# Patient Record
Sex: FEMALE | Race: WHITE | NOT HISPANIC OR LATINO | Employment: OTHER | URBAN - METROPOLITAN AREA
[De-identification: names, ages, dates, MRNs, and addresses within clinical notes are randomized per-mention and may not be internally consistent; named-entity substitution may affect disease eponyms.]

---

## 2017-01-11 DIAGNOSIS — F41.1 GAD (GENERALIZED ANXIETY DISORDER): Chronic | ICD-10-CM

## 2017-01-12 RX ORDER — CLONAZEPAM 0.5 MG/1
0.5 TABLET ORAL 2 TIMES DAILY
Qty: 30 TABLET | Refills: 2 | Status: SHIPPED | OUTPATIENT
Start: 2017-01-12 | End: 2017-01-20 | Stop reason: SDUPTHER

## 2017-01-20 PROBLEM — R41.3 AMNESIA: Status: ACTIVE | Noted: 2017-01-20

## 2017-01-29 DIAGNOSIS — K21.9 GASTROESOPHAGEAL REFLUX DISEASE WITHOUT ESOPHAGITIS: Chronic | ICD-10-CM

## 2017-01-29 RX ORDER — PANTOPRAZOLE SODIUM 40 MG/1
TABLET, DELAYED RELEASE ORAL
Qty: 90 TABLET | Refills: 0 | Status: SHIPPED | OUTPATIENT
Start: 2017-01-29 | End: 2017-05-10 | Stop reason: SDUPTHER

## 2017-01-30 ENCOUNTER — CLINICAL SUPPORT (OUTPATIENT)
Dept: CARDIOLOGY | Facility: CLINIC | Age: 77
End: 2017-01-30
Payer: MEDICARE

## 2017-01-30 DIAGNOSIS — R05.9 COUGH: ICD-10-CM

## 2017-01-30 DIAGNOSIS — I51.89 LEFT VENTRICULAR DIASTOLIC DYSFUNCTION WITH PRESERVED SYSTOLIC FUNCTION: Chronic | ICD-10-CM

## 2017-01-30 DIAGNOSIS — I20.9 AP (ANGINA PECTORIS): Chronic | ICD-10-CM

## 2017-01-30 DIAGNOSIS — Z95.1 S/P CABG X 1: ICD-10-CM

## 2017-01-30 DIAGNOSIS — R06.02 SHORTNESS OF BREATH: ICD-10-CM

## 2017-01-30 DIAGNOSIS — I25.10 CAD, MULTIPLE VESSEL: ICD-10-CM

## 2017-01-30 LAB
AORTIC VALVE REGURGITATION: ABNORMAL
DIASTOLIC DYSFUNCTION: NO
ESTIMATED PA SYSTOLIC PRESSURE: 15.11
MITRAL VALVE MOBILITY: NORMAL
RETIRED EF AND QEF - SEE NOTES: 70 (ref 55–65)

## 2017-01-30 PROCEDURE — 93306 TTE W/DOPPLER COMPLETE: CPT | Mod: S$GLB,,, | Performed by: NUCLEAR MEDICINE

## 2017-02-09 RX ORDER — ESCITALOPRAM OXALATE 20 MG/1
20 TABLET ORAL DAILY
Qty: 12 TABLET | Refills: 0 | Status: SHIPPED | OUTPATIENT
Start: 2017-02-09 | End: 2017-03-10 | Stop reason: SDUPTHER

## 2017-02-09 NOTE — TELEPHONE ENCOUNTER
----- Message from Chilo Schwarz sent at 2/9/2017  1:30 PM CST -----  Contact: Sadie ( [facility] )   Sadie ( [facility] ) is requesting a call from nurse to discuss a change in pt medication.              Please call Sadie ( [facility] ) back at [phone]

## 2017-02-10 DIAGNOSIS — J44.1 COPD EXACERBATION: ICD-10-CM

## 2017-02-10 RX ORDER — CLOPIDOGREL BISULFATE 75 MG/1
TABLET ORAL
Qty: 90 TABLET | Refills: 3 | Status: SHIPPED | OUTPATIENT
Start: 2017-02-10 | End: 2017-06-12 | Stop reason: SDUPTHER

## 2017-02-10 RX ORDER — HYDROCHLOROTHIAZIDE 25 MG/1
TABLET ORAL
Qty: 90 TABLET | Refills: 3 | Status: SHIPPED | OUTPATIENT
Start: 2017-02-10 | End: 2017-03-29 | Stop reason: SDUPTHER

## 2017-02-10 RX ORDER — PREDNISONE 20 MG/1
TABLET ORAL
Qty: 20 TABLET | Refills: 0 | Status: SHIPPED | OUTPATIENT
Start: 2017-02-10 | End: 2017-09-14

## 2017-02-13 RX ORDER — ATORVASTATIN CALCIUM 80 MG/1
TABLET, FILM COATED ORAL
Qty: 12 TABLET | Refills: 0 | Status: SHIPPED | OUTPATIENT
Start: 2017-02-13 | End: 2017-03-10 | Stop reason: SDUPTHER

## 2017-02-24 ENCOUNTER — LAB VISIT (OUTPATIENT)
Dept: LAB | Facility: HOSPITAL | Age: 77
End: 2017-02-24
Attending: INTERNAL MEDICINE
Payer: MEDICARE

## 2017-02-24 DIAGNOSIS — E11.69 HYPERLIPIDEMIA ASSOCIATED WITH TYPE 2 DIABETES MELLITUS: Chronic | ICD-10-CM

## 2017-02-24 DIAGNOSIS — R06.02 SHORTNESS OF BREATH: ICD-10-CM

## 2017-02-24 DIAGNOSIS — I51.89 LEFT VENTRICULAR DIASTOLIC DYSFUNCTION WITH PRESERVED SYSTOLIC FUNCTION: Chronic | ICD-10-CM

## 2017-02-24 DIAGNOSIS — R05.9 COUGH: ICD-10-CM

## 2017-02-24 DIAGNOSIS — E78.5 HYPERLIPIDEMIA ASSOCIATED WITH TYPE 2 DIABETES MELLITUS: Chronic | ICD-10-CM

## 2017-02-24 LAB
ALBUMIN SERPL BCP-MCNC: 3.3 G/DL
ALP SERPL-CCNC: 86 U/L
ALT SERPL W/O P-5'-P-CCNC: 17 U/L
ANION GAP SERPL CALC-SCNC: 9 MMOL/L
AST SERPL-CCNC: 13 U/L
BILIRUB SERPL-MCNC: 0.8 MG/DL
BNP SERPL-MCNC: 131 PG/ML
BUN SERPL-MCNC: 17 MG/DL
CALCIUM SERPL-MCNC: 9.3 MG/DL
CHLORIDE SERPL-SCNC: 102 MMOL/L
CHOLEST/HDLC SERPL: 2.6 {RATIO}
CO2 SERPL-SCNC: 31 MMOL/L
CREAT SERPL-MCNC: 0.9 MG/DL
EST. GFR  (AFRICAN AMERICAN): >60 ML/MIN/1.73 M^2
EST. GFR  (NON AFRICAN AMERICAN): >60 ML/MIN/1.73 M^2
GLUCOSE SERPL-MCNC: 160 MG/DL
HDL/CHOLESTEROL RATIO: 37.8 %
HDLC SERPL-MCNC: 193 MG/DL
HDLC SERPL-MCNC: 73 MG/DL
LDLC SERPL CALC-MCNC: 98 MG/DL
NONHDLC SERPL-MCNC: 120 MG/DL
POTASSIUM SERPL-SCNC: 3.4 MMOL/L
PROT SERPL-MCNC: 7.2 G/DL
SODIUM SERPL-SCNC: 142 MMOL/L
TRIGL SERPL-MCNC: 110 MG/DL

## 2017-02-24 PROCEDURE — 83880 ASSAY OF NATRIURETIC PEPTIDE: CPT

## 2017-02-24 PROCEDURE — 80053 COMPREHEN METABOLIC PANEL: CPT

## 2017-02-24 PROCEDURE — 36415 COLL VENOUS BLD VENIPUNCTURE: CPT

## 2017-02-24 PROCEDURE — 80061 LIPID PANEL: CPT

## 2017-03-10 RX ORDER — ALENDRONATE SODIUM 70 MG/1
TABLET ORAL
Qty: 12 TABLET | Refills: 0 | Status: SHIPPED | OUTPATIENT
Start: 2017-03-10 | End: 2017-05-11 | Stop reason: SDUPTHER

## 2017-03-13 RX ORDER — ATORVASTATIN CALCIUM 80 MG/1
80 TABLET, FILM COATED ORAL DAILY
Qty: 14 TABLET | Refills: 0 | Status: SHIPPED | OUTPATIENT
Start: 2017-03-13 | End: 2017-03-14 | Stop reason: SDUPTHER

## 2017-03-13 RX ORDER — ESCITALOPRAM OXALATE 20 MG/1
20 TABLET ORAL DAILY
Qty: 14 TABLET | Refills: 0 | Status: SHIPPED | OUTPATIENT
Start: 2017-03-13 | End: 2017-03-14 | Stop reason: SDUPTHER

## 2017-03-13 NOTE — TELEPHONE ENCOUNTER
Pt needs an appt. Last office visit with Dr. Wood was 10/2015. Will send 2 week supply of medication until chronic care appt

## 2017-03-15 RX ORDER — ESCITALOPRAM OXALATE 20 MG/1
20 TABLET ORAL DAILY
Qty: 90 TABLET | Refills: 0 | Status: SHIPPED | OUTPATIENT
Start: 2017-03-15 | End: 2017-06-12 | Stop reason: SDUPTHER

## 2017-03-15 RX ORDER — FOSINOPIRL SODIUM 10 MG/1
TABLET ORAL
Qty: 90 TABLET | Refills: 3 | Status: SHIPPED | OUTPATIENT
Start: 2017-03-15 | End: 2017-05-09 | Stop reason: ALTCHOICE

## 2017-03-15 RX ORDER — ATORVASTATIN CALCIUM 80 MG/1
80 TABLET, FILM COATED ORAL DAILY
Qty: 90 TABLET | Refills: 0 | Status: SHIPPED | OUTPATIENT
Start: 2017-03-15 | End: 2017-06-12 | Stop reason: SDUPTHER

## 2017-03-29 ENCOUNTER — OFFICE VISIT (OUTPATIENT)
Dept: CARDIOLOGY | Facility: CLINIC | Age: 77
End: 2017-03-29
Payer: MEDICARE

## 2017-03-29 VITALS
DIASTOLIC BLOOD PRESSURE: 44 MMHG | SYSTOLIC BLOOD PRESSURE: 118 MMHG | WEIGHT: 153.44 LBS | HEIGHT: 59 IN | HEART RATE: 60 BPM | BODY MASS INDEX: 30.93 KG/M2

## 2017-03-29 DIAGNOSIS — I25.10 CORONARY ARTERY DISEASE, ANGINA PRESENCE UNSPECIFIED, UNSPECIFIED VESSEL OR LESION TYPE, UNSPECIFIED WHETHER NATIVE OR TRANSPLANTED HEART: ICD-10-CM

## 2017-03-29 DIAGNOSIS — I25.10 CAD, MULTIPLE VESSEL: ICD-10-CM

## 2017-03-29 DIAGNOSIS — E11.69 HYPERLIPIDEMIA ASSOCIATED WITH TYPE 2 DIABETES MELLITUS: Chronic | ICD-10-CM

## 2017-03-29 DIAGNOSIS — E78.5 HYPERLIPIDEMIA ASSOCIATED WITH TYPE 2 DIABETES MELLITUS: Chronic | ICD-10-CM

## 2017-03-29 DIAGNOSIS — I51.89 LEFT VENTRICULAR DIASTOLIC DYSFUNCTION WITH PRESERVED SYSTOLIC FUNCTION: Chronic | ICD-10-CM

## 2017-03-29 DIAGNOSIS — I15.2 HYPERTENSION ASSOCIATED WITH DIABETES: ICD-10-CM

## 2017-03-29 DIAGNOSIS — E11.59 HYPERTENSION ASSOCIATED WITH DIABETES: ICD-10-CM

## 2017-03-29 DIAGNOSIS — E11.65 TYPE 2 DIABETES MELLITUS WITH HYPERGLYCEMIA, WITHOUT LONG-TERM CURRENT USE OF INSULIN: Chronic | ICD-10-CM

## 2017-03-29 DIAGNOSIS — I20.9 AP (ANGINA PECTORIS): Primary | Chronic | ICD-10-CM

## 2017-03-29 DIAGNOSIS — Z95.1 S/P CABG X 1: ICD-10-CM

## 2017-03-29 PROCEDURE — 1159F MED LIST DOCD IN RCRD: CPT | Mod: S$GLB,,, | Performed by: INTERNAL MEDICINE

## 2017-03-29 PROCEDURE — 1160F RVW MEDS BY RX/DR IN RCRD: CPT | Mod: S$GLB,,, | Performed by: INTERNAL MEDICINE

## 2017-03-29 PROCEDURE — 99999 PR PBB SHADOW E&M-EST. PATIENT-LVL II: CPT | Mod: PBBFAC,,, | Performed by: INTERNAL MEDICINE

## 2017-03-29 PROCEDURE — 3074F SYST BP LT 130 MM HG: CPT | Mod: S$GLB,,, | Performed by: INTERNAL MEDICINE

## 2017-03-29 PROCEDURE — 1157F ADVNC CARE PLAN IN RCRD: CPT | Mod: S$GLB,,, | Performed by: INTERNAL MEDICINE

## 2017-03-29 PROCEDURE — 99213 OFFICE O/P EST LOW 20 MIN: CPT | Mod: S$GLB,,, | Performed by: INTERNAL MEDICINE

## 2017-03-29 PROCEDURE — 3078F DIAST BP <80 MM HG: CPT | Mod: S$GLB,,, | Performed by: INTERNAL MEDICINE

## 2017-03-29 RX ORDER — ISOSORBIDE MONONITRATE 60 MG/1
60 TABLET, EXTENDED RELEASE ORAL DAILY
COMMUNITY
End: 2017-06-12 | Stop reason: SDUPTHER

## 2017-03-29 RX ORDER — LEVOTHYROXINE SODIUM 50 UG/1
50 TABLET ORAL DAILY
COMMUNITY
End: 2017-06-12 | Stop reason: SDUPTHER

## 2017-03-29 NOTE — MR AVS SNAPSHOT
[facility]  [id number] [place] [id number]  Phone: [phone]  Fax: [phone]                  Tiffanie Wise   3/29/2017 2:00 PM   Office Visit    Description:  Female : 1940   Provider:  Brendan Short MD   Department:  [facility]           Reason for Visit     Coronary Artery Disease     Hypertension     Hyperlipidemia           Diagnoses this Visit        Comments    AP (angina pectoris)    -  Primary     Hyperlipidemia associated with type 2 diabetes mellitus         Type 2 diabetes mellitus with hyperglycemia, without long-term current use of insulin         Left ventricular diastolic dysfunction with preserved systolic function         S/P CABG x 1         Hypertension associated with diabetes         CAD, multiple vessel         Coronary artery disease, angina presence unspecified, unspecified vessel or lesion type, unspecified whether native or transplanted heart                To Do List           Goals (5 Years of Data)     None      Follow-Up and Disposition     Return in about 6 months (around 2017).      [facility] On Call     [facility] On Call Nurse Care Line -  Assistance  Registered nurses in the [facility] Call Center provide clinical advisement, health education, appointment booking, and other advisory services.  Call for this free service at [phone].             Medications           Message regarding Medications     Verify the changes and/or additions to your medication regime listed below are the same as discussed with your clinician today.  If any of these changes or additions are incorrect, please notify your healthcare provider.        STOP taking these medications     acetaminophen (TYLENOL) 325 MG tablet Take 325 mg by mouth every 6 (six) hours as needed for Pain.           Verify that the below list of medications is an accurate representation of the medications you are currently taking.  If none reported, the list may  be blank. If incorrect, please contact your healthcare provider. Carry this list with you in case of emergency.           Current Medications     albuterol-ipratropium 2.5mg-0.5mg/3mL (DUO-NEB) 0.5 mg-3 mg(2.5 mg base)/3 mL nebulizer solution USE 3 ML VIA NEBULIZER EVERY 6 HOURS    alendronate (FOSAMAX) 70 MG tablet TAKE 1 TABLET BY MOUTH EVERY WEEK WITH A FULL GLASS OF WATER ON EMPTY STOMACH. DO NOT EAT OR LIE DOWN FOR 30 MINUTES    aspirin (ECOTRIN) 81 MG EC tablet Take 81 mg by mouth. Take 81 mg by mouth daily.    atorvastatin (LIPITOR) 80 MG tablet Take 1 tablet (80 mg total) by mouth once daily.    CANAGLIFLOZIN/METFORMIN HCL (INVOKAMET ORAL) Take 50 mg by mouth once daily. Takes two tablets by mouth daily    clonazePAM (KLONOPIN) 0.5 MG tablet Take 1 tablet (0.5 mg total) by mouth 2 (two) times daily as needed for Anxiety. as needed for anxiety.    clopidogrel (PLAVIX) 75 mg tablet TAKE 1 TABLET BY MOUTH DAILY    donepezil (ARICEPT) 10 MG tablet     ERGOCALCIFEROL, VITAMIN D2, (VITAMIN D ORAL) Take by mouth.    escitalopram oxalate (LEXAPRO) 20 MG tablet Take 1 tablet (20 mg total) by mouth once daily.    fluticasone (FLONASE) 50 mcg/actuation nasal spray 1 spray by Each Nare route once daily.    fosinopril (MONOPRIL) 10 MG Tab TAKE 1 TABLET BY MOUTH DAILY    hydrochlorothiazide (HYDRODIURIL) 25 MG tablet TAKE 1 TABLET BY MOUTH EVERY DAY    insulin lispro (HUMALOG) 100 unit/mL Crtg Inject 40 Units into the skin 2 (two) times daily. Takes 40 units in the morning and 25 units at night    isosorbide mononitrate (IMDUR) 60 MG 24 hr tablet Take 60 mg by mouth once daily.    levothyroxine (SYNTHROID) 50 MCG tablet Take 50 mcg by mouth once daily.    losartan (COZAAR) 25 MG tablet Take 1 tablet (25 mg total) by mouth once daily.    metoprolol succinate (TOPROL-XL) 50 MG 24 hr tablet TAKE 1 TABLET(50 MG) BY MOUTH EVERY DAY    pantoprazole (PROTONIX) 40 MG tablet TAKE 1 TABLET BY MOUTH DAILY    albuterol 90  "mcg/actuation inhaler Inhale 2 puffs into the lungs every 6 (six) hours.    ALCOHOL PADS PadM     BD INSULIN SYRINGE ULT-FINE II 1/2 mL 31 x 5/16" Syrg     BLOOD SUGAR DIAGNOSTIC (TRUETEST TEST STRIPS MISC) by Misc.(Non-Drug; Combo Route) route. Pt is testing 3 times a day    EASY COMFORT LANCETS 30 gauge [facility]     EASY TALK GLUCOSE TEST Strp     EASY TALK LOW CONTROL Soln     hydrocodone-acetaminophen 5-325mg (NORCO) 5-325 mg per tablet Take 1 tablet by mouth every 6 (six) hours as needed for Pain.    insulin NPH (HUMULIN N) 100 unit/mL injection Inject into the skin. 40 units in the morning and 25 units in the evening    NITROSTAT 0.4 mg SL tablet DISSOLVE 1 TABLET UNDER THE TONGUE AS NEEDED FOR CHEST PAIN AS DIRECTED    pen needle, diabetic 31 gauge x 5/16" Ndle USE TWICE DAILY AND PRN    predniSONE (DELTASONE) 20 MG tablet TAKE 3 TABLETS BY MOUTH DAILY FOR 3 DAYS, 2 TABLETS DAILY FOR 3 DAYS, 1 TABLET DAILY FOR 3 DAYS, 1/2 TABLET DAILY FOR 3 DAYS    SITagliptan (JANUVIA) 100 MG Tab Take 100 mg by mouth once daily.           Clinical Reference Information           Your Vitals Were     BP Pulse Height Weight BMI    118/44 60 4' 11" (1.499 m) 69.6 kg (153 lb 7 oz) 30.99 kg/m2      Blood Pressure          Most Recent Value    Right Arm BP - Sitting  118/44    Left Arm BP - Sitting  110/46    BP  (!)  118/44      Allergies as of 3/29/2017     No Known Allergies      Immunizations Administered on Date of Encounter - 3/29/2017     None      [facility] Sign-Up     Activating your [facility] account is as easy as 1-2-3!     1) Visit [email or web address], select Sign Up Now, enter this activation code and your date of birth, then select Next.  [email or web address]  Expires: 5/13/2017  2:44 PM      2) Create a username and password to use when you visit [facility] in the future and select a security question in case you lose your password and select Next.    3) Enter your e-mail address and click Sign Up!    Additional Information  If " you have questions, please e-mail [email or web address] or call [phone] to talk to our [facility] staff. Remember, [facility] is NOT to be used for urgent needs. For medical emergencies, dial 911.         Smoking Cessation     If you would like to quit smoking:   You may be eligible for free services if you are a [place] resident and started smoking cigarettes before September 1, 1988.  Call the Smoking Cessation Trust ([facility]) toll free at [phone] or [phone].   Call 4-810-QUIT-NOW if you do not meet the above criteria.            Language Assistance Services     ATTENTION: Language assistance services are available, free of charge. Please call [phone].      ATENCIÓN: Si nicholas zarate, tiene a barrios disposición servicios gratuitos de asistencia lingüística. Llame al [phone].     CHÚ Ý: N?u b?n nói Ti?ng Vi?t, có các d?ch v? h? tr? ngôn ng? mi?n phí dành cho b?n. G?i s? [phone].         [facility] complies with applicable Federal civil rights laws and does not discriminate on the basis of race, color, national origin, age, disability, or sex.

## 2017-03-29 NOTE — PROGRESS NOTES
Subjective:    Patient ID:  Tiffanie Wise is a 76 y.o. female who presents for evaluation of Coronary Artery Disease (3 mo f/u); Hypertension; and Hyperlipidemia      HPI Mrs. Wise returns for f/u.  Her current medical conditions include CAD (CABG LIMA-LAD 6/16), DM, HTN, dyslipidemia, GERD. Nonsmoker.      Her prior history is pertinent for following:      NSTEMI 11/10 and underwent LHC and sucessful stenting of 90% RCA (3 x 23 mm Promus BRIANDA).    S/p PCI of 70% mid-LCX 12/12 with Xience BRIANDA.    S/p LHC for worsening anginal sxs on 11/21/13 with successful PCI 90% ISR mid-LCX with Xience BRIANDA.    S/p unstable angina and had PTCA of ISR of her LCX March 2014.  S/p 7/14 PTCA of severe LCX/OM ISR and PTCA of distal LCX at her bifurcation lesion at stent site July 2014 for ACS.  S/p 10/14 repeat revascularization of severe LCX ISR with cutting balloon Oct 2014 for ACS.  S/p 12/14 admit with unstable angina. She had ISR LCX again, Resolute BRIANDA implanted with good results.    Pt underwent repeat LHC again late June 2016 for unstable anginal sxs and had severe 95% distal LAD stenosis not amenable to PCI.  She had patent LCX, RCA stents, chronic occlusion of R PLB, normal LVEF.  She had urgent CABG w LIMA-LAD June 2016.  Now here for f/u.  Seems ok.  Has residual sternotomy discomfort but no cp like her prior angina since her cabg.  Has not had to take sl ntg.  Stable SHERMAN, unchanged.  BP controlled on current meds.  Working with Dr. De Leon, endocrine, on her DM.  Lipids controlled on statin tx.    Review of Systems   Constitution: Positive for malaise/fatigue.   HENT: Negative.    Eyes: Negative.    Cardiovascular: Positive for chest pain and dyspnea on exertion.   Respiratory: Positive for shortness of breath.    Endocrine: Negative.    Hematologic/Lymphatic: Negative.    Skin: Negative.    Musculoskeletal: Negative.    Gastrointestinal: Negative.    Genitourinary: Negative.    Neurological: Negative.   "  Psychiatric/Behavioral: Negative.    Allergic/Immunologic: Negative.        BP (!) 118/44  Pulse 60  Ht 4' 11" (1.499 m)  Wt 69.6 kg (153 lb 7 oz)  BMI 30.99 kg/m2     Wt Readings from Last 3 Encounters:   03/29/17 69.6 kg (153 lb 7 oz)   01/20/17 68.5 kg (151 lb)   12/29/16 70.1 kg (154 lb 8.7 oz)     Temp Readings from Last 3 Encounters:   10/13/16 98.2 °F (36.8 °C) (Tympanic)   07/08/16 98.4 °F (36.9 °C) (Oral)   06/03/16 97.2 °F (36.2 °C) (Tympanic)     BP Readings from Last 3 Encounters:   03/29/17 (!) 118/44   01/20/17 (!) 152/68   12/29/16 122/62     Pulse Readings from Last 3 Encounters:   03/29/17 60   01/20/17 (!) 53   12/29/16 60            Objective:    Physical Exam   Constitutional: She is oriented to person, place, and time. Vital signs are normal. She appears well-developed and well-nourished. She is active and cooperative. She does not have a sickly appearance. She does not appear ill. No distress.   HENT:   Head: Normocephalic.   Neck: Neck supple. No JVD present. Carotid bruit is not present.   Cardiovascular: Normal rate, regular rhythm, S1 normal, S2 normal, normal heart sounds and normal pulses.  PMI is not displaced.  Exam reveals no gallop and no friction rub.    No murmur heard.  Pulses:       Radial pulses are 2+ on the right side, and 2+ on the left side.   Pulmonary/Chest: Effort normal and breath sounds normal. She has no wheezes. She has no rales.   Abdominal: Soft. Normal appearance, normal aorta and bowel sounds are normal. There is no tenderness.   Musculoskeletal: She exhibits no edema.   Lymphadenopathy:     She has no cervical adenopathy.   Neurological: She is alert and oriented to person, place, and time.   Skin: Skin is warm. She is not diaphoretic.   Psychiatric: She has a normal mood and affect. Her behavior is normal.   Nursing note and vitals reviewed.      I have reviewed all pertinent labs and cardiac studies.    Lab Results   Component Value Date    WBC 12.00 " 07/07/2016    HGB 12.2 07/07/2016    HCT 38.8 07/07/2016    MCV 80 (L) 07/07/2016     (H) 07/07/2016     Lab Results   Component Value Date    HGBA1C 7.7 (H) 09/13/2016     Lab Results   Component Value Date    CHOL 193 02/24/2017    CHOL 184 01/08/2016    CHOL 154 02/25/2015     Lab Results   Component Value Date    HDL 73 02/24/2017    HDL 61 01/08/2016    HDL 61 02/25/2015     Lab Results   Component Value Date    LDLCALC 98.0 02/24/2017    LDLCALC 108.4 01/08/2016    LDLCALC 74.4 02/25/2015     Lab Results   Component Value Date    TRIG 110 02/24/2017    TRIG 73 01/08/2016    TRIG 93 02/25/2015     Lab Results   Component Value Date    CHOLHDL 37.8 02/24/2017    CHOLHDL 33.2 01/08/2016    CHOLHDL 39.6 02/25/2015           Assessment:       1. AP (angina pectoris)    2. Hyperlipidemia associated with type 2 diabetes mellitus    3. Type 2 diabetes mellitus with hyperglycemia, without long-term current use of insulin    4. Left ventricular diastolic dysfunction with preserved systolic function    5. S/P CABG x 1    6. Hypertension associated with diabetes    7. CAD, multiple vessel    8. Coronary artery disease, angina presence unspecified, unspecified vessel or lesion type, unspecified whether native or transplanted heart         Plan:             STABLE CAD S/P CABG 2016.  NO ANGINAL SXS SINCE CABG AND SHE HAS BEEN TAKEN OFF RANEXA AND IS ON LOWER IMDUR DOSE NOW.  STERNOTOMY DISCOMFORT STABLE.  NO CHANGES IN MEDS TODAY.  CONTINUE OPTIMAL MEDICAL TX FOR CAD.  WORK ON LOWERING DM TO GOAL.  CARDIAC  DIET  F/U 6 MONTHS.

## 2017-05-09 ENCOUNTER — TELEPHONE (OUTPATIENT)
Dept: CARDIOLOGY | Facility: CLINIC | Age: 77
End: 2017-05-09

## 2017-05-09 NOTE — TELEPHONE ENCOUNTER
I have attempted without success to contact this patient by phone. Left message to return call.    Medication list updated.

## 2017-05-09 NOTE — TELEPHONE ENCOUNTER
----- Message from Viry Ríos sent at 5/9/2017 12:22 PM CDT -----  Contact: daughter-Connie  Pt daughter-Connie states her endo wants pt to get off Lisinopril because on 2 blood pressure on losartan and daughter wants to know if it is okay to take her off the lisinopril or ween off.....[phone]

## 2017-05-09 NOTE — TELEPHONE ENCOUNTER
Patient daughter states her Endocrinologist wants patient to stop Fosinopril because she is also on Losartan and they do the same thing. Daughter wants to know if she should just stop Fosinopril or wean patient off. Please advise?

## 2017-05-10 DIAGNOSIS — K21.9 GASTROESOPHAGEAL REFLUX DISEASE WITHOUT ESOPHAGITIS: Chronic | ICD-10-CM

## 2017-05-10 RX ORDER — PANTOPRAZOLE SODIUM 40 MG/1
TABLET, DELAYED RELEASE ORAL
Qty: 90 TABLET | Refills: 0 | Status: SHIPPED | OUTPATIENT
Start: 2017-05-10 | End: 2017-06-12 | Stop reason: SDUPTHER

## 2017-05-11 RX ORDER — ALENDRONATE SODIUM 70 MG/1
TABLET ORAL
Qty: 12 TABLET | Refills: 0 | Status: SHIPPED | OUTPATIENT
Start: 2017-05-11 | End: 2017-06-12 | Stop reason: SDUPTHER

## 2017-05-11 NOTE — TELEPHONE ENCOUNTER
Last office visit 10/13/2016. Last refill date 03/10/2017. Pt is requesting a refill on alendronate 70mg  #12  1tablet once a week.

## 2017-06-05 ENCOUNTER — PATIENT MESSAGE (OUTPATIENT)
Dept: CARDIOLOGY | Facility: CLINIC | Age: 77
End: 2017-06-05

## 2017-06-12 RX ORDER — ATORVASTATIN CALCIUM 80 MG/1
80 TABLET, FILM COATED ORAL DAILY
Qty: 90 TABLET | Refills: 0 | Status: CANCELLED | OUTPATIENT
Start: 2017-06-12

## 2017-06-12 RX ORDER — ESCITALOPRAM OXALATE 20 MG/1
20 TABLET ORAL DAILY
Qty: 90 TABLET | Refills: 0 | Status: CANCELLED | OUTPATIENT
Start: 2017-06-12

## 2017-06-15 ENCOUNTER — TELEPHONE (OUTPATIENT)
Dept: CARDIOLOGY | Facility: CLINIC | Age: 77
End: 2017-06-15

## 2017-06-15 NOTE — TELEPHONE ENCOUNTER
----- Message from Billie Harrison sent at 6/15/2017  3:01 PM CDT -----  Contact: Philip/[facility]  States he's calling to verify a prescription. Please call Kole at [phone]. Thank you

## 2017-06-16 DIAGNOSIS — E78.00 PURE HYPERCHOLESTEROLEMIA: ICD-10-CM

## 2017-06-16 DIAGNOSIS — F41.9 ANXIETY: ICD-10-CM

## 2017-06-16 RX ORDER — ESCITALOPRAM OXALATE 20 MG/1
20 TABLET ORAL DAILY
Qty: 90 TABLET | Refills: 0 | Status: SHIPPED | OUTPATIENT
Start: 2017-06-16 | End: 2017-06-20 | Stop reason: SDUPTHER

## 2017-06-16 RX ORDER — ATORVASTATIN CALCIUM 80 MG/1
80 TABLET, FILM COATED ORAL DAILY
Qty: 90 TABLET | Refills: 3 | Status: SHIPPED | OUTPATIENT
Start: 2017-06-16 | End: 2017-06-20 | Stop reason: SDUPTHER

## 2017-06-16 NOTE — TELEPHONE ENCOUNTER
Meds were originally sent to [facility] (mail order). Please send to Philip on [address] [place].

## 2017-08-07 DIAGNOSIS — K21.9 GASTROESOPHAGEAL REFLUX DISEASE WITHOUT ESOPHAGITIS: Chronic | ICD-10-CM

## 2017-08-07 RX ORDER — PANTOPRAZOLE SODIUM 40 MG/1
TABLET, DELAYED RELEASE ORAL
Qty: 90 TABLET | Refills: 0 | Status: SHIPPED | OUTPATIENT
Start: 2017-08-07 | End: 2018-12-31 | Stop reason: SDUPTHER

## 2017-08-15 RX ORDER — NITROGLYCERIN 0.4 MG/1
TABLET SUBLINGUAL
Qty: 25 TABLET | Refills: 12 | Status: SHIPPED | OUTPATIENT
Start: 2017-08-15 | End: 2018-07-17 | Stop reason: SDUPTHER

## 2017-08-23 ENCOUNTER — TELEPHONE (OUTPATIENT)
Dept: CARDIOLOGY | Facility: CLINIC | Age: 77
End: 2017-08-23

## 2017-08-23 NOTE — TELEPHONE ENCOUNTER
----- Message from Stacey Pina sent at 8/22/2017  3:51 PM CDT -----  Contact: Self  Good afternoon,     Pt would like a call back regarding rescheduling appt on 09/21/17. Pt stated she saw her Endocrinologist and was advised to see Dr. Short sooner than her current appt.    Pt can be reached at [phone].    Thank you!

## 2017-08-23 NOTE — TELEPHONE ENCOUNTER
----- Message from Fidelia Hair sent at 8/23/2017  8:36 AM CDT -----  needs callback rg poss being worked in before 9/21...[phone] (home)

## 2017-09-14 ENCOUNTER — OFFICE VISIT (OUTPATIENT)
Dept: CARDIOLOGY | Facility: CLINIC | Age: 77
End: 2017-09-14
Payer: MEDICARE

## 2017-09-14 ENCOUNTER — CLINICAL SUPPORT (OUTPATIENT)
Dept: CARDIOLOGY | Facility: CLINIC | Age: 77
End: 2017-09-14
Payer: MEDICARE

## 2017-09-14 VITALS
HEIGHT: 59 IN | DIASTOLIC BLOOD PRESSURE: 74 MMHG | SYSTOLIC BLOOD PRESSURE: 144 MMHG | WEIGHT: 160.81 LBS | BODY MASS INDEX: 32.42 KG/M2 | HEART RATE: 59 BPM

## 2017-09-14 DIAGNOSIS — I15.2 HYPERTENSION ASSOCIATED WITH DIABETES: ICD-10-CM

## 2017-09-14 DIAGNOSIS — E11.65 TYPE 2 DIABETES MELLITUS WITH HYPERGLYCEMIA, WITH LONG-TERM CURRENT USE OF INSULIN: ICD-10-CM

## 2017-09-14 DIAGNOSIS — Z95.1 S/P CABG X 1: ICD-10-CM

## 2017-09-14 DIAGNOSIS — E11.59 HYPERTENSION ASSOCIATED WITH DIABETES: ICD-10-CM

## 2017-09-14 DIAGNOSIS — I25.10 CAD, MULTIPLE VESSEL: ICD-10-CM

## 2017-09-14 DIAGNOSIS — E11.65 TYPE 2 DIABETES MELLITUS WITH HYPERGLYCEMIA, WITHOUT LONG-TERM CURRENT USE OF INSULIN: Chronic | ICD-10-CM

## 2017-09-14 DIAGNOSIS — Z79.4 TYPE 2 DIABETES MELLITUS WITH HYPERGLYCEMIA, WITH LONG-TERM CURRENT USE OF INSULIN: ICD-10-CM

## 2017-09-14 DIAGNOSIS — I25.10 CORONARY ARTERY DISEASE, ANGINA PRESENCE UNSPECIFIED, UNSPECIFIED VESSEL OR LESION TYPE, UNSPECIFIED WHETHER NATIVE OR TRANSPLANTED HEART: ICD-10-CM

## 2017-09-14 DIAGNOSIS — I51.89 LEFT VENTRICULAR DIASTOLIC DYSFUNCTION WITH PRESERVED SYSTOLIC FUNCTION: Primary | Chronic | ICD-10-CM

## 2017-09-14 DIAGNOSIS — I25.810 CORONARY ARTERY DISEASE INVOLVING CORONARY BYPASS GRAFT OF NATIVE HEART, ANGINA PRESENCE UNSPECIFIED: Primary | ICD-10-CM

## 2017-09-14 DIAGNOSIS — G47.33 OSA (OBSTRUCTIVE SLEEP APNEA): Chronic | ICD-10-CM

## 2017-09-14 DIAGNOSIS — I25.810 CORONARY ARTERY DISEASE INVOLVING CORONARY BYPASS GRAFT OF NATIVE HEART, ANGINA PRESENCE UNSPECIFIED: ICD-10-CM

## 2017-09-14 DIAGNOSIS — I20.9 AP (ANGINA PECTORIS): Chronic | ICD-10-CM

## 2017-09-14 DIAGNOSIS — E78.5 HYPERLIPIDEMIA ASSOCIATED WITH TYPE 2 DIABETES MELLITUS: Chronic | ICD-10-CM

## 2017-09-14 DIAGNOSIS — E11.69 HYPERLIPIDEMIA ASSOCIATED WITH TYPE 2 DIABETES MELLITUS: Chronic | ICD-10-CM

## 2017-09-14 DIAGNOSIS — R06.02 SHORTNESS OF BREATH: ICD-10-CM

## 2017-09-14 PROCEDURE — 99214 OFFICE O/P EST MOD 30 MIN: CPT | Mod: S$GLB,,, | Performed by: INTERNAL MEDICINE

## 2017-09-14 PROCEDURE — 3077F SYST BP >= 140 MM HG: CPT | Mod: S$GLB,,, | Performed by: INTERNAL MEDICINE

## 2017-09-14 PROCEDURE — 3078F DIAST BP <80 MM HG: CPT | Mod: S$GLB,,, | Performed by: INTERNAL MEDICINE

## 2017-09-14 PROCEDURE — 93000 ELECTROCARDIOGRAM COMPLETE: CPT | Mod: S$GLB,,, | Performed by: INTERNAL MEDICINE

## 2017-09-14 PROCEDURE — 1159F MED LIST DOCD IN RCRD: CPT | Mod: S$GLB,,, | Performed by: INTERNAL MEDICINE

## 2017-09-14 PROCEDURE — 99999 PR PBB SHADOW E&M-EST. PATIENT-LVL III: CPT | Mod: PBBFAC,,, | Performed by: INTERNAL MEDICINE

## 2017-09-14 PROCEDURE — 3008F BODY MASS INDEX DOCD: CPT | Mod: S$GLB,,, | Performed by: INTERNAL MEDICINE

## 2017-09-14 PROCEDURE — 99499 UNLISTED E&M SERVICE: CPT | Mod: S$GLB,,, | Performed by: INTERNAL MEDICINE

## 2017-09-14 RX ORDER — CHOLECALCIFEROL (VITAMIN D3) 25 MCG
1000 TABLET ORAL DAILY
COMMUNITY
End: 2017-11-06

## 2017-09-14 RX ORDER — ISOSORBIDE MONONITRATE 30 MG/1
30 TABLET, EXTENDED RELEASE ORAL NIGHTLY
Qty: 30 TABLET | Refills: 11 | Status: ON HOLD | OUTPATIENT
Start: 2017-09-14 | End: 2017-12-22 | Stop reason: HOSPADM

## 2017-09-14 NOTE — PROGRESS NOTES
Subjective:    Patient ID:  Tiffanie Wise is a 77 y.o. female who presents for evaluation of Coronary Artery Disease; Hyperlipidemia; Chest Pain; Shortness of Breath; and Hypertension      HPI Mrs. Wise returns for f/u.  Her current medical conditions include CAD (CABG LIMA-LAD 6/16), DM, HTN, dyslipidemia, GERD. Nonsmoker.  Her prior history is pertinent for following:  NSTEMI 11/10 and underwent LHC and sucessful stenting of 90% RCA (3 x 23 mm Promus BRIANDA).    S/p PCI of 70% mid-LCX 12/12 with Xience BRIANDA.    S/p LHC for worsening anginal sxs on 11/21/13 with successful PCI 90% ISR mid-LCX with Xience BRIANDA.    S/p unstable angina and had PTCA of ISR of her LCX March 2014.  S/p 7/14 PTCA of severe LCX/OM ISR and PTCA of distal LCX at her bifurcation lesion at stent site July 2014 for ACS.  S/p 10/14 repeat revascularization of severe LCX ISR with cutting balloon Oct 2014 for ACS.  S/p 12/14 admit with unstable angina. She had ISR LCX again, Resolute BRIANDA implanted with good results.    Pt underwent repeat LHC again late June 2016 for unstable anginal sxs and had severe 95% distal LAD stenosis not amenable to PCI.  She had patent LCX, RCA stents, chronic occlusion of R PLB, normal LVEF.  She had urgent CABG w LIMA-LAD June 2016.  Now here for f/u.  States she just doesn't feel good.  Has back pains, arm/hand pains, leg pains that limit her.   She thinks it is arthritis.  She has taken sl ntg once/week at night, and alleviates angina.  Has chronic SHERMAN, worse over last few months per pt.    Not using CPAP since cabg.  No pnd.  ecg today shows sinus eleno 59 bpm, anterolateral st-t abnl.  There were concerns over pt taking fosinopril and losartan per daughter.  I saw pt in Dec 2016 and pt was told to stop fosinopril due to cough and losartan was started 25 mg daily.  Apparently her PCP ordered 100 mg losartan.   She has been more depressed too recently.      Patient Active Problem List   Diagnosis    AP (angina  pectoris)    Hyperlipidemia associated with type 2 diabetes mellitus    Type 2 diabetes mellitus with hyperglycemia    Acquired hypothyroidism    S/P CABG x 1    Osteoporosis    NAWAF (obstructive sleep apnea)    Type 2 diabetes mellitus    RUDOLPH (generalized anxiety disorder)    Pulmonary nodule, right - stable    Major depressive disorder, single episode, mild    Left ventricular diastolic dysfunction with preserved systolic function    Long-term insulin use in type 2 diabetes    CAD (coronary artery disease)    CAD, multiple vessel    Cough    Shortness of breath    Hypertension associated with diabetes    Unspecified vitamin D deficiency    Amnesia     Past Medical History:   Diagnosis Date    Acute respiratory failure with hypoxia     Anxiety     AP (angina pectoris) 7/18/2013    Asthma     CAD, multiple vessel 8/2/2016    Chronic ischemic heart disease 7/18/2013    Coronary artery disease 7/18/2013    Depression     Diabetes with neurologic complications     GERD (gastroesophageal reflux disease) 7/18/2013    Heart failure     History of PTCA 7/18/2013    Hypertension 7/18/2013    Hypothyroidism     Leg pain 8/29/2013    Mixed hyperlipidemia 7/18/2013    Myocardial infarction     Osteoporosis     Pneumonia     Pneumonia due to other staphylococcus     Precancerous changes of the vagina     Sleep apnea     stopped using CPAP 2015 - sores in nose, couldn't breathe, uses Breathe riht strips now.     Thyroid disease     Tobacco dependence     resolved    Trouble in sleeping     Type 2 diabetes mellitus with ophthalmic manifestations     Urinary incontinence     pullups day, pads at night     Review of Systems   Constitution: Positive for weakness and malaise/fatigue.   HENT: Negative.    Eyes: Negative.    Cardiovascular: Positive for chest pain and dyspnea on exertion.   Respiratory: Positive for shortness of breath and snoring.    Endocrine: Negative.     Hematologic/Lymphatic: Negative.    Skin: Negative.    Musculoskeletal: Positive for arthritis, back pain and joint pain.   Gastrointestinal: Negative.    Genitourinary: Negative.    Psychiatric/Behavioral: Positive for depression.   Allergic/Immunologic: Negative.          Wt Readings from Last 3 Encounters:   09/14/17 73 kg (160 lb 13.2 oz)   05/31/17 71.2 kg (157 lb)   04/13/17 69.4 kg (153 lb)     Temp Readings from Last 3 Encounters:   10/13/16 98.2 °F (36.8 °C) (Tympanic)   07/08/16 98.4 °F (36.9 °C) (Oral)   06/03/16 97.2 °F (36.2 °C) (Tympanic)     BP Readings from Last 3 Encounters:   09/14/17 (!) 144/74   05/31/17 (!) 128/50   04/13/17 105/61     Pulse Readings from Last 3 Encounters:   09/14/17 (!) 59   05/31/17 (!) 53   04/13/17 (!) 56           Objective:    Physical Exam   Constitutional: She is oriented to person, place, and time. Vital signs are normal. She appears well-developed and well-nourished. She is active and cooperative. She does not have a sickly appearance. She does not appear ill. No distress.   HENT:   Head: Normocephalic.   Neck: Neck supple. Normal carotid pulses, no hepatojugular reflux and no JVD present. Carotid bruit is not present. No thyromegaly present.   Cardiovascular: Normal rate, regular rhythm, S1 normal, S2 normal, normal heart sounds and normal pulses.  PMI is not displaced.  Exam reveals no gallop and no friction rub.    No murmur heard.  Pulses:       Radial pulses are 2+ on the right side, and 2+ on the left side.   Pulmonary/Chest: Effort normal and breath sounds normal. She has no wheezes. She has no rales.   Abdominal: Soft. Normal appearance, normal aorta and bowel sounds are normal. She exhibits no pulsatile liver, no abdominal bruit, no ascites and no mass. There is no splenomegaly or hepatomegaly. There is no tenderness.   Musculoskeletal: She exhibits no edema.   Lymphadenopathy:     She has no cervical adenopathy.   Neurological: She is alert and oriented to  person, place, and time.   Skin: Skin is warm. She is not diaphoretic.   Psychiatric: She has a normal mood and affect. Her behavior is normal.   Nursing note and vitals reviewed.      I have reviewed all pertinent labs and cardiac studies.        Chemistry        Component Value Date/Time     04/13/2017 1216    K 5.3 (H) 04/13/2017 1216    CL 97 04/13/2017 1216    CO2 25 04/13/2017 1216    BUN 22 04/13/2017 1216    CREATININE 0.90 04/13/2017 1216     (H) 04/13/2017 1216        Component Value Date/Time    CALCIUM 9.1 04/13/2017 1216    ALKPHOS 116 04/13/2017 1216    AST 13 04/13/2017 1216    ALT 11 04/13/2017 1216    BILITOT 0.6 04/13/2017 1216    ESTGFRAFRICA >60.0 02/24/2017 0955    EGFRNONAA 62 04/13/2017 1216        Lab Results   Component Value Date    WBC 10.4 04/13/2017    HGB 12.3 04/13/2017    HCT 40.6 04/13/2017    MCV 79 04/13/2017     04/13/2017     Lab Results   Component Value Date    HGBA1C 7.7 (H) 09/13/2016     Lab Results   Component Value Date    CHOL 193 02/24/2017    CHOL 184 01/08/2016    CHOL 154 02/25/2015     Lab Results   Component Value Date    HDL 73 02/24/2017    HDL 61 01/08/2016    HDL 61 02/25/2015     Lab Results   Component Value Date    LDLCALC 98.0 02/24/2017    LDLCALC 108.4 01/08/2016    LDLCALC 74.4 02/25/2015     Lab Results   Component Value Date    TRIG 110 02/24/2017    TRIG 73 01/08/2016    TRIG 93 02/25/2015     Lab Results   Component Value Date    CHOLHDL 37.8 02/24/2017    CHOLHDL 33.2 01/08/2016    CHOLHDL 39.6 02/25/2015           Assessment:       1. Left ventricular diastolic dysfunction with preserved systolic function    2. Hypertension associated with diabetes    3. Hyperlipidemia associated with type 2 diabetes mellitus    4. CAD, multiple vessel    5. Coronary artery disease, angina presence unspecified, unspecified vessel or lesion type, unspecified whether native or transplanted heart    6. AP (angina pectoris)    7. S/P CABG x 1    8.  Shortness of breath    9. Type 2 diabetes mellitus with hyperglycemia, without long-term current use of insulin    10. Type 2 diabetes mellitus with hyperglycemia, with long-term current use of insulin         Plan:             CHRONIC CAD/ANGINA S/P CABG, AND NUMEROUS PCI PROCEDURES.  SHE NOW HAS SXS OF INCREASED SHERMAN, DEPRESSION, FATIGUE, GENERALIZED MALAISE.  ALSO HAS HAD INCREASE IN HER ANGINAL PATTERN LAST FEW MONTHS AS WELL.   PT NEEDS TO SEE PULMONARY FOR NAWAF TREATMENT AND GET BACK ON HER CPAP WHICH WILL HELP HER SXS OF FATIGUE, SNORING.  WILL SCHEDULED CONSULT.  ADD BACK NIGHTLY IMDUR 30 MG FOR ANGINA.  AFTER HER CABG, MANY OF HER ANGINAL MEDS WERE DECREASED (TAKEN OFF RANEXA, IMDUR WAS DECREASED; USED TO BE ON NIGHTLY IMDUR FOR LONG TIME).  CHECK LABS TO INCLUDE BNP TO ASSESS DIASTOLIC FUNCTION/POSS CHF.  NEEDS TO SEE HER PCP ON DEPRESSION, ARTHRITIS ISSUES.  CHECK ECHOCARDIOGRAM.  NEEDS OPTIMAL DM CONTROL; HAS POORLY CONTROLLED DM. WILL RECHECK.  CARDIAC DIET    F/U 6 WEEKS.

## 2017-09-15 ENCOUNTER — LAB VISIT (OUTPATIENT)
Dept: LAB | Facility: HOSPITAL | Age: 77
End: 2017-09-15
Attending: INTERNAL MEDICINE
Payer: MEDICARE

## 2017-09-15 DIAGNOSIS — E11.69 HYPERLIPIDEMIA ASSOCIATED WITH TYPE 2 DIABETES MELLITUS: Chronic | ICD-10-CM

## 2017-09-15 DIAGNOSIS — R06.02 SHORTNESS OF BREATH: ICD-10-CM

## 2017-09-15 DIAGNOSIS — E78.5 HYPERLIPIDEMIA ASSOCIATED WITH TYPE 2 DIABETES MELLITUS: Chronic | ICD-10-CM

## 2017-09-15 DIAGNOSIS — E11.65 TYPE 2 DIABETES MELLITUS WITH HYPERGLYCEMIA, WITHOUT LONG-TERM CURRENT USE OF INSULIN: Chronic | ICD-10-CM

## 2017-09-15 DIAGNOSIS — I51.89 LEFT VENTRICULAR DIASTOLIC DYSFUNCTION WITH PRESERVED SYSTOLIC FUNCTION: Chronic | ICD-10-CM

## 2017-09-15 LAB
ALBUMIN SERPL BCP-MCNC: 2.9 G/DL
ALP SERPL-CCNC: 137 U/L
ALT SERPL W/O P-5'-P-CCNC: 11 U/L
ANION GAP SERPL CALC-SCNC: 11 MMOL/L
AST SERPL-CCNC: 11 U/L
BILIRUB SERPL-MCNC: 0.8 MG/DL
BNP SERPL-MCNC: 190 PG/ML
BUN SERPL-MCNC: 20 MG/DL
CALCIUM SERPL-MCNC: 8.8 MG/DL
CHLORIDE SERPL-SCNC: 102 MMOL/L
CHOLEST SERPL-MCNC: 145 MG/DL
CHOLEST/HDLC SERPL: 2.5 {RATIO}
CO2 SERPL-SCNC: 29 MMOL/L
CREAT SERPL-MCNC: 0.8 MG/DL
EST. GFR  (AFRICAN AMERICAN): >60 ML/MIN/1.73 M^2
EST. GFR  (NON AFRICAN AMERICAN): >60 ML/MIN/1.73 M^2
GLUCOSE SERPL-MCNC: 217 MG/DL
HDLC SERPL-MCNC: 57 MG/DL
HDLC SERPL: 39.3 %
LDLC SERPL CALC-MCNC: 71.4 MG/DL
NONHDLC SERPL-MCNC: 88 MG/DL
POTASSIUM SERPL-SCNC: 3.8 MMOL/L
PROT SERPL-MCNC: 6.6 G/DL
SODIUM SERPL-SCNC: 142 MMOL/L
TRIGL SERPL-MCNC: 83 MG/DL

## 2017-09-15 PROCEDURE — 80061 LIPID PANEL: CPT

## 2017-09-15 PROCEDURE — 83036 HEMOGLOBIN GLYCOSYLATED A1C: CPT

## 2017-09-15 PROCEDURE — 83880 ASSAY OF NATRIURETIC PEPTIDE: CPT

## 2017-09-15 PROCEDURE — 80053 COMPREHEN METABOLIC PANEL: CPT

## 2017-09-15 PROCEDURE — 36415 COLL VENOUS BLD VENIPUNCTURE: CPT | Mod: PO

## 2017-09-16 LAB
ESTIMATED AVG GLUCOSE: 263 MG/DL
HBA1C MFR BLD HPLC: 10.8 %

## 2017-09-18 ENCOUNTER — TELEPHONE (OUTPATIENT)
Dept: CARDIOLOGY | Facility: CLINIC | Age: 77
End: 2017-09-18

## 2017-09-18 NOTE — TELEPHONE ENCOUNTER
----- Message from Chelsea Wells sent at 9/18/2017 10:50 AM CDT -----  Contact: pt   Pt was calling to get labs results.   ..[phone] (home)

## 2017-09-18 NOTE — PROGRESS NOTES
"  Subjective:   Patient ID:  Tiffanie Wise is a 77 y.o. female     Chief complaint:No chief complaint on file.      HPI  Background as recorded in my last note ( ):  Update since then:  Current Outpatient Prescriptions   Medication Sig    albuterol 90 mcg/actuation inhaler Inhale 2 puffs into the lungs every 6 (six) hours.    albuterol-ipratropium 2.5mg-0.5mg/3mL (DUO-NEB) 0.5 mg-3 mg(2.5 mg base)/3 mL nebulizer solution USE 3 ML VIA NEBULIZER EVERY 6 HOURS    ALCOHOL PADS PadM     alendronate (FOSAMAX) 70 MG tablet TAKE 1 TABLET BY MOUTH EVERY WEEK WITH A FULL GLASS OF WATER ON EMPTY STOMACH. DO NOT EAT OR LIE DOWN FOR 30 MINUTES    aspirin (ECOTRIN) 81 MG EC tablet Take 81 mg by mouth. Take 81 mg by mouth daily.    atorvastatin (LIPITOR) 80 MG tablet Take 1 tablet (80 mg total) by mouth once daily.    BD INSULIN SYRINGE ULT-FINE II 1/2 mL 31 x 5/16" Syrg     BLOOD SUGAR DIAGNOSTIC (TRUETEST TEST STRIPS MISC) by Misc.(Non-Drug; Combo Route) route. Pt is testing 3 times a day    clonazePAM (KLONOPIN) 0.5 MG tablet TAKE 1 TABLET(0.5 MG) BY MOUTH TWICE DAILY AS NEEDED FOR ANXIETY    clopidogrel (PLAVIX) 75 mg tablet Take 1 tablet (75 mg total) by mouth once daily.    donepezil (ARICEPT) 10 MG tablet Take 1 tablet (10 mg total) by mouth once daily.    EASY COMFORT LANCETS 30 gauge [facility]     EASY TALK GLUCOSE TEST Strp     EASY TALK LOW CONTROL Soln     escitalopram oxalate (LEXAPRO) 20 MG tablet Take 1 tablet (20 mg total) by mouth once daily.    fluticasone (FLONASE) 50 mcg/actuation nasal spray 1 spray by Each Nare route once daily.    hydrochlorothiazide (HYDRODIURIL) 25 MG tablet Take 1 tablet (25 mg total) by mouth once daily.    insulin lispro (HUMALOG) 100 unit/mL Crtg Inject 40 Units into the skin 2 (two) times daily. Takes 40 units in the morning and 25 units at night    insulin NPH (HUMULIN N) 100 unit/mL injection Inject into the skin. 40 units in the morning and 25 units in the evening " "   isosorbide mononitrate (IMDUR) 30 MG 24 hr tablet Take 1 tablet (30 mg total) by mouth every evening.    isosorbide mononitrate (IMDUR) 60 MG 24 hr tablet Take 1 tablet (60 mg total) by mouth once daily.    levothyroxine (SYNTHROID) 50 MCG tablet Take 1 tablet (50 mcg total) by mouth once daily.    losartan (COZAAR) 25 MG tablet Take 1 tablet (25 mg total) by mouth once daily.    metoprolol succinate (TOPROL-XL) 50 MG 24 hr tablet TAKE 1 TABLET(50 MG) BY MOUTH EVERY DAY (Patient taking differently: TAKE 1 TABLET(50 MG) BY MOUTH EVERY DAY)    nitroGLYCERIN (NITROSTAT) 0.4 MG SL tablet ONE TABLET UNDER TONGUE AS NEEDED FOR CHEST PAIN    pantoprazole (PROTONIX) 40 MG tablet TAKE 1 TABLET BY MOUTH DAILY    pen needle, diabetic 31 gauge x 5/16" Ndle USE TWICE DAILY AND PRN    SITagliptin (JANUVIA) 100 MG Tab Take 1 tablet (100 mg total) by mouth once daily.    vitamin D 1000 units Tab Take 1,000 Units by mouth once daily.     No current facility-administered medications for this visit.      ROS    Objective:   Physical Exam  There were no vitals taken for this visit.     Assessment:      1. Coronary artery disease involving coronary bypass graft of native heart, angina presence unspecified        Plan:      No orders of the defined types were placed in this encounter.    No Follow-up on file.  There are no discontinued medications.  New Prescriptions    ISOSORBIDE MONONITRATE (IMDUR) 30 MG 24 HR TABLET    Take 1 tablet (30 mg total) by mouth every evening.     Modified Medications    No medications on file              "

## 2017-09-19 NOTE — TELEPHONE ENCOUNTER
Telephoned patient and reviewed results  Requested copies be faxed to Dr. Ave De Leon her Endocrinologist [phone]/fax [phone]

## 2017-09-19 NOTE — TELEPHONE ENCOUNTER
Please call pt  Labs show her DM to again be poorly controlled.  Cholesterol is good  Other labs stable    Dr Short

## 2017-09-25 ENCOUNTER — OFFICE VISIT (OUTPATIENT)
Dept: PULMONOLOGY | Facility: CLINIC | Age: 77
End: 2017-09-25
Payer: MEDICARE

## 2017-09-25 VITALS
SYSTOLIC BLOOD PRESSURE: 120 MMHG | BODY MASS INDEX: 31.84 KG/M2 | DIASTOLIC BLOOD PRESSURE: 64 MMHG | OXYGEN SATURATION: 97 % | HEART RATE: 63 BPM | RESPIRATION RATE: 18 BRPM | HEIGHT: 59 IN | WEIGHT: 157.94 LBS

## 2017-09-25 DIAGNOSIS — G47.33 OSA (OBSTRUCTIVE SLEEP APNEA): Primary | Chronic | ICD-10-CM

## 2017-09-25 PROCEDURE — 3078F DIAST BP <80 MM HG: CPT | Mod: S$GLB,,, | Performed by: INTERNAL MEDICINE

## 2017-09-25 PROCEDURE — 3008F BODY MASS INDEX DOCD: CPT | Mod: S$GLB,,, | Performed by: INTERNAL MEDICINE

## 2017-09-25 PROCEDURE — 99999 PR PBB SHADOW E&M-EST. PATIENT-LVL III: CPT | Mod: PBBFAC,,, | Performed by: INTERNAL MEDICINE

## 2017-09-25 PROCEDURE — 1159F MED LIST DOCD IN RCRD: CPT | Mod: S$GLB,,, | Performed by: INTERNAL MEDICINE

## 2017-09-25 PROCEDURE — 3074F SYST BP LT 130 MM HG: CPT | Mod: S$GLB,,, | Performed by: INTERNAL MEDICINE

## 2017-09-25 PROCEDURE — 99214 OFFICE O/P EST MOD 30 MIN: CPT | Mod: S$GLB,,, | Performed by: INTERNAL MEDICINE

## 2017-09-25 NOTE — PATIENT INSTRUCTIONS
Continuous Positive Airway Pressure (CPAP)  Your healthcare provider has prescribed continuous positive airway pressure (CPAP) therapy for you. A CPAP device helps you breathe better at night. The device sends air through your nose or mouth when you breathe in to keep your air passages open. CPAP is:  · Used most often to treat sleep apnea and some other problems. (Sleep apnea is a chronic condition with periods of sleep in which you briefly stop breathing.)  · Safe and very effective. But it takes time to get used to the mask.  Your healthcare provider, nurse, or medical supplier will give you tips for wearing and caring for your CPAP device.  General guidelines  Recommendations include the following:  · It's very important not to give up! It takes time to get used to wearing the mask at night.  · Practice using your CPAP device during the day, especially whenever you take a nap.  · Remember, there are several different types of masks. If you cant get used to your mask, ask your provider or medical supply company about trying another style.  · If you have nasal stuffiness or dryness when using your CPAP device, talk with your provider or medical supply company. There are ways to ease these problems. For example, your provider may recommend using a moistening nasal spray. Or the medical supply company may recommend a device with a humidifier.  · The goal is to use your CPAP all night, every night, during all naps, and even when you travel.  · Keep your mask clean. Wash it with soap and water. Be sure to rinse the mask and tubing well with water to remove any soap. Let them air-dry completely before using.  · Make yourself comfortable when sleeping with CPAP. Try using extra pillows.  Work with your medical supply company so that you know how to correctly use your CPAP. The company's representative will be able to help you:  · Use the CPAP correctly  · Troubleshoot any problems that come up  · Learn to clean and  maintain the device  · Adjust to regular use of the CPAP     The CPAP device settings are given as centimeters of water, or cm/H2O. Each persons pressure settings are different. Your healthcare provider will tell you what settings to use. Never change your CPAP pressure setting unless your provider tells you to.  CPAP ____________cm/H20 pressure when you breathe in   Date Last Reviewed: 5/1/2016  © 1774-3534 [employer]. [address] [place] [address]. All rights reserved. This information is not intended as a substitute for professional medical care. Always follow your healthcare professional's instructions.

## 2017-09-25 NOTE — LETTER
September 25, 2017      Brendan Short MD  [address]  [facility] [address]           [facility]  [address] [place] [id number]  Phone: [phone]  Fax: [phone]          Patient: Tiffanie Wise   MR Number: [id number]   YOB: 1940   Date of Visit: 9/25/2017       Dear Dr. Brendan Sohrt:    Thank you for referring Tiffanie Wise to me for evaluation. Attached you will find relevant portions of my assessment and plan of care.    If you have questions, please do not hesitate to call me. I look forward to following Tiffanie Wise along with you.    Sincerely,    Toby Estrella MD    Enclosure  CC:  No Recipients    If you would like to receive this communication electronically, please contact [email or web address] or [phone] to request more information on [employer] access.    For providers and/or their staff who would like to refer a patient to [facility], please contact us through our one-stop-shop provider referral line, [facility], at [phone].    If you feel you have received this communication in error or would no longer like to receive these types of communications, please e-mail [email or web address]

## 2017-09-25 NOTE — ASSESSMENT & PLAN NOTE
Resume CPAP of 6 - 20 CMWP. (DME - [facility])     Discussed therapeutic goals for positive airway pressure therapy(CPAP or BiPAP): Ideal is usage 100% of nights for 6 - 8 hours per night. Minimum usage is 70% of night for at least 4 hours per night used. Pateint expressed understanding. All Questions answered.    She will be referred to [facility] for CPAP instructions and re education.      CPAP supplies renewed.

## 2017-09-25 NOTE — PROGRESS NOTES
Subjective:      Patient ID: Tiffanie Wise is a 77 y.o. female.  Patient Active Problem List   Diagnosis    AP (angina pectoris)    Hyperlipidemia associated with type 2 diabetes mellitus    Type 2 diabetes mellitus with hyperglycemia    Acquired hypothyroidism    S/P CABG x 1    Osteoporosis    NAWAF (obstructive sleep apnea)    Type 2 diabetes mellitus    RUDOLPH (generalized anxiety disorder)    Pulmonary nodule, right - stable    Major depressive disorder, single episode, mild    Left ventricular diastolic dysfunction with preserved systolic function    Long-term insulin use in type 2 diabetes    CAD (coronary artery disease)    CAD, multiple vessel    Cough    Shortness of breath    Hypertension associated with diabetes    Unspecified vitamin D deficiency    Amnesia     Problem list has been reviewed.    Chief Complaint: Sleep Apnea and Pulmonary Nodules    HPI    She wants to resume CPAP therapy. Her last sleep study was on 08/16/14.  Study revealed severe obstructive sleep apnea / hypopnea syndrome (A HI  = 38.9 events/ hr asleep; 35.6 arousals / hr asleep). AUTOPAP of 6 - 20 CMWP was initiated. She used her CPAP until 06/2016. She stopped using her CPAP at the time she had her heart surgery. She has not used her CPAP in 1 year. She reports that she was advised to resume PAP therapy by her cardiologist. She reports that needs re education on the use of her CPAP.       Previous Report Reviewed: office notes and radiology reports     The following portions of the patient's history were reviewed and updated as appropriate: She  has a past medical history of Acute respiratory failure with hypoxia; Anxiety; AP (angina pectoris) (7/18/2013); Asthma; CAD, multiple vessel (8/2/2016); Chronic ischemic heart disease (7/18/2013); Coronary artery disease (7/18/2013); Depression; Diabetes with neurologic complications; GERD (gastroesophageal reflux disease) (7/18/2013); Heart failure; History of PTCA  (7/18/2013); Hypertension (7/18/2013); Hypothyroidism; Leg pain (8/29/2013); Mixed hyperlipidemia (7/18/2013); Myocardial infarction; Osteoporosis; Pneumonia; Pneumonia due to other staphylococcus; Precancerous changes of the vagina; Sleep apnea; Thyroid disease; Tobacco dependence; Trouble in sleeping; Type 2 diabetes mellitus with ophthalmic manifestations; and Urinary incontinence.  She  has a past surgical history that includes Coronary stent placement; Thyroid surgery; Coronary angioplasty; Breast surgery (Left); Hysterectomy (1970); Eye surgery (Bilateral, 2014); and Tubal ligation (1970).  Her family history includes Alcohol abuse in her sister; Cancer in her son; Cirrhosis in her sister; Diabetes in her paternal grandmother and sister; Fibromyalgia in her daughter and daughter; Heart disease in her mother; Kidney disease in her brother and father.  She  reports that she quit smoking about 9 years ago. She has a 38.00 pack-year smoking history. She has never used smokeless tobacco. She reports that she does not drink alcohol or use drugs.  She has a current medication list which includes the following prescription(s): albuterol, albuterol-ipratropium 2.5mg-0.5mg/3ml, alcohol pads, alendronate, aspirin, atorvastatin, bd insulin syringe ult-fine ii, blood sugar diagnostic, clonazepam, clopidogrel, donepezil, easy comfort lancets, easy talk glucose test, easy talk low control, escitalopram oxalate, fluticasone, hydrochlorothiazide, insulin lispro, insulin nph, isosorbide mononitrate, isosorbide mononitrate, levothyroxine, losartan, metoprolol succinate, nitroglycerin, pantoprazole, pen needle, diabetic, sitagliptin, and vitamin d.  She has No Known Allergies..    Review of Systems   Constitutional: Positive for fatigue. Negative for fever.   HENT: Negative for postnasal drip, rhinorrhea and congestion.    Respiratory: Positive for apnea, cough, sputum production, chest tightness, shortness of breath, dyspnea on  "extertion, use of rescue inhaler and Paroxysmal Nocturnal Dyspnea.    Cardiovascular: Negative for chest pain, palpitations and leg swelling.   Skin: Negative for rash.   Gastrointestinal: Negative for nausea and abdominal pain.   Neurological: Positive for weakness. Negative for dizziness, syncope and light-headedness.   Hematological: Negative for adenopathy. Does not bruise/bleed easily.   Psychiatric/Behavioral: Positive for sleep disturbance. The patient is not nervous/anxious.       Objective:   /64   Pulse 63   Resp 18   Ht 4' 11" (1.499 m)   Wt 71.6 kg (157 lb 15.4 oz)   SpO2 97%   BMI 31.90 kg/m²   Body mass index is 31.9 kg/m².    Physical Exam   Constitutional: She is oriented to person, place, and time. She appears well-developed and well-nourished.   HENT:   Head: Normocephalic and atraumatic.   Mouth/Throat: Oropharyngeal exudate present.   Eyes: Conjunctivae are normal. Pupils are equal, round, and reactive to light.   Neck: Neck supple. No JVD present. No tracheal deviation present. No thyromegaly present.   Cardiovascular: Normal rate, regular rhythm and normal heart sounds.    Pulmonary/Chest: Effort normal. No respiratory distress. She has decreased breath sounds. She has no wheezes. She has rales in the right lower field and the left lower field. She exhibits no tenderness.   Abdominal: Soft. Bowel sounds are normal.   Musculoskeletal: Normal range of motion. She exhibits no edema.   Lymphadenopathy:     She has no cervical adenopathy.   Neurological: She is alert and oriented to person, place, and time.   Skin: Skin is warm and dry.   Nursing note and vitals reviewed.      Personal Diagnostic Review  none pertinent    Assessment:     1. NAWAF (obstructive sleep apnea) Active     Outpatient Encounter Prescriptions as of 9/25/2017   Medication Sig Dispense Refill    albuterol 90 mcg/actuation inhaler Inhale 2 puffs into the lungs every 6 (six) hours. 1 Inhaler 12    " "albuterol-ipratropium 2.5mg-0.5mg/3mL (DUO-NEB) 0.5 mg-3 mg(2.5 mg base)/3 mL nebulizer solution USE 3 ML VIA NEBULIZER EVERY 6 HOURS 1080 mL 12    ALCOHOL PADS PadM       alendronate (FOSAMAX) 70 MG tablet TAKE 1 TABLET BY MOUTH EVERY WEEK WITH A FULL GLASS OF WATER ON EMPTY STOMACH. DO NOT EAT OR LIE DOWN FOR 30 MINUTES 12 tablet 3    aspirin (ECOTRIN) 81 MG EC tablet Take 81 mg by mouth. Take 81 mg by mouth daily.      atorvastatin (LIPITOR) 80 MG tablet Take 1 tablet (80 mg total) by mouth once daily. 90 tablet 3    BD INSULIN SYRINGE ULT-FINE II 1/2 mL 31 x 5/16" Syrg       BLOOD SUGAR DIAGNOSTIC (TRUETEST TEST STRIPS MISC) by Misc.(Non-Drug; Combo Route) route. Pt is testing 3 times a day      clonazePAM (KLONOPIN) 0.5 MG tablet TAKE 1 TABLET(0.5 MG) BY MOUTH TWICE DAILY AS NEEDED FOR ANXIETY 30 tablet 0    clopidogrel (PLAVIX) 75 mg tablet Take 1 tablet (75 mg total) by mouth once daily. 90 tablet 3    donepezil (ARICEPT) 10 MG tablet Take 1 tablet (10 mg total) by mouth once daily. 90 tablet 3    EASY COMFORT LANCETS 30 gauge [facility]       EASY TALK GLUCOSE TEST Strp       EASY TALK LOW CONTROL Soln       escitalopram oxalate (LEXAPRO) 20 MG tablet Take 1 tablet (20 mg total) by mouth once daily. 90 tablet 0    fluticasone (FLONASE) 50 mcg/actuation nasal spray 1 spray by Each Nare route once daily. 16 g 0    hydrochlorothiazide (HYDRODIURIL) 25 MG tablet Take 1 tablet (25 mg total) by mouth once daily. 90 tablet 3    insulin lispro (HUMALOG) 100 unit/mL Crtg Inject 40 Units into the skin 2 (two) times daily. Takes 40 units in the morning and 25 units at night      insulin NPH (HUMULIN N) 100 unit/mL injection Inject into the skin. 40 units in the morning and 25 units in the evening      isosorbide mononitrate (IMDUR) 30 MG 24 hr tablet Take 1 tablet (30 mg total) by mouth every evening. 30 tablet 11    isosorbide mononitrate (IMDUR) 60 MG 24 hr tablet Take 1 tablet (60 mg total) by mouth " "once daily. 90 tablet 3    levothyroxine (SYNTHROID) 50 MCG tablet Take 1 tablet (50 mcg total) by mouth once daily. 90 tablet 3    losartan (COZAAR) 25 MG tablet Take 1 tablet (25 mg total) by mouth once daily. 90 tablet 3    metoprolol succinate (TOPROL-XL) 50 MG 24 hr tablet TAKE 1 TABLET(50 MG) BY MOUTH EVERY DAY (Patient taking differently: TAKE 1 TABLET(50 MG) BY MOUTH EVERY DAY) 90 tablet 3    nitroGLYCERIN (NITROSTAT) 0.4 MG SL tablet ONE TABLET UNDER TONGUE AS NEEDED FOR CHEST PAIN 25 tablet 12    pantoprazole (PROTONIX) 40 MG tablet TAKE 1 TABLET BY MOUTH DAILY 90 tablet 0    pen needle, diabetic 31 gauge x 5/16" Ndle USE TWICE DAILY AND PRN      SITagliptin (JANUVIA) 100 MG Tab Take 1 tablet (100 mg total) by mouth once daily. 90 tablet 3    vitamin D 1000 units Tab Take 1,000 Units by mouth once daily.       No facility-administered encounter medications on file as of 9/25/2017.      Orders Placed This Encounter   Procedures    CPAP/BIPAP SUPPLIES     Order Specific Question:   Type of mask:     Answer:   FFM     Order Specific Question:   Headgear?     Answer:   Yes     Order Specific Question:   Tubing?     Answer:   Yes     Order Specific Question:   Humidifier chamber?     Answer:   Yes     Order Specific Question:   Chin strap?     Answer:   Yes     Order Specific Question:   Filters?     Answer:   Yes     Order Specific Question:   Length of need (1-99 months):     Answer:   99     Plan:     Discussed diagnosis, its evaluation, treatment and usual course. All questions answered.    NAWAF (obstructive sleep apnea)  Resume CPAP of 6 - 20 CMWP. (DME - [facility])     Discussed therapeutic goals for positive airway pressure therapy(CPAP or BiPAP): Ideal is usage 100% of nights for 6 - 8 hours per night. Minimum usage is 70% of night for at least 4 hours per night used. Pateint expressed understanding. All Questions answered.    [facility] " [facility].      CPAP supplies renewed.    TIME SPENT WITH PATIENT: Time spent: 30 minutes in face to face  discussion concerning diagnosis, prognosis, review of lab and test results, benefits of treatment as well as management of disease, counseling of patient and coordination of care between various health  care providers . Greater than half the time spent was used for coordination of care and counseling of patient.        Return in about 6 weeks (around 11/6/2017) for NAWAF.

## 2017-09-29 ENCOUNTER — CLINICAL SUPPORT (OUTPATIENT)
Dept: CARDIOLOGY | Facility: CLINIC | Age: 77
End: 2017-09-29
Payer: MEDICARE

## 2017-09-29 DIAGNOSIS — I15.2 HYPERTENSION ASSOCIATED WITH DIABETES: ICD-10-CM

## 2017-09-29 DIAGNOSIS — E11.59 HYPERTENSION ASSOCIATED WITH DIABETES: ICD-10-CM

## 2017-09-29 DIAGNOSIS — Z95.1 S/P CABG X 1: ICD-10-CM

## 2017-09-29 DIAGNOSIS — I51.89 LEFT VENTRICULAR DIASTOLIC DYSFUNCTION WITH PRESERVED SYSTOLIC FUNCTION: Chronic | ICD-10-CM

## 2017-09-29 DIAGNOSIS — I25.10 CORONARY ARTERY DISEASE, ANGINA PRESENCE UNSPECIFIED, UNSPECIFIED VESSEL OR LESION TYPE, UNSPECIFIED WHETHER NATIVE OR TRANSPLANTED HEART: ICD-10-CM

## 2017-09-29 DIAGNOSIS — Z79.4 TYPE 2 DIABETES MELLITUS WITH HYPERGLYCEMIA, WITH LONG-TERM CURRENT USE OF INSULIN: ICD-10-CM

## 2017-09-29 DIAGNOSIS — E11.65 TYPE 2 DIABETES MELLITUS WITH HYPERGLYCEMIA, WITH LONG-TERM CURRENT USE OF INSULIN: ICD-10-CM

## 2017-09-29 DIAGNOSIS — I25.10 CAD, MULTIPLE VESSEL: ICD-10-CM

## 2017-09-29 DIAGNOSIS — R06.02 SHORTNESS OF BREATH: ICD-10-CM

## 2017-09-29 DIAGNOSIS — G47.33 OSA (OBSTRUCTIVE SLEEP APNEA): Chronic | ICD-10-CM

## 2017-09-29 DIAGNOSIS — I20.9 AP (ANGINA PECTORIS): Chronic | ICD-10-CM

## 2017-09-29 LAB
AORTIC VALVE REGURGITATION: NORMAL
DIASTOLIC DYSFUNCTION: NO
MITRAL VALVE MOBILITY: NORMAL
RETIRED EF AND QEF - SEE NOTES: 65 (ref 55–65)

## 2017-09-29 PROCEDURE — 93306 TTE W/DOPPLER COMPLETE: CPT | Mod: S$GLB,,, | Performed by: INTERNAL MEDICINE

## 2017-10-01 ENCOUNTER — TELEPHONE (OUTPATIENT)
Dept: CARDIOLOGY | Facility: CLINIC | Age: 77
End: 2017-10-01

## 2017-10-03 NOTE — TELEPHONE ENCOUNTER
----- Message from Sumeet Garibay sent at 10/3/2017 10:50 AM CDT -----  Contact: pt  Pt is calling nurse staff regarding pt test results. Pt asked that staff call back right a way.  Pt call back [phone] to be advised thanks

## 2017-10-17 ENCOUNTER — TELEPHONE (OUTPATIENT)
Dept: FAMILY MEDICINE | Facility: CLINIC | Age: 77
End: 2017-10-17

## 2017-11-06 ENCOUNTER — OFFICE VISIT (OUTPATIENT)
Dept: PULMONOLOGY | Facility: CLINIC | Age: 77
End: 2017-11-06
Payer: MEDICARE

## 2017-11-06 VITALS
DIASTOLIC BLOOD PRESSURE: 63 MMHG | HEART RATE: 68 BPM | SYSTOLIC BLOOD PRESSURE: 130 MMHG | BODY MASS INDEX: 32.36 KG/M2 | HEIGHT: 59 IN | OXYGEN SATURATION: 92 % | WEIGHT: 160.5 LBS | RESPIRATION RATE: 17 BRPM

## 2017-11-06 DIAGNOSIS — G47.33 OSA ON CPAP: Primary | ICD-10-CM

## 2017-11-06 PROCEDURE — 99999 PR PBB SHADOW E&M-EST. PATIENT-LVL III: CPT | Mod: PBBFAC,,, | Performed by: INTERNAL MEDICINE

## 2017-11-06 PROCEDURE — 99499 UNLISTED E&M SERVICE: CPT | Mod: S$GLB,,, | Performed by: INTERNAL MEDICINE

## 2017-11-06 PROCEDURE — 99214 OFFICE O/P EST MOD 30 MIN: CPT | Mod: S$GLB,,, | Performed by: INTERNAL MEDICINE

## 2017-11-06 NOTE — PATIENT INSTRUCTIONS
Continuous Positive Air Pressure (CPAP)     A mask over the nose gently directs air into the throat to keep the airway open.   Continuous positive air pressure (CPAP) uses gentle air pressure to hold the airway open. CPAP is often the most effective treatment for sleep apnea and severe snoring. It works very well for many people. But keep in mind that it can take several adjustments before the setup is right for you.  How CPAP works  The CPAP machine  is a small portable pump beside the bed. The pump sends air through a hose, which is held over your nose and mouth by a mask. Mild air pressure is gently pushed through your airway. The air pressure nudges sagging tissues aside. This widens the airway so you can breathe better. CPAP may be combined with other kinds of therapy for sleep apnea.  Types of air pressure treatments  There are different types of CPAP. Your doctor or CPAP technician will help you decide which type is best for you:  · Basic CPAP keeps the pressure constant all night long.  · A bilevel device (BiPAP) provides more pressure when you breathe in and less when you breathe out. A BiPAP machine also may be set to provide automatic breaths to maintain breathing if you stop breathing while sleeping.  · An autoCPAP device automatically adjusts pressure throughout the night and in response to changes such as body position, sleep stage, and snoring.  Date Last Reviewed: 8/10/2015  © 7599-1211 [employer] [address] [place] [address], [place] [address]. All rights reserved. This information is not intended as a substitute for professional medical care. Always follow your healthcare professional's instructions.

## 2017-11-06 NOTE — ASSESSMENT & PLAN NOTE
Continue APAP of  4 - 20 CMWP. (DME - [facility])     Discussed therapeutic goals for positive airway pressure therapy(CPAP or BiPAP): Ideal is usage 100% of nights for 6 - 8 hours per night. Minimum usage is 70% of night for at least 4 hours per night used. Pateint expressed understanding. All Questions answered.    Complaince evaluation is 6 weeks.

## 2017-11-06 NOTE — PROGRESS NOTES
Subjective:      Patient ID: Tiffanie Wise is a 77 y.o. female.  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    Chief Complaint: Sleep Apnea    HPI: here is  here for follow up for NAWAF and CPAP complaince assessment. she  is on APAP 4 - 20 CMWP . She  definitely thinks PAP is beneficial to her health and she wants to continue with PAP therapy. Patient denies snoring, headaches, excessive daytime sleepiness.      Compliance Summary  10/7/2017 - 11/5/2017 (30 days)  Days with Device Usage 30 days  Days without Device Usage 0 days  Percent Days with Device Usage 100.0%  Cumulative Usage 10 days 4 hrs. 49 mins. 47 secs.  Maximum Usage (1 Day) 10 hrs. 54 mins. 1 secs.  Average Usage (All Days) 8 hrs. 9 mins. 39 secs.  Average Usage (Days Used) 8 hrs. 9 mins. 39 secs.  Minimum Usage (1 Day) 4 hrs. 1 mins. 49 secs.  Percent of Days with Usage >= 4 Hours 100.0%  Percent of Days with Usage < 4 Hours 0.0%  Date Range  Total Blower Time 10 days 7 hrs. 4 mins. 18 secs.  Average AHI 5.5  Auto CPAP Summary ([facility])  Auto CPAP Mean Pressure 10.5 cmH2O  Auto CPAP Peak Average Pressure 16.2 cmH2O  Average Device Pressure <= 90% of Time 13.5 cmH2O  Average Time in Large Leak Per Day 36 mins. 20 secs.      [place] Sleepiness Scale   EPWORTH SLEEPINESS  SCALE 11/6/2017 9/25/2017 12/10/2015 7/14/2015   Sitting and reading 2 2 3 3   Watching TV 1 1 2 0   Sitting, inactive in a public place (e.g. a theatre or a meeting) 1 2 2 2   As a passenger in a car for an hour without a break 0 2 2 3   Lying down to rest in the afternoon when circumstances permit 3 3 3 3   Sitting and talking to someone 0 0 2 0   Sitting quietly after a lunch without alcohol 1 1 2 3   In a car, while stopped for a few minutes in traffic 0 0 0 0   Total score 8 11 16 14     Previous Report Reviewed: office notes and radiology reports     The following portions of the patient's history were reviewed and updated as appropriate: She  has a past medical history of Acute respiratory failure with hypoxia; Anxiety; AP (angina pectoris) (7/18/2013); Asthma; CAD, multiple vessel (8/2/2016); Chronic ischemic heart disease (7/18/2013); Coronary artery disease (7/18/2013); Depression; Diabetes with neurologic complications; GERD (gastroesophageal reflux disease) (7/18/2013); Heart failure; History of PTCA (7/18/2013); Hypertension (7/18/2013); Hypothyroidism; Leg pain (8/29/2013); Mixed hyperlipidemia (7/18/2013); Myocardial infarction; Osteoporosis; Pneumonia; Pneumonia due to other staphylococcus; Precancerous changes of the vagina; Sleep apnea; Thyroid disease; Tobacco dependence; Trouble in sleeping; Type 2 diabetes mellitus with ophthalmic manifestations; and Urinary incontinence.  She  does not have any pertinent problems on file.  She  has a past surgical history that includes Coronary stent placement; Thyroid surgery; Coronary angioplasty; Breast surgery (Left); Hysterectomy (1970); Eye surgery (Bilateral, 2014); and Tubal ligation (1970).  Her family history includes Alcohol abuse in her sister; Cancer in her son; Cirrhosis in her sister; Diabetes in her paternal grandmother and sister; Fibromyalgia in her daughter and daughter; Heart disease in her mother; Kidney disease in her brother and  "father.  She  reports that she quit smoking about 9 years ago. She has a 38.00 pack-year smoking history. She has never used smokeless tobacco. She reports that she does not drink alcohol or use drugs.  She has a current medication list which includes the following prescription(s): albuterol, alcohol pads, alendronate, aspirin, atorvastatin, bd insulin syringe ult-fine ii, blood sugar diagnostic, clopidogrel, donepezil, easy comfort lancets, easy talk glucose test, easy talk low control, escitalopram oxalate, fluad [id number] (65 yr up)(pf), hydrochlorothiazide, insulin lispro, insulin nph, isosorbide mononitrate, levothyroxine, losartan, metoprolol succinate, nitroglycerin, pantoprazole, pen needle, diabetic, and sitagliptin.  She has No Known Allergies..    Review of Systems   Constitutional: Positive for fatigue. Negative for fever.   HENT: Negative for postnasal drip, rhinorrhea and congestion.    Respiratory: Positive for apnea, cough, sputum production, chest tightness, shortness of breath, dyspnea on extertion, use of rescue inhaler and Paroxysmal Nocturnal Dyspnea.    Cardiovascular: Negative for chest pain, palpitations and leg swelling.   Skin: Negative for rash.   Gastrointestinal: Negative for nausea and abdominal pain.   Neurological: Positive for weakness. Negative for dizziness, syncope and light-headedness.   Hematological: Negative for adenopathy. Does not bruise/bleed easily.   Psychiatric/Behavioral: Positive for sleep disturbance. The patient is not nervous/anxious.       Objective:     Vitals:    11/06/17 1354   BP: 130/63   Pulse: 68   Resp: 17   SpO2: (!) 92%   Weight: 72.8 kg (160 lb 7.9 oz)   Height: 4' 11" (1.499 m)     Body mass index is 32.42 kg/m².     Physical Exam   Constitutional: She is oriented to person, place, and time. She appears well-developed and well-nourished.   HENT:   Head: Normocephalic and atraumatic.   Mouth/Throat: Oropharyngeal exudate present.   Eyes: Conjunctivae are " normal. Pupils are equal, round, and reactive to light.   Neck: Neck supple. No JVD present. No tracheal deviation present. No thyromegaly present.   Cardiovascular: Normal rate, regular rhythm and normal heart sounds.    Pulmonary/Chest: Effort normal. No respiratory distress. She has decreased breath sounds. She has no wheezes. She has rales in the right lower field and the left lower field. She exhibits no tenderness.   Abdominal: Soft. Bowel sounds are normal.   Musculoskeletal: Normal range of motion. She exhibits no edema.   Lymphadenopathy:     She has no cervical adenopathy.   Neurological: She is alert and oriented to person, place, and time.   Skin: Skin is warm and dry.   Nursing note and vitals reviewed.      Personal Diagnostic Review  CPAP complaince download.     Assessment:     1. 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      Plan:     Discussed diagnosis, its evaluation, treatment and usual course. All questions answered.    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 (DME - [facility])     Discussed therapeutic goals for positive airway pressure therapy(CPAP or BiPAP): Ideal is usage 100% of nights for 6 - 8 hours per night. Minimum usage is 70% of night for at least 4 hours per night used. Pateint expressed understanding. All Questions answered.    Complaince evaluation is 6 weeks.     TIME SPENT WITH PATIENT: Time spent: 30 minutes in face to face  discussion concerning diagnosis, prognosis, review of lab and test results, benefits of treatment as well as management of disease, counseling of patient and coordination of care between various health  care providers . Greater than half the time spent was used for coordination of care and counseling of patient.      Return in about 6 weeks (around 12/18/2017) for NAWAF, Obesity.

## 2017-12-15 ENCOUNTER — HOSPITAL ENCOUNTER (EMERGENCY)
Facility: HOSPITAL | Age: 77
Discharge: ANOTHER HOSPITAL | End: 2017-12-15
Attending: EMERGENCY MEDICINE
Payer: MEDICARE

## 2017-12-15 ENCOUNTER — HOSPITAL ENCOUNTER (INPATIENT)
Facility: HOSPITAL | Age: 77
LOS: 7 days | Discharge: NURSING, REHAB OR LONG-TERM CARE | DRG: 064 | End: 2017-12-22
Attending: EMERGENCY MEDICINE | Admitting: PSYCHIATRY & NEUROLOGY
Payer: MEDICARE

## 2017-12-15 VITALS
OXYGEN SATURATION: 88 % | SYSTOLIC BLOOD PRESSURE: 153 MMHG | DIASTOLIC BLOOD PRESSURE: 62 MMHG | BODY MASS INDEX: 32.98 KG/M2 | RESPIRATION RATE: 18 BRPM | WEIGHT: 163.31 LBS | HEART RATE: 57 BPM | TEMPERATURE: 99 F

## 2017-12-15 DIAGNOSIS — I63.9 CEREBROVASCULAR ACCIDENT (CVA), UNSPECIFIED MECHANISM: Primary | ICD-10-CM

## 2017-12-15 DIAGNOSIS — I63.89 ACUTE HEMORRHAGIC INFARCTION OF BRAIN: ICD-10-CM

## 2017-12-15 DIAGNOSIS — I63.512 ACUTE ISCHEMIC LEFT MCA STROKE: ICD-10-CM

## 2017-12-15 DIAGNOSIS — I63.9 ISCHEMIC STROKE: ICD-10-CM

## 2017-12-15 DIAGNOSIS — R47.81 SLURRED SPEECH: ICD-10-CM

## 2017-12-15 DIAGNOSIS — G47.33 OSA ON CPAP: ICD-10-CM

## 2017-12-15 DIAGNOSIS — R00.1 BRADYCARDIA: ICD-10-CM

## 2017-12-15 DIAGNOSIS — E11.69 TYPE 2 DIABETES MELLITUS WITH OTHER SPECIFIED COMPLICATION, WITH LONG-TERM CURRENT USE OF INSULIN: ICD-10-CM

## 2017-12-15 DIAGNOSIS — I25.10 CORONARY ARTERY DISEASE, ANGINA PRESENCE UNSPECIFIED, UNSPECIFIED VESSEL OR LESION TYPE, UNSPECIFIED WHETHER NATIVE OR TRANSPLANTED HEART: ICD-10-CM

## 2017-12-15 DIAGNOSIS — I51.89 LEFT VENTRICULAR DIASTOLIC DYSFUNCTION WITH PRESERVED SYSTOLIC FUNCTION: Chronic | ICD-10-CM

## 2017-12-15 DIAGNOSIS — I63.412 EMBOLIC STROKE INVOLVING LEFT MIDDLE CEREBRAL ARTERY: ICD-10-CM

## 2017-12-15 DIAGNOSIS — E03.9 ACQUIRED HYPOTHYROIDISM: Chronic | ICD-10-CM

## 2017-12-15 DIAGNOSIS — Z79.4 TYPE 2 DIABETES MELLITUS WITH OTHER SPECIFIED COMPLICATION, WITH LONG-TERM CURRENT USE OF INSULIN: ICD-10-CM

## 2017-12-15 DIAGNOSIS — R53.1 RIGHT SIDED WEAKNESS: ICD-10-CM

## 2017-12-15 DIAGNOSIS — E11.65 TYPE 2 DIABETES MELLITUS WITH HYPERGLYCEMIA, WITH LONG-TERM CURRENT USE OF INSULIN: Chronic | ICD-10-CM

## 2017-12-15 DIAGNOSIS — Z79.4 TYPE 2 DIABETES MELLITUS WITH HYPERGLYCEMIA, WITH LONG-TERM CURRENT USE OF INSULIN: Chronic | ICD-10-CM

## 2017-12-15 PROBLEM — R47.01 APHASIA: Status: ACTIVE | Noted: 2017-12-15

## 2017-12-15 PROBLEM — G93.6 CYTOTOXIC CEREBRAL EDEMA: Status: ACTIVE | Noted: 2017-12-15

## 2017-12-15 LAB
ABO + RH BLD: NORMAL
ALBUMIN SERPL BCP-MCNC: 2.6 G/DL
ALBUMIN SERPL BCP-MCNC: 2.9 G/DL
ALP SERPL-CCNC: 119 U/L
ALP SERPL-CCNC: 122 U/L
ALT SERPL W/O P-5'-P-CCNC: 10 U/L
ALT SERPL W/O P-5'-P-CCNC: 15 U/L
ANION GAP SERPL CALC-SCNC: 12 MMOL/L
ANION GAP SERPL CALC-SCNC: 9 MMOL/L
ANISOCYTOSIS BLD QL SMEAR: SLIGHT
AORTIC VALVE REGURGITATION: NORMAL
AST SERPL-CCNC: 14 U/L
AST SERPL-CCNC: 17 U/L
BASOPHILS # BLD AUTO: 0.03 K/UL
BASOPHILS # BLD AUTO: 0.06 K/UL
BASOPHILS NFR BLD: 0.3 %
BASOPHILS NFR BLD: 0.3 %
BILIRUB SERPL-MCNC: 1.1 MG/DL
BILIRUB SERPL-MCNC: 1.2 MG/DL
BLD GP AB SCN CELLS X3 SERPL QL: NORMAL
BNP SERPL-MCNC: 77 PG/ML
BUN SERPL-MCNC: 11 MG/DL
BUN SERPL-MCNC: 11 MG/DL
BURR CELLS BLD QL SMEAR: ABNORMAL
CALCIUM SERPL-MCNC: 8.2 MG/DL
CALCIUM SERPL-MCNC: 8.8 MG/DL
CHLORIDE SERPL-SCNC: 100 MMOL/L
CHLORIDE SERPL-SCNC: 102 MMOL/L
CK SERPL-CCNC: 152 U/L
CO2 SERPL-SCNC: 25 MMOL/L
CO2 SERPL-SCNC: 27 MMOL/L
CREAT SERPL-MCNC: 0.8 MG/DL
CREAT SERPL-MCNC: 0.9 MG/DL
DACRYOCYTES BLD QL SMEAR: ABNORMAL
DIFFERENTIAL METHOD: ABNORMAL
DIFFERENTIAL METHOD: ABNORMAL
EOSINOPHIL # BLD AUTO: 0 K/UL
EOSINOPHIL # BLD AUTO: 0.2 K/UL
EOSINOPHIL NFR BLD: 0.2 %
EOSINOPHIL NFR BLD: 1.7 %
ERYTHROCYTE [DISTWIDTH] IN BLOOD BY AUTOMATED COUNT: 17 %
ERYTHROCYTE [DISTWIDTH] IN BLOOD BY AUTOMATED COUNT: 17.2 %
EST. GFR  (AFRICAN AMERICAN): >60 ML/MIN/1.73 M^2
EST. GFR  (AFRICAN AMERICAN): >60 ML/MIN/1.73 M^2
EST. GFR  (NON AFRICAN AMERICAN): >60 ML/MIN/1.73 M^2
EST. GFR  (NON AFRICAN AMERICAN): >60 ML/MIN/1.73 M^2
ESTIMATED AVG GLUCOSE: 301 MG/DL
ESTIMATED PA SYSTOLIC PRESSURE: 14.29
GLUCOSE SERPL-MCNC: 203 MG/DL
GLUCOSE SERPL-MCNC: 311 MG/DL
HBA1C MFR BLD HPLC: 12.1 %
HCT VFR BLD AUTO: 35 %
HCT VFR BLD AUTO: 38.5 %
HGB BLD-MCNC: 10.2 G/DL
HGB BLD-MCNC: 11.5 G/DL
IMM GRANULOCYTES # BLD AUTO: 0.12 K/UL
IMM GRANULOCYTES NFR BLD AUTO: 0.6 %
INR PPP: 1.6
LYMPHOCYTES # BLD AUTO: 0.9 K/UL
LYMPHOCYTES # BLD AUTO: 1.6 K/UL
LYMPHOCYTES NFR BLD: 16.5 %
LYMPHOCYTES NFR BLD: 4.2 %
MCH RBC QN AUTO: 21.7 PG
MCH RBC QN AUTO: 21.9 PG
MCHC RBC AUTO-ENTMCNC: 29.1 G/DL
MCHC RBC AUTO-ENTMCNC: 29.9 G/DL
MCV RBC AUTO: 73 FL
MCV RBC AUTO: 75 FL
MONOCYTES # BLD AUTO: 0.7 K/UL
MONOCYTES # BLD AUTO: 0.9 K/UL
MONOCYTES NFR BLD: 4.3 %
MONOCYTES NFR BLD: 6.6 %
NEUTROPHILS # BLD AUTO: 19.2 K/UL
NEUTROPHILS # BLD AUTO: 7.4 K/UL
NEUTROPHILS NFR BLD: 75.2 %
NEUTROPHILS NFR BLD: 90.4 %
NRBC BLD-RTO: 0 /100 WBC
OVALOCYTES BLD QL SMEAR: ABNORMAL
PLATELET # BLD AUTO: 286 K/UL
PLATELET # BLD AUTO: 300 K/UL
PLATELET BLD QL SMEAR: ABNORMAL
PMV BLD AUTO: 11.5 FL
PMV BLD AUTO: 12.2 FL
POCT GLUCOSE: 324 MG/DL (ref 70–110)
POIKILOCYTOSIS BLD QL SMEAR: SLIGHT
POTASSIUM SERPL-SCNC: 3.2 MMOL/L
POTASSIUM SERPL-SCNC: 3.2 MMOL/L
PROT SERPL-MCNC: 6.1 G/DL
PROT SERPL-MCNC: 6.9 G/DL
PROTHROMBIN TIME: 16.7 SEC
RBC # BLD AUTO: 4.69 M/UL
RBC # BLD AUTO: 5.26 M/UL
RETIRED EF AND QEF - SEE NOTES: 70 (ref 55–65)
SODIUM SERPL-SCNC: 136 MMOL/L
SODIUM SERPL-SCNC: 139 MMOL/L
TROPONIN I SERPL DL<=0.01 NG/ML-MCNC: 0.01 NG/ML
TSH SERPL DL<=0.005 MIU/L-ACNC: 1.01 UIU/ML
WBC # BLD AUTO: 21.16 K/UL
WBC # BLD AUTO: 9.94 K/UL

## 2017-12-15 PROCEDURE — 96374 THER/PROPH/DIAG INJ IV PUSH: CPT

## 2017-12-15 PROCEDURE — 99285 EMERGENCY DEPT VISIT HI MDM: CPT | Mod: 25,27

## 2017-12-15 PROCEDURE — 80053 COMPREHEN METABOLIC PANEL: CPT | Mod: 91

## 2017-12-15 PROCEDURE — 83880 ASSAY OF NATRIURETIC PEPTIDE: CPT

## 2017-12-15 PROCEDURE — G0427 INPT/ED TELECONSULT70: HCPCS | Mod: GT,,, | Performed by: PSYCHIATRY & NEUROLOGY

## 2017-12-15 PROCEDURE — 86900 BLOOD TYPING SEROLOGIC ABO: CPT

## 2017-12-15 PROCEDURE — A4216 STERILE WATER/SALINE, 10 ML: HCPCS | Performed by: NURSE PRACTITIONER

## 2017-12-15 PROCEDURE — 85025 COMPLETE CBC W/AUTO DIFF WBC: CPT | Mod: 91

## 2017-12-15 PROCEDURE — 93306 TTE W/DOPPLER COMPLETE: CPT

## 2017-12-15 PROCEDURE — 99285 EMERGENCY DEPT VISIT HI MDM: CPT | Mod: 25

## 2017-12-15 PROCEDURE — 93306 TTE W/DOPPLER COMPLETE: CPT | Mod: 26,,, | Performed by: INTERNAL MEDICINE

## 2017-12-15 PROCEDURE — 80053 COMPREHEN METABOLIC PANEL: CPT

## 2017-12-15 PROCEDURE — 83036 HEMOGLOBIN GLYCOSYLATED A1C: CPT

## 2017-12-15 PROCEDURE — 99285 EMERGENCY DEPT VISIT HI MDM: CPT | Mod: ,,, | Performed by: PSYCHIATRY & NEUROLOGY

## 2017-12-15 PROCEDURE — 96361 HYDRATE IV INFUSION ADD-ON: CPT

## 2017-12-15 PROCEDURE — 63600175 PHARM REV CODE 636 W HCPCS: Performed by: EMERGENCY MEDICINE

## 2017-12-15 PROCEDURE — 25000003 PHARM REV CODE 250: Performed by: NURSE PRACTITIONER

## 2017-12-15 PROCEDURE — 99223 1ST HOSP IP/OBS HIGH 75: CPT | Mod: ,,, | Performed by: NURSE PRACTITIONER

## 2017-12-15 PROCEDURE — 93010 ELECTROCARDIOGRAM REPORT: CPT | Mod: ,,, | Performed by: INTERNAL MEDICINE

## 2017-12-15 PROCEDURE — 84443 ASSAY THYROID STIM HORMONE: CPT

## 2017-12-15 PROCEDURE — 84484 ASSAY OF TROPONIN QUANT: CPT

## 2017-12-15 PROCEDURE — 25500020 PHARM REV CODE 255: Performed by: PHYSICIAN ASSISTANT

## 2017-12-15 PROCEDURE — 96360 HYDRATION IV INFUSION INIT: CPT

## 2017-12-15 PROCEDURE — 86901 BLOOD TYPING SEROLOGIC RH(D): CPT

## 2017-12-15 PROCEDURE — 87040 BLOOD CULTURE FOR BACTERIA: CPT | Mod: 59

## 2017-12-15 PROCEDURE — 99291 CRITICAL CARE FIRST HOUR: CPT | Mod: ,,, | Performed by: EMERGENCY MEDICINE

## 2017-12-15 PROCEDURE — 20000000 HC ICU ROOM

## 2017-12-15 PROCEDURE — 80307 DRUG TEST PRSMV CHEM ANLYZR: CPT

## 2017-12-15 PROCEDURE — 85025 COMPLETE CBC W/AUTO DIFF WBC: CPT

## 2017-12-15 PROCEDURE — 82550 ASSAY OF CK (CPK): CPT

## 2017-12-15 PROCEDURE — 85610 PROTHROMBIN TIME: CPT

## 2017-12-15 RX ORDER — ONDANSETRON 2 MG/ML
4 INJECTION INTRAMUSCULAR; INTRAVENOUS EVERY 8 HOURS PRN
Status: DISCONTINUED | OUTPATIENT
Start: 2017-12-15 | End: 2017-12-22 | Stop reason: HOSPADM

## 2017-12-15 RX ORDER — GLUCAGON 1 MG
1 KIT INJECTION
Status: DISCONTINUED | OUTPATIENT
Start: 2017-12-15 | End: 2017-12-17

## 2017-12-15 RX ORDER — SODIUM CHLORIDE 0.9 % (FLUSH) 0.9 %
3 SYRINGE (ML) INJECTION EVERY 8 HOURS
Status: DISCONTINUED | OUTPATIENT
Start: 2017-12-15 | End: 2017-12-22 | Stop reason: HOSPADM

## 2017-12-15 RX ORDER — LEVOTHYROXINE SODIUM 50 UG/1
50 TABLET ORAL DAILY
Status: DISCONTINUED | OUTPATIENT
Start: 2017-12-16 | End: 2017-12-22 | Stop reason: HOSPADM

## 2017-12-15 RX ORDER — NICARDIPINE HYDROCHLORIDE 0.2 MG/ML
1 INJECTION INTRAVENOUS CONTINUOUS
Status: DISCONTINUED | OUTPATIENT
Start: 2017-12-15 | End: 2017-12-15

## 2017-12-15 RX ORDER — SODIUM CHLORIDE 9 MG/ML
INJECTION, SOLUTION INTRAVENOUS CONTINUOUS
Status: DISCONTINUED | OUTPATIENT
Start: 2017-12-15 | End: 2017-12-17

## 2017-12-15 RX ORDER — INSULIN ASPART 100 [IU]/ML
1-10 INJECTION, SOLUTION INTRAVENOUS; SUBCUTANEOUS EVERY 6 HOURS PRN
Status: DISCONTINUED | OUTPATIENT
Start: 2017-12-15 | End: 2017-12-17

## 2017-12-15 RX ORDER — LABETALOL HYDROCHLORIDE 5 MG/ML
10 INJECTION, SOLUTION INTRAVENOUS EVERY 4 HOURS PRN
Status: DISCONTINUED | OUTPATIENT
Start: 2017-12-15 | End: 2017-12-15

## 2017-12-15 RX ORDER — HYDRALAZINE HYDROCHLORIDE 20 MG/ML
10 INJECTION INTRAMUSCULAR; INTRAVENOUS EVERY 6 HOURS PRN
Status: DISCONTINUED | OUTPATIENT
Start: 2017-12-15 | End: 2017-12-16

## 2017-12-15 RX ORDER — ACETAMINOPHEN 325 MG/1
650 TABLET ORAL EVERY 6 HOURS PRN
Status: DISCONTINUED | OUTPATIENT
Start: 2017-12-15 | End: 2017-12-20

## 2017-12-15 RX ORDER — ATORVASTATIN CALCIUM 20 MG/1
80 TABLET, FILM COATED ORAL DAILY
Status: DISCONTINUED | OUTPATIENT
Start: 2017-12-16 | End: 2017-12-22 | Stop reason: HOSPADM

## 2017-12-15 RX ORDER — AMOXICILLIN 250 MG
1 CAPSULE ORAL 2 TIMES DAILY
Status: DISCONTINUED | OUTPATIENT
Start: 2017-12-15 | End: 2017-12-22

## 2017-12-15 RX ORDER — ATORVASTATIN CALCIUM 20 MG/1
40 TABLET, FILM COATED ORAL DAILY
Status: DISCONTINUED | OUTPATIENT
Start: 2017-12-16 | End: 2017-12-15

## 2017-12-15 RX ORDER — NICARDIPINE HYDROCHLORIDE 0.2 MG/ML
5 INJECTION INTRAVENOUS CONTINUOUS
Status: DISCONTINUED | OUTPATIENT
Start: 2017-12-15 | End: 2017-12-15 | Stop reason: HOSPADM

## 2017-12-15 RX ADMIN — ALTEPLASE 7 MG: KIT at 11:12

## 2017-12-15 RX ADMIN — SODIUM CHLORIDE: 0.9 INJECTION, SOLUTION INTRAVENOUS at 08:12

## 2017-12-15 RX ADMIN — IOHEXOL 100 ML: 350 INJECTION, SOLUTION INTRAVENOUS at 02:12

## 2017-12-15 RX ADMIN — SODIUM CHLORIDE, PRESERVATIVE FREE 3 ML: 5 INJECTION INTRAVENOUS at 11:12

## 2017-12-15 RX ADMIN — ALTEPLASE 60 MG: KIT at 11:12

## 2017-12-15 RX ADMIN — SODIUM CHLORIDE: 0.9 INJECTION, SOLUTION INTRAVENOUS at 06:12

## 2017-12-15 NOTE — HPI
Ms. Wise is a 78yo F who presented to OSH in [place] for dysarthria, aphasia, right hemiparesis and right hemisensory loss. She was last known normal at 10:30am. She received tpa at the outside facility and was transferred to [facility]. CTA MP on arrival showed a distal L MCA infarct with some early ischemic changes and she did not go to IR. Ms. Wise continues to have deficits, most significant for aphasia and dysarthria but her right hemiparesis has greatly improved.   Stroke risk factors: HLD, HTN, hypothyroidism, DMII

## 2017-12-15 NOTE — ED TRIAGE NOTES
Patient presents from [facility] by helicopter. Patient was last known normal around 1030am while playing bingo. Patient then had right sided weakness, aphasia, slurred speech, right sided facial droop. Patient was given 7mg bolus of TPA at 1154 and infusion of 60 mg  TPA that followed at 1155. Patient finished TPA prior to arrival. Upon arrival patient follows commands, slurred speech, aphasia and a right sided facial droop

## 2017-12-15 NOTE — ED NOTES
Dr. Hair recommends TPA and to have the pt flown to [facility]. She is calling the transfer center.

## 2017-12-15 NOTE — HOSPITAL COURSE
12/15:patient admitted to [facility] s/p tPA. CTH revealed L parietal MCA occlusion.   12/16: repeat CTH and MRI brain revealed left parietal lobe infarct with hemorrhagic conversion and mild mass effect. PT/INR elevated, Vitamin K 5mg IV given. Cardene drip to manage strict SBP goal of <140 in setting of hemorhagic conversion. Blood glucoses remain elevated while on SSI, started on 20u detemir.   12/17: Blood glucose remain elevated at 482, insulin drip started and endocrine consulted. Stepdown to [facility] pending Endocrinology recs. Start heparin prophylaxis. Cardene drip d/c.  12/19: boarding for stroke, added hctz

## 2017-12-15 NOTE — ASSESSMENT & PLAN NOTE
Ms. Wise is a 76yo f who presents with a L MCA syndrome, s/p tpa. Evidence of distal branch off MCA occlusion not amenable to thrombectomy.   Unclear etiology at this time but picture thus far suspicious for embolic stroke; ESUS.     -Antithrombotics for secondary stroke prevention: None: Reason:  Hold all Antithrombotics x 24 hours after IV t-PA administration  -Statins for secondary stroke prevention and hyperlipidemia, if present: Atorvastatin- 40 mg oral daily; check LDL   -Aggressive risk factor modification: Hypertension, Diabetes, High Cholesterol, Diet, Exercise  -Rehab Efforts: Physical Therapy, Occupational Therapy, Speech and Language Pathology  -Diagnostics: Ordered/Pending    -HgbA1C to assess blood glucose levels   -Lipid Profile to assess cholesterol levels   -MRI head without contrast to assess brain parenchyma   -Trans-thoracic cardiac echo to assess cardiac function/status   -TSH to assess thyroid function  -VTE Prophylaxis: None: Reason for No Pharmacological VTE Prophylaxis: Mechanical prophylaxis: Place SCDs

## 2017-12-15 NOTE — ASSESSMENT & PLAN NOTE
VAN (+) LMCA stroke  Statins for secondary stroke prevention and hyperlipidemia, if present: Atorvastatin- 80 mg oral daily  Aggressive risk factor modification: Hypertension  High Cholesterol  Exercise  Obesity  Rehab Efforts: Physical Therapy  Occupational Therapy  Speech and Language Pathology  Diagnostics: Ordered/Pending CTA Head to assess vasculature   CTA Neck/Arch to assess vasculature  HgbA1C to assess blood glucose levels  Lipid Profile to assess cholesterol levels  MRI head without contrast to assess brain parenchyma  Trans-thoracic cardiac echo to assess cardiac function/status  VTE Prophylaxis: None: Reason for No Pharmacological VTE Prophylaxis: Mechanical prophylaxis: Place SCDs

## 2017-12-15 NOTE — ED NOTES
Irma with transfer center called to say they have set up transfer report is to be called to [phone].     Accepting doctor Vik.

## 2017-12-15 NOTE — SUBJECTIVE & OBJECTIVE
Past Medical History:   Diagnosis Date    Acute respiratory failure with hypoxia     Anxiety     AP (angina pectoris) 7/18/2013    Arterial ischemic stroke, MCA (middle cerebral artery), left, acute 12/15/2017    Asthma     CAD, multiple vessel 8/2/2016    Chronic ischemic heart disease 7/18/2013    Coronary artery disease 7/18/2013    Depression     Diabetes with neurologic complications     GERD (gastroesophageal reflux disease) 7/18/2013    Heart failure     History of PTCA 7/18/2013    Hypertension 7/18/2013    Hypothyroidism     Leg pain 8/29/2013    Mixed hyperlipidemia 7/18/2013    Myocardial infarction     Osteoporosis     Pneumonia     Pneumonia due to other staphylococcus     Precancerous changes of the vagina     Sleep apnea     stopped using CPAP 2015 - sores in nose, couldn't breathe, uses Breathe riht strips now.     Thyroid disease     Tobacco dependence     resolved    Trouble in sleeping     Type 2 diabetes mellitus with ophthalmic manifestations     Urinary incontinence     pullups day, pads at night     Past Surgical History:   Procedure Laterality Date    BREAST SURGERY Left     benign cyst    CORONARY ANGIOPLASTY      CORONARY STENT PLACEMENT      x6-7 oer the yeqrs  last 12/17/14 BRIANDA to lcfx    EYE SURGERY Bilateral 2014    cataracts w/IOL   Dr Vance    HYSTERECTOMY  1970    vag hyst; ovaries intact    THYROID SURGERY      nodule benign, Dr Hankins    TUBAL LIGATION  1970      Current Facility-Administered Medications on File Prior to Encounter   Medication Dose Route Frequency Provider Last Rate Last Dose    [COMPLETED] alteplase (ACTIVASE) 100 mg injection 60 mg  0.81 mg/kg Intravenous ED 1 Time Eric Lovett MD 60 mL/hr at 12/15/17 1155 60 mg at 12/15/17 1155    [COMPLETED] alteplase (ACTIVASE) 100 mg injection 7 mg  0.09 mg/kg Intravenous ED 1 Time Eric Lovett MD   Stopped at 12/15/17 1205    [DISCONTINUED] niCARdipine 40 mg/200 mL  "infusion  1 mg/hr Intravenous Continuous Eric Lovett MD        [DISCONTINUED] niCARdipine 40 mg/200 mL infusion  5 mg/hr Intravenous Continuous Eric Lovett MD   Stopped at 12/15/17 1207     Current Outpatient Prescriptions on File Prior to Encounter   Medication Sig Dispense Refill    albuterol 90 mcg/actuation inhaler Inhale 2 puffs into the lungs every 6 (six) hours. 1 Inhaler 12    ALCOHOL PADS PadM       alendronate (FOSAMAX) 70 MG tablet TAKE 1 TABLET BY MOUTH EVERY WEEK WITH A FULL GLASS OF WATER ON EMPTY STOMACH. DO NOT EAT OR LIE DOWN FOR 30 MINUTES 12 tablet 3    aspirin (ECOTRIN) 81 MG EC tablet Take 81 mg by mouth. Take 81 mg by mouth daily.      atorvastatin (LIPITOR) 80 MG tablet Take 1 tablet (80 mg total) by mouth once daily. 90 tablet 3    BD INSULIN SYRINGE ULT-FINE II 1/2 mL 31 x 5/16" Syrg       BLOOD SUGAR DIAGNOSTIC (TRUETEST TEST STRIPS MISC) by Misc.(Non-Drug; Combo Route) route. Pt is testing 3 times a day      clonazePAM (KLONOPIN) 0.5 MG tablet TAKE 1 TABLET(0.5 MG) BY MOUTH TWICE DAILY AS NEEDED FOR ANXIETY 30 tablet 0    clopidogrel (PLAVIX) 75 mg tablet Take 1 tablet (75 mg total) by mouth once daily. 90 tablet 3    donepezil (ARICEPT) 10 MG tablet Take 1 tablet (10 mg total) by mouth once daily. 90 tablet 3    EASY COMFORT LANCETS 30 gauge Misc       EASY TALK GLUCOSE TEST Strp       EASY TALK LOW CONTROL Soln       escitalopram oxalate (LEXAPRO) 20 MG tablet TAKE 1 TABLET(20 MG) BY MOUTH EVERY DAY 90 tablet 0    escitalopram oxalate (LEXAPRO) 20 MG tablet TAKE 1 TABLET BY MOUTH EVERY DAY 90 tablet 0    FLUAD 3034-8064, 65 YR UP,,PF, 45 mcg (15 mcg x 3)/0.5 mL Syrg ADM 0.5ML IM UTD  0    hydrochlorothiazide (HYDRODIURIL) 25 MG tablet Take 1 tablet (25 mg total) by mouth once daily. 90 tablet 3    insulin lispro (HUMALOG) 100 unit/mL Crtg Inject 40 Units into the skin 2 (two) times daily. Takes 40 units in the morning and 25 units at night      insulin " "NPH (HUMULIN N) 100 unit/mL injection Inject into the skin. 40 units in the morning and 25 units in the evening      isosorbide mononitrate (IMDUR) 30 MG 24 hr tablet Take 1 tablet (30 mg total) by mouth every evening. 30 tablet 11    levothyroxine (SYNTHROID) 50 MCG tablet Take 1 tablet (50 mcg total) by mouth once daily. 90 tablet 3    losartan (COZAAR) 25 MG tablet Take 1 tablet (25 mg total) by mouth once daily. 90 tablet 3    metoprolol succinate (TOPROL-XL) 50 MG 24 hr tablet TAKE 1 TABLET(50 MG) BY MOUTH EVERY DAY (Patient taking differently: TAKE 1 TABLET(50 MG) BY MOUTH EVERY DAY) 90 tablet 3    nitroGLYCERIN (NITROSTAT) 0.4 MG SL tablet ONE TABLET UNDER TONGUE AS NEEDED FOR CHEST PAIN 25 tablet 12    pantoprazole (PROTONIX) 40 MG tablet TAKE 1 TABLET BY MOUTH DAILY 90 tablet 0    pen needle, diabetic 31 gauge x 5/16" Ndle USE TWICE DAILY AND PRN      SITagliptin (JANUVIA) 100 MG Tab Take 1 tablet (100 mg total) by mouth once daily. 90 tablet 3      Allergies: Patient has no known allergies.    Family History   Problem Relation Age of Onset    Kidney disease Father     Kidney disease Brother     Heart disease Mother     Fibromyalgia Daughter     Cancer Son      lung    Cirrhosis Sister     Alcohol abuse Sister     Diabetes Sister     Fibromyalgia Daughter     Diabetes Paternal Grandmother     Stroke Neg Hx     Mental illness Neg Hx     Mental retardation Neg Hx      Social History   Substance Use Topics    Smoking status: Former Smoker     Packs/day: 1.00     Years: 38.00     Quit date: 7/8/2008    Smokeless tobacco: Never Used    Alcohol use No     Review of Systems   Unable to obtain-pt is aphasic  Objective:     Vitals:  Pulse: (!) 58 (12/15/17 1524)  Resp: 16 (12/15/17 1354)  BP: (!) 140/65 (12/15/17 1524)  SpO2: (!) 94 % (12/15/17 1524)    Temp:  [99 °F (37.2 °C)] 99 °F (37.2 °C)  Pulse:  [53-82] 58  Resp:  [13-29] 16  SpO2:  [88 %-100 %] 94 %  BP: (117-181)/(57-89) 140/65     "          No intake/output data recorded.    Physical Exam    Physical Exam:  GA: Alert, comfortable, no acute distress.   HEENT: No scleral icterus or JVD. Bleeding gums, R facial droop  Pulmonary: Clear to auscultation A. No wheezing, crackles, or rhonchi.  Cardiac: RRR S1 & S2 w/o rubs/murmurs/gallops.   Abdominal: Bowel sounds present x 4. No appreciable hepatosplenomegaly.  Skin: No jaundice, rashes, or visible lesions.  Neuro:  --GCS: E4 V2 M6  --Mental Status:  Awake, alert, oriented to person, aphasis,   --CN II-XII grossly intact.   --Pupils R pupil 3mm, L pupil 2 mm PERRL  --Corneal reflex, gag, cough intact.  --LUE strength: 5/5  --RUE strength: 4/5  --LLE strength: 5/5  --RLE strength: 5/5    Today I personally reviewed pertinent medications, lines/drains/airways, imaging, lab results,

## 2017-12-15 NOTE — ED PROVIDER NOTES
SCRIBE #1 NOTE: I, Niki Tavera, am scribing for, and in the presence of, Eric Lovett MD. I have scribed the entire note.      History      Chief Complaint   Patient presents with    Cerebrovascular Accident     pt was playing bingo at 1030 and dropped what she was holding and had postive droop and slurred speech.        Review of patient's allergies indicates:  No Known Allergies     HPI   HPI    12/15/2017, 11:12 AM   [facility]      History of Present Illness: Tiffanie Wise is a 77 y.o. female patient who presents to the Emergency Department for cardiovascular accident which onset gradually around 1030 today while playing bingo. Pt is having stroke like sxs. Symptoms are constant and moderate in severity.  No mitigating or exacerbating factors reported. Associated sxs include slurred speech, facial droop, and R sided weakness. Patient is unable to deny any other sxs at this time secondary to speech troubles. No prior Tx reported by [facility]. No further complaints or concerns at this time.         Arrival mode:   [facility]    PCP: Evelio Parnell MD       Past Medical History:  Past Medical History:   Diagnosis Date    Acute respiratory failure with hypoxia     Anxiety     AP (angina pectoris) 7/18/2013    Asthma     CAD, multiple vessel 8/2/2016    Chronic ischemic heart disease 7/18/2013    Coronary artery disease 7/18/2013    Depression     Diabetes with neurologic complications     GERD (gastroesophageal reflux disease) 7/18/2013    Heart failure     History of PTCA 7/18/2013    Hypertension 7/18/2013    Hypothyroidism     Leg pain 8/29/2013    Mixed hyperlipidemia 7/18/2013    Myocardial infarction     Osteoporosis     Pneumonia     Pneumonia due to other staphylococcus     Precancerous changes of the vagina     Sleep apnea     stopped using CPAP 2015 - sores in nose, couldn't breathe, uses Breathe riht strips now.     Thyroid disease     Tobacco dependence      resolved    Trouble in sleeping     Type 2 diabetes mellitus with ophthalmic manifestations     Urinary incontinence     pullups day, pads at night       Past Surgical History:  Past Surgical History:   Procedure Laterality Date    BREAST SURGERY Left     benign cyst    CORONARY ANGIOPLASTY      CORONARY STENT PLACEMENT      x6-7 oer the yeqrs  last 12/17/14 BRIANDA to lcfx    EYE SURGERY Bilateral 2014    cataracts w/IOL   Dr Vance    HYSTERECTOMY  1970    vag hyst; ovaries intact    THYROID SURGERY      nodule benign, Dr Hankins    TUBAL LIGATION  1970         Family History:  Family History   Problem Relation Age of Onset    Kidney disease Father     Kidney disease Brother     Heart disease Mother     Fibromyalgia Daughter     Cancer Son      lung    Cirrhosis Sister     Alcohol abuse Sister     Diabetes Sister     Fibromyalgia Daughter     Diabetes Paternal Grandmother     Stroke Neg Hx     Mental illness Neg Hx     Mental retardation Neg Hx        Social History:  Social History     Social History Main Topics    Smoking status: Former Smoker     Packs/day: 1.00     Years: 38.00     Quit date: 7/8/2008    Smokeless tobacco: Never Used    Alcohol use No    Drug use: No    Sexual activity: Not Currently     Birth control/ protection: None       ROS   Review of Systems   Unable to perform ROS: Patient nonverbal       Physical Exam      Initial Vitals [12/15/17 1112]   BP Pulse Resp Temp SpO2   (!) 181/88 60 16 -- 99 %      MAP       119           Physical Exam  Nursing Notes and Vital Signs Reviewed.  Constitutional: Patient is in no apparent distress. Well-developed and well-nourished.  Head: Atraumatic. Normocephalic.  Eyes: PERRL. EOM intact. Conjunctivae are not pale. No scleral icterus.  ENT: Mucous membranes are moist. Oropharynx is clear and symmetric.    Neck: Supple. Full ROM. No lymphadenopathy.  Cardiovascular: Regular rate. Regular rhythm. No murmurs, rubs, or gallops.  Distal pulses are 2+ and symmetric.  Pulmonary/Chest: No respiratory distress. Clear to auscultation bilaterally. No wheezing or rales.  Abdominal: Soft and non-distended. No rebound, guarding, or rigidity. Good bowel sounds.  Musculoskeletal: No obvious deformities. No edema.   Skin: Warm and dry.  Neurological:  3/5 strength to LUE and LLE.  0/5 strength to RUE and RLE or pt is unable to follow verbal commands. Slurred speech. R sided facial weakness.      ED Course    Procedures  ED Vital Signs:  Vitals:    12/15/17 1112 12/15/17 1131 12/15/17 1154   BP: (!) 181/88 (!) 179/74 (!) 170/89   Pulse: 60 (!) 58 (!) 56   Resp: 16 13 16   Temp: 99 °F (37.2 °C)     TempSrc: Oral     SpO2: 99% 99% 100%   Weight: 74.1 kg (163 lb 4.8 oz)         Abnormal Lab Results:  Labs Reviewed   CBC W/ AUTO DIFFERENTIAL - Abnormal; Notable for the following:        Result Value    Hemoglobin 11.5 (*)     MCV 73 (*)     MCH 21.9 (*)     MCHC 29.9 (*)     RDW 17.0 (*)     Gran% 75.2 (*)     Lymph% 16.5 (*)     All other components within normal limits   COMPREHENSIVE METABOLIC PANEL - Abnormal; Notable for the following:     Potassium 3.2 (*)     Glucose 311 (*)     Albumin 2.9 (*)     Total Bilirubin 1.1 (*)     All other components within normal limits   B-TYPE NATRIURETIC PEPTIDE   CK   TROPONIN I   URINALYSIS        All Lab Results:  Results for orders placed or performed during the hospital encounter of 12/15/17   CBC auto differential   Result Value Ref Range    WBC 9.94 3.90 - 12.70 K/uL    RBC 5.26 4.00 - 5.40 M/uL    Hemoglobin 11.5 (L) 12.0 - 16.0 g/dL    Hematocrit 38.5 37.0 - 48.5 %    MCV 73 (L) 82 - 98 fL    MCH 21.9 (L) 27.0 - 31.0 pg    MCHC 29.9 (L) 32.0 - 36.0 g/dL    RDW 17.0 (H) 11.5 - 14.5 %    Platelets 286 150 - 350 K/uL    MPV 11.5 9.2 - 12.9 fL    Gran # 7.4 1.8 - 7.7 K/uL    Lymph # 1.6 1.0 - 4.8 K/uL    Mono # 0.7 0.3 - 1.0 K/uL    Eos # 0.2 0.0 - 0.5 K/uL    Baso # 0.03 0.00 - 0.20 K/uL    Gran% 75.2 (H) 38.0 -  73.0 %    Lymph% 16.5 (L) 18.0 - 48.0 %    Mono% 6.6 4.0 - 15.0 %    Eosinophil% 1.7 0.0 - 8.0 %    Basophil% 0.3 0.0 - 1.9 %    Platelet Estimate Appears normal     Aniso Slight     Poik Slight     Ovalocytes Occasional     Tear Drop Cells Occasional     Portis Cells Occasional     Differential Method Automated    Comprehensive metabolic panel   Result Value Ref Range    Sodium 139 136 - 145 mmol/L    Potassium 3.2 (L) 3.5 - 5.1 mmol/L    Chloride 100 95 - 110 mmol/L    CO2 27 23 - 29 mmol/L    Glucose 311 (H) 70 - 110 mg/dL    BUN, Bld 11 8 - 23 mg/dL    Creatinine 0.9 0.5 - 1.4 mg/dL    Calcium 8.8 8.7 - 10.5 mg/dL    Total Protein 6.9 6.0 - 8.4 g/dL    Albumin 2.9 (L) 3.5 - 5.2 g/dL    Total Bilirubin 1.1 (H) 0.1 - 1.0 mg/dL    Alkaline Phosphatase 122 55 - 135 U/L    AST 17 10 - 40 U/L    ALT 15 10 - 44 U/L    Anion Gap 12 8 - 16 mmol/L    eGFR if African American >60 >60 mL/min/1.73 m^2    eGFR if non African American >60 >60 mL/min/1.73 m^2   Brain natriuretic peptide   Result Value Ref Range    BNP 77 0 - 99 pg/mL   CK   Result Value Ref Range     20 - 180 U/L   Troponin I   Result Value Ref Range    Troponin I 0.013 0.000 - 0.026 ng/mL         Imaging Results:  Imaging Results          X-Ray Chest AP Portable (Final result)  Result time 12/15/17 11:59:08    Final result by Elizabeth Mcghee MD (12/15/17 11:59:08)                 Impression:      Mild vascular prominence.    Please see above.      Electronically signed by: ELIZABETH MCGHEE MD  Date:     12/15/17  Time:    11:59              Narrative:    EXAM: Portable CXR one view    CLINICAL HISTORY: Weakness.    COMPARISON STUDIES: 12/29/2016    FINDINGS:  Evidence for median sternotomy and CABG.  Cardiomegaly.  Atherosclerotic aortic arch.  Mild vascular prominence.  No focal infiltrate..  Ribs appear intact .                             CT Head Without Contrast (Final result)  Result time 12/15/17 11:33:57    Final result by Shaka Cross MD  (12/15/17 11:33:57)                 Impression:         Chronic microvascular ischemic changes.     All CT scans at this facility use dose modulation, iterative reconstruction and/or weight based dosing when appropriate to reduce radiation dose to as low as reasonably achievable.        Electronically signed by: JORY MEYERS MD  Date:     12/15/17  Time:    11:33              Narrative:    CT OF THE HEAD WITHOUT CONTRAST:     Technique: 5 mm axial images were obtained from the skullbase to the vertex, without administration of contrast.    Comparison: None.    History:  Slurred speech and ALT are    Findings:    No intracranial hemorrhage, extra-axial fluid collection, midline shift, or mass effect.  No evidence of an acute cortical infarct or of an infarct in a major vascular territory.    There is mild prominence of the ventricles and sulci compatible with diffuse cerebral volume loss.  Patchy hypoattenuation in the periventricular white matter regions is nonspecific, but most commonly seen with chronic microvascular ischemic changes. Vascular calcifications noted.    The calvarium is unremarkable. Visualized portions of the paranasal sinuses are clear. Visualized mastoid air cells are clear. Visualized orbits are unremarkable.                             The EKG was ordered, reviewed, and independently interpreted by the ED provider.  Interpretation time: 1129  Rate: 56 BPM  Rhythm: sinus bradycardia  Interpretation: Abnormal EKG. No STEMI.           The Emergency Provider reviewed the vital signs and test results, which are outlined above.    ED Discussion   11:45 AM: Dr. Bony MD discussed the pt's case with Dr. Hair (Vascular Neurology) who recommends TPA and transfer to [facility] [place] via ED to ED .        11:46 AM: Re-evaluated pt. Informed pt that there are no vascular neurology services available at this time. I have discussed test results, shared treatment plan, and the need for transfer with patient at  bedside. All historical, clinical, radiographic, and laboratory findings were reviewed with the patient in detail. Patient will be transferred by helicopter services with ALS care and neuro checks required en route. Patient was told that there could be unforeseen motor vehicle accidents or loss of vital signs that could result in potential death or permanent disability. Pt is ready for transfer.       11:50 AM Updated family. I have discussed test results, shared treatment plan, and the need for transfer with the family at bedside. All historical, clinical, radiographic, and laboratory findings were reviewed with the family in detail. Family understands that the patient will be transferred by [facility] services with ALS care and neuro checks required en route. Family was told that there could be unforeseen motor vehicle accidents or loss of vital signs that could result in potential death or permanent disability. Family understands and agrees with plan at this time. All questions were answered and addressed.       11:56 AM  Consult with MD Pao (ER) at [facility] concerning pt. There are no Vascular neurology services, which the patient requires, offered at [facility] at this time. MD Pao expresses understanding and will accept transfer for Vascular neurology services.  Accepting Facility: [facility]  Accepting Physician: MD Pao  ED Medication(s):  Medications   alteplase (ACTIVASE) 100 mg injection 60 mg (60 mg Intravenous New Bag 12/15/17 1155)   niCARdipine 40 mg/200 mL infusion (0 mg/hr Intravenous Hold 12/15/17 1207)   alteplase (ACTIVASE) 100 mg injection 7 mg (0 mg/kg × 74.1 kg Intravenous Stopped 12/15/17 1205)       New Prescriptions    No medications on file             Medical Decision Making    Medical Decision Making:   Clinical Tests:   Lab Tests: Ordered and Reviewed  Radiological Study: Ordered and Reviewed  Medical Tests: Ordered and Reviewed           Scribe Attestation:    Scribe #1: I performed the above scribed service and the documentation accurately describes the services I performed. I attest to the accuracy of the note.    Attending:   Physician Attestation Statement for Scribe #1: I, Eric Lovett MD, personally performed the services described in this documentation, as scribed by Niki Tavera, in my presence, and it is both accurate and complete.          Clinical Impression       ICD-10-CM ICD-9-CM   1. Cerebrovascular accident (CVA), unspecified mechanism I63.9 434.91   2. Right sided weakness R53.1 728.87   3. Slurred speech R47.81 784.59       Disposition:   Disposition: Transferred  Condition: Stable         Eric Lovett MD  12/15/17 [id number]

## 2017-12-15 NOTE — SUBJECTIVE & OBJECTIVE
Past Medical History:   Diagnosis Date    Acute respiratory failure with hypoxia     Anxiety     AP (angina pectoris) 7/18/2013    Arterial ischemic stroke, MCA (middle cerebral artery), left, acute 12/15/2017    Asthma     CAD, multiple vessel 8/2/2016    Chronic ischemic heart disease 7/18/2013    Coronary artery disease 7/18/2013    Depression     Diabetes with neurologic complications     GERD (gastroesophageal reflux disease) 7/18/2013    Heart failure     History of PTCA 7/18/2013    Hypertension 7/18/2013    Hypothyroidism     Leg pain 8/29/2013    Mixed hyperlipidemia 7/18/2013    Myocardial infarction     Osteoporosis     Pneumonia     Pneumonia due to other staphylococcus     Precancerous changes of the vagina     Sleep apnea     stopped using CPAP 2015 - sores in nose, couldn't breathe, uses Breathe riht strips now.     Thyroid disease     Tobacco dependence     resolved    Trouble in sleeping     Type 2 diabetes mellitus with ophthalmic manifestations     Urinary incontinence     pullups day, pads at night     Past Surgical History:   Procedure Laterality Date    BREAST SURGERY Left     benign cyst    CORONARY ANGIOPLASTY      CORONARY STENT PLACEMENT      x6-7 oer the yeqrs  last 12/17/14 BRIANDA to lcfx    EYE SURGERY Bilateral 2014    cataracts w/IOL   Dr Vance    HYSTERECTOMY  1970    vag hyst; ovaries intact    THYROID SURGERY      nodule benign, Dr Hankins    TUBAL LIGATION  1970     Family History   Problem Relation Age of Onset    Kidney disease Father     Kidney disease Brother     Heart disease Mother     Fibromyalgia Daughter     Cancer Son      lung    Cirrhosis Sister     Alcohol abuse Sister     Diabetes Sister     Fibromyalgia Daughter     Diabetes Paternal Grandmother     Stroke Neg Hx     Mental illness Neg Hx     Mental retardation Neg Hx      Social History   Substance Use Topics    Smoking status: Former Smoker     Packs/day:  1.00     Years: 38.00     Quit date: 7/8/2008    Smokeless tobacco: Never Used    Alcohol use No     Review of patient's allergies indicates:  No Known Allergies    Medications: I have reviewed the current medication administration record.      (Not in a hospital admission)    Review of Systems   Constitutional: Negative for fever.   HENT: Positive for dental problem (bleeding from mouth).    Eyes: Negative for visual disturbance.   Respiratory: Negative for shortness of breath.    Cardiovascular: Negative for leg swelling.   Gastrointestinal: Negative for nausea and vomiting.   Endocrine: Negative for polyuria.   Genitourinary: Negative for dysuria.   Musculoskeletal: Negative for neck stiffness.   Skin: Negative for color change.   Allergic/Immunologic: Negative for immunocompromised state.   Neurological: Positive for speech difficulty. Negative for tremors, syncope and headaches.   Hematological: Bruises/bleeds easily.   Psychiatric/Behavioral: Negative for behavioral problems.     Objective:     Vital Signs (Most Recent):  Pulse: (!) 53 (12/15/17 1624)  Resp: 16 (12/15/17 1354)  BP: (!) 158/70 (12/15/17 1624)  SpO2: 99 % (12/15/17 1624)    Vital Signs Range (Last 24H):  Temp:  [99 °F (37.2 °C)]   Pulse:  [53-82]   Resp:  [13-29]   BP: (117-181)/(57-89)   SpO2:  [88 %-100 %]     Physical Exam   Constitutional: She appears well-developed and well-nourished. No distress.   HENT:   Head: Normocephalic and atraumatic.   Eyes: EOM are normal. Pupils are equal, round, and reactive to light.   Neck: Normal range of motion. Neck supple.   Cardiovascular: Normal rate.    Pulmonary/Chest: Effort normal.   Abdominal: Soft.   Musculoskeletal: Normal range of motion.   Neurological: She is alert.   Severe aphasia, unable to determine if oriented   Skin: Skin is warm and dry. She is not diaphoretic.   Psychiatric:   Severe aphasia       Neurological Exam:   LOC: alert  Attention Span: Good   Language: Global  aphasia  Articulation: Dysarthria  Orientation: Untestable due to severe aphasia   Visual Fields: Full  EOM (CN III, IV, VI): Full/intact  Pupils (CN II, III): PERRL  Facial Sensation (CN V): Normal  Facial Movement (CN VII): Lower facial weakness on the Right  Gag Reflex: present  Reflexes: 2+ throughout  Motor: No drift noted in x4 extremities   Cebellar: No evidence of appendicular or axial ataxia  Sensation: Intact to light touch, temperature and vibration  Tone: Normal tone throughout      Laboratory:  CMP:   Recent Labs  Lab 12/15/17  1433   CALCIUM 8.2*   ALBUMIN 2.6*   PROT 6.1      K 3.2*   CO2 25      BUN 11   CREATININE 0.8   ALKPHOS 119   ALT 10   AST 14   BILITOT 1.2*     CBC:   Recent Labs  Lab 12/15/17  1433   WBC 21.16*   RBC 4.69   HGB 10.2*   HCT 35.0*      MCV 75*   MCH 21.7*   MCHC 29.1*     Lipid Panel: No results for input(s): CHOL, LDLCALC, HDL, TRIG in the last 168 hours.  Coagulation:   Recent Labs  Lab 12/15/17  1433   INR 1.6*     Hgb A1C:   Recent Labs  Lab 12/15/17  1433   HGBA1C 12.1*     TSH:   Recent Labs  Lab 12/15/17  1433   TSH 1.008       Diagnostic Results:      Brain imaging:  CTA MP 12/15/17  Distal left parietal parietal MCA branch occlusion with correlating early changes of infarction on noncontrast CT.    No intracranial hemorrhage or significant mass effect/midline shift.    CTH 12/15/17: No acute intracranial abnormality.     Vessel Imaging:  As above    Cardiac Evaluation:   ECHO pending

## 2017-12-15 NOTE — ED PROVIDER NOTES
Encounter Date: 12/15/2017    SCRIBE #1 NOTE: I, Hung Dunne, am scribing for, and in the presence of,  Dr. Nicole. I have scribed the following portions of the note - the APC attestation.       History     Chief Complaint   Patient presents with    Transfer     post tpa after right sided weakness at 1030     76 y/o WF with PMHx of HTN, DM, MI, CAD on Plavix presents to the ED after being transferred from [place] for CVA s/p TPA. Per ED note from [place], patient was playing Bingo when she developed slurred speech, R facial droop, R sided weakness around 10:30am today. TPA was administered prior to transfer. Per flight care, BP stable en route, patient had some bleeding of the gums. She denies fever, chest pain, SOB, abdominal pain, n/v, headache, changes in vision.      The history is provided by the patient.     Review of patient's allergies indicates:  No Known Allergies  Past Medical History:   Diagnosis Date    Acute respiratory failure with hypoxia     Anxiety     AP (angina pectoris) 7/18/2013    Arterial ischemic stroke, MCA (middle cerebral artery), left, acute 12/15/2017    Asthma     CAD, multiple vessel 8/2/2016    Chronic ischemic heart disease 7/18/2013    Coronary artery disease 7/18/2013    Depression     Diabetes with neurologic complications     GERD (gastroesophageal reflux disease) 7/18/2013    Heart failure     History of PTCA 7/18/2013    Hypertension 7/18/2013    Hypothyroidism     Leg pain 8/29/2013    Mixed hyperlipidemia 7/18/2013    Myocardial infarction     Osteoporosis     Pneumonia     Pneumonia due to other staphylococcus     Precancerous changes of the vagina     Sleep apnea     stopped using CPAP 2015 - sores in nose, couldn't breathe, uses Breathe riht strips now.     Thyroid disease     Tobacco dependence     resolved    Trouble in sleeping     Type 2 diabetes mellitus with ophthalmic manifestations     Urinary incontinence     pullups day,  pads at night     Past Surgical History:   Procedure Laterality Date    BREAST SURGERY Left     benign cyst    CORONARY ANGIOPLASTY      CORONARY STENT PLACEMENT      x6-7 oer the yeqrs  last 12/17/14 BRIADNA to lcfx    EYE SURGERY Bilateral 2014    cataracts w/IOL   Dr Vance    HYSTERECTOMY  1970    vag hyst; ovaries intact    THYROID SURGERY      nodule benign, Dr Hankins    TUBAL LIGATION  1970     Family History   Problem Relation Age of Onset    Kidney disease Father     Kidney disease Brother     Heart disease Mother     Fibromyalgia Daughter     Cancer Son      lung    Cirrhosis Sister     Alcohol abuse Sister     Diabetes Sister     Fibromyalgia Daughter     Diabetes Paternal Grandmother     Stroke Neg Hx     Mental illness Neg Hx     Mental retardation Neg Hx      Social History   Substance Use Topics    Smoking status: Former Smoker     Packs/day: 1.00     Years: 38.00     Quit date: 7/8/2008    Smokeless tobacco: Never Used    Alcohol use No     Review of Systems   Constitutional: Negative for chills and fever.   HENT: Negative for congestion, rhinorrhea and sore throat.         +bleeding gums   Eyes: Negative for photophobia and visual disturbance.   Respiratory: Negative for shortness of breath.    Cardiovascular: Negative for chest pain.   Gastrointestinal: Negative for abdominal pain, nausea and vomiting.   Genitourinary: Negative for dysuria and hematuria.   Musculoskeletal: Negative for back pain and myalgias.   Skin: Negative for rash and wound.   Neurological: Positive for facial asymmetry, speech difficulty and weakness. Negative for dizziness, light-headedness, numbness and headaches.   Psychiatric/Behavioral: Negative for confusion.       Physical Exam     Initial Vitals [12/15/17 1354]   BP Pulse Resp Temp SpO2   (!) 155/72 82 16 -- 98 %      MAP       99.67         Physical Exam    Nursing note and vitals reviewed.  Constitutional: She appears well-developed and  well-nourished. She is not diaphoretic. No distress.   HENT:   Head: Normocephalic.   Bleeding from gums noted   Eyes: Right pupil is not reactive. Left pupil is reactive. Pupils are unequal.   Neck: Normal range of motion. Neck supple.   Cardiovascular: Normal rate, regular rhythm and normal heart sounds. Exam reveals no gallop and no friction rub.    No murmur heard.  Pulmonary/Chest: Breath sounds normal. She has no wheezes. She has no rhonchi. She has no rales.   Abdominal: Soft. Bowel sounds are normal. There is no tenderness. There is no rebound and no guarding.   Musculoskeletal: Normal range of motion.   Neurological: She is alert. No sensory deficit.   R sided weakness and R facial droop improving. Patient still having expressive aphasia and slurred speech   Skin: Skin is warm and dry. No rash noted. No erythema.   Psychiatric: She has a normal mood and affect.         ED Course   Procedures  Labs Reviewed   CBC W/ AUTO DIFFERENTIAL - Abnormal; Notable for the following:        Result Value    WBC 21.16 (*)     Hemoglobin 10.2 (*)     Hematocrit 35.0 (*)     MCV 75 (*)     MCH 21.7 (*)     MCHC 29.1 (*)     RDW 17.2 (*)     Immature Granulocytes 0.6 (*)     Gran # 19.2 (*)     Immature Grans (Abs) 0.12 (*)     Lymph # 0.9 (*)     Gran% 90.4 (*)     Lymph% 4.2 (*)     All other components within normal limits   COMPREHENSIVE METABOLIC PANEL - Abnormal; Notable for the following:     Potassium 3.2 (*)     Glucose 203 (*)     Calcium 8.2 (*)     Albumin 2.6 (*)     Total Bilirubin 1.2 (*)     All other components within normal limits   PROTIME-INR - Abnormal; Notable for the following:     Prothrombin Time 16.7 (*)     INR 1.6 (*)     All other components within normal limits   HEMOGLOBIN A1C - Abnormal; Notable for the following:     Hemoglobin A1C 12.1 (*)     Estimated Avg Glucose 301 (*)     All other components within normal limits    Narrative:     ADD ON DRUG OF ABUSE SCREEN [id number]   12/15/2017   15:55   add on [facility] as per dr.gia mcleod [id number]   12/15/2017  16:00    TSH   HEMOGLOBIN A1C   DRUGS OF ABUSE SCREEN, BLOOD   URINALYSIS   DRUGS OF ABUSE SCREEN, BLOOD   POCT GLUCOSE   TYPE & SCREEN   POCT GLUCOSE MONITORING CONTINUOUS   POCT GLUCOSE MONITORING CONTINUOUS        Imaging Results          CTA STROKE MULTI-PHASE (Final result)  Result time 12/15/17 15:48:47    Final result by Kain Ngo MD (12/15/17 15:48:47)                 Impression:        Distal left parietal parietal MCA branch occlusion with correlating early changes of infarction on noncontrast CT.    No intracranial hemorrhage or significant mass effect/midline shift.      ______________________________________     Electronically signed by resident: Kain Ngo  Date:     12/15/17  Time:    14:24            As the supervising and teaching physician, I personally reviewed the images and resident's interpretation and I agree with the findings.          Electronically signed by: ELIZABETH SUAREZ MD  Date:     12/15/17  Time:    15:48              Narrative:    Comparison: Head CT 12/15/17    Technique: CTA of the head and neck according to stroke multiphase protocol.    Clinical information: Aphasia and left-sided weakness onset at 10:30 AM, transferred from [place] for further evaluation/treatment of acute ischemic stroke.  Patient received t-PA.    Findings: Compared to noncontrast CT obtained at 11:27 AM there has been development of cytotoxic edema involving the left parietal lobe in keeping with early changes of infarction.  No intracranial hemorrhage or mass effect.  There is generalized cerebral atrophy with compensatory prominence of the ventricles.    The great vessel origins are patent.  The common and internal carotid arteries show no occlusion or high-grade stenosis, noting atherosclerotic disease of the carotid bifurcations and cavernous internal carotid arteries.  There is an aberrant right subclavian artery.  The  vertebral arteries show no occlusion or significant narrowing.    A left distal cortical MCA branch the parietal lobe is occluded, located within the area of ischemic change on noncontrast CT.  The intracranial internal carotid, anterior cerebral, right middle cerebral, and posterior circulation arteries are patent without evidence of occlusion, stenosis, or aneurysm.  Delayed phases show relatively symmetric flow to the bilateral cerebral hemispheres.    The lung apices and superior mediastinal structures are unremarkable.  Patient is status post sternotomy.  Soft tissues of the neck are within normal limits.  The mastoid air cells and paranasal sinuses are clear.  The bones show no fracture or destructive lesion.                                 Medical Decision Making:   History:   Old Medical Records: I decided to obtain old medical records.  Clinical Tests:   Lab Tests: Ordered and Reviewed  Radiological Study: Reviewed and Ordered  Other:   I have discussed this case with another health care provider.       APC / Resident Notes:   76 y/o WF with PMHx of HTN, DM, MI, CAD on Plavix presents to the ED after being transferred from [place] for CVA s/p TPA.  VSS. RRR. Lungs CTA. Abdomen soft and nontender. Gums bleeding minimally. R pupil larger and non-reactive. Expressive aphasia and slurred speech noted. R sided weakness and facial droop improving. Will get labs, consult vascular neurology and neuro ICU.     Vascular neurology and Neuro ICU evaluated in the ED. Neuro ICU will admit to their service for further management.        Scribe Attestation:   Scribe #1: I performed the above scribed service and the documentation accurately describes the services I performed. I attest to the accuracy of the note.    Attending Attestation:     Physician Attestation Statement for NP/PA:   I have conducted a face to face encounter with this patient in addition to the NP/PA, due to Medical Complexity    Other NP/PA  Attestation Additions:      Medical Decision Making: Emergent evaluation of 77 year old female transferred form [facility] for evaluation of acute stroke. She presented with slurred speech, right facial droop and right sided weakness.Received TPA prior to transfer.  On arrival vital signs were stable but had a little bit of blood oozing around her gums. Continues to have expressive aphasia. Right arm weakness is improved. Vascular neurology evaluated patient. Not interventional candidate.  Patient will be admitted critical care.     Attending Critical Care:   Critical Care Times:   ==============================================================  · Total Critical Care Time - exclusive of procedural time: 35 minutes.  ==============================================================  Critical care was necessary to treat or prevent imminent or life-threatening deterioration of the following conditions: CNS failure.   Critical care was time spent personally by me on the following activities: re-evaluation of patient's conition, discussion with consultants, review of x-rays / CT sent with the patient, review of old charts and evaluation of patient's response to treatment.     Physician Attestation for Scribe:      Comments: I, Dr. Ching Nicole, personally performed the services described in this documentation. All medical record entries made by the scribe were at my direction and in my presence.  I have reviewed the chart and agree that the record reflects my personal performance and is accurate and complete. Ching Nicole MD.  5:09 PM 12/15/2017              ED Course      Clinical Impression:   The primary encounter diagnosis was Cerebrovascular accident (CVA), unspecified mechanism. A diagnosis of Ischemic stroke was also pertinent to this visit.    Disposition:   Disposition: Admitted  Condition: Critical  Neuro ICU                        Amy Mcnulty PA-C  12/15/17 1555       Ching Nicole MD  12/15/17  1708       Ching Nicole MD  12/15/17 1040

## 2017-12-15 NOTE — CONSULTS
[facility]  Vascular Neurology  Comprehensive Stroke Center  Consult Note    Inpatient consult to Vascular (Stroke) Neurology  Consult performed by: KARLA DURANT  Consult ordered by: MARILIN GOMEZ  Reason for consult: stroke         Assessment/Plan:     Patient is a 77 y.o. year old female with:    * Acute ischemic left MCA stroke    Ms. Wise is a 78yo f who presents with a L MCA syndrome, s/p tpa. Evidence of distal branch off MCA occlusion not amenable to thrombectomy.   Unclear etiology at this time but picture thus far suspicious for embolic stroke; ESUS.     -Antithrombotics for secondary stroke prevention: None: Reason:  Hold all Antithrombotics x 24 hours after IV t-PA administration  -Statins for secondary stroke prevention and hyperlipidemia, if present: Atorvastatin- 40 mg oral daily; check LDL   -Aggressive risk factor modification: Hypertension, Diabetes, High Cholesterol, Diet, Exercise  -Rehab Efforts: Physical Therapy, Occupational Therapy, Speech and Language Pathology  -Diagnostics: Ordered/Pending    -HgbA1C to assess blood glucose levels   -Lipid Profile to assess cholesterol levels   -MRI head without contrast to assess brain parenchyma   -Trans-thoracic cardiac echo to assess cardiac function/status   -TSH to assess thyroid function  -VTE Prophylaxis: None: Reason for No Pharmacological VTE Prophylaxis: Mechanical prophylaxis: Place SCDs         Cytotoxic cerebral edema    Secondary to ischemia  Noted on CTH        Essential hypertension    Stroke risk factor  BP goal <180 post tpa         Type 2 diabetes mellitus    Stroke risk factor           Acquired hypothyroidism    Stroke risk factor        Hyperlipidemia associated with type 2 diabetes mellitus    Stroke risk factor         Aphasia    Result of stroke   Speech therapy evaluate and treat            STROKE DOCUMENTATION     Acute Stroke Times   Last Known Normal Date: 12/15/17  Last Known Normal Time:  1030  Symptom Onset Date: 12/15/17  Symptom Onset Time: 1030  Stroke Team Called Date: 12/15/17  Stroke Team Called Time: 1342 (upon arrival to [facility])  Stroke Team Arrival Date: 12/15/17  Stroke Team Arrival Time: 1347  CT Interpretation Time: 1237  Decision to Treat Time for Alteplase: 1154  Decision to Treat Time for IR:  (n/a)    NIH Scale:  Interval: baseline (upon arrival/admit)  1a. Level Of Consciousness: 0-->Alert: keenly responsive  1b. LOC Questions: 2-->Answers neither question correctly  1c. LOC Commands: 1-->Performs one task correctly  2. Best Gaze: 0-->Normal  3. Visual: 0-->No visual loss  4. Facial Palsy: 1-->Minor paralysis (flattened nasolabial fold, asymmetry on smiling)  5a. Motor Arm, Left: 0-->No drift: limb holds 90 (or 45) degrees for full 10 secs  5b. Motor Arm, Right: 0-->No drift: limb holds 90 (or 45) degrees for full 10 secs  6a. Motor Leg, Left: 0-->No drift: leg holds 30 degree position for full 5 secs  6b. Motor Leg, Right: 0-->No drift: leg holds 30 degree position for full 5 secs  7. Limb Ataxia: 0-->Absent  8. Sensory: 0-->Normal: no sensory loss  9. Best Language: 2-->Severe aphasia: all communication is through fragmentary expression: great need for inference, questioning, and guessing by the listener. Range of information that can be exchanged is limited: listener carries burden of. . . (see row details)  10. Dysarthria: 2-->Severe dysarthria: patients speech is so slurred as to be unintelligible in the absence of or out of proportion to any dysphasia, or is mute/anarthric  11. Extinction and Inattention (formerly Neglect): 0-->No abnormality  Total (NIH Stroke Scale): 8    Modified Pawling Score: 0  Kali Coma Scale:13   ABCD2 Score:    [id number] Score:   HAS -BLED Score:   ICH Score:   Hunt & Branham Classification:       Thrombolysis Candidate? Yes, given prior to arrival at outside hospital      Interventional Revascularization Candidate?   Is the patient eligible for  mechanical endovascular reperfusion (KALPANA)?  No; No large vessel occlusion      Hemorrhagic change of an Ischemic Stroke: Does this patient have an ischemic stroke with hemorrhagic changes? No     Subjective:     History of Present Illness:  Ms. Wise is a 78yo F who presented to OSH in [place] for dysarthria, aphasia, right hemiparesis and right hemisensory loss. She was last known normal at 10:30am. She received tpa at the outside facility and was [place] [facility] CTA MP on arrival showed a distal L MCA infarct with some early ischemic changes and she did not go to IR. Ms. Wise continues to have deficits, most significant for aphasia and dysarthria but her right hemiparesis has greatly improved.   Stroke risk factors: HLD, HTN, hypothyroidism, DMII            Past Medical History:   Diagnosis Date    Acute respiratory failure with hypoxia     Anxiety     AP (angina pectoris) 7/18/2013    Arterial ischemic stroke, MCA (middle cerebral artery), left, acute 12/15/2017    Asthma     CAD, multiple vessel 8/2/2016    Chronic ischemic heart disease 7/18/2013    Coronary artery disease 7/18/2013    Depression     Diabetes with neurologic complications     GERD (gastroesophageal reflux disease) 7/18/2013    Heart failure     History of PTCA 7/18/2013    Hypertension 7/18/2013    Hypothyroidism     Leg pain 8/29/2013    Mixed hyperlipidemia 7/18/2013    Myocardial infarction     Osteoporosis     Pneumonia     Pneumonia due to other staphylococcus     Precancerous changes of the vagina     Sleep apnea     stopped using CPAP 2015 - sores in nose, couldn't breathe, uses Breathe riht strips now.     Thyroid disease     Tobacco dependence     resolved    Trouble in sleeping     Type 2 diabetes mellitus with ophthalmic manifestations     Urinary incontinence     pullups day, pads at night     Past Surgical History:   Procedure Laterality Date    BREAST SURGERY Left     benign cyst     CORONARY ANGIOPLASTY      CORONARY STENT PLACEMENT      x6-7 oer the yeqrs  last 12/17/14 BRIANDA to lcfx    EYE SURGERY Bilateral 2014    cataracts w/IOL   Dr Vance    HYSTERECTOMY  1970    vag hyst; ovaries intact    THYROID SURGERY      nodule benign, Dr Hankins    TUBAL LIGATION  1970     Family History   Problem Relation Age of Onset    Kidney disease Father     Kidney disease Brother     Heart disease Mother     Fibromyalgia Daughter     Cancer Son      lung    Cirrhosis Sister     Alcohol abuse Sister     Diabetes Sister     Fibromyalgia Daughter     Diabetes Paternal Grandmother     Stroke Neg Hx     Mental illness Neg Hx     Mental retardation Neg Hx      Social History   Substance Use Topics    Smoking status: Former Smoker     Packs/day: 1.00     Years: 38.00     Quit date: 7/8/2008    Smokeless tobacco: Never Used    Alcohol use No     Review of patient's allergies indicates:  No Known Allergies    Medications: I have reviewed the current medication administration record.      (Not in a hospital admission)    Review of Systems   Constitutional: Negative for fever.   HENT: Positive for dental problem (bleeding from mouth).    Eyes: Negative for visual disturbance.   Respiratory: Negative for shortness of breath.    Cardiovascular: Negative for leg swelling.   Gastrointestinal: Negative for nausea and vomiting.   Endocrine: Negative for polyuria.   Genitourinary: Negative for dysuria.   Musculoskeletal: Negative for neck stiffness.   Skin: Negative for color change.   Allergic/Immunologic: Negative for immunocompromised state.   Neurological: Positive for speech difficulty. Negative for tremors, syncope and headaches.   Hematological: Bruises/bleeds easily.   Psychiatric/Behavioral: Negative for behavioral problems.     Objective:     Vital Signs (Most Recent):  Pulse: (!) 53 (12/15/17 1624)  Resp: 16 (12/15/17 1354)  BP: (!) 158/70 (12/15/17 1624)  SpO2: 99 % (12/15/17  1624)    Vital Signs Range (Last 24H):  Temp:  [99 °F (37.2 °C)]   Pulse:  [53-82]   Resp:  [13-29]   BP: (117-181)/(57-89)   SpO2:  [88 %-100 %]     Physical Exam   Constitutional: She appears well-developed and well-nourished. No distress.   HENT:   Head: Normocephalic and atraumatic.   Eyes: EOM are normal. Pupils are equal, round, and reactive to light.   Neck: Normal range of motion. Neck supple.   Cardiovascular: Normal rate.    Pulmonary/Chest: Effort normal.   Abdominal: Soft.   Musculoskeletal: Normal range of motion.   Neurological: She is alert.   Severe aphasia, unable to determine if oriented   Skin: Skin is warm and dry. She is not diaphoretic.   Psychiatric:   Severe aphasia       Neurological Exam:   LOC: alert  Attention Span: Good   Language: Global aphasia  Articulation: Dysarthria  Orientation: Untestable due to severe aphasia   Visual Fields: Full  EOM (CN III, IV, VI): Full/intact  Pupils (CN II, III): PERRL  Facial Sensation (CN V): Normal  Facial Movement (CN VII): Lower facial weakness on the Right  Gag Reflex: present  Reflexes: 2+ throughout  Motor: No drift noted in x4 extremities   Cebellar: No evidence of appendicular or axial ataxia  Sensation: Intact to light touch, temperature and vibration  Tone: Normal tone throughout      Laboratory:  CMP:   Recent Labs  Lab 12/15/17  1433   CALCIUM 8.2*   ALBUMIN 2.6*   PROT 6.1      K 3.2*   CO2 25      BUN 11   CREATININE 0.8   ALKPHOS 119   ALT 10   AST 14   BILITOT 1.2*     CBC:   Recent Labs  Lab 12/15/17  1433   WBC 21.16*   RBC 4.69   HGB 10.2*   HCT 35.0*      MCV 75*   MCH 21.7*   MCHC 29.1*     Lipid Panel: No results for input(s): CHOL, LDLCALC, HDL, TRIG in the last 168 hours.  Coagulation:   Recent Labs  Lab 12/15/17  1433   INR 1.6*     Hgb A1C:   Recent Labs  Lab 12/15/17  1433   HGBA1C 12.1*     TSH:   Recent Labs  Lab 12/15/17  1433   TSH 1.008       Diagnostic Results:      Brain imaging:  CTA MP  12/15/17  Distal left parietal parietal MCA branch occlusion with correlating early changes of infarction on noncontrast CT.    No intracranial hemorrhage or significant mass effect/midline shift.    CTH 12/15/17: No acute intracranial abnormality.     Vessel Imaging:  As above    Cardiac Evaluation:   ECHO pending       Alisha Dangelo PA-C  Comprehensive Stroke Center  Department of Vascular Neurology   [facility]

## 2017-12-15 NOTE — ED NOTES
Patient/family member aware that they are being admitted to hospital. Awaiting bed assignment at this time. Informed patient/family that nurse will keep updating patient status. Will continue to monitor at this time

## 2017-12-15 NOTE — SUBJECTIVE & OBJECTIVE
Woke up with symptoms?: no  Last known normal: Last known well (date and time):: last known well 1030  Is the most recent last known normal correct? Yes      Does the patient take any Blood Thinners? no  Recent bleeding noted: no  Medications: aspirin      Past Medical History: hypertension, MI/CAD, CHF and Heart Problems    Past Surgical History: no major surgeries within the last 2 weeks    Family History: no relevant history    Social History: no smoking, no drinking, no drugs    Allergies:   No known drug allergies    Review of Systems   Unable to perform ROS: Patient nonverbal   Neurological: Positive for facial asymmetry, speech difficulty and weakness.     Objective:   Vitals: Blood pressure (!) 153/62, pulse (!) 53, temperature 99 °F (37.2 °C), temperature source Oral, resp. rate (!) 29, weight 74.1 kg (163 lb 4.8 oz), SpO2 (!) 88 %.     CT READ: Yes  No hemmorhage. No mass effect. No early infarct signs.     Physical Exam   Constitutional: She appears well-developed and well-nourished.   HENT:   Head: Normocephalic and atraumatic.   Eyes: EOM are normal. Pupils are equal, round, and reactive to light.   Cardiovascular: Normal rate and regular rhythm.    Pulmonary/Chest: Effort normal.   Neurological: A cranial nerve deficit and sensory deficit is present.   Vitals reviewed.

## 2017-12-15 NOTE — HPI
The patient is a 77 year old female with a PMHx of HTN, DM II, MI, CABG x1, GERD, CAD (on Plavix) admitted to [facility] s/p tPA. Per chart review, the patient was last seen normal was around 10:30 patient was playing Bingo when she developed slurred speech, R facial droop, R sided weakness around 10:30am today. TPA was administered prior to transfer. Per flight care, BP stable en route, patient had some bleeding of the gums. CT-No intracranial hemorrhage, extra-axial fluid collection, midline shift, or mass effect. CT MP-Distal left parietal parietal MCA branch occlusion with correlating early changes of infarction on noncontrast Ct. Admitted to [facility] for higher level of care.

## 2017-12-15 NOTE — CONSULTS
[facility]  Vascular Neurology  Comprehensive Stroke Center  Tele-Consultation Note      Consults    Consulting Provider: Spoke Physician:: Dr. Dequan Lovett    Patient Location: [facility]  Spoke hospital nurse at bedside with patient assisting consultant.     Patient information was obtained from EMS personnel.     Subjective:     History of Present Illness:  76 y/o female LKW at 1030 today. Sudden onset of aphasia, right hemineglect and slurred speech      Woke up with symptoms?: no  Last known normal: Last known well (date and time):: last known well 1030  Is the most recent last known normal correct? Yes      Does the patient take any Blood Thinners? no  Recent bleeding noted: no  Medications: aspirin      Past Medical History: hypertension, MI/CAD, DM, CHF and Heart Problems    Past Surgical History: no major surgeries within the last 2 weeks    Family History: no relevant history    Social History: no smoking, no drinking, no drugs    Allergies:   No known drug allergies    Review of Systems   Unable to perform ROS: Patient nonverbal   Neurological: Positive for facial asymmetry, speech difficulty and weakness.     Objective:   Vitals: Blood pressure (!) 153/62, pulse (!) 53, temperature 99 °F (37.2 °C), temperature source Oral, resp. rate (!) 29, weight 74.1 kg (163 lb 4.8 oz), SpO2 (!) 88 %.     CT READ: Yes  No hemmorhage. No mass effect. No early infarct signs.     Physical Exam   Constitutional: She appears well-developed and well-nourished.   HENT:   Head: Normocephalic and atraumatic.   Eyes: EOM are normal. Pupils are equal, round, and reactive to light.   Cardiovascular: Normal rate and regular rhythm.    Pulmonary/Chest: Effort normal.   Neurological: A cranial nerve deficit and sensory deficit is present.   Vitals reviewed.            Assessment/Plan:     STROKE DOCUMENTATION     Acute Stroke Times   Last Known Normal Date:  12/15/17  Symptom Onset Date: 12/15/17  Stroke Team Called Date: 12/15/17  Stroke Team Arrival Date: 12/15/17  CT Interpretation Time: 1237    NIH Scale:  Interval: baseline (upon arrival/admit)  1a. Level Of Consciousness: 0-->Alert: keenly responsive  1b. LOC Questions: 2-->Answers neither question correctly  1c. LOC Commands: 2-->Performs neither task correctly  2. Best Gaze: 0-->Normal  3. Visual: 2-->Complete hemianopia  4. Facial Palsy: 2-->Partial paralysis (total or near-total paralysis of lower face)  5a. Motor Arm, Left: 0-->No drift: limb holds 90 (or 45) degrees for full 10 secs  5b. Motor Arm, Right: 1-->Drift: limb holds 90 (or 45) degrees, but drifts down before full 10 secs: does not hit bed or other support  6a. Motor Leg, Left: 0-->No drift: leg holds 30 degree position for full 5 secs  6b. Motor Leg, Right: 0-->No drift: leg holds 30 degree position for full 5 secs  7. Limb Ataxia: 0-->Absent  8. Sensory: 1-->Mild-to-moderate sensory loss: patient feels pinprick is less sharp or is dull on the affected side: or there is a loss of superficial pain with pinprick, but patient is aware of being touched  9. Best Language: 2-->Severe aphasia: all communication is through fragmentary expression: great need for inference, questioning, and guessing by the listener. Range of information that can be exchanged is limited: listener carries burden of. . . (see row details)  10. Dysarthria: 1-->Mild-to-moderate dysarthria: patient slurs at least some words and, at worst, can be understood with some difficulty  11. Extinction and Inattention (formerly Neglect): 1-->Visual, tactile, auditory, spatial, or personal inattention or extinction to bilateral simultaneous stimulation in one of the sensory modalities  Total (NIH Stroke Scale): 14     Modified Leanna Score: 0  Kali Coma Scale:11   ABCD2 Score:    [id number] Score:   HAS -BLED Score:   ICH Score:   Hunt & Branham Classification:       Diagnoses:   Aphasia     Speech therapy        Arterial ischemic stroke, MCA (middle cerebral artery), left, acute    VAN (+) LMCA stroke  Statins for secondary stroke prevention and hyperlipidemia, if present: Atorvastatin- 80 mg oral daily  Aggressive risk factor modification: Hypertension  High Cholesterol  Exercise  Obesity  Rehab Efforts: Physical Therapy  Occupational Therapy  Speech and Language Pathology  Diagnostics: Ordered/Pending CTA Head to assess vasculature   CTA Neck/Arch to assess vasculature  HgbA1C to assess blood glucose levels  Lipid Profile to assess cholesterol levels  MRI head without contrast to assess brain parenchyma  Trans-thoracic cardiac echo to assess cardiac function/status  VTE Prophylaxis: None: Reason for No Pharmacological VTE Prophylaxis: Mechanical prophylaxis: Place SCDs             Blood pressure (!) 153/62, pulse (!) 53, temperature 99 °F (37.2 °C), temperature source Oral, resp. rate (!) 29, weight 74.1 kg (163 lb 4.8 oz), SpO2 (!) 88 %.  Alteplase Eligible?: Yes  Alteplase Recommendation:   Altaplase Recommendation   Altaplase Total Dose:   TPA Recommendation: 66.7 mg      Bolus Dose   Bolus Dose: 6.7 mg   Intravenously over 1 minute (10% of Total Dose)   Infusion Dose   Infusion Dose: 60 mg   Continuous Infusion over 60 Minutes     Additional Recommendations:   1. Neurological assessment and vital signs (except temperature) every 15 minutes during Altaplase infusion.  2. Frequency of BP assessments may need to be increased if systolic BP stays >= 180 mm Hg or diastolic BP stays >= 105 mm Hg. Administer antihypertensive meds as ordered  3. Continue to monitor and control blood pressure and monitor for neurological deterioration every 15 minutes for the first hour after the infusion is stopped. Then every 30 minutes for the next 6 hours. Perform hourly monitoring from the 8th post-infusion hour until 24 hours post-infusion.  4. Temperature every 4 hours or as required.  5. Follow hospital  protocol for further orders re: post tPA infusion patient management.  6. No antithrombotics or anticoagulants (including but not limited to: heparin, warfarin, aspirin, clopidigrel, or dipyridamole) for 24 hours, then start antithrombotics as ordered by treating physician    Adapted from the American Heart Association/American Stroke Association (AHA/ASA) and American Association of Neuroscience Nurses (AANN) Guidelines.   Possible Interventional Revascularization Candidate? Yes    Disposition Recommendation: transfer to [facility] by  air  stat    Recommended the emergency room physician to have a brief discussion with the patient and/or family if available regarding the risks and benefits of treatment, and to briefly document the occurrence of that discussion in his clinical encounter note.     The attending portion of this evaluation, treatment, and documentation was performed per Thania Hair MD via audiovisual.    Billing code:  (moderate to severe stroke, large areas of edema, some mimics)    · This patient has a critical neurological condition/illness, with high morbidity and mortality.  · There is a high probability for acute neurological change leading to clinical and possibly life-threatening deterioration requiring highest level of physician preparedness for urgent intervention.  · Care was coordinated with other physicians involved in the patient's care.  · Radiologic studies and laboratory data were reviewed and interpreted, and plan of care was re-assessed based on the results.  · Diagnosis, treatment options and prognosis may have been discussed with the patient and/or family members or caregiver.  · Further advanced medical management and further evaluation is warranted for his care.      Consult End Time:7003    Thania Hair MD  Comprehensive Stroke Center  Vascular Neurology   [facility]

## 2017-12-15 NOTE — H&P
[facility]  Neurocritical Care  History & Physical    Admit Date: 12/15/2017  Service Date: 12/15/2017  Length of Stay: 0    Subjective:     Chief Complaint: Ischemic stroke    History of Present Illness: The patient is a 77 year old female with a PMHx of HTN, DM II, MI, CABG x1, GERD, CAD (on Plavix) admitted to [facility] s/p tPA. Per chart review, the patient was last seen normal was around 10:30. The patient was playing Bingo when she developed slurred speech, R facial droop, R sided weakness around 10:30am today. TPA was administered prior to transfer. Per flight care, BP stable en route, patient had some bleeding of the gums. CT-No intracranial hemorrhage, extra-axial fluid collection, midline shift, or mass effect. CT MP-Distal left parietal parietal MCA branch occlusion with correlating early changes of infarction on noncontrast CT. Pt denies SOB, chest pain, headache, N/V/D. Patient admitted to [facility] for higher level of care.             Past Medical History:   Diagnosis Date    Acute respiratory failure with hypoxia     Anxiety     AP (angina pectoris) 7/18/2013    Arterial ischemic stroke, MCA (middle cerebral artery), left, acute 12/15/2017    Asthma     CAD, multiple vessel 8/2/2016    Chronic ischemic heart disease 7/18/2013    Coronary artery disease 7/18/2013    Depression     Diabetes with neurologic complications     GERD (gastroesophageal reflux disease) 7/18/2013    Heart failure     History of PTCA 7/18/2013    Hypertension 7/18/2013    Hypothyroidism     Leg pain 8/29/2013    Mixed hyperlipidemia 7/18/2013    Myocardial infarction     Osteoporosis     Pneumonia     Pneumonia due to other staphylococcus     Precancerous changes of the vagina     Sleep apnea     stopped using CPAP 2015 - sores in nose, couldn't breathe, uses Breathe riht strips now.    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     Urinary incontinence     pullups day, pads at night     Past Surgical History:   Procedure Laterality Date    BREAST SURGERY Left     benign cyst    CORONARY ANGIOPLASTY      CORONARY STENT PLACEMENT      x6-7 oer the yeqrs  last 12/17/14 BRIANDA to lcfx    EYE SURGERY Bilateral 2014    cataracts w/IOL   Dr Vance    HYSTERECTOMY  1970    vag hyst; ovaries intact    THYROID SURGERY      nodule benign, Dr Hankins    TUBAL LIGATION  1970      Current Facility-Administered Medications on File Prior to Encounter   Medication Dose Route Frequency Provider Last Rate Last Dose    [COMPLETED] alteplase (ACTIVASE) 100 mg injection 60 mg  0.81 mg/kg Intravenous ED 1 Time Eric Lovett MD 60 mL/hr at 12/15/17 1155 60 mg at 12/15/17 1155    [COMPLETED] alteplase (ACTIVASE) 100 mg injection 7 mg  0.09 mg/kg Intravenous ED 1 Time Eric Lovett MD   Stopped at 12/15/17 1205    [DISCONTINUED] niCARdipine 40 mg/200 mL infusion  1 mg/hr Intravenous Continuous Eric Lovett MD        [DISCONTINUED] niCARdipine 40 mg/200 mL infusion  5 mg/hr Intravenous Continuous Eric Lovett MD   Stopped at 12/15/17 1207     Current Outpatient Prescriptions on File Prior to Encounter   Medication Sig Dispense Refill    albuterol 90 mcg/actuation inhaler Inhale 2 puffs into the lungs every 6 (six) hours. 1 Inhaler 12    ALCOHOL PADS PadM       alendronate (FOSAMAX) 70 MG tablet TAKE 1 TABLET BY MOUTH EVERY WEEK WITH A FULL GLASS OF WATER ON EMPTY STOMACH. DO NOT EAT OR LIE DOWN FOR 30 MINUTES 12 tablet 3    aspirin (ECOTRIN) 81 MG EC tablet Take 81 mg by mouth. Take 81 mg by mouth daily.      atorvastatin (LIPITOR) 80 MG tablet Take 1 tablet (80 mg total) by mouth once daily. 90 tablet 3    BD INSULIN SYRINGE ULT-FINE II 1/2 mL 31 x 5/16" Syrg       BLOOD SUGAR DIAGNOSTIC (TRUETEST TEST STRIPS MISC) by Misc.(Non-Drug; Combo Route) route. Pt is testing 3 times a day      " "clonazePAM (KLONOPIN) 0.5 MG tablet TAKE 1 TABLET(0.5 MG) BY MOUTH TWICE DAILY AS NEEDED FOR ANXIETY 30 tablet 0    clopidogrel (PLAVIX) 75 mg tablet Take 1 tablet (75 mg total) by mouth once daily. 90 tablet 3    donepezil (ARICEPT) 10 MG tablet Take 1 tablet (10 mg total) by mouth once daily. 90 tablet 3    EASY COMFORT LANCETS 30 gauge Misc       EASY TALK GLUCOSE TEST Strp       EASY TALK LOW CONTROL Soln       escitalopram oxalate (LEXAPRO) 20 MG tablet TAKE 1 TABLET(20 MG) BY MOUTH EVERY DAY 90 tablet 0    escitalopram oxalate (LEXAPRO) 20 MG tablet TAKE 1 TABLET BY MOUTH EVERY DAY 90 tablet 0    FLUAD [id number], 65 YR UP,,PF, 45 mcg (15 mcg x 3)/0.5 mL Syrg ADM 0.5ML IM UTD  0    hydrochlorothiazide (HYDRODIURIL) 25 MG tablet Take 1 tablet (25 mg total) by mouth once daily. 90 tablet 3    insulin lispro (HUMALOG) 100 unit/mL Crtg Inject 40 Units into the skin 2 (two) times daily. Takes 40 units in the morning and 25 units at night      insulin NPH (HUMULIN N) 100 unit/mL injection Inject into the skin. 40 units in the morning and 25 units in the evening      isosorbide mononitrate (IMDUR) 30 MG 24 hr tablet Take 1 tablet (30 mg total) by mouth every evening. 30 tablet 11    levothyroxine (SYNTHROID) 50 MCG tablet Take 1 tablet (50 mcg total) by mouth once daily. 90 tablet 3    losartan (COZAAR) 25 MG tablet Take 1 tablet (25 mg total) by mouth once daily. 90 tablet 3    metoprolol succinate (TOPROL-XL) 50 MG 24 hr tablet TAKE 1 TABLET(50 MG) BY MOUTH EVERY DAY (Patient taking differently: TAKE 1 TABLET(50 MG) BY MOUTH EVERY DAY) 90 tablet 3    nitroGLYCERIN (NITROSTAT) 0.4 MG SL tablet ONE TABLET UNDER TONGUE AS NEEDED FOR CHEST PAIN 25 tablet 12    pantoprazole (PROTONIX) 40 MG tablet TAKE 1 TABLET BY MOUTH DAILY 90 tablet 0    pen needle, diabetic 31 gauge x 5/16" Ndle USE TWICE DAILY AND PRN      SITagliptin (JANUVIA) 100 MG Tab Take 1 tablet (100 mg total) by mouth once daily. 90 tablet " 3      Allergies: Patient has no known allergies.    Family History   Problem Relation Age of Onset    Kidney disease Father     Kidney disease Brother     Heart disease Mother     Fibromyalgia Daughter     Cancer Son      lung    Cirrhosis Sister     Alcohol abuse Sister     Diabetes Sister     Fibromyalgia Daughter     Diabetes Paternal Grandmother     Stroke Neg Hx     Mental illness Neg Hx     Mental retardation Neg Hx      Social History   Substance Use Topics    Smoking status: Former Smoker     Packs/day: 1.00     Years: 38.00     Quit date: 7/8/2008    Smokeless tobacco: Never Used    Alcohol use No     Review of Systems   Unable to obtain-pt is aphasic  Objective:     Vitals:  Pulse: (!) 58 (12/15/17 1524)  Resp: 16 (12/15/17 1354)  BP: (!) 140/65 (12/15/17 1524)  SpO2: (!) 94 % (12/15/17 1524)    Temp:  [99 °F (37.2 °C)] 99 °F (37.2 °C)  Pulse:  [53-82] 58  Resp:  [13-29] 16  SpO2:  [88 %-100 %] 94 %  BP: (117-181)/(57-89) 140/65              No intake/output data recorded.    Physical Exam    Physical Exam:  GA: Alert, comfortable, no acute distress.   HEENT: No scleral icterus or JVD. Bleeding gums, R facial droop  Pulmonary: Clear to auscultation A. No wheezing, crackles, or rhonchi.  Cardiac: RRR S1 & S2 w/o rubs/murmurs/gallops.   Abdominal: Bowel sounds present x 4. No appreciable hepatosplenomegaly.  Skin: No jaundice, rashes, or visible lesions.  Neuro:  --GCS: E4 V2 M6  --Mental Status:  Awake, alert, oriented to person, aphasis,   --CN II-XII grossly intact.   --Pupils R pupil 3mm, L pupil 2 mm PERRL  --Corneal reflex, gag, cough intact.  --LUE strength: 5/5  --RUE strength: 4/5  --LLE strength: 5/5  --RLE strength: 5/5    Today I personally reviewed pertinent medications, lines/drains/airways, imaging, lab results,         Assessment/Plan:     Neuro   * Ischemic stroke      S/p tPA   --Continue Neuro checks q 1hr  -- Vascular Neurology consulted  -- CT MP-Distal left parietal  parietal MCA branch occlusion with correlating early changes of infarction on noncontrast CT.  --CT-Chronic microvascular ischemic changes.   --SBP goal <180  -- PT/OT/Speech  -- Pending MRI                Aphasia    Likely due to stroke        Cardiac/Vascular   Essential hypertension    SBP <180  PRN Labetalol  Pending Echo  EKG-SB        Hyperlipidemia associated with type 2 diabetes mellitus    Lipid panel  Continue home dose 80mg Atorvastatin        Endocrine   Type 2 diabetes mellitus    A1C- 10.8  accuchecks q 6 hrs  PRN SSI         Acquired hypothyroidism    Continued home synthroid              Prophylaxis:  Venous Thromboembolism: mechanical  Stress Ulcer: NA  Ventilator Pneumonia: not applicable     Activity Orders          None        Full Code    Marilu Chand NP  Neurocritical Care  [facility]

## 2017-12-15 NOTE — ASSESSMENT & PLAN NOTE
S/p tPA   --Continue Neuro checks q 1hr  -- Vascular Neurology consulted  -- CT MP-Distal left parietal parietal MCA branch occlusion with correlating early changes of infarction on noncontrast CT.  --CT-Chronic microvascular ischemic changes.   --SBP goal <180  -- PT/OT/Speech  -- Pending MRI

## 2017-12-16 PROBLEM — R79.1 ELEVATED INR: Status: ACTIVE | Noted: 2017-12-16

## 2017-12-16 PROBLEM — I63.412 EMBOLIC STROKE INVOLVING LEFT MIDDLE CEREBRAL ARTERY: Status: ACTIVE | Noted: 2017-12-15

## 2017-12-16 PROBLEM — I63.89 ACUTE HEMORRHAGIC INFARCTION OF BRAIN: Status: ACTIVE | Noted: 2017-12-16

## 2017-12-16 PROBLEM — I70.0 AORTIC ARCH ATHEROSCLEROSIS: Status: ACTIVE | Noted: 2017-12-16

## 2017-12-16 LAB
ALBUMIN SERPL BCP-MCNC: 2.6 G/DL
ALP SERPL-CCNC: 111 U/L
ALT SERPL W/O P-5'-P-CCNC: 10 U/L
ANION GAP SERPL CALC-SCNC: 8 MMOL/L
AST SERPL-CCNC: 16 U/L
BASOPHILS # BLD AUTO: 0.04 K/UL
BASOPHILS NFR BLD: 0.4 %
BILIRUB SERPL-MCNC: 1.3 MG/DL
BUN SERPL-MCNC: 11 MG/DL
CALCIUM SERPL-MCNC: 8.2 MG/DL
CHLORIDE SERPL-SCNC: 105 MMOL/L
CO2 SERPL-SCNC: 29 MMOL/L
CREAT SERPL-MCNC: 0.7 MG/DL
DIFFERENTIAL METHOD: ABNORMAL
EOSINOPHIL # BLD AUTO: 0.2 K/UL
EOSINOPHIL NFR BLD: 1.4 %
ERYTHROCYTE [DISTWIDTH] IN BLOOD BY AUTOMATED COUNT: 17.2 %
EST. GFR  (AFRICAN AMERICAN): >60 ML/MIN/1.73 M^2
EST. GFR  (NON AFRICAN AMERICAN): >60 ML/MIN/1.73 M^2
GLUCOSE SERPL-MCNC: 143 MG/DL
HCT VFR BLD AUTO: 33.5 %
HGB BLD-MCNC: 10 G/DL
IMM GRANULOCYTES # BLD AUTO: 0.03 K/UL
IMM GRANULOCYTES NFR BLD AUTO: 0.3 %
INR PPP: 1.6
LYMPHOCYTES # BLD AUTO: 2.1 K/UL
LYMPHOCYTES NFR BLD: 19.8 %
MAGNESIUM SERPL-MCNC: 1.4 MG/DL
MAGNESIUM SERPL-MCNC: 1.9 MG/DL
MCH RBC QN AUTO: 21.8 PG
MCHC RBC AUTO-ENTMCNC: 29.9 G/DL
MCV RBC AUTO: 73 FL
MONOCYTES # BLD AUTO: 1 K/UL
MONOCYTES NFR BLD: 9.2 %
NEUTROPHILS # BLD AUTO: 7.2 K/UL
NEUTROPHILS NFR BLD: 68.9 %
NRBC BLD-RTO: 0 /100 WBC
PHOSPHATE SERPL-MCNC: 2.9 MG/DL
PLATELET # BLD AUTO: 285 K/UL
PMV BLD AUTO: 11.6 FL
POCT GLUCOSE: 127 MG/DL (ref 70–110)
POCT GLUCOSE: 166 MG/DL (ref 70–110)
POCT GLUCOSE: 236 MG/DL (ref 70–110)
POCT GLUCOSE: 268 MG/DL (ref 70–110)
POCT GLUCOSE: 270 MG/DL (ref 70–110)
POCT GLUCOSE: 287 MG/DL (ref 70–110)
POTASSIUM SERPL-SCNC: 3.1 MMOL/L
PROT SERPL-MCNC: 6 G/DL
PROTHROMBIN TIME: 16.2 SEC
RBC # BLD AUTO: 4.59 M/UL
SODIUM SERPL-SCNC: 142 MMOL/L
TROPONIN I SERPL DL<=0.01 NG/ML-MCNC: 0.02 NG/ML
WBC # BLD AUTO: 10.45 K/UL

## 2017-12-16 PROCEDURE — 80053 COMPREHEN METABOLIC PANEL: CPT

## 2017-12-16 PROCEDURE — 20000000 HC ICU ROOM

## 2017-12-16 PROCEDURE — G8987 SELF CARE CURRENT STATUS: HCPCS | Mod: CN

## 2017-12-16 PROCEDURE — 99900035 HC TECH TIME PER 15 MIN (STAT)

## 2017-12-16 PROCEDURE — 97166 OT EVAL MOD COMPLEX 45 MIN: CPT

## 2017-12-16 PROCEDURE — 27000221 HC OXYGEN, UP TO 24 HOURS

## 2017-12-16 PROCEDURE — 97530 THERAPEUTIC ACTIVITIES: CPT

## 2017-12-16 PROCEDURE — 84100 ASSAY OF PHOSPHORUS: CPT

## 2017-12-16 PROCEDURE — 25000003 PHARM REV CODE 250: Performed by: NURSE PRACTITIONER

## 2017-12-16 PROCEDURE — 85610 PROTHROMBIN TIME: CPT

## 2017-12-16 PROCEDURE — 25000003 PHARM REV CODE 250: Performed by: STUDENT IN AN ORGANIZED HEALTH CARE EDUCATION/TRAINING PROGRAM

## 2017-12-16 PROCEDURE — 63600175 PHARM REV CODE 636 W HCPCS: Performed by: NURSE PRACTITIONER

## 2017-12-16 PROCEDURE — 99233 SBSQ HOSP IP/OBS HIGH 50: CPT | Mod: GC,,, | Performed by: PSYCHIATRY & NEUROLOGY

## 2017-12-16 PROCEDURE — 63600175 PHARM REV CODE 636 W HCPCS: Performed by: STUDENT IN AN ORGANIZED HEALTH CARE EDUCATION/TRAINING PROGRAM

## 2017-12-16 PROCEDURE — 63600175 PHARM REV CODE 636 W HCPCS: Performed by: PSYCHIATRY & NEUROLOGY

## 2017-12-16 PROCEDURE — 97802 MEDICAL NUTRITION INDIV IN: CPT

## 2017-12-16 PROCEDURE — 99233 SBSQ HOSP IP/OBS HIGH 50: CPT | Mod: ,,, | Performed by: PHYSICIAN ASSISTANT

## 2017-12-16 PROCEDURE — 97163 PT EVAL HIGH COMPLEX 45 MIN: CPT

## 2017-12-16 PROCEDURE — A4216 STERILE WATER/SALINE, 10 ML: HCPCS | Performed by: NURSE PRACTITIONER

## 2017-12-16 PROCEDURE — G8996 SWALLOW CURRENT STATUS: HCPCS | Mod: CL

## 2017-12-16 PROCEDURE — 25000003 PHARM REV CODE 250: Performed by: PSYCHIATRY & NEUROLOGY

## 2017-12-16 PROCEDURE — 93005 ELECTROCARDIOGRAM TRACING: CPT

## 2017-12-16 PROCEDURE — 92523 SPEECH SOUND LANG COMPREHEN: CPT

## 2017-12-16 PROCEDURE — 83735 ASSAY OF MAGNESIUM: CPT

## 2017-12-16 PROCEDURE — 94660 CPAP INITIATION&MGMT: CPT

## 2017-12-16 PROCEDURE — 85025 COMPLETE CBC W/AUTO DIFF WBC: CPT

## 2017-12-16 PROCEDURE — 83735 ASSAY OF MAGNESIUM: CPT | Mod: 91

## 2017-12-16 PROCEDURE — 84484 ASSAY OF TROPONIN QUANT: CPT

## 2017-12-16 PROCEDURE — 93010 ELECTROCARDIOGRAM REPORT: CPT | Mod: ,,, | Performed by: INTERNAL MEDICINE

## 2017-12-16 PROCEDURE — 92610 EVALUATE SWALLOWING FUNCTION: CPT

## 2017-12-16 PROCEDURE — G8988 SELF CARE GOAL STATUS: HCPCS | Mod: CK

## 2017-12-16 PROCEDURE — 94761 N-INVAS EAR/PLS OXIMETRY MLT: CPT

## 2017-12-16 PROCEDURE — G8997 SWALLOW GOAL STATUS: HCPCS | Mod: CI

## 2017-12-16 PROCEDURE — 27000190 HC CPAP FULL FACE MASK W/VALVE

## 2017-12-16 RX ORDER — MORPHINE SULFATE 2 MG/ML
2 INJECTION, SOLUTION INTRAMUSCULAR; INTRAVENOUS ONCE
Status: COMPLETED | OUTPATIENT
Start: 2017-12-16 | End: 2017-12-16

## 2017-12-16 RX ORDER — POTASSIUM CHLORIDE 7.45 MG/ML
80 INJECTION INTRAVENOUS
Status: DISCONTINUED | OUTPATIENT
Start: 2017-12-16 | End: 2017-12-18

## 2017-12-16 RX ORDER — NICARDIPINE HYDROCHLORIDE 0.2 MG/ML
1 INJECTION INTRAVENOUS CONTINUOUS
Status: DISCONTINUED | OUTPATIENT
Start: 2017-12-16 | End: 2017-12-16

## 2017-12-16 RX ORDER — NICARDIPINE HYDROCHLORIDE 0.2 MG/ML
1 INJECTION INTRAVENOUS CONTINUOUS
Status: DISCONTINUED | OUTPATIENT
Start: 2017-12-16 | End: 2017-12-17

## 2017-12-16 RX ORDER — METOPROLOL TARTRATE 25 MG/1
25 TABLET, FILM COATED ORAL EVERY 6 HOURS
Status: DISCONTINUED | OUTPATIENT
Start: 2017-12-16 | End: 2017-12-21

## 2017-12-16 RX ORDER — POTASSIUM CHLORIDE 7.45 MG/ML
60 INJECTION INTRAVENOUS
Status: DISCONTINUED | OUTPATIENT
Start: 2017-12-16 | End: 2017-12-18

## 2017-12-16 RX ORDER — MAGNESIUM SULFATE HEPTAHYDRATE 40 MG/ML
4 INJECTION, SOLUTION INTRAVENOUS
Status: DISCONTINUED | OUTPATIENT
Start: 2017-12-16 | End: 2017-12-18

## 2017-12-16 RX ORDER — HYDRALAZINE HYDROCHLORIDE 20 MG/ML
10 INJECTION INTRAMUSCULAR; INTRAVENOUS EVERY 6 HOURS PRN
Status: DISCONTINUED | OUTPATIENT
Start: 2017-12-16 | End: 2017-12-19

## 2017-12-16 RX ORDER — MAGNESIUM SULFATE HEPTAHYDRATE 40 MG/ML
2 INJECTION, SOLUTION INTRAVENOUS
Status: DISCONTINUED | OUTPATIENT
Start: 2017-12-16 | End: 2017-12-18

## 2017-12-16 RX ORDER — LEVETIRACETAM 500 MG/1
500 TABLET ORAL 2 TIMES DAILY
Status: DISCONTINUED | OUTPATIENT
Start: 2017-12-16 | End: 2017-12-17

## 2017-12-16 RX ORDER — LEVETIRACETAM 5 MG/ML
500 INJECTION INTRAVASCULAR EVERY 12 HOURS
Status: DISCONTINUED | OUTPATIENT
Start: 2017-12-16 | End: 2017-12-16

## 2017-12-16 RX ORDER — LOSARTAN POTASSIUM 50 MG/1
50 TABLET ORAL DAILY
Status: DISCONTINUED | OUTPATIENT
Start: 2017-12-16 | End: 2017-12-21

## 2017-12-16 RX ORDER — POTASSIUM CHLORIDE 7.45 MG/ML
40 INJECTION INTRAVENOUS
Status: DISCONTINUED | OUTPATIENT
Start: 2017-12-16 | End: 2017-12-18

## 2017-12-16 RX ADMIN — LEVETIRACETAM 500 MG: 500 TABLET ORAL at 10:12

## 2017-12-16 RX ADMIN — NICARDIPINE HYDROCHLORIDE 1 MG/HR: 0.2 INJECTION, SOLUTION INTRAVENOUS at 10:12

## 2017-12-16 RX ADMIN — PHYTONADIONE 5 MG: 10 INJECTION, EMULSION INTRAMUSCULAR; INTRAVENOUS; SUBCUTANEOUS at 12:12

## 2017-12-16 RX ADMIN — ONDANSETRON 4 MG: 2 INJECTION INTRAMUSCULAR; INTRAVENOUS at 01:12

## 2017-12-16 RX ADMIN — LEVETIRACETAM 500 MG: 5 INJECTION INTRAVENOUS at 08:12

## 2017-12-16 RX ADMIN — SODIUM CHLORIDE, PRESERVATIVE FREE 3 ML: 5 INJECTION INTRAVENOUS at 10:12

## 2017-12-16 RX ADMIN — INSULIN ASPART 6 UNITS: 100 INJECTION, SOLUTION INTRAVENOUS; SUBCUTANEOUS at 06:12

## 2017-12-16 RX ADMIN — INSULIN ASPART 6 UNITS: 100 INJECTION, SOLUTION INTRAVENOUS; SUBCUTANEOUS at 12:12

## 2017-12-16 RX ADMIN — METOPROLOL TARTRATE 25 MG: 25 TABLET, FILM COATED ORAL at 06:12

## 2017-12-16 RX ADMIN — INSULIN ASPART 4 UNITS: 100 INJECTION, SOLUTION INTRAVENOUS; SUBCUTANEOUS at 06:12

## 2017-12-16 RX ADMIN — ONDANSETRON 4 MG: 2 INJECTION INTRAMUSCULAR; INTRAVENOUS at 11:12

## 2017-12-16 RX ADMIN — MORPHINE SULFATE 2 MG: 2 INJECTION, SOLUTION INTRAMUSCULAR; INTRAVENOUS at 01:12

## 2017-12-16 RX ADMIN — POTASSIUM CHLORIDE 10 MEQ: 10 INJECTION, SOLUTION INTRAVENOUS at 06:12

## 2017-12-16 RX ADMIN — NICARDIPINE HYDROCHLORIDE 1.5 MG/HR: 0.2 INJECTION, SOLUTION INTRAVENOUS at 08:12

## 2017-12-16 RX ADMIN — LOSARTAN POTASSIUM 50 MG: 50 TABLET, FILM COATED ORAL at 10:12

## 2017-12-16 RX ADMIN — SODIUM CHLORIDE, PRESERVATIVE FREE 3 ML: 5 INJECTION INTRAVENOUS at 06:12

## 2017-12-16 RX ADMIN — INSULIN DETEMIR 20 UNITS: 100 INJECTION, SOLUTION SUBCUTANEOUS at 11:12

## 2017-12-16 RX ADMIN — MAGNESIUM SULFATE IN WATER 2 G: 40 INJECTION, SOLUTION INTRAVENOUS at 04:12

## 2017-12-16 RX ADMIN — NICARDIPINE HYDROCHLORIDE 1 MG/HR: 0.2 INJECTION, SOLUTION INTRAVENOUS at 03:12

## 2017-12-16 RX ADMIN — STANDARDIZED SENNA CONCENTRATE AND DOCUSATE SODIUM 1 TABLET: 8.6; 5 TABLET, FILM COATED ORAL at 10:12

## 2017-12-16 RX ADMIN — INSULIN DETEMIR 10 UNITS: 100 INJECTION, SOLUTION SUBCUTANEOUS at 10:12

## 2017-12-16 NOTE — PROGRESS NOTES
[facility]  Vascular Neurology  Comprehensive Stroke Center  Progress Note    Assessment/Plan:     * Acute ischemic left MCA stroke    Ms. Wise is a 76yo f who presents with a L MCA syndrome, s/p tpa. Evidence of distal branch off MCA occlusion not amenable to thrombectomy.   Unclear etiology at this time but picture thus far suspicious for embolic stroke; ESUS. ECHO with LAE.     -Antithrombotics for secondary stroke prevention: None: Reason:  Hold all Antithrombotics x 24 hours after IV t-PA administration; consider holding 24 hours after hemorrhage until repeat scan with stable hemorrhage  -Statins for secondary stroke prevention and hyperlipidemia, if present: Atorvastatin- 80 mg daily (on at home; LDL 71)  -Aggressive risk factor modification: Hypertension, Diabetes, High Cholesterol, Diet, Exercise  -Rehab Efforts: Physical Therapy, Occupational Therapy, Speech and Language Pathology  -Diagnostics: Ordered/Pending    - Repeat CTH   -VTE Prophylaxis: None: Reason for No Pharmacological VTE Prophylaxis: Mechanical prophylaxis: Place SCDs due to tpa and apparent symptomatic hemorrhagic conversion         Cytotoxic cerebral edema    Secondary to ischemia  Noted on CTH        Essential hypertension    Stroke risk factor  BP goal <140 post tpa and hemorrhage into infarct        Type 2 diabetes mellitus    Stroke risk factor           Acquired hypothyroidism    Stroke risk factor, continue home medications        Hyperlipidemia associated with type 2 diabetes mellitus    Stroke risk factor         Aphasia    Result of stroke   Speech therapy evaluate and treat        Aortic arch atherosclerosis    Noted on CTA              Ms. Wise is a 76yo F with L MCA ischemic stroke s/p tPA.   12/16/17 Last night patient became distressed, appeared to be in pain but was unable to communicate where. ECG and troponin (stable) were ordered as well as CTH which showed hemorrhage in the area of the infarct  without significant mass effect or shift. MRI completed today.     STROKE DOCUMENTATION   Acute Stroke Times   Last Known Normal Date: 12/15/17  Last Known Normal Time: 1030  Symptom Onset Date: 12/15/17  Symptom Onset Time: 1030  Stroke Team Called Date: 12/15/17  Stroke Team Called Time: 1342 (upon arrival to [facility])  Stroke Team Arrival Date: 12/15/17  Stroke Team Arrival Time: 1347  CT Interpretation Time: 1237  Decision to Treat Time for Alteplase: 1154  Decision to Treat Time for IR:  (n/a)    NIH Scale:  1a. Level Of Consciousness: 0-->Alert: keenly responsive  1b. LOC Questions: 2-->Answers neither question correctly  1c. LOC Commands: 0-->Performs both tasks correctly  2. Best Gaze: 0-->Normal  3. Visual: 0-->No visual loss  4. Facial Palsy: 2-->Partial paralysis (total or near-total paralysis of lower face)  5a. Motor Arm, Left: 0-->No drift: limb holds 90 (or 45) degrees for full 10 secs  5b. Motor Arm, Right: 0-->No drift: limb holds 90 (or 45) degrees for full 10 secs  6a. Motor Leg, Left: 0-->No drift: leg holds 30 degree position for full 5 secs  6b. Motor Leg, Right: 0-->No drift: leg holds 30 degree position for full 5 secs  7. Limb Ataxia: 0-->Absent  8. Sensory: 0-->Normal: no sensory loss  9. Best Language: 1-->Mild-to-moderate aphasia: some obvious loss of fluency or facility of comprehension, without significant limitation on ideas expressed or form of expression. Reduction of speech and/or comprehension, however, makes conversation. . . (see row details)  10. Dysarthria: 2-->Severe dysarthria: patients speech is so slurred as to be unintelligible in the absence of or out of proportion to any dysphasia, or is mute/anarthric  11. Extinction and Inattention (formerly Neglect): 0-->No abnormality  Total (NIH Stroke Scale): 7       Modified Leanna Score: 0  Kali Coma Scale:    ABCD2 Score:    [id number] Score:   HAS -BLED Score:   ICH Score:   Hunt & Branham Classification:      Hemorrhagic change  of an Ischemic Stroke: Does this patient have an ischemic stroke with hemorrhagic changes? Yes, Grading Scale: PH Type 1 (PH-1) = hematoma in < 30% of the infarcted area with some slight space-occupying effect. Is this a symptomatic change?  No - Hemorrhage is not clinically significant     Neurologic Chief Complaint: L MCA stroke     Subjective:     Interval History: Patient is seen for follow-up neurological assessment and treatment recommendations:   Overnight with pain and neuro change. CTH showed hemorrhage into site of ischemic tissue. Patient has now stabilized.     HPI, Past Medical, Family, and Social History remains the same as documented in the initial encounter.     Review of Systems   Constitutional: Negative for fever.   Eyes: Positive for visual disturbance.   Respiratory: Negative for shortness of breath.    Gastrointestinal: Negative for vomiting.   Skin: Negative for rash.   Neurological: Positive for speech difficulty and headaches. Negative for weakness and numbness.     Scheduled Meds:   atorvastatin  80 mg Oral Daily    insulin detemir  20 Units Subcutaneous Daily    levETIRAcetam in NaCl (iso-os)  500 mg Intravenous Q12H    levothyroxine  50 mcg Oral Daily    metoprolol tartrate  25 mg Oral Q6H    senna-docusate 8.6-50 mg  1 tablet Oral BID    sodium chloride 0.9%  3 mL Intravenous Q8H     Continuous Infusions:   sodium chloride 0.9% 50 mL/hr at 12/16/17 0305    nicardipine 1.5 mg/hr (12/16/17 0852)     PRN Meds:acetaminophen, dextrose 50%, glucagon (human recombinant), hydrALAZINE, insulin aspart, magnesium sulfate IVPB, magnesium sulfate IVPB, ondansetron, potassium chloride **AND** potassium chloride **AND** potassium chloride    Objective:     Vital Signs (Most Recent):  Temp: 98.5 °F (36.9 °C) (12/16/17 1130)  Pulse: 70 (12/16/17 1300)  Resp: (!) 26 (12/16/17 1300)  BP: 135/73 (12/16/17 1300)  SpO2: 98 % (12/16/17 1300)  BP Location: Left arm    Vital Signs Range (Last  24H):  Temp:  [98.4 °F (36.9 °C)-98.7 °F (37.1 °C)]   Pulse:  [46-83]   Resp:  [15-29]   BP: (119-172)/(55-77)   SpO2:  [94 %-100 %]   BP Location: Left arm    Physical Exam   Constitutional: She appears well-developed and well-nourished. No distress.   HENT:   Head: Normocephalic and atraumatic.   Eyes: EOM are normal. Pupils are equal, round, and reactive to light.   Neck: Normal range of motion. Neck supple.   Cardiovascular: Normal rate.    Pulmonary/Chest: Effort normal.   Musculoskeletal: Normal range of motion.   Neurological: She is alert.   Skin: Skin is warm and dry. She is not diaphoretic.   Psychiatric: She has a normal mood and affect.       Neurological Exam:   LOC: alert  Attention Span: Good   Language: expressive>receptive  Articulation: Dysarthria  Orientation: oriented to person  Visual Fields: Full  EOM (CN III, IV, VI): Full/intact  Facial Movement (CN VII): Lower facial weakness on the Right  Motor: no drift x4 extremities  Sensation: Intact to light touch, temperature and vibration  Tone: Normal tone throughout    Laboratory:  CMP:   Recent Labs  Lab 12/16/17 0139   CALCIUM 8.2*   ALBUMIN 2.6*   PROT 6.0      K 3.1*   CO2 29      BUN 11   CREATININE 0.7   ALKPHOS 111   ALT 10   AST 16   BILITOT 1.3*     BMP:   Recent Labs  Lab 12/16/17  0139      K 3.1*      CO2 29   BUN 11   CREATININE 0.7   CALCIUM 8.2*     CBC:   Recent Labs  Lab 12/16/17 0139   WBC 10.45   RBC 4.59   HGB 10.0*   HCT 33.5*      MCV 73*   MCH 21.8*   MCHC 29.9*     Lipid Panel: No results for input(s): CHOL, LDLCALC, HDL, TRIG in the last 168 hours.  Coagulation:   Recent Labs  Lab 12/16/17  0139   INR 1.6*     Platelet Aggregation Study: No results for input(s): PLTAGG, PLTAGINTERP, PLTAGREGLACO, ADPPLTAGGREG in the last 168 hours.  Hgb A1C:   Recent Labs  Lab 12/15/17  1433   HGBA1C 12.1*     TSH:   Recent Labs  Lab 12/15/17  1433   TSH 1.008       Diagnostic Results     Brain Imaging   CTH  12/16/17: Evolving acute/recent infarct involving the left parietal lobe with new hyperdensity concerning for hemorrhagic conversion. Mild mass effect without midline shift.   Continued followup advised.    MRI brain 12/16/17:    Hemorrhagic infarct left parieto-occipital temporal junction.    Vessel Imaging   CTA MP 12/15/17:   Distal left parietal parietal MCA branch occlusion with correlating early changes of infarction on noncontrast CT.  No intracranial hemorrhage or significant mass effect/midline shift.    Cardiac Imaging     CONCLUSIONS     1 - Normal left ventricular systolic function (EF 65-70%).     2 - Indeterminate LV diastolic function.     3 - Normal right ventricular systolic function .     4 - The estimated PA systolic pressure is 14 mmHg.     5 - Trivial aortic regurgitation.     6 - Trivial pulmonic regurgitation.     7 - Mild left atrial enlargement.     8 - Concentric remodeling.     9 - No wall motion abnormalities.       Alisha Dangelo PA-C  Comprehensive Stroke Center  Department of Vascular Neurology   [facility]

## 2017-12-16 NOTE — NURSING
At 0220 RN noticed pt was pulling off CPAP and appeared distressed.  Pt was crying and showing signs of being in pain and was clearly nauseated.  Pt was unable to communicate where the pain was originating due to her being aphasic.  Pt signaled towards her head and chest.  A stat EKG was performed and a troponin drawn.  DOLORES Wilson was called to the bedside.  DOLORES Wilson ordered the pt receive 8 mg zophran and 2mg of morphine.  Pt was brought down for stat CT scan.  Will continue to monitor.

## 2017-12-16 NOTE — PT/OT/SLP EVAL
Physical Therapy Evaluation    Patient Name:  Tiffanie Wise   MRN:  [id number]    Recommendations:     Discharge Recommendations:  rehabilitation facility   Discharge Equipment Recommendations:  (TBD)   Barriers to discharge: Decreased caregiver support    Assessment:     Tiffanie Wise is a 77 y.o. female admitted with a medical diagnosis of Acute ischemic left MCA stroke.  She was independent prior to admit without the use of an assistive device.   She now presents with the following impairments/functional limitations:  impaired self care skills, impaired balance, decreased safety awareness, impaired endurance, impaired functional mobilty, gait instability, impaired cognition.  Due to these impairments she now requires the skilled assistance of a therapist to safely perform mobility.  She demonstrated great inconsistency with sitting and standing balance that increase her fall risk at this time.  She will require intensive therapy from multiple disciplines in order to progress mobility and return to home environment. Her  in w/c bound and unable to provide physical assistance.     Rehab Prognosis:  good; patient would benefit from acute skilled PT services to address these deficits and reach maximum level of function.      Recent Surgery: * No surgery found *      Plan:     During this hospitalization, patient to be seen 5 x/week to address the above listed problems via gait training, therapeutic activities, therapeutic exercises, neuromuscular re-education  · Plan of Care Expires:  01/16/18   Plan of Care Reviewed with: patient (children)    Subjective     Communicated with Rn prior to session.  Patient found supine in bed with children present upon PT entry to room, agreeable to evaluation.      Chief Complaint: visible frustration when attempting to communicate  Patient comments/goals: goals set by therapist  Pain/Comfort:  Pain Rating 1: 0/10  Pain Rating Post-Intervention 1: 0/10    Patients cultural,  spiritual, Methodist conflicts given the current situation:      Living Environment:  Pt is the primary caretaker for her  who is w/c bound.  They live in a 1 story home with 3-4 steps and bilateral rails to enter front or w/c ramp to enter rear.  She was very active prior to admit, driving and providing physical assistance for her .  Her family reports that both patient and her  fall frequently. The patient was no longer able to lift her  from the floor after a fall.  The home environment is very cluttered and difficult to move in safely.      Prior to admission, patients level of function was independent with frequent falls.  Patient has the following equipment: none.  DME owned (not currently used): BSC, tub chair.  Upon discharge, patient will have assistance from undetermined.    Objective:     Patient found with: blood pressure cuff, PureWick, peripheral IV, telemetry, pulse ox (continuous), oxygen     General Precautions: Standard, aspiration, aphasia, fall     PHYSICAL EXAMINATION  Cognitive Function:  - Oriented to: unable to assess, exp aphasia   - Level of Alertness: somnolent, falling asleep throughout session including in sitting and standing.   - Follows Commands/attention: Follows one-step commands with increased time and demonstration needed  - Communication: expressive aphasia, Pt made sounds or attempted to speak throughout the treatment session, but speech was nonsensical.   - Safety awareness/insight to disability: impaired  Musculoskeletal System  Lower Extremities:  ROM: WFL   Strength: Difficult to assess; at least 4/5 grossly in BLEs in function  Integumentary System: Visible skin intact  Cardiopulmonary System:   - Edema: none noted   Neuromuscular System:  - Sensation: NT  - Coordination: NT d/t patient falling asleep in sitting  Posture and gross symmetry: forward trunk flexion with rounded shoulders    BALANCE:  Sitting:  - Static: POOR: Needs MODERATE assist  to maintain; inconsistent   Pt demonstrated sitting EOB with SBA initially while therapist was preparing the environment for bed to chair transfer.    Pt suddenly collapsed forward on therapist voluntarily(?) and required max assist to maintain balance. Pt was somnolent but was stable, in NAD and able to straighten up with verbal cues.  She appears to have just gotten fatigued (or impatient per family).   - Dynamic: FAIR: Cannot move trunk without losing balance; or not motivated to try  Standing:  - Static Stand: 0: Needs MAXIMAL assist to maintain ; inconsistent   Pt stood with contact guard assist during the first 2 trials.    On the third trial she demonstrated unexpected forward LOB with excessive trunk lean and required moderate assist to remain standing.    Unsure if trunk movement was true LOB or voluntary movement to rest on therapist's shoulder.   - Dynamic stand: POOR: N/A;     FUNCTIONAL MOBILITY ASSESSMENT:  Bed Mobility: performed with HOB flat  - Rolling/Turning R: NT  - Rolling/Turning L: min assist   - Supine <> sit: moderate assist   - Scooting EOB: moderate assist 2* high bed surface vs pt height (4'11)     Transfers:  - Sit <> stand transfer: min assist from bed and chair height   - Bed <> chair transfer: min assist for stand pivot    Gait: 5-6 steps to chair with min assist for directional cues.   Gait was attempted again after time spent up in sitting, but patient was fatigued and falling asleep sitting up.      THERAPEUTIC ACTIVITIES AND EXERCISES:  Therapist educated patient and children on the role of PT, POC, and therapy recommendations of rehab pending progress.  Therapist discussed the patients current mobility status and level of assistance with the family.  Patient's family expressed multiple questions regarding mobility, recovery process, therapy, and prognosis. Therapist answered questions to patient/familys satisfaction within scope of practice.  White board updated to reflect  current level of assistance.     Pt is safe to perform transfers with nursing min assist.  Recommend gait with therapy only at this time.      AM-PAC 6 CLICK MOBILITY  Total Score:14     Patient left up in chair with all lines intact, call button in reach, RN notified and family  present.    GOALS:    Physical Therapy Goals        Problem: Physical Therapy Goal    Goal Priority Disciplines Outcome Goal Variances Interventions   Physical Therapy Goal     PT/OT, PT Ongoing (interventions implemented as appropriate)     Description:  Goals to be met by: 2017     Patient will increase functional independence with mobility by performin. Supine to sit with Stand-by Assistance  2. Sit to supine with Stand-by Assistance  3. Sit to stand transfer with Stand-by Assistance  4. Bed to chair transfer with Stand-by Assistance using stand pivot transfer  5. Gait  x 150 feet with Contact Guard Assistance using no assistive device.   6. Stand for 10 minutes with stand by assist and no LOB in order to safely participate in ADLs.                     History:     Past Medical History:   Diagnosis Date    Acute respiratory failure with hypoxia     Anxiety     AP (angina pectoris) 2013    Arterial ischemic stroke, MCA (middle cerebral artery), left, acute 12/15/2017    Asthma     CAD, multiple vessel 2016    Chronic ischemic heart disease 2013    Coronary artery disease 2013    Depression     Diabetes with neurologic complications     GERD (gastroesophageal reflux disease) 2013    Heart failure     History of PTCA 2013    Hypertension 2013    Hypothyroidism     Leg pain 2013    Mixed hyperlipidemia 2013    Myocardial infarction     Osteoporosis     Pneumonia     Pneumonia due to other staphylococcus     Precancerous changes of the vagina     Sleep apnea     stopped using CPAP  - sores in nose, couldn't breathe, uses Breathe riht strips now.     Thyroid  disease     Tobacco dependence     resolved    Trouble in sleeping     Type 2 diabetes mellitus with ophthalmic manifestations     Urinary incontinence     pullups day, pads at night       Past Surgical History:   Procedure Laterality Date    BREAST SURGERY Left     benign cyst    CORONARY ANGIOPLASTY      CORONARY STENT PLACEMENT      x6-7 oer the yeqrs  last 12/17/14 BRIANDA to lcfx    EYE SURGERY Bilateral 2014    cataracts w/IOL   Dr Vance    HYSTERECTOMY  1970    vag hyst; ovaries intact    THYROID SURGERY      nodule benign, Dr Hankins    TUBAL LIGATION  1970       Clinical Decision Making:     Comorbidities and personal factors that affect the PT plan of care or the patient's ability to participate or progress with therapy:  1. MI 6- s/p CABG  2. Osteoporosis  3. Asthma  4. R pulmonary nodule  5. Decreased caregiver support  6. Exp aphasia    Clinical Presentation: unstable/unpredictable characteristics  Pt was evaluated in the ICU with continuous monitoring of vitals and varying levels of awareness or cognitive performance.     Level of Complexity:   High Complexity:   · At least 3 or more personal factors or comorbidities that impact the plan of care  · Examination addressing 4 or more body structures and functions, activity limitations, and/or participation restrictions  · Clinical presentation with unstable or unpredictable characteristics     Time Tracking:     PT Received On: 12/16/17  PT Start Time: 1411     PT Stop Time: 1454  PT Total Time (min): 43 min     Billable Minutes: Evaluation 30 and Therapeutic Activity 13      Nia Dempsey, PT  12/16/2017

## 2017-12-16 NOTE — PLAN OF CARE
Problem: Patient Care Overview  Goal: Plan of Care Review  Outcome: Ongoing (interventions implemented as appropriate)  POC reviewed with pt at 0400. Pt cannot verbalize an understanding due to condition. Pt's daughter did verbalize an understanding. Questions and concerns addressed. Pt brought to stat CT scan due to neuro change. Pt put on cardene gtt . Pt progressing toward goals. Will continue to monitor. See flowsheets for full assessment and VS info

## 2017-12-16 NOTE — PROGRESS NOTES
[facility]  Neurocritical Care  Progress Note    Admit Date: 12/15/2017  Service Date: 12/16/2017  Length of Stay: 1    Subjective:     Chief Complaint: Acute ischemic left MCA stroke    History of Present Illness: The patient is a 77 year old female with a PMHx of HTN, DM II, MI, CABG x1, GERD, CAD (on Plavix) admitted to [facility] s/p tPA. Per chart review, the patient was last seen normal was around 10:30 patient was playing Bingo when she developed slurred speech, R facial droop, R sided weakness around 10:30am today. TPA was administered prior to transfer. Per flight care, BP stable en route, patient had some bleeding of the gums. CT-No intracranial hemorrhage, extra-axial fluid collection, midline shift, or mass effect. CT MP-Distal left parietal parietal MCA branch occlusion with correlating early changes of infarction on noncontrast Ct. Admitted to [facility] for higher level of care.    Hospital Course: 12/15:patient admitted to [facility] s/p tPA. CTH revealed L parietal MCA occlusion.   12/16: repeat CTH and MRI brain revealed left parietal lobe infarct with hemorrhagic conversion and mild mass effect. PT/INR elevated, Vitamin K 5mg IV given. Cardene drip to manage strict SBP goal of <140 in setting of hemorhagic conversion. Blood glucoses remain elevated while on SSI, started on 20u detemir.     Interval History:  Repeat CTH revealed hemorrhagic conversion. PT/INR elevated, Vitamin K 5mg IV given. Patient started on Cardene drip to manage strict SBP goal of <140 in setting of hemorhagic conversion. Blood glucoses remain elevated 300 > 287 while on SSI, started on 20u detemir.     Review of Systems  Unable to assess due to mental status.    Objective:     Vitals:  Temp: 98.5 °F (36.9 °C) (12/16/17 1130)  Pulse: 70 (12/16/17 1300)  Resp: (!) 26 (12/16/17 1300)  BP: 135/73 (12/16/17 1300)  SpO2: 98 % (12/16/17 1300)    Temp:  [98.4 °F (36.9 °C)-98.7 °F (37.1 °C)] 98.5 °F (36.9 °C)  Pulse:  [46-83] 70  Resp:   [15-29] 26  SpO2:  [94 %-100 %] 98 %  BP: (119-172)/(55-77) 135/73              12/15 0701 - 12/16 0700  In: 643.4 [I.V.:543.4]  Out: -     Physical Exam  Physical Exam:  GA: Alert, awake, comfortable, no acute distress. Unable to assess orientation, patient is dysarthric.  HEENT: No scleral icterus or JVD. R facial droop.  Pulmonary: Clear to auscultation A. No wheezing, crackles, or rhonchi.  Cardiac: RRR S1 & S2 w/o rubs/murmurs/gallops.   Abdominal: Bowel sounds present x 4. No appreciable hepatosplenomegaly.  Skin: No jaundice, rashes, or visible lesions.  Neuro:  --GCS: E4 V2 M6  --Mental Status:  Some difficulty following commands. Dysarthria.  --CN II-XII grossly intact  --L pupil 2mm, R pupil 4mm. PERRL.   --Moves all extremities spontaneously  Unable to test orientation, language, memory, judgment, insight, fund of knowledge, gait     Medications:  Continuous  sodium chloride 0.9% Last Rate: 50 mL/hr at 12/16/17 0305   nicardipine Last Rate: 1.5 mg/hr (12/16/17 0852)   Scheduled  atorvastatin 80 mg Daily   insulin detemir 20 Units Daily   levETIRAcetam in NaCl (iso-os) 500 mg Q12H   levothyroxine 50 mcg Daily   metoprolol tartrate 25 mg Q6H   senna-docusate 8.6-50 mg 1 tablet BID   sodium chloride 0.9% 3 mL Q8H   PRN  acetaminophen 650 mg Q6H PRN   dextrose 50% 12.5 g PRN   glucagon (human recombinant) 1 mg PRN   hydrALAZINE 10 mg Q6H PRN   insulin aspart 1-10 Units Q6H PRN   magnesium sulfate IVPB 2 g PRN   magnesium sulfate IVPB 4 g PRN   ondansetron 4 mg Q8H PRN   potassium chloride 40 mEq PRN   And     potassium chloride 60 mEq PRN   And     potassium chloride 80 mEq PRN     Today I personally reviewed pertinent medications, lines/drains/airways, imaging, lab results,     Assessment/Plan:     Neuro   * Acute ischemic left MCA stroke    -- s/p tpa  --Continue Neuro checks q 1hr  -- Vascular Neurology consulted  -- CT MP 12/15-Distal left parietal parietal MCA branch occlusion with correlating early  changes of infarction on noncontrast CT.  --CT 12/15-Chronic microvascular ischemic changes.   --SBP goal <140  -- PT/OT/Speech  -- MRI brain 12/16  Hemorrhagic infarct left parieto-occipital temporal junction  -- CTH 12/16- Evolving acute/recent infarct involving the left parietal lobe with new hyperdensity concerning for hemorrhagic conversion        Acute hemorrhagic infarction of brain    -- MRI brain 12/16  Hemorrhagic infarct left parieto-occipital temporal junction  -- CTH 12/16- Evolving acute/recent infarct involving the left parietal lobe with new hyperdensity concerning for hemorrhagic conversion  -- Keppra 500mg BID for seizure prophylaxis           Cardiac/Vascular   Essential hypertension    SBP <140  Metoprolol 25mg q6h  Cardene drip  PRN Labetalol  EF 70%        Hyperlipidemia associated with type 2 diabetes mellitus    Continue home dose 80mg Atorvastatin        Endocrine   Type 2 diabetes mellitus    A1C- 10.8  accuchecks q 6 hrs  SSI  Detemir 20u        Acquired hypothyroidism    Continued home synthroid              Prophylaxis:  Venous Thromboembolism: mechanical  Stress Ulcer: None  Ventilator Pneumonia: not applicable     Activity Orders          Activity as tolerated starting at 12/16 1030        Full Code    Temi James PA-C  Neurocritical Care  [facility]

## 2017-12-16 NOTE — NURSING
Patient arrived to [facility] via [facility] to room 7095.    Type of Stroke ischemic    TPA start time and end time 1155 start, end 1255  Patients current symptoms include apahsia    Will continue to monitor and treat per POC

## 2017-12-16 NOTE — SUBJECTIVE & OBJECTIVE
Neurologic Chief Complaint: L MCA stroke     Subjective:     Interval History: Patient is seen for follow-up neurological assessment and treatment recommendations:   Overnight with pain and neuro change. CTH showed hemorrhage into site of ischemic tissue. Patient has now stabilized.     HPI, Past Medical, Family, and Social History remains the same as documented in the initial encounter.     Review of Systems   Constitutional: Negative for fever.   Eyes: Positive for visual disturbance.   Respiratory: Negative for shortness of breath.    Gastrointestinal: Negative for vomiting.   Skin: Negative for rash.   Neurological: Positive for speech difficulty and headaches. Negative for weakness and numbness.     Scheduled Meds:   atorvastatin  80 mg Oral Daily    insulin detemir  20 Units Subcutaneous Daily    levETIRAcetam in NaCl (iso-os)  500 mg Intravenous Q12H    levothyroxine  50 mcg Oral Daily    metoprolol tartrate  25 mg Oral Q6H    senna-docusate 8.6-50 mg  1 tablet Oral BID    sodium chloride 0.9%  3 mL Intravenous Q8H     Continuous Infusions:   sodium chloride 0.9% 50 mL/hr at 12/16/17 0305    nicardipine 1.5 mg/hr (12/16/17 0852)     PRN Meds:acetaminophen, dextrose 50%, glucagon (human recombinant), hydrALAZINE, insulin aspart, magnesium sulfate IVPB, magnesium sulfate IVPB, ondansetron, potassium chloride **AND** potassium chloride **AND** potassium chloride    Objective:     Vital Signs (Most Recent):  Temp: 98.5 °F (36.9 °C) (12/16/17 1130)  Pulse: 70 (12/16/17 1300)  Resp: (!) 26 (12/16/17 1300)  BP: 135/73 (12/16/17 1300)  SpO2: 98 % (12/16/17 1300)  BP Location: Left arm    Vital Signs Range (Last 24H):  Temp:  [98.4 °F (36.9 °C)-98.7 °F (37.1 °C)]   Pulse:  [46-83]   Resp:  [15-29]   BP: (119-172)/(55-77)   SpO2:  [94 %-100 %]   BP Location: Left arm    Physical Exam   Constitutional: She appears well-developed and well-nourished. No distress.   HENT:   Head: Normocephalic and atraumatic.    Eyes: EOM are normal. Pupils are equal, round, and reactive to light.   Neck: Normal range of motion. Neck supple.   Cardiovascular: Normal rate.    Pulmonary/Chest: Effort normal.   Musculoskeletal: Normal range of motion.   Neurological: She is alert.   Skin: Skin is warm and dry. She is not diaphoretic.   Psychiatric: She has a normal mood and affect.       Neurological Exam:   LOC: alert  Attention Span: Good   Language: expressive>receptive  Articulation: Dysarthria  Orientation: oriented to person  Visual Fields: Full  EOM (CN III, IV, VI): Full/intact  Facial Movement (CN VII): Lower facial weakness on the Right  Motor: no drift x4 extremities  Sensation: Intact to light touch, temperature and vibration  Tone: Normal tone throughout    Laboratory:  CMP:   Recent Labs  Lab 12/16/17  0139   CALCIUM 8.2*   ALBUMIN 2.6*   PROT 6.0      K 3.1*   CO2 29      BUN 11   CREATININE 0.7   ALKPHOS 111   ALT 10   AST 16   BILITOT 1.3*     BMP:   Recent Labs  Lab 12/16/17  0139      K 3.1*      CO2 29   BUN 11   CREATININE 0.7   CALCIUM 8.2*     CBC:   Recent Labs  Lab 12/16/17  0139   WBC 10.45   RBC 4.59   HGB 10.0*   HCT 33.5*      MCV 73*   MCH 21.8*   MCHC 29.9*     Lipid Panel: No results for input(s): CHOL, LDLCALC, HDL, TRIG in the last 168 hours.  Coagulation:   Recent Labs  Lab 12/16/17  0139   INR 1.6*     Platelet Aggregation Study: No results for input(s): PLTAGG, PLTAGINTERP, PLTAGREGLACO, ADPPLTAGGREG in the last 168 hours.  Hgb A1C:   Recent Labs  Lab 12/15/17  1433   HGBA1C 12.1*     TSH:   Recent Labs  Lab 12/15/17  1433   TSH 1.008       Diagnostic Results     Brain Imaging   CTH 12/16/17: Evolving acute/recent infarct involving the left parietal lobe with new hyperdensity concerning for hemorrhagic conversion. Mild mass effect without midline shift.   Continued followup advised.    MRI brain 12/16/17:    Hemorrhagic infarct left parieto-occipital temporal  junction.    Vessel Imaging   CTA  12/15/17:   Distal left parietal parietal MCA branch occlusion with correlating early changes of infarction on noncontrast CT.  No intracranial hemorrhage or significant mass effect/midline shift.    Cardiac Imaging     CONCLUSIONS     1 - Normal left ventricular systolic function (EF 65-70%).     2 - Indeterminate LV diastolic function.     3 - Normal right ventricular systolic function .     4 - The estimated PA systolic pressure is 14 mmHg.     5 - Trivial aortic regurgitation.     6 - Trivial pulmonic regurgitation.     7 - Mild left atrial enlargement.     8 - Concentric remodeling.     9 - No wall motion abnormalities.

## 2017-12-16 NOTE — ASSESSMENT & PLAN NOTE
-- s/p tpa  --Continue Neuro checks q 1hr  -- Vascular Neurology consulted  -- CT MP 12/15-Distal left parietal parietal MCA branch occlusion with correlating early changes of infarction on noncontrast CT.  --CT 12/15-Chronic microvascular ischemic changes.   --SBP goal <140  -- PT/OT/Speech  -- MRI brain 12/16  Hemorrhagic infarct left parieto-occipital temporal junction  -- CTH 12/16- Evolving acute/recent infarct involving the left parietal lobe with new hyperdensity concerning for hemorrhagic conversion

## 2017-12-16 NOTE — CONSULTS
"  [facility]  Adult Nutrition  Consult Note    SUMMARY     Recommendations    1. If able to advance diet, recommend 1800 kcal ADA diet (texture per SLP).   2. If unable to advance diet, initiate enteral nutrition. Recommend Diabetisource @ 45 mL/hr to provide 1296 calories, 65 g of protein, 883 mL fluid.    - Hold for residuals >500 mL; additional fluid per MD.   3. RD to monitor & follow-up.    Goals: Meet % EEN, EPN  Nutrition Goal Status: new  Communication of RD Recs: reviewed with RN    Reason for Assessment    Reason for Assessment: nurse/nurse practitioner consult  Diagnosis: stroke/CVA  Relevent Medical History: HTN, HF, DM   Interdisciplinary Rounds: did not attend     General Information Comments: Pt aphasic, failed KIERSTEN. Awaiting ST evaluation. Not appropriate for diabetic diet education at this time (A1C 12.1).  Nutrition Discharge Planning: Unable to determine    Nutrition Prescription Ordered    Current Diet Order: NPO    Evaluation of Received Nutrients/Fluid Intake    IV Fluid (mL): 1200    Nutrition/Diet History    Patient Reported Diet/Restrictions/Preferences: other (see comments) (BLANE)     Factors Affecting Nutritional Intake: NPO, difficulty/impaired swallowing    Labs/Tests/Procedures/Meds    Pertinent Labs Reviewed: reviewed, pertinent  Pertinent Labs Comments: Gluc 143-311, A1C 12.1  Pertinent Medications Reviewed: reviewed, pertinent  Pertinent Medications Comments: IVF, Statin, Nicardipine    Physical Findings    Overall Physical Appearance: overweight, lethargic  Oral/Mouth Cavity: WDL  Skin: intact    Anthropometrics    Height: 4' 11" (149.9 cm)  Weight Method: Bed Scale  Weight: 68.5 kg (151 lb 0.2 oz)     Ideal Body Weight (IBW), Female: 95 lb  % Ideal Body Weight, Female (lb): 158.97 lb     BMI (Calculated): 30.6  BMI Grade: 30 - 34.9- obesity - grade I    Estimated/Assessed Needs    Weight Used For Calorie Calculations: 68.5 kg (151 lb 0.2 oz)      Energy " Calorie Requirements (kcal): 1343 kcal/d  Energy Need Method: Harrison-St Neo (1.25 PAL)     Weight Used For Protein Calculations: 68.5 kg (151 lb 0.2 oz)  Protein Requirements: 69 g/d (1 g/kg)     Fluid Need Method: other (see comments), RDA Method (Per MD or 1 mL/kcal)      CHO Requirement: 50% total kcals     Assessment and Plan    Nutrition Problem  Inadequate energy intake    Related to (etiology):   Inability to consume sufficient energy    Signs and Symptoms (as evidenced by):   NPO with no alternate means of nutrition.    Nutrition Diagnosis Status:   New    Monitor and Evaluation    Food and Nutrient Intake: energy intake, food and beverage intake, enteral nutrition intake  Food and Nutrient Adminstration: diet order, enteral and parenteral nutrition administration     Physical Activity and Function: nutrition-related ADLs and IADLs  Anthropometric Measurements: weight, weight change  Biochemical Data, Medical Tests and Procedures: lipid profile, inflammatory profile, gastrointestinal profile, electrolyte and renal panel, glucose/endocrine profile  Nutrition-Focused Physical Findings: overall appearance    Nutrition Risk    Level of Risk: other (see comments) (2x/week)    Nutrition Follow-Up    RD Follow-up?: Yes

## 2017-12-16 NOTE — SUBJECTIVE & OBJECTIVE
Interval History:  Repeat CTH revealed hemorrhagic conversion. PT/INR elevated, Vitamin K 5mg IV given. Patient started on Cardene drip to manage strict SBP goal of <140 in setting of hemorhagic conversion. Blood glucoses remain elevated 300 > 287 while on SSI, started on 20u detemir.     Review of Systems  Unable to assess due to mental status.    Objective:     Vitals:  Temp: 98.5 °F (36.9 °C) (12/16/17 1130)  Pulse: 70 (12/16/17 1300)  Resp: (!) 26 (12/16/17 1300)  BP: 135/73 (12/16/17 1300)  SpO2: 98 % (12/16/17 1300)    Temp:  [98.4 °F (36.9 °C)-98.7 °F (37.1 °C)] 98.5 °F (36.9 °C)  Pulse:  [46-83] 70  Resp:  [15-29] 26  SpO2:  [94 %-100 %] 98 %  BP: (119-172)/(55-77) 135/73              12/15 0701 - 12/16 0700  In: 643.4 [I.V.:543.4]  Out: -     Physical Exam  Physical Exam:  GA: Alert, awake, comfortable, no acute distress. Unable to assess orientation, patient is dysarthric.  HEENT: No scleral icterus or JVD. R facial droop.  Pulmonary: Clear to auscultation A. No wheezing, crackles, or rhonchi.  Cardiac: RRR S1 & S2 w/o rubs/murmurs/gallops.   Abdominal: Bowel sounds present x 4. No appreciable hepatosplenomegaly.  Skin: No jaundice, rashes, or visible lesions.  Neuro:  --GCS: E4 V2 M6  --Mental Status:  Some difficulty following commands. Dysarthria.  --CN II-XII grossly intact  --L pupil 2mm, R pupil 4mm. PERRL.   --Moves all extremities spontaneously  Unable to test orientation, language, memory, judgment, insight, fund of knowledge, gait     Medications:  Continuous  sodium chloride 0.9% Last Rate: 50 mL/hr at 12/16/17 0305   nicardipine Last Rate: 1.5 mg/hr (12/16/17 0852)   Scheduled  atorvastatin 80 mg Daily   insulin detemir 20 Units Daily   levETIRAcetam in NaCl (iso-os) 500 mg Q12H   levothyroxine 50 mcg Daily   metoprolol tartrate 25 mg Q6H   senna-docusate 8.6-50 mg 1 tablet BID   sodium chloride 0.9% 3 mL Q8H   PRN  acetaminophen 650 mg Q6H PRN   dextrose 50% 12.5 g PRN   glucagon (human  recombinant) 1 mg PRN   hydrALAZINE 10 mg Q6H PRN   insulin aspart 1-10 Units Q6H PRN   magnesium sulfate IVPB 2 g PRN   magnesium sulfate IVPB 4 g PRN   ondansetron 4 mg Q8H PRN   potassium chloride 40 mEq PRN   And     potassium chloride 60 mEq PRN   And     potassium chloride 80 mEq PRN     Today I personally reviewed pertinent medications, lines/drains/airways, imaging, lab results,

## 2017-12-16 NOTE — PLAN OF CARE
Problem: Patient Care Overview  Goal: Plan of Care Review  Outcome: Ongoing (interventions implemented as appropriate)  Recommendations     1. If able to advance diet, recommend 1800 kcal ADA diet (texture per SLP).   2. If unable to advance diet, initiate enteral nutrition. Recommend Diabetisource @ 45 mL/hr to provide 1296 calories, 65 g of protein, 883 mL fluid.               - Hold for residuals >500 mL; additional fluid per MD.   3. RD to monitor & follow-up.

## 2017-12-16 NOTE — HOSPITAL COURSE
Ms. Wise is a 76yo F with L MCA ischemic stroke s/p tPA.   12/16/17 Last night patient became distressed, appeared to be in pain but was unable to communicate where. ECG and troponin (stable) were ordered as well as CTH which showed hemorrhage in the area of the infarct without significant mass effect or shift. MRI completed today.   12/17/2017 NAEON. Blood glucose ranging from 246-428. Continued aphasia and dysarthria.  12/18 - BG in 300s today, low-dose insulin gtt. Plans for step down.  12/19 - Insulin gtt d/c'd. PT/OT/SLP recommending rehab. Boarding in [facility].  12/20/17 Stepped down last night.  Pending rehab acceptance.  Insulin coverage adjusted per endocrinology.  Exam unchanged. Asymptomatic bradycardia throughout day.  Metoprolol held.  Left arm swelling and pain reported.  12/21/17 NAEON. Improved arm pain/swelling. Awaiting insurance authorization for discharge to rehab facility.   12/22/17 After peer to peer, patient approved [facility]. NAEON. Patient is neurologically and medically stable for discharge.     In summary, Ms. Wise is a 76yo F with a L MCA ischemic infarct with hemorrhagic conversion (though not clinically significant; NIH score change <4 points). Ms. Wise has uncontrolled diabetes (endocrine consulted and adjusted insulin) as well as hypertension which required adjustment to her home medications. She improved with her physical deficits during the hospitalization, but her aphasia/dysarthria is still very severe. Other issues during the hospitalization were with asymptomatic bradycardia so patient's home metoprolol was changed to carvedilol. After an insurance appeal she was discharged to rehab facility. She was educated on secondary stroke prevention and continued on aspirin. Her stroke was considered embolic stroke of undetermined source so she will be discharged with a 30d event monitor. She will follow up with endocrinology, PCP and vascular neurology clinic  post-discharge.

## 2017-12-16 NOTE — ASSESSMENT & PLAN NOTE
-- MRI brain 12/16  Hemorrhagic infarct left parieto-occipital temporal junction  -- CTH 12/16- Evolving acute/recent infarct involving the left parietal lobe with new hyperdensity concerning for hemorrhagic conversion  -- Keppra 500mg BID for seizure prophylaxis

## 2017-12-16 NOTE — ED NOTES
Assumed care. Pt resting on stretcher, respirations even and unlabored, no distress noted. Family member at bedside, pt and family updated on POC, will continue to monitor.

## 2017-12-16 NOTE — PLAN OF CARE
Problem: Occupational Therapy Goal  Goal: Occupational Therapy Goal  OT evaluation initiated.  NGOZI Acosta  12/16/2017

## 2017-12-16 NOTE — ASSESSMENT & PLAN NOTE
Ms. Wise is a 78yo f who presents with a L MCA syndrome, s/p tpa. Evidence of distal branch off MCA occlusion not amenable to thrombectomy.   Unclear etiology at this time but picture thus far suspicious for embolic stroke; ESUS. ECHO with LAE.     -Antithrombotics for secondary stroke prevention: None: Reason:  Hold all Antithrombotics x 24 hours after IV t-PA administration; consider holding 24 hours after hemorrhage until repeat scan with stable hemorrhage  -Statins for secondary stroke prevention and hyperlipidemia, if present: Atorvastatin- 80 mg daily (on at home; LDL 71)  -Aggressive risk factor modification: Hypertension, Diabetes, High Cholesterol, Diet, Exercise  -Rehab Efforts: Physical Therapy, Occupational Therapy, Speech and Language Pathology  -Diagnostics: Ordered/Pending    - Repeat CTH   -VTE Prophylaxis: None: Reason for No Pharmacological VTE Prophylaxis: Mechanical prophylaxis: Place SCDs due to tpa and apparent symptomatic hemorrhagic conversion

## 2017-12-16 NOTE — PLAN OF CARE
Problem: Physical Therapy Goal  Goal: Physical Therapy Goal  Outcome: Ongoing (interventions implemented as appropriate)  Initial eval completed.  Results, POC, and therapy recommendations discussed with patient and children.  Complete evaluation documentation to follow.     Pt requires minimal assistance with transfers at this time. Recommend stand pivot transfers to/from chair with nursing.  Gt with therapy only at this time.     Nia Dempsey, PT  12/16/2017  [phone] (pager)

## 2017-12-16 NOTE — PT/OT/SLP EVAL
Occupational Therapy   Evaluation    Name: Tiffanie Wise  MRN: [id number]  Admitting Diagnosis:  Acute ischemic left MCA stroke      Recommendations:     Discharge Recommendations:  (anticipate SNF)    History:     Occupational Profile:  Per dtg:  Patient resides with her  (disabled, uses wc) in one story home in [place] with several steps to enter; ramp access at back.  PTA patient independent with ADLs including driving.  Had assistance from dtg with preparing medications 2* early stage of dementia.  DME:  utilizes wc, shower chair.  Patient is right handed.  Hobbies:  Shopping at the thrift store, being with friends, going out to eat, talking. Roles/Responsibilities: caregiver to , wife, mother, grocery shopping.    Past Medical History:   Diagnosis Date    Acute respiratory failure with hypoxia     Anxiety     AP (angina pectoris) 7/18/2013    Arterial ischemic stroke, MCA (middle cerebral artery), left, acute 12/15/2017    Asthma     CAD, multiple vessel 8/2/2016    Chronic ischemic heart disease 7/18/2013    Coronary artery disease 7/18/2013    Depression     Diabetes with neurologic complications     GERD (gastroesophageal reflux disease) 7/18/2013    Heart failure     History of PTCA 7/18/2013    Hypertension 7/18/2013    Hypothyroidism     Leg pain 8/29/2013    Mixed hyperlipidemia 7/18/2013    Myocardial infarction     Osteoporosis     Pneumonia     Pneumonia due to other staphylococcus     Precancerous changes of the vagina     Sleep apnea     stopped using CPAP 2015 - sores in nose, couldn't breathe, uses Breathe riht strips now.     Thyroid disease     Tobacco dependence     resolved    Trouble in sleeping     Type 2 diabetes mellitus with ophthalmic manifestations     Urinary incontinence     pullups day, pads at night       Past Surgical History:   Procedure Laterality Date    BREAST SURGERY Left     benign cyst    CORONARY ANGIOPLASTY       "CORONARY STENT PLACEMENT      x6-7 oer the yeqrs  last 12/17/14 BRIANDA to lcfx    EYE SURGERY Bilateral 2014    cataracts w/IOL   Dr Vance    HYSTERECTOMY  1970    vag hyst; ovaries intact    THYROID SURGERY      nodule benign, Dr Hankins    TUBAL LIGATION  1970       Subjective     Patient:  "I'm hot."  Nurse reported patient had complications overnight; converted.  Nurse reported significant decline overnight with her ability to follow commands and communication.  Dtg:  "She is very emotional.  Last night, things got worse and all she could do was moan loudly."  "At home, she doesn't eat right.  She forgets things too.  She depends on me a lot."  "She has restless leg syndrome."  Communicated with:  nurse prior to session.  Pain/Comfort:  · Pain Rating 1: 0/10  · Pain Rating Post-Intervention 1: 0/10    Objective:     Patient found with: peripheral IV, SCD, oxygen  Dtg at bedside  General Precautions: Standard, NPO, aspiration, aphasia, fall   Orthopedic Precautions:N/A   Braces: N/A     Occupational Performance:    Bed Mobility:    · Patient completed Rolling/Turning to Left with  maximal assistance  · Patient completed Rolling/Turning to Right with maximal assistance    Functional Mobility/Transfers:  · Dependent drawsheet    Activities of Daily Living:  · Feeding:  NPO    · Grooming: total assistance  while supine in bed    Cognitive/Visual Perceptual:  Cognitive/Psychosocial Skills:     -       Oriented to: Daily orientation provided  -       Follows Commands/attention:unable to follow commands  -       Communication: aphasic  -       Safety awareness/insight to disability: impaired     Physical Exam:  Postural examination/scapula alignment:    -       Rounded shoulders  Skin integrity: Visible skin intact  Edema:  None noted  Upper Extremity Range of Motion:     -       Right Upper Extremity: WNL AAROM  -       Left Upper Extremity: WNL  AAROM    Patient left supine with all lines intact and call " "button in reach    [facility] 6 Click:  [facility] Total Score: 6    Treatment & Education:  Patient education provided on role of OT, daily orientation, and ROM.  Patient unable to verbalize understanding via teach back method. Continued education, patient/ family training recommended.  Patient alert 40% of the session; attentive to voice during the session, provided eye contact when spoken to.  Mostly moaning throughout the session.  One verbalization that was intelligible ("I'm hot) followed by appropriate behavior ie: removing covers.  Unable to follow commands.  Daily orientation provided.  AAROM performed bilateral UE/LEs one set x 10 rep in all planes of motion.  Positioning provided for midline orientation with bilateral UEs elevated and heels lifted off mattress. Patient's functional status and disposition recommendation discussed with patient's nurse.  White board updated in patient's room.  OT asked if there were any other questions; patient/ family had no further questions.  Education:    Assessment:     Tiffanie Wise is a 77 y.o. female with a medical diagnosis of Acute ischemic left MCA stroke.  She presents with performance deficits of physical skills including impaired sensation and endurance; demonstrating performance deficits of cognitive skills including impaired  attention, perception, understanding, problem solving, sequencing and memory all resulting in inability organizing occupational performance in a timely and safe manner; demonstrating performance deficits of psychosocial skills including impairments of interpersonal interactions and coping strategies which are skills necessary to successfully and appropriately participate in everyday tasks and social situations.  These performance deficits have resulted in activity limitations including but not limited to:   transfers, ascending/ descending stairs, walking short and long distances, walking around obstacles, transitional movement patterns " "(kneeling, bending); upper body dressing, lower body dressing, brushing teeth, toileting, bathing, carrying objects, assisting  in his care.   Patient's role as mother, wife, caregiver to  and independent caretaker for self has been affected. Patient will benefit from skilled OT services to maximize level of independence with self-care skills and functional mobility.      Rehab Prognosis:  Fair; patient would benefit from acute skilled OT services to address these deficits and reach maximum level of function.         Clinical Decision Makin.  OT Mod:  "Pt evaluation falls under moderate complexity for evaluation coding due to identification of 3-5 performance deficits noted as stated above. Eval required Min/Mod assistance to complete on this date and detailed assessment(s) were utilized. Moreover, an expanded review of history and occupational profile obtained with additional review of cognitive, physical and psychosocial hx."     Plan:     Patient to be seen 4 x/week to address the above listed problems via self-care/home management, neuromuscular re-education, cognitive retraining, sensory integration, therapeutic exercises  · Plan of Care Expires: 18  · Plan of Care Reviewed with: patient    This Plan of care has been discussed with the patient who was involved in its development and understands and is in agreement with the identified goals and treatment plan    GOALS:    Occupational Therapy Goals        Problem: Occupational Therapy Goal    Goal Priority Disciplines Outcome Interventions   Occupational Therapy Goal     OT, PT/OT     Description:  Goals set  to be addressed for 14 days with expiration date, :  Patient will increase functional independence with ADLs by performing:    Patient will demonstrate rolling to the right with min assist.  Not met   Patient will demonstrate rolling to the left with min assist.   Not met  Patient will demonstrate supine -sit with min " assist.   Not met  Patient will demonstrate stand pivot transfers with min assist.   Not met  Patient will demonstrate grooming while standing with min assist.   Not met  Patient will demonstrate upper body dressing with min assist while seated EOB.   Not met  Patient will demonstrate lower body dressing with min assist while seated EOB.   Not met  Patient will demonstrate ability to follow 2/3 commands.   Not met  Patient's family / caregiver will demonstrate independence and safety with assisting patient with self-care skills and functional mobility.     Not met  Patient and/or patient's family will verbalize understanding of stroke prevention guidelines, personal risk factors and stroke warning signs via teachback method.  Not met                           Time Tracking:     OT Date of Treatment: 12/16/17  OT Start Time: 0518  OT Stop Time: 0543  OT Total Time (min): 25 min    Billable Minutes:Evaluation 16  Therapeutic Activity 9    NGOZI Acosta  12/16/2017

## 2017-12-16 NOTE — ASSESSMENT & PLAN NOTE
Grade 1 ICH, reperfusion petechial hemorrhage s/p tpa    -no neurosurgical intervention  -will sign off; please call if further questions exist

## 2017-12-16 NOTE — SUBJECTIVE & OBJECTIVE
"Prescriptions Prior to Admission   Medication Sig Dispense Refill Last Dose    albuterol 90 mcg/actuation inhaler Inhale 2 puffs into the lungs every 6 (six) hours. 1 Inhaler 12 Taking    ALCOHOL PADS PadM    Taking    alendronate (FOSAMAX) 70 MG tablet TAKE 1 TABLET BY MOUTH EVERY WEEK WITH A FULL GLASS OF WATER ON EMPTY STOMACH. DO NOT EAT OR LIE DOWN FOR 30 MINUTES 12 tablet 3 Taking    aspirin (ECOTRIN) 81 MG EC tablet Take 81 mg by mouth. Take 81 mg by mouth daily.   Taking    atorvastatin (LIPITOR) 80 MG tablet Take 1 tablet (80 mg total) by mouth once daily. 90 tablet 3 Taking    BD INSULIN SYRINGE ULT-FINE II 1/2 mL 31 x 5/16" Syrg    Taking    BLOOD SUGAR DIAGNOSTIC (TRUETEST TEST STRIPS MISC) by Misc.(Non-Drug; Combo Route) route. Pt is testing 3 times a day   Taking    clonazePAM (KLONOPIN) 0.5 MG tablet TAKE 1 TABLET(0.5 MG) BY MOUTH TWICE DAILY AS NEEDED FOR ANXIETY 30 tablet 0     clopidogrel (PLAVIX) 75 mg tablet Take 1 tablet (75 mg total) by mouth once daily. 90 tablet 3 Taking    donepezil (ARICEPT) 10 MG tablet Take 1 tablet (10 mg total) by mouth once daily. 90 tablet 3 Taking    EASY COMFORT LANCETS 30 gauge Misc    Taking    EASY TALK GLUCOSE TEST Strp    Taking    EASY TALK LOW CONTROL Soln    Taking    escitalopram oxalate (LEXAPRO) 20 MG tablet TAKE 1 TABLET(20 MG) BY MOUTH EVERY DAY 90 tablet 0     escitalopram oxalate (LEXAPRO) 20 MG tablet TAKE 1 TABLET BY MOUTH EVERY DAY 90 tablet 0     FLUAD 1604-3938, 65 YR UP,,PF, 45 mcg (15 mcg x 3)/0.5 mL Syrg ADM 0.5ML IM UTD  0     hydrochlorothiazide (HYDRODIURIL) 25 MG tablet Take 1 tablet (25 mg total) by mouth once daily. 90 tablet 3 Taking    insulin lispro (HUMALOG) 100 unit/mL Crtg Inject 40 Units into the skin 2 (two) times daily. Takes 40 units in the morning and 25 units at night   Taking    insulin NPH (HUMULIN N) 100 unit/mL injection Inject into the skin. 40 units in the morning and 25 units in the evening   Taking " "   isosorbide mononitrate (IMDUR) 30 MG 24 hr tablet Take 1 tablet (30 mg total) by mouth every evening. 30 tablet 11 Taking    levothyroxine (SYNTHROID) 50 MCG tablet Take 1 tablet (50 mcg total) by mouth once daily. 90 tablet 3 Taking    losartan (COZAAR) 25 MG tablet Take 1 tablet (25 mg total) by mouth once daily. 90 tablet 3 Taking    metoprolol succinate (TOPROL-XL) 50 MG 24 hr tablet TAKE 1 TABLET(50 MG) BY MOUTH EVERY DAY (Patient taking differently: TAKE 1 TABLET(50 MG) BY MOUTH EVERY DAY) 90 tablet 3 Taking    nitroGLYCERIN (NITROSTAT) 0.4 MG SL tablet ONE TABLET UNDER TONGUE AS NEEDED FOR CHEST PAIN 25 tablet 12 Taking    pantoprazole (PROTONIX) 40 MG tablet TAKE 1 TABLET BY MOUTH DAILY 90 tablet 0 Taking    pen needle, diabetic 31 gauge x 5/16" Ndle USE TWICE DAILY AND PRN   Taking    SITagliptin (JANUVIA) 100 MG Tab Take 1 tablet (100 mg total) by mouth once daily. 90 tablet 3 Taking       Review of patient's allergies indicates:  No Known Allergies    Past Medical History:   Diagnosis Date    Acute respiratory failure with hypoxia     Anxiety     AP (angina pectoris) 7/18/2013    Arterial ischemic stroke, MCA (middle cerebral artery), left, acute 12/15/2017    Asthma     CAD, multiple vessel 8/2/2016    Chronic ischemic heart disease 7/18/2013    Coronary artery disease 7/18/2013    Depression     Diabetes with neurologic complications     GERD (gastroesophageal reflux disease) 7/18/2013    Heart failure     History of PTCA 7/18/2013    Hypertension 7/18/2013    Hypothyroidism     Leg pain 8/29/2013    Mixed hyperlipidemia 7/18/2013    Myocardial infarction     Osteoporosis     Pneumonia     Pneumonia due to other staphylococcus     Precancerous changes of the vagina     Sleep apnea     stopped using CPAP 2015 - sores in nose, couldn't breathe, uses Breathe riht strips now.     Thyroid disease     Tobacco dependence     resolved    Trouble in sleeping     Type 2 " diabetes mellitus with ophthalmic manifestations     Urinary incontinence     pullups day, pads at night     Past Surgical History:   Procedure Laterality Date    BREAST SURGERY Left     benign cyst    CORONARY ANGIOPLASTY      CORONARY STENT PLACEMENT      x6-7 oer the yeqrs  last 12/17/14 BRIANDA to lcfx    EYE SURGERY Bilateral 2014    cataracts w/IOL   Dr Vance    HYSTERECTOMY  1970    vag hyst; ovaries intact    THYROID SURGERY      nodule benign, Dr Hankins    TUBAL LIGATION  1970     Family History     Problem Relation (Age of Onset)    Alcohol abuse Sister    Cancer Son    Cirrhosis Sister    Diabetes Sister, Paternal Grandmother    Fibromyalgia Daughter, Daughter    Heart disease Mother    Kidney disease Father, Brother        Social History Main Topics    Smoking status: Former Smoker     Packs/day: 1.00     Years: 38.00     Quit date: 7/8/2008    Smokeless tobacco: Never Used    Alcohol use No    Drug use: No    Sexual activity: Not Currently     Birth control/ protection: None     Review of Systems   Unable to perform ROS: Mental status change     Objective:     Weight: 68.5 kg (151 lb 0.2 oz)  Body mass index is 30.5 kg/m².  Vital Signs (Most Recent):  Temp: 98.4 °F (36.9 °C) (12/16/17 0700)  Pulse: 74 (12/16/17 0945)  Resp: 15 (12/16/17 0945)  BP: (!) 119/55 (12/16/17 0945)  SpO2: 100 % (12/16/17 0945) Vital Signs (24h Range):  Temp:  [98.4 °F (36.9 °C)-99 °F (37.2 °C)] 98.4 °F (36.9 °C)  Pulse:  [46-83] 74  Resp:  [13-29] 15  SpO2:  [88 %-100 %] 100 %  BP: (117-181)/(55-89) 119/55       Date 12/16/17 0700 - 12/17/17 0659   Shift 7977-7094 8375-9134 7378-0176 24 Hour Total   I  N  T  A  K  E   I.V.  (mL/kg) 453.9  (6.6)   453.9  (6.6)    IV Piggyback 100   100    Shift Total  (mL/kg) 553.9  (8.1)   553.9  (8.1)   O  U  T  P  U  T   Urine  (mL/kg/hr) 200   200    Shift Total  (mL/kg) 200  (2.9)   200  (2.9)   Weight (kg) 68.5 68.5 68.5 68.5                   Female External Urinary  "Catheter 12/15/17 2109 (Active)   Skin no redness;no breakdown;perineum cleansed w/ soap and water 12/16/2017  7:00 AM   Tolerance no signs/symptoms of discomfort 12/16/2017  7:00 AM   Suction Continuous suction at 40 mmHg 12/16/2017  7:00 AM   Date of last wick change 12/16/17 12/16/2017  7:00 AM   Time of last wick change 0730 12/16/2017  7:00 AM   CAUTI Prevention Bundle StatLock in place w 1" slack 12/16/2017  7:00 AM       Physical Exam:    Constitutional: She appears well-developed and well-nourished.     Eyes: Pupils are equal, round, and reactive to light. Conjunctivae and EOM are normal.     Cardiovascular: Normal rate and regular rhythm.     Abdominal: Soft. Bowel sounds are normal.     Skin: Skin displays no rash on trunk and no rash on extremities. Skin displays no lesions on trunk and no lesions on extremities.     Psych/Behavior: She is alert.     Musculoskeletal:        Neck: Range of motion is full. There is no tenderness.        Back: Range of motion is full. There is no tenderness.        Right Upper Extremities: There is no tenderness.        Left Upper Extremities: There is no tenderness.       Right Lower Extremities: There is no tenderness.        Left Lower Extremities: There is no tenderness.     Neurological:   [id number]  Aphasic   PERRL, face grossly symmetrical, tongue midline  4/5 on R; 5/5 on L   Responds to noxious stimuli throughout       Significant Labs:    Recent Labs  Lab 12/15/17  1113 12/15/17  1433 12/16/17  0139 12/16/17  0953   * 203* 143*  --     136 142  --    K 3.2* 3.2* 3.1*  --     102 105  --    CO2 27 25 29  --    BUN 11 11 11  --    CREATININE 0.9 0.8 0.7  --    CALCIUM 8.8 8.2* 8.2*  --    MG  --   --  1.4* 1.9       Recent Labs  Lab 12/15/17  1113 12/15/17  1433 12/16/17  0139   WBC 9.94 21.16* 10.45   HGB 11.5* 10.2* 10.0*   HCT 38.5 35.0* 33.5*    300 285       Recent Labs  Lab 12/15/17  1433 12/16/17  0139   INR 1.6* 1.6*     Microbiology " Results (last 7 days)     Procedure Component Value Units Date/Time    Blood culture [[id number]] Collected:  12/15/17 1808    Order Status:  Completed Specimen:  Blood from Peripheral, Antecubital, Left Updated:  12/16/17 0315     Blood Culture, Routine No Growth to date    Narrative:       Blood cultures from 2 different sites. 4 bottles total.  Please draw before starting antibiotics.    Blood culture [[id number]] Collected:  12/15/17 1814    Order Status:  Completed Specimen:  Blood from Peripheral, Hand, Right Updated:  12/16/17 0315     Blood Culture, Routine No Growth to date    Narrative:       Blood cultures x 2 different sites. 4 bottles total. Please  draw cultures before administering antibiotics.        All pertinent labs from the last 24 hours have been reviewed.    Significant Diagnostics:  CT: Ct Head Without Contrast    Result Date: 12/16/2017   Evolving acute/recent infarct involving the left parietal lobe with new hyperdensity concerning for hemorrhagic conversion. Mild mass effect without midline shift. Continued followup advised. Preliminary findings were communicated from Dr. Mckeon to PÉREZ BURLESON via telephone for staff radiologist Dr. Muse at 03:14:14 on 12/16/17. ______________________________________ Electronically signed by resident: MARV MCKEON MD Date:     12/16/17 Time:    03:19 As the supervising and teaching physician, I personally reviewed the images and resident's interpretation and I agree with the findings. Electronically signed by: Brett Muse Date:     12/16/17 Time:    03:40     Ct Head Without Contrast    Result Date: 12/15/2017   Chronic microvascular ischemic changes. All CT scans at this facility use dose modulation, iterative reconstruction and/or weight based dosing when appropriate to reduce radiation dose to as low as reasonably achievable. Electronically signed by: JORY MEYERS MD Date:     12/15/17 Time:    11:33   MRI: No results found in the last 24  hours.

## 2017-12-16 NOTE — PLAN OF CARE
Problem: SLP Goal  Goal: SLP Goal  SLP evaluation initiated. Full report to follow. Patient presents with Oropharyngeal Dysphagia, Dysarthria and Aphasia. REC: Puree diet with nectar-thickened liquids, medications crushed in puree and ST to initiate therapy.  Findings reviewed with Pt, family, nurse and MD team. Please continue to monitor for signs and symptoms of aspiration and discontinue oral feeding should you notice any of the following: watery eyes, reddened facial area, wet vocal quality, increased work of breathing, change in respiratory status, increased congestion, coughing, fever, and/or confusion. Thank you.    SAMMIE Shaver., [facility]-SLP  Speech-Language Pathology  Pager: [phone]  12/16/2017

## 2017-12-16 NOTE — HPI
77 year old female presented with L M1 stroke with aphasia and R side weakness s/p tpa, no endovascular intervention. On f/u CTH, hemorrhagic conversion was noted. Patient has remained stable without any neuro worsening. NSGY consulted given reperfusion hemorrhage.

## 2017-12-16 NOTE — PT/OT/SLP EVAL
Speech Language Pathology Evaluation  Cognitive/Bedside Swallow    Patient Name:  Tiffanie Wise   MRN:  [id number]  Admitting Diagnosis: Acute ischemic left MCA stroke  Continue to monitor for signs and symptoms of aspiration and discontinue oral feeding should you notice any of the following: watery eyes, reddened facial area, wet vocal quality, increased work of breathing, change in respiratory status, increased congestion, coughing, fever, etc.    Recommendations:                  General Recommendations:  Dysphagia therapy and Speech/language therapy  Diet recommendations:  Puree, Nectar Thick   Aspiration Precautions: 1 bite/sip at a time, Alternating bites/sips, Assistance with meals and Assistance with thickening liquids, Check for pocketing/oral residue, Eliminate distractions, Feed only when awake/alert, Frequent oral care, HOB to 90 degrees, Meds crushed in puree and Monitor for s/s of aspiration Continue to monitor for signs and symptoms of aspiration and discontinue oral feeding should you notice any of the following: watery eyes, reddened facial area, wet vocal quality, increased work of breathing, change in respiratory status, increased congestion, coughing, fever, etc.  General Precautions: Standard, aspiration, aphasia, nectar thick, pureed diet, hard of hearing  Communication strategies:  go to room if call light pushed and visual aids advised    History:     Past Medical History:   Diagnosis Date    Acute respiratory failure with hypoxia     Anxiety     AP (angina pectoris) 7/18/2013    Arterial ischemic stroke, MCA (middle cerebral artery), left, acute 12/15/2017    Asthma     CAD, multiple vessel 8/2/2016    Chronic ischemic heart disease 7/18/2013    Coronary artery disease 7/18/2013    Depression     Diabetes with neurologic complications     GERD (gastroesophageal reflux disease) 7/18/2013    Heart failure     History of PTCA 7/18/2013    Hypertension 7/18/2013    Hypothyroidism   "   Leg pain 8/29/2013    Mixed hyperlipidemia 7/18/2013    Myocardial infarction     Osteoporosis     Pneumonia     Pneumonia due to other staphylococcus     Precancerous changes of the vagina     Sleep apnea     stopped using CPAP 2015 - sores in nose, couldn't breathe, uses Breathe riht strips now.     Thyroid disease     Tobacco dependence     resolved    Trouble in sleeping     Type 2 diabetes mellitus with ophthalmic manifestations     Urinary incontinence     pullups day, pads at night       Past Surgical History:   Procedure Laterality Date    BREAST SURGERY Left     benign cyst    CORONARY ANGIOPLASTY      CORONARY STENT PLACEMENT      x6-7 oer the yeqrs  last 12/17/14 BRIANDA to lcfx    EYE SURGERY Bilateral 2014    cataracts w/IOL   Dr Vance    HYSTERECTOMY  1970    vag hyst; ovaries intact    THYROID SURGERY      nodule benign, Dr Hankins    TUBAL LIGATION  1970       Social History: Patient lives with .  Family reports Patient is primary caregiver to , drives, and was Independent with ADLs prior to admit.     MRI 12/16/17:Hemorrhagic infarct left parieto-occipital temporal junction.    Chest X-Rays: 12/15/17 Mild vascular prominence.    Prior diet: Reg, thin. Pts daughter reports pt loved to chew on ice.     Occupation/hobbies/homemaking: Pt is retired, formerly wored at [employer] in [place]. She enjoys talking with family and watching TV for hobbies.     Subjective     SLp reviewed pt with nurse, nurse reports Pt aphasic, increased ADRIEL this am  Pt presents calm and cooperative  She explains "The hamburger is at the car"  Pts daughter explains, "She takes care of her  and it is not a good situation at home"  Patient goals: to eat     Pain/Comfort:  · Pain Rating 1: 0/10  · Pain Rating Post-Intervention 1: 0/10    Objective:   Pt found reclined in bed upon SLP entry to room. Nurse in room at start of session, assists SLP in repositioning " pt in bed, then leaves. HOB elevated.   Cognitive Status:    SLP to continue to cognition in session as status improves   Unable to assess 2/2 aphasia     Receptive Language:   Comprehension:      DEP. Patient compeltes 1 step commands with 25% accuracy across 5 attempts provided MAX A. She is DEP for ID of objects/pictures in a FO3-4 items. She answers personalbasic YNQ with 20% accuracy across 5 attempts, MAX A    Pragmatics:    inconsistent eye contact presents, initiation delayed and Eye contact variable    Expressive Language:  Verbal:    Patient with severe exprwessive aphasia characterized by phonemic jargon,  semantic and phonemic paraphasias.  Pt DEP for automatic speech tasks, object naming and sentence/phrase completion tasks this service day  Nonverbal:   Gestures 20% accuracy in imitation with SLP and Communication board DEP for picture and letter ID tasks      Motor Speech:  Dysarthria moderately decreased articulatory precision and breath support noted    Voice:   strained vocal quality, varying prosody     Visual-Spatial:  Ongoing assessment warranted as limited assessment today 2/2 decreased comprehension/command following    Reading:   MAX A for reading comprehension tasks at the word level today, ongoing assessment warranted as status improves      Written Expression:   DNA 2/2 decreased comprehension. ST to continue to assess in session    Oral Musculature Evaluation  · Oral Musculature: right weakness, unable to assess due to poor participation/comprehension  · Dentition: scattered dentition, teeth in poor condition  · Secretion Management: adequate  · Mandibular Strength and Mobility: other (see comments) (reduced coordination)  · Oral Labial Strength and Mobility: impaired coordination, impaired pursing  · Lingual Strength and Mobility: impaired strength, impaired protrusion  · Buccal Strength and Mobility: decreased tone  · Volitional Cough: not able to elicit on command  · Volitional  Swallow: elicited, timely   · Voice Prior to PO Intake: strained, dysarthric    Bedside Swallow Eval:   Consistencies Assessed:  · Thin liquids ice chipsx3, tsp sipx1, straw sipx1  · Nectar thick liquids tsp sipsx3, cup edge sipx1  · Puree tsp bites x4  · Solids bite of macho cracker     Oral Phase:   · Prolonged mastication  · Oral residue    Pharyngeal Phase:   · throat clearing  · wet vocal quality after swallow    Compensatory Strategies  · Effortful swallow    Treatment: Pt with throat clear x 1 and delayed wet change in vocal quality following larger straw sip thin liquids. Moderate lingual stasis noted with trial of solid requiring cues for liquid wash and finger sweep to clear.   SLP educates Pt and family on SLP role, aspiration precautions, thickener guidelines, dietary modifications, Aphasia, and ongoing SLP POC. Pt not able to demonstrate understanding while family v/u. No further questions. Whiteboard updated. Nurse and MD team notified.     Assessment:     Tiffanie Wise is a 77 y.o. female with an SLP diagnosis of Aphasia, Dysphagia and Dysarthria.  She will require ongoing, intensive speech therapy upon d/c from acute. POC initiated and ST to continue to follow. Thank you.     Goals:    SLP Goals        Problem: SLP Goal    Goal Priority Disciplines Outcome   SLP Goal     SLP    Description:  Speech Language Pathology Goals  Goals expected to be met by 12/23/2017  1. Pt will tolerate puree diet without overt S/S aspiration, MIN A  2. Pt will tolerate nectar-thickened liquids w/o overt S/S aspiration, MIN A  3. Pt will tolerate trials of advanced diet textures (dental soft, regular) with adequate oral clearance, MIN A  4. Pt will tolerate trials of thin liquids w/o overt S/S aspiration, MIN A  5. Pt will answer basic/personal YNQ with 80% accuracy or higher, MOD A  6. Pt will follow 1-step commands with 80% accuracy or higher, MOD A  7. Pt will complete automatic speech tasks with 70% accuracy, MOD  A  8. Educate Pt and family on aspiration precautions and compensatory strategies for functional communication                         Plan:     · Patient to be seen:  5 x/week   · Plan of Care expires:  01/15/18  · Plan of Care reviewed with:  patient, son, daughter   · SLP Follow-Up:  Yes       Discharge recommendations:  Discharge Facility/Level Of Care Needs: nursing facility, skilled, other (see comments) (pendign TP/OT recs)   Barriers to Discharge:  Decreased Care Giver Support    Time Tracking:     SLP Treatment Date:   12/16/17  Speech Start Time:  1213  Speech Stop Time:  1245     Speech Total Time (min):  32 min    Billable Minutes: Eval 20  and Eval Swallow and Oral Function 12    SAMMIE Shaver., CCC-SLP  Speech-Language Pathology  Pager: [phone]  12/16/2017

## 2017-12-16 NOTE — CONSULTS
"[facility]  Neurosurgery  Consult Note    Consults  Subjective:     Chief Complaint/Reason for Admission: ischemic stroke     History of Present Illness: 77 year old female presented with L M1 stroke with aphasia and R side weakness s/p tpa, no endovascular intervention. On f/u CTH, hemorrhagic conversion was noted. Patient has remained stable without any neuro worsening. NSGY consulted given reperfusion hemorrhage.     Prescriptions Prior to Admission   Medication Sig Dispense Refill Last Dose    albuterol 90 mcg/actuation inhaler Inhale 2 puffs into the lungs every 6 (six) hours. 1 Inhaler 12 Taking    ALCOHOL PADS PadM    Taking    alendronate (FOSAMAX) 70 MG tablet TAKE 1 TABLET BY MOUTH EVERY WEEK WITH A FULL GLASS OF WATER ON EMPTY STOMACH. DO NOT EAT OR LIE DOWN FOR 30 MINUTES 12 tablet 3 Taking    aspirin (ECOTRIN) 81 MG EC tablet Take 81 mg by mouth. Take 81 mg by mouth daily.   Taking    atorvastatin (LIPITOR) 80 MG tablet Take 1 tablet (80 mg total) by mouth once daily. 90 tablet 3 Taking    BD INSULIN SYRINGE ULT-FINE II 1/2 mL 31 x 5/16" Syrg    Taking    BLOOD SUGAR DIAGNOSTIC (TRUETEST TEST STRIPS MISC) by Misc.(Non-Drug; Combo Route) route. Pt is testing 3 times a day   Taking    clonazePAM (KLONOPIN) 0.5 MG tablet TAKE 1 TABLET(0.5 MG) BY MOUTH TWICE DAILY AS NEEDED FOR ANXIETY 30 tablet 0     clopidogrel (PLAVIX) 75 mg tablet Take 1 tablet (75 mg total) by mouth once daily. 90 tablet 3 Taking    donepezil (ARICEPT) 10 MG tablet Take 1 tablet (10 mg total) by mouth once daily. 90 tablet 3 Taking    EASY COMFORT LANCETS 30 gauge Misc    Taking    EASY TALK GLUCOSE TEST Strp    Taking    EASY TALK LOW CONTROL Soln    Taking    escitalopram oxalate (LEXAPRO) 20 MG tablet TAKE 1 TABLET(20 MG) BY MOUTH EVERY DAY 90 tablet 0     escitalopram oxalate (LEXAPRO) 20 MG tablet TAKE 1 TABLET BY MOUTH EVERY DAY 90 tablet 0     FLUAD 9720-1929, 65 YR UP,,PF, 45 mcg (15 mcg x " "3)/0.5 mL Syrg ADM 0.5ML IM UTD  0     hydrochlorothiazide (HYDRODIURIL) 25 MG tablet Take 1 tablet (25 mg total) by mouth once daily. 90 tablet 3 Taking    insulin lispro (HUMALOG) 100 unit/mL Crtg Inject 40 Units into the skin 2 (two) times daily. Takes 40 units in the morning and 25 units at night   Taking    insulin NPH (HUMULIN N) 100 unit/mL injection Inject into the skin. 40 units in the morning and 25 units in the evening   Taking    isosorbide mononitrate (IMDUR) 30 MG 24 hr tablet Take 1 tablet (30 mg total) by mouth every evening. 30 tablet 11 Taking    levothyroxine (SYNTHROID) 50 MCG tablet Take 1 tablet (50 mcg total) by mouth once daily. 90 tablet 3 Taking    losartan (COZAAR) 25 MG tablet Take 1 tablet (25 mg total) by mouth once daily. 90 tablet 3 Taking    metoprolol succinate (TOPROL-XL) 50 MG 24 hr tablet TAKE 1 TABLET(50 MG) BY MOUTH EVERY DAY (Patient taking differently: TAKE 1 TABLET(50 MG) BY MOUTH EVERY DAY) 90 tablet 3 Taking    nitroGLYCERIN (NITROSTAT) 0.4 MG SL tablet ONE TABLET UNDER TONGUE AS NEEDED FOR CHEST PAIN 25 tablet 12 Taking    pantoprazole (PROTONIX) 40 MG tablet TAKE 1 TABLET BY MOUTH DAILY 90 tablet 0 Taking    pen needle, diabetic 31 gauge x 5/16" Ndle USE TWICE DAILY AND PRN   Taking    SITagliptin (JANUVIA) 100 MG Tab Take 1 tablet (100 mg total) by mouth once daily. 90 tablet 3 Taking       Review of patient's allergies indicates:  No Known Allergies    Past Medical History:   Diagnosis Date    Acute respiratory failure with hypoxia     Anxiety     AP (angina pectoris) 7/18/2013    Arterial ischemic stroke, MCA (middle cerebral artery), left, acute 12/15/2017    Asthma     CAD, multiple vessel 8/2/2016    Chronic ischemic heart disease 7/18/2013    Coronary artery disease 7/18/2013    Depression     Diabetes with neurologic complications     GERD (gastroesophageal reflux disease) 7/18/2013    Heart failure     History of PTCA 7/18/2013    " Hypertension 7/18/2013    Hypothyroidism     Leg pain 8/29/2013    Mixed hyperlipidemia 7/18/2013    Myocardial infarction     Osteoporosis     Pneumonia     Pneumonia due to other staphylococcus     Precancerous changes of the vagina     Sleep apnea     stopped using CPAP 2015 - sores in nose, couldn't breathe, uses Breathe riht strips now.     Thyroid disease     Tobacco dependence     resolved    Trouble in sleeping     Type 2 diabetes mellitus with ophthalmic manifestations     Urinary incontinence     pullups day, pads at night     Past Surgical History:   Procedure Laterality Date    BREAST SURGERY Left     benign cyst    CORONARY ANGIOPLASTY      CORONARY STENT PLACEMENT      x6-7 oer the yeqrs  last 12/17/14 BRIANDA to lcfx    EYE SURGERY Bilateral 2014    cataracts w/IOL   Dr Vance    HYSTERECTOMY  1970    vag hyst; ovaries intact    THYROID SURGERY      nodule benign, Dr Hankins    TUBAL LIGATION  1970     Family History     Problem Relation (Age of Onset)    Alcohol abuse Sister    Cancer Son    Cirrhosis Sister    Diabetes Sister, Paternal Grandmother    Fibromyalgia Daughter, Daughter    Heart disease Mother    Kidney disease Father, Brother        Social History Main Topics    Smoking status: Former Smoker     Packs/day: 1.00     Years: 38.00     Quit date: 7/8/2008    Smokeless tobacco: Never Used    Alcohol use No    Drug use: No    Sexual activity: Not Currently     Birth control/ protection: None     Review of Systems   Unable to perform ROS: Mental status change     Objective:     Weight: 68.5 kg (151 lb 0.2 oz)  Body mass index is 30.5 kg/m².  Vital Signs (Most Recent):  Temp: 98.4 °F (36.9 °C) (12/16/17 0700)  Pulse: 74 (12/16/17 0945)  Resp: 15 (12/16/17 0945)  BP: (!) 119/55 (12/16/17 0945)  SpO2: 100 % (12/16/17 0945) Vital Signs (24h Range):  Temp:  [98.4 °F (36.9 °C)-99 °F (37.2 °C)] 98.4 °F (36.9 °C)  Pulse:  [46-83] 74  Resp:  [13-29] 15  SpO2:  [88 %-100 %]  "100 %  BP: (117-181)/(55-89) 119/55       Date 12/16/17 0700 - 12/17/17 0659   Shift 2223-0210 7587-4225 5016-3319 24 Hour Total   I  N  T  A  K  E   I.V.  (mL/kg) 453.9  (6.6)   453.9  (6.6)    IV Piggyback 100   100    Shift Total  (mL/kg) 553.9  (8.1)   553.9  (8.1)   O  U  T  P  U  T   Urine  (mL/kg/hr) 200   200    Shift Total  (mL/kg) 200  (2.9)   200  (2.9)   Weight (kg) 68.5 68.5 68.5 68.5                   Female External Urinary Catheter 12/15/17 2109 (Active)   Skin no redness;no breakdown;perineum cleansed w/ soap and water 12/16/2017  7:00 AM   Tolerance no signs/symptoms of discomfort 12/16/2017  7:00 AM   Suction Continuous suction at 40 mmHg 12/16/2017  7:00 AM   Date of last wick change 12/16/17 12/16/2017  7:00 AM   Time of last wick change 0730 12/16/2017  7:00 AM   CAUTI Prevention Bundle StatLock in place w 1" slack 12/16/2017  7:00 AM       Physical Exam:    Constitutional: She appears well-developed and well-nourished.     Eyes: Pupils are equal, round, and reactive to light. Conjunctivae and EOM are normal.     Cardiovascular: Normal rate and regular rhythm.     Abdominal: Soft. Bowel sounds are normal.     Skin: Skin displays no rash on trunk and no rash on extremities. Skin displays no lesions on trunk and no lesions on extremities.     Psych/Behavior: She is alert.     Musculoskeletal:        Neck: Range of motion is full. There is no tenderness.        Back: Range of motion is full. There is no tenderness.        Right Upper Extremities: There is no tenderness.        Left Upper Extremities: There is no tenderness.       Right Lower Extremities: There is no tenderness.        Left Lower Extremities: There is no tenderness.     Neurological:   [id number]  Aphasic   PERRL, face grossly symmetrical, tongue midline  4/5 on R; 5/5 on L   Responds to noxious stimuli throughout       Significant Labs:    Recent Labs  Lab 12/15/17  1113 12/15/17  1433 12/16/17  0139 12/16/17  0953   * 203* " 143*  --     136 142  --    K 3.2* 3.2* 3.1*  --     102 105  --    CO2 27 25 29  --    BUN 11 11 11  --    CREATININE 0.9 0.8 0.7  --    CALCIUM 8.8 8.2* 8.2*  --    MG  --   --  1.4* 1.9       Recent Labs  Lab 12/15/17  1113 12/15/17  1433 12/16/17  0139   WBC 9.94 21.16* 10.45   HGB 11.5* 10.2* 10.0*   HCT 38.5 35.0* 33.5*    300 285       Recent Labs  Lab 12/15/17  1433 12/16/17  0139   INR 1.6* 1.6*     Microbiology Results (last 7 days)     Procedure Component Value Units Date/Time    Blood culture [[id number]] Collected:  12/15/17 1808    Order Status:  Completed Specimen:  Blood from Peripheral, Antecubital, Left Updated:  12/16/17 0315     Blood Culture, Routine No Growth to date    Narrative:       Blood cultures from 2 different sites. 4 bottles total.  Please draw before starting antibiotics.    Blood culture [[id number]] Collected:  12/15/17 1814    Order Status:  Completed Specimen:  Blood from Peripheral, Hand, Right Updated:  12/16/17 0315     Blood Culture, Routine No Growth to date    Narrative:       Blood cultures x 2 different sites. 4 bottles total. Please  draw cultures before administering antibiotics.        All pertinent labs from the last 24 hours have been reviewed.    Significant Diagnostics:  CT: Ct Head Without Contrast    Result Date: 12/16/2017   Evolving acute/recent infarct involving the left parietal lobe with new hyperdensity concerning for hemorrhagic conversion. Mild mass effect without midline shift. Continued followup advised. Preliminary findings were communicated from Dr. Mckeon to PÉREZ BURLESON via telephone for staff radiologist Dr. Muse at 03:14:14 on 12/16/17. ______________________________________ Electronically signed by resident: MARV MCKEON MD Date:     12/16/17 Time:    03:19 As the supervising and teaching physician, I personally reviewed the images and resident's interpretation and I agree with the findings. Electronically signed  by: Brett Muse Date:     12/16/17 Time:    03:40     Ct Head Without Contrast    Result Date: 12/15/2017   Chronic microvascular ischemic changes. All CT scans at this facility use dose modulation, iterative reconstruction and/or weight based dosing when appropriate to reduce radiation dose to as low as reasonably achievable. Electronically signed by: JORY MEYERS MD Date:     12/15/17 Time:    11:33   MRI: No results found in the last 24 hours.    Assessment/Plan:     * Acute ischemic left MCA stroke    Grade 1 ICH, reperfusion petechial hemorrhage s/p tpa    -no neurosurgical intervention  -will sign off; please call if further questions exist             Thank you for your consult. I will sign off. Please contact us if you have any additional questions.    Sadqi Ponce MD  Neurosurgery  [facility]

## 2017-12-16 NOTE — PLAN OF CARE
ICU Attending Note  Neurocritical Care    -For tPA, thrombectomy,  *Continue Acute Stroke Intervention protocol, including stroke patient/family education  *Perform neuro and vital checks q15min x2 hours after intervention, then q30min from 2-8 hours, then q1h x24 hours  *No anticoagulants, antiplatelets, and, if possible, NGT, AL, CVC for 24 hours  *Reimage 24 hours after intervention with MR brain  -hold aspirin given hemorrhagic conversion today  -atorvastatin  -TTE  -Telemetry      --140  -start metoprolol 25 mg q6h  -NS  -Normothermia  -vitamin 5 mg IV for INR 1.6  -Glycemic control; start detemir 20 units, ISS  -NPO until cleared to swallow by BSS or SLP  -PT, OT, SLP consults as appropriate  -activity as tolerated

## 2017-12-17 PROBLEM — I63.89 ACUTE HEMORRHAGIC INFARCTION OF BRAIN: Status: ACTIVE | Noted: 2017-12-17

## 2017-12-17 LAB
ALBUMIN SERPL BCP-MCNC: 2.7 G/DL
ALP SERPL-CCNC: 113 U/L
ALT SERPL W/O P-5'-P-CCNC: 9 U/L
ANION GAP SERPL CALC-SCNC: 9 MMOL/L
AST SERPL-CCNC: 13 U/L
BASOPHILS # BLD AUTO: 0.03 K/UL
BASOPHILS NFR BLD: 0.2 %
BILIRUB SERPL-MCNC: 0.8 MG/DL
BUN SERPL-MCNC: 20 MG/DL
CALCIUM SERPL-MCNC: 8.1 MG/DL
CHLORIDE SERPL-SCNC: 102 MMOL/L
CO2 SERPL-SCNC: 26 MMOL/L
CREAT SERPL-MCNC: 1 MG/DL
DIFFERENTIAL METHOD: ABNORMAL
EOSINOPHIL # BLD AUTO: 0.1 K/UL
EOSINOPHIL NFR BLD: 0.9 %
ERYTHROCYTE [DISTWIDTH] IN BLOOD BY AUTOMATED COUNT: 17.2 %
EST. GFR  (AFRICAN AMERICAN): >60 ML/MIN/1.73 M^2
EST. GFR  (NON AFRICAN AMERICAN): 54.5 ML/MIN/1.73 M^2
GLUCOSE SERPL-MCNC: 428 MG/DL
HCT VFR BLD AUTO: 32.6 %
HGB BLD-MCNC: 9.6 G/DL
IMM GRANULOCYTES # BLD AUTO: 0.04 K/UL
IMM GRANULOCYTES NFR BLD AUTO: 0.3 %
INR PPP: 1.1
LYMPHOCYTES # BLD AUTO: 1.4 K/UL
LYMPHOCYTES NFR BLD: 10.9 %
MAGNESIUM SERPL-MCNC: 1.9 MG/DL
MCH RBC QN AUTO: 21.9 PG
MCHC RBC AUTO-ENTMCNC: 29.4 G/DL
MCV RBC AUTO: 74 FL
MONOCYTES # BLD AUTO: 0.9 K/UL
MONOCYTES NFR BLD: 6.7 %
NEUTROPHILS # BLD AUTO: 10.3 K/UL
NEUTROPHILS NFR BLD: 81 %
NRBC BLD-RTO: 0 /100 WBC
PHOSPHATE SERPL-MCNC: 2.6 MG/DL
PLATELET # BLD AUTO: 281 K/UL
PMV BLD AUTO: 12 FL
POCT GLUCOSE: 233 MG/DL (ref 70–110)
POCT GLUCOSE: 246 MG/DL (ref 70–110)
POCT GLUCOSE: 281 MG/DL (ref 70–110)
POCT GLUCOSE: 301 MG/DL (ref 70–110)
POCT GLUCOSE: 375 MG/DL (ref 70–110)
POCT GLUCOSE: 408 MG/DL (ref 70–110)
POCT GLUCOSE: 411 MG/DL (ref 70–110)
POCT GLUCOSE: 438 MG/DL (ref 70–110)
POTASSIUM SERPL-SCNC: 3.7 MMOL/L
PROT SERPL-MCNC: 6 G/DL
PROTHROMBIN TIME: 11.8 SEC
RBC # BLD AUTO: 4.38 M/UL
SODIUM SERPL-SCNC: 137 MMOL/L
WBC # BLD AUTO: 12.73 K/UL

## 2017-12-17 PROCEDURE — 25000003 PHARM REV CODE 250: Performed by: PHYSICIAN ASSISTANT

## 2017-12-17 PROCEDURE — 25000003 PHARM REV CODE 250: Performed by: PSYCHIATRY & NEUROLOGY

## 2017-12-17 PROCEDURE — 99233 SBSQ HOSP IP/OBS HIGH 50: CPT | Mod: ,,, | Performed by: PHYSICIAN ASSISTANT

## 2017-12-17 PROCEDURE — 84100 ASSAY OF PHOSPHORUS: CPT

## 2017-12-17 PROCEDURE — 97535 SELF CARE MNGMENT TRAINING: CPT

## 2017-12-17 PROCEDURE — 25000003 PHARM REV CODE 250: Performed by: NURSE PRACTITIONER

## 2017-12-17 PROCEDURE — 63600175 PHARM REV CODE 636 W HCPCS: Performed by: INTERNAL MEDICINE

## 2017-12-17 PROCEDURE — 63600175 PHARM REV CODE 636 W HCPCS: Performed by: NURSE PRACTITIONER

## 2017-12-17 PROCEDURE — 85610 PROTHROMBIN TIME: CPT

## 2017-12-17 PROCEDURE — 99222 1ST HOSP IP/OBS MODERATE 55: CPT | Mod: GC,,, | Performed by: INTERNAL MEDICINE

## 2017-12-17 PROCEDURE — 25000003 PHARM REV CODE 250: Performed by: STUDENT IN AN ORGANIZED HEALTH CARE EDUCATION/TRAINING PROGRAM

## 2017-12-17 PROCEDURE — 99233 SBSQ HOSP IP/OBS HIGH 50: CPT | Mod: GC,,, | Performed by: PSYCHIATRY & NEUROLOGY

## 2017-12-17 PROCEDURE — 27000221 HC OXYGEN, UP TO 24 HOURS

## 2017-12-17 PROCEDURE — 85025 COMPLETE CBC W/AUTO DIFF WBC: CPT

## 2017-12-17 PROCEDURE — 20000000 HC ICU ROOM

## 2017-12-17 PROCEDURE — 63600175 PHARM REV CODE 636 W HCPCS: Performed by: PHYSICIAN ASSISTANT

## 2017-12-17 PROCEDURE — 94761 N-INVAS EAR/PLS OXIMETRY MLT: CPT

## 2017-12-17 PROCEDURE — 99900035 HC TECH TIME PER 15 MIN (STAT)

## 2017-12-17 PROCEDURE — 80053 COMPREHEN METABOLIC PANEL: CPT

## 2017-12-17 PROCEDURE — 83735 ASSAY OF MAGNESIUM: CPT

## 2017-12-17 RX ORDER — IBUPROFEN 200 MG
16 TABLET ORAL
Status: DISCONTINUED | OUTPATIENT
Start: 2017-12-17 | End: 2017-12-19

## 2017-12-17 RX ORDER — HEPARIN SODIUM 5000 [USP'U]/ML
5000 INJECTION, SOLUTION INTRAVENOUS; SUBCUTANEOUS EVERY 8 HOURS
Status: DISCONTINUED | OUTPATIENT
Start: 2017-12-17 | End: 2017-12-22 | Stop reason: HOSPADM

## 2017-12-17 RX ORDER — GLUCAGON 1 MG
1 KIT INJECTION
Status: DISCONTINUED | OUTPATIENT
Start: 2017-12-17 | End: 2017-12-19

## 2017-12-17 RX ORDER — IBUPROFEN 200 MG
24 TABLET ORAL
Status: DISCONTINUED | OUTPATIENT
Start: 2017-12-17 | End: 2017-12-19

## 2017-12-17 RX ORDER — INSULIN ASPART 100 [IU]/ML
0-10 INJECTION, SOLUTION INTRAVENOUS; SUBCUTANEOUS
Status: DISCONTINUED | OUTPATIENT
Start: 2017-12-17 | End: 2017-12-19

## 2017-12-17 RX ORDER — INSULIN ASPART 100 [IU]/ML
4-8 INJECTION, SOLUTION INTRAVENOUS; SUBCUTANEOUS
Status: DISCONTINUED | OUTPATIENT
Start: 2017-12-17 | End: 2017-12-18

## 2017-12-17 RX ORDER — INSULIN ASPART 100 [IU]/ML
2-4 INJECTION, SOLUTION INTRAVENOUS; SUBCUTANEOUS
Status: DISCONTINUED | OUTPATIENT
Start: 2017-12-17 | End: 2017-12-17

## 2017-12-17 RX ORDER — LEVETIRACETAM 500 MG/1
500 TABLET ORAL 2 TIMES DAILY
Status: DISCONTINUED | OUTPATIENT
Start: 2017-12-17 | End: 2017-12-22 | Stop reason: HOSPADM

## 2017-12-17 RX ADMIN — STANDARDIZED SENNA CONCENTRATE AND DOCUSATE SODIUM 1 TABLET: 8.6; 5 TABLET, FILM COATED ORAL at 09:12

## 2017-12-17 RX ADMIN — ATORVASTATIN CALCIUM 80 MG: 20 TABLET, FILM COATED ORAL at 09:12

## 2017-12-17 RX ADMIN — LEVETIRACETAM 500 MG: 500 TABLET ORAL at 09:12

## 2017-12-17 RX ADMIN — LEVOTHYROXINE SODIUM 50 MCG: 50 TABLET ORAL at 06:12

## 2017-12-17 RX ADMIN — METOPROLOL TARTRATE 25 MG: 25 TABLET, FILM COATED ORAL at 06:12

## 2017-12-17 RX ADMIN — INSULIN ASPART 10 UNITS: 100 INJECTION, SOLUTION INTRAVENOUS; SUBCUTANEOUS at 12:12

## 2017-12-17 RX ADMIN — INSULIN ASPART 4 UNITS: 100 INJECTION, SOLUTION INTRAVENOUS; SUBCUTANEOUS at 04:12

## 2017-12-17 RX ADMIN — METOPROLOL TARTRATE 25 MG: 25 TABLET, FILM COATED ORAL at 12:12

## 2017-12-17 RX ADMIN — INSULIN ASPART 6 UNITS: 100 INJECTION, SOLUTION INTRAVENOUS; SUBCUTANEOUS at 09:12

## 2017-12-17 RX ADMIN — HEPARIN SODIUM 5000 UNITS: 5000 INJECTION, SOLUTION INTRAVENOUS; SUBCUTANEOUS at 12:12

## 2017-12-17 RX ADMIN — LOSARTAN POTASSIUM 50 MG: 50 TABLET, FILM COATED ORAL at 09:12

## 2017-12-17 RX ADMIN — SODIUM CHLORIDE 5 UNITS/HR: 9 INJECTION, SOLUTION INTRAVENOUS at 06:12

## 2017-12-17 RX ADMIN — HEPARIN SODIUM 5000 UNITS: 5000 INJECTION, SOLUTION INTRAVENOUS; SUBCUTANEOUS at 09:12

## 2017-12-17 NOTE — PROGRESS NOTES
[facility]  Neurocritical Care  Progress Note    Admit Date: 12/15/2017  Service Date: 12/17/2017  Length of Stay: 2    Subjective:     Chief Complaint: Embolic stroke involving left middle cerebral artery    History of Present Illness: The patient is a 77 year old female with a PMHx of HTN, DM II, MI, CABG x1, GERD, CAD (on Plavix) admitted to [facility] s/p tPA. Per chart review, the patient was last seen normal was around 10:30 patient was playing Bingo when she developed slurred speech, R facial droop, R sided weakness around 10:30am today. TPA was administered prior to transfer. Per flight care, BP stable en route, patient had some bleeding of the gums. CT-No intracranial hemorrhage, extra-axial fluid collection, midline shift, or mass effect. CT MP-Distal left parietal parietal MCA branch occlusion with correlating early changes of infarction on noncontrast Ct. Admitted to [facility] for higher level of care.    Hospital Course: 12/15:patient admitted to [facility] s/p tPA. CTH revealed L parietal MCA occlusion.   12/16: repeat CTH and MRI brain revealed left parietal lobe infarct with hemorrhagic conversion and mild mass effect. PT/INR elevated, Vitamin K 5mg IV given. Cardene drip to manage strict SBP goal of <140 in setting of hemorhagic conversion. Blood glucoses remain elevated while on SSI, started on 20u detemir.   12/17: Blood glucose remain elevated at 482, insulin drip started and endocrine consulted. Stepdown to VN pending Endocrinology recs. Start heparin prophylaxis. Cardene drip d/c.    Interval History:  Blood glucose remain elevated at 482, insulin drip started and endocrine consulted. Stepdown to VN pending Endocrinology recs. Patient experienced anxiety overnight, will restart her home med escitalopram tonight. D/c IVF. Start heparin prophylaxis. cardene drip d/c.    Review of Systems  Unable to assess due to mental status.    Objective:     Vitals:  Temp: 98.6 °F (37 °C) (12/17/17  1100)  Pulse: 61 (12/17/17 1315)  Resp: (!) 21 (12/17/17 1315)  BP: (!) 99/53 (12/17/17 1315)  SpO2: 97 % (12/17/17 1315)    Temp:  [98.5 °F (36.9 °C)-98.8 °F (37.1 °C)] 98.6 °F (37 °C)  Pulse:  [58-80] 61  Resp:  [15-33] 21  SpO2:  [90 %-100 %] 97 %  BP: ()/(53-80) 99/53              12/16 0701 - 12/17 0700  In: 1771.7 [I.V.:1621.7]  Out: 850 [Urine:850]    Physical Exam    Physical Exam:  GA: Alert, awake, comfortable, no acute distress. Unable to assess orientation, patient is dysarthric.  HEENT: No scleral icterus or JVD. R facial droop.  Pulmonary: Clear to auscultation A. No wheezing, crackles, or rhonchi.  Cardiac: RRR S1 & S2 w/o rubs/murmurs/gallops.   Abdominal: Bowel sounds present x 4. No appreciable hepatosplenomegaly.  Skin: No jaundice, rashes, or visible lesions.  Neuro:  --GCS: E4 V2 M6  --Mental Status:  Some difficulty following commands. Dysarthria.  --CN II-XII grossly intact  --L pupil 2mm, R pupil 4mm. (previous eye surgery). PERRL.   --Moves all extremities spontaneously and to commands  Unable to test orientation, language, memory, judgment, insight, fund of knowledge, gait     Medications:  Continuous    insulin (HUMAN R) infusion (adults) Last Rate: 3 Units/hr (12/17/17 1300)   Scheduled    atorvastatin 80 mg Daily   heparin (porcine) 5,000 Units Q8H   levETIRAcetam 500 mg BID   levothyroxine 50 mcg Daily   losartan 50 mg Daily   metoprolol tartrate 25 mg Q6H   senna-docusate 8.6-50 mg 1 tablet BID   sodium chloride 0.9% 3 mL Q8H   PRN    acetaminophen 650 mg Q6H PRN   dextrose 50% 12.5 g PRN   dextrose 50% 12.5 g PRN   dextrose 50% 25 g PRN   glucagon (human recombinant) 1 mg PRN   hydrALAZINE 10 mg Q6H PRN   magnesium sulfate IVPB 2 g PRN   magnesium sulfate IVPB 4 g PRN   ondansetron 4 mg Q8H PRN   potassium chloride 40 mEq PRN   And     potassium chloride 60 mEq PRN   And     potassium chloride 80 mEq PRN     Today I personally reviewed pertinent medications,  lines/drains/airways, imaging, lab results,     Assessment/Plan:     Neuro   * Embolic stroke involving left middle cerebral artery    -- s/p tpa  --Continue Neuro checks q 1hr  -- Vascular Neurology consulted  -- CT MP 12/15-Distal left parietal parietal MCA branch occlusion with correlating early changes of infarction on noncontrast CT.  --CT 12/15-Chronic microvascular ischemic changes.   --SBP goal <140  -- PT/OT/Speech  -- MRI brain 12/16  Hemorrhagic infarct left parieto-occipital temporal junction  -- CTH 12/16- Evolving acute/recent infarct involving the left parietal lobe with new hyperdensity concerning for hemorrhagic conversion  -- start ASA in 3 days due to hemorrhagic conversion        Acute hemorrhagic infarction of brain    -- MRI brain 12/16  Hemorrhagic infarct left parieto-occipital temporal junction  -- CTH 12/16- Evolving acute/recent infarct involving the left parietal lobe with new hyperdensity concerning for hemorrhagic conversion  -- Keppra 500mg BID x 7 days for seizure prophylaxis           Cardiac/Vascular   Essential hypertension    SBP <140  Restart home meds metoprolol and losartan  PRN Labetalol  Cardene drip d/c  EF 70%        Hyperlipidemia associated with type 2 diabetes mellitus    Continue home dose 80mg Atorvastatin        Endocrine   Type 2 diabetes mellitus    A1C- 10.8  accuchecks q 1 hrs  SSI  Detemir 10u  Aspart 10u  Insulin drip  Endocrinology consult        Acquired hypothyroidism    Continued home synthroid              Prophylaxis:  Venous Thromboembolism: mechanical chemical  Stress Ulcer: None  Ventilator Pneumonia: not applicable     Activity Orders          Activity as tolerated starting at 12/16 1030        Full Code    Temi James PA-C  Neurocritical Care  [facility]

## 2017-12-17 NOTE — ASSESSMENT & PLAN NOTE
Goal -180    Start transition gtt at 3 units/hr with stepdown.  Novolog 4-8 units with meals based on PO intake.  POC glucose AC,hs,0200  Moderate dose correction.    Discharge planning:  Probably resume 70/30. Daughter thinks patient has very low chance of being compliant with 4 shots/day.  Consider addition of GLP-1.  Follow-up with patient's endocrinologist after discharge.

## 2017-12-17 NOTE — HPI
Reason for Consult: Management of T2DM, Hyperglycemia     Home Diabetes Medications:   Januvia 100 mg daily  Humulin 70/30 - 40 AM and 30 PM    How often checking glucose at home? 2 times per day (AM and bedtime)   BG readings on regimen: Doesn't remember numbers, but usually high.  Hypoglycemia on the regimen?  No  Missed doses on regimen?  Yes    Diabetes Complications include:     Cerebrovascular disease.  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 For diabetes, she takes januvia 100 mg and Humalin 70/30 (40 units AM, 30 units PM). Her daughter provided the majority of the history. She has had much difficulty controlling her blood glucose. She follows regularly with an endocrinologist in , who is titrating her insulin. Her daughter admits her diet is poor. She monitors her blood glucose twice per day, and it is usually very high, although she's unsure the exact numbers. Endocrine consulted for BG management.

## 2017-12-17 NOTE — PLAN OF CARE
Problem: Patient Care Overview  Goal: Plan of Care Review  Outcome: Ongoing (interventions implemented as appropriate)  POC reviewed with pt at 0500. Pt cannot verbalize understanding. Questions and concerns addressed. No acute events overnight. Pt progressing toward goals. Will continue to monitor. See flowsheets for full assessment and VS info.  Pt on cardene gtt and insulin gtt.

## 2017-12-17 NOTE — PLAN OF CARE
ICU Attending Note  Neurocritical Care    E4V4M6  Severe dysarthria, some aphasia.    -atorvastatin  -levetiracetam prophylaxis x7 d  -wait until day 3 to start aspirin given hemorrhagic conversion  -restart escitalopram  --140  -losartan, metoprolol  -stop IVF  -insulin drip today; try to transition tomorrow; consult Endocrine to coordinate care outside [facility] and as outpatient  -start heparin prophylaxis tonight

## 2017-12-17 NOTE — SUBJECTIVE & OBJECTIVE
Interval History:  Blood glucose remain elevated at 482, insulin drip started and endocrine consulted. Stepdown to VN pending Endocrinology recs. Patient experienced anxiety overnight, will restart her home med escitalopram tonight. D/c IVF. Start heparin prophylaxis. cardene drip d/c.    Review of Systems  Unable to assess due to mental status.    Objective:     Vitals:  Temp: 98.6 °F (37 °C) (12/17/17 1100)  Pulse: 61 (12/17/17 1315)  Resp: (!) 21 (12/17/17 1315)  BP: (!) 99/53 (12/17/17 1315)  SpO2: 97 % (12/17/17 1315)    Temp:  [98.5 °F (36.9 °C)-98.8 °F (37.1 °C)] 98.6 °F (37 °C)  Pulse:  [58-80] 61  Resp:  [15-33] 21  SpO2:  [90 %-100 %] 97 %  BP: ()/(53-80) 99/53              12/16 0701 - 12/17 0700  In: 1771.7 [I.V.:1621.7]  Out: 850 [Urine:850]    Physical Exam    Physical Exam:  GA: Alert, awake, comfortable, no acute distress. Unable to assess orientation, patient is dysarthric.  HEENT: No scleral icterus or JVD. R facial droop.  Pulmonary: Clear to auscultation A. No wheezing, crackles, or rhonchi.  Cardiac: RRR S1 & S2 w/o rubs/murmurs/gallops.   Abdominal: Bowel sounds present x 4. No appreciable hepatosplenomegaly.  Skin: No jaundice, rashes, or visible lesions.  Neuro:  --GCS: E4 V2 M6  --Mental Status:  Some difficulty following commands. Dysarthria.  --CN II-XII grossly intact  --L pupil 2mm, R pupil 4mm. (previous eye surgery). PERRL.   --Moves all extremities spontaneously and to commands  Unable to test orientation, language, memory, judgment, insight, fund of knowledge, gait     Medications:  Continuous    insulin (HUMAN R) infusion (adults) Last Rate: 3 Units/hr (12/17/17 1300)   Scheduled    atorvastatin 80 mg Daily   heparin (porcine) 5,000 Units Q8H   levETIRAcetam 500 mg BID   levothyroxine 50 mcg Daily   losartan 50 mg Daily   metoprolol tartrate 25 mg Q6H   senna-docusate 8.6-50 mg 1 tablet BID   sodium chloride 0.9% 3 mL Q8H   PRN    acetaminophen 650 mg Q6H PRN   dextrose 50%  12.5 g PRN   dextrose 50% 12.5 g PRN   dextrose 50% 25 g PRN   glucagon (human recombinant) 1 mg PRN   hydrALAZINE 10 mg Q6H PRN   magnesium sulfate IVPB 2 g PRN   magnesium sulfate IVPB 4 g PRN   ondansetron 4 mg Q8H PRN   potassium chloride 40 mEq PRN   And     potassium chloride 60 mEq PRN   And     potassium chloride 80 mEq PRN     Today I personally reviewed pertinent medications, lines/drains/airways, imaging, lab results,

## 2017-12-17 NOTE — ASSESSMENT & PLAN NOTE
-- MRI brain 12/16  Hemorrhagic infarct left parieto-occipital temporal junction  -- CTH 12/16- Evolving acute/recent infarct involving the left parietal lobe with new hyperdensity concerning for hemorrhagic conversion  -- Keppra 500mg BID x 7 days for seizure prophylaxis

## 2017-12-17 NOTE — SUBJECTIVE & OBJECTIVE
Neurologic Chief Complaint: L MCA stroke     Subjective:     Interval History: Patient is seen for follow-up neurological assessment and treatment recommendations:   NAEON. Blood glucose ranging from 246-428. Continued aphasia and dysarthria.    HPI, Past Medical, Family, and Social History remains the same as documented in the initial encounter.     Review of Systems   Constitutional: Negative for fever.   Eyes: Positive for visual disturbance.   Respiratory: Negative for shortness of breath.    Gastrointestinal: Negative for vomiting.   Skin: Negative for rash.   Neurological: Positive for speech difficulty and headaches. Negative for weakness and numbness.     Scheduled Meds:   atorvastatin  80 mg Oral Daily    heparin (porcine)  5,000 Units Subcutaneous Q8H    insulin aspart  2-4 Units Subcutaneous TIDWM    levETIRAcetam  500 mg Oral BID    levothyroxine  50 mcg Oral Daily    losartan  50 mg Oral Daily    metoprolol tartrate  25 mg Oral Q6H    senna-docusate 8.6-50 mg  1 tablet Oral BID    sodium chloride 0.9%  3 mL Intravenous Q8H     Continuous Infusions:   insulin (HUMAN R) infusion (adults)       PRN Meds:acetaminophen, dextrose 50%, dextrose 50%, glucagon (human recombinant), glucose, glucose, hydrALAZINE, insulin aspart, magnesium sulfate IVPB, magnesium sulfate IVPB, ondansetron, potassium chloride **AND** potassium chloride **AND** potassium chloride    Objective:     Vital Signs (Most Recent):  Temp: 98.6 °F (37 °C) (12/17/17 1100)  Pulse: 60 (12/17/17 1400)  Resp: (!) 21 (12/17/17 1400)  BP: (!) 115/57 (12/17/17 1400)  SpO2: (!) 94 % (12/17/17 1400)  BP Location: Left arm    Vital Signs Range (Last 24H):  Temp:  [98.5 °F (36.9 °C)-98.8 °F (37.1 °C)]   Pulse:  [58-80]   Resp:  [16-33]   BP: ()/(53-80)   SpO2:  [90 %-100 %]   BP Location: Left arm    Physical Exam   Constitutional: She appears well-developed and well-nourished. No distress.   HENT:   Head: Normocephalic and atraumatic.    Eyes: EOM are normal. Pupils are equal, round, and reactive to light.   Neck: Normal range of motion. Neck supple.   Cardiovascular: Normal rate.    Pulmonary/Chest: Effort normal.   Musculoskeletal: Normal range of motion.   Neurological: She is alert.   Skin: Skin is warm and dry. She is not diaphoretic.   Psychiatric: She has a normal mood and affect.       Neurological Exam:   LOC: alert  Attention Span: Good   Language: expressive>receptive  Articulation: Dysarthria  Orientation: oriented to person  Visual Fields: Full  EOM (CN III, IV, VI): Full/intact  Facial Movement (CN VII): Lower facial weakness on the Right  Motor: no drift x4 extremities  Sensation: Intact to light touch, temperature and vibration  Tone: Normal tone throughout    Laboratory:  CMP:     Recent Labs  Lab 12/17/17  0121   CALCIUM 8.1*   ALBUMIN 2.7*   PROT 6.0      K 3.7   CO2 26      BUN 20   CREATININE 1.0   ALKPHOS 113   ALT 9*   AST 13   BILITOT 0.8     BMP:     Recent Labs  Lab 12/17/17  0121      K 3.7      CO2 26   BUN 20   CREATININE 1.0   CALCIUM 8.1*     CBC:     Recent Labs  Lab 12/17/17  0121   WBC 12.73*   RBC 4.38   HGB 9.6*   HCT 32.6*      MCV 74*   MCH 21.9*   MCHC 29.4*     Lipid Panel: No results for input(s): CHOL, LDLCALC, HDL, TRIG in the last 168 hours.  Coagulation:     Recent Labs  Lab 12/17/17  0121   INR 1.1     Platelet Aggregation Study: No results for input(s): PLTAGG, PLTAGINTERP, PLTAGREGLACO, ADPPLTAGGREG in the last 168 hours.  Hgb A1C:     Recent Labs  Lab 12/15/17  1433   HGBA1C 12.1*     TSH:     Recent Labs  Lab 12/15/17  1433   TSH 1.008       Diagnostic Results     Brain Imaging   CTH 12/16/17: Evolving acute/recent infarct involving the left parietal lobe with new hyperdensity concerning for hemorrhagic conversion. Mild mass effect without midline shift.   Continued followup advised.    MRI brain 12/16/17:    Hemorrhagic infarct left parieto-occipital temporal  junction.    Vessel Imaging   CTA  12/15/17:   Distal left parietal parietal MCA branch occlusion with correlating early changes of infarction on noncontrast CT.  No intracranial hemorrhage or significant mass effect/midline shift.    Cardiac Imaging     CONCLUSIONS     1 - Normal left ventricular systolic function (EF 65-70%).     2 - Indeterminate LV diastolic function.     3 - Normal right ventricular systolic function .     4 - The estimated PA systolic pressure is 14 mmHg.     5 - Trivial aortic regurgitation.     6 - Trivial pulmonic regurgitation.     7 - Mild left atrial enlargement.     8 - Concentric remodeling.     9 - No wall motion abnormalities.

## 2017-12-17 NOTE — PROGRESS NOTES
[facility]  Vascular Neurology  Comprehensive Stroke Center  Progress Note    Assessment/Plan:     * Embolic stroke involving left middle cerebral artery    Ms. Wise is a 76yo f who presents with a L MCA syndrome, s/p tpa. Evidence of distal branch off MCA occlusion not amenable to thrombectomy.   Unclear etiology at this time but picture thus far suspicious for embolic stroke; ESUS. ECHO with LAE.     -Antithrombotics for secondary stroke prevention: None: Reason:  Hold all Antithrombotics x 24 hours after IV t-PA administration; consider holding 24 hours after hemorrhage until repeat scan with stable hemorrhage  -Statins for secondary stroke prevention and hyperlipidemia, if present: Atorvastatin- 80 mg daily (on at home; LDL 71)  -Aggressive risk factor modification: Hypertension, Diabetes, High Cholesterol, Diet, Exercise  -Rehab Efforts: Physical Therapy, Occupational Therapy, Speech and Language Pathology  -Diagnostics: Ordered/Pending   -VTE Prophylaxis: None: Reason for No Pharmacological VTE Prophylaxis: Mechanical prophylaxis: Place SCDs due to tpa and apparent symptomatic hemorrhagic conversion         Aortic arch atherosclerosis    Noted on CTA         Cytotoxic cerebral edema    Secondary to ischemia  Noted on CTH        Aphasia    Result of stroke   Speech therapy evaluate and treat        Essential hypertension    Stroke risk factor  BP goal <140 post tpa and hemorrhage into infarct        Type 2 diabetes mellitus    Stroke risk factor           Acquired hypothyroidism    Stroke risk factor, continue home medications        Hyperlipidemia associated with type 2 diabetes mellitus    Stroke risk factor.  BG between 246-428/24 hours.  Management per NCC.             Ms. Wise is a 76yo F with L MCA ischemic stroke s/p tPA.   12/16/17 Last night patient became distressed, appeared to be in pain but was unable to communicate where. ECG and troponin (stable) were ordered as well as  CTH which showed hemorrhage in the area of the infarct without significant mass effect or shift. MRI completed today.   12/17/2017 BRENNANEON. Blood glucose ranging from 246-428. Continued aphasia and dysarthria.    STROKE DOCUMENTATION   Acute Stroke Times   Last Known Normal Date: 12/15/17  Last Known Normal Time: 1030  Symptom Onset Date: 12/15/17  Symptom Onset Time: 1030  Stroke Team Called Date: 12/15/17  Stroke Team Called Time: 1342 (upon arrival to [facility])  Stroke Team Arrival Date: 12/15/17  Stroke Team Arrival Time: 1347  CT Interpretation Time: 1237  Decision to Treat Time for Alteplase: 1154  Decision to Treat Time for IR:  (n/a)    NIH Scale:  1a. Level Of Consciousness: 0-->Alert: keenly responsive  1b. LOC Questions: 2-->Answers neither question correctly  1c. LOC Commands: 0-->Performs both tasks correctly  2. Best Gaze: 0-->Normal  3. Visual: 0-->No visual loss  4. Facial Palsy: 1-->Minor paralysis (flattened nasolabial fold, asymmetry on smiling)  5a. Motor Arm, Left: 0-->No drift: limb holds 90 (or 45) degrees for full 10 secs  5b. Motor Arm, Right: 0-->No drift: limb holds 90 (or 45) degrees for full 10 secs  6a. Motor Leg, Left: 0-->No drift: leg holds 30 degree position for full 5 secs  6b. Motor Leg, Right: 0-->No drift: leg holds 30 degree position for full 5 secs  7. Limb Ataxia: 0-->Absent  8. Sensory: 0-->Normal: no sensory loss  9. Best Language: 1-->Mild-to-moderate aphasia: some obvious loss of fluency or facility of comprehension, without significant limitation on ideas expressed or form of expression. Reduction of speech and/or comprehension, however, makes conversation. . . (see row details)  10. Dysarthria: 2-->Severe dysarthria: patients speech is so slurred as to be unintelligible in the absence of or out of proportion to any dysphasia, or is mute/anarthric  11. Extinction and Inattention (formerly Neglect): 0-->No abnormality  Total (NIH Stroke Scale): 6       Modified Reno Score:  0  [place] Coma Scale:    ABCD2 Score:    [id number] Score:   HAS -BLED Score:   ICH Score:   Hunt & Branham Classification:      Hemorrhagic change of an Ischemic Stroke: Does this patient have an ischemic stroke with hemorrhagic changes? Yes, Grading Scale: HI Type 2 (HI-2) = confluent petechiae within the infarcted area but without space-occupying effect. Is this a symptomatic change?  No - Hemorrhage is not clinically significant     Neurologic Chief Complaint: L MCA stroke     Subjective:     Interval History: Patient is seen for follow-up neurological assessment and treatment recommendations:   KENON. Blood glucose ranging from 246-428. Continued aphasia and dysarthria.    HPI, Past Medical, Family, and Social History remains the same as documented in the initial encounter.     Review of Systems   Constitutional: Negative for fever.   Eyes: Positive for visual disturbance.   Respiratory: Negative for shortness of breath.    Gastrointestinal: Negative for vomiting.   Skin: Negative for rash.   Neurological: Positive for speech difficulty and headaches. Negative for weakness and numbness.     Scheduled Meds:   atorvastatin  80 mg Oral Daily    heparin (porcine)  5,000 Units Subcutaneous Q8H    insulin aspart  2-4 Units Subcutaneous TIDWM    levETIRAcetam  500 mg Oral BID    levothyroxine  50 mcg Oral Daily    losartan  50 mg Oral Daily    metoprolol tartrate  25 mg Oral Q6H    senna-docusate 8.6-50 mg  1 tablet Oral BID    sodium chloride 0.9%  3 mL Intravenous Q8H     Continuous Infusions:   insulin (HUMAN R) infusion (adults)       PRN Meds:acetaminophen, dextrose 50%, dextrose 50%, glucagon (human recombinant), glucose, glucose, hydrALAZINE, insulin aspart, magnesium sulfate IVPB, magnesium sulfate IVPB, ondansetron, potassium chloride **AND** potassium chloride **AND** potassium chloride    Objective:     Vital Signs (Most Recent):  Temp: 98.6 °F (37 °C) (12/17/17 1100)  Pulse: 60 (12/17/17  1400)  Resp: (!) 21 (12/17/17 1400)  BP: (!) 115/57 (12/17/17 1400)  SpO2: (!) 94 % (12/17/17 1400)  BP Location: Left arm    Vital Signs Range (Last 24H):  Temp:  [98.5 °F (36.9 °C)-98.8 °F (37.1 °C)]   Pulse:  [58-80]   Resp:  [16-33]   BP: ()/(53-80)   SpO2:  [90 %-100 %]   BP Location: Left arm    Physical Exam   Constitutional: She appears well-developed and well-nourished. No distress.   HENT:   Head: Normocephalic and atraumatic.   Eyes: EOM are normal. Pupils are equal, round, and reactive to light.   Neck: Normal range of motion. Neck supple.   Cardiovascular: Normal rate.    Pulmonary/Chest: Effort normal.   Musculoskeletal: Normal range of motion.   Neurological: She is alert.   Skin: Skin is warm and dry. She is not diaphoretic.   Psychiatric: She has a normal mood and affect.       Neurological Exam:   LOC: alert  Attention Span: Good   Language: expressive>receptive  Articulation: Dysarthria  Orientation: oriented to person  Visual Fields: Full  EOM (CN III, IV, VI): Full/intact  Facial Movement (CN VII): Lower facial weakness on the Right  Motor: no drift x4 extremities  Sensation: Intact to light touch, temperature and vibration  Tone: Normal tone throughout    Laboratory:  CMP:     Recent Labs  Lab 12/17/17 0121   CALCIUM 8.1*   ALBUMIN 2.7*   PROT 6.0      K 3.7   CO2 26      BUN 20   CREATININE 1.0   ALKPHOS 113   ALT 9*   AST 13   BILITOT 0.8     BMP:     Recent Labs  Lab 12/17/17 0121      K 3.7      CO2 26   BUN 20   CREATININE 1.0   CALCIUM 8.1*     CBC:     Recent Labs  Lab 12/17/17 0121   WBC 12.73*   RBC 4.38   HGB 9.6*   HCT 32.6*      MCV 74*   MCH 21.9*   MCHC 29.4*     Lipid Panel: No results for input(s): CHOL, LDLCALC, HDL, TRIG in the last 168 hours.  Coagulation:     Recent Labs  Lab 12/17/17 0121   INR 1.1     Platelet Aggregation Study: No results for input(s): PLTAGG, PLTAGINTERP, PLTAGREGLACO, ADPPLTAGGREG in the last 168 hours.  Hgb A1C:      Recent Labs  Lab 12/15/17  1433   HGBA1C 12.1*     TSH:     Recent Labs  Lab 12/15/17  1433   TSH 1.008       Diagnostic Results     Brain Imaging   CTH 12/16/17: Evolving acute/recent infarct involving the left parietal lobe with new hyperdensity concerning for hemorrhagic conversion. Mild mass effect without midline shift.   Continued followup advised.    MRI brain 12/16/17:    Hemorrhagic infarct left parieto-occipital temporal junction.    Vessel Imaging   CTA MP 12/15/17:   Distal left parietal parietal MCA branch occlusion with correlating early changes of infarction on noncontrast CT.  No intracranial hemorrhage or significant mass effect/midline shift.    Cardiac Imaging     CONCLUSIONS     1 - Normal left ventricular systolic function (EF 65-70%).     2 - Indeterminate LV diastolic function.     3 - Normal right ventricular systolic function .     4 - The estimated PA systolic pressure is 14 mmHg.     5 - Trivial aortic regurgitation.     6 - Trivial pulmonic regurgitation.     7 - Mild left atrial enlargement.     8 - Concentric remodeling.     9 - No wall motion abnormalities.       Sandrita Carrera PA-C  Comprehensive Stroke Center  Department of Vascular Neurology   [facility]

## 2017-12-17 NOTE — CONSULTS
[facility]  Endocrinology  Diabetes Consult Note    Consult Requested by: Rigoberto Bar MD   Reason for admit: Embolic stroke involving left middle cerebral artery    HISTORY OF PRESENT ILLNESS:  Reason for Consult: Management of T2DM, Hyperglycemia     Home Diabetes Medications:   Januvia 100 mg daily  Humulin 70/30 - 40 AM and 30 PM    How often checking glucose at home? 2 times per day (AM and bedtime)   BG readings on regimen: Doesn't remember numbers, but usually high.  Hypoglycemia on the regimen?  No  Missed doses on regimen?  Yes    Diabetes Complications include:     Cerebrovascular disease.  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 For diabetes, she takes januvia 100 mg and Humalin 70/30 (40 units AM, 30 units PM). Her daughter provided the majority of the history. She has had much difficulty controlling her blood glucose. She follows regularly with an endocrinologist in , who is titrating her insulin. Her daughter admits her diet is poor. She monitors her blood glucose twice per day, and it is usually very high, although she's unsure the exact numbers. She was placed on an intensive insulin drip due to consistently high blood glucose, and we were consulted to assist with management.        Interval HPI:   Eating:  Not eating much today per daughter. Nurse reports ate more yesterday.    PMH, PSH, FH, SH updated and reviewed     Review of Systems   Constitutional: Negative for unexpected weight change.   Eyes: Negative for visual disturbance.   Respiratory: Negative for shortness of breath.    Cardiovascular: Negative for chest pain.   Gastrointestinal: Negative for abdominal pain.   Genitourinary: Negative for  urgency.   Musculoskeletal: Negative for arthralgias.   Skin: Negative for wound.   Neurological: Positive for weakness. Negative for headaches.   Hematological: Does not bruise/bleed easily.   Psychiatric/Behavioral: Positive for confusion. Negative for sleep disturbance.     PHYSICAL EXAMINATION:  Vitals:    12/17/17 1400   BP: (!) 115/57   Pulse: 60   Resp: (!) 21   Temp:      Body mass index is 30.5 kg/m².    Physical Exam   Constitutional: She appears well-developed.   HENT:   Right Ear: External ear normal.   Left Ear: External ear normal.   Nose: Nose normal.   Hearing grossly normal  Dentition grossly normal   Cardiovascular: Normal rate.    No murmur heard.  Pulmonary/Chest: Effort normal and breath sounds normal.   Abdominal: Soft. She exhibits no mass. There is no tenderness.   Musculoskeletal: She exhibits no edema.   No digital clubbing or extremity cyanosis   Neurological: A cranial nerve deficit (R facial droop. Dysarthria.) is present. Coordination normal.   Skin: No rash noted.   No subcutaneous nodules noted.   Psychiatric:   Mildly confused.   Nursing note and vitals reviewed.        Labs Reviewed and Include     Recent Labs  Lab 12/17/17  0121   *   CALCIUM 8.1*   ALBUMIN 2.7*   PROT 6.0      K 3.7   CO2 26      BUN 20   CREATININE 1.0   ALKPHOS 113   ALT 9*   AST 13   BILITOT 0.8     Lab Results   Component Value Date    WBC 12.73 (H) 12/17/2017    HGB 9.6 (L) 12/17/2017    HCT 32.6 (L) 12/17/2017    MCV 74 (L) 12/17/2017     12/17/2017       Recent Labs  Lab 12/15/17  1433   TSH 1.008     Lab Results   Component Value Date    HGBA1C 12.1 (H) 12/15/2017       Nutritional status:   Body mass index is 30.5 kg/m².  Lab Results   Component Value Date    ALBUMIN 2.7 (L) 12/17/2017    ALBUMIN 2.6 (L) 12/16/2017    ALBUMIN 2.6 (L) 12/15/2017     Lab Results   Component Value Date    PREALBUMIN 23 06/29/2016       Estimated Creatinine Clearance: 50.9 mL/min (based on SCr of 1  mg/dL).    Accu-Checks  Recent Labs      12/16/17   1215  12/16/17   1811  12/17/17   0049  12/17/17   0051  12/17/17   0444  12/17/17   0613  12/17/17   0814  12/17/17   0913  12/17/17   1025  12/17/17   1137   POCTGLUCOSE  268*  236*  408*  411*  438*  375*  301*  233*  281*  246*        ASSESSMENT and PLAN    * Embolic stroke involving left middle cerebral artery    Per primary  Avoid hypoglycemia        Acute hemorrhagic infarction of brain    Per primary        Type 2 diabetes mellitus with other specified complication, cardiovascular disease    Goal -180    Start transition gtt at 3 units/hr with stepdown.  Novolog 4-8 units with meals based on PO intake.  POC glucose AC,hs,0200  Moderate dose correction.    Discharge planning:  Probably resume 70/30. Daughter thinks patient has very low chance of being compliant with 4 shots/day.  Consider addition of GLP-1.  Follow-up with patient's endocrinologist after discharge.        Acquired hypothyroidism    Resume home dose of levothyroxine 50 mcg.  TSH WNL            Plan discussed with patient, family, and RN at bedside.     Naresh Lopez MD  Endocrinology  [facility]

## 2017-12-17 NOTE — PLAN OF CARE
Problem: Occupational Therapy Goal  Goal: Occupational Therapy Goal  Goals set 12/16 to be addressed for 14 days with expiration date, 12/30:  Patient will increase functional independence with ADLs by performing:    Patient will demonstrate rolling to the right with min assist.  Not met   Patient will demonstrate rolling to the left with min assist.   Not met  Patient will demonstrate supine -sit with min assist.   Not met  Patient will demonstrate stand pivot transfers with min assist.   Not met  Patient will demonstrate grooming while standing with min assist.   Not met  Patient will demonstrate upper body dressing with min assist while seated EOB.   Not met  Patient will demonstrate lower body dressing with min assist while seated EOB.   Not met  Patient will demonstrate ability to follow 2/3 commands.   Not met  Patient's family / caregiver will demonstrate independence and safety with assisting patient with self-care skills and functional mobility.     Not met  Patient and/or patient's family will verbalize understanding of stroke prevention guidelines, personal risk factors and stroke warning signs via teachback method.  Not met          Goals remain appropriate.  NGOZI Acosta  12/17/2017

## 2017-12-17 NOTE — SUBJECTIVE & OBJECTIVE
Interval HPI:   Eating:  Not eating much today per daughter. Nurse reports ate more yesterday.    PMH, PSH, FH, SH updated and reviewed     Review of Systems   Constitutional: Negative for unexpected weight change.   Eyes: Negative for visual disturbance.   Respiratory: Negative for shortness of breath.    Cardiovascular: Negative for chest pain.   Gastrointestinal: Negative for abdominal pain.   Genitourinary: Negative for urgency.   Musculoskeletal: Negative for arthralgias.   Skin: Negative for wound.   Neurological: Positive for weakness. Negative for headaches.   Hematological: Does not bruise/bleed easily.   Psychiatric/Behavioral: Positive for confusion. Negative for sleep disturbance.     PHYSICAL EXAMINATION:  Vitals:    12/17/17 1400   BP: (!) 115/57   Pulse: 60   Resp: (!) 21   Temp:      Body mass index is 30.5 kg/m².    Physical Exam   Constitutional: She appears well-developed.   HENT:   Right Ear: External ear normal.   Left Ear: External ear normal.   Nose: Nose normal.   Hearing grossly normal  Dentition grossly normal   Cardiovascular: Normal rate.    No murmur heard.  Pulmonary/Chest: Effort normal and breath sounds normal.   Abdominal: Soft. She exhibits no mass. There is no tenderness.   Musculoskeletal: She exhibits no edema.   No digital clubbing or extremity cyanosis   Neurological: A cranial nerve deficit (R facial droop. Dysarthria.) is present. Coordination normal.   Skin: No rash noted.   No subcutaneous nodules noted.   Psychiatric:   Mildly confused.   Nursing note and vitals reviewed.

## 2017-12-17 NOTE — PROGRESS NOTES
Pt stated IV in left hand hurts, IV d/eloisa mild swelling when flushed dressing applied no bleeding noted.

## 2017-12-17 NOTE — PT/OT/SLP PROGRESS
"Occupational Therapy   Treatment/Goals Revised    Name: Tiffanie Wise  MRN: [id number]  Admitting Diagnosis:  Embolic stroke involving left middle cerebral artery       Recommendations:     Discharge Recommendations: rehabilitation facility  Discharge Equipment Recommendations:  shower chair  Barriers to discharge:  Inaccessible home environment, Decreased caregiver support    Subjective   Patient:  "Okay."  "Yeah"  Dtg:  "We are suppose to move to the room today."  "Her big problem is her speech and her blood sugar has been so high."  "I want her to get in the chair for lunch."   Communicated with: Nurse prior to session.  Pain/Comfort:  · Pain Rating 1: 0/10  · Pain Rating Post-Intervention 1: 0/10    Objective:     Patient found with: blood pressure cuff, peripheral IV, oxygen, PureWick, telemetry, pulse ox (continuous)  Dtg present.    General Precautions: Standard, aphasia, aspiration, fall, pureed diet, nectar thick   Orthopedic Precautions:N/A   Braces: N/A     Occupational Performance:    Bed Mobility:    · Patient completed Rolling/Turning to Left with  minimum assistance  · Patient completed Rolling/Turning to Right with minimum assistance  · Patient completed Scooting/Bridging with minimum assistance  · Patient completed Supine to Sit with minimum assistance  · Patient completed Sit to Supine with minimum assistance     Functional Mobility/Transfers:  · Patient completed Sit <> Stand Transfer with minimum assistance  with  no assistive device   · Patient completed Bed <> Chair Transfer using Stand Pivot technique with minimum assistance with no assistive device    Activities of Daily Living:  · Grooming: moderate assistance while standing  · UB Dressing: maximal assistance while seated EOB  · LB Dressing: maximal assistance while seated EOB    Patient left up in chair with all lines intact, call button in reach, nurse notified and dtg present    [facility] 6 Click:  [facility] Total Score: 12    Treatment & " Education:  Patient education provided on role of OT, daily orientation, and ROM.  Patient unable to verbalize understanding via teach back method. Continued education, patient/ family training recommended.  Patient alert 100% of the session; attentive to voice during the session, provided eye contact when spoken to.  Mostly unintelligible speech.  Able to follow 1/4 one step commands.  Daily orientation provided.  AAROM performed bilateral UE/LEs one set x 10 rep in all planes of motion.  Right UE flexed frequently during the session; assistance required for elbow/finger extension. Patient's functional status and disposition recommendation discussed patient, patient's dtg and  nurse.  White board updated in patient's room.  OT asked if there were any other questions; patient/ family had no further questions.     Education:    Assessment:     Tiffanie Wise is a 77 y.o. female with a medical diagnosis of Acute ischemic left MCA stroke.  She presents with performance deficits of physical skills including impaired sensation and endurance; demonstrating performance deficits of cognitive skills including impaired  attention, perception, understanding, problem solving, sequencing and memory all resulting in inability organizing occupational performance in a timely and safe manner; demonstrating performance deficits of psychosocial skills including impairments of interpersonal interactions and coping strategies which are skills necessary to successfully and appropriately participate in everyday tasks and social situations.  These performance deficits have resulted in activity limitations including but not limited to:   transfers, ascending/ descending stairs, walking short and long distances, walking around obstacles, transitional movement patterns (kneeling, bending); upper body dressing, lower body dressing, brushing teeth, toileting, bathing, carrying objects, assisting  in his care.   Patient's role as mother,  wife, caregiver to  and independent caretaker for self has been affected. Patient will benefit from skilled OT services to maximize level of independence with self-care skills and functional mobility.  Improvements noted with level of participation.    Rehab Prognosis:  Good; patient would benefit from acute skilled OT services to address these deficits and reach maximum level of function.       Plan:     Patient to be seen 4 x/week to address the above listed problems via self-care/home management, therapeutic activities, therapeutic exercises, neuromuscular re-education, cognitive retraining, sensory integration  · Plan of Care Expires: 01/13/18  · Plan of Care Reviewed with: patient, daughter    This Plan of care has been discussed with the patient who was involved in its development and understands and is in agreement with the identified goals and treatment plan    GOALS:    Occupational Therapy Goals        Problem: Occupational Therapy Goal    Goal Priority Disciplines Outcome Interventions   Occupational Therapy Goal     OT, PT/OT     Description:  Goals set 12/17 to be addressed for 14 days with expiration date, 12/31:  Patient will increase functional independence with ADLs by performing:    Patient will demonstrate rolling to the right with SBA.  Not met   Patient will demonstrate rolling to the left with SBA.   Not met  Patient will demonstrate supine -sit with SBA.   Not met  Patient will demonstrate stand pivot transfers with SBA.   Not met  Patient will demonstrate grooming while standing with SBA.   Not met  Patient will demonstrate upper body dressing with min assist while seated EOB.   Not met  Patient will demonstrate lower body dressing with min assist while seated EOB.   Not met  Patient will demonstrate ability to follow 2/3 commands.   Not met  Patient's family / caregiver will demonstrate independence and safety with assisting patient with self-care skills and functional mobility.      Not met  Patient and/or patient's family will verbalize understanding of stroke prevention guidelines, personal risk factors and stroke warning signs via teachback method.  Not met                            Time Tracking:     OT Date of Treatment: 12/17/17  OT Start Time: 1230  OT Stop Time: 1258  OT Total Time (min): 28 min    Billable Minutes:Self Care/Home Management 28    NGOZI Acosta  12/17/2017

## 2017-12-17 NOTE — ASSESSMENT & PLAN NOTE
-- s/p tpa  --Continue Neuro checks q 1hr  -- Vascular Neurology consulted  -- CT MP 12/15-Distal left parietal parietal MCA branch occlusion with correlating early changes of infarction on noncontrast CT.  --CT 12/15-Chronic microvascular ischemic changes.   --SBP goal <140  -- PT/OT/Speech  -- MRI brain 12/16  Hemorrhagic infarct left parieto-occipital temporal junction  -- CTH 12/16- Evolving acute/recent infarct involving the left parietal lobe with new hyperdensity concerning for hemorrhagic conversion  -- start ASA in 3 days due to hemorrhagic conversion

## 2017-12-17 NOTE — ASSESSMENT & PLAN NOTE
Ms. Wise is a 76yo f who presents with a L MCA syndrome, s/p tpa. Evidence of distal branch off MCA occlusion not amenable to thrombectomy.   Unclear etiology at this time but picture thus far suspicious for embolic stroke; ESUS. ECHO with LAE.     -Antithrombotics for secondary stroke prevention: None: Reason:  Hold all Antithrombotics x 24 hours after IV t-PA administration; consider holding 24 hours after hemorrhage until repeat scan with stable hemorrhage  -Statins for secondary stroke prevention and hyperlipidemia, if present: Atorvastatin- 80 mg daily (on at home; LDL 71)  -Aggressive risk factor modification: Hypertension, Diabetes, High Cholesterol, Diet, Exercise  -Rehab Efforts: Physical Therapy, Occupational Therapy, Speech and Language Pathology  -Diagnostics: Ordered/Pending   -VTE Prophylaxis: None: Reason for No Pharmacological VTE Prophylaxis: Mechanical prophylaxis: Place SCDs due to tpa and apparent symptomatic hemorrhagic conversion

## 2017-12-18 PROBLEM — I63.9 CEREBROVASCULAR ACCIDENT (CVA): Status: ACTIVE | Noted: 2017-12-18

## 2017-12-18 LAB
ALBUMIN SERPL BCP-MCNC: 2.3 G/DL
ALP SERPL-CCNC: 89 U/L
ALT SERPL W/O P-5'-P-CCNC: 9 U/L
AMPHETAMINES SERPL QL: NEGATIVE
ANION GAP SERPL CALC-SCNC: 8 MMOL/L
AST SERPL-CCNC: 13 U/L
BARBITURATES SERPL QL SCN: NEGATIVE
BASOPHILS # BLD AUTO: 0.03 K/UL
BASOPHILS NFR BLD: 0.2 %
BENZODIAZ SERPL QL: NEGATIVE
BILIRUB SERPL-MCNC: 0.8 MG/DL
BUN SERPL-MCNC: 20 MG/DL
CALCIUM SERPL-MCNC: 8 MG/DL
CANNABINOIDS SERPL QL: NEGATIVE
CHLORIDE SERPL-SCNC: 105 MMOL/L
CHOLEST SERPL-MCNC: 116 MG/DL
CHOLEST/HDLC SERPL: 2.7 {RATIO}
CO2 SERPL-SCNC: 27 MMOL/L
COCAINE, BLOOD: NEGATIVE
CREAT SERPL-MCNC: 0.9 MG/DL
DIFFERENTIAL METHOD: ABNORMAL
EOSINOPHIL # BLD AUTO: 0.2 K/UL
EOSINOPHIL NFR BLD: 1.3 %
ERYTHROCYTE [DISTWIDTH] IN BLOOD BY AUTOMATED COUNT: 17.5 %
EST. GFR  (AFRICAN AMERICAN): >60 ML/MIN/1.73 M^2
EST. GFR  (NON AFRICAN AMERICAN): >60 ML/MIN/1.73 M^2
ETHANOL SERPL-MCNC: NEGATIVE MG/DL
GLUCOSE SERPL-MCNC: 136 MG/DL
HCT VFR BLD AUTO: 33.3 %
HDLC SERPL-MCNC: 43 MG/DL
HDLC SERPL: 37.1 %
HGB BLD-MCNC: 9.9 G/DL
IMM GRANULOCYTES # BLD AUTO: 0.06 K/UL
IMM GRANULOCYTES NFR BLD AUTO: 0.5 %
INR PPP: 1.1
LDLC SERPL CALC-MCNC: 50 MG/DL
LYMPHOCYTES # BLD AUTO: 2.3 K/UL
LYMPHOCYTES NFR BLD: 18.7 %
MAGNESIUM SERPL-MCNC: 1.9 MG/DL
MCH RBC QN AUTO: 22 PG
MCHC RBC AUTO-ENTMCNC: 29.7 G/DL
MCV RBC AUTO: 74 FL
METHADONE SERPL QL SCN: NEGATIVE
MONOCYTES # BLD AUTO: 1.4 K/UL
MONOCYTES NFR BLD: 11.3 %
NEUTROPHILS # BLD AUTO: 8.4 K/UL
NEUTROPHILS NFR BLD: 68 %
NONHDLC SERPL-MCNC: 73 MG/DL
NRBC BLD-RTO: 0 /100 WBC
OPIATES SERPL QL SCN: NEGATIVE
PCP SERPL QL SCN: NEGATIVE
PHOSPHATE SERPL-MCNC: 2.5 MG/DL
PLATELET # BLD AUTO: 239 K/UL
PMV BLD AUTO: 12.6 FL
POCT GLUCOSE: 141 MG/DL (ref 70–110)
POCT GLUCOSE: 148 MG/DL (ref 70–110)
POCT GLUCOSE: 174 MG/DL (ref 70–110)
POCT GLUCOSE: 178 MG/DL (ref 70–110)
POCT GLUCOSE: 221 MG/DL (ref 70–110)
POCT GLUCOSE: 247 MG/DL (ref 70–110)
POCT GLUCOSE: 252 MG/DL (ref 70–110)
POCT GLUCOSE: 290 MG/DL (ref 70–110)
POCT GLUCOSE: 314 MG/DL (ref 70–110)
POCT GLUCOSE: 345 MG/DL (ref 70–110)
POTASSIUM SERPL-SCNC: 3.3 MMOL/L
PROPOXYPH SERPL QL: NEGATIVE
PROT SERPL-MCNC: 5.6 G/DL
PROTHROMBIN TIME: 11.2 SEC
RBC # BLD AUTO: 4.51 M/UL
SODIUM SERPL-SCNC: 140 MMOL/L
TRIGL SERPL-MCNC: 115 MG/DL
WBC # BLD AUTO: 12.33 K/UL

## 2017-12-18 PROCEDURE — 85610 PROTHROMBIN TIME: CPT

## 2017-12-18 PROCEDURE — 63600175 PHARM REV CODE 636 W HCPCS: Performed by: PHYSICIAN ASSISTANT

## 2017-12-18 PROCEDURE — 80061 LIPID PANEL: CPT

## 2017-12-18 PROCEDURE — 63600175 PHARM REV CODE 636 W HCPCS: Performed by: INTERNAL MEDICINE

## 2017-12-18 PROCEDURE — 94761 N-INVAS EAR/PLS OXIMETRY MLT: CPT

## 2017-12-18 PROCEDURE — 92507 TX SP LANG VOICE COMM INDIV: CPT

## 2017-12-18 PROCEDURE — 25000003 PHARM REV CODE 250: Performed by: INTERNAL MEDICINE

## 2017-12-18 PROCEDURE — 63600175 PHARM REV CODE 636 W HCPCS: Performed by: NURSE PRACTITIONER

## 2017-12-18 PROCEDURE — 83735 ASSAY OF MAGNESIUM: CPT

## 2017-12-18 PROCEDURE — 84100 ASSAY OF PHOSPHORUS: CPT

## 2017-12-18 PROCEDURE — 80053 COMPREHEN METABOLIC PANEL: CPT

## 2017-12-18 PROCEDURE — 99233 SBSQ HOSP IP/OBS HIGH 50: CPT | Mod: GC,,, | Performed by: PSYCHIATRY & NEUROLOGY

## 2017-12-18 PROCEDURE — 99232 SBSQ HOSP IP/OBS MODERATE 35: CPT | Mod: ,,, | Performed by: NURSE PRACTITIONER

## 2017-12-18 PROCEDURE — 25000003 PHARM REV CODE 250: Performed by: STUDENT IN AN ORGANIZED HEALTH CARE EDUCATION/TRAINING PROGRAM

## 2017-12-18 PROCEDURE — 25000003 PHARM REV CODE 250: Performed by: PHYSICIAN ASSISTANT

## 2017-12-18 PROCEDURE — 25000003 PHARM REV CODE 250: Performed by: PSYCHIATRY & NEUROLOGY

## 2017-12-18 PROCEDURE — 85025 COMPLETE CBC W/AUTO DIFF WBC: CPT

## 2017-12-18 PROCEDURE — 92526 ORAL FUNCTION THERAPY: CPT

## 2017-12-18 PROCEDURE — 20000000 HC ICU ROOM

## 2017-12-18 PROCEDURE — 25000003 PHARM REV CODE 250: Performed by: NURSE PRACTITIONER

## 2017-12-18 PROCEDURE — A4216 STERILE WATER/SALINE, 10 ML: HCPCS | Performed by: NURSE PRACTITIONER

## 2017-12-18 RX ORDER — TRAZODONE HYDROCHLORIDE 50 MG/1
50 TABLET ORAL NIGHTLY
Status: ON HOLD | COMMUNITY
End: 2017-12-22 | Stop reason: HOSPADM

## 2017-12-18 RX ORDER — POTASSIUM CHLORIDE 20 MEQ/15ML
40 SOLUTION ORAL
Status: DISCONTINUED | OUTPATIENT
Start: 2017-12-18 | End: 2017-12-19

## 2017-12-18 RX ORDER — SODIUM,POTASSIUM PHOSPHATES 280-250MG
2 POWDER IN PACKET (EA) ORAL
Status: DISCONTINUED | OUTPATIENT
Start: 2017-12-18 | End: 2017-12-19

## 2017-12-18 RX ORDER — INSULIN ASPART 100 [IU]/ML
6-8 INJECTION, SOLUTION INTRAVENOUS; SUBCUTANEOUS
Status: DISCONTINUED | OUTPATIENT
Start: 2017-12-18 | End: 2017-12-19

## 2017-12-18 RX ORDER — LANOLIN ALCOHOL/MO/W.PET/CERES
800 CREAM (GRAM) TOPICAL
Status: DISCONTINUED | OUTPATIENT
Start: 2017-12-18 | End: 2017-12-19

## 2017-12-18 RX ORDER — ESCITALOPRAM OXALATE 20 MG/1
20 TABLET ORAL NIGHTLY
Status: DISCONTINUED | OUTPATIENT
Start: 2017-12-18 | End: 2017-12-22 | Stop reason: HOSPADM

## 2017-12-18 RX ORDER — POTASSIUM CHLORIDE 20 MEQ/15ML
60 SOLUTION ORAL
Status: DISCONTINUED | OUTPATIENT
Start: 2017-12-18 | End: 2017-12-19

## 2017-12-18 RX ORDER — FOSINOPIRL SODIUM 10 MG/1
10 TABLET ORAL DAILY
Status: ON HOLD | COMMUNITY
End: 2017-12-22 | Stop reason: HOSPADM

## 2017-12-18 RX ORDER — NAPROXEN SODIUM 220 MG/1
81 TABLET, FILM COATED ORAL DAILY
Status: DISCONTINUED | OUTPATIENT
Start: 2017-12-18 | End: 2017-12-22 | Stop reason: HOSPADM

## 2017-12-18 RX ADMIN — SODIUM CHLORIDE, PRESERVATIVE FREE 3 ML: 5 INJECTION INTRAVENOUS at 10:12

## 2017-12-18 RX ADMIN — SODIUM CHLORIDE 3 UNITS/HR: 9 INJECTION, SOLUTION INTRAVENOUS at 06:12

## 2017-12-18 RX ADMIN — INSULIN ASPART 4 UNITS: 100 INJECTION, SOLUTION INTRAVENOUS; SUBCUTANEOUS at 12:12

## 2017-12-18 RX ADMIN — METOPROLOL TARTRATE 25 MG: 25 TABLET, FILM COATED ORAL at 12:12

## 2017-12-18 RX ADMIN — METOPROLOL TARTRATE 25 MG: 25 TABLET, FILM COATED ORAL at 05:12

## 2017-12-18 RX ADMIN — INSULIN ASPART 4 UNITS: 100 INJECTION, SOLUTION INTRAVENOUS; SUBCUTANEOUS at 08:12

## 2017-12-18 RX ADMIN — POTASSIUM & SODIUM PHOSPHATES POWDER PACK 280-160-250 MG 2 PACKET: 280-160-250 PACK at 06:12

## 2017-12-18 RX ADMIN — HYDRALAZINE HYDROCHLORIDE 10 MG: 20 INJECTION INTRAMUSCULAR; INTRAVENOUS at 03:12

## 2017-12-18 RX ADMIN — ATORVASTATIN CALCIUM 80 MG: 20 TABLET, FILM COATED ORAL at 08:12

## 2017-12-18 RX ADMIN — INSULIN ASPART 6 UNITS: 100 INJECTION, SOLUTION INTRAVENOUS; SUBCUTANEOUS at 04:12

## 2017-12-18 RX ADMIN — INSULIN ASPART 2 UNITS: 100 INJECTION, SOLUTION INTRAVENOUS; SUBCUTANEOUS at 10:12

## 2017-12-18 RX ADMIN — HEPARIN SODIUM 5000 UNITS: 5000 INJECTION, SOLUTION INTRAVENOUS; SUBCUTANEOUS at 02:12

## 2017-12-18 RX ADMIN — ESCITALOPRAM 20 MG: 20 TABLET, FILM COATED ORAL at 10:12

## 2017-12-18 RX ADMIN — LEVETIRACETAM 500 MG: 500 TABLET ORAL at 08:12

## 2017-12-18 RX ADMIN — POTASSIUM CHLORIDE 40 MEQ: 20 SOLUTION ORAL at 06:12

## 2017-12-18 RX ADMIN — SODIUM CHLORIDE, PRESERVATIVE FREE 3 ML: 5 INJECTION INTRAVENOUS at 06:12

## 2017-12-18 RX ADMIN — POTASSIUM CHLORIDE 40 MEQ: 20 SOLUTION ORAL at 08:12

## 2017-12-18 RX ADMIN — HEPARIN SODIUM 5000 UNITS: 5000 INJECTION, SOLUTION INTRAVENOUS; SUBCUTANEOUS at 06:12

## 2017-12-18 RX ADMIN — METOPROLOL TARTRATE 25 MG: 25 TABLET, FILM COATED ORAL at 06:12

## 2017-12-18 RX ADMIN — LEVOTHYROXINE SODIUM 50 MCG: 50 TABLET ORAL at 06:12

## 2017-12-18 RX ADMIN — INSULIN ASPART 8 UNITS: 100 INJECTION, SOLUTION INTRAVENOUS; SUBCUTANEOUS at 04:12

## 2017-12-18 RX ADMIN — LOSARTAN POTASSIUM 50 MG: 50 TABLET, FILM COATED ORAL at 08:12

## 2017-12-18 RX ADMIN — ASPIRIN 81 MG CHEWABLE TABLET 81 MG: 81 TABLET CHEWABLE at 04:12

## 2017-12-18 RX ADMIN — STANDARDIZED SENNA CONCENTRATE AND DOCUSATE SODIUM 1 TABLET: 8.6; 5 TABLET, FILM COATED ORAL at 10:12

## 2017-12-18 RX ADMIN — LEVETIRACETAM 500 MG: 500 TABLET ORAL at 10:12

## 2017-12-18 RX ADMIN — HEPARIN SODIUM 5000 UNITS: 5000 INJECTION, SOLUTION INTRAVENOUS; SUBCUTANEOUS at 10:12

## 2017-12-18 RX ADMIN — STANDARDIZED SENNA CONCENTRATE AND DOCUSATE SODIUM 1 TABLET: 8.6; 5 TABLET, FILM COATED ORAL at 08:12

## 2017-12-18 NOTE — SUBJECTIVE & OBJECTIVE
"Interval HPI:   On a pureed, nectar thick liquid diet. Daughter reports that today is the first day she has had intake of >50%. BG trending up, noticed higher today with lunch. Transition rate at 3 units/hr currently. Afebrile. No hypoglycemic episodes.     BP (!) 152/68 (BP Location: Left arm, Patient Position: Lying)   Pulse (!) 51   Temp 99.2 °F (37.3 °C) (Oral)   Resp 16   Ht 4' 11" (1.499 m)   Wt 68.5 kg (151 lb 0.2 oz)   LMP  (LMP Unknown)   SpO2 (!) 94%   Breastfeeding? No   BMI 30.50 kg/m²     Labs Reviewed and Include      Recent Labs  Lab 12/18/17  0231   *   CALCIUM 8.0*   ALBUMIN 2.3*   PROT 5.6*      K 3.3*   CO2 27      BUN 20   CREATININE 0.9   ALKPHOS 89   ALT 9*   AST 13   BILITOT 0.8     Lab Results   Component Value Date    WBC 12.33 12/18/2017    HGB 9.9 (L) 12/18/2017    HCT 33.3 (L) 12/18/2017    MCV 74 (L) 12/18/2017     12/18/2017       Recent Labs  Lab 12/15/17  1433   TSH 1.008     Lab Results   Component Value Date    HGBA1C 12.1 (H) 12/15/2017       Nutritional status:   Body mass index is 30.5 kg/m².  Lab Results   Component Value Date    ALBUMIN 2.3 (L) 12/18/2017    ALBUMIN 2.7 (L) 12/17/2017    ALBUMIN 2.6 (L) 12/16/2017     Lab Results   Component Value Date    PREALBUMIN 23 06/29/2016       Estimated Creatinine Clearance: 56.6 mL/min (based on SCr of 0.9 mg/dL).    Accu-Checks  Recent Labs      12/16/17   1215  12/16/17   1811  12/17/17   0049  12/17/17   0051  12/17/17   0444  12/17/17   0613  12/17/17   0814  12/17/17   0913  12/17/17   1025  12/17/17   1137   POCTGLUCOSE  268*  236*  408*  411*  438*  375*  301*  233*  281*  246*       Current Medications and/or Treatments Impacting Glycemic Control  Immunotherapy:  Immunosuppressants     None        Steroids:   Hormones     None        Pressors:    Autonomic Drugs     None        Hyperglycemia/Diabetes Medications: Antihyperglycemics     Start     Stop Route Frequency Ordered    12/17/17 5105  " insulin aspart pen 4-8 Units      -- SubQ 3 times daily with meals 12/17/17 1453    12/17/17 1515  insulin regular (Humulin R) 100 Units in sodium chloride 0.9% 100 mL infusion      -- IV Continuous 12/17/17 1407    12/17/17 1506  insulin aspart pen 0-10 Units      -- SubQ As needed (PRN) 12/17/17 1407

## 2017-12-18 NOTE — ASSESSMENT & PLAN NOTE
Resume home dose of levothyroxine 50 mcg.  Lab Results   Component Value Date    TSH 1.008 12/15/2017

## 2017-12-18 NOTE — ASSESSMENT & PLAN NOTE
Goal -180    Continue transition, increase to 3.2 units/hr, change Novolog to 6-8 units with meals depending on PO intake. BG monitoring AC/HS/2A with moderate dose correction scale.     Discharge planning:  Probably resume 70/30. Daughter thinks patient has very low chance of being compliant with 4 shots/day.  Consider addition of GLP-1.  Follow-up with patient's endocrinologist after discharge.

## 2017-12-18 NOTE — PLAN OF CARE
[id number] - [place], [place] - [address]  [address]  [place] [address]  Phone: [phone] Fax: [phone]     [place], [place] - [address]  [address] [facility] [address]  Phone: [phone] Fax: [phone]    This CM spoke with daughter at bedside. Daughter states mother was at the [facility] when she became ill and was transfer from there to [facility] to  and [facility] to [facility].       12/18/17 1242   Discharge Assessment   Assessment Type Discharge Planning Assessment   Confirmed/corrected address and phone number on facesheet? Yes   Assessment information obtained from? Caregiver   Expected Length of Stay (days) 3   Communicated expected length of stay with patient/caregiver yes   Prior to hospitilization cognitive status: Alert/Oriented   Prior to hospitalization functional status: Independent   Current cognitive status: Unable to Assess   Current Functional Status: Needs Assistance   Facility Arrived From: (airlift from [facility] in )   Lives With spouse   Able to Return to Prior Arrangements yes   Is patient able to care for self after discharge? Unable to determine at this time (comments)   Who are your caregiver(s) and their phone number(s)? (daughter Connie Worley [phone])   Patient's perception of discharge disposition other (comments)  (tequila)   Readmission Within The Last 30 Days no previous admission in last 30 days   Patient currently being followed by outpatient case management? No   Patient currently receives any other outside agency services? No   Equipment Currently Used at Home none   Do you have any problems affording any of your prescribed medications? No   Is the patient taking medications as prescribed? yes   Does the patient have transportation home? Yes   Does the patient receive services at the [facility]? No   Discharge Plan A Rehab   Discharge  Plan B Home;Home Health   Patient/Family In Agreement With Plan yes   Readmission Questionnaire   Have you felt down, depressed, or hopeless? Unable to Assess   Have you felt little interest or pleasure in doing things? Unable to assess     Jia Christy RN/JEWEL  [phone]  [facility]

## 2017-12-18 NOTE — PLAN OF CARE
SW met with Pt daughter at bedside, Pt was sleeping. Discussed therapy recs for rehab and provided list. Reviewed with Pt daughter and addressed questions. She will tour and let SW know of preferences/choices asap.    Colleen Martin LMSW  Neurocritical Care   [facility]  [id number]

## 2017-12-18 NOTE — PT/OT/SLP PROGRESS
"Speech Language Pathology Treatment    Patient Name:  Tiffanie Wise   MRN:  [id number]  Admitting Diagnosis: Embolic stroke involving left middle cerebral artery    Recommendations:                 General Recommendations:  Dysphagia therapy and Speech/language therapy  Diet recommendations:  Puree, Liquid Diet Level: Nectar Thick   Aspiration Precautions: 1 bite/sip at a time, Alternating bites/sips, Assistance with meals and Assistance with thickening liquids, Check for pocketing/oral residue, Eliminate distractions, Feed only when awake/alert, Frequent oral care, HOB to 90 degrees, Meds crushed in puree, No straws, Remain upright 30 minutes post meal, Small bites/sips and Strict aspiration precautions   General Precautions: Standard, aphasia, aspiration, fall, pureed diet, nectar thick, [place]   Communication strategies:  Yes./no questions, visual aids    Subjective     SLP reviewed Pt with nurse, nurse clears for ST  Pt presents fatigued, labile  Pt explains, "I want ice"      Pain/Comfort:  · Pain Rating 1: 3/10  · Location - Side 1: Left  · Location 1: arm  · Pain Addressed 1: Distraction, Nurse notified  · Pain Rating Post-Intervention 1: 3/10    Objective:     Has the patient been evaluated by SLP for swallowing?   Yes  Keep patient NPO? No   Current Respiratory Status: nasal cannula      Pt found asleep in room,  upright in chair, daughter at bedside. She wakes per simple verbal cues. Pt labile and crying intermittently t/o session. Ongoing verbal and visual encouragement and redirection provided. Pt completes route song tasks with 20% accuracy in unison with SLP today. She counts 1-7 provided MAX A. She answers basic/personal YNQ with 60% accuracy across 5 attempts provided MAX A. She declines SLP attempts to intiaite gestures/pointing tasks. Pt seen with trials of thin liquids (ice chips x2, tsp sips thin liquids x2.) Pt with throat clear x1with trial of ice chip. Pt with overt coughing/choking on trial of tsp " sip water and additional trials of thin liquids held. She declines trials of solids. She accepts tsp bites of puree x4, fed by clinician. No difficulty noted with trials of puree. Pts daughter explaisn Pt with minimal PO intake and requires assistance with feeding. Pt and daughter educated on ongoing diet modifications, thickener guidelines, aspiraiton precautions, communication strategies nad safety precautions. Measuring cups provided per thickener guidelines. Pt DEP for teachback while daughter verbalizes understanding. No further questions. Whiteboard current. Nurse notified of findings and recommendations to continue current diet.     Assessment:     Tiffanie Wise is a 77 y.o. female with an SLP diagnosis of Aphasia, Dysphagia and Dysarthria.  She presents labile.  Pt with increased automatic speech in unison with SLP today. She would benefit from ongoing, intensive speech therapy via IRF following d/c from acute to continue to improve speech, language, cognition and swallowing.     Goals:    SLP Goals        Problem: SLP Goal    Goal Priority Disciplines Outcome   SLP Goal     SLP Ongoing (interventions implemented as appropriate)   Description:  Speech Language Pathology Goals  Goals expected to be met by 12/23/2017  1. Pt will tolerate puree diet without overt S/S aspiration, MIN A  2. Pt will tolerate nectar-thickened liquids w/o overt S/S aspiration, MIN A  3. Pt will tolerate trials of advanced diet textures (dental soft, regular) with adequate oral clearance, MIN A  4. Pt will tolerate trials of thin liquids w/o overt S/S aspiration, MIN A  5. Pt will answer basic/personal YNQ with 80% accuracy or higher, MOD A  6. Pt will follow 1-step commands with 80% accuracy or higher, MOD A  7. Pt will complete automatic speech tasks with 70% accuracy, MOD A  8. Educate Pt and family on aspiration precautions and compensatory strategies for functional communication                         Plan:     · Patient to be  seen:  5 x/week   · Plan of Care expires:  01/15/18  · Plan of Care reviewed with:  patient, son, daughter   · SLP Follow-Up:  Yes       Discharge recommendations:  rehabilitation facility   Barriers to Discharge:  Decreased Care Giver Support    Time Tracking:     SLP Treatment Date:   12/18/17  Speech Start Time:  1632  Speech Stop Time:  1658     Speech Total Time (min):  26 min    Billable Minutes: Speech Therapy Individual `6 and Treatment Swallowing Dysfunction `0    SAMMIE Shaver., [facility]-SLP  Speech-Language Pathology  Pager: [phone]    12/18/2017

## 2017-12-18 NOTE — PROGRESS NOTES
"[facility]  Endocrinology  Progress Note    Admit Date: 12/15/2017     Reason for Consult: Management of T2DM, Hyperglycemia     Home Diabetes Medications:   Januvia 100 mg daily  Humulin 70/30 - 40 AM and 30 PM    How often checking glucose at home? 2 times per day (AM and bedtime)   BG readings on regimen: Doesn't remember numbers, but usually high.  Hypoglycemia on the regimen?  No  Missed doses on regimen?  Yes    Diabetes Complications include:     Cerebrovascular disease.  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 For diabetes, she takes januvia 100 mg and Humalin 70/30 (40 units AM, 30 units PM). Her daughter provided the majority of the history. She has had much difficulty controlling her blood glucose. She follows regularly with an endocrinologist in , who is titrating her insulin. Her daughter admits her diet is poor. She monitors her blood glucose twice per day, and it is usually very high, although she's unsure the exact numbers. She was placed on an intensive insulin drip due to consistently high blood glucose, and we were consulted to assist with management.        Interval HPI:   On a pureed, nectar thick liquid diet. Daughter reports that today is the first day she has had intake of >50%. BG trending up, noticed higher today with lunch. Transition rate at 3 units/hr currently. Afebrile. No hypoglycemic episodes.     BP (!) 152/68 (BP Location: Left arm, Patient Position: Lying)   Pulse (!) 51   Temp 99.2 °F (37.3 °C) (Oral)   Resp 16   Ht 4' 11" (1.499 m)   Wt 68.5 kg (151 lb 0.2 oz)   LMP  (LMP Unknown)   SpO2 (!) 94%   Breastfeeding? No   BMI 30.50 kg/m²      Labs Reviewed and Include      Recent Labs  Lab " 12/18/17  0231   *   CALCIUM 8.0*   ALBUMIN 2.3*   PROT 5.6*      K 3.3*   CO2 27      BUN 20   CREATININE 0.9   ALKPHOS 89   ALT 9*   AST 13   BILITOT 0.8     Lab Results   Component Value Date    WBC 12.33 12/18/2017    HGB 9.9 (L) 12/18/2017    HCT 33.3 (L) 12/18/2017    MCV 74 (L) 12/18/2017     12/18/2017       Recent Labs  Lab 12/15/17  1433   TSH 1.008     Lab Results   Component Value Date    HGBA1C 12.1 (H) 12/15/2017       Nutritional status:   Body mass index is 30.5 kg/m².  Lab Results   Component Value Date    ALBUMIN 2.3 (L) 12/18/2017    ALBUMIN 2.7 (L) 12/17/2017    ALBUMIN 2.6 (L) 12/16/2017     Lab Results   Component Value Date    PREALBUMIN 23 06/29/2016       Estimated Creatinine Clearance: 56.6 mL/min (based on SCr of 0.9 mg/dL).    Accu-Checks  Recent Labs      12/16/17   1215  12/16/17   1811  12/17/17   0049  12/17/17   0051  12/17/17   0444  12/17/17   0613  12/17/17   0814  12/17/17   0913  12/17/17   1025  12/17/17   1137   POCTGLUCOSE  268*  236*  408*  411*  438*  375*  301*  233*  281*  246*       Current Medications and/or Treatments Impacting Glycemic Control  Immunotherapy:  Immunosuppressants     None        Steroids:   Hormones     None        Pressors:    Autonomic Drugs     None        Hyperglycemia/Diabetes Medications: Antihyperglycemics     Start     Stop Route Frequency Ordered    12/17/17 1645  insulin aspart pen 4-8 Units      -- SubQ 3 times daily with meals 12/17/17 1453    12/17/17 1515  insulin regular (Humulin R) 100 Units in sodium chloride 0.9% 100 mL infusion      -- IV Continuous 12/17/17 1407    12/17/17 1506  insulin aspart pen 0-10 Units      -- SubQ As needed (PRN) 12/17/17 1407          ASSESSMENT and PLAN    * Embolic stroke involving left middle cerebral artery    Per primary  Avoid hypoglycemia        Acute hemorrhagic infarction of brain    Per primary        Type 2 diabetes mellitus with other specified complication,  cardiovascular disease    Goal -180    Continue transition, increase to 3.2 units/hr, change Novolog to 6-8 units with meals depending on PO intake. BG monitoring AC/HS/2A with moderate dose correction scale.     Discharge planning:  Probably resume 70/30. Daughter thinks patient has very low chance of being compliant with 4 shots/day.  Consider addition of GLP-1.  Follow-up with patient's endocrinologist after discharge.        Acquired hypothyroidism    Resume home dose of levothyroxine 50 mcg.  Lab Results   Component Value Date    TSH 1.008 12/15/2017                Nelly Salazar, IRINA, NP  Endocrinology  [facility]

## 2017-12-19 LAB
ALBUMIN SERPL BCP-MCNC: 2.3 G/DL
ALP SERPL-CCNC: 92 U/L
ALT SERPL W/O P-5'-P-CCNC: 9 U/L
ANION GAP SERPL CALC-SCNC: 5 MMOL/L
AST SERPL-CCNC: 12 U/L
BASOPHILS # BLD AUTO: 0.03 K/UL
BASOPHILS NFR BLD: 0.3 %
BILIRUB SERPL-MCNC: 0.9 MG/DL
BUN SERPL-MCNC: 12 MG/DL
CALCIUM SERPL-MCNC: 8.3 MG/DL
CHLORIDE SERPL-SCNC: 110 MMOL/L
CO2 SERPL-SCNC: 27 MMOL/L
CREAT SERPL-MCNC: 0.8 MG/DL
DIFFERENTIAL METHOD: ABNORMAL
EOSINOPHIL # BLD AUTO: 0.2 K/UL
EOSINOPHIL NFR BLD: 1.3 %
ERYTHROCYTE [DISTWIDTH] IN BLOOD BY AUTOMATED COUNT: 17.5 %
EST. GFR  (AFRICAN AMERICAN): >60 ML/MIN/1.73 M^2
EST. GFR  (NON AFRICAN AMERICAN): >60 ML/MIN/1.73 M^2
GLUCOSE SERPL-MCNC: 118 MG/DL
HCT VFR BLD AUTO: 32.7 %
HGB BLD-MCNC: 9.7 G/DL
IMM GRANULOCYTES # BLD AUTO: 0.05 K/UL
IMM GRANULOCYTES NFR BLD AUTO: 0.4 %
INR PPP: 1
LYMPHOCYTES # BLD AUTO: 1.7 K/UL
LYMPHOCYTES NFR BLD: 14 %
MAGNESIUM SERPL-MCNC: 1.7 MG/DL
MCH RBC QN AUTO: 21.7 PG
MCHC RBC AUTO-ENTMCNC: 29.7 G/DL
MCV RBC AUTO: 73 FL
MONOCYTES # BLD AUTO: 1 K/UL
MONOCYTES NFR BLD: 8.4 %
NEUTROPHILS # BLD AUTO: 9 K/UL
NEUTROPHILS NFR BLD: 75.6 %
NRBC BLD-RTO: 0 /100 WBC
PHOSPHATE SERPL-MCNC: 2.5 MG/DL
PLATELET # BLD AUTO: 244 K/UL
PMV BLD AUTO: 11.8 FL
POCT GLUCOSE: 113 MG/DL (ref 70–110)
POCT GLUCOSE: 119 MG/DL (ref 70–110)
POCT GLUCOSE: 151 MG/DL (ref 70–110)
POCT GLUCOSE: 158 MG/DL (ref 70–110)
POCT GLUCOSE: 159 MG/DL (ref 70–110)
POCT GLUCOSE: 190 MG/DL (ref 70–110)
POCT GLUCOSE: 238 MG/DL (ref 70–110)
POTASSIUM SERPL-SCNC: 3.9 MMOL/L
PROT SERPL-MCNC: 5.8 G/DL
PROTHROMBIN TIME: 10.9 SEC
RBC # BLD AUTO: 4.46 M/UL
SODIUM SERPL-SCNC: 142 MMOL/L
WBC # BLD AUTO: 11.96 K/UL

## 2017-12-19 PROCEDURE — 85610 PROTHROMBIN TIME: CPT

## 2017-12-19 PROCEDURE — 97535 SELF CARE MNGMENT TRAINING: CPT

## 2017-12-19 PROCEDURE — 99232 SBSQ HOSP IP/OBS MODERATE 35: CPT | Mod: ,,, | Performed by: NURSE PRACTITIONER

## 2017-12-19 PROCEDURE — 63600175 PHARM REV CODE 636 W HCPCS: Performed by: NURSE PRACTITIONER

## 2017-12-19 PROCEDURE — 97116 GAIT TRAINING THERAPY: CPT

## 2017-12-19 PROCEDURE — 25000003 PHARM REV CODE 250: Performed by: STUDENT IN AN ORGANIZED HEALTH CARE EDUCATION/TRAINING PROGRAM

## 2017-12-19 PROCEDURE — 97530 THERAPEUTIC ACTIVITIES: CPT

## 2017-12-19 PROCEDURE — 80053 COMPREHEN METABOLIC PANEL: CPT

## 2017-12-19 PROCEDURE — 11000001 HC ACUTE MED/SURG PRIVATE ROOM

## 2017-12-19 PROCEDURE — 99233 SBSQ HOSP IP/OBS HIGH 50: CPT | Mod: GC,,, | Performed by: PSYCHIATRY & NEUROLOGY

## 2017-12-19 PROCEDURE — 92507 TX SP LANG VOICE COMM INDIV: CPT

## 2017-12-19 PROCEDURE — 25000003 PHARM REV CODE 250: Performed by: PHYSICIAN ASSISTANT

## 2017-12-19 PROCEDURE — 92526 ORAL FUNCTION THERAPY: CPT

## 2017-12-19 PROCEDURE — 85025 COMPLETE CBC W/AUTO DIFF WBC: CPT

## 2017-12-19 PROCEDURE — 63600175 PHARM REV CODE 636 W HCPCS: Performed by: PHYSICIAN ASSISTANT

## 2017-12-19 PROCEDURE — 25000003 PHARM REV CODE 250: Performed by: PSYCHIATRY & NEUROLOGY

## 2017-12-19 PROCEDURE — A4216 STERILE WATER/SALINE, 10 ML: HCPCS | Performed by: NURSE PRACTITIONER

## 2017-12-19 PROCEDURE — 84100 ASSAY OF PHOSPHORUS: CPT

## 2017-12-19 PROCEDURE — 99233 SBSQ HOSP IP/OBS HIGH 50: CPT | Mod: ,,, | Performed by: PHYSICIAN ASSISTANT

## 2017-12-19 PROCEDURE — 25000003 PHARM REV CODE 250: Performed by: NURSE PRACTITIONER

## 2017-12-19 PROCEDURE — 83735 ASSAY OF MAGNESIUM: CPT

## 2017-12-19 RX ORDER — HYDROCHLOROTHIAZIDE 25 MG/1
25 TABLET ORAL DAILY
Status: DISCONTINUED | OUTPATIENT
Start: 2017-12-19 | End: 2017-12-22 | Stop reason: HOSPADM

## 2017-12-19 RX ORDER — INSULIN ASPART 100 [IU]/ML
6-8 INJECTION, SOLUTION INTRAVENOUS; SUBCUTANEOUS
Status: DISCONTINUED | OUTPATIENT
Start: 2017-12-19 | End: 2017-12-20

## 2017-12-19 RX ORDER — INSULIN ASPART 100 [IU]/ML
0-5 INJECTION, SOLUTION INTRAVENOUS; SUBCUTANEOUS
Status: DISCONTINUED | OUTPATIENT
Start: 2017-12-19 | End: 2017-12-22 | Stop reason: HOSPADM

## 2017-12-19 RX ORDER — IBUPROFEN 200 MG
24 TABLET ORAL
Status: DISCONTINUED | OUTPATIENT
Start: 2017-12-19 | End: 2017-12-22 | Stop reason: HOSPADM

## 2017-12-19 RX ORDER — FOSINOPIRL SODIUM 10 MG/1
10 TABLET ORAL DAILY
Status: DISCONTINUED | OUTPATIENT
Start: 2017-12-19 | End: 2017-12-19

## 2017-12-19 RX ORDER — FENTANYL CITRATE 50 UG/ML
12.5 INJECTION, SOLUTION INTRAMUSCULAR; INTRAVENOUS ONCE
Status: COMPLETED | OUTPATIENT
Start: 2017-12-19 | End: 2017-12-19

## 2017-12-19 RX ORDER — IBUPROFEN 200 MG
16 TABLET ORAL
Status: DISCONTINUED | OUTPATIENT
Start: 2017-12-19 | End: 2017-12-22 | Stop reason: HOSPADM

## 2017-12-19 RX ORDER — GLUCAGON 1 MG
1 KIT INJECTION
Status: DISCONTINUED | OUTPATIENT
Start: 2017-12-19 | End: 2017-12-22 | Stop reason: HOSPADM

## 2017-12-19 RX ADMIN — HEPARIN SODIUM 5000 UNITS: 5000 INJECTION, SOLUTION INTRAVENOUS; SUBCUTANEOUS at 09:12

## 2017-12-19 RX ADMIN — LEVETIRACETAM 500 MG: 500 TABLET ORAL at 09:12

## 2017-12-19 RX ADMIN — INSULIN ASPART 6 UNITS: 100 INJECTION, SOLUTION INTRAVENOUS; SUBCUTANEOUS at 08:12

## 2017-12-19 RX ADMIN — HEPARIN SODIUM 5000 UNITS: 5000 INJECTION, SOLUTION INTRAVENOUS; SUBCUTANEOUS at 03:12

## 2017-12-19 RX ADMIN — INSULIN ASPART 2 UNITS: 100 INJECTION, SOLUTION INTRAVENOUS; SUBCUTANEOUS at 06:12

## 2017-12-19 RX ADMIN — METOPROLOL TARTRATE 25 MG: 25 TABLET, FILM COATED ORAL at 12:12

## 2017-12-19 RX ADMIN — SODIUM CHLORIDE, PRESERVATIVE FREE 3 ML: 5 INJECTION INTRAVENOUS at 06:12

## 2017-12-19 RX ADMIN — ASPIRIN 81 MG CHEWABLE TABLET 81 MG: 81 TABLET CHEWABLE at 08:12

## 2017-12-19 RX ADMIN — HYDROCHLOROTHIAZIDE 25 MG: 25 TABLET ORAL at 10:12

## 2017-12-19 RX ADMIN — STANDARDIZED SENNA CONCENTRATE AND DOCUSATE SODIUM 1 TABLET: 8.6; 5 TABLET, FILM COATED ORAL at 09:12

## 2017-12-19 RX ADMIN — INSULIN DETEMIR 38 UNITS: 100 INJECTION, SOLUTION SUBCUTANEOUS at 12:12

## 2017-12-19 RX ADMIN — LEVETIRACETAM 500 MG: 500 TABLET ORAL at 08:12

## 2017-12-19 RX ADMIN — LOSARTAN POTASSIUM 50 MG: 50 TABLET, FILM COATED ORAL at 08:12

## 2017-12-19 RX ADMIN — STANDARDIZED SENNA CONCENTRATE AND DOCUSATE SODIUM 1 TABLET: 8.6; 5 TABLET, FILM COATED ORAL at 08:12

## 2017-12-19 RX ADMIN — INSULIN ASPART 6 UNITS: 100 INJECTION, SOLUTION INTRAVENOUS; SUBCUTANEOUS at 12:12

## 2017-12-19 RX ADMIN — ATORVASTATIN CALCIUM 80 MG: 20 TABLET, FILM COATED ORAL at 08:12

## 2017-12-19 RX ADMIN — INSULIN ASPART 6 UNITS: 100 INJECTION, SOLUTION INTRAVENOUS; SUBCUTANEOUS at 06:12

## 2017-12-19 RX ADMIN — HEPARIN SODIUM 5000 UNITS: 5000 INJECTION, SOLUTION INTRAVENOUS; SUBCUTANEOUS at 06:12

## 2017-12-19 RX ADMIN — METOPROLOL TARTRATE 25 MG: 25 TABLET, FILM COATED ORAL at 11:12

## 2017-12-19 RX ADMIN — FENTANYL CITRATE 12.5 MCG: 50 INJECTION INTRAMUSCULAR; INTRAVENOUS at 09:12

## 2017-12-19 RX ADMIN — SODIUM CHLORIDE, PRESERVATIVE FREE 3 ML: 5 INJECTION INTRAVENOUS at 09:12

## 2017-12-19 RX ADMIN — METOPROLOL TARTRATE 25 MG: 25 TABLET, FILM COATED ORAL at 06:12

## 2017-12-19 RX ADMIN — ESCITALOPRAM 20 MG: 20 TABLET, FILM COATED ORAL at 09:12

## 2017-12-19 RX ADMIN — LEVOTHYROXINE SODIUM 50 MCG: 50 TABLET ORAL at 06:12

## 2017-12-19 NOTE — PLAN OF CARE
Referral sent to     1. [facility] [place] [phone]  [facility]. [facility] [phone]  3.     Shawanda is f/u.

## 2017-12-19 NOTE — PT/OT/SLP PROGRESS
"Speech Language Pathology Treatment    Patient Name:  Tiffanie Wise   MRN:  [id number]  Admitting Diagnosis: Embolic stroke involving left middle cerebral artery    Recommendations:                 General Recommendations:  Dysphagia therapy, Speech/language therapy and Cognitive-linguistic therapy  Diet recommendations:  Dental Soft, Liquid Diet Level: Thin   Aspiration Precautions: 1 bite/sip at a time, Alternating bites/sips, Avoid talking while eating, Eliminate distractions, Feed only when awake/alert, No straws and Strict aspiration precautions   General Precautions: Standard, aphasia, aspiration, fall  Communication strategies:  provide increased time to answer    Subjective     " Thank God"  Patient goals: to drink thin liquids    Pain/Comfort:  · Pain Rating 1: 0/10  · Pain Rating Post-Intervention 1: 0/10    Objective:     Has the patient been evaluated by SLP for swallowing?   Yes  Keep patient NPO? No   Current Respiratory Status: nasal cannula      Pt seen bedside with her daughter present.Pt initially asleep but easily aroused. Pt with good participation in session. Daughter reported pt has been asking for thin liquids. Pt able to count to 6 with therapist. She did not complete any automatic phrases or sentences. She attempted to sing a familiar song with no appropriate words. All were jargon. Pt unable to name objects given max cues. Pt with some appropriate spontaneous speech. Pt able to independently use combination of gestures, pointing and some appropriate speech to express basic wants/needs. She modeled 60% acc and followed 40% of simple commands. Pt responded to simple y/n questions with 70% acc.  She ID'd objects in a f=2 with 100% acc. Pt able to write her name but unable to write the name of any objects. She was able to copy simple words with 80% acc. Pt very verbose during session. Pt given teaspoons of water and then cup sips. Pt impulsive and took large sips. Pt allowed to self feed from a " cup. Pt with 2 cups of water and allowed to chew on ice with no overt s/s of aspiration. Pt also given pudding and cracker. Mastication was adequate without any overt s/s of aspiration. Education provided at length re: s/s of aspiration, swallowing precautions including avoiding distractions and talking during meals. Discussed not have many people in the room and pulling curtain for privacy to help decrease distractions. Agreed to continue with crushed meds at this time. Also discussed communication strategies with pt and daughter. Encouraged family to continue to use gestures, facial expressions and encourage all communication from the pt. White board updated. Education provided to nurse as well. Daughter expressed understanding of all information.     Assessment:     Tiffanie Wise is a 77 y.o. female with an SLP diagnosis of Aphasia and Dysphagia.  She presents with improved swallowing function and improved spontaneous speech      Goals:    SLP Goals        Problem: SLP Goal    Goal Priority Disciplines Outcome   SLP Goal     SLP Ongoing (interventions implemented as appropriate)   Description:  Speech Language Pathology Goals  Goals expected to be met by 12/23/2017  1. Pt will tolerate puree diet without overt S/S aspiration, MIN A  2. Pt will tolerate nectar-thickened liquids w/o overt S/S aspiration, MIN A  3. Pt will tolerate trials of advanced diet textures (dental soft, regular) with adequate oral clearance, MIN A  4. Pt will tolerate trials of thin liquids w/o overt S/S aspiration, MIN A  5. Pt will answer basic/personal YNQ with 80% accuracy or higher, MOD A  6. Pt will follow 1-step commands with 80% accuracy or higher, MOD A  7. Pt will complete automatic speech tasks with 70% accuracy, MOD A  8. Educate Pt and family on aspiration precautions and compensatory strategies for functional communication                         Plan:     · Patient to be seen:  5 x/week   · Plan of Care expires:   01/15/18  · Plan of Care reviewed with:  patient, daughter   · SLP Follow-Up:  Yes       Discharge recommendations:  rehabilitation facility       Time Tracking:     SLP Treatment Date:   12/19/17  Speech Start Time:  0945  Speech Stop Time:  1026     Speech Total Time (min):  41 min    Billable Minutes: Speech Therapy Individual 15, Treatment Swallowing Dysfunction 15 and Seld Care/Home Management Training 11    JONATHON Maldonado, CCC-SLP  12/19/2017

## 2017-12-19 NOTE — PLAN OF CARE
Problem: Physical Therapy Goal  Goal: Physical Therapy Goal  Goals to be met by: 2017     Patient will increase functional independence with mobility by performin. Supine to sit with Stand-by Assistance  2. Sit to supine with Stand-by Assistance  3. Sit to stand transfer with Stand-by Assistance  4. Bed to chair transfer with Stand-by Assistance using stand pivot transfer  5. Gait  x 150 feet with Contact Guard Assistance using no assistive device.   6. Stand for 10 minutes with stand by assist and no LOB in order to safely participate in ADLs.    Outcome: Ongoing (interventions implemented as appropriate)  Patient participated well in therapy.  POC and goals remain appropriate.  Please refer to progress note for functional mobility.     Pt is safe to perform transfers with nursing using min assist for safety.  Patient is safe to ambulate with nursing using min assist for short distances.     Nia Dempsey, PT  2017  [phone] (pager)

## 2017-12-19 NOTE — ASSESSMENT & PLAN NOTE
Ms. Wise is a 76yo f who presents with a L MCA syndrome, s/p tpa. Evidence of distal branch off MCA occlusion not amenable to thrombectomy.   Unclear etiology at this time but picture thus far suspicious for embolic stroke; ESUS/Cryptogenic Embolism. Echo with LAE.     -Antithrombotics for secondary stroke prevention: None: Reason: Consider holding 24 hours after hemorrhage until repeat scan with stable hemorrhage  -Statins for secondary stroke prevention and hyperlipidemia, if present: Atorvastatin- 80 mg daily (on at home; LDL 71)  -Aggressive risk factor modification: Hypertension, Diabetes, High Cholesterol, Diet, Exercise  -Rehab Efforts: Physical Therapy, Occupational Therapy, Speech and Language Pathology  -Diagnostics: None  -VTE Prophylaxis: None: Reason for No Pharmacological VTE Prophylaxis: Mechanical prophylaxis: Place SCDs due to apparent symptomatic hemorrhagic conversion

## 2017-12-19 NOTE — ASSESSMENT & PLAN NOTE
A1C- 10.8  accuchecks q 1 hrs  SSI  Detemir 10u  Aspart 10u  Insulin drip  Endocrinology managing DM

## 2017-12-19 NOTE — PLAN OF CARE
Problem: Patient Care Overview  Goal: Plan of Care Review  Outcome: Ongoing (interventions implemented as appropriate)  POC reviewed with pt at 0300. Pt cannot verbalize an understanding due to her condition the daughter did verbalize an understanding Questions and concerns addressed. No acute events overnight. Pt progressing toward goals. Will continue to monitor. See flowsheets for full assessment and VS info.  Pt has continuous insulin going.  Awaiting transfer to [facility].

## 2017-12-19 NOTE — PT/OT/SLP PROGRESS
Physical Therapy Treatment  POC change    Patient Name:  Tiffanie Wise   MRN:  [id number]    Recommendations:     Discharge Recommendations:  rehabilitation facility   Discharge Equipment Recommendations:  (TBD)   Barriers to discharge: Decreased caregiver support    Assessment:     Tiffanie Wise is a 77 y.o. female admitted with a medical diagnosis of Embolic stroke involving left middle cerebral artery.  She presents with the following impairments/functional limitations:  impaired functional mobilty, impaired cognition, decreased safety awareness, gait instability, pain, impaired fine motor, impaired balance, decreased upper extremity function, impaired self care skills, edema.    Pt is participating well in therapy and progressing toward goals.  Her mobility is improving, but she is at a significantly increased risk for falls d/t decreased safety awareness, impulsivity and decreased balance.  She continues to require intensive rehabilitation from multiple disciplines to maximize her safety and independence prior to d/c home.  Her spouse is w/c bound and will be unable to provide physical assistance at time of d/c.     Rehab Prognosis:  good; patient would benefit from acute skilled PT services to address these deficits and reach maximum level of function.      Recent Surgery: * No surgery found *      Plan:     During this hospitalization, patient to be seen 4 x/week to address the above listed problems via gait training, therapeutic activities, therapeutic exercises, neuromuscular re-education  · Plan of Care Expires:  01/16/18   Plan of Care Reviewed with: patient, daughter    Subjective     Communicated with RN prior to session.  Patient found supine in bed with dtr at bedside upon PT entry to room, agreeable to treatment.      Chief Complaint: LUE pain  Patient comments/goals: unintelligible   Pain/Comfort:  · Pain Rating 1:  (LUE pain)  · Pain Rating Post-Intervention 1:  (L UE pain)    Patients cultural,  spiritual, Methodist conflicts given the current situation:      Objective:     Patient found with: pulse ox (continuous), telemetry, blood pressure cuff, peripheral IV     General Precautions: Standard, aphasia, aspiration, fall     Patient was found resting in bed and cradling LUE.  Rn aware of L UE pain.  Pt and dtr agreed to gait trial in the hallway.  Gait performed with assist from therapist, Rn monitoring vitals and dtr performing chair follow for safety.  Pt ambulated 100 feet at a fast pace with impulsive episodes of dancing which decreased her safety and balance.  Static standing balance activity performed with emphasis on following one and two step commands while maintaining balance. See below for details.     Functional Mobility:  Bed mobility    Supine to sit with contact guard assist for safety d/t impulsivity  Transfers   Sit to stand transfer with contact guard assist   Bed to chair transfer with min assist for safety   Gait   100 feet with min assist (moderate assist at times when patient began dancing the two step when her physician passed)   Pt walks very quickly and is easily distracted, which results in unsteadiness.     Despite unsteadiness, patient does not decrease gait speed leading to significant safety concerns and an elevated fall risk.     Standing balance x 10 minutes with contact guard assist while performing cognitive tasks   Pt with good standing balance until fatigued, then unsafe 2* falling asleep in standing   Pt followed 90% of one step commands, but required repeated instructions at times   Pt followed 10% of 2 step commands despite repetition.     AM-PAC 6 CLICK MOBILITY  Turning over in bed (including adjusting bedclothes, sheets and blankets)?: 3  Sitting down on and standing up from a chair with arms (e.g., wheelchair, bedside commode, etc.): 3  Moving from lying on back to sitting on the side of the bed?: 3  Moving to and from a bed to a chair (including a wheelchair)?:  3  Need to walk in hospital room?: 3  Climbing 3-5 steps with a railing?: 3  Total Score: 18     Patient left up in chair with all lines intact, call button in reach and Rn and dtr present..    GOALS:    Physical Therapy Goals        Problem: Physical Therapy Goal    Goal Priority Disciplines Outcome Goal Variances Interventions   Physical Therapy Goal     PT/OT, PT Ongoing (interventions implemented as appropriate)     Description:  Goals to be met by: 2017     Patient will increase functional independence with mobility by performin. Supine to sit with Stand-by Assistance  2. Sit to supine with Stand-by Assistance  3. Sit to stand transfer with Stand-by Assistance  4. Bed to chair transfer with Stand-by Assistance using stand pivot transfer  5. Gait  x 150 feet with Contact Guard Assistance using no assistive device.   6. Stand for 10 minutes with stand by assist and no LOB in order to safely participate in ADLs.                     Time Tracking:     PT Received On: 17  PT Start Time: 1115     PT Stop Time: 1145  PT Total Time (min): 30 min     Billable Minutes: Gait Training 20 and Therapeutic Activity 10       PT/PTA: PT         Nia Dempsey, PT  2017  [phone] (pager)

## 2017-12-19 NOTE — ASSESSMENT & PLAN NOTE
--s/p tpa  --CTAMP 12/15: L MCA infarct   --MRI 12/16:  Hemorrhagic infarct left parieto-occipital temporal junction  --CTH 12/16- Evolving acute/recent infarct involving the left parietal lobe with new hyperdensity concerning for hemorrhagic conversion  --ASA 81 mg qd, atorvastatin 80 mg qd   --SBP goal <140  --boarding for vascular neurology, stepdown to floor

## 2017-12-19 NOTE — PLAN OF CARE
SW met with Pt daughter at bedside. She reported wanting Neuromedical rehab as first choice and [facility] as second.     JEFF sent email to [facility] [place] to complete referrals.    Colleen Martin LMSW  Neurocritical Care   [facility]  [id number]

## 2017-12-19 NOTE — SUBJECTIVE & OBJECTIVE
Neurologic Chief Complaint: L MCA stroke w/ hemorrhagic conversion    Subjective:     Interval History: Patient is seen for follow-up neurological assessment and treatment recommendations: BRENNANEON. BG in 300s today, low-dose insulin gtt. Plans for step down.    HPI, Past Medical, Family, and Social History remains the same as documented in the initial encounter.     Review of Systems   Constitutional: Negative for fever.   Eyes: Negative for visual disturbance.   Respiratory: Negative for shortness of breath.    Neurological: Positive for speech difficulty. Negative for weakness and numbness.     Scheduled Meds:   aspirin  81 mg Oral Daily    atorvastatin  80 mg Oral Daily    escitalopram oxalate  20 mg Oral Nightly    heparin (porcine)  5,000 Units Subcutaneous Q8H    insulin aspart  6-8 Units Subcutaneous TIDWM    levETIRAcetam  500 mg Oral BID    levothyroxine  50 mcg Oral Daily    losartan  50 mg Oral Daily    metoprolol tartrate  25 mg Oral Q6H    senna-docusate 8.6-50 mg  1 tablet Oral BID    sodium chloride 0.9%  3 mL Intravenous Q8H     Continuous Infusions:   insulin (HUMAN R) infusion (adults) 3.2 Units/hr (12/18/17 1805)     PRN Meds:acetaminophen, dextrose 50%, dextrose 50%, glucagon (human recombinant), glucose, glucose, hydrALAZINE, insulin aspart, magnesium oxide, magnesium oxide, ondansetron, potassium chloride 10%, potassium chloride 10%, potassium chloride 10%, potassium, sodium phosphates, potassium, sodium phosphates, potassium, sodium phosphates    Objective:     Vital Signs (Most Recent):  Temp: 98.6 °F (37 °C) (12/18/17 1505)  Pulse: 62 (12/18/17 1805)  Resp: (!) 30 (12/18/17 1805)  BP: (!) 147/64 (12/18/17 1805)  SpO2: 97 % (12/18/17 1805)  BP Location: Right arm    Vital Signs Range (Last 24H):  Temp:  [98.4 °F (36.9 °C)-99.2 °F (37.3 °C)]   Pulse:  [49-77]   Resp:  [15-40]   BP: (125-174)/(56-91)   SpO2:  [94 %-100 %]   BP Location: Right arm    Physical Exam   Constitutional: She  appears well-developed and well-nourished. No distress.   HENT:   Head: Normocephalic and atraumatic.   Eyes: EOM are normal. Pupils are equal, round, and reactive to light.   Neck: Normal range of motion. Neck supple.   Cardiovascular: Normal rate.    Pulmonary/Chest: Effort normal.   Musculoskeletal: Normal range of motion.   Neurological: She is alert.   Skin: Skin is warm and dry. She is not diaphoretic.   Psychiatric: She has a normal mood and affect.       Neurological Exam:   LOC: alert  Attention Span: Good   Language: Expressive > Receptive  Articulation: Dysarthria  Orientation: Oriented to person  Visual Fields: Full  EOM (CN III, IV, VI): Full/intact  Facial Movement (CN VII): Lower facial weakness on the Right  Motor: No drift x 4 extremities  Sensation: Intact to light touch  Tone: Normal tone throughout    Laboratory:  CMP:     Recent Labs  Lab 12/18/17 0231   CALCIUM 8.0*   ALBUMIN 2.3*   PROT 5.6*      K 3.3*   CO2 27      BUN 20   CREATININE 0.9   ALKPHOS 89   ALT 9*   AST 13   BILITOT 0.8     BMP:     Recent Labs  Lab 12/18/17 0231      K 3.3*      CO2 27   BUN 20   CREATININE 0.9   CALCIUM 8.0*     CBC:     Recent Labs  Lab 12/18/17 0231   WBC 12.33   RBC 4.51   HGB 9.9*   HCT 33.3*      MCV 74*   MCH 22.0*   MCHC 29.7*     Lipid Panel:     Recent Labs  Lab 12/18/17  1423   CHOL 116*   LDLCALC 50.0*   HDL 43   TRIG 115     Coagulation:     Recent Labs  Lab 12/18/17 0231   INR 1.1     Platelet Aggregation Study: No results for input(s): PLTAGG, PLTAGINTERP, PLTAGREGLACO, ADPPLTAGGREG in the last 168 hours.  Hgb A1C:     Recent Labs  Lab 12/15/17  1433   HGBA1C 12.1*     TSH:     Recent Labs  Lab 12/15/17  1433   TSH 1.008       Diagnostic Results       Brain Imaging     CTH 12/16/17:   Evolving acute/recent infarct involving the left parietal lobe with new hyperdensity concerning for hemorrhagic conversion. Mild mass effect without midline shift.   Continued  followup advised.    MRI brain 12/16/17:    Hemorrhagic infarct left parieto-occipital temporal junction.        Vessel Imaging     CTA MP 12/15/17:   Distal left parietal parietal MCA branch occlusion with correlating early changes of infarction on noncontrast CT.  No intracranial hemorrhage or significant mass effect/midline shift.        Cardiac Imaging     2D Echo 12/15/17  CONCLUSIONS     1 - Normal left ventricular systolic function (EF 65-70%).     2 - Indeterminate LV diastolic function.     3 - Normal right ventricular systolic function .     4 - The estimated PA systolic pressure is 14 mmHg.     5 - Trivial aortic regurgitation.     6 - Trivial pulmonic regurgitation.     7 - Mild left atrial enlargement.     8 - Concentric remodeling.     9 - No wall motion abnormalities.

## 2017-12-19 NOTE — PROGRESS NOTES
[facility]  Vascular Neurology  Comprehensive Stroke Center  Progress Note    Assessment/Plan:     * Embolic stroke involving left middle cerebral artery    Ms. Wise is a 78yo f who presents with a L MCA syndrome, s/p tpa. Evidence of distal branch off MCA occlusion not amenable to thrombectomy.   Unclear etiology at this time but picture thus far suspicious for embolic stroke; ESUS/Cryptogenic Embolism. Echo with LAE.     -Antithrombotics for secondary stroke prevention: None: Reason: Consider holding 24 hours after hemorrhage until repeat scan with stable hemorrhage  -Statins for secondary stroke prevention and hyperlipidemia, if present: Atorvastatin- 80 mg daily (on at home; LDL 71)  -Aggressive risk factor modification: Hypertension, Diabetes, High Cholesterol, Diet, Exercise  -Rehab Efforts: Physical Therapy, Occupational Therapy, Speech and Language Pathology  -Diagnostics: None  -VTE Prophylaxis: None: Reason for No Pharmacological VTE Prophylaxis: Mechanical prophylaxis: Place SCDs due to apparent symptomatic hemorrhagic conversion         Cytotoxic cerebral edema    Secondary to ischemia  Noted on CTH        Aphasia    Result of stroke   SLP evaluate and treat        Aortic arch atherosclerosis    Noted on CTA         Essential hypertension    Stroke risk factor  BP goal <140 post tpa and hemorrhage into infarct        Type 2 diabetes mellitus with other specified complication, cardiovascular disease    Stroke risk factor   A1c 12.1%  Endocrinology on board        Acquired hypothyroidism    Stroke risk factor  Continue home medications        Hyperlipidemia associated with type 2 diabetes mellitus    Stroke risk factor  LDL 50  Home Atorvastatin 80mg             Ms. Wise is a 78yo F with L MCA ischemic stroke s/p tPA.   12/16/17 Last night patient became distressed, appeared to be in pain but was unable to communicate where. ECG and troponin (stable) were ordered as well as CTH  which showed hemorrhage in the area of the infarct without significant mass effect or shift. MRI completed today.   12/17/2017 NAEON. Blood glucose ranging from 246-428. Continued aphasia and dysarthria.  12/18 - BG in 300s today, low-dose insulin gtt. Plans for step down.    STROKE DOCUMENTATION   Acute Stroke Times   Last Known Normal Date: 12/15/17  Last Known Normal Time: 1030  Symptom Onset Date: 12/15/17  Symptom Onset Time: 1030  Stroke Team Called Date: 12/15/17  Stroke Team Called Time: 1342 (upon arrival to [facility])  Stroke Team Arrival Date: 12/15/17  Stroke Team Arrival Time: 1347  CT Interpretation Time: 1237  Decision to Treat Time for Alteplase: 1154  Decision to Treat Time for IR:  (n/a)    NIH Scale:  1a. Level Of Consciousness: 0-->Alert: keenly responsive  1b. LOC Questions: 2-->Answers neither question correctly  1c. LOC Commands: 0-->Performs both tasks correctly  2. Best Gaze: 0-->Normal  3. Visual: 0-->No visual loss  4. Facial Palsy: 1-->Minor paralysis (flattened nasolabial fold, asymmetry on smiling)  5a. Motor Arm, Left: 0-->No drift: limb holds 90 (or 45) degrees for full 10 secs  5b. Motor Arm, Right: 0-->No drift: limb holds 90 (or 45) degrees for full 10 secs  6a. Motor Leg, Left: 0-->No drift: leg holds 30 degree position for full 5 secs  6b. Motor Leg, Right: 0-->No drift: leg holds 30 degree position for full 5 secs  7. Limb Ataxia: 0-->Absent  8. Sensory: 0-->Normal: no sensory loss  9. Best Language: 2-->Severe aphasia: all communication is through fragmentary expression: great need for inference, questioning, and guessing by the listener. Range of information that can be exchanged is limited: listener carries burden of. . . (see row details)  10. Dysarthria: 2-->Severe dysarthria: patients speech is so slurred as to be unintelligible in the absence of or out of proportion to any dysphasia, or is mute/anarthric  11. Extinction and Inattention (formerly Neglect): 0-->No  abnormality  Total (NIH Stroke Scale): 7       Modified [place] Score: 0  [place] Coma Scale:    ABCD2 Score:    [id number] Score:   HAS -BLED Score:   ICH Score:   Hunt & Branham Classification:      Hemorrhagic change of an Ischemic Stroke: Does this patient have an ischemic stroke with hemorrhagic changes? Yes, Grading Scale: HI Type 2 (HI-2) = confluent petechiae within the infarcted area but without space-occupying effect. Is this a symptomatic change?  No - Hemorrhage is not clinically significant     Neurologic Chief Complaint: L MCA stroke w/ hemorrhagic conversion    Subjective:     Interval History: Patient is seen for follow-up neurological assessment and treatment recommendations: KENON. BG in 300s today, low-dose insulin gtt. Plans for step down.    HPI, Past Medical, Family, and Social History remains the same as documented in the initial encounter.     Review of Systems   Constitutional: Negative for fever.   Eyes: Negative for visual disturbance.   Respiratory: Negative for shortness of breath.    Neurological: Positive for speech difficulty. Negative for weakness and numbness.     Scheduled Meds:   aspirin  81 mg Oral Daily    atorvastatin  80 mg Oral Daily    escitalopram oxalate  20 mg Oral Nightly    heparin (porcine)  5,000 Units Subcutaneous Q8H    insulin aspart  6-8 Units Subcutaneous TIDWM    levETIRAcetam  500 mg Oral BID    levothyroxine  50 mcg Oral Daily    losartan  50 mg Oral Daily    metoprolol tartrate  25 mg Oral Q6H    senna-docusate 8.6-50 mg  1 tablet Oral BID    sodium chloride 0.9%  3 mL Intravenous Q8H     Continuous Infusions:   insulin (HUMAN R) infusion (adults) 3.2 Units/hr (12/18/17 1805)     PRN Meds:acetaminophen, dextrose 50%, dextrose 50%, glucagon (human recombinant), glucose, glucose, hydrALAZINE, insulin aspart, magnesium oxide, magnesium oxide, ondansetron, potassium chloride 10%, potassium chloride 10%, potassium chloride 10%, potassium, sodium  phosphates, potassium, sodium phosphates, potassium, sodium phosphates    Objective:     Vital Signs (Most Recent):  Temp: 98.6 °F (37 °C) (12/18/17 1505)  Pulse: 62 (12/18/17 1805)  Resp: (!) 30 (12/18/17 1805)  BP: (!) 147/64 (12/18/17 1805)  SpO2: 97 % (12/18/17 1805)  BP Location: Right arm    Vital Signs Range (Last 24H):  Temp:  [98.4 °F (36.9 °C)-99.2 °F (37.3 °C)]   Pulse:  [49-77]   Resp:  [15-40]   BP: (125-174)/(56-91)   SpO2:  [94 %-100 %]   BP Location: Right arm    Physical Exam   Constitutional: She appears well-developed and well-nourished. No distress.   HENT:   Head: Normocephalic and atraumatic.   Eyes: EOM are normal. Pupils are equal, round, and reactive to light.   Neck: Normal range of motion. Neck supple.   Cardiovascular: Normal rate.    Pulmonary/Chest: Effort normal.   Musculoskeletal: Normal range of motion.   Neurological: She is alert.   Skin: Skin is warm and dry. She is not diaphoretic.   Psychiatric: She has a normal mood and affect.       Neurological Exam:   LOC: alert  Attention Span: Good   Language: Expressive > Receptive  Articulation: Dysarthria  Orientation: Oriented to person  Visual Fields: Full  EOM (CN III, IV, VI): Full/intact  Facial Movement (CN VII): Lower facial weakness on the Right  Motor: No drift x 4 extremities  Sensation: Intact to light touch  Tone: Normal tone throughout    Laboratory:  CMP:     Recent Labs  Lab 12/18/17 0231   CALCIUM 8.0*   ALBUMIN 2.3*   PROT 5.6*      K 3.3*   CO2 27      BUN 20   CREATININE 0.9   ALKPHOS 89   ALT 9*   AST 13   BILITOT 0.8     BMP:     Recent Labs  Lab 12/18/17 0231      K 3.3*      CO2 27   BUN 20   CREATININE 0.9   CALCIUM 8.0*     CBC:     Recent Labs  Lab 12/18/17 0231   WBC 12.33   RBC 4.51   HGB 9.9*   HCT 33.3*      MCV 74*   MCH 22.0*   MCHC 29.7*     Lipid Panel:     Recent Labs  Lab 12/18/17  1423   CHOL 116*   LDLCALC 50.0*   HDL 43   TRIG 115     Coagulation:     Recent  Labs  Lab 12/18/17  0231   INR 1.1     Platelet Aggregation Study: No results for input(s): PLTAGG, PLTAGINTERP, PLTAGREGLACO, ADPPLTAGGREG in the last 168 hours.  Hgb A1C:     Recent Labs  Lab 12/15/17  1433   HGBA1C 12.1*     TSH:     Recent Labs  Lab 12/15/17  1433   TSH 1.008       Diagnostic Results       Brain Imaging     CTH 12/16/17:   Evolving acute/recent infarct involving the left parietal lobe with new hyperdensity concerning for hemorrhagic conversion. Mild mass effect without midline shift.   Continued followup advised.    MRI brain 12/16/17:    Hemorrhagic infarct left parieto-occipital temporal junction.        Vessel Imaging     CTA MP 12/15/17:   Distal left parietal parietal MCA branch occlusion with correlating early changes of infarction on noncontrast CT.  No intracranial hemorrhage or significant mass effect/midline shift.        Cardiac Imaging     2D Echo 12/15/17  CONCLUSIONS     1 - Normal left ventricular systolic function (EF 65-70%).     2 - Indeterminate LV diastolic function.     3 - Normal right ventricular systolic function .     4 - The estimated PA systolic pressure is 14 mmHg.     5 - Trivial aortic regurgitation.     6 - Trivial pulmonic regurgitation.     7 - Mild left atrial enlargement.     8 - Concentric remodeling.     9 - No wall motion abnormalities.       Rosa Ross PA-C  Comprehensive Stroke Center  Department of Vascular Neurology   [facility]

## 2017-12-19 NOTE — PLAN OF CARE
Problem: SLP Goal  Goal: SLP Goal  Speech Language Pathology Goals  Goals expected to be met by 12/23/2017  1. Pt will tolerate puree diet without overt S/S aspiration, MIN A  2. Pt will tolerate nectar-thickened liquids w/o overt S/S aspiration, MIN A  3. Pt will tolerate trials of advanced diet textures (dental soft, regular) with adequate oral clearance, MIN A  4. Pt will tolerate trials of thin liquids w/o overt S/S aspiration, MIN A  5. Pt will answer basic/personal YNQ with 80% accuracy or higher, MOD A  6. Pt will follow 1-step commands with 80% accuracy or higher, MOD A  7. Pt will complete automatic speech tasks with 70% accuracy, MOD A  8. Educate Pt and family on aspiration precautions and compensatory strategies for functional communication        Recommend upgrading diet to Soft and thin. NO straws. Strict aspiration precautions. Avoid distractions. No talking while eating.     JONATHON Maldonado, CCC-SLP  12/19/2017

## 2017-12-19 NOTE — ASSESSMENT & PLAN NOTE
Continue Levothyroxine 50 mcg daily    Lab Results   Component Value Date    TSH 1.008 12/15/2017

## 2017-12-19 NOTE — PT/OT/SLP PROGRESS
"Occupational Therapy   Treatment    Name: Tiffanie Wise  MRN: [id number]  Admitting Diagnosis:  Embolic stroke involving left middle cerebral artery       Recommendations:     Discharge Recommendations: rehabilitation facility  Discharge Equipment Recommendations:  none  Barriers to discharge:  Decreased caregiver support    Subjective   Patient:  "Let's go for it!"  "Thank you.  Chelle Monzon."  Communicated with: nurse prior to session.  Pain/Comfort:  · Pain Rating 1: 10/10  · Location - Orientation 1:  (left UE)  · Pain Addressed 1: Reposition, Nurse notified  · Pain Rating Post-Intervention 1: 10/10    Objective:     Patient found with: blood pressure cuff, peripheral IV, PureWick, telemetry, pulse ox (continuous)  Dtg stepped out during the session    General Precautions: Standard, nectar thick, pureed diet, aspiration, fall, aphasia   Orthopedic Precautions:N/A   Braces: N/A     Occupational Performance:    Bed Mobility:    · Patient completed Rolling/Turning to Left with  supervision  · Patient completed Rolling/Turning to Right with supervision  · Patient completed Scooting/Bridging with supervision  · Patient completed Supine to Sit with CGA  · Patient completed Sit to Supine with CGA     Functional Mobility/Transfers:  · Patient completed Sit <> Stand Transfer with SBA  · Patient completed Bed <> Chair Transfer using Stand Pivot technique with CGA with no assistive device     Activities of Daily Living:  · Grooming: SBA while standing  · UB Dressing: min assistance while seated EOB    Patient left supine with all lines intact and call button in reach    AMPA 6 Click:  [facility] Total Score: 17    Treatment & Education:  Patient education provided on role of OT, daily orientation, and ROM.  Patient unable to verbalize understanding via teach back method. Continued education, patient/ family training recommended.  Patient alert 100% of the session; attentive throughout the session; good eye contact.  Able to " follow 3/3 one step commands.  Able to identify 2/3 body parts.  Unable to name common objects.  Able to sequence 2/7 days of the week.  Daily orientation provided.  HARPER performed left UE one set x 10 rep in all planes of motion with assistance provided for elbow flexion, supination, external rotation and finger flexion.10 rep in all planes of motion.  Patient's functional status and disposition recommendation discussed patient, patient's dtg and  nurse.  White board updated in patient's room.  OT asked if there were any other questions; patient/ family had no further questions.     Education:    Assessment:     Tiffanie Wise is a 77 y.o. female with a medical diagnosis of Acute ischemic left MCA stroke.  She presents with performance deficits of physical skills including impaired sensation and endurance; demonstrating performance deficits of cognitive skills including impaired  attention, perception, understanding, problem solving, sequencing and memory all resulting in inability organizing occupational performance in a timely and safe manner; demonstrating performance deficits of psychosocial skills including impairments of interpersonal interactions and coping strategies which are skills necessary to successfully and appropriately participate in everyday tasks and social situations.  These performance deficits have resulted in activity limitations including but not limited to:   transfers, ascending/ descending stairs, walking short and long distances, walking around obstacles, transitional movement patterns (kneeling, bending); upper body dressing, lower body dressing, brushing teeth, toileting, bathing, carrying objects, assisting  in his care.   Patient's role as mother, wife, caregiver to  and independent caretaker for self has been affected. Patient will benefit from skilled OT services to maximize level of independence with self-care skills and functional mobility.  Improvements noted with  level of participation.     Rehab Prognosis:  Good; patient would benefit from acute skilled OT services to address these deficits and reach maximum level of function.       Plan:     Patient to be seen 4 x/week to address the above listed problems via self-care/home management, therapeutic activities, therapeutic exercises, neuromuscular re-education, cognitive retraining  · Plan of Care Expires: 01/13/18  · Plan of Care Reviewed with: patient, daughter    This Plan of care has been discussed with the patient who was involved in its development and understands and is in agreement with the identified goals and treatment plan    GOALS:    Occupational Therapy Goals        Problem: Occupational Therapy Goal    Goal Priority Disciplines Outcome Interventions   Occupational Therapy Goal     OT, PT/OT     Description:  Goals set 12/17 to be addressed for 14 days with expiration date, 12/31:  Patient will increase functional independence with ADLs by performing:    Patient will demonstrate rolling to the right with SBA.  Not met   Patient will demonstrate rolling to the left with SBA.   Not met  Patient will demonstrate supine -sit with SBA.   Not met  Patient will demonstrate stand pivot transfers with SBA.   Not met  Patient will demonstrate grooming while standing with SBA.   Not met  Patient will demonstrate upper body dressing with min assist while seated EOB.   Not met  Patient will demonstrate lower body dressing with min assist while seated EOB.   Not met  Patient will demonstrate ability to follow 2/3 commands.   Not met  Patient's family / caregiver will demonstrate independence and safety with assisting patient with self-care skills and functional mobility.     Not met  Patient and/or patient's family will verbalize understanding of stroke prevention guidelines, personal risk factors and stroke warning signs via teachback method.  Not met                            Time Tracking:     OT Date of Treatment:  12/19/17  OT Start Time: 1033  OT Stop Time: 1100  OT Total Time (min): 27 min    Billable Minutes:Self Care/Home Management 17  Therapeutic Activity 10    NGOZI Acosta  12/19/2017

## 2017-12-19 NOTE — PROGRESS NOTES
"[facility]  Endocrinology  Progress Note    Admit Date: 12/15/2017     Reason for Consult: Management of T2DM, Hyperglycemia     Home Diabetes Medications:   Januvia 100 mg daily  Humulin 70/30 - 40 AM and 30 PM    How often checking glucose at home? 2 times per day (AM and bedtime)   BG readings on regimen: Doesn't remember numbers, but usually high.  Hypoglycemia on the regimen?  No  Missed doses on regimen?  Yes    Diabetes Complications include:     Cerebrovascular disease.  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 For diabetes, she takes januvia 100 mg and Humalin 70/30 (40 units AM, 30 units PM). Her daughter provided the majority of the history. She has had much difficulty controlling her blood glucose. She follows regularly with an endocrinologist in , who is titrating her insulin. Her daughter admits her diet is poor. She monitors her blood glucose twice per day, and it is usually very high, although she's unsure the exact numbers. She was placed on an intensive insulin drip due to consistently high blood glucose, and we were consulted to assist with management.        Interval HPI:   Pt on a dental soft diet. Daughter reports she ate dinner well last night as well as breakfast this morning. BG have improved. No hypoglycemia or fever. Nausea denied.     BP (!) 159/94 (BP Location: Left leg, Patient Position: Lying)   Pulse 64   Temp 98.6 °F (37 °C) (Oral)   Resp (!) 24   Ht 4' 11" (1.499 m)   Wt 68.5 kg (151 lb 0.2 oz)   LMP  (LMP Unknown)   SpO2 96%   Breastfeeding? No   BMI 30.50 kg/m²      Labs Reviewed and Include      Recent Labs  Lab 12/19/17  0209   *   CALCIUM 8.3*   ALBUMIN 2.3*   PROT 5.8*   NA " 142   K 3.9   CO2 27      BUN 12   CREATININE 0.8   ALKPHOS 92   ALT 9*   AST 12   BILITOT 0.9     Lab Results   Component Value Date    WBC 11.96 12/19/2017    HGB 9.7 (L) 12/19/2017    HCT 32.7 (L) 12/19/2017    MCV 73 (L) 12/19/2017     12/19/2017       Recent Labs  Lab 12/15/17  1433   TSH 1.008     Lab Results   Component Value Date    HGBA1C 12.1 (H) 12/15/2017       Nutritional status:   Body mass index is 30.5 kg/m².  Lab Results   Component Value Date    ALBUMIN 2.3 (L) 12/19/2017    ALBUMIN 2.3 (L) 12/18/2017    ALBUMIN 2.7 (L) 12/17/2017     Lab Results   Component Value Date    PREALBUMIN 23 06/29/2016       Estimated Creatinine Clearance: 63.7 mL/min (based on SCr of 0.8 mg/dL).    Accu-Checks  Recent Labs      12/18/17   0601  12/18/17   0824  12/18/17   1200  12/18/17   1616  12/18/17   1653  12/18/17   2221  12/19/17   0219  12/19/17   0611  12/19/17   0735  12/19/17   0841   POCTGLUCOSE  178*  174*  247*  314*  345*  221*  119*  113*  159*  190*       Current Medications and/or Treatments Impacting Glycemic Control  Immunotherapy:  Immunosuppressants     None        Steroids:   Hormones     None        Pressors:    Autonomic Drugs     None        Hyperglycemia/Diabetes Medications: Antihyperglycemics     Start     Stop Route Frequency Ordered    12/18/17 1645  insulin aspart pen 6-8 Units      -- SubQ 3 times daily with meals 12/18/17 1459    12/17/17 1515  insulin regular (Humulin R) 100 Units in sodium chloride 0.9% 100 mL infusion      -- IV Continuous 12/17/17 1407    12/17/17 1506  insulin aspart pen 0-10 Units      -- SubQ As needed (PRN) 12/17/17 1407          ASSESSMENT and PLAN    * Embolic stroke involving left middle cerebral artery    Per primary  Avoid hypoglycemia        Acute hemorrhagic infarction of brain    Per primary        Type 2 diabetes mellitus with other specified complication, cardiovascular disease    Goal -180. D/C transition and place on subQ therapy.  Levemir 38 units subQ daily, Novolog 6-8 units with meals. Monitor AC/HS, low dose correction scale.     Discharge planning:  Probably resume 70/30. Daughter thinks patient has very low chance of being compliant with 4 shots/day.  Consider addition of GLP-1.  Follow-up with patient's endocrinologist after discharge.        Acquired hypothyroidism    Continue Levothyroxine 50 mcg daily    Lab Results   Component Value Date    TSH 1.008 12/15/2017                Nelly Salazar DNP, NP  Endocrinology  [facility]

## 2017-12-19 NOTE — PROGRESS NOTES
[facility]  Neurocritical Care  Progress Note    Admit Date: 12/15/2017  Service Date: 12/19/2017  Length of Stay: 4    Subjective:     Chief Complaint: Embolic stroke involving left middle cerebral artery    History of Present Illness: The patient is a 77 year old female with a PMHx of HTN, DM II, MI, CABG x1, GERD, CAD (on Plavix) admitted to [facility] s/p tPA. Per chart review, the patient was last seen normal was around 10:30 patient was playing Bingo when she developed slurred speech, R facial droop, R sided weakness around 10:30am today. TPA was administered prior to transfer. Per flight care, BP stable en route, patient had some bleeding of the gums. CT-No intracranial hemorrhage, extra-axial fluid collection, midline shift, or mass effect. CT MP-Distal left parietal parietal MCA branch occlusion with correlating early changes of infarction on noncontrast Ct. Admitted to [facility] for higher level of care.    Hospital Course:  12/15:patient admitted to [facility] s/p tPA. CTH revealed L parietal MCA occlusion.   12/16: repeat CTH and MRI brain revealed left parietal lobe infarct with hemorrhagic conversion and mild mass effect. PT/INR elevated, Vitamin K 5mg IV given. Cardene drip to manage strict SBP goal of <140 in setting of hemorhagic conversion. Blood glucoses remain elevated while on SSI, started on 20u detemir.   12/17: Blood glucose remain elevated at 482, insulin drip started and endocrine consulted. Stepdown to [facility]N pending Endocrinology recs. Start heparin prophylaxis. Cardene drip d/c.  12/19: boarding for stroke, added hctz    Interval History: NAEON    Review of Systems:   Constitutional: Denies fevers or chills.  Pulmonary: Denies shortness of breath or cough.  Cardiology: Denies chest pain or palpitations.  GI: Denies abdominal pain or constipation.  Neurologic: Denies new weakness,  headache, or paresthesias.      Vitals:   Temp: 98.6 °F (37 °C)  Pulse: 64  Rhythm: sinus bradycardia  BP:  132/60  MAP (mmHg): 87  Resp: (!) 37  SpO2: 98 %  O2 Device (Oxygen Therapy): room air    Temp  Min: 98.5 °F (36.9 °C)  Max: 99.4 °F (37.4 °C)  Pulse  Min: 50  Max: 77  BP  Min: 125/89  Max: 167/70  MAP (mmHg)  Min: 87  Max: 117  Resp  Min: 16  Max: 37  SpO2  Min: 93 %  Max: 99 %    12/18 0701 - 12/19 0700  In: 71 [I.V.:71]  Out: 600 [Urine:600]   Unmeasured Output  Urine Occurrence: 1  Stool Occurrence: 1  Pad Count: 2     Examination:   Constitutional: Well-nourished and -developed. No apparent distress.   Eyes: Conjunctiva clear, anicteric. Lids no lesions.  Head/Ears/Nose/Mouth/Throat/Neck: Moist mucous membranes. External ears, nose atraumatic.   Cardiovascular: Regular rhythm. No murmurs. No leg edema.  Respiratory: Comfortable respirations. Clear to auscultation.  Gastrointestinal: No hernia. Soft, nondistended, nontender. + bowel sounds.    Neurologic:  -GCS [id number]  -Alert. Aphasic. Dysarthric. Difficulty following commands.   -PERRL. EOMI. R facial weakness  -Motor: 5/5 BUE BLE, no drift   -Sensation to light touch intact throughout       Medications:   Continuous  insulin (HUMAN R) infusion (adults) Last Rate: 2.4 Units/hr (12/19/17 1005)   Scheduled  aspirin 81 mg Daily   atorvastatin 80 mg Daily   escitalopram oxalate 20 mg Nightly   heparin (porcine) 5,000 Units Q8H   hydroCHLOROthiazide 25 mg Daily   insulin aspart 6-8 Units TIDWM   levETIRAcetam 500 mg BID   levothyroxine 50 mcg Daily   losartan 50 mg Daily   metoprolol tartrate 25 mg Q6H   senna-docusate 8.6-50 mg 1 tablet BID   sodium chloride 0.9% 3 mL Q8H   PRN  acetaminophen 650 mg Q6H PRN   dextrose 50% 12.5 g PRN   dextrose 50% 25 g PRN   glucagon (human recombinant) 1 mg PRN   glucose 16 g PRN   glucose 24 g PRN   insulin aspart 0-10 Units PRN   ondansetron 4 mg Q8H PRN      Today I independently reviewed pertinent medications, lines/drains/airways, imaging, cardiology, lab results, microbiology results, notably: added hctz, boarding for  stroke, airway secure, HDS, VSS. Labs, imaging reviewed.     Assessment/Plan:     Neuro   * Embolic stroke involving left middle cerebral artery    --s/p tpa  --CTAMP 12/15: L MCA infarct   --MRI 12/16:  Hemorrhagic infarct left parieto-occipital temporal junction  --CTH 12/16- Evolving acute/recent infarct involving the left parietal lobe with new hyperdensity concerning for hemorrhagic conversion  --ASA 81 mg qd, atorvastatin 80 mg qd   --SBP goal <140  --boarding for vascular neurology, stepdown to floor        Acute hemorrhagic infarction of brain    --see L MCA  --keppra 500 mg BID x 7d, sz ppx         Aphasia    Likely due to stroke        Psychiatric   RUDOLPH (generalized anxiety disorder)    -escitalopram 20 mg qhs         Cardiac/Vascular   Essential hypertension    SBP <140  Metoprolol 25 mg q6h  hctz 25 mg qd  Losartan 50 mg qd           Hyperlipidemia associated with type 2 diabetes mellitus    Continue home dose 80mg Atorvastatin        Endocrine   Type 2 diabetes mellitus with other specified complication, cardiovascular disease    A1C- 10.8  accuchecks q 1 hrs  SSI  Detemir 10u  Aspart 10u  Insulin drip  Endocrinology managing DM        Acquired hypothyroidism    Continued home synthroid              Prophylaxis:  Venous Thromboembolism: mechanical chemical  Stress Ulcer: None  Ventilator Pneumonia: not applicable     Activity Orders          Commode at bedside starting at 12/17 1709    Activity as tolerated starting at 12/16 1030        Full Code    Darrell Ellsworth PA-C  Neurocritical Care  [facility]

## 2017-12-19 NOTE — PLAN OF CARE
Problem: Occupational Therapy Goal  Goal: Occupational Therapy Goal  Goals set 12/17 to be addressed for 14 days with expiration date, 12/31:  Patient will increase functional independence with ADLs by performing:    Patient will demonstrate rolling to the right with SBA.  Not met   Patient will demonstrate rolling to the left with SBA.   Not met  Patient will demonstrate supine -sit with SBA.   Not met  Patient will demonstrate stand pivot transfers with SBA.   Not met  Patient will demonstrate grooming while standing with SBA.   Not met  Patient will demonstrate upper body dressing with min assist while seated EOB.   Not met  Patient will demonstrate lower body dressing with min assist while seated EOB.   Not met  Patient will demonstrate ability to follow 2/3 commands.   Not met  Patient's family / caregiver will demonstrate independence and safety with assisting patient with self-care skills and functional mobility.     Not met  Patient and/or patient's family will verbalize understanding of stroke prevention guidelines, personal risk factors and stroke warning signs via teachback method.  Not met           Goals remain appropriate.  NGOZI Acosta  12/19/2017

## 2017-12-19 NOTE — ASSESSMENT & PLAN NOTE
Goal -180. D/C transition and place on subQ therapy. Levemir 38 units subQ daily, Novolog 6-8 units with meals. Monitor AC/HS, low dose correction scale.     Discharge planning:  Probably resume 70/30. Daughter thinks patient has very low chance of being compliant with 4 shots/day.  Consider addition of GLP-1.  Follow-up with patient's endocrinologist after discharge.

## 2017-12-19 NOTE — PLAN OF CARE
Problem: Patient Care Overview  Goal: Plan of Care Review  Outcome: Ongoing (interventions implemented as appropriate)  POC reviewed with pt and family at 1400. Pt unable to verbalize understanding d/t aphasia. Questions and concerns addressed with pts daughter. No acute events today. Pt progressing toward goals. Will continue to monitor. See flowsheets for full assessment and VS info.

## 2017-12-19 NOTE — SUBJECTIVE & OBJECTIVE
"Interval HPI:   Pt on a dental soft diet. Daughter reports she ate dinner well last night as well as breakfast this morning. BG have improved. No hypoglycemia or fever. Nausea denied.     BP (!) 159/94 (BP Location: Left leg, Patient Position: Lying)   Pulse 64   Temp 98.6 °F (37 °C) (Oral)   Resp (!) 24   Ht 4' 11" (1.499 m)   Wt 68.5 kg (151 lb 0.2 oz)   LMP  (LMP Unknown)   SpO2 96%   Breastfeeding? No   BMI 30.50 kg/m²     Labs Reviewed and Include      Recent Labs  Lab 12/19/17  0209   *   CALCIUM 8.3*   ALBUMIN 2.3*   PROT 5.8*      K 3.9   CO2 27      BUN 12   CREATININE 0.8   ALKPHOS 92   ALT 9*   AST 12   BILITOT 0.9     Lab Results   Component Value Date    WBC 11.96 12/19/2017    HGB 9.7 (L) 12/19/2017    HCT 32.7 (L) 12/19/2017    MCV 73 (L) 12/19/2017     12/19/2017       Recent Labs  Lab 12/15/17  1433   TSH 1.008     Lab Results   Component Value Date    HGBA1C 12.1 (H) 12/15/2017       Nutritional status:   Body mass index is 30.5 kg/m².  Lab Results   Component Value Date    ALBUMIN 2.3 (L) 12/19/2017    ALBUMIN 2.3 (L) 12/18/2017    ALBUMIN 2.7 (L) 12/17/2017     Lab Results   Component Value Date    PREALBUMIN 23 06/29/2016       Estimated Creatinine Clearance: 63.7 mL/min (based on SCr of 0.8 mg/dL).    Accu-Checks  Recent Labs      12/18/17   0601  12/18/17   0824  12/18/17   1200  12/18/17   1616  12/18/17   1653  12/18/17   2221  12/19/17   0219  12/19/17   0611  12/19/17   0735  12/19/17   0841   POCTGLUCOSE  178*  174*  247*  314*  345*  221*  119*  113*  159*  190*       Current Medications and/or Treatments Impacting Glycemic Control  Immunotherapy:  Immunosuppressants     None        Steroids:   Hormones     None        Pressors:    Autonomic Drugs     None        Hyperglycemia/Diabetes Medications: Antihyperglycemics     Start     Stop Route Frequency Ordered    12/18/17 6595  insulin aspart pen 6-8 Units      -- SubQ 3 times daily with meals 12/18/17 " 1459    12/17/17 1515  insulin regular (Humulin R) 100 Units in sodium chloride 0.9% 100 mL infusion      -- IV Continuous 12/17/17 1407    12/17/17 1506  insulin aspart pen 0-10 Units      -- SubQ As needed (PRN) 12/17/17 1407

## 2017-12-20 PROBLEM — M79.89 LEFT ARM SWELLING: Status: ACTIVE | Noted: 2017-12-20

## 2017-12-20 PROBLEM — R00.1 BRADYCARDIA: Status: ACTIVE | Noted: 2017-12-20

## 2017-12-20 LAB
BACTERIA BLD CULT: NORMAL
BACTERIA BLD CULT: NORMAL
POCT GLUCOSE: 173 MG/DL (ref 70–110)
POCT GLUCOSE: 193 MG/DL (ref 70–110)
POCT GLUCOSE: 209 MG/DL (ref 70–110)
POCT GLUCOSE: 218 MG/DL (ref 70–110)
POCT GLUCOSE: 234 MG/DL (ref 70–110)

## 2017-12-20 PROCEDURE — 25000003 PHARM REV CODE 250: Performed by: PHYSICIAN ASSISTANT

## 2017-12-20 PROCEDURE — 99233 SBSQ HOSP IP/OBS HIGH 50: CPT | Mod: GC,,, | Performed by: PSYCHIATRY & NEUROLOGY

## 2017-12-20 PROCEDURE — 97803 MED NUTRITION INDIV SUBSEQ: CPT

## 2017-12-20 PROCEDURE — 92507 TX SP LANG VOICE COMM INDIV: CPT

## 2017-12-20 PROCEDURE — 97535 SELF CARE MNGMENT TRAINING: CPT

## 2017-12-20 PROCEDURE — 99900035 HC TECH TIME PER 15 MIN (STAT)

## 2017-12-20 PROCEDURE — 25000003 PHARM REV CODE 250: Performed by: NURSE PRACTITIONER

## 2017-12-20 PROCEDURE — 63600175 PHARM REV CODE 636 W HCPCS: Performed by: PHYSICIAN ASSISTANT

## 2017-12-20 PROCEDURE — 94660 CPAP INITIATION&MGMT: CPT

## 2017-12-20 PROCEDURE — 20600001 HC STEP DOWN PRIVATE ROOM

## 2017-12-20 PROCEDURE — 25000003 PHARM REV CODE 250: Performed by: PSYCHIATRY & NEUROLOGY

## 2017-12-20 PROCEDURE — 99232 SBSQ HOSP IP/OBS MODERATE 35: CPT | Mod: ,,, | Performed by: NURSE PRACTITIONER

## 2017-12-20 PROCEDURE — A4216 STERILE WATER/SALINE, 10 ML: HCPCS | Performed by: NURSE PRACTITIONER

## 2017-12-20 RX ORDER — HYDRALAZINE HYDROCHLORIDE 20 MG/ML
10 INJECTION INTRAMUSCULAR; INTRAVENOUS EVERY 6 HOURS PRN
Status: DISCONTINUED | OUTPATIENT
Start: 2017-12-20 | End: 2017-12-22 | Stop reason: HOSPADM

## 2017-12-20 RX ORDER — INSULIN ASPART 100 [IU]/ML
9-12 INJECTION, SOLUTION INTRAVENOUS; SUBCUTANEOUS
Status: DISCONTINUED | OUTPATIENT
Start: 2017-12-20 | End: 2017-12-20

## 2017-12-20 RX ORDER — INSULIN ASPART 100 [IU]/ML
8-10 INJECTION, SOLUTION INTRAVENOUS; SUBCUTANEOUS
Status: DISCONTINUED | OUTPATIENT
Start: 2017-12-20 | End: 2017-12-21

## 2017-12-20 RX ORDER — HYDROCODONE BITARTRATE AND ACETAMINOPHEN 5; 325 MG/1; MG/1
1 TABLET ORAL EVERY 6 HOURS PRN
Status: DISCONTINUED | OUTPATIENT
Start: 2017-12-20 | End: 2017-12-22 | Stop reason: HOSPADM

## 2017-12-20 RX ORDER — FOSINOPIRL SODIUM 10 MG/1
10 TABLET ORAL DAILY
Status: DISCONTINUED | OUTPATIENT
Start: 2017-12-20 | End: 2017-12-21

## 2017-12-20 RX ADMIN — STANDARDIZED SENNA CONCENTRATE AND DOCUSATE SODIUM 1 TABLET: 8.6; 5 TABLET, FILM COATED ORAL at 09:12

## 2017-12-20 RX ADMIN — ASPIRIN 81 MG CHEWABLE TABLET 81 MG: 81 TABLET CHEWABLE at 09:12

## 2017-12-20 RX ADMIN — HEPARIN SODIUM 5000 UNITS: 5000 INJECTION, SOLUTION INTRAVENOUS; SUBCUTANEOUS at 09:12

## 2017-12-20 RX ADMIN — INSULIN ASPART 8 UNITS: 100 INJECTION, SOLUTION INTRAVENOUS; SUBCUTANEOUS at 01:12

## 2017-12-20 RX ADMIN — HYDROCHLOROTHIAZIDE 25 MG: 25 TABLET ORAL at 09:12

## 2017-12-20 RX ADMIN — LEVETIRACETAM 500 MG: 500 TABLET ORAL at 09:12

## 2017-12-20 RX ADMIN — INSULIN ASPART 8 UNITS: 100 INJECTION, SOLUTION INTRAVENOUS; SUBCUTANEOUS at 05:12

## 2017-12-20 RX ADMIN — INSULIN DETEMIR 38 UNITS: 100 INJECTION, SOLUTION SUBCUTANEOUS at 09:12

## 2017-12-20 RX ADMIN — HEPARIN SODIUM 5000 UNITS: 5000 INJECTION, SOLUTION INTRAVENOUS; SUBCUTANEOUS at 01:12

## 2017-12-20 RX ADMIN — INSULIN ASPART 1 UNITS: 100 INJECTION, SOLUTION INTRAVENOUS; SUBCUTANEOUS at 10:12

## 2017-12-20 RX ADMIN — LEVOTHYROXINE SODIUM 50 MCG: 50 TABLET ORAL at 06:12

## 2017-12-20 RX ADMIN — HEPARIN SODIUM 5000 UNITS: 5000 INJECTION, SOLUTION INTRAVENOUS; SUBCUTANEOUS at 06:12

## 2017-12-20 RX ADMIN — ATORVASTATIN CALCIUM 80 MG: 20 TABLET, FILM COATED ORAL at 09:12

## 2017-12-20 RX ADMIN — INSULIN ASPART 6 UNITS: 100 INJECTION, SOLUTION INTRAVENOUS; SUBCUTANEOUS at 09:12

## 2017-12-20 RX ADMIN — LOSARTAN POTASSIUM 50 MG: 50 TABLET, FILM COATED ORAL at 09:12

## 2017-12-20 RX ADMIN — SODIUM CHLORIDE, PRESERVATIVE FREE 3 ML: 5 INJECTION INTRAVENOUS at 01:12

## 2017-12-20 RX ADMIN — SODIUM CHLORIDE, PRESERVATIVE FREE 3 ML: 5 INJECTION INTRAVENOUS at 09:12

## 2017-12-20 RX ADMIN — HYDROCODONE BITARTRATE AND ACETAMINOPHEN 1 TABLET: 5; 325 TABLET ORAL at 09:12

## 2017-12-20 RX ADMIN — INSULIN ASPART 2 UNITS: 100 INJECTION, SOLUTION INTRAVENOUS; SUBCUTANEOUS at 12:12

## 2017-12-20 RX ADMIN — INSULIN ASPART 1 UNITS: 100 INJECTION, SOLUTION INTRAVENOUS; SUBCUTANEOUS at 12:12

## 2017-12-20 RX ADMIN — ESCITALOPRAM 20 MG: 20 TABLET, FILM COATED ORAL at 09:12

## 2017-12-20 NOTE — PROGRESS NOTES
[facility]  Vascular Neurology  Comprehensive Stroke Center  Progress Note    Assessment/Plan:     * Embolic stroke involving left middle cerebral artery    Ms. Wise is a 78yo f who presents with a L MCA syndrome, s/p tpa. Evidence of distal MCA occlusion not amenable to thrombectomy.   Unclear etiology at this time but picture thus far suspicious for embolic stroke; ESUS/Cryptogenic Embolism. Echo with LAE.  Recommend 30 day monitor at discharge.    -Antithrombotics for secondary stroke prevention: Hemorrhage stable, ASA 81mg  -Statins for secondary stroke prevention and hyperlipidemia, if present: Atorvastatin- 80 mg daily (on at home; LDL 71)  -Aggressive risk factor modification: Hypertension, Diabetes, High Cholesterol, Diet, Exercise  -Rehab Efforts: Physical Therapy, Occupational Therapy, Speech and Language Pathology, PM&R  -Diagnostics: None  -VTE Prophylaxis: SQH; Mechanical prophylaxis: Place SCDs due to apparent symptomatic hemorrhagic conversion   -30 Day autotrigger event monitor at discharge        Left arm swelling    -edema and pain of left forearm down into left hand  -warm compresses  -will continue to monitor - may need to relocated IV heplock        Bradycardia    -asymptomatic bradycardia - HR range 48-63  -metoprolol held for 06:00 and 12:00 dose        Aortic arch atherosclerosis    Noted on CTA         Cytotoxic cerebral edema    Secondary to ischemia  Noted on CTH        Aphasia    -Symptom of stroke  -Aggressive SLP         Essential hypertension    -Stroke risk factor  -BP goal <140 post tpa and hemorrhage into infarct  -PRN hydralazine ordered (HR low 50's)  -added home fosinopril 10mg daily  -check blood pressure in right arm only - swelling to left arm          CAD (coronary artery disease)    -Stroke Risk factor  -ASA 81mg daily  -metoprolol 25mg - holding due to asymptomatic bradycardia        Diabetes mellitus    -Stroke risk factor   -A1c 12.1%  -Insulin  increased today  -Management per Endocrinology          Acquired hypothyroidism    Stroke risk factor  Continue home medications        Hyperlipidemia associated with type 2 diabetes mellitus    Stroke risk factor  LDL 50  Home Atorvastatin 80mg             Ms. Wise is a 76yo F with L MCA ischemic stroke s/p tPA.   12/16/17 Last night patient became distressed, appeared to be in pain but was unable to communicate where. ECG and troponin (stable) were ordered as well as CTH which showed hemorrhage in the area of the infarct without significant mass effect or shift. MRI completed today.   12/17/2017 NAEON. Blood glucose ranging from 246-428. Continued aphasia and dysarthria.  12/18 - BG in 300s today, low-dose insulin gtt. Plans for step down.  12/19 - Insulin gtt d/c'd. PT/OT/SLP recommending rehab. Boarding in [facility].  12/20/17 Stepped down last night.  Pending rehab acceptance.  Insulin coverage adjusted per endocrinology.  Exam unchanged. Asymptomatic bradycardia throughout day.  Metoprolol held.  Left arm swelling and pain reported.    STROKE DOCUMENTATION   Acute Stroke Times   Last Known Normal Date: 12/15/17  Last Known Normal Time: 1030  Symptom Onset Date: 12/15/17  Symptom Onset Time: 1030  Stroke Team Called Date: 12/15/17  Stroke Team Called Time: 1342 (upon arrival to [facility])  Stroke Team Arrival Date: 12/15/17  Stroke Team Arrival Time: 1347  CT Interpretation Time: 1237  Decision to Treat Time for Alteplase: 1154  Decision to Treat Time for IR:  (n/a)    NIH Scale:  1a. Level Of Consciousness: 0-->Alert: keenly responsive  1b. LOC Questions: 2-->Answers neither question correctly  1c. LOC Commands: 0-->Performs both tasks correctly  2. Best Gaze: 0-->Normal  3. Visual: 0-->No visual loss  4. Facial Palsy: 1-->Minor paralysis (flattened nasolabial fold, asymmetry on smiling)  5a. Motor Arm, Left: 0-->No drift: limb holds 90 (or 45) degrees for full 10 secs  5b. Motor Arm, Right: 0-->No drift: limb holds 90  (or 45) degrees for full 10 secs  6a. Motor Leg, Left: 0-->No drift: leg holds 30 degree position for full 5 secs  6b. Motor Leg, Right: 0-->No drift: leg holds 30 degree position for full 5 secs  7. Limb Ataxia: 0-->Absent  8. Sensory: 0-->Normal: no sensory loss  9. Best Language: 2-->Severe aphasia: all communication is through fragmentary expression: great need for inference, questioning, and guessing by the listener. Range of information that can be exchanged is limited: listener carries burden of. . . (see row details)  10. Dysarthria: 2-->Severe dysarthria: patients speech is so slurred as to be unintelligible in the absence of or out of proportion to any dysphasia, or is mute/anarthric  11. Extinction and Inattention (formerly Neglect): 0-->No abnormality  Total (NIH Stroke Scale): 7       Modified [place] Coma Scale:    ABCD2 Score:    [id number] Score:   HAS -BLED Score:   ICH Score:   Hunt & Branham Classification:      Hemorrhagic change of an Ischemic Stroke: Does this patient have an ischemic stroke with hemorrhagic changes? Yes, Grading Scale: PH Type 2 (PH-2) = was defined as dense hematoma > 30% of the infarcted area with substantial space-occupying effect, any extension into the intraventricular space, or any hemorrhagic lesion outside the infarcted area.  Is this a symptomatic change?  No - Hemorrhage is not clinically significant     Neurologic Chief Complaint: L MCA stroke w/ hemorrhagic conversion    Subjective:     Interval History: Patient is seen for follow-up neurological assessment and treatment recommendations: No acute issues or events overnight.  Patient and family very upset with semi-private room.  Voiced concerns multiple times throughout the day and was eventually moved to private room once available.  Overall happy with people and care, but accommodations did not meet expectations.  Dispo pending rehab acceptance.  CBG elevated, insulin increased by endocrinology.  Michael  cardiac throughout day.  Asymptomatic.  Metoprolol held at 6a and 12p.  BP elevated.  Home fosinopril 10mg added to regiment.      HPI, Past Medical, Family, and Social History remains the same as documented in the initial encounter.     Review of Systems   Constitutional: Negative for fever.   Eyes: Negative for visual disturbance.   Respiratory: Negative for shortness of breath.    Gastrointestinal: Negative for vomiting.   Musculoskeletal:        Left arm edema and pain   Neurological: Positive for speech difficulty. Negative for weakness and numbness.     Scheduled Meds:   aspirin  81 mg Oral Daily    atorvastatin  80 mg Oral Daily    escitalopram oxalate  20 mg Oral Nightly    fosinopril  10 mg Oral Daily    heparin (porcine)  5,000 Units Subcutaneous Q8H    hydroCHLOROthiazide  25 mg Oral Daily    insulin aspart  8-10 Units Subcutaneous TIDWM    insulin detemir  38 Units Subcutaneous Daily    levETIRAcetam  500 mg Oral BID    levothyroxine  50 mcg Oral Daily    losartan  50 mg Oral Daily    metoprolol tartrate  25 mg Oral Q6H    senna-docusate 8.6-50 mg  1 tablet Oral BID    sodium chloride 0.9%  3 mL Intravenous Q8H     Continuous Infusions:    PRN Meds:acetaminophen, dextrose 50%, dextrose 50%, glucagon (human recombinant), glucose, glucose, insulin aspart, ondansetron    Objective:     Vital Signs (Most Recent):  Temp: 98.6 °F (37 °C) (12/20/17 1526)  Pulse: (!) 55 (12/20/17 1526)  Resp: 18 (12/20/17 1526)  BP: (!) 190/74 (MD notified of elevated BP and bradycardia) (12/20/17 1526)  SpO2: (!) 92 % (12/20/17 1526)  BP Location: Right leg    Vital Signs Range (Last 24H):  Temp:  [98.4 °F (36.9 °C)-99.8 °F (37.7 °C)]   Pulse:  [48-65]   Resp:  [18-32]   BP: (133-190)/(22-79)   SpO2:  [91 %-99 %]   BP Location: Right leg    Physical Exam   Constitutional: She appears well-developed and well-nourished. No distress.   Cardiovascular: Normal rate.    Pulmonary/Chest: Effort normal.   Musculoskeletal:  She exhibits edema (Left forearm/left hand) and tenderness.   Skin: Skin is warm and dry. She is not diaphoretic.   Psychiatric: Her affect is angry.       Neurological Exam:   LOC: alert  Attention Span: Good   Language: Expressive > Receptive  Articulation: Dysarthria  Orientation: Oriented to person  Visual Fields: Full  EOM (CN III, IV, VI): Full/intact  Facial Movement (CN VII): mild right facial droop  Motor: 5/5 in all extremities  Sensation: Intact to light touch, temp  Tone: Normal tone throughout    Laboratory:  CMP:   No results for input(s): GLUCOSE, CALCIUM, ALBUMIN, PROT, NA, K, CO2, CL, BUN, CREATININE, ALKPHOS, ALT, AST, BILITOT in the last 24 hours.  BMP:     Recent Labs  Lab 12/19/17  0209      K 3.9      CO2 27   BUN 12   CREATININE 0.8   CALCIUM 8.3*     CBC:     Recent Labs  Lab 12/19/17  0209   WBC 11.96   RBC 4.46   HGB 9.7*   HCT 32.7*      MCV 73*   MCH 21.7*   MCHC 29.7*     Lipid Panel:     Recent Labs  Lab 12/18/17  1423   CHOL 116*   LDLCALC 50.0*   HDL 43   TRIG 115     Coagulation:     Recent Labs  Lab 12/19/17  0209   INR 1.0     Platelet Aggregation Study: No results for input(s): PLTAGG, PLTAGINTERP, PLTAGREGLACO, ADPPLTAGGREG in the last 168 hours.     Hgb A1C:     Recent Labs  Lab 12/15/17  1433   HGBA1C 12.1*     TSH:     Recent Labs  Lab 12/15/17  1433   TSH 1.008       Diagnostic Results       Brain Imaging     CTH 12/16/17:   Evolving acute/recent infarct involving the left parietal lobe with new hyperdensity concerning for hemorrhagic conversion. Mild mass effect without midline shift.   Continued followup advised.    MRI brain 12/16/17:    Hemorrhagic infarct left parieto-occipital temporal junction.        Vessel Imaging     CTA MP 12/15/17:   Distal left parietal parietal MCA branch occlusion with correlating early changes of infarction on noncontrast CT.  No intracranial hemorrhage or significant mass effect/midline shift.        Cardiac Imaging     2D  Echo 12/15/17  CONCLUSIONS     1 - Normal left ventricular systolic function (EF 65-70%).     2 - Indeterminate LV diastolic function.     3 - Normal right ventricular systolic function .     4 - The estimated PA systolic pressure is 14 mmHg.     5 - Trivial aortic regurgitation.     6 - Trivial pulmonic regurgitation.     7 - Mild left atrial enlargement.     8 - Concentric remodeling.     9 - No wall motion abnormalities.       Kezia Dawson NP  [facility]roke [facility]  Department of Vascular Neurology   [facility]

## 2017-12-20 NOTE — ASSESSMENT & PLAN NOTE
Nutrition Problem  Inadequate PO intake     Related to (etiology):   Condition related diagnoses s/p stroke     Signs and Symptoms (as evidenced by):   PO intake of 25-50%     Interventions/Recommendations (treatment strategy):  See recs     Nutrition Diagnosis Status:   New

## 2017-12-20 NOTE — PLAN OF CARE
Ssc phoned [facility] @ [phone] spoke to Dorothy said she did receive the referral and I gave her the contact information    [facility] @ [phone]  Left a message for Bertha to call back    [phone] spoke to Bertha said she gave to Madhavi the nurse to review           [email or web address]/matthew

## 2017-12-20 NOTE — ASSESSMENT & PLAN NOTE
-edema and pain of left forearm down into left hand  -warm compresses  -will continue to monitor - may need to relocated IV heplock

## 2017-12-20 NOTE — PLAN OF CARE
JEFF informed by Madhavi with [facility] ([phone]) that they are interested in accepting patient and will be submitting to pt's insurance for authorization.    1:44 PM  SW contacted admissions at [facility] ([phone]) to follow up on referral. SW informed by representative that she is not sure if referral has been reviewed yet. SW waiting to hear back from .    3:03 PM  JEFF informed that patient has been accepted to [facility] and auth is pending with insurance.     SW cancelled referral for [facility].      Fouzia Gonzalez, JESSICA  [facility]  Ext. [phone]

## 2017-12-20 NOTE — PROGRESS NOTES
[facility]  Vascular Neurology  Comprehensive Stroke Center  Progress Note    Assessment/Plan:     * Embolic stroke involving left middle cerebral artery    Ms. Wise is a 76yo f who presents with a L MCA syndrome, s/p tpa. Evidence of distal branch off MCA occlusion not amenable to thrombectomy.   Unclear etiology at this time but picture thus far suspicious for embolic stroke; ESUS/Cryptogenic Embolism. Echo with LAE.     -Antithrombotics for secondary stroke prevention: Hemorrhage stable, ASA 81mg  -Statins for secondary stroke prevention and hyperlipidemia, if present: Atorvastatin- 80 mg daily (on at home; LDL 71)  -Aggressive risk factor modification: Hypertension, Diabetes, High Cholesterol, Diet, Exercise  -Rehab Efforts: Physical Therapy, Occupational Therapy, Speech and Language Pathology, PM&R  -Diagnostics: None  -VTE Prophylaxis: SQH; Mechanical prophylaxis: Place SCDs due to apparent symptomatic hemorrhagic conversion         Cytotoxic cerebral edema    Secondary to ischemia  Noted on CTH        Aphasia    Result of stroke   SLP evaluate and treat        Aortic arch atherosclerosis    Noted on CTA         Essential hypertension    Stroke risk factor  BP goal <140 post tpa and hemorrhage into infarct        Diabetes mellitus    Stroke risk factor   A1c 12.1%  Endocrinology on board        Hyperlipidemia associated with type 2 diabetes mellitus    Stroke risk factor  LDL 50  Home Atorvastatin 80mg        Acquired hypothyroidism    Stroke risk factor  Continue home medications             Ms. Wise is a 76yo F with L MCA ischemic stroke s/p tPA.   12/16/17 Last night patient became distressed, appeared to be in pain but was unable to communicate where. ECG and troponin (stable) were ordered as well as CTH which showed hemorrhage in the area of the infarct without significant mass effect or shift. MRI completed today.   12/17/2017 JACKIE. Blood glucose ranging from 246-428. Continued  aphasia and dysarthria.  12/18 - BG in 300s today, low-dose insulin gtt. Plans for step down.  12/19 - Insulin gtt d/c'd. PT/OT/SLP recommending rehab. Boarding in [facility].    STROKE DOCUMENTATION   Acute Stroke Times   Last Known Normal Date: 12/15/17  Last Known Normal Time: 1030  Symptom Onset Date: 12/15/17  Symptom Onset Time: 1030  Stroke Team Called Date: 12/15/17  Stroke Team Called Time: 1342 ([facility] [place] [facility])  Stroke Team Arrival Date: 12/15/17  Stroke Team Arrival Time: 1347  CT Interpretation Time: 1237  Decision to Treat Time for Alteplase: 1154  Decision to Treat Time for IR:  (n/a)    NIH Scale:  1a. Level Of Consciousness: 0-->Alert: keenly responsive  1b. LOC Questions: 2-->Answers neither question correctly  1c. LOC Commands: 0-->Performs both tasks correctly  2. Best Gaze: 0-->Normal  3. Visual: 0-->No visual loss  4. Facial Palsy: 1-->Minor paralysis (flattened nasolabial fold, asymmetry on smiling)  5a. Motor Arm, Left: 0-->No drift: limb holds 90 (or 45) degrees for full 10 secs  5b. Motor Arm, Right: 0-->No drift: limb holds 90 (or 45) degrees for full 10 secs  6a. Motor Leg, Left: 0-->No drift: leg holds 30 degree position for full 5 secs  6b. Motor Leg, Right: 0-->No drift: leg holds 30 degree position for full 5 secs  7. Limb Ataxia: 0-->Absent  8. Sensory: 0-->Normal: no sensory loss  9. Best Language: 2-->Severe aphasia: all communication is through fragmentary expression: great need for inference, questioning, and guessing by the listener. Range of information that can be exchanged is limited: listener carries burden of. . . (see row details)  10. Dysarthria: 2-->Severe dysarthria: patients speech is so slurred as to be unintelligible in the absence of or out of proportion to any dysphasia, or is mute/anarthric  11. Extinction and Inattention (formerly Neglect): 0-->No abnormality  Total (NIH Stroke Scale): 7       Modified [place] Score: 0  [place] Coma Scale:    ABCD2 Score:     [id number] Score:   HAS -BLED Score:   ICH Score:   Hunt & Branham Classification:      Hemorrhagic change of an Ischemic Stroke: Does this patient have an ischemic stroke with hemorrhagic changes? Yes, Grading Scale: HI Type 2 (HI-2) = confluent petechiae within the infarcted area but without space-occupying effect. Is this a symptomatic change?  No - Hemorrhage is not clinically significant     Neurologic Chief Complaint: L MCA stroke w/ hemorrhagic conversion    Subjective:     Interval History: Patient is seen for follow-up neurological assessment and treatment recommendations: JACKIE. Insulin gtt d/c'd. PT/OT/SLP recommending rehab. Boarding in [facility].    HPI, Past Medical, Family, and Social History remains the same as documented in the initial encounter.     Review of Systems   Constitutional: Negative for fever.   Eyes: Negative for visual disturbance.   Respiratory: Negative for shortness of breath.    Neurological: Positive for speech difficulty. Negative for weakness and numbness.     Scheduled Meds:   aspirin  81 mg Oral Daily    atorvastatin  80 mg Oral Daily    escitalopram oxalate  20 mg Oral Nightly    heparin (porcine)  5,000 Units Subcutaneous Q8H    hydroCHLOROthiazide  25 mg Oral Daily    insulin aspart  6-8 Units Subcutaneous TIDWM    insulin detemir  38 Units Subcutaneous Daily    levETIRAcetam  500 mg Oral BID    levothyroxine  50 mcg Oral Daily    losartan  50 mg Oral Daily    metoprolol tartrate  25 mg Oral Q6H    senna-docusate 8.6-50 mg  1 tablet Oral BID    sodium chloride 0.9%  3 mL Intravenous Q8H     Continuous Infusions:    PRN Meds:acetaminophen, dextrose 50%, dextrose 50%, glucagon (human recombinant), glucose, glucose, insulin aspart, ondansetron    Objective:     Vital Signs (Most Recent):  Temp: 98.4 °F (36.9 °C) (12/19/17 1505)  Pulse: 65 (12/19/17 1705)  Resp: 20 (12/19/17 1705)  BP: (!) 144/61 (12/19/17 1705)  SpO2: 99 % (12/19/17 1705)  BP Location: Left  leg    Vital Signs Range (Last 24H):  Temp:  [98.2 °F (36.8 °C)-99.4 °F (37.4 °C)]   Pulse:  [52-65]   Resp:  [16-37]   BP: (130-169)/(60-94)   SpO2:  [93 %-99 %]   BP Location: Left leg    Physical Exam   Constitutional: She appears well-developed and well-nourished. No distress.   HENT:   Head: Normocephalic and atraumatic.   Eyes: EOM are normal. Pupils are equal, round, and reactive to light.   Neck: Normal range of motion. Neck supple.   Cardiovascular: Normal rate.    Pulmonary/Chest: Effort normal.   Musculoskeletal: Normal range of motion.   Neurological: She is alert.   Skin: Skin is warm and dry. She is not diaphoretic.   Psychiatric: She has a normal mood and affect.       Neurological Exam:   LOC: alert  Attention Span: Good   Language: Expressive > Receptive  Articulation: Dysarthria  Orientation: Oriented to person  Visual Fields: Full  EOM (CN III, IV, VI): Full/intact  Facial Movement (CN VII): Lower facial weakness on the Right  Motor: No drift x 4 extremities  Sensation: Intact to light touch  Tone: Normal tone throughout    Laboratory:  CMP:     Recent Labs  Lab 12/19/17  0209   CALCIUM 8.3*   ALBUMIN 2.3*   PROT 5.8*      K 3.9   CO2 27      BUN 12   CREATININE 0.8   ALKPHOS 92   ALT 9*   AST 12   BILITOT 0.9     BMP:     Recent Labs  Lab 12/19/17  0209      K 3.9      CO2 27   BUN 12   CREATININE 0.8   CALCIUM 8.3*     CBC:     Recent Labs  Lab 12/19/17  0209   WBC 11.96   RBC 4.46   HGB 9.7*   HCT 32.7*      MCV 73*   MCH 21.7*   MCHC 29.7*     Lipid Panel:     Recent Labs  Lab 12/18/17  1423   CHOL 116*   LDLCALC 50.0*   HDL 43   TRIG 115     Coagulation:     Recent Labs  Lab 12/19/17  0209   INR 1.0     Platelet Aggregation Study: No results for input(s): PLTAGG, PLTAGINTERP, PLTAGREGLACO, ADPPLTAGGREG in the last 168 hours.     Hgb A1C:     Recent Labs  Lab 12/15/17  1433   HGBA1C 12.1*     TSH:     Recent Labs  Lab 12/15/17  1433   TSH 1.008       Diagnostic  Results       Brain Imaging     CTH 12/16/17:   Evolving acute/recent infarct involving the left parietal lobe with new hyperdensity concerning for hemorrhagic conversion. Mild mass effect without midline shift.   Continued followup advised.    MRI brain 12/16/17:    Hemorrhagic infarct left parieto-occipital temporal junction.        Vessel Imaging     CTA MP 12/15/17:   Distal left parietal parietal MCA branch occlusion with correlating early changes of infarction on noncontrast CT.  No intracranial hemorrhage or significant mass effect/midline shift.        Cardiac Imaging     2D Echo 12/15/17  CONCLUSIONS     1 - Normal left ventricular systolic function (EF 65-70%).     2 - Indeterminate LV diastolic function.     3 - Normal right ventricular systolic function .     4 - The estimated PA systolic pressure is 14 mmHg.     5 - Trivial aortic regurgitation.     6 - Trivial pulmonic regurgitation.     7 - Mild left atrial enlargement.     8 - Concentric remodeling.     9 - No wall motion abnormalities.       Rosa Ross PA-C  Comprehensive Stroke Center  Department of Vascular Neurology   [facility]

## 2017-12-20 NOTE — ASSESSMENT & PLAN NOTE
Goal -180.   Continue Levemir 38 units subQ daily  Adjust  Novolog to 9-12 units with meals. Monitor AC/HS, low dose correction scale.     ADDENDUM 1200:  RN called and Lunch BG reading is 209 after receiving 6 units this AM for breakfast. Adjust scheduled Novolog to 8-10 units AC depending on PO intake. Continue to monitor for prandial needs.        Discharge planning:  Probably resume 70/30. Daughter thinks patient has very low chance of being compliant with 4 shots/day.  Consider addition of GLP-1.  Follow-up with patient's endocrinologist after discharge.

## 2017-12-20 NOTE — PROGRESS NOTES
MD Kezia notified of /74, HR 55. No new orders at this time. MD states she will look over pt's medication list and put in orders. No new orders at this time. Will continue to monitor.

## 2017-12-20 NOTE — PLAN OF CARE
8:34 AM  SW contacted [facility] and [facility] to follow up on referral. SW left messages for both facilities. Sw waiting to hear back.    Fouzia Gonzalez, JESSICA  [facility]  Ext. [phone]

## 2017-12-20 NOTE — PT/OT/SLP PROGRESS
"Physical Therapy  Pt Not Seen     Patient Name:  Tiffanie Wise   MRN:  [id number]    2:54 pm  Patient not seen today secondary to Other (Comment) (Pt's daughter refused for pt stating "She's tired right now.  She just came back from a test and was moved from another room"  ). Will follow-up  on next scheduled visit.    Clara Ochoa, PTA   12/20/2017      "

## 2017-12-20 NOTE — SUBJECTIVE & OBJECTIVE
Neurologic Chief Complaint: L MCA stroke w/ hemorrhagic conversion    Subjective:     Interval History: Patient is seen for follow-up neurological assessment and treatment recommendations: JACKIE. Insulin gtt d/c'd. PT/OT/SLP recommending rehab. Boarding in [facility].    HPI, Past Medical, Family, and Social History remains the same as documented in the initial encounter.     Review of Systems   Constitutional: Negative for fever.   Eyes: Negative for visual disturbance.   Respiratory: Negative for shortness of breath.    Neurological: Positive for speech difficulty. Negative for weakness and numbness.     Scheduled Meds:   aspirin  81 mg Oral Daily    atorvastatin  80 mg Oral Daily    escitalopram oxalate  20 mg Oral Nightly    heparin (porcine)  5,000 Units Subcutaneous Q8H    hydroCHLOROthiazide  25 mg Oral Daily    insulin aspart  6-8 Units Subcutaneous TIDWM    insulin detemir  38 Units Subcutaneous Daily    levETIRAcetam  500 mg Oral BID    levothyroxine  50 mcg Oral Daily    losartan  50 mg Oral Daily    metoprolol tartrate  25 mg Oral Q6H    senna-docusate 8.6-50 mg  1 tablet Oral BID    sodium chloride 0.9%  3 mL Intravenous Q8H     Continuous Infusions:    PRN Meds:acetaminophen, dextrose 50%, dextrose 50%, glucagon (human recombinant), glucose, glucose, insulin aspart, ondansetron    Objective:     Vital Signs (Most Recent):  Temp: 98.4 °F (36.9 °C) (12/19/17 1505)  Pulse: 65 (12/19/17 1705)  Resp: 20 (12/19/17 1705)  BP: (!) 144/61 (12/19/17 1705)  SpO2: 99 % (12/19/17 1705)  BP Location: Left leg    Vital Signs Range (Last 24H):  Temp:  [98.2 °F (36.8 °C)-99.4 °F (37.4 °C)]   Pulse:  [52-65]   Resp:  [16-37]   BP: (130-169)/(60-94)   SpO2:  [93 %-99 %]   BP Location: Left leg    Physical Exam   Constitutional: She appears well-developed and well-nourished. No distress.   HENT:   Head: Normocephalic and atraumatic.   Eyes: EOM are normal. Pupils are equal, round, and reactive to light.   Neck:  Normal range of motion. Neck supple.   Cardiovascular: Normal rate.    Pulmonary/Chest: Effort normal.   Musculoskeletal: Normal range of motion.   Neurological: She is alert.   Skin: Skin is warm and dry. She is not diaphoretic.   Psychiatric: She has a normal mood and affect.       Neurological Exam:   LOC: alert  Attention Span: Good   Language: Expressive > Receptive  Articulation: Dysarthria  Orientation: Oriented to person  Visual Fields: Full  EOM (CN III, IV, VI): Full/intact  Facial Movement (CN VII): Lower facial weakness on the Right  Motor: No drift x 4 extremities  Sensation: Intact to light touch  Tone: Normal tone throughout    Laboratory:  CMP:     Recent Labs  Lab 12/19/17  0209   CALCIUM 8.3*   ALBUMIN 2.3*   PROT 5.8*      K 3.9   CO2 27      BUN 12   CREATININE 0.8   ALKPHOS 92   ALT 9*   AST 12   BILITOT 0.9     BMP:     Recent Labs  Lab 12/19/17  0209      K 3.9      CO2 27   BUN 12   CREATININE 0.8   CALCIUM 8.3*     CBC:     Recent Labs  Lab 12/19/17  0209   WBC 11.96   RBC 4.46   HGB 9.7*   HCT 32.7*      MCV 73*   MCH 21.7*   MCHC 29.7*     Lipid Panel:     Recent Labs  Lab 12/18/17  1423   CHOL 116*   LDLCALC 50.0*   HDL 43   TRIG 115     Coagulation:     Recent Labs  Lab 12/19/17  0209   INR 1.0     Platelet Aggregation Study: No results for input(s): PLTAGG, PLTAGINTERP, PLTAGREGLACO, ADPPLTAGGREG in the last 168 hours.     Hgb A1C:     Recent Labs  Lab 12/15/17  1433   HGBA1C 12.1*     TSH:     Recent Labs  Lab 12/15/17  1433   TSH 1.008       Diagnostic Results       Brain Imaging     CTH 12/16/17:   Evolving acute/recent infarct involving the left parietal lobe with new hyperdensity concerning for hemorrhagic conversion. Mild mass effect without midline shift.   Continued followup advised.    MRI brain 12/16/17:    Hemorrhagic infarct left parieto-occipital temporal junction.        Vessel Imaging     CTA MP 12/15/17:   Distal left parietal parietal MCA  branch occlusion with correlating early changes of infarction on noncontrast CT.  No intracranial hemorrhage or significant mass effect/midline shift.        Cardiac Imaging     2D Echo 12/15/17  CONCLUSIONS     1 - Normal left ventricular systolic function (EF 65-70%).     2 - Indeterminate LV diastolic function.     3 - Normal right ventricular systolic function .     4 - The estimated PA systolic pressure is 14 mmHg.     5 - Trivial aortic regurgitation.     6 - Trivial pulmonic regurgitation.     7 - Mild left atrial enlargement.     8 - Concentric remodeling.     9 - No wall motion abnormalities.

## 2017-12-20 NOTE — PT/OT/SLP PROGRESS
"Occupational Therapy   Treatment    Name: Tiffanie Wise  MRN: [id number]  Admitting Diagnosis:  Embolic stroke involving left middle cerebral artery       Recommendations:     Discharge Recommendations: rehabilitation facility  Discharge Equipment Recommendations:  bath bench  Barriers to discharge:  Decreased caregiver support    Subjective   Patient:  "Thank you!"  Communicated with: nurse prior to session.  Pain/Comfort:  · Pain Rating 1: 4/10  · Left hand; addressed by hot packs/ elevation  · Pain Rating Post-Intervention 1: 4/10    Objective:     Patient found with: telemetry, peripheral IV, PureWick    General Precautions: Standard, aspiration, aphasia, fall   Orthopedic Precautions:N/A   Braces: N/A     Occupational Performance:    Bed Mobility:    · Patient completed Rolling/Turning to Left with  supervision  · Patient completed Rolling/Turning to Right with supervision  · Patient completed Scooting/Bridging with supervision  · Patient completed Supine to Sit with CGA from the left side  · Patient completed Sit to Supine with CGA     Functional Mobility/Transfers:  · Patient completed Sit <> Stand Transfer with SBA  · Patient completed Bed <> Chair Transfer using Stand Pivot technique with CGA with no assistive device     Activities of Daily Living:  · Grooming: SBA while standing, brushing hair with right hand   · UB Dressing: min assistance while seated EOB  · LB Dressing:  Moderate assistance to initiate socks and for fasteners     Patient left supine with all lines intact and call button in reachAMPAC 6 Click:  [facility] Total Score: 17    Treatment & Education:  Patient education provided on role of OT, daily orientation, ADLs and ROM.  Patient unable to verbalize understanding via teach back method. Continued education, patient/ family training recommended.  Patient alert 100% of the session; attentive throughout the session; good eye contact.  Able to follow 3/3 one step commands. Daily orientation " provided.  HARPER performed left UE one set x 10 rep in all planes of motion with assistance provided for elbow flexion, supination, external rotation and finger flexion.10 rep in all planes of motion.  Intermittently during the session, patient positioning right UE in flexed position across abdomen.  Patient's functional status and disposition recommendation discussed patient, patient's dtg and  nurse.  White board updated in patient's room.  OT asked if there were any other questions; patient/ family had no further questions.  Education:    Assessment:     Tiffanie Wise is a 77 y.o. female with a medical diagnosis of Acute ischemic left MCA stroke.  She presents with performance deficits of physical skills including impaired sensation and endurance; demonstrating performance deficits of cognitive skills including impaired  attention, perception, understanding, problem solving, sequencing and memory all resulting in inability organizing occupational performance in a timely and safe manner; demonstrating performance deficits of psychosocial skills including impairments of interpersonal interactions and coping strategies which are skills necessary to successfully and appropriately participate in everyday tasks and social situations.  These performance deficits have resulted in activity limitations including but not limited to:   transfers, ascending/ descending stairs, walking short and long distances, walking around obstacles, transitional movement patterns (kneeling, bending); upper body dressing, lower body dressing, brushing teeth, toileting, bathing, carrying objects, assisting  in his care.   Patient's role as mother, wife, caregiver to  and independent caretaker for self has been affected. Patient will benefit from skilled OT services to maximize level of independence with self-care skills and functional mobility.  Improvements noted with level of participation.     Rehab Prognosis:  Good;  patient would benefit from acute skilled OT services to address these deficits and reach maximum level of function.       Plan:     Patient to be seen 4 x/week to address the above listed problems via self-care/home management, neuromuscular re-education, cognitive retraining, sensory integration, therapeutic activities, therapeutic exercises  · Plan of Care Expires: 01/13/18  · Plan of Care Reviewed with: patient, daughter    This Plan of care has been discussed with the patient who was involved in its development and understands and is in agreement with the identified goals and treatment plan    GOALS:    Occupational Therapy Goals        Problem: Occupational Therapy Goal    Goal Priority Disciplines Outcome Interventions   Occupational Therapy Goal     OT, PT/OT     Description:  Goals set 12/17 to be addressed for 14 days with expiration date, 12/31:  Patient will increase functional independence with ADLs by performing:    Patient will demonstrate rolling to the right with SBA.  Not met   Patient will demonstrate rolling to the left with SBA.   Not met  Patient will demonstrate supine -sit with SBA.   Not met  Patient will demonstrate stand pivot transfers with SBA.   Not met  Patient will demonstrate grooming while standing with SBA.   Not met  Patient will demonstrate upper body dressing with min assist while seated EOB.   Not met  Patient will demonstrate lower body dressing with min assist while seated EOB.   Not met  Patient will demonstrate ability to follow 2/3 commands.   Not met  Patient's family / caregiver will demonstrate independence and safety with assisting patient with self-care skills and functional mobility.     Not met  Patient and/or patient's family will verbalize understanding of stroke prevention guidelines, personal risk factors and stroke warning signs via teachback method.  Not met                            Time Tracking:     OT Date of Treatment: 12/20/17  OT Start Time: 1050  OT  Stop Time: 1119  OT Total Time (min): 29 min    Billable Minutes:Self Care/Home Management 29    NGOZI Acosta  12/20/2017

## 2017-12-20 NOTE — PLAN OF CARE
Problem: Patient Care Overview  Goal: Plan of Care Review  Outcome: Ongoing (interventions implemented as appropriate)  POC reviewed with pt and family at 1400. Pt unable to verbalize understanding. Questions and concerns addressed with pts daughter. No acute events today. Pt progressing toward goals. Will continue to monitor. See flowsheets for full assessment and VS info.

## 2017-12-20 NOTE — PROGRESS NOTES
"[facility]  Endocrinology  Progress Note    Admit Date: 12/15/2017     Reason for Consult: Management of T2DM, Hyperglycemia     Home Diabetes Medications:   Januvia 100 mg daily  Humulin 70/30 - 40 AM and 30 PM    How often checking glucose at home? 2 times per day (AM and bedtime)   BG readings on regimen: Doesn't remember numbers, but usually high.  Hypoglycemia on the regimen?  No  Missed doses on regimen?  Yes    Diabetes Complications include:     Cerebrovascular disease.  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 For diabetes, she takes Januvia 100 mg and Humalin 70/30 (40 units AM, 30 units PM). Her daughter provided the majority of the history. She has had much difficulty controlling her blood glucose. She follows regularly with an endocrinologist in , who is titrating her insulin. Her daughter admits her diet is poor. She monitors her blood glucose twice per day, and it is usually very high, although she's unsure the exact numbers. Endocrine consulted for BG management.       Interval HPI:   Overnight events: Transitioned from IV insulin infusion to Levemir yesterday AM. FBG at goal today. Noted significant prandial excursions yesterday.   Eatin% or more of her tray   Nausea: No  Hypoglycemia and intervention: No  Fever: No  TPN and/or TF: No    BP (!) 170/72 (Patient Position: Lying)   Pulse (!) 57   Temp 98.4 °F (36.9 °C) (Axillary)   Resp 18   Ht 4' 11" (1.499 m)   Wt 68.5 kg (151 lb 0.2 oz)   LMP  (LMP Unknown)   SpO2 (!) 91%   Breastfeeding? No   BMI 30.50 kg/m²       Labs Reviewed and Include    No results for input(s): GLU, CALCIUM, ALBUMIN, PROT, NA, K, CO2, CL, BUN, CREATININE, ALKPHOS, ALT, AST, " BILITOT in the last 24 hours.  Lab Results   Component Value Date    WBC 11.96 12/19/2017    HGB 9.7 (L) 12/19/2017    HCT 32.7 (L) 12/19/2017    MCV 73 (L) 12/19/2017     12/19/2017       Recent Labs  Lab 12/15/17  1433   TSH 1.008     Lab Results   Component Value Date    HGBA1C 12.1 (H) 12/15/2017       Nutritional status:   Body mass index is 30.5 kg/m².  Lab Results   Component Value Date    ALBUMIN 2.3 (L) 12/19/2017    ALBUMIN 2.3 (L) 12/18/2017    ALBUMIN 2.7 (L) 12/17/2017     Lab Results   Component Value Date    PREALBUMIN 23 06/29/2016       Estimated Creatinine Clearance: 63.7 mL/min (based on SCr of 0.8 mg/dL).    Accu-Checks  Recent Labs      12/18/17   1653  12/18/17   2221  12/19/17   0219  12/19/17   0611  12/19/17   0735  12/19/17   0841  12/19/17   1156  12/19/17   1258  12/19/17   1808  12/19/17   2354   POCTGLUCOSE  345*  221*  119*  113*  159*  190*  158*  151*  238*  234*       Current Medications and/or Treatments Impacting Glycemic Control  Immunotherapy:  Immunosuppressants     None        Steroids:   Hormones     None        Pressors:    Autonomic Drugs     None        Hyperglycemia/Diabetes Medications: Antihyperglycemics     Start     Stop Route Frequency Ordered    12/19/17 1322  insulin aspart pen 0-5 Units      -- SubQ Before meals & nightly PRN 12/19/17 1222    12/19/17 1230  insulin detemir pen 38 Units      -- SubQ Daily 12/19/17 1222    12/19/17 1230  insulin aspart pen 6-8 Units      -- SubQ 3 times daily with meals 12/19/17 1222          ASSESSMENT and PLAN    * Embolic stroke involving left middle cerebral artery    Per primary  Avoid hypoglycemia        Type 2 diabetes mellitus with hyperglycemia    Goal -180.   Continue Levemir 38 units subQ daily  Adjust  Novolog to 9-12 units with meals. Monitor AC/HS, low dose correction scale.     ADDENDUM 1200:  RN called and Lunch BG reading is 209 after receiving 6 units this AM for breakfast. Adjust scheduled Novolog to  8-10 units AC depending on PO intake. Continue to monitor for prandial needs.        Discharge planning:  Probably resume 70/30. Daughter thinks patient has very low chance of being compliant with 4 shots/day.  Consider addition of GLP-1.  Follow-up with patient's endocrinologist after discharge.          Acute hemorrhagic infarction of brain    Per primary        Acquired hypothyroidism    Continue Levothyroxine 50 mcg daily    Lab Results   Component Value Date    TSH 1.008 12/15/2017                Pricila Forbes NP  Endocrinology  [facility]

## 2017-12-20 NOTE — PLAN OF CARE
Problem: Occupational Therapy Goal  Goal: Occupational Therapy Goal  Goals set 12/17 to be addressed for 14 days with expiration date, 12/31:  Patient will increase functional independence with ADLs by performing:    Patient will demonstrate rolling to the right with SBA.  Not met   Patient will demonstrate rolling to the left with SBA.   Not met  Patient will demonstrate supine -sit with SBA.   Not met  Patient will demonstrate stand pivot transfers with SBA.   Not met  Patient will demonstrate grooming while standing with SBA.   Not met  Patient will demonstrate upper body dressing with min assist while seated EOB.   Not met  Patient will demonstrate lower body dressing with min assist while seated EOB.   Not met  Patient will demonstrate ability to follow 2/3 commands.   Not met  Patient's family / caregiver will demonstrate independence and safety with assisting patient with self-care skills and functional mobility.     Not met  Patient and/or patient's family will verbalize understanding of stroke prevention guidelines, personal risk factors and stroke warning signs via teachback method.  Not met           Goals remain appropriate.  NGOZI Acosta  12/20/2017

## 2017-12-20 NOTE — PT/OT/SLP PROGRESS
"Speech Language Pathology Treatment    Patient Name:  Tiffanie Wise   MRN:  [id number]  Admitting Diagnosis: Embolic stroke involving left middle cerebral artery    Recommendations:                 General Recommendations:  Speech/language therapy  Diet recommendations:  Dental Soft, Liquid Diet Level: Thin   Aspiration Precautions: Standard aspiration precautions   General Precautions: Standard, fall, aspiration, aphasia  Communication strategies:  yes/no questions only    Subjective     "Bye"  Patient goals: unable to state    Pain/Comfort:  · Pain Rating 1: 0/10  · Pain Rating Post-Intervention 1: 0/10    Objective:     Has the patient been evaluated by SLP for swallowing?   Yes  Keep patient NPO? No   Current Respiratory Status: nasal cannula      Pt. Seen at bedside with daughter present.  HOB was raised to 90 degree angle.  Education provided to daughter re pt's communication deficits and prognosis.  Receptively, Ms. Wise followed 3/6 commands and modelled 5/6 commands with groping behavior noted.  She responded via head nod to simple yes/no questions with 100% accuracy and did identify object after shown written word with 100% accuracy.  Expressively, language was primarily jargon with occasional intelligible utterance.  She counted to 10 with 50% accuracy given max cues and did not accurately complete automatic sentences given max cues.  Pt. Did not model vowels or repeat single words with visual and tactile cues.  No intelligible words were elicited when asked to single simple song nor did she accurately carry fallon of tune.  She did not read any single words given max cues.        Assessment:     Tiffanie Wise is a 77 y.o. female with an SLP diagnosis of Aphasia and Apraxia.      Goals:    SLP Goals        Problem: SLP Goal    Goal Priority Disciplines Outcome   SLP Goal     SLP Ongoing (interventions implemented as appropriate)   Description:  Speech Language Pathology Goals  Goals expected to be met by " 12/23/2017  1. Pt will tolerate puree diet without overt S/S aspiration, MIN A  2. Pt will tolerate nectar-thickened liquids w/o overt S/S aspiration, MIN A  3. Pt will tolerate trials of advanced diet textures (dental soft, regular) with adequate oral clearance, MIN A  4. Pt will tolerate trials of thin liquids w/o overt S/S aspiration, MIN A  5. Pt will answer basic/personal YNQ with 80% accuracy or higher, MOD A  6. Pt will follow 1-step commands with 80% accuracy or higher, MOD A  7. Pt will complete automatic speech tasks with 70% accuracy, MOD A  8. Educate Pt and family on aspiration precautions and compensatory strategies for functional communication                         Plan:     · Patient to be seen:  5 x/week   · Plan of Care expires:  01/15/18  · Plan of Care reviewed with:  patient, daughter   · SLP Follow-Up:  Yes       Discharge recommendations:  rehabilitation facility   Barriers to Discharge:  None    Time Tracking:     SLP Treatment Date:   12/20/17  Speech Start Time:  1205  Speech Stop Time:  1235     Speech Total Time (min):  30 min    Billable Minutes: Speech Therapy Individual 22 and Seld Care/Home Management Training 8    Glory Mera MA, CCC-SLP  12/20/2017

## 2017-12-20 NOTE — PLAN OF CARE
JEFF spoke with Carson from [facility] ([phone]). He wanted to know what kind of support she would have after rehab and to confirm the number he has for the daughter is correct. He will review with the MD director and let the new SW know asa today if they can take her and submit for auth.     Colleen Martin, JESSICA  Neurocritical Care   [facility]  [id number]

## 2017-12-20 NOTE — ASSESSMENT & PLAN NOTE
Ms. Wise is a 78yo f who presents with a L MCA syndrome, s/p tpa. Evidence of distal MCA occlusion not amenable to thrombectomy.   Unclear etiology at this time but picture thus far suspicious for embolic stroke; ESUS/Cryptogenic Embolism. Echo with LAE.  Recommend 30 day monitor at discharge.    -Antithrombotics for secondary stroke prevention: Hemorrhage stable, ASA 81mg  -Statins for secondary stroke prevention and hyperlipidemia, if present: Atorvastatin- 80 mg daily (on at home; LDL 71)  -Aggressive risk factor modification: Hypertension, Diabetes, High Cholesterol, Diet, Exercise  -Rehab Efforts: Physical Therapy, Occupational Therapy, Speech and Language Pathology, PM&R  -Diagnostics: None  -VTE Prophylaxis: SQH; Mechanical prophylaxis: Place SCDs due to apparent symptomatic hemorrhagic conversion   -30 Day autotrigger event monitor at discharge

## 2017-12-20 NOTE — PLAN OF CARE
Problem: Diabetes, Type 2 (Adult)  Goal: Signs and Symptoms of Listed Potential Problems Will be Absent, Minimized or Managed (Diabetes, Type 2)  Signs and symptoms of listed potential problems will be absent, minimized or managed by discharge/transition of care (reference Diabetes, Type 2 (Adult) CPG).   Outcome: Ongoing (interventions implemented as appropriate)  Diabetes education provided to pt with questions and concerns discussed. Instructed pt on orders for hyper/hypoglycemic control and blood glucose monitoring. Acknowledged understanding. Will cont to monitor.     Problem: Patient Care Overview  Goal: Plan of Care Review  Outcome: Ongoing (interventions implemented as appropriate)  Pt transferred from NCCU to NSU with daughter at bedside. Questions and concerns addressed. Daughter and pt upset about being moved this late in the shift in non private room. VSS; no change from previous neuro status. Pt turned and repositioned every two hours and as needed. Call light in reach and will con to monitor.     Problem: Fall Risk (Adult)  Goal: Identify Related Risk Factors and Signs and Symptoms  Related risk factors and signs and symptoms are identified upon initiation of Human Response Clinical Practice Guideline (CPG)   Outcome: Ongoing (interventions implemented as appropriate)  Fall risk and precautions discussed with pt and family. Acknowledged understanding. Fall precautions in place. No questions or concerns at present. Call light in reach. Will cont to monitor.

## 2017-12-20 NOTE — PROGRESS NOTES
"[facility]  Adult Nutrition  Progress Note    SUMMARY     Recommendations    Recommendation/Intervention:   1. Continue 1800cal Diabetic diet with texture per SLP.   2. Recommend Boost Glucose Control to supplement meals.     RD following     Goals: Meet % EEN, EPN  Nutrition Goal Status: progressing towards goal  Communication of RD Recs:  (POC)    Reason for Assessment    Reason for Assessment: RD follow-up  Diagnosis: stroke/CVA  Relevent Medical History: HTN, HF, DM   Interdisciplinary Rounds: did not attend     General Information Comments: Dental soft diabetic diet ordered. Pt seen this am, not eating very much. Daughter at bedside to report good PO intake prior to hospital stay. Gave diabetic education  to patient and daughter as A1C is 12.1. Daughter reports mother has fair compliance to diet and has been educated before. Handouts for reference provided.     Nutrition Discharge Plannin Diabetic diet     Nutrition Prescription Ordered    Current Diet Order: Dental Soft 1800cal Diabetic     Evaluation of Received Nutrients/Fluid Intake    I/O: -I/O  Comments: LBM 12/18     % Intake of Estimated Energy Needs: 25 - 50 %  % Meal Intake: 25%     Nutrition Risk Screen     Nutrition Risk Screen: dysphagia or difficulty swallowing    Nutrition/Diet History    Patient Reported Diet/Restrictions/Preferences: other (see comments) (BLANE)  Food Preferences: No Denominational/ cultural prefs noted     Labs/Tests/Procedures/Meds    Pertinent Labs Reviewed: reviewed, pertinent  Pertinent Labs Comments: POCT Gluc 113-375, Ca 8.3, Phos 2.5, Alb 2.3  Pertinent Medications Reviewed: reviewed, pertinent  Pertinent Medications Comments: heparin, statin, insulin, senna, levothyroxine     Physical Findings    Overall Physical Appearance: overweight, listlessness  Oral/Mouth Cavity: WDL  Skin: intact    Anthropometrics    Temp: 98.6 °F (37 °C)     Height: 4' 11" (149.9 cm)  Weight Method: Bed Scale  Weight: " 68.5 kg (151 lb 0.2 oz)     Ideal Body Weight (IBW), Female: 95 lb     % Ideal Body Weight, Female (lb): 158.97 lb  BMI (Calculated): 30.6  BMI Grade: 30 - 34.9- obesity - grade I       Estimated/Assessed Needs    Weight Used For Calorie Calculations: 68.5 kg (151 lb 0.2 oz)   Height (cm): 149.9 cm  Energy Calorie Requirements (kcal): 1343 kcal/d  Energy Need Method: Summers-St Jeor (1.25 PAL)  RMR (Summers-St. Jeor Equation): 1075.62  Weight Used For Protein Calculations: 68.5 kg (151 lb 0.2 oz)  Protein Requirements: 69 g/d (1 g/kg)  Fluid Need Method: other (see comments), RDA Method (Per MD or 1 mL/kcal)  RDA Method (mL): 1343  CHO Requirement: 50% total kcals     Assessment and Plan    * Embolic stroke involving left middle cerebral artery    Nutrition Problem  Inadequate PO intake     Related to (etiology):   Condition related diagnoses s/p stroke     Signs and Symptoms (as evidenced by):   PO intake of 25-50%     Interventions/Recommendations (treatment strategy):  See recs     Nutrition Diagnosis Status:   New                  Monitor and Evaluation    Food and Nutrient Intake: energy intake, food and beverage intake  Food and Nutrient Adminstration: diet order     Physical Activity and Function: nutrition-related ADLs and IADLs  Anthropometric Measurements: weight, weight change  Biochemical Data, Medical Tests and Procedures: lipid profile, inflammatory profile, gastrointestinal profile, electrolyte and renal panel, glucose/endocrine profile  Nutrition-Focused Physical Findings: overall appearance, skin    Nutrition Risk    Level of Risk:  (f/u 1x week)    Nutrition Follow-Up    RD Follow-up?: Yes

## 2017-12-20 NOTE — SUBJECTIVE & OBJECTIVE
Neurologic Chief Complaint: L MCA stroke w/ hemorrhagic conversion    Subjective:     Interval History: Patient is seen for follow-up neurological assessment and treatment recommendations: No acute issues or events overnight.  Patient and family very upset with semi-private room.  Voiced concerns multiple times throughout the day and was eventually moved to private room once available.  Overall happy with people and care, but accommodations did not meet expectations.  Dispo pending rehab acceptance.  CBG elevated, insulin increased by endocrinology.  Michael cardiac throughout day.  Asymptomatic.  Metoprolol held at 6a and 12p.  BP elevated.  Home fosinopril 10mg added to regiment.      HPI, Past Medical, Family, and Social History remains the same as documented in the initial encounter.     Review of Systems   Constitutional: Negative for fever.   Eyes: Negative for visual disturbance.   Respiratory: Negative for shortness of breath.    Gastrointestinal: Negative for vomiting.   Musculoskeletal:        Left arm edema and pain   Neurological: Positive for speech difficulty. Negative for weakness and numbness.     Scheduled Meds:   aspirin  81 mg Oral Daily    atorvastatin  80 mg Oral Daily    escitalopram oxalate  20 mg Oral Nightly    fosinopril  10 mg Oral Daily    heparin (porcine)  5,000 Units Subcutaneous Q8H    hydroCHLOROthiazide  25 mg Oral Daily    insulin aspart  8-10 Units Subcutaneous TIDWM    insulin detemir  38 Units Subcutaneous Daily    levETIRAcetam  500 mg Oral BID    levothyroxine  50 mcg Oral Daily    losartan  50 mg Oral Daily    metoprolol tartrate  25 mg Oral Q6H    senna-docusate 8.6-50 mg  1 tablet Oral BID    sodium chloride 0.9%  3 mL Intravenous Q8H     Continuous Infusions:    PRN Meds:acetaminophen, dextrose 50%, dextrose 50%, glucagon (human recombinant), glucose, glucose, insulin aspart, ondansetron    Objective:     Vital Signs (Most Recent):  Temp: 98.6 °F (37 °C)  (12/20/17 1526)  Pulse: (!) 55 (12/20/17 1526)  Resp: 18 (12/20/17 1526)  BP: (!) 190/74 (MD notified of elevated BP and bradycardia) (12/20/17 1526)  SpO2: (!) 92 % (12/20/17 1526)  BP Location: Right leg    Vital Signs Range (Last 24H):  Temp:  [98.4 °F (36.9 °C)-99.8 °F (37.7 °C)]   Pulse:  [48-65]   Resp:  [18-32]   BP: (133-190)/(22-79)   SpO2:  [91 %-99 %]   BP Location: Right leg    Physical Exam   Constitutional: She appears well-developed and well-nourished. No distress.   Cardiovascular: Normal rate.    Pulmonary/Chest: Effort normal.   Musculoskeletal: She exhibits edema (Left forearm/left hand) and tenderness.   Skin: Skin is warm and dry. She is not diaphoretic.   Psychiatric: Her affect is angry.       Neurological Exam:   LOC: alert  Attention Span: Good   Language: Expressive > Receptive  Articulation: Dysarthria  Orientation: Oriented to person  Visual Fields: Full  EOM (CN III, IV, VI): Full/intact  Facial Movement (CN VII): mild right facial droop  Motor: 5/5 in all extremities  Sensation: Intact to light touch, temp  Tone: Normal tone throughout    Laboratory:  CMP:   No results for input(s): GLUCOSE, CALCIUM, ALBUMIN, PROT, NA, K, CO2, CL, BUN, CREATININE, ALKPHOS, ALT, AST, BILITOT in the last 24 hours.  BMP:     Recent Labs  Lab 12/19/17  0209      K 3.9      CO2 27   BUN 12   CREATININE 0.8   CALCIUM 8.3*     CBC:     Recent Labs  Lab 12/19/17  0209   WBC 11.96   RBC 4.46   HGB 9.7*   HCT 32.7*      MCV 73*   MCH 21.7*   MCHC 29.7*     Lipid Panel:     Recent Labs  Lab 12/18/17  1423   CHOL 116*   LDLCALC 50.0*   HDL 43   TRIG 115     Coagulation:     Recent Labs  Lab 12/19/17  0209   INR 1.0     Platelet Aggregation Study: No results for input(s): PLTAGG, PLTAGINTERP, PLTAGREGLACO, ADPPLTAGGREG in the last 168 hours.     Hgb A1C:     Recent Labs  Lab 12/15/17  1433   HGBA1C 12.1*     TSH:     Recent Labs  Lab 12/15/17  1433   TSH 1.008       Diagnostic Results       Brain  Imaging     CTH 12/16/17:   Evolving acute/recent infarct involving the left parietal lobe with new hyperdensity concerning for hemorrhagic conversion. Mild mass effect without midline shift.   Continued followup advised.    MRI brain 12/16/17:    Hemorrhagic infarct left parieto-occipital temporal junction.        Vessel Imaging     CTA MP 12/15/17:   Distal left parietal parietal MCA branch occlusion with correlating early changes of infarction on noncontrast CT.  No intracranial hemorrhage or significant mass effect/midline shift.        Cardiac Imaging     2D Echo 12/15/17  CONCLUSIONS     1 - Normal left ventricular systolic function (EF 65-70%).     2 - Indeterminate LV diastolic function.     3 - Normal right ventricular systolic function .     4 - The estimated PA systolic pressure is 14 mmHg.     5 - Trivial aortic regurgitation.     6 - Trivial pulmonic regurgitation.     7 - Mild left atrial enlargement.     8 - Concentric remodeling.     9 - No wall motion abnormalities.

## 2017-12-20 NOTE — PLAN OF CARE
Problem: Patient Care Overview  Goal: Plan of Care Review  Outcome: Ongoing (interventions implemented as appropriate)  POC reviewed with Ms. Wise and daughter at 1400. Pt unable to verbalize understanding. Daughter verbalized understanding. Questions and concerns addressed. No acute events today. Pt progressing toward goals. Plan to discharge to rehab facility tomorrow. Will continue to monitor. See flowsheets for full assessment and VS info.

## 2017-12-20 NOTE — PLAN OF CARE
Problem: SLP Goal  Goal: SLP Goal  Speech Language Pathology Goals  Goals expected to be met by 12/23/2017  1. Pt will tolerate puree diet without overt S/S aspiration, MIN A  2. Pt will tolerate nectar-thickened liquids w/o overt S/S aspiration, MIN A  3. Pt will tolerate trials of advanced diet textures (dental soft, regular) with adequate oral clearance, MIN A  4. Pt will tolerate trials of thin liquids w/o overt S/S aspiration, MIN A  5. Pt will answer basic/personal YNQ with 80% accuracy or higher, MOD A  6. Pt will follow 1-step commands with 80% accuracy or higher, MOD A  7. Pt will complete automatic speech tasks with 70% accuracy, MOD A  8. Educate Pt and family on aspiration precautions and compensatory strategies for functional communication        Outcome: Ongoing (interventions implemented as appropriate)  Pt. Progressing towards goals.    Glory Mera MA/MARÍA-SLP  Speech Language Pathologist  Pager [phone]  12/20/2017

## 2017-12-20 NOTE — NURSING TRANSFER
Nursing Transfer Note      12/19/2017     Transfer To: [facility] [id number]    Transfer via bed    Transfer with cardiac monitoring    Transported by RN    Medicines sent: insulin aspart, and insulin detemir    Chart send with patient: Yes    Notified: daughter    Patient reassessed at: 12/19/17, 7341 (date, time)    Upon arrival to floor: cardiac monitor applied, patient oriented to room, call bell in reach and bed in lowest position

## 2017-12-20 NOTE — ASSESSMENT & PLAN NOTE
-Stroke risk factor  -BP goal <140 post tpa and hemorrhage into infarct  -PRN hydralazine ordered (HR low 50's)  -added home fosinopril 10mg daily  -check blood pressure in right arm only - swelling to left arm

## 2017-12-20 NOTE — PLAN OF CARE
Problem: Patient Care Overview  Goal: Plan of Care Review  Recommendation/Intervention:   1. Continue 1800cal Diabetic diet with texture per SLP.   2. Recommend Boost Glucose Control to supplement meals.     RD following     Goals: Meet % EEN, EPN  Nutrition Goal Status: progressing towards goal

## 2017-12-20 NOTE — SUBJECTIVE & OBJECTIVE
"Interval HPI:   Overnight events: Transitioned from IV insulin infusion to Levemir yesterday AM. FBG at goal today. Noted significant prandial excursions yesterday.   Eatin% or more of her tray   Nausea: No  Hypoglycemia and intervention: No  Fever: No  TPN and/or TF: No    BP (!) 170/72 (Patient Position: Lying)   Pulse (!) 57   Temp 98.4 °F (36.9 °C) (Axillary)   Resp 18   Ht 4' 11" (1.499 m)   Wt 68.5 kg (151 lb 0.2 oz)   LMP  (LMP Unknown)   SpO2 (!) 91%   Breastfeeding? No   BMI 30.50 kg/m²     Labs Reviewed and Include    No results for input(s): GLU, CALCIUM, ALBUMIN, PROT, NA, K, CO2, CL, BUN, CREATININE, ALKPHOS, ALT, AST, BILITOT in the last 24 hours.  Lab Results   Component Value Date    WBC 11.96 2017    HGB 9.7 (L) 2017    HCT 32.7 (L) 2017    MCV 73 (L) 2017     2017       Recent Labs  Lab 12/15/17  1433   TSH 1.008     Lab Results   Component Value Date    HGBA1C 12.1 (H) 12/15/2017       Nutritional status:   Body mass index is 30.5 kg/m².  Lab Results   Component Value Date    ALBUMIN 2.3 (L) 2017    ALBUMIN 2.3 (L) 2017    ALBUMIN 2.7 (L) 2017     Lab Results   Component Value Date    PREALBUMIN 23 2016       Estimated Creatinine Clearance: 63.7 mL/min (based on SCr of 0.8 mg/dL).    Accu-Checks  Recent Labs      17   1653  17   2221  17   0219  17   0611  17   0735  17   0841  17   1156  17   1258  17   1808  17   2354   POCTGLUCOSE  345*  221*  119*  113*  159*  190*  158*  151*  238*  234*       Current Medications and/or Treatments Impacting Glycemic Control  Immunotherapy:  Immunosuppressants     None        Steroids:   Hormones     None        Pressors:    Autonomic Drugs     None        Hyperglycemia/Diabetes Medications: Antihyperglycemics     Start     Stop Route Frequency Ordered    17 1322  insulin aspart pen 0-5 Units      -- SubQ Before meals & " nightly PRN 12/19/17 1222    12/19/17 1230  insulin detemir pen 38 Units      -- SubQ Daily 12/19/17 1222    12/19/17 1230  insulin aspart pen 6-8 Units      -- SubQ 3 times daily with meals 12/19/17 1222

## 2017-12-21 PROBLEM — R00.1 BRADYCARDIA: Status: ACTIVE | Noted: 2017-12-21

## 2017-12-21 PROBLEM — M79.89 LEFT ARM SWELLING: Status: ACTIVE | Noted: 2017-12-21

## 2017-12-21 LAB
POCT GLUCOSE: 219 MG/DL (ref 70–110)
POCT GLUCOSE: 227 MG/DL (ref 70–110)
POCT GLUCOSE: 228 MG/DL (ref 70–110)
POCT GLUCOSE: 228 MG/DL (ref 70–110)

## 2017-12-21 PROCEDURE — 20600001 HC STEP DOWN PRIVATE ROOM

## 2017-12-21 PROCEDURE — 25000003 PHARM REV CODE 250: Performed by: PSYCHIATRY & NEUROLOGY

## 2017-12-21 PROCEDURE — 25000003 PHARM REV CODE 250: Performed by: PHYSICIAN ASSISTANT

## 2017-12-21 PROCEDURE — 92507 TX SP LANG VOICE COMM INDIV: CPT

## 2017-12-21 PROCEDURE — 97530 THERAPEUTIC ACTIVITIES: CPT

## 2017-12-21 PROCEDURE — 63600175 PHARM REV CODE 636 W HCPCS: Performed by: PHYSICIAN ASSISTANT

## 2017-12-21 PROCEDURE — G8988 SELF CARE GOAL STATUS: HCPCS | Mod: CK

## 2017-12-21 PROCEDURE — 97535 SELF CARE MNGMENT TRAINING: CPT

## 2017-12-21 PROCEDURE — G8989 SELF CARE D/C STATUS: HCPCS | Mod: CJ

## 2017-12-21 PROCEDURE — 25000003 PHARM REV CODE 250: Performed by: NURSE PRACTITIONER

## 2017-12-21 PROCEDURE — 99232 SBSQ HOSP IP/OBS MODERATE 35: CPT | Mod: ,,, | Performed by: NURSE PRACTITIONER

## 2017-12-21 PROCEDURE — 92526 ORAL FUNCTION THERAPY: CPT

## 2017-12-21 PROCEDURE — A4216 STERILE WATER/SALINE, 10 ML: HCPCS | Performed by: NURSE PRACTITIONER

## 2017-12-21 PROCEDURE — 99233 SBSQ HOSP IP/OBS HIGH 50: CPT | Mod: GC,,, | Performed by: PSYCHIATRY & NEUROLOGY

## 2017-12-21 PROCEDURE — 97532 *HC OT COG SKL DEV EA 15: CPT

## 2017-12-21 PROCEDURE — 97116 GAIT TRAINING THERAPY: CPT

## 2017-12-21 PROCEDURE — 94660 CPAP INITIATION&MGMT: CPT

## 2017-12-21 PROCEDURE — 99900035 HC TECH TIME PER 15 MIN (STAT)

## 2017-12-21 RX ORDER — INSULIN ASPART 100 [IU]/ML
9-12 INJECTION, SOLUTION INTRAVENOUS; SUBCUTANEOUS
Status: DISCONTINUED | OUTPATIENT
Start: 2017-12-21 | End: 2017-12-22

## 2017-12-21 RX ORDER — CARVEDILOL 3.12 MG/1
3.12 TABLET ORAL 2 TIMES DAILY
Status: DISCONTINUED | OUTPATIENT
Start: 2017-12-21 | End: 2017-12-22 | Stop reason: HOSPADM

## 2017-12-21 RX ORDER — LOSARTAN POTASSIUM 50 MG/1
100 TABLET ORAL DAILY
Status: DISCONTINUED | OUTPATIENT
Start: 2017-12-22 | End: 2017-12-22 | Stop reason: HOSPADM

## 2017-12-21 RX ADMIN — ESCITALOPRAM 20 MG: 20 TABLET, FILM COATED ORAL at 09:12

## 2017-12-21 RX ADMIN — HYDROCODONE BITARTRATE AND ACETAMINOPHEN 1 TABLET: 5; 325 TABLET ORAL at 06:12

## 2017-12-21 RX ADMIN — LOSARTAN POTASSIUM 50 MG: 50 TABLET, FILM COATED ORAL at 09:12

## 2017-12-21 RX ADMIN — INSULIN DETEMIR 38 UNITS: 100 INJECTION, SOLUTION SUBCUTANEOUS at 09:12

## 2017-12-21 RX ADMIN — INSULIN ASPART 2 UNITS: 100 INJECTION, SOLUTION INTRAVENOUS; SUBCUTANEOUS at 05:12

## 2017-12-21 RX ADMIN — HEPARIN SODIUM 5000 UNITS: 5000 INJECTION, SOLUTION INTRAVENOUS; SUBCUTANEOUS at 06:12

## 2017-12-21 RX ADMIN — LEVETIRACETAM 500 MG: 500 TABLET ORAL at 09:12

## 2017-12-21 RX ADMIN — ATORVASTATIN CALCIUM 80 MG: 20 TABLET, FILM COATED ORAL at 09:12

## 2017-12-21 RX ADMIN — HYDROCODONE BITARTRATE AND ACETAMINOPHEN 1 TABLET: 5; 325 TABLET ORAL at 09:12

## 2017-12-21 RX ADMIN — ASPIRIN 81 MG CHEWABLE TABLET 81 MG: 81 TABLET CHEWABLE at 09:12

## 2017-12-21 RX ADMIN — HEPARIN SODIUM 5000 UNITS: 5000 INJECTION, SOLUTION INTRAVENOUS; SUBCUTANEOUS at 09:12

## 2017-12-21 RX ADMIN — HEPARIN SODIUM 5000 UNITS: 5000 INJECTION, SOLUTION INTRAVENOUS; SUBCUTANEOUS at 02:12

## 2017-12-21 RX ADMIN — LEVOTHYROXINE SODIUM 50 MCG: 50 TABLET ORAL at 06:12

## 2017-12-21 RX ADMIN — INSULIN ASPART 2 UNITS: 100 INJECTION, SOLUTION INTRAVENOUS; SUBCUTANEOUS at 12:12

## 2017-12-21 RX ADMIN — SODIUM CHLORIDE, PRESERVATIVE FREE 3 ML: 5 INJECTION INTRAVENOUS at 02:12

## 2017-12-21 RX ADMIN — FOSINOPRIL 10 MG: 10 TABLET ORAL at 09:12

## 2017-12-21 RX ADMIN — INSULIN ASPART 2 UNITS: 100 INJECTION, SOLUTION INTRAVENOUS; SUBCUTANEOUS at 09:12

## 2017-12-21 RX ADMIN — CARVEDILOL 3.12 MG: 3.12 TABLET, FILM COATED ORAL at 09:12

## 2017-12-21 RX ADMIN — INSULIN ASPART 10 UNITS: 100 INJECTION, SOLUTION INTRAVENOUS; SUBCUTANEOUS at 12:12

## 2017-12-21 RX ADMIN — INSULIN ASPART 1 UNITS: 100 INJECTION, SOLUTION INTRAVENOUS; SUBCUTANEOUS at 09:12

## 2017-12-21 RX ADMIN — INSULIN ASPART 12 UNITS: 100 INJECTION, SOLUTION INTRAVENOUS; SUBCUTANEOUS at 05:12

## 2017-12-21 RX ADMIN — CARVEDILOL 3.12 MG: 3.12 TABLET, FILM COATED ORAL at 02:12

## 2017-12-21 NOTE — PROGRESS NOTES
"[facility]  Endocrinology  Progress Note    Admit Date: 12/15/2017     Reason for Consult: Management of T2DM, Hyperglycemia     Home Diabetes Medications:   Januvia 100 mg daily  Humulin 70/30 - 40 AM and 30 PM    How often checking glucose at home? 2 times per day (AM and bedtime)   BG readings on regimen: Doesn't remember numbers, but usually high.  Hypoglycemia on the regimen?  No  Missed doses on regimen?  Yes    Diabetes Complications include:     Cerebrovascular disease.  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 For diabetes, she takes januvia 100 mg and Humalin 70/30 (40 units AM, 30 units PM). Her daughter provided the majority of the history. She has had much difficulty controlling her blood glucose. She follows regularly with an endocrinologist in , who is titrating her insulin. Her daughter admits her diet is poor. She monitors her blood glucose twice per day, and it is usually very high, although she's unsure the exact numbers. Endocrine consulted for BG management.       Interval HPI:   Overnight events: BG slightly elevated, has required correction scale coverage, despite increase in Novolog AC dose.   Eatin%-75% of ADA diet  Nausea: No  Hypoglycemia and intervention: No  Fever: No  TPN and/or TF: No    BP (!) 150/65 (Patient Position: Lying)   Pulse (!) 59   Temp 99.2 °F (37.3 °C) (Oral)   Resp 17   Ht 4' 11" (1.499 m)   Wt 68.5 kg (151 lb 0.2 oz)   LMP  (LMP Unknown)   SpO2 97%   Breastfeeding? No   BMI 30.50 kg/m²       Labs Reviewed and Include    No results for input(s): GLU, CALCIUM, ALBUMIN, PROT, NA, K, CO2, CL, BUN, CREATININE, ALKPHOS, ALT, AST, BILITOT in the last 24 hours.  Lab Results "   Component Value Date    WBC 11.96 12/19/2017    HGB 9.7 (L) 12/19/2017    HCT 32.7 (L) 12/19/2017    MCV 73 (L) 12/19/2017     12/19/2017       Recent Labs  Lab 12/15/17  1433   TSH 1.008     Lab Results   Component Value Date    HGBA1C 12.1 (H) 12/15/2017       Nutritional status:   Body mass index is 30.5 kg/m².  Lab Results   Component Value Date    ALBUMIN 2.3 (L) 12/19/2017    ALBUMIN 2.3 (L) 12/18/2017    ALBUMIN 2.7 (L) 12/17/2017     Lab Results   Component Value Date    PREALBUMIN 23 06/29/2016       Estimated Creatinine Clearance: 63.7 mL/min (based on SCr of 0.8 mg/dL).    Accu-Checks  Recent Labs      12/19/17   0841  12/19/17   1156  12/19/17   1258  12/19/17   1808  12/19/17   2354  12/20/17   0858  12/20/17   1257  12/20/17   1728  12/20/17   2154  12/21/17   0940   POCTGLUCOSE  190*  158*  151*  238*  234*  173*  209*  193*  218*  228*     Hyperglycemia/Diabetes Medications: Antihyperglycemics     Start     Stop Route Frequency Ordered    12/20/17 1315  insulin aspart pen 8-10 Units      -- SubQ 3 times daily with meals 12/20/17 1300    12/19/17 1322  insulin aspart pen 0-5 Units      -- SubQ Before meals & nightly PRN 12/19/17 1222    12/19/17 1230  insulin detemir pen 38 Units      -- SubQ Daily 12/19/17 1222          ASSESSMENT and PLAN    * Embolic stroke involving left middle cerebral artery    Per primary  Avoid hypoglycemia        Type 2 diabetes mellitus with hyperglycemia    Goal -180.   Increase Levemir 40 units subQ daily  Adjust Novolog to 9-12 units with meals. Monitor AC/HS, low dose correction scale.          Discharge planning:  Probably resume 70/30. Daughter thinks patient has very low chance of being compliant with 4 shots/day.  Consider addition of GLP-1.  Follow-up with patient's endocrinologist after discharge.          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

## 2017-12-21 NOTE — PROGRESS NOTES
Barbara at [facility] set up peer to peer for tomorrow. Cm will continue to follow.

## 2017-12-21 NOTE — ASSESSMENT & PLAN NOTE
Goal -180.   Increase Levemir 40 units subQ daily  Adjust Novolog to 9-12 units with meals. Monitor AC/HS, low dose correction scale.          Discharge planning:  Probably resume 70/30. Daughter thinks patient has very low chance of being compliant with 4 shots/day.  Consider addition of GLP-1.  Follow-up with patient's endocrinologist after discharge.

## 2017-12-21 NOTE — ASSESSMENT & PLAN NOTE
Ms. Wise is a 76yo f who presents with a L MCA syndrome, s/p tpa. Evidence of distal MCA occlusion not amenable to thrombectomy.   Unclear etiology at this time but picture thus far suspicious for embolic stroke; ESUS/Cryptogenic Embolism. Echo with LAE.  Recommend 30 day monitor at discharge.    -Antithrombotics for secondary stroke prevention: Hemorrhage stable, ASA 81mg  -Statins for secondary stroke prevention and hyperlipidemia, if present: Atorvastatin- 80 mg daily (on at home; LDL 71)  -Aggressive risk factor modification: Hypertension, Diabetes, High Cholesterol, Diet, Exercise  -Rehab Efforts: Physical Therapy, Occupational Therapy, Speech and Language Pathology, PM&R  -Diagnostics: None  -VTE Prophylaxis: SQH; Mechanical prophylaxis:on heparin 5000u sub q   -30 Day autotrigger event monitor at discharge

## 2017-12-21 NOTE — SUBJECTIVE & OBJECTIVE
"Interval HPI:   Overnight events: BG slightly elevated, has required correction scale coverage, despite increase in Novolog AC dose.   Eatin%-75% of ADA diet  Nausea: No  Hypoglycemia and intervention: No  Fever: No  TPN and/or TF: No    BP (!) 150/65 (Patient Position: Lying)   Pulse (!) 59   Temp 99.2 °F (37.3 °C) (Oral)   Resp 17   Ht 4' 11" (1.499 m)   Wt 68.5 kg (151 lb 0.2 oz)   LMP  (LMP Unknown)   SpO2 97%   Breastfeeding? No   BMI 30.50 kg/m²     Labs Reviewed and Include    No results for input(s): GLU, CALCIUM, ALBUMIN, PROT, NA, K, CO2, CL, BUN, CREATININE, ALKPHOS, ALT, AST, BILITOT in the last 24 hours.  Lab Results   Component Value Date    WBC 11.96 2017    HGB 9.7 (L) 2017    HCT 32.7 (L) 2017    MCV 73 (L) 2017     2017       Recent Labs  Lab 12/15/17  1433   TSH 1.008     Lab Results   Component Value Date    HGBA1C 12.1 (H) 12/15/2017       Nutritional status:   Body mass index is 30.5 kg/m².  Lab Results   Component Value Date    ALBUMIN 2.3 (L) 2017    ALBUMIN 2.3 (L) 2017    ALBUMIN 2.7 (L) 2017     Lab Results   Component Value Date    PREALBUMIN 23 2016       Estimated Creatinine Clearance: 63.7 mL/min (based on SCr of 0.8 mg/dL).    Accu-Checks  Recent Labs      17   0841  17   1156  17   1258  17   1808  17   2354  17   0858  17   1257  17   1728  17   2154  17   0940   POCTGLUCOSE  190*  158*  151*  238*  234*  173*  209*  193*  218*  228*     Hyperglycemia/Diabetes Medications: Antihyperglycemics     Start     Stop Route Frequency Ordered    17 1315  insulin aspart pen 8-10 Units      -- SubQ 3 times daily with meals 17 1300    17 1322  insulin aspart pen 0-5 Units      -- SubQ Before meals & nightly PRN 17 1222    17 1230  insulin detemir pen 38 Units      -- SubQ Daily 17 1222        "

## 2017-12-21 NOTE — PT/OT/SLP PROGRESS
"Occupational Therapy   Treatment    Name: Tiffanie Wise  MRN: [id number]  Admitting Diagnosis:  Embolic stroke involving left middle cerebral artery       Recommendations:     Discharge Recommendations: rehabilitation facility  Discharge Equipment Recommendations:  shower chair  Barriers to discharge:  Decreased caregiver support    Subjective   Patient:  "That was not right."  "I just can't do it."  Communicated with: nurse prior to session.  Pain/Comfort:  Pain Rating 1: 0/10  Pain Rating Post-Intervention 1: 0/10    Objective:     Patient found with: peripheral IV, telemetry, PureWick, oxygen  Dtg present.  General Precautions: Standard, aspiration, aphasia, fall   Orthopedic Precautions:N/A   Braces: N/A     Occupational Performance:    Bed Mobility:    · Patient completed Rolling/Turning to Left with  supervision  · Patient completed Rolling/Turning to Right with supervision  · Patient completed Scooting/Bridging with supervision  · Patient completed Supine to Sit with supervision from the left side  · Patient completed Sit to Supine with supervision     Functional Mobility/Transfers:  · Patient completed Sit <> Stand Transfer with SBA  · Patient completed Bed <> Chair Transfer using Stand Pivot technique with CGA with no assistive device     Activities of Daily Living:  · Grooming: SBA while standing, brushing hair with right hand   · UB Dressing: min assistance while seated EOB  · LB Dressing:  Minimal assistance to initiate socks and for fasteners     Patient left supine with all lines intact and call button in reach    AMPA 6 Click:  AMPAC Total Score: 18    Treatment & Education:  Patient education provided on role of OT, daily orientation, ADLs and ROM.  Continued education, patient/ family training recommended.  Patient alert 100% of the session; attentive throughout the session; good eye contact.  Able to follow 3/3 one step commands. Daily orientation provided.  HARPER performed left UE one set x 10 rep " in all planes of motion with assistance provided for elbow flexion, supination, external rotation and finger flexion.10 rep in all planes of motion. Less pain noted on this date, left UE. Addressed FMC with right UE throughout ADLs.  Addressed handwriting; difficulty noted.  With repetition and model, patient able to write her full name.  Patient able to recognizes errors on attempts.  Able to sequence days of the week via writing.  Able to draw 1/3 simple shapes without cuing.  Able to perform copy design on 3/3 complex designs.     Patient's functional status and disposition recommendation discussed patient, patient's dtg and  nurse.  White board updated in patient's room.  OT asked if there were any other questions; patient/ family had no further questions.  Education:    Assessment:     Tiffanie Wise is a 77 y.o. female with a medical diagnosis of Acute ischemic left MCA stroke.  She presents with performance deficits of physical skills including impaired sensation and endurance; demonstrating performance deficits of cognitive skills including impaired  attention, perception, understanding, problem solving, sequencing and memory all resulting in inability organizing occupational performance in a timely and safe manner; demonstrating performance deficits of psychosocial skills including impairments of interpersonal interactions and coping strategies which are skills necessary to successfully and appropriately participate in everyday tasks and social situations.  These performance deficits have resulted in activity limitations including but not limited to:   transfers, ascending/ descending stairs, walking short and long distances, walking around obstacles, transitional movement patterns (kneeling, bending); upper body dressing, lower body dressing, brushing teeth, toileting, bathing, carrying objects, assisting  in his care.   Patient's role as mother, wife, caregiver to  and independent caretaker  for self has been affected. Patient will benefit from skilled OT services to maximize level of independence with self-care skills and functional mobility.  Improvements noted with level of participation.  Increased frequency to 5x week; had been limited with ADLs 2* left hand swollen and painful; improvements noted with left UE and patient now able to participate more in ADLs.    Rehab Prognosis:  Good; patient would benefit from acute skilled OT services to address these deficits and reach maximum level of function.     Plan:     Patient to be seen 5 x/week to address the above listed problems via self-care/home management, therapeutic activities, cognitive retraining, neuromuscular re-education, therapeutic exercises, sensory integration  · Plan of Care Expires: 01/13/18  · Plan of Care Reviewed with: patient, daughter    This Plan of care has been discussed with the patient who was involved in its development and understands and is in agreement with the identified goals and treatment plan    GOALS:    Occupational Therapy Goals        Problem: Occupational Therapy Goal    Goal Priority Disciplines Outcome Interventions   Occupational Therapy Goal     OT, PT/OT     Description:  Goals set 12/17 to be addressed for 14 days with expiration date, 12/31:  Patient will increase functional independence with ADLs by performing:    Patient will demonstrate rolling to the right with SBA.  Not met   Patient will demonstrate rolling to the left with SBA.   Not met  Patient will demonstrate supine -sit with SBA.   Not met  Patient will demonstrate stand pivot transfers with SBA.   Not met  Patient will demonstrate grooming while standing with SBA.   Not met  Patient will demonstrate upper body dressing with SBA while seated EOB.   Not met  Patient will demonstrate lower body dressing with SBA while seated EOB.   Not met  Patient's family / caregiver will demonstrate independence and safety with assisting patient with  self-care skills and functional mobility.     Not met  Patient and/or patient's family will verbalize understanding of stroke prevention guidelines, personal risk factors and stroke warning signs via teachback method.  Not met                             Time Tracking:     OT Date of Treatment: 12/21/17  OT Start Time: 0940  OT Stop Time: 1036  OT Total Time (min): 56 min    Billable Minutes:Self Care/Home Management 18  Cognitive Retraining 38    NGOZI Acosta  12/21/2017

## 2017-12-21 NOTE — SUBJECTIVE & OBJECTIVE
Neurologic Chief Complaint: L MCA stroke w/ hemorrhagic conversion    Subjective:     Interval History: Patient is seen for follow-up neurological assessment and treatment recommendations: No acute issues or events overnight.  Patient moved to private room.  Dispo pending insurance authorization.    SBP max 190 yesterday but this morning is well controlled <160.     HPI, Past Medical, Family, and Social History remains the same as documented in the initial encounter.     Review of Systems   Constitutional: Negative for fever.   Eyes: Negative for visual disturbance.   Respiratory: Negative for shortness of breath.    Gastrointestinal: Negative for vomiting.   Musculoskeletal:        Left arm pain improved   Neurological: Positive for speech difficulty. Negative for weakness and numbness.   Psychiatric/Behavioral: Negative for agitation and behavioral problems.     Scheduled Meds:   aspirin  81 mg Oral Daily    atorvastatin  80 mg Oral Daily    escitalopram oxalate  20 mg Oral Nightly    fosinopril  10 mg Oral Daily    heparin (porcine)  5,000 Units Subcutaneous Q8H    hydroCHLOROthiazide  25 mg Oral Daily    insulin aspart  8-10 Units Subcutaneous TIDWM    insulin detemir  38 Units Subcutaneous Daily    levETIRAcetam  500 mg Oral BID    levothyroxine  50 mcg Oral Daily    losartan  50 mg Oral Daily    metoprolol tartrate  25 mg Oral Q6H    senna-docusate 8.6-50 mg  1 tablet Oral BID    sodium chloride 0.9%  3 mL Intravenous Q8H     Continuous Infusions:    PRN Meds:dextrose 50%, dextrose 50%, glucagon (human recombinant), glucose, glucose, hydrALAZINE, hydrocodone-acetaminophen 5-325mg, insulin aspart, ondansetron    Objective:     Vital Signs (Most Recent):  Temp: 97.4 °F (36.3 °C) (12/21/17 0749)  Pulse: (!) 50 (12/21/17 0750)  Resp: 18 (12/21/17 0749)  BP: (!) 155/65 (12/21/17 0749)  SpO2: 97 % (12/21/17 0750)  BP Location: Right arm    Vital Signs Range (Last 24H):  Temp:  [97.4 °F (36.3 °C)-98.6  °F (37 °C)]   Pulse:  [43-59]   Resp:  [18-20]   BP: (128-190)/(60-74)   SpO2:  [91 %-99 %]   BP Location: Right arm    Physical Exam   Constitutional: She appears well-developed and well-nourished. No distress.   Eyes: EOM are normal.   Cardiovascular: Normal rate.    Pulmonary/Chest: Effort normal.   Musculoskeletal: She exhibits tenderness.   Neurological: She is alert.   Skin: Skin is warm and dry. She is not diaphoretic.       Neurological Exam:   LOC: alert  Attention Span: Good   Language: Expressive > Receptive  Articulation: Dysarthria  Orientation: Oriented to person  Visual Fields: Full  EOM (CN III, IV, VI): Full/intact  Facial Movement (CN VII): mild right facial droop  Motor: 5/5 in all extremities  Sensation: Intact to light touch, temp  Tone: Normal tone throughout    Laboratory:  CMP:   No results for input(s): GLUCOSE, CALCIUM, ALBUMIN, PROT, NA, K, CO2, CL, BUN, CREATININE, ALKPHOS, ALT, AST, BILITOT in the last 24 hours.  BMP:     Recent Labs  Lab 12/19/17  0209      K 3.9      CO2 27   BUN 12   CREATININE 0.8   CALCIUM 8.3*     CBC:     Recent Labs  Lab 12/19/17  0209   WBC 11.96   RBC 4.46   HGB 9.7*   HCT 32.7*      MCV 73*   MCH 21.7*   MCHC 29.7*     Lipid Panel:     Recent Labs  Lab 12/18/17  1423   CHOL 116*   LDLCALC 50.0*   HDL 43   TRIG 115     Coagulation:     Recent Labs  Lab 12/19/17  0209   INR 1.0     Platelet Aggregation Study: No results for input(s): PLTAGG, PLTAGINTERP, PLTAGREGLACO, ADPPLTAGGREG in the last 168 hours.     Hgb A1C:     Recent Labs  Lab 12/15/17  1433   HGBA1C 12.1*     TSH:     Recent Labs  Lab 12/15/17  1433   TSH 1.008       Diagnostic Results       Brain Imaging     CTH 12/16/17:   Evolving acute/recent infarct involving the left parietal lobe with new hyperdensity concerning for hemorrhagic conversion. Mild mass effect without midline shift.   Continued followup advised.    MRI brain 12/16/17:    Hemorrhagic infarct left parieto-occipital  temporal junction.        Vessel Imaging     CTA  12/15/17:   Distal left parietal parietal MCA branch occlusion with correlating early changes of infarction on noncontrast CT.  No intracranial hemorrhage or significant mass effect/midline shift.        Cardiac Imaging     2D Echo 12/15/17  CONCLUSIONS     1 - Normal left ventricular systolic function (EF 65-70%).     2 - Indeterminate LV diastolic function.     3 - Normal right ventricular systolic function .     4 - The estimated PA systolic pressure is 14 mmHg.     5 - Trivial aortic regurgitation.     6 - Trivial pulmonic regurgitation.     7 - Mild left atrial enlargement.     8 - Concentric remodeling.     9 - No wall motion abnormalities.

## 2017-12-21 NOTE — ASSESSMENT & PLAN NOTE
-edema and pain of left forearm down into left hand now improved  -warm compresses  -will continue to monitor - may need to relocated IV heplock

## 2017-12-21 NOTE — PLAN OF CARE
Problem: Patient Care Overview  Goal: Plan of Care Review  Outcome: Ongoing (interventions implemented as appropriate)  POC reviewed with pt and daughter, verbalized an understanding. Pt states her pain is feeling better this Am. NADN. No event of fall/injury this shift.

## 2017-12-21 NOTE — ASSESSMENT & PLAN NOTE
-asymptomatic bradycardia   - 43 to 59 pulse over last day   - Changed metoprolol to coreg 3.125 BID

## 2017-12-21 NOTE — ASSESSMENT & PLAN NOTE
-Stroke risk factor  -BP goal <140 post tpa and hemorrhage into infarct  -PRN hydralazine ordered (HR low 50's)  -d/c ACE-I as patient already on ARB. Increased losartan 50mg daily to 100mg daily. Changed metoprolol to coreg 3.125 BID   -check blood pressure in right arm only - swelling to left arm

## 2017-12-21 NOTE — PT/OT/SLP PROGRESS
Physical Therapy Treatment    Patient Name:  Tiffanie Wise   MRN:  [id number]    Recommendations:     Discharge Recommendations:  rehabilitation facility   Discharge Equipment Recommendations: none   Barriers to discharge: Decreased caregiver support    Assessment:     Tiffanie Wise is a 77 y.o. female admitted with a medical diagnosis of Embolic stroke involving left middle cerebral artery.  She presents with the following impairments/functional limitations:  gait instability, decreased upper extremity function, impaired balance, decreased safety awareness, impaired fine motor, impaired self care skills, impaired functional mobilty, impaired cognition.    Pt participates well in therapy.  She is ambulating increased distances but continues to demonstrate inconsistency with her balance which places her at significant fall risk.  Pt demonstrated 2 episodes of LOB, one of which required max assist to prevent a posterior fall with patient not demonstrating any protective reactions.  She is very vision dependent for balance.  When her visual input is minimized (eyes closed, dim light, or dual tasking when her vision is on a task rather than her environment) her balance deteriorates. 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  She is not physically or cognitively able to resume this role due to her impairments.  She is not at her PLOF and would greatly benefit from inpatient rehab in order to maximize functional mobility and return to her role as wife and caretaker.     Rehab Prognosis:  Good; patient would benefit from acute skilled PT services to address these deficits and reach maximum level of function.      Recent Surgery: * No surgery found *      Plan:     During this hospitalization, patient to be seen 4 x/week to address the above listed problems via gait training, therapeutic activities, therapeutic exercises, neuromuscular re-education  · Plan of Care Expires:   "01/16/18   Plan of Care Reviewed with: patient, daughter    Subjective     Communicated with RN prior to session.  Patient found supine upon PT entry to room, agreeable to treatment.      Chief Complaint: LUE pain  Patient comments/goals: "So pretty" when [place]  Pain/Comfort:  · Pain Rating 1:  (LUE pain)    Patients cultural, spiritual, Yazidism conflicts given the current situation:      Objective:     Patient found with: telemetry     General Precautions: Standard, aspiration, fall, aphasia     Pt was found supine in bed in NAD with dtr at bedside.  She agreed to therapy.  Pt performed bed mobility with supervision and good sitting balance.  Gait performed in the [place] with distractions minimized x 100 feet.  Dynamic standing balance activities performed.  Pt had 2 episodes of significant LOB which would have resulted in a fall without therapist intervention.  See below for details.     Functional Mobility:  Bed mobility    Rolling supervision    Supine <> sit supervision   Transfers   Sit to stand supervision    Bed <> chair contact guard assist for balance  Gait x 100 feet contact guard assist for balance.   Pt demonstrates decreased gait speed and holds LUE against her body 2* pain.    Distractions were minimized during gait session to improve safety with gait trial.     Dynamic balance activities:   Static standing x 5 minutes with good midline and compensatory increased RENE to maintain balance.    1 episode L sided LOB requiring moderate assist to remain standing when patient closed her eyes to scratch her face.     Ball toss x 5 minutes in standing with therapist providing continuous min assist for balance and safety   Ball bounce x 3 minutes in standing with threapist providing continuous min assist for balance and safety.    Dynamic gait activity with object manipulation to evaluate changes in gait with dual tasking    Pt demonstrated sudden and significant posterior LOB when " looking up toward the ceiling resulting in posterior fall into therapist's arms while standing    Therapist prevented fall to the floor and the patient remained on her feet, but the patient did not demonstrate any postural reactions or compensatory stepping to prevent fall/regain balance.      AM-PAC 6 CLICK MOBILITY  Turning over in bed (including adjusting bedclothes, sheets and blankets)?: 4  Sitting down on and standing up from a chair with arms (e.g., wheelchair, bedside commode, etc.): 3  Moving from lying on back to sitting on the side of the bed?: 4  Moving to and from a bed to a chair (including a wheelchair)?: 3  Need to walk in hospital room?: 3  Climbing 3-5 steps with a railing?: 3  Total Score: 20     Patient left with bed in chair position with all lines intact, call button in reach and dtr present..    GOALS:    Physical Therapy Goals        Problem: Physical Therapy Goal    Goal Priority Disciplines Outcome Goal Variances Interventions   Physical Therapy Goal     PT/OT, PT Ongoing (interventions implemented as appropriate)     Description:  Goals to be met by: 2017     Patient will increase functional independence with mobility by performin. Supine to sit with Stand-by Assistance- MET  2. Sit to supine with Stand-by Assistance- MET  3. Sit to stand transfer with Stand-by Assistance  4. Bed to chair transfer with Stand-by Assistance using stand pivot transfer  5. Gait  x 150 feet with Contact Guard Assistance using no assistive device.   6. Stand for 10 minutes with stand by assist and no LOB in order to safely participate in ADLs.  - MET  Revised: stand for 15 minutes with stand by assist and no LOB while performing dynamic UE tasks to decreased fall risk.                     Time Tracking:     PT Received On: 17  PT Start Time: 1041     PT Stop Time: 1135  PT Total Time (min): 54 min     Billable Minutes: Gait Training 15 and Therapeutic Activity 38    Treatment Type:  Treatment  PT/PTA: PT         Nia Dempsey, PT  12/21/2017  [phone] (pager)

## 2017-12-21 NOTE — PT/OT/SLP PROGRESS
"Speech Language Pathology Treatment    Patient Name:  Tiffanie Wise   MRN:  [id number]  Admitting Diagnosis: Embolic stroke involving left middle cerebral artery    Recommendations:                 General Recommendations:  Speech/language therapy  Diet recommendations:  Dental Soft, Liquid Diet Level: Thin   Aspiration Precautions: 1 bite/sip at a time, Avoid talking while eating, Eliminate distractions, Meds crushed in puree and No straws   General Precautions: Standard, aphasia, aspiration, fall  Communication strategies:  yes/no questions aid in communication and provide increased time to answer    Subjective     " I'm so mad"  Patient goals: to go to rehab    Pain/Comfort:  · Pain Rating 1: 0/10  · Pain Rating Post-Intervention 1: 0/10    Objective:     Has the patient been evaluated by SLP for swallowing?   Yes  Keep patient NPO? No   Current Respiratory Status: nasal cannula      Pt seen bedside with her daughter present.  was just leaving after discussing that pt was not accepted to rehab. Pt and daughter very disappointed and want pt to go to rehab. Pt able to count from 2-9 with cues to start. She was able to complete only one automatic phrase and automatic sentence. She was complete responsive speech task given object cues. Pt with poor imitation. Pt frequently states " yes" when therapist givens phonemic cues or models the correct word but has difficulty repeating the word. Pt with some appropriate spontaneous speech was paraphasic errors noted. Often speech was jargon. Pt followed 1 and 2 step commands with 90% acc. She ID objects in a f=3 with 100% acc. Pt was able to ID written words of objects in f=2 with 33% acc. She filled in missing vowel of short words given visual cues with 60% acc and self corrected x1. Pt communicates via a combination of gestures, y/n questions, writing and speech. Pt able to write initial letter of word she was attempting to say and through asking y/n questions, " daughter able to figure out the correct word. Encouraged pt and family to continue to use all avenues to aid in communication (gestures, writing, y/n questions). Daughter reported pt with some coughing with dry meats during meals. Discussed asking for gravy with all meals to help moisten the meat. Observed pt with thin liquids and a cracker. Pt with no overt s/s of aspiration. Reviewed swallowing precautions and updated white board. All questions answered for pt and daughter. Daughter asking if pt still needed thickner as it was still arriving on pt's tray. Called stroke team to cancel thickner order.     Assessment:     Tiffanie Wise is a 77 y.o. female with an SLP diagnosis of Aphasia.  She presents with good participation in session. Pt with expressive/receptive aphasia.  Goals:    SLP Goals        Problem: SLP Goal    Goal Priority Disciplines Outcome   SLP Goal     SLP Ongoing (interventions implemented as appropriate)   Description:  Speech Language Pathology Goals  Goals expected to be met by 12/23/2017  1. Pt will tolerate puree diet without overt S/S aspiration, MIN A  2. Pt will tolerate nectar-thickened liquids w/o overt S/S aspiration, MIN A  3. Pt will tolerate trials of advanced diet textures (dental soft, regular) with adequate oral clearance, MIN A  4. Pt will tolerate trials of thin liquids w/o overt S/S aspiration, MIN A  5. Pt will answer basic/personal YNQ with 80% accuracy or higher, MOD A  6. Pt will follow 1-step commands with 80% accuracy or higher, MOD A  7. Pt will complete automatic speech tasks with 70% accuracy, MOD A  8. Educate Pt and family on aspiration precautions and compensatory strategies for functional communication                         Plan:     · Patient to be seen:  5 x/week   · Plan of Care expires:  01/15/18  · Plan of Care reviewed with:  patient, daughter   · SLP Follow-Up:  Yes       Discharge recommendations:  rehabilitation facility   Barriers to Discharge:   Decreased Care Giver Support    Time Tracking:     SLP Treatment Date:   12/21/17  Speech Start Time:  1234  Speech Stop Time:  1325     Speech Total Time (min):  51 min    Billable Minutes: Speech Therapy Individual 26, Treatment Swallowing Dysfunction 10 and Seld Care/Home Management Training 15    JONATHON Maldonado, CCC-SLP  12/21/2017

## 2017-12-21 NOTE — PLAN OF CARE
Problem: SLP Goal  Goal: SLP Goal  Speech Language Pathology Goals  Goals expected to be met by 12/23/2017  1. Pt will tolerate puree diet without overt S/S aspiration, MIN A  2. Pt will tolerate nectar-thickened liquids w/o overt S/S aspiration, MIN A  3. Pt will tolerate trials of advanced diet textures (dental soft, regular) with adequate oral clearance, MIN A  4. Pt will tolerate trials of thin liquids w/o overt S/S aspiration, MIN A  5. Pt will answer basic/personal YNQ with 80% accuracy or higher, MOD A  6. Pt will follow 1-step commands with 80% accuracy or higher, MOD A  7. Pt will complete automatic speech tasks with 70% accuracy, MOD A  8. Educate Pt and family on aspiration precautions and compensatory strategies for functional communication        Pt with great participation in session and able to tolerate long session. Pt is an excellent rehab candidate.      JONATHON Maldonado, CCC-SLP  12/21/2017

## 2017-12-21 NOTE — PLAN OF CARE
Problem: Physical Therapy Goal  Goal: Physical Therapy Goal  Goals to be met by: 2017     Patient will increase functional independence with mobility by performin. Supine to sit with Stand-by Assistance- MET  2. Sit to supine with Stand-by Assistance- MET  3. Sit to stand transfer with Stand-by Assistance  4. Bed to chair transfer with Stand-by Assistance using stand pivot transfer  5. Gait  x 150 feet with Contact Guard Assistance using no assistive device.   6. Stand for 10 minutes with stand by assist and no LOB in order to safely participate in ADLs.  - MET  Revised: stand for 15 minutes with stand by assist and no LOB while performing dynamic UE tasks to decreased fall risk.   Outcome: Ongoing (interventions implemented as appropriate)  Patient participated well in therapy.  POC and goals revised.  Please refer to progress note for functional mobility.     Pt is safe to perform transfers with nursing using contact guard assist for balance.  Patient is safe to ambulate with nursing using min assist for balance and safety.     Nia Dempsey, PT  2017  [phone] (pager)

## 2017-12-21 NOTE — PROGRESS NOTES
[facility]  Vascular Neurology  Comprehensive Stroke Center  Progress Note    Assessment/Plan:     * Embolic stroke involving left middle cerebral artery    Ms. Wise is a 78yo f who presents with a L MCA syndrome, s/p tpa. Evidence of distal MCA occlusion not amenable to thrombectomy.   Unclear etiology at this time but picture thus far suspicious for embolic stroke; ESUS/Cryptogenic Embolism. Echo with LAE.  Recommend 30 day monitor at discharge.    -Antithrombotics for secondary stroke prevention: Hemorrhage stable, ASA 81mg  -Statins for secondary stroke prevention and hyperlipidemia, if present: Atorvastatin- 80 mg daily (on at home; LDL 71)  -Aggressive risk factor modification: Hypertension, Diabetes, High Cholesterol, Diet, Exercise  -Rehab Efforts: Physical Therapy, Occupational Therapy, Speech and Language Pathology, PM&R  -Diagnostics: None  -VTE Prophylaxis: SQH; Mechanical prophylaxis:on heparin 5000u sub q   -30 Day autotrigger event monitor at discharge        Cytotoxic cerebral edema    Secondary to ischemia  Noted on CTH, MRI brain        Essential hypertension    -Stroke risk factor  -BP goal <140 post tpa and hemorrhage into infarct  -PRN hydralazine ordered (HR low 50's)  -d/c ACE-I as patient already on ARB. Increased losartan 50mg daily to 100mg daily. Changed metoprolol to coreg 3.125 BID   -check blood pressure in right arm only - swelling to left arm          Diabetes mellitus    -Stroke risk factor   -A1c 12.1%  -Insulin increased  -Management per Endocrinology          Acquired hypothyroidism    Stroke risk factor  Continue home medications        Hyperlipidemia associated with type 2 diabetes mellitus    Stroke risk factor  LDL 50  Home Atorvastatin 80mg        Aphasia    -Symptom of stroke  -Aggressive SLP         Left arm swelling    -edema and pain of left forearm down into left hand now improved  -warm compresses  -will continue to monitor - may need to relocated  IV heplock        Bradycardia    -asymptomatic bradycardia   - 43 to 59 pulse over last day   - Changed metoprolol to coreg 3.125 BID         Aortic arch atherosclerosis    Noted on CTA         CAD (coronary artery disease)    -Stroke Risk factor  -ASA 81mg daily  -Changed metoprolol to coreg 3.125 BID due to bradycardia             Ms. Wise is a 76yo F with L MCA ischemic stroke s/p tPA.   12/16/17 Last night patient became distressed, appeared to be in pain but was unable to communicate where. ECG and troponin (stable) were ordered as well as CTH which showed hemorrhage in the area of the infarct without significant mass effect or shift. MRI completed today.   12/17/2017 NAEON. Blood glucose ranging from 246-428. Continued aphasia and dysarthria.  12/18 - BG in 300s today, low-dose insulin gtt. Plans for step down.  12/19 - Insulin gtt d/c'd. PT/OT/SLP recommending rehab. Boarding in [facility].  12/20/17 Stepped down last night.  Pending rehab acceptance.  Insulin coverage adjusted per endocrinology.  Exam unchanged. Asymptomatic bradycardia throughout day.  Metoprolol held.  Left arm swelling and pain reported.  12/21/17 NAEON. Improved arm pain/swelling. Awaiting insurance authorization for discharge to rehab facility.     STROKE DOCUMENTATION   Acute Stroke Times   Last Known Normal Date: 12/15/17  Last Known Normal Time: 1030  Symptom Onset Date: 12/15/17  Symptom Onset Time: 1030  Stroke Team Called Date: 12/15/17  Stroke Team Called Time: 1342 (upon arrival to [facility])  Stroke Team Arrival Date: 12/15/17  Stroke Team Arrival Time: 1347  CT Interpretation Time: 1237  Decision to Treat Time for Alteplase: 1154  Decision to Treat Time for IR:  (n/a)    NIH Scale:  1a. Level Of Consciousness: 0-->Alert: keenly responsive  1b. LOC Questions: 2-->Answers neither question correctly  1c. LOC Commands: 0-->Performs both tasks correctly  2. Best Gaze: 0-->Normal  3. Visual: 0-->No visual loss  4. Facial Palsy: 1-->Minor  paralysis (flattened nasolabial fold, asymmetry on smiling)  5a. Motor Arm, Left: 0-->No drift: limb holds 90 (or 45) degrees for full 10 secs  5b. Motor Arm, Right: 0-->No drift: limb holds 90 (or 45) degrees for full 10 secs  6a. Motor Leg, Left: 0-->No drift: leg holds 30 degree position for full 5 secs  6b. Motor Leg, Right: 0-->No drift: leg holds 30 degree position for full 5 secs  7. Limb Ataxia: 0-->Absent  8. Sensory: 0-->Normal: no sensory loss  9. Best Language: 2-->Severe aphasia: all communication is through fragmentary expression: great need for inference, questioning, and guessing by the listener. Range of information that can be exchanged is limited: listener carries burden of. . . (see row details)  10. Dysarthria: 1-->Mild-to-moderate dysarthria: patient slurs at least some words and, at worst, can be understood with some difficulty  11. Extinction and Inattention (formerly Neglect): 0-->No abnormality  Total (NIH Stroke Scale): 6       Modified [place] Score: 0  [place] Coma Scale:    ABCD2 Score:    [id number] Score:   HAS -BLED Score:   ICH Score:   Hunt & Branham Classification:      Hemorrhagic change of an Ischemic Stroke: Does this patient have an ischemic stroke with hemorrhagic changes? Yes, Grading Scale: PH Type 1 (PH-1) = hematoma in < 30% of the infarcted area with some slight space-occupying effect. Is this a symptomatic change?  No - Hemorrhage is not clinically significant     Neurologic Chief Complaint: L MCA stroke w/ hemorrhagic conversion    Subjective:     Interval History: Patient is seen for follow-up neurological assessment and treatment recommendations: No acute issues or events overnight.  Patient moved to private room.  Dispo pending insurance authorization.    SBP max 190 yesterday but this morning is well controlled <160.     HPI, Past Medical, Family, and Social History remains the same as documented in the initial encounter.     Review of Systems   Constitutional:  Negative for fever.   Eyes: Negative for visual disturbance.   Respiratory: Negative for shortness of breath.    Gastrointestinal: Negative for vomiting.   Musculoskeletal:        Left arm pain improved   Neurological: Positive for speech difficulty. Negative for weakness and numbness.   Psychiatric/Behavioral: Negative for agitation and behavioral problems.     Scheduled Meds:   aspirin  81 mg Oral Daily    atorvastatin  80 mg Oral Daily    escitalopram oxalate  20 mg Oral Nightly    fosinopril  10 mg Oral Daily    heparin (porcine)  5,000 Units Subcutaneous Q8H    hydroCHLOROthiazide  25 mg Oral Daily    insulin aspart  8-10 Units Subcutaneous TIDWM    insulin detemir  38 Units Subcutaneous Daily    levETIRAcetam  500 mg Oral BID    levothyroxine  50 mcg Oral Daily    losartan  50 mg Oral Daily    metoprolol tartrate  25 mg Oral Q6H    senna-docusate 8.6-50 mg  1 tablet Oral BID    sodium chloride 0.9%  3 mL Intravenous Q8H     Continuous Infusions:    PRN Meds:dextrose 50%, dextrose 50%, glucagon (human recombinant), glucose, glucose, hydrALAZINE, hydrocodone-acetaminophen 5-325mg, insulin aspart, ondansetron    Objective:     Vital Signs (Most Recent):  Temp: 97.4 °F (36.3 °C) (12/21/17 0749)  Pulse: (!) 50 (12/21/17 0750)  Resp: 18 (12/21/17 0749)  BP: (!) 155/65 (12/21/17 0749)  SpO2: 97 % (12/21/17 0750)  BP Location: Right arm    Vital Signs Range (Last 24H):  Temp:  [97.4 °F (36.3 °C)-98.6 °F (37 °C)]   Pulse:  [43-59]   Resp:  [18-20]   BP: (128-190)/(60-74)   SpO2:  [91 %-99 %]   BP Location: Right arm    Physical Exam   Constitutional: She appears well-developed and well-nourished. No distress.   Eyes: EOM are normal.   Cardiovascular: Normal rate.    Pulmonary/Chest: Effort normal.   Musculoskeletal: She exhibits tenderness.   Neurological: She is alert.   Skin: Skin is warm and dry. She is not diaphoretic.       Neurological Exam:   LOC: alert  Attention Span: Good   Language: Expressive  > Receptive  Articulation: Dysarthria  Orientation: Oriented to person  Visual Fields: Full  EOM (CN III, IV, VI): Full/intact  Facial Movement (CN VII): mild right facial droop  Motor: 5/5 in all extremities  Sensation: Intact to light touch, temp  Tone: Normal tone throughout    Laboratory:  CMP:   No results for input(s): GLUCOSE, CALCIUM, ALBUMIN, PROT, NA, K, CO2, CL, BUN, CREATININE, ALKPHOS, ALT, AST, BILITOT in the last 24 hours.  BMP:     Recent Labs  Lab 12/19/17  0209      K 3.9      CO2 27   BUN 12   CREATININE 0.8   CALCIUM 8.3*     CBC:     Recent Labs  Lab 12/19/17  0209   WBC 11.96   RBC 4.46   HGB 9.7*   HCT 32.7*      MCV 73*   MCH 21.7*   MCHC 29.7*     Lipid Panel:     Recent Labs  Lab 12/18/17  1423   CHOL 116*   LDLCALC 50.0*   HDL 43   TRIG 115     Coagulation:     Recent Labs  Lab 12/19/17  0209   INR 1.0     Platelet Aggregation Study: No results for input(s): PLTAGG, PLTAGINTERP, PLTAGREGLACO, ADPPLTAGGREG in the last 168 hours.     Hgb A1C:     Recent Labs  Lab 12/15/17  1433   HGBA1C 12.1*     TSH:     Recent Labs  Lab 12/15/17  1433   TSH 1.008       Diagnostic Results       Brain Imaging     CTH 12/16/17:   Evolving acute/recent infarct involving the left parietal lobe with new hyperdensity concerning for hemorrhagic conversion. Mild mass effect without midline shift.   Continued followup advised.    MRI brain 12/16/17:    Hemorrhagic infarct left parieto-occipital temporal junction.        Vessel Imaging     CTA MP 12/15/17:   Distal left parietal parietal MCA branch occlusion with correlating early changes of infarction on noncontrast CT.  No intracranial hemorrhage or significant mass effect/midline shift.        Cardiac Imaging     2D Echo 12/15/17  CONCLUSIONS     1 - Normal left ventricular systolic function (EF 65-70%).     2 - Indeterminate LV diastolic function.     3 - Normal right ventricular systolic function .     4 - The estimated PA systolic pressure is  14 mmHg.     5 - Trivial aortic regurgitation.     6 - Trivial pulmonic regurgitation.     7 - Mild left atrial enlargement.     8 - Concentric remodeling.     9 - No wall motion abnormalities.       Alisha Dangelo PA-C  [facility]  Department of Vascular Neurology   [facility]

## 2017-12-21 NOTE — PLAN OF CARE
Problem: Occupational Therapy Goal  Goal: Occupational Therapy Goal  Goals set 12/17 to be addressed for 14 days with expiration date, 12/31:  Patient will increase functional independence with ADLs by performing:    Patient will demonstrate rolling to the right with SBA.  Not met   Patient will demonstrate rolling to the left with SBA.   Not met  Patient will demonstrate supine -sit with SBA.   Not met  Patient will demonstrate stand pivot transfers with SBA.   Not met  Patient will demonstrate grooming while standing with SBA.   Not met  Patient will demonstrate upper body dressing with min assist while seated EOB.   Not met  Patient will demonstrate lower body dressing with min assist while seated EOB.   Not met  Patient will demonstrate ability to follow 2/3 commands.   Not met  Patient's family / caregiver will demonstrate independence and safety with assisting patient with self-care skills and functional mobility.     Not met  Patient and/or patient's family will verbalize understanding of stroke prevention guidelines, personal risk factors and stroke warning signs via teachback method.  Not met           Goals remain appropriate.  NGOZI Acosta  12/21/2017

## 2017-12-21 NOTE — NURSING
Spoke with iglesia on call with stroke about pts left arm still hurting and hand swollen, fentanyl relieved pain last night per pt and family member. No new orders rcvd. Iglesia states she will look into it.

## 2017-12-21 NOTE — PLAN OF CARE
Cm notified patient and daughter that she was denied Rehab by insurance. Cm explained the appeal process at length. The patient and daughter would like for the doctor to do a peer to peer. Cm will set up the peer to peer for tomorrow. Cm spoke to [facility] from [facility] whom states the patient could arrive at their facility as late as 6:30-7:00. If patient gets approval late they can accept patients on weekends as well. Cm will continue to follow        12/21/17 3817   Discharge Reassessment   Assessment Type Discharge Planning Reassessment   Provided patient/caregiver education on the expected discharge date and the discharge plan Yes   Do you have any problems affording any of your prescribed medications? No   Discharge Plan A Rehab   Discharge Plan B Home with family;Home Health   Patient choice form signed by patient/caregiver N/A   Can the patient answer the patient profile reliably? No, cognitively impaired   How does the patient rate their overall health at the present time? Fair  (per Rn assessment)   Describe the patient's ability to walk at the present time. Minor restrictions or changes   How often would a person be available to care for the patient? Infrequently   Number of comorbid conditions (as recorded on the chart) Five or more   During the past month, has the patient often been bothered by feeling down, depressed or hopeless? Yes  (per Rn assessment)   During the past month, has the patient often been bothered by little interest or pleasure in doing things? Yes  (per Rn assessment)

## 2017-12-22 ENCOUNTER — CLINICAL SUPPORT (OUTPATIENT)
Dept: ELECTROPHYSIOLOGY | Facility: CLINIC | Age: 77
DRG: 064 | End: 2017-12-22
Attending: PSYCHIATRY & NEUROLOGY
Payer: MEDICARE

## 2017-12-22 VITALS
RESPIRATION RATE: 16 BRPM | HEIGHT: 59 IN | DIASTOLIC BLOOD PRESSURE: 79 MMHG | WEIGHT: 151 LBS | BODY MASS INDEX: 30.44 KG/M2 | TEMPERATURE: 99 F | HEART RATE: 54 BPM | SYSTOLIC BLOOD PRESSURE: 202 MMHG | OXYGEN SATURATION: 95 %

## 2017-12-22 DIAGNOSIS — I63.412 EMBOLIC STROKE INVOLVING LEFT MIDDLE CEREBRAL ARTERY: ICD-10-CM

## 2017-12-22 DIAGNOSIS — I51.89 LEFT VENTRICULAR DIASTOLIC DYSFUNCTION WITH PRESERVED SYSTOLIC FUNCTION: Chronic | ICD-10-CM

## 2017-12-22 DIAGNOSIS — I25.10 CORONARY ARTERY DISEASE, ANGINA PRESENCE UNSPECIFIED, UNSPECIFIED VESSEL OR LESION TYPE, UNSPECIFIED WHETHER NATIVE OR TRANSPLANTED HEART: ICD-10-CM

## 2017-12-22 DIAGNOSIS — G47.33 OSA ON CPAP: ICD-10-CM

## 2017-12-22 DIAGNOSIS — R00.1 BRADYCARDIA: ICD-10-CM

## 2017-12-22 LAB
ALBUMIN SERPL BCP-MCNC: 2.3 G/DL
ALP SERPL-CCNC: 87 U/L
ALT SERPL W/O P-5'-P-CCNC: 11 U/L
ANION GAP SERPL CALC-SCNC: 10 MMOL/L
AST SERPL-CCNC: 13 U/L
BASOPHILS # BLD AUTO: 0.04 K/UL
BASOPHILS NFR BLD: 0.4 %
BILIRUB SERPL-MCNC: 0.6 MG/DL
BUN SERPL-MCNC: 15 MG/DL
CALCIUM SERPL-MCNC: 8.3 MG/DL
CHLORIDE SERPL-SCNC: 102 MMOL/L
CO2 SERPL-SCNC: 29 MMOL/L
CREAT SERPL-MCNC: 0.7 MG/DL
DIFFERENTIAL METHOD: ABNORMAL
EOSINOPHIL # BLD AUTO: 0.3 K/UL
EOSINOPHIL NFR BLD: 3.7 %
ERYTHROCYTE [DISTWIDTH] IN BLOOD BY AUTOMATED COUNT: 17.8 %
EST. GFR  (AFRICAN AMERICAN): >60 ML/MIN/1.73 M^2
EST. GFR  (NON AFRICAN AMERICAN): >60 ML/MIN/1.73 M^2
GLUCOSE SERPL-MCNC: 154 MG/DL
HCT VFR BLD AUTO: 32.9 %
HGB BLD-MCNC: 9.8 G/DL
IMM GRANULOCYTES # BLD AUTO: 0.03 K/UL
IMM GRANULOCYTES NFR BLD AUTO: 0.3 %
LYMPHOCYTES # BLD AUTO: 1.9 K/UL
LYMPHOCYTES NFR BLD: 20.1 %
MAGNESIUM SERPL-MCNC: 1.2 MG/DL
MCH RBC QN AUTO: 22 PG
MCHC RBC AUTO-ENTMCNC: 29.8 G/DL
MCV RBC AUTO: 74 FL
MONOCYTES # BLD AUTO: 0.9 K/UL
MONOCYTES NFR BLD: 9.6 %
NEUTROPHILS # BLD AUTO: 6.1 K/UL
NEUTROPHILS NFR BLD: 65.9 %
NRBC BLD-RTO: 0 /100 WBC
PHOSPHATE SERPL-MCNC: 4.6 MG/DL
PLATELET # BLD AUTO: 292 K/UL
PMV BLD AUTO: 12 FL
POCT GLUCOSE: 204 MG/DL (ref 70–110)
POCT GLUCOSE: 239 MG/DL (ref 70–110)
POCT GLUCOSE: 263 MG/DL (ref 70–110)
POTASSIUM SERPL-SCNC: 3.9 MMOL/L
PROT SERPL-MCNC: 6.1 G/DL
RBC # BLD AUTO: 4.45 M/UL
SODIUM SERPL-SCNC: 141 MMOL/L
WBC # BLD AUTO: 9.25 K/UL

## 2017-12-22 PROCEDURE — 63600175 PHARM REV CODE 636 W HCPCS: Performed by: PHYSICIAN ASSISTANT

## 2017-12-22 PROCEDURE — 99233 SBSQ HOSP IP/OBS HIGH 50: CPT | Mod: GC,,, | Performed by: PSYCHIATRY & NEUROLOGY

## 2017-12-22 PROCEDURE — 84100 ASSAY OF PHOSPHORUS: CPT

## 2017-12-22 PROCEDURE — 80053 COMPREHEN METABOLIC PANEL: CPT

## 2017-12-22 PROCEDURE — 83735 ASSAY OF MAGNESIUM: CPT

## 2017-12-22 PROCEDURE — 25000003 PHARM REV CODE 250: Performed by: PHYSICIAN ASSISTANT

## 2017-12-22 PROCEDURE — 97116 GAIT TRAINING THERAPY: CPT

## 2017-12-22 PROCEDURE — 36415 COLL VENOUS BLD VENIPUNCTURE: CPT

## 2017-12-22 PROCEDURE — 63600175 PHARM REV CODE 636 W HCPCS: Performed by: NURSE PRACTITIONER

## 2017-12-22 PROCEDURE — 85025 COMPLETE CBC W/AUTO DIFF WBC: CPT

## 2017-12-22 PROCEDURE — A4216 STERILE WATER/SALINE, 10 ML: HCPCS | Performed by: NURSE PRACTITIONER

## 2017-12-22 PROCEDURE — 92507 TX SP LANG VOICE COMM INDIV: CPT

## 2017-12-22 PROCEDURE — 25000003 PHARM REV CODE 250: Performed by: NURSE PRACTITIONER

## 2017-12-22 PROCEDURE — 97530 THERAPEUTIC ACTIVITIES: CPT

## 2017-12-22 RX ORDER — INSULIN ASPART 100 [IU]/ML
9-14 INJECTION, SOLUTION INTRAVENOUS; SUBCUTANEOUS
Status: COMPLETED | OUTPATIENT
Start: 2017-12-22 | End: 2017-12-22

## 2017-12-22 RX ORDER — INSULIN ASPART 100 [IU]/ML
9-14 INJECTION, SOLUTION INTRAVENOUS; SUBCUTANEOUS 3 TIMES DAILY
Refills: 0
Start: 2017-12-22 | End: 2017-12-22 | Stop reason: HOSPADM

## 2017-12-22 RX ORDER — INSULIN ASPART 100 [IU]/ML
9-14 INJECTION, SOLUTION INTRAVENOUS; SUBCUTANEOUS
Status: DISCONTINUED | OUTPATIENT
Start: 2017-12-22 | End: 2017-12-22 | Stop reason: HOSPADM

## 2017-12-22 RX ORDER — MAGNESIUM SULFATE HEPTAHYDRATE 40 MG/ML
2 INJECTION, SOLUTION INTRAVENOUS ONCE
Status: COMPLETED | OUTPATIENT
Start: 2017-12-22 | End: 2017-12-22

## 2017-12-22 RX ORDER — INSULIN ASPART 100 [IU]/ML
9-14 INJECTION, SOLUTION INTRAVENOUS; SUBCUTANEOUS 3 TIMES DAILY
Refills: 0
Start: 2017-12-22 | End: 2018-01-22

## 2017-12-22 RX ORDER — LEVETIRACETAM 500 MG/1
500 TABLET ORAL 2 TIMES DAILY
Qty: 5 TABLET | Refills: 0
Start: 2017-12-22 | End: 2018-01-22

## 2017-12-22 RX ORDER — CARVEDILOL 3.12 MG/1
3.12 TABLET ORAL 2 TIMES DAILY
Qty: 60 TABLET | Refills: 11
Start: 2017-12-22 | End: 2018-01-22

## 2017-12-22 RX ORDER — INSULIN ASPART 100 [IU]/ML
0-5 INJECTION, SOLUTION INTRAVENOUS; SUBCUTANEOUS
Refills: 0
Start: 2017-12-22 | End: 2017-12-22 | Stop reason: HOSPADM

## 2017-12-22 RX ORDER — LOSARTAN POTASSIUM 100 MG/1
100 TABLET ORAL DAILY
Qty: 90 TABLET | Refills: 0
Start: 2017-12-23 | End: 2018-02-08 | Stop reason: DRUGHIGH

## 2017-12-22 RX ORDER — POLYETHYLENE GLYCOL 3350 17 G/17G
17 POWDER, FOR SOLUTION ORAL DAILY
Status: DISCONTINUED | OUTPATIENT
Start: 2017-12-22 | End: 2017-12-22

## 2017-12-22 RX ORDER — SENNOSIDES 8.6 MG/1
8.6 TABLET ORAL DAILY PRN
Status: DISCONTINUED | OUTPATIENT
Start: 2017-12-22 | End: 2017-12-22 | Stop reason: HOSPADM

## 2017-12-22 RX ADMIN — HYDROCODONE BITARTRATE AND ACETAMINOPHEN 1 TABLET: 5; 325 TABLET ORAL at 04:12

## 2017-12-22 RX ADMIN — ESCITALOPRAM 20 MG: 20 TABLET, FILM COATED ORAL at 08:12

## 2017-12-22 RX ADMIN — SODIUM CHLORIDE, PRESERVATIVE FREE 3 ML: 5 INJECTION INTRAVENOUS at 01:12

## 2017-12-22 RX ADMIN — ASPIRIN 81 MG CHEWABLE TABLET 81 MG: 81 TABLET CHEWABLE at 08:12

## 2017-12-22 RX ADMIN — HEPARIN SODIUM 5000 UNITS: 5000 INJECTION, SOLUTION INTRAVENOUS; SUBCUTANEOUS at 06:12

## 2017-12-22 RX ADMIN — HEPARIN SODIUM 5000 UNITS: 5000 INJECTION, SOLUTION INTRAVENOUS; SUBCUTANEOUS at 08:12

## 2017-12-22 RX ADMIN — CARVEDILOL 3.12 MG: 3.12 TABLET, FILM COATED ORAL at 08:12

## 2017-12-22 RX ADMIN — MAGNESIUM SULFATE IN WATER 2 G: 40 INJECTION, SOLUTION INTRAVENOUS at 12:12

## 2017-12-22 RX ADMIN — ATORVASTATIN CALCIUM 80 MG: 20 TABLET, FILM COATED ORAL at 08:12

## 2017-12-22 RX ADMIN — LEVOTHYROXINE SODIUM 50 MCG: 50 TABLET ORAL at 06:12

## 2017-12-22 RX ADMIN — INSULIN ASPART 9 UNITS: 100 INJECTION, SOLUTION INTRAVENOUS; SUBCUTANEOUS at 08:12

## 2017-12-22 RX ADMIN — INSULIN ASPART 2 UNITS: 100 INJECTION, SOLUTION INTRAVENOUS; SUBCUTANEOUS at 08:12

## 2017-12-22 RX ADMIN — LOSARTAN POTASSIUM 100 MG: 50 TABLET, FILM COATED ORAL at 08:12

## 2017-12-22 RX ADMIN — HYDROCHLOROTHIAZIDE 25 MG: 25 TABLET ORAL at 08:12

## 2017-12-22 RX ADMIN — INSULIN ASPART 9 UNITS: 100 INJECTION, SOLUTION INTRAVENOUS; SUBCUTANEOUS at 12:12

## 2017-12-22 RX ADMIN — LEVETIRACETAM 500 MG: 500 TABLET ORAL at 08:12

## 2017-12-22 RX ADMIN — INSULIN ASPART 14 UNITS: 100 INJECTION, SOLUTION INTRAVENOUS; SUBCUTANEOUS at 04:12

## 2017-12-22 RX ADMIN — INSULIN ASPART 3 UNITS: 100 INJECTION, SOLUTION INTRAVENOUS; SUBCUTANEOUS at 12:12

## 2017-12-22 RX ADMIN — INSULIN ASPART 2 UNITS: 100 INJECTION, SOLUTION INTRAVENOUS; SUBCUTANEOUS at 04:12

## 2017-12-22 RX ADMIN — HEPARIN SODIUM 5000 UNITS: 5000 INJECTION, SOLUTION INTRAVENOUS; SUBCUTANEOUS at 01:12

## 2017-12-22 NOTE — PLAN OF CARE
Problem: SLP Goal  Goal: SLP Goal  Speech Language Pathology Goals  Goals expected to be met by 12/23/2017  1. Pt will tolerate puree diet without overt S/S aspiration, MIN A  2. Pt will tolerate nectar-thickened liquids w/o overt S/S aspiration, MIN A  3. Pt will tolerate trials of advanced diet textures (dental soft, regular) with adequate oral clearance, MIN A  4. Pt will tolerate trials of thin liquids w/o overt S/S aspiration, MIN A  5. Pt will answer basic/personal YNQ with 80% accuracy or higher, MOD A  6. Pt will follow 1-step commands with 80% accuracy or higher, MOD A  7. Pt will complete automatic speech tasks with 70% accuracy, MOD A  8. Educate Pt and family on aspiration precautions and compensatory strategies for functional communication        Outcome: Ongoing (interventions implemented as appropriate)  Pt progressing towards goals.    Gloyr Mera MA/MARÍA-SLP  Speech Language Pathologist  Pager [phone]  12/22/2017

## 2017-12-22 NOTE — PHYSICIAN QUERY
"PT Name: Tiffanie Wise  MR #: [id number]    Physician Query Form - Heart  Condition Clarification     CDS/: Connie Garcia RN, CCDS               Contact information:  [email or web address]        This form is a permanent document in the medical record.     Query Date: December 22, 2017    By submitting this query, we are merely seeking further clarification of documentation. Please utilize your independent clinical judgment when addressing the question(s) below.    The medical record contains the following   Indicators     Supporting Clinical Findings Location in Medical Record    BNP      EF      Radiology findings     X Echo Results 2D Echo 12/15/17  Conclusions     1 - Normal left ventricular systolic function (EF 65-70%).     2 - Indeterminate LV diastolic function.     3 - Normal right ventricular systolic function .  Vascular Neurology progress note 12/21 8:39 AM    "Ascites" documented      "SOB" or "SHERMAN" documented      "Hypoxia" documented     X Heart Failure documented Past medical history  Heart failure H & P    "Edema" documented     X Diuretics/Meds Current outpatient prescriptions on file prior to encounter  hydrochlorothiazide   metoprolol succinate     Carvedilol oral  Hydrochlorothiazide oral  Fosinopril oral H & P        MAR    Treatment:     X Other:  Ischemic stroke    Essential hypertension  SBP <180  PRN Labetalol  Pending Echo  EKG-SB    Pulmonary/Chest: Effort normal.     Left ventricular diastolic dysfunction with preserved systolic function H & P    H & P            Vascular Neurology consult 12/15    Endocrinology progress note 12/21 4:32 PM       Provider, please specify diagnosis or diagnoses associated with above clinical findings.                               [ x ] Chronic Diastolic Heart Failure (EF > 40)*  [  ] Other Type of Heart Failure (please specify type): _________________________  [  ] Heart Failure Ruled Out  [  ] Other (please specify): " ___________________________________  [  ] Clinically Undetermined            *American Heart Association                                                                                                          Please document in your progress notes daily for the duration of treatment until resolved and include in your discharge summary.

## 2017-12-22 NOTE — PT/OT/SLP PROGRESS
Physical Therapy Treatment    Patient Name:  Tiffanie Wise   MRN:  [id number]    Recommendations:     Discharge Recommendations:  rehabilitation facility   Discharge Equipment Recommendations: none   Barriers to discharge: Decreased caregiver support    Assessment:     Tiffanie Wise is a 77 y.o. female admitted with a medical diagnosis of Embolic stroke involving left middle cerebral artery.  She presents with the following impairments/functional limitations:  impaired balance, impaired self care skills, impaired functional mobilty, decreased safety awareness, gait instability, impaired cognition.    Mrs Wise continues to progress with mobility, however she is not safe to perform any mobility tasks without 24 hr supervision due to impulsivity, decreased safety awareness, and decreased cognition.  She demonstrates inconsistency with gait pattern, balance and safety.  Due to these impairments she is at high risk for falls and carries a high burden of care.  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  Patient is not physical or cognitively able to resume this role currently.  She has the potential to continue to progress with an intensive rehabilitation program incorporating multiple disciplines.     Rehab Prognosis:  good; patient would benefit from acute skilled PT services to address these deficits and reach maximum level of function.      Recent Surgery: * No surgery found *      Plan:     During this hospitalization, patient to be seen 3 x/week to address the above listed problems via gait training, therapeutic activities, therapeutic exercises, neuromuscular re-education  · Plan of Care Expires:  01/15/18   Plan of Care Reviewed with: patient, daughter    Subjective     Communicated with RN prior to session.  Patient found supine with bed in chair position and dtr at bedside upon PT entry to room, agreeable to treatment.      Chief Complaint: LUE pain  Patient comments/goals: Pt  verbalizing throughout session, but inappropriate and nonsensical    Pain/Comfort:  · Pain Rating 1:  (LUE pain)    Patients cultural, spiritual, Bahai conflicts given the current situation:      Objective:     Patient found with: telemetry, [employer]     General Precautions: Standard, aspiration, fall     Patient agreed to therapy.  Command following activities incorporated into dynamic standing to assess safety in home environment.  Pt demonstrated impulsivity, decreased safety awareness and difficulty with both one and two step commands during functional activities. See below for details.     Functional Mobility:  Gait 50 feet x 2 with contact guard assist for safety while performing dynamic tasks and visual scanning. She initially ambulated using a shuffling gait pattern and minimal step lengths.  She required verbal cues to improve gait pattern and attend to task.    Static standing activities:   RUE reaching for clothes pins in multiple planes 10x ea, unable to perform with LUE   4 square step test modified using colored disks to improve patient ability to follow steps.  She was able to follow one step commands at a time with repetition, decreased balance when performing precise foot placement.    Object manipulation in standing during cognitive command following task    Dynamic standing/gait activities:   Object (<.5lbs) transportation to target contact guard assist for balance   Pt required min assist for safety and balance when picking objects off the floor due to dizziness and LOB with leaning over, performed 3x.      Command following trials not counted.  On average patient followed 75% of simple, one step commands with visible and tangible objects and limited (3-4) options.  Pt followed 0% of 2 step commands.     2 step commands were incorporated into all standing and dynamic gait activities.  Pt improved with repetitive 2 step commands to 10% accuracy with limited options but was inconsistent.      AM-PAC 6 CLICK MOBILITY  Turning over in bed (including adjusting bedclothes, sheets and blankets)?: 4  Sitting down on and standing up from a chair with arms (e.g., wheelchair, bedside commode, etc.): 4  Moving from lying on back to sitting on the side of the bed?: 4  Moving to and from a bed to a chair (including a wheelchair)?: 3  Need to walk in hospital room?: 3  Climbing 3-5 steps with a railing?: 3  Total Score: 21     Patient left up in chair with all lines intact, call button in reach, rn notified and dtr present..    GOALS:    Physical Therapy Goals        Problem: Physical Therapy Goal    Goal Priority Disciplines Outcome Goal Variances Interventions   Physical Therapy Goal     PT/OT, PT Ongoing (interventions implemented as appropriate)     Description:  Goals to be met by: 2017     Patient will increase functional independence with mobility by performin. Supine to sit with Stand-by Assistance- MET  2. Sit to supine with Stand-by Assistance- MET  3. Sit to stand transfer with Stand-by Assistance  4. Bed to chair transfer with Stand-by Assistance using stand pivot transfer- MET  5. Gait  x 150 feet with Contact Guard Assistance using no assistive device.   6. Stand for 10 minutes with stand by assist and no LOB in order to safely participate in ADLs.  - MET  Revised: stand for 15 minutes with stand by assist and no LOB while performing dynamic UE tasks to decreased fall risk. - MET  7. Pt will  5 objects from the floor with no LOB to safely return to home.                      Time Tracking:     PT Received On: 17  PT Start Time: 1020     PT Stop Time: 1100  PT Total Time (min): 40 min     Billable Minutes: Gait Training 10 and Therapeutic Activity 30    Treatment Type: Treatment  PT/PTA: PT         Nia Dempsey, PT  2017  [phone] (pager)

## 2017-12-22 NOTE — PLAN OF CARE
12/22/17 1413   Final Note   Assessment Type Final Discharge Note   Discharge Disposition Rehab     Patient is [facility]. Patient and family notified. SW to arrange transportation.

## 2017-12-22 NOTE — PLAN OF CARE
[facility]    FACILITY TRANSFER ORDERS      Patient Name: Tiffanie Wise  YOB: 1940    PCP: Evelio Parnell MD   PCP Address: [address] / [facility] [address]  PCP Phone Number: [phone]  PCP Fax: [phone]    Encounter Date: 12/22/2017     Admit to: [facility]    Vital Signs:  Routine    Diagnoses:   Active Hospital Problems    Diagnosis  POA    *Embolic stroke involving left middle cerebral artery [I63.412]  Yes     Priority: 1 - High    Cytotoxic cerebral edema [G93.6]  Yes     Priority: 2     Essential hypertension [I10]  Yes     Priority: 3     Hyperlipidemia associated with type 2 diabetes mellitus [E11.69, E78.5]  Yes     Priority: 3      Chronic    Acquired hypothyroidism [E03.9]  Yes     Priority: 3      Chronic    Aphasia [R47.01]  Yes     Priority: 4     Bradycardia [R00.1]  Yes    Left arm swelling [M79.89]  No    Cerebrovascular accident (CVA) [I63.9]  Yes    Acute hemorrhagic infarction of brain [I63.8]  No    Aortic arch atherosclerosis [I70.0]  Yes    CAD (coronary artery disease) [I25.10]  Yes    Left ventricular diastolic dysfunction with preserved systolic function [I51.9]  Yes     Chronic     9/14/15  2 D echo:  Conc remodeling.  Normal LVEF 60-65%.  Left ventricular diastolic dysfunction.    Normal RV systolic function . estimated PA systolic pressure is 17 mmHg.  Trivial tricuspid regurgitation         RUDOLPH (generalized anxiety disorder) [F41.1]  Yes     Chronic    NAWAF on CPAP [G47.33, Z99.89]  Not Applicable    Type 2 diabetes mellitus with hyperglycemia [E11.65]  Yes     Chronic      Resolved Hospital Problems    Diagnosis Date Resolved POA   No resolved problems to display.       Allergies:Review of patient's allergies indicates:  No Known Allergies    Diet: diabetic diet: 1800 calorie; regular with thin liquids. Ok to swallow pills whole    Activities: Activity as tolerated    Nursing: No special considerations     Labs:  please recheck magnesium and as needed if low/given replacement      CONSULTS:    Physical Therapy to evaluate and treat. , Occupational Therapy to evaluate and treat. and Speech Therapy to evaluate and treat for Language, Swallowing and Cognition.    MISCELLANEOUS CARE:  Diabetes Care:   SN to perform and educate Diabetic management with blood glucose monitoring:, Fingerstick blood sugar AC and HS and Report CBG < 60 or > 350 to physician.      ** please note: per endocrinology recommendations while inpatient:   - Novolog 9-14 units depending on meal intake.  Decreased Levemir to 36 units daily    UPON DISCHARGE from facility of patient to home, recommendations per endocrinology:   - Probably resume 70/30. Daughter thinks patient has very low chance of being compliant with 4 shots/day.  -Consider addition of GLP-1.  -Follow-up with patient's endocrinologist after discharge.      WOUND CARE ORDERS  None    Medications: Review discharge medications with patient and family and provide education.      Current Discharge Medication List      START taking these medications    Details   carvedilol (COREG) 3.125 MG tablet Take 1 tablet (3.125 mg total) by mouth 2 (two) times daily.  Qty: 60 tablet, Refills: 11      insulin aspart (NOVOLOG) 100 unit/mL InPn pen Inject 9-14 Units into the skin 3 (three) times daily.  Refills: 0      insulin detemir (LEVEMIR FLEXTOUCH) 100 unit/mL (3 mL) SubQ InPn pen Inject 36 Units into the skin once daily.  Refills: 0      levETIRAcetam (KEPPRA) 500 MG Tab Take 1 tablet (500 mg total) by mouth 2 (two) times daily.  Qty: 5 tablet, Refills: 0         CONTINUE these medications which have CHANGED    Details   losartan (COZAAR) 100 MG tablet Take 1 tablet (100 mg total) by mouth once daily.  Qty: 90 tablet, Refills: 0         CONTINUE these medications which have NOT CHANGED    Details   albuterol 90 mcg/actuation inhaler Inhale 2 puffs into the lungs every 6 (six) hours.  Qty: 1 Inhaler,  "Refills: 12    Associated Diagnoses: Dyspnea, unspecified dyspnea      alendronate (FOSAMAX) 70 MG tablet TAKE 1 TABLET BY MOUTH EVERY WEEK WITH A FULL GLASS OF WATER ON EMPTY STOMACH. DO NOT EAT OR LIE DOWN FOR 30 MINUTES  Qty: 12 tablet, Refills: 3    Associated Diagnoses: Osteoporosis, unspecified osteoporosis type, unspecified pathological fracture presence      aspirin (ECOTRIN) 81 MG EC tablet Take 81 mg by mouth. Take 81 mg by mouth daily.      atorvastatin (LIPITOR) 80 MG tablet Take 1 tablet (80 mg total) by mouth once daily.  Qty: 90 tablet, Refills: 3    Associated Diagnoses: Pure hypercholesterolemia      BD INSULIN SYRINGE ULT-FINE II 1/2 mL 31 x 5/16" Syrg       BLOOD SUGAR DIAGNOSTIC (TRUETEST TEST STRIPS MISC) by Misc.(Non-Drug; Combo Route) route. Pt is testing 3 times a day      clonazePAM (KLONOPIN) 0.5 MG tablet TAKE 1 TABLET(0.5 MG) BY MOUTH TWICE DAILY AS NEEDED FOR ANXIETY  Qty: 30 tablet, Refills: 0    Associated Diagnoses: RUDOLPH (generalized anxiety disorder)      clopidogrel (PLAVIX) 75 mg tablet Take 1 tablet (75 mg total) by mouth once daily.  Qty: 90 tablet, Refills: 3    Associated Diagnoses: Coronary artery disease, angina presence unspecified, unspecified vessel or lesion type, unspecified whether native or transplanted heart      donepezil (ARICEPT) 10 MG tablet Take 1 tablet (10 mg total) by mouth once daily.  Qty: 90 tablet, Refills: 3    Associated Diagnoses: Dementia without behavioral disturbance, unspecified dementia type      EASY COMFORT LANCETS 30 gauge [facility]       escitalopram oxalate (LEXAPRO) 20 MG tablet TAKE 1 TABLET(20 MG) BY MOUTH EVERY DAY  Qty: 90 tablet, Refills: 0    Associated Diagnoses: Anxiety      FLUAD 2516-1717, 65 YR UP,,PF, 45 mcg (15 mcg x 3)/0.5 mL Syrg ADM 0.5ML IM UTD  Refills: 0      hydrochlorothiazide (HYDRODIURIL) 25 MG tablet Take 1 tablet (25 mg total) by mouth once daily.  Qty: 90 tablet, Refills: 3    Associated Diagnoses: Essential hypertension " "     levothyroxine (SYNTHROID) 50 MCG tablet Take 1 tablet (50 mcg total) by mouth once daily.  Qty: 90 tablet, Refills: 3    Associated Diagnoses: Hypothyroidism, unspecified type      nitroGLYCERIN (NITROSTAT) 0.4 MG SL tablet ONE TABLET UNDER TONGUE AS NEEDED FOR CHEST PAIN  Qty: 25 tablet, Refills: 12      pantoprazole (PROTONIX) 40 MG tablet TAKE 1 TABLET BY MOUTH DAILY  Qty: 90 tablet, Refills: 0    Associated Diagnoses: Gastroesophageal reflux disease without esophagitis      pen needle, diabetic 31 gauge x 5/16" Ndle USE TWICE DAILY AND PRN         STOP taking these medications       fosinopril (MONOPRIL) 10 MG Tab Comments:   Reason for Stopping:         traZODone (DESYREL) 50 MG tablet Comments:   Reason for Stopping:         ALCOHOL PADS PadM Comments:   Reason for Stopping:         EASY TALK LOW CONTROL Soln Comments:   Reason for Stopping:         insulin lispro (HUMALOG) 100 unit/mL Crtg Comments:   Reason for Stopping:         insulin NPH (HUMULIN N) 100 unit/mL injection Comments:   Reason for Stopping:         isosorbide mononitrate (IMDUR) 30 MG 24 hr tablet Comments:   Reason for Stopping:         metoprolol succinate (TOPROL-XL) 50 MG 24 hr tablet Comments:   Reason for Stopping:         SITagliptin (JANUVIA) 100 MG Tab Comments:   Reason for Stopping:                    _________________________________  Alisha Dangelo PA-C  12/22/2017        "

## 2017-12-22 NOTE — PLAN OF CARE
On call MSW spoke to Sonja at [facility] [phone], who states that they are willing to accept patient.  Sonja states that they realize transportation is behind and is agreement with pt arriving after 7pm.    Marce Lozoya LMSW

## 2017-12-22 NOTE — PROGRESS NOTES
Type 2 DM with hyperglycemia  Fasting BG low-mid goal.   + prandial excursions.  No hypoglycemia.   Increase Novolog to 9-14 units depending on meal intake.  Decreased Levemir to 36 units daily.   BG Ac and HS with low dose correction.

## 2017-12-22 NOTE — PROGRESS NOTES
[facility]  Vascular Neurology  Comprehensive Stroke Center  Progress Note    Assessment/Plan:     * Embolic stroke involving left middle cerebral artery    Ms. Wise is a 78yo f who presents with a L MCA syndrome, s/p tpa. Evidence of distal MCA occlusion not amenable to thrombectomy.   Unclear etiology at this time but picture thus far suspicious for embolic stroke; ESUS/Cryptogenic Embolism. Echo with LAE.  Recommend 30 day monitor at discharge.    -Antithrombotics for secondary stroke prevention: Hemorrhage stable, ASA 81mg  -Statins for secondary stroke prevention and hyperlipidemia, if present: Atorvastatin- 80 mg daily (on at home; LDL 71)  -Aggressive risk factor modification: Hypertension, Diabetes, High Cholesterol, Diet, Exercise  -Rehab Efforts: Physical Therapy, Occupational Therapy, Speech and Language Pathology, PM&R  -Diagnostics: None  -VTE Prophylaxis: SQH; Mechanical prophylaxis:on heparin 5000u sub q   -30 Day autotrigger event monitor at discharge (ordered, will complete after rehab)        Cytotoxic cerebral edema    Secondary to ischemia  Noted on CTH, MRI brain        Essential hypertension    -Stroke risk factor  -BP goal <140 post tpa and hemorrhage into infarct  -PRN hydralazine ordered (HR low 50's)  -d/c ACE-I as patient already on ARB. Increased losartan 50mg daily to 100mg daily. Changed metoprolol to coreg 3.125 BID   -check blood pressure in right arm only - swelling to left arm          Acquired hypothyroidism    Stroke risk factor  Continue home medications        Hyperlipidemia associated with type 2 diabetes mellitus    Stroke risk factor  LDL 50  Home Atorvastatin 80mg        Aphasia    -Symptom of stroke  -Aggressive SLP         Left arm swelling    -edema and pain of left forearm down into left hand now improved  -warm compresses  -d/c upon discharge        Bradycardia    -asymptomatic bradycardia   - Changed metoprolol to coreg 3.125 BID         Acute  hemorrhagic infarction of brain    Please see above; acute L MCA infarct   BP control <140 goal        Aortic arch atherosclerosis    Noted on CTA         CAD (coronary artery disease)    -Stroke Risk factor  -ASA 81mg daily  -Changed metoprolol to coreg 3.125 BID due to bradycardia        RUDOLPH (generalized anxiety disorder)    -escitalopram 20 mg qhs         NAWAF on CPAP    Stroke risk factor  Recommend treatment with CPAP at night          Type 2 diabetes mellitus with hyperglycemia    Goal -180.   Increase Levemir 40 units subQ daily  Adjust Novolog to 9-12 units with meals. Monitor AC/HS, low dose correction scale.          Discharge planning:  Probably resume 70/30. Daughter thinks patient has very low chance of being compliant with 4 shots/day.  Consider addition of GLP-1.  Follow-up with patient's endocrinologist after discharge.               Ms. Wise is a 78yo F with L MCA ischemic stroke s/p tPA.   12/16/17 Last night patient became distressed, appeared to be in pain but was unable to communicate where. ECG and troponin (stable) were ordered as well as CTH which showed hemorrhage in the area of the infarct without significant mass effect or shift. MRI completed today.   12/17/2017 NAEON. Blood glucose ranging from 246-428. Continued aphasia and dysarthria.  12/18 - BG in 300s today, low-dose insulin gtt. Plans for step down.  12/19 - Insulin gtt d/c'd. PT/OT/SLP recommending rehab. Boarding in [facility].  12/20/17 Stepped down last night.  Pending rehab acceptance.  Insulin coverage adjusted per endocrinology.  Exam unchanged. Asymptomatic bradycardia throughout day.  Metoprolol held.  Left arm swelling and pain reported.  12/21/17 NAEON. Improved arm pain/swelling. Awaiting insurance authorization for discharge to rehab facility.   12/22/17 After peer to peer, patient approved [facility]. NAEON. Patient is neurologically and medically stable for discharge.     STROKE DOCUMENTATION   Acute  Stroke Times   Last Known Normal Date: 12/15/17  Last Known Normal Time: 1030  Symptom Onset Date: 12/15/17  Symptom Onset Time: 1030  Stroke Team Called Date: 12/15/17  Stroke Team Called Time: 1342 ([place] [facility]  Stroke Team Arrival Date: 12/15/17  Stroke Team Arrival Time: 1347  CT Interpretation Time: 1237  Decision to Treat Time for Alteplase: 1154  Decision to Treat Time for IR:  (n/a)    NIH Scale:  1a. Level Of Consciousness: 0-->Alert: keenly responsive  1b. LOC Questions: 0-->Answers both questions correctly  1c. LOC Commands: 0-->Performs both tasks correctly  2. Best Gaze: 0-->Normal  3. Visual: 0-->No visual loss  4. Facial Palsy: 0-->Normal symmetrical movements  5a. Motor Arm, Left: 0-->No drift: limb holds 90 (or 45) degrees for full 10 secs  5b. Motor Arm, Right: 0-->No drift: limb holds 90 (or 45) degrees for full 10 secs  6a. Motor Leg, Left: 0-->No drift: leg holds 30 degree position for full 5 secs  6b. Motor Leg, Right: 0-->No drift: leg holds 30 degree position for full 5 secs  7. Limb Ataxia: 0-->Absent  8. Sensory: 0-->Normal: no sensory loss  9. Best Language: 2-->Severe aphasia: all communication is through fragmentary expression: great need for inference, questioning, and guessing by the listener. Range of information that can be exchanged is limited: listener carries burden of. . . (see row details)  10. Dysarthria: 1-->Mild-to-moderate dysarthria: patient slurs at least some words and, at worst, can be understood with some difficulty  11. Extinction and Inattention (formerly Neglect): 0-->No abnormality  Total (NIH Stroke Scale): 3       Modified Leanna Score: 0  Kali Coma Scale:    ABCD2 Score:    [id number] Score:   HAS -BLED Score:   ICH Score:   Hunt & Branham Classification:      Hemorrhagic change of an Ischemic Stroke: Does this patient have an ischemic stroke with hemorrhagic changes? Yes, Grading Scale: PH Type 1 (PH-1) = hematoma in < 30% of the infarcted area with  some slight space-occupying effect. Is this a symptomatic change?  No - Hemorrhage is not clinically significant     Neurologic Chief Complaint: L MCA stroke w/ hemorrhagic conversion    Subjective:     Interval History: Patient is seen for follow-up neurological assessment and treatment recommendations: No acute issues or events overnight.  Patient moved to private room.  Dispo pending insurance authorization.    SBP max 190 yesterday but this morning is well controlled <160.     HPI, Past Medical, Family, and Social History remains the same as documented in the initial encounter.     Review of Systems   Constitutional: Negative for fever.   Eyes: Negative for visual disturbance.   Respiratory: Negative for shortness of breath.    Cardiovascular: Negative for chest pain.   Gastrointestinal: Negative for vomiting.   Musculoskeletal:        Left arm pain improved   Neurological: Positive for speech difficulty. Negative for weakness and numbness.   Psychiatric/Behavioral: Negative for agitation and behavioral problems.     Scheduled Meds:   aspirin  81 mg Oral Daily    atorvastatin  80 mg Oral Daily    carvedilol  3.125 mg Oral BID    escitalopram oxalate  20 mg Oral Nightly    heparin (porcine)  5,000 Units Subcutaneous Q8H    hydroCHLOROthiazide  25 mg Oral Daily    insulin aspart  9-14 Units Subcutaneous TIDWM    insulin detemir  36 Units Subcutaneous Daily    levETIRAcetam  500 mg Oral BID    levothyroxine  50 mcg Oral Daily    losartan  100 mg Oral Daily    magnesium sulfate IVPB  2 g Intravenous Once    sodium chloride 0.9%  3 mL Intravenous Q8H     Continuous Infusions:    PRN Meds:dextrose 50%, dextrose 50%, glucagon (human recombinant), glucose, glucose, hydrALAZINE, hydrocodone-acetaminophen 5-325mg, insulin aspart, ondansetron, senna    Objective:     Vital Signs (Most Recent):  Temp: 97.6 °F (36.4 °C) (12/22/17 1236)  Pulse: (!) 48 (12/22/17 1236)  Resp: 16 (12/22/17 1236)  BP: 130/60  (12/22/17 1236)  SpO2: (!) 92 % (12/22/17 1236)  BP Location: Right arm    Vital Signs Range (Last 24H):  Temp:  [97.1 °F (36.2 °C)-99.2 °F (37.3 °C)]   Pulse:  [47-59]   Resp:  [16-18]   BP: (130-175)/(60-71)   SpO2:  [91 %-97 %]   BP Location: Right arm    Physical Exam   Constitutional: She appears well-developed and well-nourished. No distress.   Eyes: EOM are normal.   Cardiovascular: Normal rate.    Pulmonary/Chest: Effort normal.   Musculoskeletal: She exhibits tenderness.   Tender IV site on left hand. Swelling improved    Neurological: She is alert.   Skin: Skin is warm and dry. She is not diaphoretic.       Neurological Exam:   LOC: alert  Attention Span: Good   Language: Expressive > Receptive  Articulation: Dysarthria  Orientation: Oriented to person, time  Visual Fields: Full  EOM (CN III, IV, VI): Full/intact  Facial Movement (CN VII): mild right facial droop  Motor: 5/5 in all extremities  Sensation: Intact to light touch, temp  Tone: Normal tone throughout    Laboratory:  CMP:     Recent Labs  Lab 12/22/17  0527   CALCIUM 8.3*   ALBUMIN 2.3*   PROT 6.1      K 3.9   CO2 29      BUN 15   CREATININE 0.7   ALKPHOS 87   ALT 11   AST 13   BILITOT 0.6     BMP:     Recent Labs  Lab 12/22/17  0527      K 3.9      CO2 29   BUN 15   CREATININE 0.7   CALCIUM 8.3*     CBC:     Recent Labs  Lab 12/22/17  0527   WBC 9.25   RBC 4.45   HGB 9.8*   HCT 32.9*      MCV 74*   MCH 22.0*   MCHC 29.8*     Lipid Panel:     Recent Labs  Lab 12/18/17  1423   CHOL 116*   LDLCALC 50.0*   HDL 43   TRIG 115     Coagulation:     Recent Labs  Lab 12/19/17  0209   INR 1.0     Platelet Aggregation Study: No results for input(s): PLTAGG, PLTAGINTERP, PLTAGREGLACO, ADPPLTAGGREG in the last 168 hours.     Hgb A1C:     Recent Labs  Lab 12/15/17  1433   HGBA1C 12.1*     TSH:     Recent Labs  Lab 12/15/17  1433   TSH 1.008       Diagnostic Results       Brain Imaging     CTH 12/16/17:   Evolving acute/recent  infarct involving the left parietal lobe with new hyperdensity concerning for hemorrhagic conversion. Mild mass effect without midline shift.   Continued followup advised.    MRI brain 12/16/17:    Hemorrhagic infarct left parieto-occipital temporal junction.        Vessel Imaging     CTA MP 12/15/17:   Distal left parietal parietal MCA branch occlusion with correlating early changes of infarction on noncontrast CT.  No intracranial hemorrhage or significant mass effect/midline shift.        Cardiac Imaging     2D Echo 12/15/17  CONCLUSIONS     1 - Normal left ventricular systolic function (EF 65-70%).     2 - Indeterminate LV diastolic function.     3 - Normal right ventricular systolic function .     4 - The estimated PA systolic pressure is 14 mmHg.     5 - Trivial aortic regurgitation.     6 - Trivial pulmonic regurgitation.     7 - Mild left atrial enlargement.     8 - Concentric remodeling.     9 - No wall motion abnormalities.       Alisha Dangelo PA-C  Comprehensive Stroke Center  Department of Vascular Neurology   [facility]

## 2017-12-22 NOTE — ASSESSMENT & PLAN NOTE
-edema and pain of left forearm down into left hand now improved  -warm compresses  -d/c upon discharge

## 2017-12-22 NOTE — ASSESSMENT & PLAN NOTE
Ms. Wise is a 76yo f who presents with a L MCA syndrome, s/p tpa. Evidence of distal MCA occlusion not amenable to thrombectomy.   Unclear etiology at this time but picture thus far suspicious for embolic stroke; ESUS/Cryptogenic Embolism. Echo with LAE.  Recommend 30 day monitor at discharge.    -Antithrombotics for secondary stroke prevention: Hemorrhage stable, ASA 81mg  -Statins for secondary stroke prevention and hyperlipidemia, if present: Atorvastatin- 80 mg daily (on at home; LDL 71)  -Aggressive risk factor modification: Hypertension, Diabetes, High Cholesterol, Diet, Exercise  -Rehab Efforts: Physical Therapy, Occupational Therapy, Speech and Language Pathology, PM&R  -Diagnostics: None  -VTE Prophylaxis: SQH; Mechanical prophylaxis:on heparin 5000u sub q   -30 Day autotrigger event monitor at discharge (ordered, will complete after rehab)

## 2017-12-22 NOTE — PLAN OF CARE
Shilpa arranged transportation with [facility] ext [phone] for approximately 15:00.     Shaka Thakur, LCSW   [id number]

## 2017-12-22 NOTE — PLAN OF CARE
Problem: Physical Therapy Goal  Goal: Physical Therapy Goal  Goals to be met by: 2017     Patient will increase functional independence with mobility by performin. Supine to sit with Stand-by Assistance- MET  2. Sit to supine with Stand-by Assistance- MET  3. Sit to stand transfer with Stand-by Assistance  4. Bed to chair transfer with Stand-by Assistance using stand pivot transfer- MET  5. Gait  x 150 feet with Contact Guard Assistance using no assistive device.   6. Stand for 10 minutes with stand by assist and no LOB in order to safely participate in ADLs.  - MET  Revised: stand for 15 minutes with stand by assist and no LOB while performing dynamic UE tasks to decreased fall risk. - MET  7. Pt will  5 objects from the floor with no LOB to safely return to home.    Outcome: Ongoing (interventions implemented as appropriate)  Patient participated well in therapy.  POC and goals updated.  Please refer to progress note for functional mobility.     Pt is safe to perform transfers with nursing using contact guard assist.  Patient is safe to ambulate with nursing using contact guard assist for balance.     Nia Dempsey, PT  2017  [phone] (pager)

## 2017-12-22 NOTE — PLAN OF CARE
On call MSW received call stating that transportation hasn't showed up for patient and facility has cut off time.  MSW spoke to Amena at [facility] transportation [phone] stating that he has to see where  is.  He will call back.    Marce Lozoya LMSW

## 2017-12-22 NOTE — PT/OT/SLP PROGRESS
Speech Language Pathology Treatment    Patient Name:  Tiffanie Wise   MRN:  [id number]  Admitting Diagnosis: Embolic stroke involving left middle cerebral artery    Recommendations:                 General Recommendations:  Speech/language therapy  Diet recommendations:  Regular, Liquid Diet Level: Thin   Aspiration Precautions: Standard aspiration precautions   General Precautions: Standard, fall  Communication strategies:  yes/no questions only    Subjective       Patient goals: rehab    Pain/Comfort:  · Pain Rating 1: 0/10  · Pain Rating Post-Intervention 1: 0/10    Objective:     Has the patient been evaluated by SLP for swallowing?   Yes  Keep patient NPO? No   Current Respiratory Status: nasal cannula      Pt. Seen while sitting up in chair with good attention to task.  Education provided to pt.'s daughter re stimulation activities.  She copied single words with 100% accuracy and filled in missing letter of single words with 0% accuracy.  Slight improvement in spontaneous speech noted in conversation.  Expressively, she counted to 10 with 70% accuracy given max cues and modelled 2/3 simple vowels with max cues needed.  Slight improvement in carrying tune when asked to sing happy birthday.  She did not accurately complete any automatic sentences given max cues.  Pt. Did initiated communication though verbalizations attempted were ineffective.        Assessment:     Tiffanie Wise is a 77 y.o. female with an SLP diagnosis of Aphasia and Apraxia.  She presents with receptive and expressive aphasia.    Goals:    SLP Goals        Problem: SLP Goal    Goal Priority Disciplines Outcome   SLP Goal     SLP Ongoing (interventions implemented as appropriate)   Description:  Speech Language Pathology Goals  Goals expected to be met by 12/23/2017  1. Pt will tolerate puree diet without overt S/S aspiration, MIN A  2. Pt will tolerate nectar-thickened liquids w/o overt S/S aspiration, MIN A  3. Pt will tolerate trials of  advanced diet textures (dental soft, regular) with adequate oral clearance, MIN A  4. Pt will tolerate trials of thin liquids w/o overt S/S aspiration, MIN A  5. Pt will answer basic/personal YNQ with 80% accuracy or higher, MOD A  6. Pt will follow 1-step commands with 80% accuracy or higher, MOD A  7. Pt will complete automatic speech tasks with 70% accuracy, MOD A  8. Educate Pt and family on aspiration precautions and compensatory strategies for functional communication                         Plan:     · Patient to be seen:  5 x/week   · Plan of Care expires:  01/15/18  · Plan of Care reviewed with:  patient, daughter   · SLP Follow-Up:  Yes       Discharge recommendations:  rehabilitation facility       Time Tracking:     SLP Treatment Date:   12/22/17  Speech Start Time:  1115  Speech Stop Time:  1140     Speech Total Time (min):  25 min    Billable Minutes: Speech Therapy Individual 25    Glory Mera MA, CCC-SLP  12/22/2017

## 2017-12-22 NOTE — PT/OT/SLP DISCHARGE
Occupational Therapy Discharge Summary    Tiffanie Wise  MRN: [id number]   Principal Problem: Embolic stroke involving left middle cerebral artery      Patient Discharged from acute Occupational Therapy on 12/22.  Please refer to prior OT note dated 12/21 for functional status.    Assessment:      Patient appropriate for care in another setting.    Objective:     GOALS:    Occupational Therapy Goals        Problem: Occupational Therapy Goal    Goal Priority Disciplines Outcome Interventions   Occupational Therapy Goal     OT, PT/OT     Description:  Goals set 12/17 to be addressed for 14 days with expiration date, 12/31:  Patient will increase functional independence with ADLs by performing:    Patient will demonstrate rolling to the right with SBA.  Not met   Patient will demonstrate rolling to the left with SBA.   Not met  Patient will demonstrate supine -sit with SBA.   Not met  Patient will demonstrate stand pivot transfers with SBA.   Not met  Patient will demonstrate grooming while standing with SBA.   Not met  Patient will demonstrate upper body dressing with SBA while seated EOB.   Not met  Patient will demonstrate lower body dressing with SBA while seated EOB.   Not met  Patient's family / caregiver will demonstrate independence and safety with assisting patient with self-care skills and functional mobility.     Not met  Patient and/or patient's family will verbalize understanding of stroke prevention guidelines, personal risk factors and stroke warning signs via teachback method.  Not met                             Reasons for Discontinuation of Therapy Services  Transfer to alternate level of care.      Plan:     Patient Discharged to: Inpatient Rehab    NGOZI Acosta  12/22/2017

## 2017-12-22 NOTE — PROGRESS NOTES
Cm contacted Joslyn bedside Rn to notify her of the patient's discharge to [facility] today. The number for report is [phone]. Transportation is set up for 3:00 pm. Disk of brain scans given to Sw to add to packet for patient transport.

## 2017-12-23 PROBLEM — E83.42 HYPOMAGNESEMIA: Status: ACTIVE | Noted: 2017-12-23

## 2017-12-23 NOTE — PLAN OF CARE
Problem: Fall Risk (Adult)  Goal: Identify Related Risk Factors and Signs and Symptoms  Related risk factors and signs and symptoms are identified upon initiation of Human Response Clinical Practice Guideline (CPG)   Outcome: Ongoing (interventions implemented as appropriate)  Fall precautions maintained, call bell within reach, VSS, bed in lowest position, no distress noted, will continue to monitor.

## 2017-12-23 NOTE — PROGRESS NOTES
Saline loc will be removed once Corinne's transport arrive. Discharge instructions given to pt. Report given to BAO Casillas from [facility].

## 2017-12-23 NOTE — NURSING
Pt periheral iv remived.  Hand tender, no redness, no bleeding. Telemetry removed;10mg hydralazine iv given for SBP 0f 202.  Pt stable and oriented. Discharged via wheelchair.

## 2017-12-24 NOTE — DISCHARGE SUMMARY
[facility]  Vascular Neurology  Comprehensive Stroke Center  Discharge Summary     Summary:     Admit Date: 12/15/2017  1:48 PM    Discharge Date and Time: 12/22/2017  8:49 PM    Attending Physician: No att. providers found     Discharge Provider: Alisha Dangelo PA-C    History of Present Illness: Ms. Wise is a 78yo F who presented to OSH in [place] for dysarthria, aphasia, right hemiparesis and right hemisensory loss. She was last known normal at 10:30am. She received tpa at the outside facility and was transferred to [facility]. CTA MP on arrival showed a distal L MCA infarct with some early ischemic changes and she did not go to IR. Ms. Wise continues to have deficits, most significant for aphasia and dysarthria but her right hemiparesis has greatly improved.   Stroke risk factors: HLD, HTN, hypothyroidism, DMII        Hospital Course (synopsis of major diagnoses, care, treatment, and services provided during the course of the hospital stay): Ms. Wise is a 78yo F with L MCA ischemic stroke s/p tPA.   12/16/17 Last night patient became distressed, appeared to be in pain but was unable to communicate where. ECG and troponin (stable) were ordered as well as CTH which showed hemorrhage in the area of the infarct without significant mass effect or shift. MRI completed today.   12/17/2017 NAEON. Blood glucose ranging from 246-428. Continued aphasia and dysarthria.  12/18 - BG in 300s today, low-dose insulin gtt. Plans for step down.  12/19 - Insulin gtt d/c'd. PT/OT/SLP recommending rehab. Boarding in [facility].  12/20/17 Stepped down last night.  Pending rehab acceptance.  Insulin coverage adjusted per endocrinology.  Exam unchanged. Asymptomatic bradycardia throughout day.  Metoprolol held.  Left arm swelling and pain reported.  12/21/17 NAEON. Improved arm pain/swelling. Awaiting insurance authorization for discharge to rehab facility.   12/22/17 After peer to peer, patient approved for IP Rehab  at [facility]. Patient is neurologically and medically stable for discharge.     In summary, Ms. Wise is a 76yo F with a L MCA ischemic infarct with hemorrhagic conversion (though not clinically significant; NIH score change <4 points). Ms. Wise has uncontrolled diabetes (endocrine consulted and adjusted insulin) as well as hypertension which required adjustment to her home medications. She improved with her physical deficits during the hospitalization, but her aphasia/dysarthria is still very severe. Other issues during the hospitalization were with asymptomatic bradycardia so patient's home metoprolol was changed to carvedilol. After an insurance appeal she was discharged to rehab facility. She was educated on secondary stroke prevention and continued on aspirin 81mg and atorvastatin 80mg daily. Her stroke was considered embolic stroke of undetermined source so she will be discharged with a 30d event monitor. Of note, she had LAE seen on her ECHO. She will follow up with endocrinology, PCP and vascular neurology clinic post-discharge.     STROKE DOCUMENTATION   Acute Stroke Times   Last Known Normal Date: 12/15/17  Last Known Normal Time: 1030  Symptom Onset Date: 12/15/17  Symptom Onset Time: 1030  Stroke Team Called Date: 12/15/17  Stroke Team Called Time: 1342 (upon arrival to [facility])  Stroke Team Arrival Date: 12/15/17  Stroke Team Arrival Time: 1347  CT Interpretation Time: 1237  Decision to Treat Time for Alteplase: 1154  Decision to Treat Time for IR:  (n/a)     NIH Scale:  1a. Level Of Consciousness: 0-->Alert: keenly responsive  1b. LOC Questions: 0-->Answers both questions correctly  1c. LOC Commands: 0-->Performs both tasks correctly  2. Best Gaze: 0-->Normal  3. Visual: 0-->No visual loss  4. Facial Palsy: 0-->Normal symmetrical movements  5a. Motor Arm, Left: 0-->No drift: limb holds 90 (or 45) degrees for full 10 secs  5b. Motor Arm, Right: 0-->No drift: limb holds 90 (or 45) degrees for  full 10 secs  6a. Motor Leg, Left: 0-->No drift: leg holds 30 degree position for full 5 secs  6b. Motor Leg, Right: 0-->No drift: leg holds 30 degree position for full 5 secs  7. Limb Ataxia: 0-->Absent  8. Sensory: 0-->Normal: no sensory loss  9. Best Language: 2-->Severe aphasia: all communication is through fragmentary expression: great need for inference, questioning, and guessing by the listener. Range of information that can be exchanged is limited: listener carries burden of. . . (see row details)  10. Dysarthria: 1-->Mild-to-moderate dysarthria: patient slurs at least some words and, at worst, can be understood with some difficulty  11. Extinction and Inattention (formerly Neglect): 0-->No abnormality  Total (NIH Stroke Scale): 3        Modified Leanna Score: 2  [place] Coma Scale:15   ABCD2 Score:    [id number] Score:   HAS -BLED Score:   ICH Score:   Hunt & Branham Classification:       Assessment/Plan:     Diagnostic Results:      Brain Imaging:   CTH 12/16/17:   Evolving acute/recent infarct involving the left parietal lobe with new hyperdensity concerning for hemorrhagic conversion. Mild mass effect without midline shift.   Continued followup advised.     MRI brain 12/16/17:    Hemorrhagic infarct left parieto-occipital temporal junction.          Vessel Imaging      CTA MP 12/15/17:   Distal left parietal parietal MCA branch occlusion with correlating early changes of infarction on noncontrast CT.  No intracranial hemorrhage or significant mass effect/midline shift.          Cardiac Imaging      2D Echo 12/15/17  CONCLUSIONS     1 - Normal left ventricular systolic function (EF 65-70%).     2 - Indeterminate LV diastolic function.     3 - Normal right ventricular systolic function .     4 - The estimated PA systolic pressure is 14 mmHg.     5 - Trivial aortic regurgitation.     6 - Trivial pulmonic regurgitation.     7 - Mild left atrial enlargement.     8 - Concentric remodeling.     9 - No wall  motion abnormalities.     Interventions: IV t-PA    Complications: Hemorrhage    Disposition: Rehab Facility    Final Active Diagnoses:    Diagnosis Date Noted POA    PRINCIPAL PROBLEM:  Embolic stroke involving left middle cerebral artery [I63.412] 12/15/2017 Yes    Cytotoxic cerebral edema [G93.6] 12/15/2017 Yes    Essential hypertension [I10] 12/29/2016 Yes    Hyperlipidemia associated with type 2 diabetes mellitus [E11.69, E78.5] 07/18/2013 Yes     Chronic    Acquired hypothyroidism [E03.9] 04/15/2013 Yes     Chronic    Aphasia [R47.01] 12/15/2017 Yes    Hypomagnesemia [E83.42] 12/23/2017 Unknown    Bradycardia [R00.1] 12/21/2017 Yes    Left arm swelling [M79.89] 12/21/2017 No    Cerebrovascular accident (CVA) [I63.9] 12/18/2017 Yes    Acute hemorrhagic infarction of brain [I63.8] 12/17/2017 No    Aortic arch atherosclerosis [I70.0] 12/16/2017 Yes    CAD (coronary artery disease) [I25.10]  Yes    Left ventricular diastolic dysfunction with preserved systolic function [I51.9] 06/02/2016 Yes     Chronic    RUDOLPH (generalized anxiety disorder) [F41.1] 10/27/2015 Yes     Chronic    NAWAF on CPAP [G47.33, Z99.89] 07/14/2015 Not Applicable    Type 2 diabetes mellitus with hyperglycemia [E11.65] 07/18/2013 Yes     Chronic      Problems Resolved During this Admission:    Diagnosis Date Noted Date Resolved POA     * Embolic stroke involving left middle cerebral artery    Ms. Wise is a 78yo f who presents with a L MCA syndrome, s/p tpa. Evidence of distal MCA occlusion not amenable to thrombectomy.   Unclear etiology at this time but picture thus far suspicious for embolic stroke; ESUS/Cryptogenic Embolism. Echo with LAE.  Recommend 30 day monitor at discharge.    -Antithrombotics for secondary stroke prevention: Hemorrhage stable, ASA 81mg  -Statins for secondary stroke prevention and hyperlipidemia, if present: Atorvastatin- 80 mg daily (on at home; LDL 71)  -Aggressive risk factor modification:  Hypertension, Diabetes, High Cholesterol, Diet, Exercise  -Rehab Efforts: Physical Therapy, Occupational Therapy, Speech and Language Pathology, PM&R  -Diagnostics: None  -VTE Prophylaxis: SQH; Mechanical prophylaxis:on heparin 5000u sub q   -30 Day autotrigger event monitor at discharge (ordered, will complete after rehab)        Cytotoxic cerebral edema    Secondary to ischemia  Noted on CTH, MRI brain        Essential hypertension    -Stroke risk factor  -BP goal <140 post tpa and hemorrhage into infarct  -PRN hydralazine ordered (HR low 50's)  -d/c ACE-I as patient already on ARB. Increased losartan 50mg daily to 100mg daily. Changed metoprolol to coreg 3.125 BID   -check blood pressure in right arm only - swelling to left arm          Acquired hypothyroidism    Stroke risk factor  Continue home medications        Hyperlipidemia associated with type 2 diabetes mellitus    Stroke risk factor  LDL 50  Home Atorvastatin 80mg        Aphasia    -Symptom of stroke  -Aggressive SLP         Hypomagnesemia    2g IV mg sulfate now; continue monitoring upon discharge in rehab facility        Left arm swelling    -edema and pain of left forearm down into left hand now improved  -warm compresses  -d/c IV upon discharge        Bradycardia    -asymptomatic bradycardia   - Changed metoprolol to coreg 3.125 BID         Acute hemorrhagic infarction of brain    Please see above; acute L MCA infarct   BP control <140 goal        Aortic arch atherosclerosis    Noted on CTA         CAD (coronary artery disease)    -Stroke Risk factor  -ASA 81mg daily  -Changed metoprolol to coreg 3.125 BID due to bradycardia        RUDOLPH (generalized anxiety disorder)    -escitalopram 20 mg qhs         NAWAF on CPAP    Stroke risk factor  Recommend treatment with CPAP at night          Type 2 diabetes mellitus with hyperglycemia    Goal -180.   Increase Levemir 40 units subQ daily  Adjust Novolog to 9-12 units with meals. Monitor AC/HS, low dose  correction scale.          Discharge planning:  Probably resume 70/30. Daughter thinks patient has very low chance of being compliant with 4 shots/day.  Consider addition of GLP-1.  Follow-up with patient's endocrinologist after discharge.              Recommendations:     Post-discharge complication risks: Falls    Stroke Education given to: patient and family    Follow-up in Stroke Clinic in 4-6 weeks.     Discharge Plan:  Antithrombotics: Aspirin 81mg  Statin: Atorvastatin 80mg  Aggresive risk factor modification:  Hypertension  Diabetes  High Cholesterol  Diet  Exercise  Obesity    Follow Up:      Patient Instructions:     Activity as tolerated     Cardiac event monitor (30 day)   Standing Status: Future Number of Occurrences: 1 Standing Exp. Date: 12/22/18   Order Specific Question Answer Comments   Cardiac Event Monitor Auto Trigger          Medications:  Reconciled Home Medications:   Discharge Medication List as of 12/22/2017  6:23 PM      START taking these medications    Details   carvedilol (COREG) 3.125 MG tablet Take 1 tablet (3.125 mg total) by mouth 2 (two) times daily., Starting Fri 12/22/2017, Until Sat 12/22/2018, No Print      insulin detemir (LEVEMIR FLEXTOUCH) 100 unit/mL (3 mL) SubQ InPn pen Inject 36 Units into the skin once daily., Starting Sat 12/23/2017, Until Sun 12/23/2018, No Print      levETIRAcetam (KEPPRA) 500 MG Tab Take 1 tablet (500 mg total) by mouth 2 (two) times daily., Starting Fri 12/22/2017, Until Mon 12/25/2017, No Print         CONTINUE these medications which have CHANGED    Details   insulin aspart (NOVOLOG) 100 unit/mL InPn pen Inject 9-14 Units into the skin 3 (three) times daily., Starting Fri 12/22/2017, Until Sat 12/22/2018, No Print      losartan (COZAAR) 100 MG tablet Take 1 tablet (100 mg total) by mouth once daily., Starting Sat 12/23/2017, Until Sun 12/23/2018, No Print         CONTINUE these medications which have NOT CHANGED    Details   albuterol 90  "mcg/actuation inhaler Inhale 2 puffs into the lungs every 6 (six) hours., Starting 12/10/2015, Until Discontinued, Normal      alendronate (FOSAMAX) 70 MG tablet TAKE 1 TABLET BY MOUTH EVERY WEEK WITH A FULL GLASS OF WATER ON EMPTY STOMACH. DO NOT EAT OR LIE DOWN FOR 30 MINUTES, Normal      aspirin (ECOTRIN) 81 MG EC tablet Take 81 mg by mouth. Take 81 mg by mouth daily., Until Discontinued, Historical Med      atorvastatin (LIPITOR) 80 MG tablet Take 1 tablet (80 mg total) by mouth once daily., Starting Tue 6/20/2017, Normal      BD INSULIN SYRINGE ULT-FINE II 1/2 mL 31 x 5/16" Syrg Starting 5/20/2013, Until Discontinued, Historical Med      BLOOD SUGAR DIAGNOSTIC (TRUETEST TEST STRIPS MISC) by Misc.(Non-Drug; Combo Route) route. Pt is testing 3 times a day, Until Discontinued, Historical Med      clonazePAM (KLONOPIN) 0.5 MG tablet TAKE 1 TABLET(0.5 MG) BY MOUTH TWICE DAILY AS NEEDED FOR ANXIETY, Normal      clopidogrel (PLAVIX) 75 mg tablet Take 1 tablet (75 mg total) by mouth once daily., Starting Mon 6/12/2017, Normal      donepezil (ARICEPT) 10 MG tablet Take 1 tablet (10 mg total) by mouth once daily., Starting Mon 6/12/2017, Normal      EASY COMFORT LANCETS 30 gauge Misc Starting 11/30/2013, Until Discontinued, Historical Med      escitalopram oxalate (LEXAPRO) 20 MG tablet TAKE 1 TABLET(20 MG) BY MOUTH EVERY DAY, Normal      FLUAD [id number], 65 YR UP,,PF, 45 mcg (15 mcg x 3)/0.5 mL Syrg ADM 0.5ML IM UTD, Historical Med      hydrochlorothiazide (HYDRODIURIL) 25 MG tablet Take 1 tablet (25 mg total) by mouth once daily., Starting Mon 6/12/2017, Normal      levothyroxine (SYNTHROID) 50 MCG tablet Take 1 tablet (50 mcg total) by mouth once daily., Starting Mon 6/12/2017, Normal      nitroGLYCERIN (NITROSTAT) 0.4 MG SL tablet ONE TABLET UNDER TONGUE AS NEEDED FOR CHEST PAIN, Normal      pantoprazole (PROTONIX) 40 MG tablet TAKE 1 TABLET BY MOUTH DAILY, Normal      pen needle, diabetic 31 gauge x 5/16" Ndle USE " TWICE DAILY AND PRN, Historical Med         STOP taking these medications       ALCOHOL PADS PadM Comments:   Reason for Stopping:         EASY TALK LOW CONTROL Soln Comments:   Reason for Stopping:         fosinopril (MONOPRIL) 10 MG Tab Comments:   Reason for Stopping:         insulin lispro (HUMALOG) 100 unit/mL Crtg Comments:   Reason for Stopping:         insulin NPH (HUMULIN N) 100 unit/mL injection Comments:   Reason for Stopping:         isosorbide mononitrate (IMDUR) 30 MG 24 hr tablet Comments:   Reason for Stopping:         metoprolol succinate (TOPROL-XL) 50 MG 24 hr tablet Comments:   Reason for Stopping:         SITagliptin (JANUVIA) 100 MG Tab Comments:   Reason for Stopping:         traZODone (DESYREL) 50 MG tablet Comments:   Reason for Stopping:               Alisha Dangelo PA-C  Comprehensive Stroke Center  Department of Vascular Neurology   [facility]

## 2017-12-24 NOTE — ASSESSMENT & PLAN NOTE
-edema and pain of left forearm down into left hand now improved  -warm compresses  -d/c IV upon discharge

## 2017-12-27 PROBLEM — E83.42 HYPOMAGNESEMIA: Status: ACTIVE | Noted: 2017-12-27

## 2018-01-09 ENCOUNTER — TELEPHONE (OUTPATIENT)
Dept: NEUROLOGY | Facility: CLINIC | Age: 78
End: 2018-01-09

## 2018-01-09 NOTE — TELEPHONE ENCOUNTER
----- Message from Alisha Lozoya sent at 1/9/2018 11:12 AM CST -----  Contact: Cassy from [facility]  Cassy from [facility] is requesting a call back in regards to clarifying what pt needs to be monitored for after discharging from the er      Cassy from [facility] can be contacted [phone]

## 2018-01-10 ENCOUNTER — OFFICE VISIT (OUTPATIENT)
Dept: CARDIOLOGY | Facility: CLINIC | Age: 78
End: 2018-01-10
Payer: MEDICARE

## 2018-01-10 VITALS
DIASTOLIC BLOOD PRESSURE: 70 MMHG | WEIGHT: 147.69 LBS | SYSTOLIC BLOOD PRESSURE: 122 MMHG | HEART RATE: 64 BPM | BODY MASS INDEX: 29.83 KG/M2

## 2018-01-10 DIAGNOSIS — I63.412 EMBOLIC STROKE INVOLVING LEFT MIDDLE CEREBRAL ARTERY: ICD-10-CM

## 2018-01-10 DIAGNOSIS — E78.5 HYPERLIPIDEMIA ASSOCIATED WITH TYPE 2 DIABETES MELLITUS: Chronic | ICD-10-CM

## 2018-01-10 DIAGNOSIS — I25.10 CAD, MULTIPLE VESSEL: ICD-10-CM

## 2018-01-10 DIAGNOSIS — I20.9 AP (ANGINA PECTORIS): Primary | Chronic | ICD-10-CM

## 2018-01-10 DIAGNOSIS — I63.89 ACUTE HEMORRHAGIC INFARCTION OF BRAIN: ICD-10-CM

## 2018-01-10 DIAGNOSIS — E11.65 TYPE 2 DIABETES MELLITUS WITH HYPERGLYCEMIA, WITH LONG-TERM CURRENT USE OF INSULIN: Chronic | ICD-10-CM

## 2018-01-10 DIAGNOSIS — Z79.4 TYPE 2 DIABETES MELLITUS WITH HYPERGLYCEMIA, WITH LONG-TERM CURRENT USE OF INSULIN: Chronic | ICD-10-CM

## 2018-01-10 DIAGNOSIS — I63.9 CEREBROVASCULAR ACCIDENT (CVA), UNSPECIFIED MECHANISM: ICD-10-CM

## 2018-01-10 DIAGNOSIS — G47.33 OSA ON CPAP: ICD-10-CM

## 2018-01-10 DIAGNOSIS — Z95.1 S/P CABG X 1: ICD-10-CM

## 2018-01-10 DIAGNOSIS — I10 ESSENTIAL HYPERTENSION: ICD-10-CM

## 2018-01-10 DIAGNOSIS — E11.69 HYPERLIPIDEMIA ASSOCIATED WITH TYPE 2 DIABETES MELLITUS: Chronic | ICD-10-CM

## 2018-01-10 DIAGNOSIS — I63.512 ARTERIAL ISCHEMIC STROKE, MCA (MIDDLE CEREBRAL ARTERY), LEFT, ACUTE: ICD-10-CM

## 2018-01-10 DIAGNOSIS — R00.1 BRADYCARDIA: ICD-10-CM

## 2018-01-10 DIAGNOSIS — I25.10 CORONARY ARTERY DISEASE, ANGINA PRESENCE UNSPECIFIED, UNSPECIFIED VESSEL OR LESION TYPE, UNSPECIFIED WHETHER NATIVE OR TRANSPLANTED HEART: ICD-10-CM

## 2018-01-10 DIAGNOSIS — I51.89 LEFT VENTRICULAR DIASTOLIC DYSFUNCTION WITH PRESERVED SYSTOLIC FUNCTION: Chronic | ICD-10-CM

## 2018-01-10 PROCEDURE — 99499 UNLISTED E&M SERVICE: CPT | Mod: S$GLB,,, | Performed by: INTERNAL MEDICINE

## 2018-01-10 PROCEDURE — 99999 PR PBB SHADOW E&M-EST. PATIENT-LVL III: CPT | Mod: PBBFAC,,, | Performed by: INTERNAL MEDICINE

## 2018-01-10 PROCEDURE — 99214 OFFICE O/P EST MOD 30 MIN: CPT | Mod: S$GLB,,, | Performed by: INTERNAL MEDICINE

## 2018-01-10 RX ORDER — BUSPIRONE HYDROCHLORIDE 7.5 MG/1
7.5 TABLET ORAL 2 TIMES DAILY
COMMUNITY
End: 2018-01-22

## 2018-01-10 RX ORDER — METFORMIN HYDROCHLORIDE 1000 MG/1
1000 TABLET ORAL 2 TIMES DAILY
COMMUNITY
End: 2018-11-18 | Stop reason: ALTCHOICE

## 2018-01-10 NOTE — PROGRESS NOTES
Subjective:    Patient ID:  Tiffanie Wise is a 77 y.o. female who presents for evaluation of Coronary Artery Disease; Hyperlipidemia; Hypertension; and Cerebrovascular Accident      HPI Mrs. Wise returns for hospital f/u after CVA.  Old records reviewed in detail.  Her current medical conditions include CAD (CABG LIMA-LAD 6/16), DM, HTN, dyslipidemia, GERD. Nonsmoker.  Her prior history is pertinent for following:  NSTEMI 11/10 and underwent LHC and sucessful stenting of 90% RCA (3 x 23 mm Promus BRIANDA).    S/p PCI of 70% mid-LCX 12/12 with Xience BRIANDA.    S/p LHC for worsening anginal sxs on 11/21/13 with successful PCI 90% ISR mid-LCX with Xience BRIANDA.    S/p unstable angina and had PTCA of ISR of her LCX March 2014.  S/p 7/14 PTCA of severe LCX/OM ISR and PTCA of distal LCX at her bifurcation lesion at stent site July 2014 for ACS.  S/p 10/14 repeat revascularization of severe LCX ISR with cutting balloon Oct 2014 for ACS.  S/p 12/14 admit with unstable angina. She had ISR LCX again, Resolute BRIANDA implanted with good results.    Pt underwent repeat LHC again late June 2016 for unstable anginal sxs and had severe 95% distal LAD stenosis not amenable to PCI.  She had patent LCX, RCA stents, chronic occlusion of R PLB, normal LVEF.  She had urgent CABG w LIMA-LAD June 2016.  Now here for f/u.  Since I last saw her she had significant hospitalization for ischemic CVA Dec 2017 tx with TPA and tx to [facility] (distal left parietal MCA branch occlusion).  Unclear mechanism of CVA.  She had some hemorrhage noted in CVA distribution.  Was maintained on asa, plavix at time of discharge.  Metoprolol was changed to Coreg for bradycardia.  Her ecg showed sinus bradycardia, inferior/anterolateral st-t abnl.  Echo showed normal EF, LAE.  Event monitor advised after discharge.  Has speech deficits.  S/p rehab at [facility].  Now doing home health.  She gets dizzy if closing eyes.  No cp sxs.  No dyspnea. Uses cpap.   Daughter here  with pt.  Off imdur now, unclear reasons.  Her DM is poorly controlled, HGAIC 12.1%  BP stable today, on meds.         Past Medical History:   Diagnosis Date    Acute respiratory failure with hypoxia     Anxiety     AP (angina pectoris) 7/18/2013    Arterial ischemic stroke, MCA (middle cerebral artery), left, acute 12/15/2017    Asthma     CAD, multiple vessel 8/2/2016    Chronic ischemic heart disease 7/18/2013    Coronary artery disease 7/18/2013    Depression     Diabetes with neurologic complications     GERD (gastroesophageal reflux disease) 7/18/2013    Heart failure     History of PTCA 7/18/2013    Hypertension 7/18/2013    Hypothyroidism     Leg pain 8/29/2013    Mixed hyperlipidemia 7/18/2013    Myocardial infarction     Osteoporosis     Pneumonia     Pneumonia due to other staphylococcus     Precancerous changes of the vagina     Sleep apnea     stopped using CPAP 2015 - sores in nose, couldn't breathe, uses Breathe riht strips now.     Thyroid disease     Tobacco dependence     resolved    Trouble in sleeping     Type 2 diabetes mellitus with ophthalmic manifestations     Urinary incontinence     pullups day, pads at night         Review of Systems   Constitution: Positive for weakness and malaise/fatigue.   HENT: Negative.    Eyes: Negative.    Cardiovascular: Negative.    Respiratory: Negative.    Endocrine: Negative.    Hematologic/Lymphatic: Negative.    Skin: Negative.    Musculoskeletal: Negative.    Gastrointestinal: Negative.    Genitourinary: Negative.    Neurological: Positive for difficulty with concentration, focal weakness and loss of balance.   Psychiatric/Behavioral: Negative.    Allergic/Immunologic: Negative.        /70 (BP Location: Left arm, Patient Position: Sitting, BP Method: Medium (Manual))   Pulse 64   Wt 67 kg (147 lb 11.3 oz)   LMP  (LMP Unknown)   BMI 29.83 kg/m²     Wt Readings from Last 3 Encounters:   01/10/18 67 kg (147 lb 11.3 oz)    12/16/17 68.5 kg (151 lb 0.2 oz)   12/15/17 74.1 kg (163 lb 4.8 oz)     Temp Readings from Last 3 Encounters:   12/22/17 99.2 °F (37.3 °C) (Oral)   12/15/17 99 °F (37.2 °C) (Oral)   10/13/16 98.2 °F (36.8 °C) (Tympanic)     BP Readings from Last 3 Encounters:   01/10/18 122/70   12/22/17 (!) 202/79   12/15/17 (!) 153/62     Pulse Readings from Last 3 Encounters:   01/10/18 64   12/22/17 (!) 54   12/15/17 (!) 57          Objective:    Physical Exam   Constitutional: She is oriented to person, place, and time. Vital signs are normal. She appears well-developed and well-nourished. She is active and cooperative. She does not have a sickly appearance. She does not appear ill. No distress.   HENT:   Head: Normocephalic.   Neck: Neck supple. No JVD present. Carotid bruit is not present. No thyromegaly present.   Cardiovascular: Normal rate, regular rhythm, S1 normal, S2 normal, normal heart sounds and normal pulses.  PMI is not displaced.  Exam reveals no gallop and no friction rub.    No murmur heard.  Pulses:       Radial pulses are 2+ on the right side, and 2+ on the left side.   Pulmonary/Chest: Effort normal and breath sounds normal. She has no wheezes. She has no rales.   Abdominal: Soft. Normal appearance, normal aorta and bowel sounds are normal. She exhibits no pulsatile liver, no abdominal bruit, no ascites and no mass. There is no splenomegaly or hepatomegaly. There is no tenderness.   Musculoskeletal: She exhibits no edema.   Lymphadenopathy:     She has no cervical adenopathy.   Neurological: She is alert and oriented to person, place, and time.   Skin: Skin is warm. She is not diaphoretic.   Psychiatric: She has a normal mood and affect. Her behavior is normal.   Nursing note and vitals reviewed.      I have reviewed all pertinent labs and cardiac studies.      Chemistry        Component Value Date/Time     12/22/2017 0527    K 3.9 12/22/2017 0527     12/22/2017 0527    CO2 29 12/22/2017 0527     BUN 15 12/22/2017 0527    CREATININE 0.7 12/22/2017 0527     (H) 12/22/2017 0527        Component Value Date/Time    CALCIUM 8.3 (L) 12/22/2017 0527    ALKPHOS 87 12/22/2017 0527    AST 13 12/22/2017 0527    ALT 11 12/22/2017 0527    BILITOT 0.6 12/22/2017 0527    ESTGFRAFRICA >60.0 12/22/2017 0527    EGFRNONAA >60.0 12/22/2017 0527          Lab Results   Component Value Date    WBC 9.25 12/22/2017    HGB 9.8 (L) 12/22/2017    HCT 32.9 (L) 12/22/2017    MCV 74 (L) 12/22/2017     12/22/2017     Lab Results   Component Value Date    HGBA1C 12.1 (H) 12/15/2017     Lab Results   Component Value Date    CHOL 116 (L) 12/18/2017    CHOL 145 09/15/2017    CHOL 193 02/24/2017     Lab Results   Component Value Date    HDL 43 12/18/2017    HDL 57 09/15/2017    HDL 73 02/24/2017     Lab Results   Component Value Date    LDLCALC 50.0 (L) 12/18/2017    LDLCALC 71.4 09/15/2017    LDLCALC 98.0 02/24/2017     Lab Results   Component Value Date    TRIG 115 12/18/2017    TRIG 83 09/15/2017    TRIG 110 02/24/2017     Lab Results   Component Value Date    CHOLHDL 37.1 12/18/2017    CHOLHDL 39.3 09/15/2017    CHOLHDL 37.8 02/24/2017     Narrative     Date of Procedure: 12/15/2017        TEST DESCRIPTION   Technical Quality: This is a technically challenging study.     Aorta: The aortic root is normal in size, measuring 1.9 cm at sinotubular junction and 2.3 cm at Sinuses of Valsalva. The proximal ascending aorta is normal in size, measuring 2.7 cm across.     Left Atrium: The left atrial volume index is mildly enlarged, measuring 38.85 cc/m2.     Left Ventricle: The left ventricle is normal in size, with an end-diastolic diameter of 3.9 cm, and an end-systolic diameter of 2.7 cm. LV wall thickness is normal, with the septum measuring 0.8 cm and the posterior wall measuring 0.9 cm across. Relative   wall thickness was increased at 0.46, and the LV mass index was 64.7 g/m2 consistent with concentric remodeling. There are no  regional wall motion abnormalities. Left ventricular systolic function appears normal. Visually estimated ejection fraction is   65-70%. The LV Doppler derived stroke volume equals 76.0 ccs.     Diastolic indices: E wave velocity 0.9 m/s, E/A ratio 0.9,  msec., E/e' ratio(avg) 12. Diastolic function is indeterminate.     Right Atrium: The right atrium is normal in size, measuring 4.5 cm in length and 3.2 cm in width in the apical view.     Right Ventricle: The right ventricle is normal in size measuring 4.0 cm at the base in the apical right ventricle-focused view. Global right ventricular systolic function appears normal. Tricuspid annular plane systolic excursion (TAPSE) is .9 cm. Tissue   Doppler-derived tricuspid annular peak systolic velocity (S prime) is 9.6 cm/s. The estimated PA systolic pressure is 14 mmHg.     Aortic Valve:  The aortic valve is mildly sclerotic. Additionally, there is trivial aortic regurgitation. There is aortic annular calcification.     Mitral Valve:  The mitral valve is mildly sclerotic.     Pulmonary Valve:  There is trivial pulmonic regurgitation.     IVC: IVC is normal in size and collapses > 50% with a sniff, suggesting normal right atrial pressure of 3 mmHg.     Intracavitary: There is no evidence of pericardial effusion, intracavity mass, thrombi, or vegetation.         CONCLUSIONS     1 - Normal left ventricular systolic function (EF 65-70%).     2 - Indeterminate LV diastolic function.     3 - Normal right ventricular systolic function .     4 - The estimated PA systolic pressure is 14 mmHg.     5 - Trivial aortic regurgitation.     6 - Trivial pulmonic regurgitation.     7 - Mild left atrial enlargement.     8 - Concentric remodeling.     9 - No wall motion abnormalities.             This document has been electronically    SIGNED BY: Trudy Ramey MD On: 12/15/2017 16:37           Assessment:       1. AP (angina pectoris)    2. Hyperlipidemia associated with type 2  diabetes mellitus    3. Type 2 diabetes mellitus with hyperglycemia, with long-term current use of insulin    4. S/P CABG x 1    5. NAWAF on CPAP    6. CAD, multiple vessel    7. Coronary artery disease, angina presence unspecified, unspecified vessel or lesion type, unspecified whether native or transplanted heart    8. Essential hypertension    9. Left ventricular diastolic dysfunction with preserved systolic function    10. Embolic stroke involving left middle cerebral artery    11. Acute hemorrhagic infarction of brain    12. Cerebrovascular accident (CVA), unspecified mechanism    13. Arterial ischemic stroke, MCA (middle cerebral artery), left, acute    14. Bradycardia         Plan:             - S/p CVA Dec 2017, possible embolic, with residual deficits and needing continued therapy.  - F/u with Dr. Rosenbaum, Neurology,  Clinic, later this month.  - Recommend pt see Dr. Devine, EP, and have ILR done to assess for PAF (which pt has not had before) in light of her CVA.  - Continue optimal medical tx for CAD.  - Needs to get her DM under control!  F/u with Dr. De Leon.  - Cardiac diet  - No changes in CV meds today.  - Continue CPAP for NAWAF.     F/u 3 months.

## 2018-01-18 ENCOUNTER — PATIENT MESSAGE (OUTPATIENT)
Dept: CARDIOLOGY | Facility: CLINIC | Age: 78
End: 2018-01-18

## 2018-01-20 ENCOUNTER — DOCUMENTATION ONLY (OUTPATIENT)
Dept: ELECTROPHYSIOLOGY | Facility: CLINIC | Age: 78
End: 2018-01-20

## 2018-01-22 ENCOUNTER — INITIAL CONSULT (OUTPATIENT)
Dept: CARDIOLOGY | Facility: CLINIC | Age: 78
End: 2018-01-22
Payer: MEDICARE

## 2018-01-22 VITALS
SYSTOLIC BLOOD PRESSURE: 130 MMHG | DIASTOLIC BLOOD PRESSURE: 66 MMHG | BODY MASS INDEX: 29.61 KG/M2 | WEIGHT: 146.63 LBS | HEART RATE: 66 BPM

## 2018-01-22 DIAGNOSIS — I63.412 EMBOLIC STROKE INVOLVING LEFT MIDDLE CEREBRAL ARTERY: Primary | ICD-10-CM

## 2018-01-22 DIAGNOSIS — E78.5 HYPERLIPIDEMIA ASSOCIATED WITH TYPE 2 DIABETES MELLITUS: Chronic | ICD-10-CM

## 2018-01-22 DIAGNOSIS — G47.33 OSA ON CPAP: ICD-10-CM

## 2018-01-22 DIAGNOSIS — E11.69 HYPERLIPIDEMIA ASSOCIATED WITH TYPE 2 DIABETES MELLITUS: Chronic | ICD-10-CM

## 2018-01-22 DIAGNOSIS — Z95.1 S/P CABG X 1: ICD-10-CM

## 2018-01-22 DIAGNOSIS — I51.89 LEFT VENTRICULAR DIASTOLIC DYSFUNCTION WITH PRESERVED SYSTOLIC FUNCTION: Chronic | ICD-10-CM

## 2018-01-22 PROCEDURE — 99999 PR PBB SHADOW E&M-EST. PATIENT-LVL III: CPT | Mod: PBBFAC,,, | Performed by: INTERNAL MEDICINE

## 2018-01-22 PROCEDURE — 99205 OFFICE O/P NEW HI 60 MIN: CPT | Mod: S$GLB,,, | Performed by: INTERNAL MEDICINE

## 2018-01-22 PROCEDURE — 99499 UNLISTED E&M SERVICE: CPT | Mod: S$GLB,,, | Performed by: INTERNAL MEDICINE

## 2018-01-22 RX ORDER — CHOLECALCIFEROL (VITAMIN D3) 25 MCG
1000 TABLET ORAL DAILY
COMMUNITY

## 2018-01-22 NOTE — PROGRESS NOTES
Subjective:    Patient ID:  Tiffanie Wise is a 77 y.o. female who presents for evaluation of Cerebrovascular Accident (ref Dr. Short/[facility] ILR) and Atrial Fibrillation      77 yoF CAD s/p PCI, CABG, HTN, DM here for ILR consideration. 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 She has recovered partially but still has aphasia. She had normal EF and no arrhythmia during her hospitalization. She is on aspirin and plavix for her CVA. She continues to go to rehab for her CVA. Sulma Pichardo and Han recommended ILR. Her daughter is present and speaks for her mother. No known history of arrhythmia. She has NAWAF as well as CABG. Available ECGs show SR.       Past Medical History:  No date: Acute respiratory failure with hypoxia  No date: Anxiety  7/18/2013: AP (angina pectoris)  12/15/2017: Arterial ischemic stroke, MCA (middle cerebral*  No date: Asthma  8/2/2016: CAD, multiple vessel  7/18/2013: Chronic ischemic heart disease  7/18/2013: Coronary artery disease  No date: Depression  No date: Diabetes with neurologic complications  7/18/2013: GERD (gastroesophageal reflux disease)  No date: Heart failure  7/18/2013: History of PTCA  7/18/2013: Hypertension  No date: Hypothyroidism  8/29/2013: Leg pain  7/18/2013: Mixed hyperlipidemia  No date: Myocardial infarction  No date: Osteoporosis  No date: Pneumonia  No date: Pneumonia due to other staphylococcus  No date: Precancerous changes of the vagina  No date: Sleep apnea      Comment: stopped using CPAP 2015 - sores in nose,                couldn't breathe, uses Breathe riht strips now.  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*  No date: Urinary incontinence      Comment: pullups day, pads at night    Past Surgical History:  No date: BREAST SURGERY Left      Comment: benign cyst  No date: CORONARY ANGIOPLASTY  No date:  CORONARY STENT PLACEMENT      Comment: x6-7 oer the yeqrs  last 12/17/14 BRIANDA to lcfx  2014: EYE SURGERY Bilateral      Comment: cataracts w/IOL   Dr Vance  1970: HYSTERECTOMY      Comment: vag hyst; ovaries intact  No date: THYROID SURGERY      Comment: nodule benign, Dr Hankins  1970: TUBAL LIGATION    Social History    Marital status:              Spouse name:                       Years of education:                 Number of children:               Occupational History    None on file    Social History Main Topics    Smoking status: Former Smoker                                                                Packs/day: 1.00      Years: 38.00          Quit date: 7/8/2008    Smokeless tobacco: Never Used                        Alcohol use: No              Drug use: No              Sexual activity: Not Currently           Birth control/protection: None    Other Topics            Concern    None on file    Social History Narrative    ( 2nd marriage,  x 1). She has 4 children and 3 step children.. 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 She still drives. Does not have a Living Will or Advanced directive.    Review of patient's family history indicates:    Kidney disease                 Father                    Kidney disease                 Brother                   Heart disease                  Mother                    Fibromyalgia                   Daughter                  Cancer                         Son                         Comment: lung    Cirrhosis                      Sister                    Alcohol abuse                  Sister                    Diabetes                       Sister                    Fibromyalgia                   Daughter                  Diabetes                       Paternal Grandmother      Stroke                         Neg Hx                    Mental illness                 Neg Hx                     Mental retardation             Neg Hx                          Review of Systems   Unable to perform ROS: other (aphasia)   Constitution: Negative for malaise/fatigue.   HENT: Negative.    Eyes: Negative.    Cardiovascular: Negative for chest pain, dyspnea on exertion, leg swelling, near-syncope, palpitations and syncope.   Respiratory: Negative.  Negative for shortness of breath.    Endocrine: Negative.    Hematologic/Lymphatic: Negative.    Skin: Negative.    Musculoskeletal: Negative.    Gastrointestinal: Negative.    Genitourinary: Negative.    Neurological: Negative.  Negative for dizziness and light-headedness.   Psychiatric/Behavioral: Negative.    Allergic/Immunologic: Negative.         Objective:    Physical Exam   Constitutional: She is oriented to person, place, and time. She appears well-developed and well-nourished. No distress.   HENT:   Head: Normocephalic and atraumatic.   Eyes: EOM are normal. Pupils are equal, round, and reactive to light.   Neck: Normal range of motion. No JVD present. No thyromegaly present.   Cardiovascular: Normal rate, regular rhythm, S1 normal, S2 normal and normal heart sounds.  PMI is not displaced.  Exam reveals no gallop and no friction rub.    No murmur heard.  Pulmonary/Chest: Effort normal and breath sounds normal. No respiratory distress. She has no wheezes. She has no rales.   Abdominal: Soft. Bowel sounds are normal. She exhibits no distension. There is no tenderness. There is no rebound and no guarding.   Musculoskeletal: Normal range of motion. She exhibits no edema or tenderness.   Neurological: She is alert and oriented to person, place, and time. No cranial nerve deficit.   Skin: Skin is warm and dry. No rash noted. No erythema.   Psychiatric: She has a normal mood and affect. Her behavior is normal. Judgment and thought content normal.   Vitals reviewed.        Assessment:       1. Embolic stroke involving left middle cerebral artery    2.  Hyperlipidemia associated with type 2 diabetes mellitus    3. Left ventricular diastolic dysfunction with preserved systolic function    4. NAWAF on CPAP    5. S/P CABG x 1         Plan:       77 yoM CAD s/p CABG, HTN, DM, HLP here for ILR consideration. She has cryptogenic CVA and has risk factor for AF. She is a candidate for ILR. Extensive discussion regarding risks, benefits, and alternative approaches to the procedure was had with the patient.  The patient voices understanding and all questions have been answered.  The patient would like to proceed as planned.    ILR for cryptogenic CVA  [facility]

## 2018-01-22 NOTE — LETTER
January 22, 2018      Brendan Short MD  [address]  [place] [address]           [facility]  [id number] [place] [address] , [address]  [place] [address]  Phone: [phone]  Fax: [phone]          Patient: Tiffanie Wise   MR Number: [id number]   YOB: 1940   Date of Visit: 1/22/2018       Dear Dr. Brendan Short:    Thank you for referring Tiffanie Wise to me for evaluation. Attached you will find relevant portions of my assessment and plan of care.    If you have questions, please do not hesitate to call me. I look forward to following Tiffanie Wise along with you.    Sincerely,    Kole Devine MD    Enclosure  CC:  No Recipients    If you would like to receive this communication electronically, please contact [email or web address] or [phone] to request more information on [facility] [employer] Link access.    For providers and/or their staff who would like to refer a patient to [facility], please contact us through our one-stop-shop provider referral line, [facility], at [phone].    If you feel you have received this communication in error or would no longer like to receive these types of communications, please e-mail [email or web address]

## 2018-01-24 ENCOUNTER — TELEPHONE (OUTPATIENT)
Dept: CARDIOLOGY | Facility: CLINIC | Age: 78
End: 2018-01-24

## 2018-01-24 ENCOUNTER — PATIENT MESSAGE (OUTPATIENT)
Dept: CARDIOLOGY | Facility: CLINIC | Age: 78
End: 2018-01-24

## 2018-01-24 NOTE — TELEPHONE ENCOUNTER
----- Message from Kole Devine MD sent at 1/22/2018  1:05 PM CST -----  Please see chart for ILR implant. Will use [facility] device.     Thanks, MB

## 2018-01-24 NOTE — TELEPHONE ENCOUNTER
Telephoned patient to confirm Loop implant for 2/1/18 and daughter thought it would be this week explained multiple issues to delay also not usually considered an emergency procedure. Reviewed Npo status, arrival time and will send patient portal message for review

## 2018-01-29 ENCOUNTER — PATIENT MESSAGE (OUTPATIENT)
Dept: CARDIOLOGY | Facility: HOSPITAL | Age: 78
End: 2018-01-29

## 2018-01-29 NOTE — PROGRESS NOTES
Received a phone call from Anila with [employer] [employer]. Multiple attempts were made to contact patient to deliver the 30 day Cardiac event monitor. Patient did not return any messages left by [employer] nor did they receive a response to the letter that was sent to the patient. Monitor was cancelled due to unable to confirm delivery.

## 2018-02-01 ENCOUNTER — SURGERY (OUTPATIENT)
Age: 78
End: 2018-02-01

## 2018-02-01 ENCOUNTER — HOSPITAL ENCOUNTER (OUTPATIENT)
Facility: HOSPITAL | Age: 78
Discharge: HOME | End: 2018-02-01
Attending: INTERNAL MEDICINE | Admitting: INTERNAL MEDICINE
Payer: MEDICARE

## 2018-02-01 ENCOUNTER — OFFICE VISIT (OUTPATIENT)
Dept: PULMONOLOGY | Facility: CLINIC | Age: 78
End: 2018-02-01
Payer: MEDICARE

## 2018-02-01 VITALS
RESPIRATION RATE: 18 BRPM | HEART RATE: 63 BPM | BODY MASS INDEX: 30.32 KG/M2 | WEIGHT: 150.38 LBS | HEIGHT: 59 IN | SYSTOLIC BLOOD PRESSURE: 150 MMHG | OXYGEN SATURATION: 94 % | DIASTOLIC BLOOD PRESSURE: 62 MMHG

## 2018-02-01 VITALS
DIASTOLIC BLOOD PRESSURE: 46 MMHG | SYSTOLIC BLOOD PRESSURE: 145 MMHG | HEART RATE: 65 BPM | OXYGEN SATURATION: 97 % | TEMPERATURE: 98 F

## 2018-02-01 DIAGNOSIS — I63.412 EMBOLIC STROKE INVOLVING LEFT MIDDLE CEREBRAL ARTERY: ICD-10-CM

## 2018-02-01 DIAGNOSIS — I20.9 ANGINA PECTORIS: ICD-10-CM

## 2018-02-01 DIAGNOSIS — I63.412 EMBOLIC STROKE INVOLVING LEFT MIDDLE CEREBRAL ARTERY: Primary | ICD-10-CM

## 2018-02-01 DIAGNOSIS — G47.33 OSA ON CPAP: Primary | ICD-10-CM

## 2018-02-01 PROCEDURE — 1159F MED LIST DOCD IN RCRD: CPT | Mod: S$GLB,,, | Performed by: INTERNAL MEDICINE

## 2018-02-01 PROCEDURE — 99214 OFFICE O/P EST MOD 30 MIN: CPT | Mod: S$GLB,,, | Performed by: INTERNAL MEDICINE

## 2018-02-01 PROCEDURE — 3008F BODY MASS INDEX DOCD: CPT | Mod: S$GLB,,, | Performed by: INTERNAL MEDICINE

## 2018-02-01 PROCEDURE — C1764 EVENT RECORDER, CARDIAC: HCPCS

## 2018-02-01 PROCEDURE — 27200083 EP LAB PROCEDURE

## 2018-02-01 PROCEDURE — 33282 EP LAB PROCEDURE: CPT | Mod: ,,, | Performed by: INTERNAL MEDICINE

## 2018-02-01 PROCEDURE — 99999 PR PBB SHADOW E&M-EST. PATIENT-LVL III: CPT | Mod: PBBFAC,,, | Performed by: INTERNAL MEDICINE

## 2018-02-01 PROCEDURE — 25000003 PHARM REV CODE 250

## 2018-02-01 RX ORDER — BUSPIRONE HYDROCHLORIDE 7.5 MG/1
7.5 TABLET ORAL
COMMUNITY
Start: 2018-01-15 | End: 2018-02-01

## 2018-02-01 RX ORDER — CARVEDILOL 3.12 MG/1
3.12 TABLET ORAL
COMMUNITY
Start: 2018-01-15 | End: 2018-02-01

## 2018-02-01 RX ORDER — CLONAZEPAM 0.5 MG/1
0.5 TABLET ORAL
COMMUNITY
End: 2018-07-13 | Stop reason: SDUPTHER

## 2018-02-01 NOTE — INTERVAL H&P NOTE
The patient has been examined and the H&P has been reviewed:    I concur with the findings and no changes have occurred since H&P was written.    Anesthesia/Surgery risks, benefits and alternative options discussed and understood by patient/family.          Active Hospital Problems    Diagnosis  POA    Embolic stroke involving left middle cerebral artery [I63.412]  Yes     Priority: Low      Resolved Hospital Problems    Diagnosis Date Resolved POA   No resolved problems to display.

## 2018-02-01 NOTE — H&P (VIEW-ONLY)
Subjective:    Patient ID:  Tiffanie Wise is a 77 y.o. female who presents for evaluation of Cerebrovascular Accident (ref Dr. Short/[facility] ILR) and Atrial Fibrillation      77 yoF CAD s/p PCI, CABG, HTN, DM here for ILR consideration. 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 She has recovered partially but still has aphasia. She had normal EF and no arrhythmia during her hospitalization. She is on aspirin and plavix for her CVA. She continues to go to rehab for her CVA. Sulma Pichardo and Han recommended ILR. Her daughter is present and speaks for her mother. No known history of arrhythmia. She has NAWAF as well as CABG. Available ECGs show SR.       Past Medical History:  No date: Acute respiratory failure with hypoxia  No date: Anxiety  7/18/2013: AP (angina pectoris)  12/15/2017: Arterial ischemic stroke, MCA (middle cerebral*  No date: Asthma  8/2/2016: CAD, multiple vessel  7/18/2013: Chronic ischemic heart disease  7/18/2013: Coronary artery disease  No date: Depression  No date: Diabetes with neurologic complications  7/18/2013: GERD (gastroesophageal reflux disease)  No date: Heart failure  7/18/2013: History of PTCA  7/18/2013: Hypertension  No date: Hypothyroidism  8/29/2013: Leg pain  7/18/2013: Mixed hyperlipidemia  No date: Myocardial infarction  No date: Osteoporosis  No date: Pneumonia  No date: Pneumonia due to other staphylococcus  No date: Precancerous changes of the vagina  No date: Sleep apnea      Comment: stopped using CPAP 2015 - sores in nose,                couldn't breathe, uses Breathe riht strips now.  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*  No date: Urinary incontinence      Comment: pullups day, pads at night    Past Surgical History:  No date: BREAST SURGERY Left      Comment: benign cyst  No date: CORONARY ANGIOPLASTY  No date:  CORONARY STENT PLACEMENT      Comment: x6-7 oer the yeqrs  last 12/17/14 BRIANDA to [facility]  2014: EYE SURGERY Bilateral      Comment: cataracts w/IOL   Dr Vance  1970: HYSTERECTOMY      Comment: vag hyst; ovaries intact  No date: THYROID SURGERY      Comment: nodule benign, Dr Hankins  1970: TUBAL LIGATION    Social History    Marital status:              Spouse name:                       Years of education:                 Number of children:               Occupational History    None on file    Social History Main Topics    Smoking status: Former Smoker                                                                Packs/day: 1.00      Years: 38.00          Quit date: 7/8/2008    Smokeless tobacco: Never Used                        Alcohol use: No              Drug use: No              Sexual activity: Not Currently           Birth control/protection: None    Other Topics            Concern    None on file    Social History Narrative    ( 2nd marriage,  x 1). She has 4 children and 3 step children.. 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 She still drives. Does not have a Living Will or Advanced directive.    Review of patient's family history indicates:    Kidney disease                 Father                    Kidney disease                 Brother                   Heart disease                  Mother                    Fibromyalgia                   Daughter                  Cancer                         Son                         Comment: lung    Cirrhosis                      Sister                    Alcohol abuse                  Sister                    Diabetes                       Sister                    Fibromyalgia                   Daughter                  Diabetes                       Paternal Grandmother      Stroke                         Neg Hx                    Mental illness                 Neg Hx                     Mental retardation             Neg Hx                          Review of Systems   Unable to perform ROS: other (aphasia)   Constitution: Negative for malaise/fatigue.   HENT: Negative.    Eyes: Negative.    Cardiovascular: Negative for chest pain, dyspnea on exertion, leg swelling, near-syncope, palpitations and syncope.   Respiratory: Negative.  Negative for shortness of breath.    Endocrine: Negative.    Hematologic/Lymphatic: Negative.    Skin: Negative.    Musculoskeletal: Negative.    Gastrointestinal: Negative.    Genitourinary: Negative.    Neurological: Negative.  Negative for dizziness and light-headedness.   Psychiatric/Behavioral: Negative.    Allergic/Immunologic: Negative.         Objective:    Physical Exam   Constitutional: She is oriented to person, place, and time. She appears well-developed and well-nourished. No distress.   HENT:   Head: Normocephalic and atraumatic.   Eyes: EOM are normal. Pupils are equal, round, and reactive to light.   Neck: Normal range of motion. No JVD present. No thyromegaly present.   Cardiovascular: Normal rate, regular rhythm, S1 normal, S2 normal and normal heart sounds.  PMI is not displaced.  Exam reveals no gallop and no friction rub.    No murmur heard.  Pulmonary/Chest: Effort normal and breath sounds normal. No respiratory distress. She has no wheezes. She has no rales.   Abdominal: Soft. Bowel sounds are normal. She exhibits no distension. There is no tenderness. There is no rebound and no guarding.   Musculoskeletal: Normal range of motion. She exhibits no edema or tenderness.   Neurological: She is alert and oriented to person, place, and time. No cranial nerve deficit.   Skin: Skin is warm and dry. No rash noted. No erythema.   Psychiatric: She has a normal mood and affect. Her behavior is normal. Judgment and thought content normal.   Vitals reviewed.        Assessment:       1. Embolic stroke involving left middle cerebral artery    2.  Hyperlipidemia associated with type 2 diabetes mellitus    3. Left ventricular diastolic dysfunction with preserved systolic function    4. NAWAF on CPAP    5. S/P CABG x 1         Plan:       77 yoM CAD s/p CABG, HTN, DM, HLP here for ILR consideration. She has cryptogenic CVA and has risk factor for AF. She is a candidate for ILR. Extensive discussion regarding risks, benefits, and alternative approaches to the procedure was had with the patient.  The patient voices understanding and all questions have been answered.  The patient would like to proceed as planned.    ILR for cryptogenic CVA  [facility]

## 2018-02-01 NOTE — PATIENT INSTRUCTIONS
Continuous Positive Air Pressure (CPAP)     A mask over the nose gently directs air into the throat to keep the airway open.   Continuous positive air pressure (CPAP) uses gentle air pressure to hold the airway open. CPAP is often the most effective treatment for sleep apnea and severe snoring. It works very well for many people. But keep in mind that it can take several adjustments before the setup is right for you.  How CPAP works  The CPAP machine  is a small portable pump beside the bed. The pump sends air through a hose, which is held over your nose and mouth by a mask. Mild air pressure is gently pushed through your airway. The air pressure nudges sagging tissues aside. This widens the airway so you can breathe better. CPAP may be combined with other kinds of therapy for sleep apnea.  Types of air pressure treatments  There are different types of CPAP. Your doctor or CPAP technician will help you decide which type is best for you:  · Basic CPAP keeps the pressure constant all night long.  · A bilevel device (BiPAP) provides more pressure when you breathe in and less when you breathe out. A BiPAP machine also may be set to provide automatic breaths to maintain breathing if you stop breathing while sleeping.  · An autoCPAP device automatically adjusts pressure throughout the night and in response to changes such as body position, sleep stage, and snoring.  Date Last Reviewed: 8/10/2015  © 3918-6857 [employer]. [address] [place] [address], [place] [address]. All rights reserved. This information is not intended as a substitute for professional medical care. Always follow your healthcare professional's instructions.

## 2018-02-01 NOTE — DISCHARGE INSTRUCTIONS
Nozin Instructions  Goal: the goal of Nozin is to reduce the risk of post-procedural infections by bacteria in the nasal cavity. Think of it as hand  for your nose.    How to use:    1. Shake Nozin bottle well    2. Take a cotton swab and apply 4 drops to one tip    3. Insert cotton tip into one nostril, being sure not to go deeper into nose than tip of the swab.    4. Swab nostril 6 times counterclockwise and 6 times clockwise. Make sure the swab the inside front pocket of the nostril.    5. Take swab out and apply 2 drops to the same cotton tip. Repeat steps 3 and 4 in the other nostril.        Do steps 1-5 twice a day for 7 days.

## 2018-02-01 NOTE — PROGRESS NOTES
Subjective:      Patient ID: Tiffanie Wise is a 77 y.o. female.    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    RUDOLPH (generalized anxiety disorder)    Pulmonary nodule, right - stable    Major depressive disorder, single episode, mild    Left ventricular diastolic dysfunction with preserved systolic function    Long-term insulin use in type 2 diabetes    CAD (coronary artery disease)    CAD, multiple vessel    Cough    Essential hypertension    Unspecified vitamin D deficiency    Amnesia    Arterial ischemic stroke, MCA (middle cerebral artery), left, acute    Aphasia    Embolic stroke involving left middle cerebral artery    Cytotoxic cerebral edema    Aortic arch atherosclerosis    Acute hemorrhagic infarction of brain    Cerebrovascular accident (CVA)    Bradycardia    Left arm swelling    Hypomagnesemia     Problem list has been reviewed.      Chief Complaint: NAWAF ON CPAP         HPI: Follow up for NAWAF and CPAP complaince assessment.   she  is on APAP  of  4 - 20 CMWP .     She definitely thinks PAP is beneficial to her health and She wants to continue with PAP therapy.    Patient denies snoring, headaches, excessive daytime sleepiness    [place] Sleepiness Scale       EPWORTH SLEEPINESS SCALE 2/1/2018 11/6/2017 9/25/2017 12/10/2015 7/14/2015   Sitting and reading 0 2 2 3 3   Watching TV 0 1 1 2 0   Sitting, inactive in a public place (e.g. a theatre or a meeting) 0 1 2 2 2   As a passenger in a car for an hour without a break 0 0 2 2 3   Lying down to rest in the afternoon when circumstances permit 3 3 3 3 3   Sitting and talking to someone 0 0 0 2 0   Sitting quietly after a lunch without alcohol 0 1 1 2 3   In a car, while stopped for a few minutes in traffic 0 0 0 0 0   Total score  3 8 11 16 14       Compliance Summary  10/30/2017 - 1/27/2018 (90 days)  Days with Device Usage 69 days  Days without Device Usage 21 days  Percent Days with Device Usage 76.7%  Cumulative Usage 21 days 7 hrs. 27 mins. 38 secs.  Maximum Usage (1 Day) 13 hrs. 8 mins. 13 secs.  Average Usage (All Days) 5 hrs. 40 mins. 58 secs.  Average Usage (Days Used) 7 hrs. 24 mins. 44 secs.  Minimum Usage (1 Day) 2 mins. 6 secs.  Percent of Days with Usage >= 4 Hours 67.8%  Percent of Days with Usage < 4 Hours 32.2%  Date Range  Total Blower Time 22 days 4 hrs. 40 mins. 41 secs.  Average AHI 6.2  Auto CPAP Summary (Melyssa Respire-Zassis)  Auto CPAP Mean Pressure 11.0 cmH2O  Auto CPAP Peak Average Pressure 16.6 cmH2O  Average Device Pressure <= 90% of Time 14.2 cmH2O  Average Time in Large Leak Per Day 36 mins. 10 secs.      Previous Report Reviewed: office notes     The following portions of the patient's history were reviewed and updated as appropriate: She  has a past medical history of Acute respiratory failure with hypoxia; Anxiety; AP (angina pectoris) (7/18/2013); Arterial ischemic stroke, MCA (middle cerebral artery), left, acute (12/15/2017); Asthma; CAD, multiple vessel (8/2/2016); Chronic ischemic heart disease (7/18/2013); Coronary artery disease (7/18/2013); Depression; Diabetes with neurologic complications; GERD (gastroesophageal reflux disease) (7/18/2013); Heart failure; History of PTCA (7/18/2013); Hypertension (7/18/2013); Hypothyroidism; Leg pain (8/29/2013); Mixed hyperlipidemia (7/18/2013); Myocardial infarction; Osteoporosis; Pneumonia; Pneumonia due to other staphylococcus; Precancerous changes of the vagina; Sleep apnea; Thyroid disease; Tobacco dependence; Trouble in sleeping; Type 2 diabetes mellitus with ophthalmic manifestations; and Urinary incontinence.  She  has a past surgical history that includes Coronary stent placement; Thyroid surgery; Coronary angioplasty; Breast surgery (Left); Hysterectomy  "(1970); Eye surgery (Bilateral, 2014); and Tubal ligation (1970).  Her family history includes Alcohol abuse in her sister; Cancer in her son; Cirrhosis in her sister; Diabetes in her paternal grandmother and sister; Fibromyalgia in her daughter and daughter; Heart disease in her mother; Kidney disease in her brother and father.  She  reports that she quit smoking about 9 years ago. She has a 38.00 pack-year smoking history. She has never used smokeless tobacco. She reports that she does not drink alcohol or use drugs.  She has a current medication list which includes the following prescription(s): alendronate, aspirin, atorvastatin, bd insulin syringe ult-fine ii, blood sugar diagnostic, blood sugar diagnostic, clonazepam, clopidogrel, donepezil, easy comfort lancets, escitalopram oxalate, fluad [id number] (65 yr up)(pf), hydrochlorothiazide, insulin detemir, levothyroxine, losartan, metformin, nitroglycerin, pantoprazole, pen needle, diabetic, and vitamin d.  She has No Known Allergies..    Review of Systems   Constitutional: Positive for fatigue. Negative for fever.   HENT: Negative for postnasal drip, rhinorrhea and congestion.    Respiratory: Positive for apnea, cough, sputum production, chest tightness, shortness of breath, dyspnea on extertion, use of rescue inhaler and Paroxysmal Nocturnal Dyspnea.    Cardiovascular: Negative for chest pain, palpitations and leg swelling.   Skin: Negative for rash.   Gastrointestinal: Negative for nausea and abdominal pain.   Neurological: Positive for weakness. Negative for dizziness, syncope and light-headedness.   Hematological: Negative for adenopathy. Does not bruise/bleed easily.   Psychiatric/Behavioral: Positive for sleep disturbance. The patient is not nervous/anxious.         Objective:     Vitals:    02/01/18 1000   BP: (!) 150/62   Pulse: 63   Resp: 18   SpO2: (!) 94%   Weight: 68.2 kg (150 lb 5.7 oz)   Height: 4' 11" (1.499 m)     body mass index is 30.37 " kg/m².    Physical Exam   Constitutional: She is oriented to person, place, and time. She appears well-developed and well-nourished.   HENT:   Head: Normocephalic and atraumatic.   Mouth/Throat: Oropharyngeal exudate present.   Eyes: Conjunctivae are normal. Pupils are equal, round, and reactive to light.   Neck: Neck supple. No JVD present. No tracheal deviation present. No thyromegaly present.   Cardiovascular: Normal rate, regular rhythm and normal heart sounds.    Pulmonary/Chest: Effort normal. No respiratory distress. She has decreased breath sounds. She has no wheezes. She has rales in the right lower field and the left lower field. She exhibits no tenderness.   Abdominal: Soft. Bowel sounds are normal.   Musculoskeletal: Normal range of motion. She exhibits no edema.   Lymphadenopathy:     She has no cervical adenopathy.   Neurological: She is alert and oriented to person, place, and time.   Skin: Skin is warm and dry.   Nursing note and vitals reviewed.      Personal Diagnostic Review  CPAP complaince download.     Assessment / plan     Discussed diagnosis, its evaluation, treatment and usual course. All questions answered.    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 Minimum usage is 70% of night for at least 4 hours per night used. Pateint expressed understanding. All Questions answered.    Complaince download in 6 weeks                 TIME SPENT WITH PATIENT: Time spent: 35 minutes in face to face  discussion concerning diagnosis, prognosis, review of lab and test results, benefits of treatment as well as management of disease, counseling of patient and coordination of care between various health  care providers . Greater than half the time spent was  used for coordination of care and counseling of patient.     Follow-up in about 6 weeks (around 3/15/2018) for NAWAF.

## 2018-02-01 NOTE — BRIEF OP NOTE
Successful ILR implant for cryptogenic CVA. No complications. Follow up in 1-2 weeks in device clinic.

## 2018-02-01 NOTE — ASSESSMENT & PLAN NOTE
Continue AUTOPAP of  4 - 20 CMWP (DME -  [facility])     Discussed therapeutic goals for positive airway pressure therapy(CPAP or BiPAP): Ideal is usage 100% of nights for 6 - 8 hours per night. Minimum usage is 70% of night for at least 4 hours per night used. Pateint expressed understanding. All Questions answered.    Complaince download in 6 weeks

## 2018-02-01 NOTE — DISCHARGE SUMMARY
[facility] -   Discharge Summary      Admit Date: 2/1/2018    Discharge Date and Time:  02/01/2018 2:56 PM    Attending Physician: Kole Devine MD     Reason for Admission: ILR implant    Procedures Performed: Procedure(s) (LRB):  PLACEMENT-LOOP RECORDER (Left)    Hospital Course (synopsis of major diagnoses, care, treatment, and services provided during the course of the hospital stay): ILR implant without complications.      Consults: none    Significant Diagnostic Studies: Labs:   BMP: No results for input(s): GLU, NA, K, CL, CO2, BUN, CREATININE, CALCIUM, MG in the last 48 hours., CMP No results for input(s): NA, K, CL, CO2, GLU, BUN, CREATININE, CALCIUM, PROT, ALBUMIN, BILITOT, ALKPHOS, AST, ALT, ANIONGAP, ESTGFRAFRICA, EGFRNONAA in the last 48 hours., CBC No results for input(s): WBC, HGB, HCT, PLT in the last 48 hours. and INR   Lab Results   Component Value Date    INR 1.0 12/19/2017    INR 1.1 12/18/2017    INR 1.1 12/17/2017       Final Diagnoses:    Principal Problem: <principal problem not specified>   Secondary Diagnoses:   Active Hospital Problems    Diagnosis  POA    Embolic stroke involving left middle cerebral artery [I63.412]  Yes     Priority: Low      Resolved Hospital Problems    Diagnosis Date Resolved POA   No resolved problems to display.       Discharged Condition: good    Disposition: Home or Self Care    Follow Up/Patient Instructions:     Medications:  Reconciled Home Medications:   Current Discharge Medication List      CONTINUE these medications which have NOT CHANGED    Details   aspirin (ECOTRIN) 81 MG EC tablet Take 81 mg by mouth. Take 81 mg by mouth daily.      atorvastatin (LIPITOR) 80 MG tablet Take 1 tablet (80 mg total) by mouth once daily.  Qty: 90 tablet, Refills: 3    Associated Diagnoses: Pure hypercholesterolemia      clonazePAM (KLONOPIN) 0.5 MG tablet Take 0.5 mg by mouth.      clopidogrel (PLAVIX) 75 mg tablet Take 1 tablet (75 mg total) by mouth  "once daily.  Qty: 90 tablet, Refills: 3    Associated Diagnoses: Coronary artery disease, angina presence unspecified, unspecified vessel or lesion type, unspecified whether native or transplanted heart      donepezil (ARICEPT) 10 MG tablet Take 1 tablet (10 mg total) by mouth once daily.  Qty: 90 tablet, Refills: 3    Associated Diagnoses: Dementia without behavioral disturbance, unspecified dementia type      escitalopram oxalate (LEXAPRO) 20 MG tablet TAKE 1 TABLET(20 MG) BY MOUTH EVERY DAY  Qty: 90 tablet, Refills: 0    Associated Diagnoses: Anxiety      insulin detemir (LEVEMIR FLEXTOUCH) 100 unit/mL (3 mL) SubQ InPn pen Inject 36 Units into the skin once daily.  Refills: 0      levothyroxine (SYNTHROID) 50 MCG tablet Take 1 tablet (50 mcg total) by mouth once daily.  Qty: 90 tablet, Refills: 3    Associated Diagnoses: Hypothyroidism, unspecified type      losartan (COZAAR) 100 MG tablet Take 1 tablet (100 mg total) by mouth once daily.  Qty: 90 tablet, Refills: 0      metFORMIN (GLUCOPHAGE) 1000 MG tablet Take 1,000 mg by mouth 2 (two) times daily.       nitroGLYCERIN (NITROSTAT) 0.4 MG SL tablet ONE TABLET UNDER TONGUE AS NEEDED FOR CHEST PAIN  Qty: 25 tablet, Refills: 12      pantoprazole (PROTONIX) 40 MG tablet TAKE 1 TABLET BY MOUTH DAILY  Qty: 90 tablet, Refills: 0    Associated Diagnoses: Gastroesophageal reflux disease without esophagitis      vitamin D 1000 units Tab Take 1,000 Units by mouth once daily.      alendronate (FOSAMAX) 70 MG tablet TAKE 1 TABLET BY MOUTH EVERY WEEK WITH A FULL GLASS OF WATER ON EMPTY STOMACH. DO NOT EAT OR LIE DOWN FOR 30 MINUTES  Qty: 12 tablet, Refills: 3    Associated Diagnoses: Osteoporosis, unspecified osteoporosis type, unspecified pathological fracture presence      BD INSULIN SYRINGE ULT-FINE II 1/2 mL 31 x 5/16" Syrg       !! BLOOD SUGAR DIAGNOSTIC (TRUETEST TEST STRIPS MISC) by Misc.(Non-Drug; Combo Route) route. Pt is testing 3 times a day      !! blood sugar " "diagnostic Strp Use to check blood sugar three times daily and as needed.      EASY COMFORT LANCETS 30 gauge Misc       FLUAD 0252-3123, 65 YR UP,,PF, 45 mcg (15 mcg x 3)/0.5 mL Syrg ADM 0.5ML IM UTD  Refills: 0      hydrochlorothiazide (HYDRODIURIL) 25 MG tablet Take 1 tablet (25 mg total) by mouth once daily.  Qty: 90 tablet, Refills: 3    Associated Diagnoses: Essential hypertension      pen needle, diabetic 31 gauge x 5/16" Ndle USE TWICE DAILY AND PRN       !! - Potential duplicate medications found. Please discuss with provider.        No discharge procedures on file.  Follow-up Information     [facility] In 2 weeks.    Why:  For wound re-check               "

## 2018-02-07 ENCOUNTER — TELEPHONE (OUTPATIENT)
Dept: CARDIOLOGY | Facility: CLINIC | Age: 78
End: 2018-02-07

## 2018-02-07 DIAGNOSIS — I63.9 CRYPTOGENIC STROKE: ICD-10-CM

## 2018-02-07 DIAGNOSIS — Z45.09 ENCOUNTER FOR LOOP RECORDER CHECK: Primary | ICD-10-CM

## 2018-02-07 NOTE — TELEPHONE ENCOUNTER
----- Message from Chinedu Multani sent at 2/7/2018  9:31 AM CST -----  Contact: Connie Worley-Daughter   Connie Worley-Daughter requested a callback in regards to pt foot/ankles swollen need advice...[phone]

## 2018-02-07 NOTE — TELEPHONE ENCOUNTER
Returned phone call to daughter re: edema and shortness of breath- she had been holding Hydrochlorothiazide 25 mg but has restarted and has noticed decrease.  Concerned about what medications her mother should really be on now and if it's necessary to continue monitoring blood pressure twice a day.  Mother is no longer taking Metoprolol 25 mg or Carvedilol 3.125 mg, Isosorbide 60 mg in morning and 30 mg in evening-  Her Losartan is 25 mg if b/p >140 and not the 100 mg daily as noted on Med card  Complaining of occasional chest pain, left shoulder and arm pain not sure if it's her Angina or loop site.  Scheduled for follow-up 2/8/18 at 10:30 with Device Clinic and Dr. Devine.  Blood pressure readings lowest 124/68 and highest 161/88 pulse lowest 49 highest 89    Raymon Short/Nilson- please advise

## 2018-02-08 ENCOUNTER — CLINICAL SUPPORT (OUTPATIENT)
Dept: CARDIOLOGY | Facility: CLINIC | Age: 78
End: 2018-02-08
Payer: MEDICARE

## 2018-02-08 DIAGNOSIS — I63.9 CRYPTOGENIC STROKE: ICD-10-CM

## 2018-02-08 DIAGNOSIS — Z45.09 ENCOUNTER FOR LOOP RECORDER CHECK: ICD-10-CM

## 2018-02-08 PROCEDURE — 93285 PRGRMG DEV EVAL SCRMS IP: CPT | Mod: S$GLB,,, | Performed by: INTERNAL MEDICINE

## 2018-02-08 RX ORDER — LOSARTAN POTASSIUM 100 MG/1
100 TABLET ORAL DAILY
COMMUNITY
End: 2018-04-19

## 2018-02-08 NOTE — TELEPHONE ENCOUNTER
Returned phone call to patient's daughter, Connie and advised to continue current therapy.  Losartan 25 mg daily and Hydrochlorothiazide 25 mg daily and keep scheduled appointment.

## 2018-03-14 ENCOUNTER — PES CALL (OUTPATIENT)
Dept: ADMINISTRATIVE | Facility: CLINIC | Age: 78
End: 2018-03-14

## 2018-03-15 ENCOUNTER — OFFICE VISIT (OUTPATIENT)
Dept: PULMONOLOGY | Facility: CLINIC | Age: 78
End: 2018-03-15
Payer: MEDICARE

## 2018-03-15 VITALS
HEART RATE: 66 BPM | RESPIRATION RATE: 18 BRPM | OXYGEN SATURATION: 94 % | BODY MASS INDEX: 27.63 KG/M2 | DIASTOLIC BLOOD PRESSURE: 62 MMHG | SYSTOLIC BLOOD PRESSURE: 110 MMHG | HEIGHT: 59 IN | WEIGHT: 137.06 LBS

## 2018-03-15 DIAGNOSIS — G47.33 OSA ON CPAP: Primary | Chronic | ICD-10-CM

## 2018-03-15 DIAGNOSIS — R91.1 PULMONARY NODULE, RIGHT: Chronic | ICD-10-CM

## 2018-03-15 PROCEDURE — 99214 OFFICE O/P EST MOD 30 MIN: CPT | Mod: S$GLB,,, | Performed by: INTERNAL MEDICINE

## 2018-03-15 PROCEDURE — 3074F SYST BP LT 130 MM HG: CPT | Mod: CPTII,S$GLB,, | Performed by: INTERNAL MEDICINE

## 2018-03-15 PROCEDURE — 99999 PR PBB SHADOW E&M-EST. PATIENT-LVL III: CPT | Mod: PBBFAC,,, | Performed by: INTERNAL MEDICINE

## 2018-03-15 PROCEDURE — 3078F DIAST BP <80 MM HG: CPT | Mod: CPTII,S$GLB,, | Performed by: INTERNAL MEDICINE

## 2018-03-15 NOTE — PROGRESS NOTES
Subjective:      Patient ID: Tiffanie Wise is a 77 y.o. female.    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    RUDOLPH (generalized anxiety disorder)    Pulmonary nodule, right - stable    Major depressive disorder, single episode, mild    Left ventricular diastolic dysfunction with preserved systolic function    Long-term insulin use in type 2 diabetes    CAD (coronary artery disease)    CAD, multiple vessel    Essential hypertension    Unspecified vitamin D deficiency    Amnesia    Arterial ischemic stroke, MCA (middle cerebral artery), left, acute    Aphasia    Embolic stroke involving left middle cerebral artery    Cytotoxic cerebral edema    Aortic arch atherosclerosis    Acute hemorrhagic infarction of brain    Cerebrovascular accident (CVA)    Bradycardia    Left arm swelling    Hypomagnesemia     Problem list has been reviewed.      Chief Complaint: NAWAF ON CPAP         HPI: Follow up for NAWAF and CPAP complaince assessment.   she  is on APAP  of  4 - 20 CMWP .     She definitely thinks PAP is beneficial to her health and She wants to continue with PAP therapy.    Patient denies snoring, headaches, excessive daytime sleepiness    [place] Sleepiness Scale       EPWORTH SLEEPINESS SCALE 3/15/2018 2/1/2018 11/6/2017 9/25/2017 12/10/2015 7/14/2015   Sitting and reading 0 0 2 2 3 3   Watching TV 0 0 1 1 2 0   Sitting, inactive in a public place (e.g. a theatre or a meeting) 0 0 1 2 2 2   As a passenger in a car for an hour without a break 0 0 0 2 2 3   Lying down to rest in the afternoon when circumstances permit 0 3 3 3 3 3   Sitting and talking to someone 0 0 0 0 2 0   Sitting quietly after a lunch without alcohol 0 0 1 1 2 3   In a car, while stopped for a few minutes in traffic 0 0 0 0 0  0   Total score 0 3 8 11 16 14       Compliance Summary  2/13/2018 - 3/14/2018 (30 days)  Days with Device Usage 29 days  Days without Device Usage 1 day  Percent Days with Device Usage 96.7%  Cumulative Usage 12 days 6 hrs. 1 mins. 30 secs.  Maximum Usage (1 Day) 11 hrs. 59 mins. 54 secs.  Average Usage (All Days) 9 hrs. 48 mins. 3 secs.  Average Usage (Days Used) 10 hrs. 8 mins. 19 secs.  Minimum Usage (1 Day) 2 hrs. 59 mins. 9 secs.  Percent of Days with Usage >= 4 Hours 93.3%  Percent of Days with Usage < 4 Hours 6.7%  Date Range  Total Blower Time 12 days 14 hrs. 50 mins. 28 secs.  Average AHI 6.2  Auto-CPAP Summary ([email or web address] [email or web address])  Auto-CPAP Mean Pressure 10.0 cmH2O  Auto-CPAP Peak Average Pressure 12.5 cmH2O  Average Device Pressure <= 90% of Time 12.6 cmH2O  Average Time in Large Leak Per Day 17 mins. 4 secs.      Previous Report Reviewed: office notes     The following portions of the patient's history were reviewed and updated as appropriate: She  has a past medical history of Acute respiratory failure with hypoxia; Anxiety; AP (angina pectoris) (7/18/2013); Arterial ischemic stroke, MCA (middle cerebral artery), left, acute (12/15/2017); Asthma; CAD, multiple vessel (8/2/2016); Chronic ischemic heart disease (7/18/2013); Coronary artery disease (7/18/2013); Depression; Diabetes with neurologic complications; GERD (gastroesophageal reflux disease) (7/18/2013); Heart failure; History of PTCA (7/18/2013); Hypertension (7/18/2013); Hypothyroidism; Leg pain (8/29/2013); Mixed hyperlipidemia (7/18/2013); Myocardial infarction; Osteoporosis; Pneumonia; Pneumonia due to other staphylococcus; Precancerous changes of the vagina; Sleep apnea; Thyroid disease; Tobacco dependence; Trouble in sleeping; Type 2 diabetes mellitus with ophthalmic manifestations; and Urinary incontinence.  She  has a past surgical history that includes Coronary stent placement; Thyroid surgery; Coronary angioplasty; Breast surgery  "(Left); Hysterectomy (1970); Eye surgery (Bilateral, 2014); and Tubal ligation (1970).  Her family history includes Alcohol abuse in her sister; Cancer in her son; Cirrhosis in her sister; Diabetes in her paternal grandmother and sister; Fibromyalgia in her daughter and daughter; Heart disease in her mother; Kidney disease in her brother and father.  She  reports that she quit smoking about 9 years ago. She has a 38.00 pack-year smoking history. She has never used smokeless tobacco. She reports that she does not drink alcohol or use drugs.  She has a current medication list which includes the following prescription(s): alendronate, aspirin, atorvastatin, bd insulin syringe ult-fine ii, blood sugar diagnostic, blood sugar diagnostic, clonazepam, clopidogrel, donepezil, easy comfort lancets, escitalopram oxalate, fluad [id number] (65 yr up)(pf), hydrochlorothiazide, insulin detemir u-100, levothyroxine, losartan, metformin, nitroglycerin, pantoprazole, pen needle, diabetic, sitagliptin, and vitamin d.  She has No Known Allergies..    Review of Systems   Constitutional: Positive for fatigue. Negative for fever.   HENT: Negative for postnasal drip, rhinorrhea and congestion.    Respiratory: Positive for apnea, cough, sputum production, chest tightness, shortness of breath, dyspnea on extertion, use of rescue inhaler and Paroxysmal Nocturnal Dyspnea.    Cardiovascular: Negative for chest pain, palpitations and leg swelling.   Skin: Negative for rash.   Gastrointestinal: Negative for nausea and abdominal pain.   Neurological: Positive for weakness. Negative for dizziness, syncope and light-headedness.   Hematological: Negative for adenopathy. Does not bruise/bleed easily.   Psychiatric/Behavioral: Positive for sleep disturbance. The patient is not nervous/anxious.         Objective:     Vitals:    03/15/18 1129   BP: 110/62   Pulse: 66   Resp: 18   SpO2: (!) 94%   Weight: 62.2 kg (137 lb 1 oz)   Height: 4' 11" (1.499 m) "     body mass index is 27.68 kg/m².    Physical Exam   Constitutional: She is oriented to person, place, and time. She appears well-developed and well-nourished.   HENT:   Head: Normocephalic and atraumatic.   Mouth/Throat: Oropharyngeal exudate present.   Eyes: Conjunctivae are normal. Pupils are equal, round, and reactive to light.   Neck: Neck supple. No JVD present. No tracheal deviation present. No thyromegaly present.   Cardiovascular: Normal rate, regular rhythm and normal heart sounds.    Pulmonary/Chest: Effort normal. No respiratory distress. She has decreased breath sounds. She has no wheezes. She has rales in the right lower field and the left lower field. She exhibits no tenderness.   Abdominal: Soft. Bowel sounds are normal.   Musculoskeletal: Normal range of motion. She exhibits no edema.   Lymphadenopathy:     She has no cervical adenopathy.   Neurological: She is alert and oriented to person, place, and time.   Skin: Skin is warm and dry.   Nursing note and vitals reviewed.      Personal Diagnostic Review  CPAP complaince download.     Assessment / plan     Discussed diagnosis, its evaluation, treatment and usual course. All questions answered.    Problem List Items Addressed This Visit        Pulmonary    Pulmonary nodule, right - stable    Overview     12/10/15: CT chest:       Stable CT scan of the chest without contrast.  Small stable 4-mm soft opacity right middle lobe.  No detrimental interval change.Coronary and aortic valvular calcification.  Aortic and renal arterial calcification.               Current Assessment & Plan     Stable and controlled. No changes to current therapeutic plan. Continue all previously prescribed medications.               Other    NAWAF on CPAP - Primary    Current Assessment & Plan     Continue AUTOPAP of  4 - 20 CMWP (DME -  [facility])     Discussed therapeutic goals for positive airway pressure therapy(CPAP or BiPAP): Ideal is usage 100% of nights for 6 - 8 hours per  night. Minimum usage is 70% of night for at least 4 hours per night used. Pateint expressed understanding. All Questions answered.    Complaince download in 6 Months.                 TIME SPENT WITH PATIENT: Time spent: 35 minutes in face to face  discussion concerning diagnosis, prognosis, review of lab and test results, benefits of treatment as well as management of disease, counseling of patient and coordination of care between various health  care providers . Greater than half the time spent was used for coordination of care and counseling of patient.     Follow-up in about 6 months (around 9/15/2018) for NAWAF, Lung Nodule.

## 2018-03-16 ENCOUNTER — PATIENT MESSAGE (OUTPATIENT)
Dept: CARDIOLOGY | Facility: CLINIC | Age: 78
End: 2018-03-16

## 2018-03-26 ENCOUNTER — CLINICAL SUPPORT (OUTPATIENT)
Dept: CARDIOLOGY | Facility: CLINIC | Age: 78
End: 2018-03-26
Attending: INTERNAL MEDICINE
Payer: MEDICARE

## 2018-03-26 DIAGNOSIS — I63.9 CRYPTOGENIC STROKE: ICD-10-CM

## 2018-03-26 DIAGNOSIS — Z45.09 ENCOUNTER FOR LOOP RECORDER CHECK: ICD-10-CM

## 2018-03-26 PROBLEM — M19.049 ARTHRITIS OF HAND: Status: ACTIVE | Noted: 2018-03-26

## 2018-03-26 PROCEDURE — 93298 REM INTERROG DEV EVAL SCRMS: CPT | Mod: S$GLB,,, | Performed by: INTERNAL MEDICINE

## 2018-03-26 PROCEDURE — 93299 LOOP RECORDER REMOTE: CPT | Mod: S$GLB,,, | Performed by: INTERNAL MEDICINE

## 2018-03-28 ENCOUNTER — TELEPHONE (OUTPATIENT)
Dept: CARDIOLOGY | Facility: CLINIC | Age: 78
End: 2018-03-28

## 2018-03-28 ENCOUNTER — PATIENT MESSAGE (OUTPATIENT)
Dept: CARDIOLOGY | Facility: CLINIC | Age: 78
End: 2018-03-28

## 2018-03-28 ENCOUNTER — TELEPHONE (OUTPATIENT)
Dept: ADMINISTRATIVE | Facility: OTHER | Age: 78
End: 2018-03-28

## 2018-03-28 NOTE — TELEPHONE ENCOUNTER
Left voicemail message would place a tag in the mail.      ----- Message from Marifer Croft sent at 3/28/2018  2:22 PM CDT -----  Contact: Connie, pt's daughter  She's calling to see if she can get a handicapped tag for pt, please advise [phone]

## 2018-04-02 ENCOUNTER — TELEPHONE (OUTPATIENT)
Dept: ELECTROPHYSIOLOGY | Facility: CLINIC | Age: 78
End: 2018-04-02

## 2018-04-02 NOTE — TELEPHONE ENCOUNTER
Patients 30 day Event monitor was canceled. Patient is having an implanted Loop recorder and does not want a monitor.

## 2018-04-03 ENCOUNTER — TELEPHONE (OUTPATIENT)
Dept: ELECTROPHYSIOLOGY | Facility: CLINIC | Age: 78
End: 2018-04-03

## 2018-04-19 ENCOUNTER — OFFICE VISIT (OUTPATIENT)
Dept: CARDIOLOGY | Facility: CLINIC | Age: 78
End: 2018-04-19
Payer: MEDICARE

## 2018-04-19 VITALS
SYSTOLIC BLOOD PRESSURE: 148 MMHG | DIASTOLIC BLOOD PRESSURE: 60 MMHG | HEIGHT: 59 IN | BODY MASS INDEX: 28.46 KG/M2 | HEART RATE: 80 BPM | WEIGHT: 141.19 LBS

## 2018-04-19 DIAGNOSIS — Z79.4 TYPE 2 DIABETES MELLITUS WITH HYPERGLYCEMIA, WITH LONG-TERM CURRENT USE OF INSULIN: ICD-10-CM

## 2018-04-19 DIAGNOSIS — R47.01 APHASIA: ICD-10-CM

## 2018-04-19 DIAGNOSIS — I63.512 ARTERIAL ISCHEMIC STROKE, MCA (MIDDLE CEREBRAL ARTERY), LEFT, ACUTE: ICD-10-CM

## 2018-04-19 DIAGNOSIS — I25.10 CORONARY ARTERY DISEASE, ANGINA PRESENCE UNSPECIFIED, UNSPECIFIED VESSEL OR LESION TYPE, UNSPECIFIED WHETHER NATIVE OR TRANSPLANTED HEART: ICD-10-CM

## 2018-04-19 DIAGNOSIS — Z95.1 S/P CABG X 1: ICD-10-CM

## 2018-04-19 DIAGNOSIS — I51.89 LEFT VENTRICULAR DIASTOLIC DYSFUNCTION WITH PRESERVED SYSTOLIC FUNCTION: Chronic | ICD-10-CM

## 2018-04-19 DIAGNOSIS — E78.5 HYPERLIPIDEMIA ASSOCIATED WITH TYPE 2 DIABETES MELLITUS: Chronic | ICD-10-CM

## 2018-04-19 DIAGNOSIS — E11.69 HYPERLIPIDEMIA ASSOCIATED WITH TYPE 2 DIABETES MELLITUS: Chronic | ICD-10-CM

## 2018-04-19 DIAGNOSIS — E11.65 TYPE 2 DIABETES MELLITUS WITH HYPERGLYCEMIA, WITH LONG-TERM CURRENT USE OF INSULIN: ICD-10-CM

## 2018-04-19 DIAGNOSIS — I20.9 AP (ANGINA PECTORIS): Primary | Chronic | ICD-10-CM

## 2018-04-19 DIAGNOSIS — I10 ESSENTIAL HYPERTENSION: ICD-10-CM

## 2018-04-19 DIAGNOSIS — I25.10 CAD, MULTIPLE VESSEL: ICD-10-CM

## 2018-04-19 DIAGNOSIS — I63.412 EMBOLIC STROKE INVOLVING LEFT MIDDLE CEREBRAL ARTERY: ICD-10-CM

## 2018-04-19 PROCEDURE — 99214 OFFICE O/P EST MOD 30 MIN: CPT | Mod: S$GLB,,, | Performed by: INTERNAL MEDICINE

## 2018-04-19 PROCEDURE — 3078F DIAST BP <80 MM HG: CPT | Mod: CPTII,S$GLB,, | Performed by: INTERNAL MEDICINE

## 2018-04-19 PROCEDURE — 99499 UNLISTED E&M SERVICE: CPT | Mod: S$GLB,,, | Performed by: INTERNAL MEDICINE

## 2018-04-19 PROCEDURE — 3077F SYST BP >= 140 MM HG: CPT | Mod: CPTII,S$GLB,, | Performed by: INTERNAL MEDICINE

## 2018-04-19 PROCEDURE — 99999 PR PBB SHADOW E&M-EST. PATIENT-LVL II: CPT | Mod: PBBFAC,,, | Performed by: INTERNAL MEDICINE

## 2018-04-19 RX ORDER — LOSARTAN POTASSIUM 25 MG/1
TABLET ORAL
Refills: 3 | COMMUNITY
Start: 2018-02-23 | End: 2018-06-06 | Stop reason: SDUPTHER

## 2018-04-19 NOTE — PROGRESS NOTES
Subjective:    Patient ID:  Tiffanie Wise is a 77 y.o. female who presents for evaluation of Hypertension (Patient presents to clinic today for 3 month follow up. ); Coronary Artery Disease; and Hyperlipidemia      HPI Mrs. Wise returns for hospital f/u after CVA.  Old records reviewed in detail.  Her current medical conditions include CAD (CABG LIMA-LAD 6/16), DM, HTN, dyslipidemia, GERD. Nonsmoker.  Her prior history is pertinent for following:  NSTEMI 11/10 and underwent LHC and sucessful stenting of 90% RCA (3 x 23 mm Promus BRIANDA).    S/p PCI of 70% mid-LCX 12/12 with Xience BRIANDA.    S/p LHC for worsening anginal sxs on 11/21/13 with successful PCI 90% ISR mid-LCX with Xience BRIANDA.    S/p unstable angina and had PTCA of ISR of her LCX March 2014.  S/p 7/14 PTCA of severe LCX/OM ISR and PTCA of distal LCX at her bifurcation lesion at stent site July 2014 for ACS.  S/p 10/14 repeat revascularization of severe LCX ISR with cutting balloon Oct 2014 for ACS.  S/p 12/14 admit with unstable angina. She had ISR LCX again, Resolute BRIANDA implanted with good results.    Pt underwent repeat LHC again late June 2016 for unstable anginal sxs and had severe 95% distal LAD stenosis not amenable to PCI.  She had patent LCX, RCA stents, chronic occlusion of R PLB, normal LVEF.  She had urgent CABG w LIMA-LAD June 2016.  S/p hospitalization for ischemic CVA Dec 2017 tx with TPA and tx to [facility] (distal left parietal MCA branch occlusion). Unclear mechanism of CVA.  She had some hemorrhage noted in CVA distribution.  Was maintained on asa, plavix at time of discharge.  Metoprolol was changed to Coreg for bradycardia.  Her ecg showed sinus bradycardia, inferior/anterolateral st-t abnl.  Echo showed normal EF, LAE.  Has speech deficits, expressive aphasia.  S/p rehab at [facility].  Now here for f/u.  CAD seems stable. She is not having active anginal sxs on current tx.  No CHF sxs.  Has chronic SHERMAN, stable.  No pnd/orthopnea.  Now  back at home.  BP controlled on current meds.   Her expressive aphasia seems to have improved.  DM is improving per pt.  Feb 2018 labs HGAIC 8.1%  Compliant with meds.  S/p ILR 2/18 with Dr. Devine, no a fib noted so far per reports.  Was taken off beta-blockers due to bradycardia.   No dizziness or syncope.  Lipids are well controlled, on Atorvastatin.       Current Outpatient Prescriptions:     alendronate (FOSAMAX) 70 MG tablet, TAKE 1 TABLET BY MOUTH EVERY WEEK WITH A FULL GLASS OF WATER ON EMPTY STOMACH. DO NOT EAT OR LIE DOWN FOR 30 MINUTES, Disp: 12 tablet, Rfl: 3    aspirin (ECOTRIN) 81 MG EC tablet, Take 81 mg by mouth. Take 81 mg by mouth daily., Disp: , Rfl:     atorvastatin (LIPITOR) 80 MG tablet, Take 1 tablet (80 mg total) by mouth once daily., Disp: 90 tablet, Rfl: 3    clonazePAM (KLONOPIN) 0.5 MG tablet, Take 0.5 mg by mouth., Disp: , Rfl:     clopidogrel (PLAVIX) 75 mg tablet, Take 1 tablet (75 mg total) by mouth once daily., Disp: 90 tablet, Rfl: 3    donepezil (ARICEPT) 10 MG tablet, Take 1 tablet (10 mg total) by mouth once daily., Disp: 90 tablet, Rfl: 3    escitalopram oxalate (LEXAPRO) 20 MG tablet, TAKE 1 TABLET(20 MG) BY MOUTH EVERY DAY, Disp: 90 tablet, Rfl: 0    hydrochlorothiazide (HYDRODIURIL) 25 MG tablet, Take 1 tablet (25 mg total) by mouth once daily., Disp: 90 tablet, Rfl: 3    insulin detemir (LEVEMIR FLEXTOUCH) 100 unit/mL (3 mL) SubQ InPn pen, Inject 36 Units into the skin once daily., Disp: , Rfl: 0    levothyroxine (SYNTHROID) 50 MCG tablet, Take 1 tablet (50 mcg total) by mouth once daily., Disp: 90 tablet, Rfl: 3    losartan (COZAAR) 25 MG tablet, , Disp: , Rfl: 3    metFORMIN (GLUCOPHAGE) 1000 MG tablet, Take 1,000 mg by mouth 2 (two) times daily. , Disp: , Rfl:     pantoprazole (PROTONIX) 40 MG tablet, TAKE 1 TABLET BY MOUTH DAILY, Disp: 90 tablet, Rfl: 0    SITagliptin (JANUVIA) 100 MG Tab, Take 100 mg by mouth once daily., Disp: , Rfl:     vitamin D 1000  "units Tab, Take 1,000 Units by mouth once daily., Disp: , Rfl:     BD INSULIN SYRINGE ULT-FINE II 1/2 mL 31 x 5/16" Syrg, , Disp: , Rfl:     BLOOD SUGAR DIAGNOSTIC (TRUETEST TEST STRIPS MISC), by Misc.(Non-Drug; Combo Route) route. Pt is testing 3 times a day, Disp: , Rfl:     blood sugar diagnostic Strp, Use to check blood sugar three times daily and as needed., Disp: , Rfl:     EASY COMFORT LANCETS 30 gauge Misc, , Disp: , Rfl:     FLUAD [id number], 65 YR UP,,PF, 45 mcg (15 mcg x 3)/0.5 mL Syrg, ADM 0.5ML IM UTD, Disp: , Rfl: 0    nitroGLYCERIN (NITROSTAT) 0.4 MG SL tablet, ONE TABLET UNDER TONGUE AS NEEDED FOR CHEST PAIN, Disp: 25 tablet, Rfl: 12    pen needle, diabetic 31 gauge x 5/16" Ndle, USE TWICE DAILY AND PRN, Disp: , Rfl:       Review of Systems   Constitution: Positive for weakness and malaise/fatigue.   HENT: Negative.    Eyes: Negative.    Cardiovascular: Positive for dyspnea on exertion.   Respiratory: Positive for shortness of breath.    Endocrine: Negative.    Hematologic/Lymphatic: Negative.    Skin: Negative.    Musculoskeletal: Positive for arthritis and joint pain.   Gastrointestinal: Negative.    Genitourinary: Negative.    Neurological: Positive for difficulty with concentration.   Psychiatric/Behavioral: Negative.    Allergic/Immunologic: Negative.        BP (!) 148/60   Pulse 80   Ht 4' 11" (1.499 m)   Wt 64 kg (141 lb 3.3 oz)   LMP  (LMP Unknown)   BMI 28.52 kg/m²     Wt Readings from Last 3 Encounters:   04/19/18 64 kg (141 lb 3.3 oz)   03/26/18 64 kg (141 lb)   03/15/18 62.2 kg (137 lb 1 oz)     Temp Readings from Last 3 Encounters:   03/26/18 98 °F (36.7 °C)   02/01/18 98.1 °F (36.7 °C) (Oral)   12/22/17 99.2 °F (37.3 °C) (Oral)     BP Readings from Last 3 Encounters:   04/19/18 (!) 148/60   03/26/18 137/61   03/15/18 110/62     Pulse Readings from Last 3 Encounters:   04/19/18 80   03/26/18 (!) 57   03/15/18 66               Objective:    Physical Exam   Constitutional: She " is oriented to person, place, and time. Vital signs are normal. She appears well-developed and well-nourished. She is active and cooperative. She does not have a sickly appearance. She does not appear ill. No distress.   HENT:   Head: Normocephalic.   Neck: Neck supple. Normal carotid pulses, no hepatojugular reflux and no JVD present. Carotid bruit is not present. No thyromegaly present.   Cardiovascular: Normal rate, regular rhythm, S1 normal, S2 normal, normal heart sounds and normal pulses.  PMI is not displaced.  Exam reveals no gallop and no friction rub.    No murmur heard.  Pulses:       Radial pulses are 2+ on the right side, and 2+ on the left side.   Pulmonary/Chest: Effort normal and breath sounds normal. She has no wheezes. She has no rales.   Abdominal: Soft. Normal appearance, normal aorta and bowel sounds are normal. She exhibits no pulsatile liver, no abdominal bruit, no ascites and no mass. There is no splenomegaly or hepatomegaly. There is no tenderness.   Musculoskeletal: She exhibits no edema.   Lymphadenopathy:     She has no cervical adenopathy.   Neurological: She is alert and oriented to person, place, and time.   Skin: Skin is warm. She is not diaphoretic.   Psychiatric: She has a normal mood and affect. Her behavior is normal.   Nursing note and vitals reviewed.      I have reviewed all pertinent labs and cardiac studies.      Chemistry        Component Value Date/Time     12/22/2017 0527    K 3.9 12/22/2017 0527     12/22/2017 0527    CO2 29 12/22/2017 0527    BUN 15 12/22/2017 0527    CREATININE 0.7 12/22/2017 0527     (H) 12/22/2017 0527        Component Value Date/Time    CALCIUM 8.3 (L) 12/22/2017 0527    ALKPHOS 87 12/22/2017 0527    AST 13 12/22/2017 0527    ALT 11 12/22/2017 0527    BILITOT 0.6 12/22/2017 0527    ESTGFRAFRICA >60.0 12/22/2017 0527    EGFRNONAA >60.0 12/22/2017 0527        Lab Results   Component Value Date    WBC 9.25 12/22/2017    HGB 9.8 (L)  12/22/2017    HCT 32.9 (L) 12/22/2017    MCV 74 (L) 12/22/2017     12/22/2017     Lab Results   Component Value Date    HGBA1C 12.1 (H) 12/15/2017     Lab Results   Component Value Date    CHOL 116 (L) 12/18/2017    CHOL 145 09/15/2017    CHOL 193 02/24/2017     Lab Results   Component Value Date    HDL 43 12/18/2017    HDL 57 09/15/2017    HDL 73 02/24/2017     Lab Results   Component Value Date    LDLCALC 50.0 (L) 12/18/2017    LDLCALC 71.4 09/15/2017    LDLCALC 98.0 02/24/2017     Lab Results   Component Value Date    TRIG 115 12/18/2017    TRIG 83 09/15/2017    TRIG 110 02/24/2017     Lab Results   Component Value Date    CHOLHDL 37.1 12/18/2017    CHOLHDL 39.3 09/15/2017    CHOLHDL 37.8 02/24/2017           Assessment:       1. AP (angina pectoris)    2. Arterial ischemic stroke, MCA (middle cerebral artery), left, acute    3. Aphasia    4. Coronary artery disease, angina presence unspecified, unspecified vessel or lesion type, unspecified whether native or transplanted heart    5. CAD, multiple vessel    6. Embolic stroke involving left middle cerebral artery    7. S/P CABG x 1    8. Type 2 diabetes mellitus with hyperglycemia, with long-term current use of insulin    9. Left ventricular diastolic dysfunction with preserved systolic function    10. Hyperlipidemia associated with type 2 diabetes mellitus    11. Essential hypertension         Plan:             Stable CV conditions on current medical tx.  S/p CVA, seems much improved to me after therapy.  Continue ASA, plavix.   Continue current medical tx.  Continue ILR monitoring for recent CVA, look for any a fib.  Continue therapy  Needs to get DM to goal -- work with PCP.  Cardiac diet  F/u 4 months.

## 2018-06-13 ENCOUNTER — PATIENT MESSAGE (OUTPATIENT)
Dept: CARDIOLOGY | Facility: CLINIC | Age: 78
End: 2018-06-13

## 2018-06-14 ENCOUNTER — OFFICE VISIT (OUTPATIENT)
Dept: CARDIOLOGY | Facility: CLINIC | Age: 78
End: 2018-06-14
Payer: MEDICARE

## 2018-06-14 ENCOUNTER — CLINICAL SUPPORT (OUTPATIENT)
Dept: CARDIOLOGY | Facility: CLINIC | Age: 78
End: 2018-06-14
Attending: INTERNAL MEDICINE
Payer: MEDICARE

## 2018-06-14 VITALS
HEIGHT: 59 IN | SYSTOLIC BLOOD PRESSURE: 136 MMHG | BODY MASS INDEX: 28.63 KG/M2 | DIASTOLIC BLOOD PRESSURE: 52 MMHG | HEART RATE: 52 BPM | WEIGHT: 142 LBS

## 2018-06-14 DIAGNOSIS — I63.512 ARTERIAL ISCHEMIC STROKE, MCA (MIDDLE CEREBRAL ARTERY), LEFT, ACUTE: ICD-10-CM

## 2018-06-14 DIAGNOSIS — Z45.09 ENCOUNTER FOR LOOP RECORDER CHECK: ICD-10-CM

## 2018-06-14 DIAGNOSIS — I63.412 EMBOLIC STROKE INVOLVING LEFT MIDDLE CEREBRAL ARTERY: Primary | ICD-10-CM

## 2018-06-14 DIAGNOSIS — I63.9 CRYPTOGENIC STROKE: ICD-10-CM

## 2018-06-14 DIAGNOSIS — Z95.1 S/P CABG X 1: ICD-10-CM

## 2018-06-14 DIAGNOSIS — R00.1 BRADYCARDIA: ICD-10-CM

## 2018-06-14 PROCEDURE — 3075F SYST BP GE 130 - 139MM HG: CPT | Mod: CPTII,S$GLB,, | Performed by: INTERNAL MEDICINE

## 2018-06-14 PROCEDURE — 99999 PR PBB SHADOW E&M-EST. PATIENT-LVL II: CPT | Mod: PBBFAC,,, | Performed by: INTERNAL MEDICINE

## 2018-06-14 PROCEDURE — 99214 OFFICE O/P EST MOD 30 MIN: CPT | Mod: S$GLB,,, | Performed by: INTERNAL MEDICINE

## 2018-06-14 PROCEDURE — 93285 PRGRMG DEV EVAL SCRMS IP: CPT | Mod: S$GLB,,, | Performed by: INTERNAL MEDICINE

## 2018-06-14 PROCEDURE — 3078F DIAST BP <80 MM HG: CPT | Mod: CPTII,S$GLB,, | Performed by: INTERNAL MEDICINE

## 2018-06-14 RX ORDER — GABAPENTIN 100 MG/1
100 CAPSULE ORAL
COMMUNITY
Start: 2018-05-22 | End: 2018-11-13

## 2018-06-14 NOTE — PROGRESS NOTES
Subjective:    Patient ID:  Tiffanie Wise is a 77 y.o. female who presents for follow-up of cryptogenic CVA      77 yoF CAD s/p PCI, CABG, HTN, DM here for ILR consideration.     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 She has recovered partially but still has aphasia. She had normal EF and no arrhythmia during her hospitalization. She is on aspirin and plavix for her CVA. She continues to go to rehab for her CVA. Sulma Pichardo and Han recommended ILR. Her daughter is present and speaks for her mother. No known history of arrhythmia. She has NAWAF as well as CABG. Available ECGs show SR.     Interval history: ILR implanted 2/1/18. No arrhythmias on ILR. Some pain at the site.     Past Medical History:  No date: Acute respiratory failure with hypoxia  No date: Anxiety  7/18/2013: AP (angina pectoris)  12/15/2017: Arterial ischemic stroke, MCA (middle cerebral*  No date: Asthma  8/2/2016: CAD, multiple vessel  7/18/2013: Chronic ischemic heart disease  7/18/2013: Coronary artery disease  No date: Depression  No date: Diabetes with neurologic complications  7/18/2013: GERD (gastroesophageal reflux disease)  No date: Heart failure  7/18/2013: History of PTCA  7/18/2013: Hypertension  No date: Hypothyroidism  8/29/2013: Leg pain  7/18/2013: Mixed hyperlipidemia  No date: Myocardial infarction  No date: Osteoporosis  No date: Pneumonia  No date: Pneumonia due to other staphylococcus  No date: Precancerous changes of the vagina  No date: Sleep apnea      Comment: stopped using CPAP 2015 - sores in nose,                couldn't breathe, uses Breathe riht strips now.  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*  No date: Urinary incontinence      Comment: pullups day, pads at night    Past Surgical History:  No date: BREAST SURGERY Left      Comment: benign cyst  No  date: CORONARY ANGIOPLASTY  No date: CORONARY STENT PLACEMENT      Comment: x6-7 oer the yeqrs  last 12/17/14 BRIANDA to lcfx  2014: EYE SURGERY Bilateral      Comment: cataracts w/IOL   Dr Vance  1970: HYSTERECTOMY      Comment: vag hyst; ovaries intact  No date: THYROID SURGERY      Comment: nodule benign, Dr Hankins  1970: TUBAL LIGATION    Social History    Marital status:              Spouse name:                       Years of education:                 Number of children:               Occupational History    None on file    Social History Main Topics    Smoking status: Former Smoker                                                                Packs/day: 1.00      Years: 38.00          Quit date: 7/8/2008    Smokeless tobacco: Never Used                        Alcohol use: No              Drug use: No              Sexual activity: Not Currently           Birth control/protection: None    Other Topics            Concern    None on file    Social History Narrative    ( 2nd marriage,  x 1). She has 4 children and 3 step children.. 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 She still drives. Does not have a Living Will or Advanced directive.        Review of patient's family history indicates:  Problem: Kidney disease      Relation: Father       Age of Onset: (Not Specified)   Problem: Kidney disease      Relation: Brother       Age of Onset: (Not Specified)   Problem: Heart disease      Relation: Mother       Age of Onset: (Not Specified)   Problem: Fibromyalgia      Relation: Daughter       Age of Onset: (Not Specified)   Problem: Cancer      Relation: Son       Age of Onset: (Not Specified)       Comment: lung  Problem: Cirrhosis      Relation: Sister       Age of Onset: (Not Specified)   Problem: Alcohol abuse      Relation: Sister       Age of Onset: (Not Specified)   Problem: Diabetes      Relation: Sister       Age of  Onset: (Not Specified)   Problem: Fibromyalgia      Relation: Daughter       Age of Onset: (Not Specified)   Problem: Diabetes      Relation: Paternal Grandmother       Age of Onset: (Not Specified)   Problem: Stroke      Relation: Neg Hx       Age of Onset: (Not Specified)   Problem: Mental illness      Relation: Neg Hx       Age of Onset: (Not Specified)   Problem: Mental retardation      Relation: Neg Hx       Age of Onset: (Not Specified)           Review of Systems   Unable to perform ROS: other (aphasia)   Constitution: Negative for malaise/fatigue.   HENT: Negative.    Eyes: Negative.    Cardiovascular: Negative for chest pain, dyspnea on exertion, leg swelling, near-syncope, palpitations and syncope.   Respiratory: Negative.  Negative for shortness of breath.    Endocrine: Negative.    Hematologic/Lymphatic: Negative.    Skin: Negative.    Musculoskeletal: Negative.    Gastrointestinal: Negative.    Genitourinary: Negative.    Neurological: Negative.  Negative for dizziness and light-headedness.   Psychiatric/Behavioral: Negative.    Allergic/Immunologic: Negative.         Objective:    Physical Exam   Constitutional: She is oriented to person, place, and time. She appears well-developed and well-nourished. No distress.   HENT:   Head: Normocephalic and atraumatic.   Eyes: EOM are normal. Pupils are equal, round, and reactive to light.   Neck: Normal range of motion. No JVD present. No thyromegaly present.   Cardiovascular: Normal rate, regular rhythm, S1 normal, S2 normal and normal heart sounds.  PMI is not displaced.  Exam reveals no gallop and no friction rub.    No murmur heard.  Pulmonary/Chest: Effort normal and breath sounds normal. No respiratory distress. She has no wheezes. She has no rales.   Abdominal: Soft. Bowel sounds are normal. She exhibits no distension. There is no tenderness. There is no rebound and no guarding.   Musculoskeletal: Normal range of motion. She exhibits no edema or  tenderness.   Neurological: She is alert and oriented to person, place, and time. No cranial nerve deficit.   Skin: Skin is warm and dry. No rash noted. No erythema.   Psychiatric: She has a normal mood and affect. Her behavior is normal. Judgment and thought content normal.   Vitals reviewed.        Assessment:       1. Embolic stroke involving left middle cerebral artery    2. Arterial ischemic stroke, MCA (middle cerebral artery), left, acute    3. Bradycardia    4. S/P CABG x 1         Plan:       77 yoF CVA s/p ILR here for post implant visit. Normal ILR check with no arrhythmias. Continue to monitor. RTC 1y

## 2018-07-09 DIAGNOSIS — E78.00 PURE HYPERCHOLESTEROLEMIA: ICD-10-CM

## 2018-07-09 RX ORDER — ATORVASTATIN CALCIUM 80 MG/1
80 TABLET, FILM COATED ORAL DAILY
Qty: 90 TABLET | Refills: 3 | OUTPATIENT
Start: 2018-07-09

## 2018-07-13 DIAGNOSIS — E78.00 PURE HYPERCHOLESTEROLEMIA: ICD-10-CM

## 2018-07-13 RX ORDER — ATORVASTATIN CALCIUM 80 MG/1
80 TABLET, FILM COATED ORAL DAILY
Qty: 90 TABLET | Refills: 3 | Status: SHIPPED | OUTPATIENT
Start: 2018-07-13 | End: 2018-07-17 | Stop reason: SDUPTHER

## 2018-07-13 NOTE — LETTER
July 13, 2018    Tiffanie Wise  [address] [place] [address]             [facility]  [address]  [facility] [address]  Phone: [phone]  Fax: [phone] To Whom it May Concern:    We are seeing MARY rBand.O.B is 1940, at [facility]. MILTON Lim MD is their primary care physician. To help with our [place] maintenance records could you please send the following:     Most recent copy of Diabetic Eye Exam that states   Positive or Negative Retinopathy.    Please send fax to [phone], Attention Tianna PENDLETON LPN .    Thank-you in advance for your assistance. If you have any questions or concerns, please don't hesitate to contact me at [phone]  .   Tianna PENDLETON LPN  [facility]

## 2018-07-17 DIAGNOSIS — I20.9 ANGINA PECTORIS: Primary | ICD-10-CM

## 2018-07-17 DIAGNOSIS — I25.10 CORONARY ARTERY DISEASE, ANGINA PRESENCE UNSPECIFIED, UNSPECIFIED VESSEL OR LESION TYPE, UNSPECIFIED WHETHER NATIVE OR TRANSPLANTED HEART: ICD-10-CM

## 2018-07-17 RX ORDER — NITROGLYCERIN 0.4 MG/1
0.4 TABLET SUBLINGUAL
Qty: 25 TABLET | Refills: 6 | Status: SHIPPED | OUTPATIENT
Start: 2018-07-17 | End: 2020-07-31 | Stop reason: SDUPTHER

## 2018-07-17 RX ORDER — CLOPIDOGREL BISULFATE 75 MG/1
75 TABLET ORAL DAILY
Qty: 90 TABLET | Refills: 3 | Status: SHIPPED | OUTPATIENT
Start: 2018-07-17 | End: 2018-12-05 | Stop reason: SDUPTHER

## 2018-07-19 ENCOUNTER — TELEPHONE (OUTPATIENT)
Dept: CARDIOLOGY | Facility: CLINIC | Age: 78
End: 2018-07-19

## 2018-07-19 NOTE — TELEPHONE ENCOUNTER
Returned phone call and clarified all Cardiac drugs    ----- Message from Nicole Wells sent at 7/19/2018 11:48 AM CDT -----  Contact: Carmen/Jacob Morales is calling to check on the status of the request that was sent over on 7/12/18, Please call her at [phone].    Thanks  Td

## 2018-07-22 ENCOUNTER — HOSPITAL ENCOUNTER (OUTPATIENT)
Facility: HOSPITAL | Age: 78
Discharge: HOME | End: 2018-07-22
Attending: EMERGENCY MEDICINE | Admitting: HOSPITALIST
Payer: MEDICARE

## 2018-07-22 VITALS
DIASTOLIC BLOOD PRESSURE: 71 MMHG | WEIGHT: 140.44 LBS | OXYGEN SATURATION: 93 % | TEMPERATURE: 98 F | HEART RATE: 48 BPM | RESPIRATION RATE: 18 BRPM | BODY MASS INDEX: 28.31 KG/M2 | HEIGHT: 59 IN | SYSTOLIC BLOOD PRESSURE: 157 MMHG

## 2018-07-22 DIAGNOSIS — R07.9 CHEST PAIN: ICD-10-CM

## 2018-07-22 DIAGNOSIS — R07.89 ATYPICAL CHEST PAIN: Primary | ICD-10-CM

## 2018-07-22 PROBLEM — R00.1 BRADYCARDIA: Chronic | Status: ACTIVE | Noted: 2017-12-21

## 2018-07-22 PROBLEM — D50.9 MICROCYTIC ANEMIA: Status: ACTIVE | Noted: 2018-07-22

## 2018-07-22 PROBLEM — I63.89 ACUTE HEMORRHAGIC INFARCTION OF BRAIN: Chronic | Status: ACTIVE | Noted: 2017-12-17

## 2018-07-22 PROBLEM — D50.9 MICROCYTIC ANEMIA: Chronic | Status: ACTIVE | Noted: 2018-07-22

## 2018-07-22 PROBLEM — E87.6 HYPOKALEMIA: Status: ACTIVE | Noted: 2018-07-22

## 2018-07-22 PROBLEM — I63.512 ARTERIAL ISCHEMIC STROKE, MCA (MIDDLE CEREBRAL ARTERY), LEFT, ACUTE: Chronic | Status: ACTIVE | Noted: 2017-12-15

## 2018-07-22 LAB
ALBUMIN SERPL BCP-MCNC: 2.9 G/DL
ALP SERPL-CCNC: 117 U/L
ALT SERPL W/O P-5'-P-CCNC: 14 U/L
ANION GAP SERPL CALC-SCNC: 12 MMOL/L
APTT BLDCRRT: 26 SEC
AST SERPL-CCNC: 17 U/L
BASOPHILS # BLD AUTO: 0.03 K/UL
BASOPHILS NFR BLD: 0.3 %
BILIRUB SERPL-MCNC: 0.5 MG/DL
BILIRUB UR QL STRIP: NEGATIVE
BNP SERPL-MCNC: 125 PG/ML
BUN SERPL-MCNC: 16 MG/DL
CALCIUM SERPL-MCNC: 9 MG/DL
CHLORIDE SERPL-SCNC: 99 MMOL/L
CHOLEST SERPL-MCNC: 187 MG/DL
CHOLEST/HDLC SERPL: 3.2 {RATIO}
CK MB SERPL-MCNC: 1 NG/ML
CK MB SERPL-RTO: 1.3 %
CK SERPL-CCNC: 79 U/L
CLARITY UR: CLEAR
CO2 SERPL-SCNC: 27 MMOL/L
COLOR UR: YELLOW
CREAT SERPL-MCNC: 0.9 MG/DL
DIASTOLIC DYSFUNCTION: YES
DIFFERENTIAL METHOD: ABNORMAL
EOSINOPHIL # BLD AUTO: 0.2 K/UL
EOSINOPHIL NFR BLD: 1.6 %
ERYTHROCYTE [DISTWIDTH] IN BLOOD BY AUTOMATED COUNT: 17.5 %
EST. GFR  (AFRICAN AMERICAN): >60 ML/MIN/1.73 M^2
EST. GFR  (NON AFRICAN AMERICAN): >60 ML/MIN/1.73 M^2
ESTIMATED AVG GLUCOSE: 177 MG/DL
ESTIMATED PA SYSTOLIC PRESSURE: 29.63
FERRITIN SERPL-MCNC: 11 NG/ML
FOLATE SERPL-MCNC: 4.6 NG/ML
GLUCOSE SERPL-MCNC: 211 MG/DL
GLUCOSE UR QL STRIP: NEGATIVE
HBA1C MFR BLD HPLC: 7.8 %
HCT VFR BLD AUTO: 32.6 %
HDLC SERPL-MCNC: 59 MG/DL
HDLC SERPL: 31.6 %
HGB BLD-MCNC: 10.1 G/DL
HGB UR QL STRIP: NEGATIVE
INR PPP: 1
IRON SERPL-MCNC: 24 UG/DL
KETONES UR QL STRIP: NEGATIVE
LDLC SERPL CALC-MCNC: 104.4 MG/DL
LEUKOCYTE ESTERASE UR QL STRIP: NEGATIVE
LYMPHOCYTES # BLD AUTO: 1.8 K/UL
LYMPHOCYTES NFR BLD: 15.7 %
MAGNESIUM SERPL-MCNC: 1.8 MG/DL
MCH RBC QN AUTO: 23.6 PG
MCHC RBC AUTO-ENTMCNC: 31 G/DL
MCV RBC AUTO: 76 FL
MONOCYTES # BLD AUTO: 0.8 K/UL
MONOCYTES NFR BLD: 6.7 %
NEUTROPHILS # BLD AUTO: 8.5 K/UL
NEUTROPHILS NFR BLD: 75.7 %
NITRITE UR QL STRIP: NEGATIVE
NONHDLC SERPL-MCNC: 128 MG/DL
PH UR STRIP: 6 [PH] (ref 5–8)
PLATELET # BLD AUTO: 304 K/UL
PMV BLD AUTO: 10.5 FL
POCT GLUCOSE: 209 MG/DL (ref 70–110)
POTASSIUM SERPL-SCNC: 3.2 MMOL/L
PROT SERPL-MCNC: 6.7 G/DL
PROT UR QL STRIP: NEGATIVE
PROTHROMBIN TIME: 10.3 SEC
RBC # BLD AUTO: 4.28 M/UL
RETIRED EF AND QEF - SEE NOTES: 60 (ref 55–65)
SATURATED IRON: 5 %
SODIUM SERPL-SCNC: 138 MMOL/L
SP GR UR STRIP: 1.01 (ref 1–1.03)
TOTAL IRON BINDING CAPACITY: 462 UG/DL
TRANSFERRIN SERPL-MCNC: 312 MG/DL
TRIGL SERPL-MCNC: 118 MG/DL
TROPONIN I SERPL DL<=0.01 NG/ML-MCNC: 0.01 NG/ML
TROPONIN I SERPL DL<=0.01 NG/ML-MCNC: <0.006 NG/ML
TROPONIN I SERPL DL<=0.01 NG/ML-MCNC: <0.006 NG/ML
TSH SERPL DL<=0.005 MIU/L-ACNC: 3.31 UIU/ML
URN SPEC COLLECT METH UR: NORMAL
UROBILINOGEN UR STRIP-ACNC: NEGATIVE EU/DL
VIT B12 SERPL-MCNC: 418 PG/ML
WBC # BLD AUTO: 11.19 K/UL

## 2018-07-22 PROCEDURE — 25000003 PHARM REV CODE 250: Performed by: HOSPITALIST

## 2018-07-22 PROCEDURE — 80053 COMPREHEN METABOLIC PANEL: CPT

## 2018-07-22 PROCEDURE — 84484 ASSAY OF TROPONIN QUANT: CPT | Mod: 91

## 2018-07-22 PROCEDURE — 83880 ASSAY OF NATRIURETIC PEPTIDE: CPT

## 2018-07-22 PROCEDURE — 99285 EMERGENCY DEPT VISIT HI MDM: CPT | Mod: 25

## 2018-07-22 PROCEDURE — 63600175 PHARM REV CODE 636 W HCPCS: Performed by: HOSPITALIST

## 2018-07-22 PROCEDURE — 85610 PROTHROMBIN TIME: CPT

## 2018-07-22 PROCEDURE — 25000003 PHARM REV CODE 250: Performed by: NURSE PRACTITIONER

## 2018-07-22 PROCEDURE — G0378 HOSPITAL OBSERVATION PER HR: HCPCS

## 2018-07-22 PROCEDURE — 96372 THER/PROPH/DIAG INJ SC/IM: CPT

## 2018-07-22 PROCEDURE — 83540 ASSAY OF IRON: CPT

## 2018-07-22 PROCEDURE — 80061 LIPID PANEL: CPT

## 2018-07-22 PROCEDURE — 82607 VITAMIN B-12: CPT

## 2018-07-22 PROCEDURE — 82728 ASSAY OF FERRITIN: CPT

## 2018-07-22 PROCEDURE — 84484 ASSAY OF TROPONIN QUANT: CPT

## 2018-07-22 PROCEDURE — 63600175 PHARM REV CODE 636 W HCPCS: Performed by: EMERGENCY MEDICINE

## 2018-07-22 PROCEDURE — 25000003 PHARM REV CODE 250: Performed by: EMERGENCY MEDICINE

## 2018-07-22 PROCEDURE — 96375 TX/PRO/DX INJ NEW DRUG ADDON: CPT

## 2018-07-22 PROCEDURE — 82550 ASSAY OF CK (CPK): CPT

## 2018-07-22 PROCEDURE — 96374 THER/PROPH/DIAG INJ IV PUSH: CPT | Mod: 59

## 2018-07-22 PROCEDURE — 93306 TTE W/DOPPLER COMPLETE: CPT

## 2018-07-22 PROCEDURE — 85730 THROMBOPLASTIN TIME PARTIAL: CPT

## 2018-07-22 PROCEDURE — 82553 CREATINE MB FRACTION: CPT

## 2018-07-22 PROCEDURE — 36415 COLL VENOUS BLD VENIPUNCTURE: CPT

## 2018-07-22 PROCEDURE — 83036 HEMOGLOBIN GLYCOSYLATED A1C: CPT

## 2018-07-22 PROCEDURE — 85025 COMPLETE CBC W/AUTO DIFF WBC: CPT

## 2018-07-22 PROCEDURE — 93306 TTE W/DOPPLER COMPLETE: CPT | Mod: 26,,, | Performed by: INTERNAL MEDICINE

## 2018-07-22 PROCEDURE — 81003 URINALYSIS AUTO W/O SCOPE: CPT

## 2018-07-22 PROCEDURE — 93010 ELECTROCARDIOGRAM REPORT: CPT | Mod: ,,, | Performed by: INTERNAL MEDICINE

## 2018-07-22 PROCEDURE — 82746 ASSAY OF FOLIC ACID SERUM: CPT

## 2018-07-22 PROCEDURE — 83735 ASSAY OF MAGNESIUM: CPT

## 2018-07-22 PROCEDURE — 84443 ASSAY THYROID STIM HORMONE: CPT

## 2018-07-22 RX ORDER — GLUCAGON 1 MG
1 KIT INJECTION
Status: DISCONTINUED | OUTPATIENT
Start: 2018-07-22 | End: 2018-07-22 | Stop reason: HOSPADM

## 2018-07-22 RX ORDER — ACETAMINOPHEN 325 MG/1
650 TABLET ORAL EVERY 6 HOURS PRN
Status: DISCONTINUED | OUTPATIENT
Start: 2018-07-22 | End: 2018-07-22 | Stop reason: HOSPADM

## 2018-07-22 RX ORDER — LOSARTAN POTASSIUM 50 MG/1
100 TABLET ORAL DAILY
Status: DISCONTINUED | OUTPATIENT
Start: 2018-07-22 | End: 2018-07-22 | Stop reason: HOSPADM

## 2018-07-22 RX ORDER — ASPIRIN 325 MG
325 TABLET ORAL ONCE
Status: COMPLETED | OUTPATIENT
Start: 2018-07-22 | End: 2018-07-22

## 2018-07-22 RX ORDER — DONEPEZIL HYDROCHLORIDE 5 MG/1
10 TABLET, FILM COATED ORAL DAILY
Status: DISCONTINUED | OUTPATIENT
Start: 2018-07-22 | End: 2018-07-22 | Stop reason: HOSPADM

## 2018-07-22 RX ORDER — IBUPROFEN 200 MG
16 TABLET ORAL
Status: DISCONTINUED | OUTPATIENT
Start: 2018-07-22 | End: 2018-07-22 | Stop reason: HOSPADM

## 2018-07-22 RX ORDER — ASPIRIN 81 MG/1
81 TABLET ORAL DAILY
Status: DISCONTINUED | OUTPATIENT
Start: 2018-07-22 | End: 2018-07-22

## 2018-07-22 RX ORDER — ESCITALOPRAM OXALATE 10 MG/1
20 TABLET ORAL DAILY
Status: DISCONTINUED | OUTPATIENT
Start: 2018-07-22 | End: 2018-07-22 | Stop reason: HOSPADM

## 2018-07-22 RX ORDER — CLONAZEPAM 0.5 MG/1
0.5 TABLET ORAL 2 TIMES DAILY PRN
Status: DISCONTINUED | OUTPATIENT
Start: 2018-07-22 | End: 2018-07-22 | Stop reason: HOSPADM

## 2018-07-22 RX ORDER — ONDANSETRON 2 MG/ML
4 INJECTION INTRAMUSCULAR; INTRAVENOUS
Status: COMPLETED | OUTPATIENT
Start: 2018-07-22 | End: 2018-07-22

## 2018-07-22 RX ORDER — MORPHINE SULFATE 4 MG/ML
2 INJECTION, SOLUTION INTRAMUSCULAR; INTRAVENOUS
Status: COMPLETED | OUTPATIENT
Start: 2018-07-22 | End: 2018-07-22

## 2018-07-22 RX ORDER — IBUPROFEN 200 MG
24 TABLET ORAL
Status: DISCONTINUED | OUTPATIENT
Start: 2018-07-22 | End: 2018-07-22 | Stop reason: HOSPADM

## 2018-07-22 RX ORDER — HYDROCHLOROTHIAZIDE 25 MG/1
25 TABLET ORAL DAILY
Status: DISCONTINUED | OUTPATIENT
Start: 2018-07-22 | End: 2018-07-22 | Stop reason: HOSPADM

## 2018-07-22 RX ORDER — ENOXAPARIN SODIUM 100 MG/ML
40 INJECTION SUBCUTANEOUS EVERY 24 HOURS
Status: DISCONTINUED | OUTPATIENT
Start: 2018-07-22 | End: 2018-07-22 | Stop reason: HOSPADM

## 2018-07-22 RX ORDER — NITROGLYCERIN 0.4 MG/1
0.4 TABLET SUBLINGUAL EVERY 5 MIN PRN
Status: DISCONTINUED | OUTPATIENT
Start: 2018-07-22 | End: 2018-07-22 | Stop reason: HOSPADM

## 2018-07-22 RX ORDER — ASPIRIN 81 MG/1
81 TABLET ORAL DAILY
Status: DISCONTINUED | OUTPATIENT
Start: 2018-07-23 | End: 2018-07-22 | Stop reason: HOSPADM

## 2018-07-22 RX ORDER — POTASSIUM CHLORIDE 20 MEQ/1
40 TABLET, EXTENDED RELEASE ORAL EVERY 6 HOURS
Status: COMPLETED | OUTPATIENT
Start: 2018-07-22 | End: 2018-07-22

## 2018-07-22 RX ORDER — CHOLECALCIFEROL (VITAMIN D3) 25 MCG
1000 TABLET ORAL DAILY
Status: DISCONTINUED | OUTPATIENT
Start: 2018-07-22 | End: 2018-07-22 | Stop reason: HOSPADM

## 2018-07-22 RX ORDER — CLOPIDOGREL BISULFATE 75 MG/1
75 TABLET ORAL DAILY
Status: DISCONTINUED | OUTPATIENT
Start: 2018-07-22 | End: 2018-07-22 | Stop reason: HOSPADM

## 2018-07-22 RX ORDER — INSULIN ASPART 100 [IU]/ML
0-5 INJECTION, SOLUTION INTRAVENOUS; SUBCUTANEOUS
Status: DISCONTINUED | OUTPATIENT
Start: 2018-07-22 | End: 2018-07-22 | Stop reason: HOSPADM

## 2018-07-22 RX ORDER — ATORVASTATIN CALCIUM 40 MG/1
80 TABLET, FILM COATED ORAL DAILY
Status: DISCONTINUED | OUTPATIENT
Start: 2018-07-22 | End: 2018-07-22 | Stop reason: HOSPADM

## 2018-07-22 RX ORDER — ONDANSETRON 2 MG/ML
4 INJECTION INTRAMUSCULAR; INTRAVENOUS EVERY 8 HOURS PRN
Status: DISCONTINUED | OUTPATIENT
Start: 2018-07-22 | End: 2018-07-22 | Stop reason: HOSPADM

## 2018-07-22 RX ORDER — PANTOPRAZOLE SODIUM 40 MG/1
40 TABLET, DELAYED RELEASE ORAL DAILY
Status: DISCONTINUED | OUTPATIENT
Start: 2018-07-22 | End: 2018-07-22 | Stop reason: HOSPADM

## 2018-07-22 RX ORDER — LEVOTHYROXINE SODIUM 50 UG/1
50 TABLET ORAL
Status: DISCONTINUED | OUTPATIENT
Start: 2018-07-22 | End: 2018-07-22 | Stop reason: HOSPADM

## 2018-07-22 RX ADMIN — POTASSIUM CHLORIDE 40 MEQ: 1500 TABLET, EXTENDED RELEASE ORAL at 11:07

## 2018-07-22 RX ADMIN — INSULIN DETEMIR 36 UNITS: 100 INJECTION, SOLUTION SUBCUTANEOUS at 08:07

## 2018-07-22 RX ADMIN — VITAMIN D, TAB 1000IU (100/BT) 1000 UNITS: 25 TAB at 09:07

## 2018-07-22 RX ADMIN — MORPHINE SULFATE 2 MG: 4 INJECTION INTRAVENOUS at 03:07

## 2018-07-22 RX ADMIN — LEVOTHYROXINE SODIUM 50 MCG: 50 TABLET ORAL at 08:07

## 2018-07-22 RX ADMIN — LOSARTAN POTASSIUM 100 MG: 50 TABLET, FILM COATED ORAL at 08:07

## 2018-07-22 RX ADMIN — HYDROCHLOROTHIAZIDE 25 MG: 25 TABLET ORAL at 08:07

## 2018-07-22 RX ADMIN — ESCITALOPRAM OXALATE 20 MG: 10 TABLET, FILM COATED ORAL at 08:07

## 2018-07-22 RX ADMIN — POTASSIUM CHLORIDE 40 MEQ: 1500 TABLET, EXTENDED RELEASE ORAL at 08:07

## 2018-07-22 RX ADMIN — ONDANSETRON 4 MG: 2 INJECTION, SOLUTION INTRAMUSCULAR; INTRAVENOUS at 03:07

## 2018-07-22 RX ADMIN — PANTOPRAZOLE SODIUM 40 MG: 40 TABLET, DELAYED RELEASE ORAL at 08:07

## 2018-07-22 RX ADMIN — INSULIN ASPART 2 UNITS: 100 INJECTION, SOLUTION INTRAVENOUS; SUBCUTANEOUS at 12:07

## 2018-07-22 RX ADMIN — CLOPIDOGREL BISULFATE 75 MG: 75 TABLET ORAL at 08:07

## 2018-07-22 RX ADMIN — ALUMINUM HYDROXIDE, MAGNESIUM HYDROXIDE, SIMETHICONE 50 ML: 400; 400; 40 SUSPENSION ORAL at 03:07

## 2018-07-22 RX ADMIN — DONEPEZIL HYDROCHLORIDE 10 MG: 5 TABLET, FILM COATED ORAL at 08:07

## 2018-07-22 RX ADMIN — ASPIRIN 325 MG: 325 TABLET ORAL at 08:07

## 2018-07-22 RX ADMIN — ATORVASTATIN CALCIUM 80 MG: 40 TABLET, FILM COATED ORAL at 08:07

## 2018-07-22 NOTE — SUBJECTIVE & OBJECTIVE
Past Medical History:   Diagnosis Date    Acute respiratory failure with hypoxia     Anxiety     AP (angina pectoris) 7/18/2013    Arterial ischemic stroke, MCA (middle cerebral artery), left, acute 12/15/2017    Asthma     CAD, multiple vessel 8/2/2016    Chronic ischemic heart disease 7/18/2013    Coronary artery disease 7/18/2013    Depression     Diabetes with neurologic complications     GERD (gastroesophageal reflux disease) 7/18/2013    Heart failure     History of PTCA 7/18/2013    Hypertension 7/18/2013    Hypothyroidism     Leg pain 8/29/2013    Mixed hyperlipidemia 7/18/2013    Myocardial infarction     Osteoporosis     Pneumonia     Pneumonia due to other staphylococcus     Precancerous changes of the vagina     Sleep apnea     stopped using CPAP 2015 - sores in nose, couldn't breathe, uses Breathe riht strips now.     Thyroid disease     Tobacco dependence     resolved    Trouble in sleeping     Type 2 diabetes mellitus with ophthalmic manifestations     Urinary incontinence     pullups day, pads at night       Past Surgical History:   Procedure Laterality Date    BREAST SURGERY Left     benign cyst    CORONARY ANGIOPLASTY      CORONARY STENT PLACEMENT      x6-7 oer the yeqrs  last 12/17/14 BRIANDA to lcfx    EYE SURGERY Bilateral 2014    cataracts w/IOL   Dr Vance    HYSTERECTOMY  1970    vag hyst; ovaries intact    THYROID SURGERY      nodule benign, Dr Hankins    TUBAL LIGATION  1970       Review of patient's allergies indicates:  No Known Allergies    No current facility-administered medications on file prior to encounter.      Current Outpatient Prescriptions on File Prior to Encounter   Medication Sig    alendronate (FOSAMAX) 70 MG tablet TAKE 1 TABLET BY MOUTH EVERY WEEK WITH A FULL GLASS OF WATER ON EMPTY STOMACH. DO NOT EAT OR LIE DOWN FOR 30 MINUTES    aspirin (ECOTRIN) 81 MG EC tablet Take 81 mg by mouth. Take 81 mg by mouth daily.    atorvastatin  "(LIPITOR) 80 MG tablet Take 1 tablet (80 mg total) by mouth once daily.    clonazePAM (KLONOPIN) 0.5 MG tablet Take 1 tablet (0.5 mg total) by mouth once daily.    clopidogrel (PLAVIX) 75 mg tablet Take 1 tablet (75 mg total) by mouth once daily.    donepezil (ARICEPT) 10 MG tablet Take 1 tablet (10 mg total) by mouth once daily.    escitalopram oxalate (LEXAPRO) 20 MG tablet TAKE 1 TABLET(20 MG) BY MOUTH EVERY DAY    escitalopram oxalate (LEXAPRO) 20 MG tablet TAKE 1 TABLET BY MOUTH EVERY DAY    gabapentin (NEURONTIN) 100 MG capsule Take 100 mg by mouth.    hydroCHLOROthiazide (HYDRODIURIL) 25 MG tablet TAKE 1 TABLET BY MOUTH EVERY DAY    levothyroxine (SYNTHROID) 50 MCG tablet Take 1 tablet (50 mcg total) by mouth once daily.    losartan (COZAAR) 100 MG tablet Take 1 tablet (100 mg total) by mouth once daily.    metFORMIN (GLUCOPHAGE) 1000 MG tablet Take 1,000 mg by mouth 2 (two) times daily.     nitroGLYCERIN (NITROSTAT) 0.4 MG SL tablet Place 1 tablet (0.4 mg total) under the tongue as needed.    pen needle, diabetic 31 gauge x 5/16" Ndle USE TWICE DAILY AND PRN    SITagliptin (JANUVIA) 100 MG Tab Take 100 mg by mouth once daily.    vitamin D 1000 units Tab Take 1,000 Units by mouth once daily.    BD INSULIN SYRINGE ULT-FINE II 1/2 mL 31 x 5/16" Syrg     BLOOD SUGAR DIAGNOSTIC (TRUETEST TEST STRIPS MISC) by Misc.(Non-Drug; Combo Route) route. Pt is testing 3 times a day    blood sugar diagnostic Strp Use to check blood sugar three times daily and as needed.    EASY COMFORT LANCETS 30 gauge [facility]     FLUAD [id number], 65 YR UP,,PF, 45 mcg (15 mcg x 3)/0.5 mL Syrg ADM 0.5ML IM UTD    insulin detemir (LEVEMIR FLEXTOUCH) 100 unit/mL (3 mL) SubQ InPn pen Inject 36 Units into the skin once daily.    pantoprazole (PROTONIX) 40 MG tablet TAKE 1 TABLET BY MOUTH DAILY     Family History     Problem Relation (Age of Onset)    Alcohol abuse Sister    Cancer Son    Cirrhosis Sister    Diabetes Sister, " Paternal Grandmother    Fibromyalgia Daughter, Daughter    Heart disease Mother    Kidney disease Father, Brother        Social History Main Topics    Smoking status: Former Smoker     Packs/day: 1.00     Years: 38.00     Quit date: 7/8/2008    Smokeless tobacco: Never Used    Alcohol use No    Drug use: No    Sexual activity: Not Currently     Birth control/ protection: None     Review of Systems   Constitutional: Negative for activity change, diaphoresis, fever and unexpected weight change.   HENT: Negative for congestion, postnasal drip, rhinorrhea and sore throat.    Respiratory: Positive for chest tightness. Negative for cough, shortness of breath and wheezing.    Cardiovascular: Positive for chest pain. Negative for palpitations and leg swelling.   Gastrointestinal: Negative for abdominal distention, abdominal pain, blood in stool, constipation, diarrhea, nausea and vomiting.   Genitourinary: Negative for decreased urine volume, difficulty urinating, dysuria, flank pain, frequency, hematuria and urgency.   Musculoskeletal: Negative for arthralgias, back pain and myalgias.   Skin: Negative for pallor, rash and wound.   Neurological: Positive for speech difficulty (expressive aphasia due to CVA). Negative for dizziness, syncope, weakness, light-headedness and headaches.   Psychiatric/Behavioral: Negative for agitation and confusion. The patient is not nervous/anxious.      Objective:     Vital Signs (Most Recent):  Temp: 98.1 °F (36.7 °C) (07/22/18 0313)  Pulse: (!) 51 (07/22/18 0357)  Resp: 19 (07/22/18 0357)  BP: (!) 155/64 (07/22/18 0357)  SpO2: 95 % (07/22/18 0357) Vital Signs (24h Range):  Temp:  [98.1 °F (36.7 °C)] 98.1 °F (36.7 °C)  Pulse:  [51-53] 51  Resp:  [18-19] 19  SpO2:  [95 %-99 %] 95 %  BP: (141-173)/(55-67) 155/64     Weight: 69.6 kg (153 lb 7 oz)  Body mass index is 31.52 kg/m².    Physical Exam   Constitutional: She is oriented to person, place, and time. She appears well-developed and  well-nourished. No distress.   HENT:   Head: Normocephalic and atraumatic.   Eyes: Conjunctivae are normal.   PERRL; EOM intact.   Neck: Normal range of motion. Neck supple. No JVD present.   Cardiovascular: Regular rhythm, S1 normal, S2 normal and intact distal pulses.   No extrasystoles are present. Bradycardia present.  Exam reveals no gallop and no friction rub.    No murmur heard.  Pulses:       Radial pulses are 2+ on the right side, and 2+ on the left side.        Dorsalis pedis pulses are 2+ on the right side, and 2+ on the left side.        Posterior tibial pulses are 2+ on the right side, and 2+ on the left side.   Pulmonary/Chest: Effort normal and breath sounds normal. No accessory muscle usage. No tachypnea. No respiratory distress. She has no wheezes. She has no rhonchi. She has no rales.   Abdominal: Soft. Bowel sounds are normal. She exhibits no distension. There is no tenderness. There is no rebound, no guarding and no CVA tenderness.   Musculoskeletal: Normal range of motion. She exhibits no edema, tenderness or deformity.   Neurological: She is alert and oriented to person, place, and time. She has normal strength. No sensory deficit.   Mild expressive aphasia due to old CVA.   Skin: Skin is warm, dry and intact. Capillary refill takes less than 2 seconds. No rash noted. She is not diaphoretic. No cyanosis or erythema.   Psychiatric: She has a normal mood and affect. Her behavior is normal. Cognition and memory are normal.   Mild expressive aphasia.    Nursing note and vitals reviewed.          Significant Labs:   Results for orders placed or performed during the hospital encounter of 07/22/18   CBC auto differential   Result Value Ref Range    WBC 11.19 3.90 - 12.70 K/uL    RBC 4.28 4.00 - 5.40 M/uL    Hemoglobin 10.1 (L) 12.0 - 16.0 g/dL    Hematocrit 32.6 (L) 37.0 - 48.5 %    MCV 76 (L) 82 - 98 fL    MCH 23.6 (L) 27.0 - 31.0 pg    MCHC 31.0 (L) 32.0 - 36.0 g/dL    RDW 17.5 (H) 11.5 - 14.5 %     Platelets 304 150 - 350 K/uL    MPV 10.5 9.2 - 12.9 fL    Gran # (ANC) 8.5 (H) 1.8 - 7.7 K/uL    Lymph # 1.8 1.0 - 4.8 K/uL    Mono # 0.8 0.3 - 1.0 K/uL    Eos # 0.2 0.0 - 0.5 K/uL    Baso # 0.03 0.00 - 0.20 K/uL    Gran% 75.7 (H) 38.0 - 73.0 %    Lymph% 15.7 (L) 18.0 - 48.0 %    Mono% 6.7 4.0 - 15.0 %    Eosinophil% 1.6 0.0 - 8.0 %    Basophil% 0.3 0.0 - 1.9 %    Differential Method Automated    Comprehensive metabolic panel   Result Value Ref Range    Sodium 138 136 - 145 mmol/L    Potassium 3.2 (L) 3.5 - 5.1 mmol/L    Chloride 99 95 - 110 mmol/L    CO2 27 23 - 29 mmol/L    Glucose 211 (H) 70 - 110 mg/dL    BUN, Bld 16 8 - 23 mg/dL    Creatinine 0.9 0.5 - 1.4 mg/dL    Calcium 9.0 8.7 - 10.5 mg/dL    Total Protein 6.7 6.0 - 8.4 g/dL    Albumin 2.9 (L) 3.5 - 5.2 g/dL    Total Bilirubin 0.5 0.1 - 1.0 mg/dL    Alkaline Phosphatase 117 55 - 135 U/L    AST 17 10 - 40 U/L    ALT 14 10 - 44 U/L    Anion Gap 12 8 - 16 mmol/L    eGFR if African American >60 >60 mL/min/1.73 m^2    eGFR if non African American >60 >60 mL/min/1.73 m^2   APTT   Result Value Ref Range    aPTT 26.0 21.0 - 32.0 sec   Protime-INR   Result Value Ref Range    Prothrombin Time 10.3 9.0 - 12.5 sec    INR 1.0 0.8 - 1.2   Troponin I   Result Value Ref Range    Troponin I 0.006 0.000 - 0.026 ng/mL      All pertinent labs within the past 24 hours have been reviewed.    Significant Imaging:   Imaging Results          X-Ray Chest AP Portable (In process)                I have reviewed all pertinent imaging results/findings within the past 24 hours.     EKG: (personally reviewed)  Sinus bradycardia, T wave inversions in inferolateral leads (improved from previous), nonspecific T wave abnormality in anterior leads.  No acute ischemic ST-T changes from previous tracings.

## 2018-07-22 NOTE — PLAN OF CARE
07/22/18 0859   BOND Message   Medicare Outpatient and Observation Notification regarding financial responsibility Given to patient/caregiver;Explained to patient/caregiver;Signed/date by patient/caregiver   Date BOND was signed 07/22/18   Time BOND was signed 0854

## 2018-07-22 NOTE — PLAN OF CARE
Problem: Fall Risk (Adult)  Intervention: Patient Rounds  POC discussed w/patient, verbalized understanding.   Sinus eleno on the monitor. VSS. AAO X 4.   Patient does have expressive aphasia from past stroke.   Up to restroom with assistance X 1.   Instructed to call for assistance.   IV intact.   No complaints of chest pain at this time.    Patient turns independently in bed.  Fall precautions in place, bed alarm on, call bell in reach, bed in low and locked position.   Patient remains free from falls/injuries.  Patient denies needs at this time.   Will continue to monitor.

## 2018-07-22 NOTE — NURSING
Patient transported from ED via stretcher. Patient ambulated to restroom then to bed.  Cardiac monitor placed, vital signs taken, shift assessment completed. Bed alarm turn on and instructed patient to call when she needed to get up. No complaints at this time. Will continue to monitor.

## 2018-07-22 NOTE — PLAN OF CARE
07/22/18 1633   Final Note   Assessment Type Final Discharge Note   Discharge Disposition Home   Right Care Referral Info   Post Acute Recommendation No Care

## 2018-07-22 NOTE — H&P
"[facility] Hospital Medicine  History & Physical    Patient Name: Tiffanie Wise  MRN: [id number]  Admission Date: 7/22/2018  Attending Physician: Gopal Aguilar MD   Primary Care Provider: MILTON Lim MD         Patient information was obtained from patient, relative(s), past medical records and ER records.     Subjective:     Principal Problem:Atypical chest pain    Chief Complaint:   Chief Complaint   Patient presents with    Chest Pain     Pt. report chest pain to the epigastric area that feel to her like indigestion,  pt. reports taking nitro x2 without relief         HPI: History is limited due to patient's expressive aphasia.  HPI obtained from patient, family at bedside, ED physician, and past medical record.  Ms. 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  She presented to the ED with c/o mid epigastric chest pain that started gradually last night after eating dinner.  Pain has been constant, nonradiating, and nonexertional.  Associated chest tightness, like she cannot take a "good breath".  No aggravating factors; mildly improved with Pepto Bismol, no relief with NTG x 2 doses.  Patient states pain is different from her usual angina, but feels like her indigestion.  Denies any palpitations, SOB, SHERMAN, orthopnea, PND, cough, edema, ABD pain or distention, N/V/D, diaphoresis, HA, AMS, lightheadedness/dizziness, syncope, fever, or chills.  Work-up in ED resulted troponin 0.006, CXR unremarkable, EKG unrevealing.  Patient admitted to Observation under Hospital Medicine services to r/o AMI.  Currently, patient appears comfortable in NAD.  Reports CP resolved after GI cocktail given in ED.      Past Medical History:   Diagnosis Date    Acute respiratory failure with hypoxia     Anxiety     AP (angina pectoris) 7/18/2013 "    Arterial ischemic stroke, MCA (middle cerebral artery), left, acute 12/15/2017    Asthma     CAD, multiple vessel 8/2/2016    Chronic ischemic heart disease 7/18/2013    Coronary artery disease 7/18/2013    Depression     Diabetes with neurologic complications     GERD (gastroesophageal reflux disease) 7/18/2013    Heart failure     History of PTCA 7/18/2013    Hypertension 7/18/2013    Hypothyroidism     Leg pain 8/29/2013    Mixed hyperlipidemia 7/18/2013    Myocardial infarction     Osteoporosis     Pneumonia     Pneumonia due to other staphylococcus     Precancerous changes of the vagina     Sleep apnea     stopped using CPAP 2015 - sores in nose, couldn't breathe, uses Breathe riht strips now.     Thyroid disease     Tobacco dependence     resolved    Trouble in sleeping     Type 2 diabetes mellitus with ophthalmic manifestations     Urinary incontinence     pullups day, pads at night       Past Surgical History:   Procedure Laterality Date    BREAST SURGERY Left     benign cyst    CORONARY ANGIOPLASTY      CORONARY STENT PLACEMENT      x6-7 oer the yeqrs  last 12/17/14 BRIANDA to lcfx    EYE SURGERY Bilateral 2014    cataracts w/IOL   Dr Vance    HYSTERECTOMY  1970    vag hyst; ovaries intact    THYROID SURGERY      nodule benign, Dr Hankins    TUBAL LIGATION  1970       Review of patient's allergies indicates:  No Known Allergies    No current facility-administered medications on file prior to encounter.      Current Outpatient Prescriptions on File Prior to Encounter   Medication Sig    alendronate (FOSAMAX) 70 MG tablet TAKE 1 TABLET BY MOUTH EVERY WEEK WITH A FULL GLASS OF WATER ON EMPTY STOMACH. DO NOT EAT OR LIE DOWN FOR 30 MINUTES    aspirin (ECOTRIN) 81 MG EC tablet Take 81 mg by mouth. Take 81 mg by mouth daily.    atorvastatin (LIPITOR) 80 MG tablet Take 1 tablet (80 mg total) by mouth once daily.    clonazePAM (KLONOPIN) 0.5 MG tablet Take 1 tablet (0.5 mg  "total) by mouth once daily.    clopidogrel (PLAVIX) 75 mg tablet Take 1 tablet (75 mg total) by mouth once daily.    donepezil (ARICEPT) 10 MG tablet Take 1 tablet (10 mg total) by mouth once daily.    escitalopram oxalate (LEXAPRO) 20 MG tablet TAKE 1 TABLET(20 MG) BY MOUTH EVERY DAY    escitalopram oxalate (LEXAPRO) 20 MG tablet TAKE 1 TABLET BY MOUTH EVERY DAY    gabapentin (NEURONTIN) 100 MG capsule Take 100 mg by mouth.    hydroCHLOROthiazide (HYDRODIURIL) 25 MG tablet TAKE 1 TABLET BY MOUTH EVERY DAY    levothyroxine (SYNTHROID) 50 MCG tablet Take 1 tablet (50 mcg total) by mouth once daily.    losartan (COZAAR) 100 MG tablet Take 1 tablet (100 mg total) by mouth once daily.    metFORMIN (GLUCOPHAGE) 1000 MG tablet Take 1,000 mg by mouth 2 (two) times daily.     nitroGLYCERIN (NITROSTAT) 0.4 MG SL tablet Place 1 tablet (0.4 mg total) under the tongue as needed.    pen needle, diabetic 31 gauge x 5/16" Ndle USE TWICE DAILY AND PRN    SITagliptin (JANUVIA) 100 MG Tab Take 100 mg by mouth once daily.    vitamin D 1000 units Tab Take 1,000 Units by mouth once daily.    BD INSULIN SYRINGE ULT-FINE II 1/2 mL 31 x 5/16" Syrg     BLOOD SUGAR DIAGNOSTIC (TRUETEST TEST STRIPS MISC) by Misc.(Non-Drug; Combo Route) route. Pt is testing 3 times a day    blood sugar diagnostic Strp Use to check blood sugar three times daily and as needed.    EASY COMFORT LANCETS 30 gauge [facility]     FLUAD 2252-1299, 65 YR UP,,PF, 45 mcg (15 mcg x 3)/0.5 mL Syrg ADM 0.5ML IM UTD    insulin detemir (LEVEMIR FLEXTOUCH) 100 unit/mL (3 mL) SubQ InPn pen Inject 36 Units into the skin once daily.    pantoprazole (PROTONIX) 40 MG tablet TAKE 1 TABLET BY MOUTH DAILY     Family History     Problem Relation (Age of Onset)    Alcohol abuse Sister    Cancer Son    Cirrhosis Sister    Diabetes Sister, Paternal Grandmother    Fibromyalgia Daughter, Daughter    Heart disease Mother    Kidney disease Father, Brother        Social History " Main Topics    Smoking status: Former Smoker     Packs/day: 1.00     Years: 38.00     Quit date: 7/8/2008    Smokeless tobacco: Never Used    Alcohol use No    Drug use: No    Sexual activity: Not Currently     Birth control/ protection: None     Review of Systems   Constitutional: Negative for activity change, diaphoresis, fever and unexpected weight change.   HENT: Negative for congestion, postnasal drip, rhinorrhea and sore throat.    Respiratory: Positive for chest tightness. Negative for cough, shortness of breath and wheezing.    Cardiovascular: Positive for chest pain. Negative for palpitations and leg swelling.   Gastrointestinal: Negative for abdominal distention, abdominal pain, blood in stool, constipation, diarrhea, nausea and vomiting.   Genitourinary: Negative for decreased urine volume, difficulty urinating, dysuria, flank pain, frequency, hematuria and urgency.   Musculoskeletal: Negative for arthralgias, back pain and myalgias.   Skin: Negative for pallor, rash and wound.   Neurological: Positive for speech difficulty (expressive aphasia due to CVA). Negative for dizziness, syncope, weakness, light-headedness and headaches.   Psychiatric/Behavioral: Negative for agitation and confusion. The patient is not nervous/anxious.      Objective:     Vital Signs (Most Recent):  Temp: 98.1 °F (36.7 °C) (07/22/18 0313)  Pulse: (!) 51 (07/22/18 0357)  Resp: 19 (07/22/18 0357)  BP: (!) 155/64 (07/22/18 0357)  SpO2: 95 % (07/22/18 0357) Vital Signs (24h Range):  Temp:  [98.1 °F (36.7 °C)] 98.1 °F (36.7 °C)  Pulse:  [51-53] 51  Resp:  [18-19] 19  SpO2:  [95 %-99 %] 95 %  BP: (141-173)/(55-67) 155/64     Weight: 69.6 kg (153 lb 7 oz)  Body mass index is 31.52 kg/m².    Physical Exam   Constitutional: She is oriented to person, place, and time. She appears well-developed and well-nourished. No distress.   HENT:   Head: Normocephalic and atraumatic.   Eyes: Conjunctivae are normal.   PERRL; EOM intact.   Neck:  Normal range of motion. Neck supple. No JVD present.   Cardiovascular: Regular rhythm, S1 normal, S2 normal and intact distal pulses.   No extrasystoles are present. Bradycardia present.  Exam reveals no gallop and no friction rub.    No murmur heard.  Pulses:       Radial pulses are 2+ on the right side, and 2+ on the left side.        Dorsalis pedis pulses are 2+ on the right side, and 2+ on the left side.        Posterior tibial pulses are 2+ on the right side, and 2+ on the left side.   Pulmonary/Chest: Effort normal and breath sounds normal. No accessory muscle usage. No tachypnea. No respiratory distress. She has no wheezes. She has no rhonchi. She has no rales.   Abdominal: Soft. Bowel sounds are normal. She exhibits no distension. There is no tenderness. There is no rebound, no guarding and no CVA tenderness.   Musculoskeletal: Normal range of motion. She exhibits no edema, tenderness or deformity.   Neurological: She is alert and oriented to person, place, and time. She has normal strength. No sensory deficit.   Mild expressive aphasia due to old CVA.   Skin: Skin is warm, dry and intact. Capillary refill takes less than 2 seconds. No rash noted. She is not diaphoretic. No cyanosis or erythema.   Psychiatric: She has a normal mood and affect. Her behavior is normal. Cognition and memory are normal.   Mild expressive aphasia.    Nursing note and vitals reviewed.          Significant Labs:   Results for orders placed or performed during the hospital encounter of 07/22/18   CBC auto differential   Result Value Ref Range    WBC 11.19 3.90 - 12.70 K/uL    RBC 4.28 4.00 - 5.40 M/uL    Hemoglobin 10.1 (L) 12.0 - 16.0 g/dL    Hematocrit 32.6 (L) 37.0 - 48.5 %    MCV 76 (L) 82 - 98 fL    MCH 23.6 (L) 27.0 - 31.0 pg    MCHC 31.0 (L) 32.0 - 36.0 g/dL    RDW 17.5 (H) 11.5 - 14.5 %    Platelets 304 150 - 350 K/uL    MPV 10.5 9.2 - 12.9 fL    Gran # (ANC) 8.5 (H) 1.8 - 7.7 K/uL    Lymph # 1.8 1.0 - 4.8 K/uL    Mono # 0.8  0.3 - 1.0 K/uL    Eos # 0.2 0.0 - 0.5 K/uL    Baso # 0.03 0.00 - 0.20 K/uL    Gran% 75.7 (H) 38.0 - 73.0 %    Lymph% 15.7 (L) 18.0 - 48.0 %    Mono% 6.7 4.0 - 15.0 %    Eosinophil% 1.6 0.0 - 8.0 %    Basophil% 0.3 0.0 - 1.9 %    Differential Method Automated    Comprehensive metabolic panel   Result Value Ref Range    Sodium 138 136 - 145 mmol/L    Potassium 3.2 (L) 3.5 - 5.1 mmol/L    Chloride 99 95 - 110 mmol/L    CO2 27 23 - 29 mmol/L    Glucose 211 (H) 70 - 110 mg/dL    BUN, Bld 16 8 - 23 mg/dL    Creatinine 0.9 0.5 - 1.4 mg/dL    Calcium 9.0 8.7 - 10.5 mg/dL    Total Protein 6.7 6.0 - 8.4 g/dL    Albumin 2.9 (L) 3.5 - 5.2 g/dL    Total Bilirubin 0.5 0.1 - 1.0 mg/dL    Alkaline Phosphatase 117 55 - 135 U/L    AST 17 10 - 40 U/L    ALT 14 10 - 44 U/L    Anion Gap 12 8 - 16 mmol/L    eGFR if African American >60 >60 mL/min/1.73 m^2    eGFR if non African American >60 >60 mL/min/1.73 m^2   APTT   Result Value Ref Range    aPTT 26.0 21.0 - 32.0 sec   Protime-INR   Result Value Ref Range    Prothrombin Time 10.3 9.0 - 12.5 sec    INR 1.0 0.8 - 1.2   Troponin I   Result Value Ref Range    Troponin I 0.006 0.000 - 0.026 ng/mL      All pertinent labs within the past 24 hours have been reviewed.    Significant Imaging:   Imaging Results          X-Ray Chest AP Portable (In process)                I have reviewed all pertinent imaging results/findings within the past 24 hours.     EKG: (personally reviewed)  Sinus bradycardia, T wave inversions in inferolateral leads (improved from previous), nonspecific T wave abnormality in anterior leads.  No acute ischemic ST-T changes from previous tracings.           Assessment/Plan:     * Atypical chest pain    - Chest pain free at present.  CP atypical of angina, likely GI in etiology.  - Initial troponin negative, EKG unremarkable.  Admit to OBS to r/o AMI with serial CE's and EKG.  - Repeat TTE in AM.  Check FLP.  - Continue ASA, Plavix, and high-intensity statin.  No BB due to  chronic bradycardia.  - Further recs pending clinical course.        CAD with remote CABG x 1V (LIMA-LAD) in 6/2016 + PCI of RCA and LCx    - Continue medical management as above.        Hypokalemia    - Initial K 3.2.    - Will replete to keep K >/=4.  Follow repeat BMP.        Microcytic anemia    - H&H stable at baseline.  Follow CBC and transfuse as needed.  - Check iron studies.        Sinus bradycardia    - Stable.  - Monitor telemetry.  Avoid AV karen blocking agents.         History of arterial ischemic stroke, left MCA (middle cerebral artery) 12/2017    - Stable.  - Continue ASA, Plavix, and statin.        Essential hypertension    - BP stable.  - Continue losartan + HCTZ.        Type 2 diabetes mellitus with hyperglycemia    - Hold Januvia and metformin, will resume at DC.  - Continue Levemir 36 units daily.  - Accuchecks with SSI.  - Diabetic diet.  - Check HbA1c.        GERD (gastroesophageal reflux disease)    - Continue PPI.          VTE Risk Mitigation         Ordered     enoxaparin injection 40 mg  Daily      07/22/18 0647     IP VTE HIGH RISK PATIENT  Once      07/22/18 0647     Place sequential compression device  Until discontinued      07/22/18 0647             Kelsey Moody NP  Department of Hospital Medicine   [facility]

## 2018-07-22 NOTE — ASSESSMENT & PLAN NOTE
- Hold Januvia and metformin, will resume at [place].  - Continue Levemir 36 units daily.  - Accuchecks with SSI.  - Diabetic diet.  - Check HbA1c.

## 2018-07-22 NOTE — ASSESSMENT & PLAN NOTE
- Chest pain free at present.  CP atypical of angina, likely GI in etiology.  - Initial troponin negative, EKG unremarkable.  Admit to OBS to r/o AMI with serial CE's and EKG.  - Repeat TTE in AM.  Check FLP.  - Continue ASA, Plavix, and high-intensity statin.  No BB due to chronic bradycardia.  - Further recs pending clinical course.

## 2018-07-22 NOTE — HOSPITAL COURSE
Pt admitted to Observation Unit for Atypical Chest Pain.  Troponin trended with negative results.  EKG unremarkable.  ASA, Plavix, and high-intensity statin continued.  Pt not placed on beta blocker due to chronic brdaycardia.  Echo results pending.  Pt verbalized symptom resolution and denied CP, SOB, N/V, and dizziness.  Vital signs stable and Potassium repleted prior to discharge.  Pt seen and examined on the date of discharge and deemed suitable for discharge to home accompanied by daughter.  Current medications resumed.   Pt instructed to follow up with Dr. Short (established Cardiology) and PCP for further evaluation.

## 2018-07-22 NOTE — PROGRESS NOTES
Went over discharge instructions with patient and daughter.   Stressed importance of making and keeping all follow ups.   Patient verbalized understanding and has no questions in regards to discharge.  IV removed, catheter intact.  Telemetry box removed.  Patient wheeled down to waiting car by staff. No distress noted.

## 2018-07-22 NOTE — DISCHARGE SUMMARY
"[facility] Hospital Medicine  Discharge Summary      Patient Name: Tiffanie Wise  MRN: [id number]  Admission Date: 7/22/2018  Hospital Length of Stay: 0 days  Discharge Date and Time: 7/22/2018  5:12 PM  Attending Physician: Dr. Julio Perez   Discharging Provider: Brandi Sierra NP  Primary Care Provider: MILTON Lim MD      HPI:   History is limited due to patient's expressive aphasia.  HPI obtained from patient, family at bedside, ED physician, and past medical record.  Ms. 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  She presented to the ED with c/o mid epigastric chest pain that started gradually last night after eating dinner.  Pain has been constant, nonradiating, and nonexertional.  Associated chest tightness, like she cannot take a "good breath".  No aggravating factors; mildly improved with Pepto Bismol, no relief with NTG x 2 doses.  Patient states pain is different from her usual angina, but feels like her indigestion.  Denies any palpitations, SOB, SHERMAN, orthopnea, PND, cough, edema, ABD pain or distention, N/V/D, diaphoresis, HA, AMS, lightheadedness/dizziness, syncope, fever, or chills.  Work-up in ED resulted troponin 0.006, CXR unremarkable, EKG unrevealing.  Patient admitted to Observation under Hospital Medicine services to r/o AMI.  Currently, patient appears comfortable in NAD.  Reports CP resolved after GI cocktail given in ED.    * No surgery found *      Hospital Course:   Pt admitted to Observation Unit for Atypical Chest Pain.  Troponin trended with negative results.  EKG unremarkable.  ASA, Plavix, and high-intensity statin continued.  Pt not placed on beta blocker due to chronic brdaycardia.  Echo results pending.  Pt verbalized symptom resolution and denied CP, SOB, N/V, and dizziness.  Vital signs " stable and Potassium repleted prior to discharge.  Pt seen and examined on the date of discharge and deemed suitable for discharge to home accompanied by daughter.  Current medications resumed.   Pt instructed to follow up with Dr. Short (established Cardiology) and PCP for further evaluation.         Consults:     Final Active Diagnoses:    Diagnosis Date Noted POA    PRINCIPAL PROBLEM:  Atypical chest pain [R07.89] 07/22/2018 Yes    Hypokalemia [E87.6] 07/22/2018 Yes    Microcytic anemia [D50.9] 07/22/2018 Yes     Chronic    Sinus bradycardia [R00.1] 12/21/2017 Yes     Chronic    History of arterial ischemic stroke, left MCA (middle cerebral artery) 12/2017 [I63.512] 12/15/2017 Yes     Chronic    Essential hypertension [I10] 12/29/2016 Yes     Chronic    CAD with remote CABG x 1V (LIMA-LAD) in 6/2016 + PCI of RCA and LCx [I25.10]  Yes     Chronic    Type 2 diabetes mellitus with hyperglycemia [E11.65] 07/18/2013 Yes     Chronic    GERD (gastroesophageal reflux disease) [K21.9] 07/18/2013 Yes     Chronic      Problems Resolved During this Admission:    Diagnosis Date Noted Date Resolved POA       Discharged Condition: stable    Disposition: Home or Self Care    Follow Up:  Follow-up Information     MILTON Lim MD In 3 days.    Specialty:  Internal Medicine  Why:  -hospital follow up -repeat CMP  Contact information:  [address] [address] [phone]             Brendan Short MD In 1 week.    Specialties:  Cardiology, INTERVENTIONAL CARDIOLOGY  Why:  -hospital follow up   Contact information:  [address] [address] [phone]                 Patient Instructions:     Diet Cardiac     Diet diabetic     Notify your health care provider if you experience any of the following:  temperature >100.4     Notify your health care provider if you experience any of the following:  persistent nausea and vomiting or diarrhea     Notify your health care provider if you experience  any of the following:  difficulty breathing or increased cough     Notify your health care provider if you experience any of the following:  persistent dizziness, light-headedness, or visual disturbances     Notify your health care provider if you experience any of the following:  increased confusion or weakness     Activity as tolerated         Significant Diagnostic Studies: Labs:   CMP   Recent Labs  Lab 07/22/18  0334      K 3.2*   CL 99   CO2 27   *   BUN 16   CREATININE 0.9   CALCIUM 9.0   PROT 6.7   ALBUMIN 2.9*   BILITOT 0.5   ALKPHOS 117   AST 17   ALT 14   ANIONGAP 12   ESTGFRAFRICA >60   EGFRNONAA >60   , CBC   Recent Labs  Lab 07/22/18  0334   WBC 11.19   HGB 10.1*   HCT 32.6*       and Troponin   Recent Labs  Lab 07/22/18  1423   TROPONINI <0.006       Pending Diagnostic Studies:     None         Medications:  Reconciled Home Medications:      Medication List      CHANGE how you take these medications    TRUETEST TEST STRIPS MISC  by Misc.(Non-Drug; Combo Route) route. Pt is testing 3 times a day  What changed:  Another medication with the same name was removed. Continue taking this medication, and follow the directions you see here.        CONTINUE taking these medications    alendronate 70 MG tablet  Commonly known as:  FOSAMAX  TAKE 1 TABLET BY MOUTH EVERY WEEK WITH A FULL GLASS OF WATER ON EMPTY STOMACH. DO NOT EAT OR LIE DOWN FOR 30 MINUTES     aspirin 81 MG EC tablet  Commonly known as:  ECOTRIN  Take 81 mg by mouth. Take 81 mg by mouth daily.     atorvastatin 80 MG tablet  Commonly known as:  LIPITOR  Take 1 tablet (80 mg total) by mouth once daily.     BD INSULIN SYRINGE ULT-FINE II 0.5 mL 31 gauge x 5/16 Syrg  Generic drug:  insulin syringe-needle U-100     clopidogrel 75 mg tablet  Commonly known as:  PLAVIX  Take 1 tablet (75 mg total) by mouth once daily.     donepezil 10 MG tablet  Commonly known as:  ARICEPT  Take 1 tablet (10 mg total) by mouth once daily.     EASY  "COMFORT LANCETS 30 gauge Misc  Generic drug:  lancets     escitalopram oxalate 20 MG tablet  Commonly known as:  LEXAPRO  TAKE 1 TABLET BY MOUTH EVERY DAY     FLUAD 4431-2528 (65 YR UP)(PF) 45 mcg (15 mcg x 3)/0.5 mL Syrg  Generic drug:  flu vac-2017 [id number](PF)  ADM 0.5ML IM UTD     gabapentin 100 MG capsule  Commonly known as:  NEURONTIN  Take 100 mg by mouth.     hydroCHLOROthiazide 25 MG tablet  Commonly known as:  HYDRODIURIL  TAKE 1 TABLET BY MOUTH EVERY DAY     insulin detemir U-100 100 unit/mL (3 mL) Inpn pen  Commonly known as:  LEVEMIR FLEXTOUCH  Inject 36 Units into the skin once daily.     JANUVIA 100 MG Tab  Generic drug:  SITagliptin  Take 100 mg by mouth once daily.     levothyroxine 50 MCG tablet  Commonly known as:  SYNTHROID  Take 1 tablet (50 mcg total) by mouth once daily.     losartan 100 MG tablet  Commonly known as:  COZAAR  Take 1 tablet (100 mg total) by mouth once daily.     metFORMIN 1000 MG tablet  Commonly known as:  GLUCOPHAGE  Take 1,000 mg by mouth 2 (two) times daily.     nitroGLYCERIN 0.4 MG SL tablet  Commonly known as:  NITROSTAT  Place 1 tablet (0.4 mg total) under the tongue as needed.     pantoprazole 40 MG tablet  Commonly known as:  PROTONIX  TAKE 1 TABLET BY MOUTH DAILY     pen needle, diabetic 31 gauge x 5/16" Ndle  USE TWICE DAILY AND PRN     vitamin D 1000 units Tab  Take 1,000 Units by mouth once daily.        STOP taking these medications    clonazePAM 0.5 MG tablet  Commonly known as:  KLONOPIN            Indwelling Lines/Drains at time of discharge:   Lines/Drains/Airways     Drain            Female External Urinary Catheter 12/15/17 2109 218 days                Time spent on the discharge of patient: 50 minutes  Patient was seen and examined on the date of discharge and determined to be suitable for discharge.         Brandi Sierra NP  Department of Hospital Medicine  [facility]  "

## 2018-07-22 NOTE — ED PROVIDER NOTES
SCRIBE #1 NOTE: I, Sabina Serrano, am scribing for, and in the presence of, Bk Gomez DO. I have scribed the entire note.      History      Chief Complaint   Patient presents with    Chest Pain     Pt. report chest pain to the epigastric area that feel to her like indigestion,  pt. reports taking nitro x2 without relief        Review of patient's allergies indicates:  No Known Allergies     HPI   HPI    7/22/2018, 3:15 AM   History obtained from the patient      History of Present Illness: Tiffanie Wise is a 77 y.o. female patient with PMHx of CAD, DM, HTN, and MI who presents to the Emergency Department for CP which onset gradually last night. Symptoms are constant and moderate in severity. No mitigating or exacerbating factors reported. Associated sxs include SOB. Prior tx includes nitro with minimal relief. Patient also reports taking Pepto Bismol with some relief. Patient reports taking aspirin qd. Patient denies any diaphoresis, palpitations, extremity weakness/numbness, leg swelling, dizziness, cough, N/V, and all other sxs at this time. Patient reports seeing Dr. Short for cardiology. No further complaints or concerns at this time.     Arrival mode: [facility]    PCP: MILTON Lim MD       Past Medical History:  Past Medical History:   Diagnosis Date    Acute respiratory failure with hypoxia     Anxiety     AP (angina pectoris) 7/18/2013    Arterial ischemic stroke, MCA (middle cerebral artery), left, acute 12/15/2017    Asthma     CAD, multiple vessel 8/2/2016    Chronic ischemic heart disease 7/18/2013    Coronary artery disease 7/18/2013    Depression     Diabetes with neurologic complications     GERD (gastroesophageal reflux disease) 7/18/2013    Heart failure     History of PTCA 7/18/2013    Hypertension 7/18/2013    Hypothyroidism     Leg pain 8/29/2013    Mixed hyperlipidemia 7/18/2013    Myocardial infarction     Osteoporosis     Pneumonia     Pneumonia due to other staphylococcus      Precancerous changes of the vagina     Sleep apnea     stopped using CPAP 2015 - sores in nose, couldn't breathe, uses Breathe riht strips now.     Thyroid disease     Tobacco dependence     resolved    Trouble in sleeping     Type 2 diabetes mellitus with ophthalmic manifestations     Urinary incontinence     pullups day, pads at night       Past Surgical History:  Past Surgical History:   Procedure Laterality Date    BREAST SURGERY Left     benign cyst    CORONARY ANGIOPLASTY      CORONARY STENT PLACEMENT      x6-7 oer the yeqrs  last 12/17/14 BRIANDA to lcfx    EYE SURGERY Bilateral 2014    cataracts w/IOL   Dr Vance    HYSTERECTOMY  1970    vag hyst; ovaries intact    THYROID SURGERY      nodule benign, Dr Hankins    TUBAL LIGATION  1970         Family History:  Family History   Problem Relation Age of Onset    Kidney disease Father     Kidney disease Brother     Heart disease Mother     Fibromyalgia Daughter     Cancer Son         lung    Cirrhosis Sister     Alcohol abuse Sister     Diabetes Sister     Fibromyalgia Daughter     Diabetes Paternal Grandmother     Stroke Neg Hx     Mental illness Neg Hx     Mental retardation Neg Hx        Social History:  Social History     Social History Main Topics    Smoking status: Former Smoker     Packs/day: 1.00     Years: 38.00     Quit date: 7/8/2008    Smokeless tobacco: Never Used    Alcohol use No    Drug use: No    Sexual activity: Not Currently     Birth control/ protection: None       ROS   Review of Systems   Constitutional: Negative for diaphoresis and fever.   HENT: Negative for sore throat.    Respiratory: Positive for shortness of breath. Negative for cough.    Cardiovascular: Positive for chest pain. Negative for palpitations and leg swelling.   Gastrointestinal: Negative for nausea and vomiting.   Genitourinary: Negative for dysuria.   Musculoskeletal: Negative for back pain.   Skin: Negative for rash.   Neurological:  "Negative for dizziness, weakness and numbness.   Hematological: Does not bruise/bleed easily.   All other systems reviewed and are negative.      Physical Exam      Initial Vitals [07/22/18 0313]   BP Pulse Resp Temp SpO2   (!) 173/55 (!) 53 18 98.1 °F (36.7 °C) 99 %      MAP       --          Physical Exam  Nursing Notes and Vital Signs Reviewed.  Constitutional: Patient is in no acute distress. Well-developed and well-nourished.  Head: Atraumatic. Normocephalic.  Eyes: PERRL. EOM intact. Conjunctivae are not pale. No scleral icterus.  ENT: Mucous membranes are moist. Oropharynx is clear and symmetric.    Neck: Supple. Full ROM. No lymphadenopathy.  Cardiovascular: Bradycardic. Regular rhythm. No murmurs, rubs, or gallops. Distal pulses are 2+ and symmetric.  Pulmonary/Chest: No respiratory distress. Clear to auscultation bilaterally. No wheezing or rales.  Abdominal: Soft and non-distended.  There is no tenderness.  No rebound, guarding, or rigidity. Good bowel sounds.  Genitourinary: No CVA tenderness  Musculoskeletal: Moves all extremities. No obvious deformities. No edema. No calf tenderness.  Skin: Warm and dry.  Neurological:  Alert, awake, and appropriate.  Chronic mild expressive aphasia.  No acute focal neurological deficits are appreciated.  Psychiatric: Normal affect. Good eye contact. Appropriate in content.    ED Course    Procedures  ED Vital Signs:  Vitals:    07/22/18 0313 07/22/18 0332 07/22/18 0333 07/22/18 0357   BP: (!) 173/55 (!) 141/67  (!) 155/64   Pulse: (!) 53 (!) 51 (!) 53 (!) 51   Resp: 18 19  19   Temp: 98.1 °F (36.7 °C)      TempSrc: Oral      SpO2: 99% 97%  95%   Weight: 69.6 kg (153 lb 7 oz)      Height: 4' 10.5" (1.486 m)          Abnormal Lab Results:  Labs Reviewed   CBC W/ AUTO DIFFERENTIAL - Abnormal; Notable for the following:        Result Value    Hemoglobin 10.1 (*)     Hematocrit 32.6 (*)     MCV 76 (*)     MCH 23.6 (*)     MCHC 31.0 (*)     RDW 17.5 (*)     Gran # (ANC) 8.5 " (*)     Gran% 75.7 (*)     Lymph% 15.7 (*)     All other components within normal limits   COMPREHENSIVE METABOLIC PANEL - Abnormal; Notable for the following:     Potassium 3.2 (*)     Glucose 211 (*)     Albumin 2.9 (*)     All other components within normal limits   APTT   PROTIME-INR   TROPONIN I        All Lab Results:  Results for orders placed or performed during the hospital encounter of 07/22/18   CBC auto differential   Result Value Ref Range    WBC 11.19 3.90 - 12.70 K/uL    RBC 4.28 4.00 - 5.40 M/uL    Hemoglobin 10.1 (L) 12.0 - 16.0 g/dL    Hematocrit 32.6 (L) 37.0 - 48.5 %    MCV 76 (L) 82 - 98 fL    MCH 23.6 (L) 27.0 - 31.0 pg    MCHC 31.0 (L) 32.0 - 36.0 g/dL    RDW 17.5 (H) 11.5 - 14.5 %    Platelets 304 150 - 350 K/uL    MPV 10.5 9.2 - 12.9 fL    Gran # (ANC) 8.5 (H) 1.8 - 7.7 K/uL    Lymph # 1.8 1.0 - 4.8 K/uL    Mono # 0.8 0.3 - 1.0 K/uL    Eos # 0.2 0.0 - 0.5 K/uL    Baso # 0.03 0.00 - 0.20 K/uL    Gran% 75.7 (H) 38.0 - 73.0 %    Lymph% 15.7 (L) 18.0 - 48.0 %    Mono% 6.7 4.0 - 15.0 %    Eosinophil% 1.6 0.0 - 8.0 %    Basophil% 0.3 0.0 - 1.9 %    Differential Method Automated    Comprehensive metabolic panel   Result Value Ref Range    Sodium 138 136 - 145 mmol/L    Potassium 3.2 (L) 3.5 - 5.1 mmol/L    Chloride 99 95 - 110 mmol/L    CO2 27 23 - 29 mmol/L    Glucose 211 (H) 70 - 110 mg/dL    BUN, Bld 16 8 - 23 mg/dL    Creatinine 0.9 0.5 - 1.4 mg/dL    Calcium 9.0 8.7 - 10.5 mg/dL    Total Protein 6.7 6.0 - 8.4 g/dL    Albumin 2.9 (L) 3.5 - 5.2 g/dL    Total Bilirubin 0.5 0.1 - 1.0 mg/dL    Alkaline Phosphatase 117 55 - 135 U/L    AST 17 10 - 40 U/L    ALT 14 10 - 44 U/L    Anion Gap 12 8 - 16 mmol/L    eGFR if African American >60 >60 mL/min/1.73 m^2    eGFR if non African American >60 >60 mL/min/1.73 m^2   APTT   Result Value Ref Range    aPTT 26.0 21.0 - 32.0 sec   Protime-INR   Result Value Ref Range    Prothrombin Time 10.3 9.0 - 12.5 sec    INR 1.0 0.8 - 1.2   Troponin I   Result Value Ref  Range    Troponin I 0.006 0.000 - 0.026 ng/mL       Imaging Results:  Imaging Results          X-Ray Chest AP Portable (In process)                Ordered, reviewed, and independently interpreted by the ED provider.  Study: X-ray Chest  Findings: Mild markings.  Suspect loop recorder.    The EKG was ordered, reviewed, and independently interpreted by the ED provider.  Interpretation time: 0313  Rate: 54 BPM  Rhythm: sinus bradycardia  Interpretation: Inverted T waves laterally. Mild ST depression inferiorly. No STEMI.   When compared to EKG performed December 2017, there are no significant changes.         The Emergency Provider reviewed the vital signs and test results, which are outlined above.    ED Discussion     4:59 AM: Re-evaluated pt. Pt is resting comfortably and is in no acute distress, chest pain free. I have discussed test results, shared treatment plan, and the need for admission with patient and family at bedside. Pt and family express understanding at this time and agree with all information. All questions answered. Pt and family have no further questions or concerns at this time. Pt is ready for admit.    5:30 AM: Discussed case with Kelsey Moody NP (Hospital Medicine) who agrees with current care and management of pt and accepts admission.   Admitting Service: Hospital medicine   Admitting Physician: Dr. Aguilar  Admit to: Obs-tele        ED Medication(s):  Medications   ondansetron injection 4 mg (4 mg Intravenous Given 7/22/18 0347)   morphine injection 2 mg (2 mg Intravenous Given 7/22/18 0347)   GI cocktail (mylanta 30 mL, lidocaine 2 % viscous 10 mL, dicyclomine 10 mL) 50 mL (50 mLs Oral Given 7/22/18 0347)       New Prescriptions    No medications on file             Medical Decision Making    Medical Decision Making:   Clinical Tests:   Lab Tests: Ordered and Reviewed  Radiological Study: Ordered and Reviewed  Medical Tests: Ordered and Reviewed           Scribe Attestation:   Scribe #1: I  performed the above scribed service and the documentation accurately describes the services I performed. I attest to the accuracy of the note.    Attending:   Physician Attestation Statement for Scribe #1: I, Bk Gomez DO, personally performed the services described in this documentation, as scribed by Sabina Serrano, in my presence, and it is both accurate and complete.          Clinical Impression       ICD-10-CM ICD-9-CM   1. Chest pain R07.9 786.50       Disposition:   Disposition: Placed in Observation (Obs-tele)  Condition: Fair         Bk Gomez DO  07/22/18 0533

## 2018-07-22 NOTE — HPI
"History is limited due to patient's expressive aphasia.  HPI obtained from patient, family at bedside, ED physician, and past medical record.  Ms. 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  She presented to the ED with c/o mid epigastric chest pain that started gradually last night after eating dinner.  Pain has been constant, nonradiating, and nonexertional.  Associated chest tightness, like she cannot take a "good breath".  No aggravating factors; mildly improved with Pepto Bismol, no relief with NTG x 2 doses.  Patient states pain is different from her usual angina, but feels like her indigestion.  Denies any palpitations, SOB, SHERMAN, orthopnea, PND, cough, edema, ABD pain or distention, N/V/D, diaphoresis, HA, AMS, lightheadedness/dizziness, syncope, fever, or chills.  Work-up in ED resulted troponin 0.006, CXR unremarkable, EKG unrevealing.  Patient admitted to Observation under Hospital Medicine services to r/o AMI.  Currently, patient appears comfortable in NAD.  Reports CP resolved after GI cocktail given in ED.  "

## 2018-07-30 ENCOUNTER — OFFICE VISIT (OUTPATIENT)
Dept: CARDIOLOGY | Facility: CLINIC | Age: 78
End: 2018-07-30
Payer: MEDICARE

## 2018-07-30 ENCOUNTER — CLINICAL SUPPORT (OUTPATIENT)
Dept: CARDIOLOGY | Facility: CLINIC | Age: 78
End: 2018-07-30
Attending: INTERNAL MEDICINE
Payer: MEDICARE

## 2018-07-30 VITALS
DIASTOLIC BLOOD PRESSURE: 46 MMHG | BODY MASS INDEX: 28.26 KG/M2 | HEIGHT: 59 IN | HEART RATE: 54 BPM | WEIGHT: 140.19 LBS | SYSTOLIC BLOOD PRESSURE: 128 MMHG

## 2018-07-30 DIAGNOSIS — E11.65 TYPE 2 DIABETES MELLITUS WITH HYPERGLYCEMIA, WITH LONG-TERM CURRENT USE OF INSULIN: Chronic | ICD-10-CM

## 2018-07-30 DIAGNOSIS — Z79.4 TYPE 2 DIABETES MELLITUS WITH HYPERGLYCEMIA, WITH LONG-TERM CURRENT USE OF INSULIN: Chronic | ICD-10-CM

## 2018-07-30 DIAGNOSIS — R00.1 SINUS BRADYCARDIA: Chronic | ICD-10-CM

## 2018-07-30 DIAGNOSIS — I63.9 CRYPTOGENIC STROKE: ICD-10-CM

## 2018-07-30 DIAGNOSIS — I25.10 CORONARY ARTERY DISEASE, ANGINA PRESENCE UNSPECIFIED, UNSPECIFIED VESSEL OR LESION TYPE, UNSPECIFIED WHETHER NATIVE OR TRANSPLANTED HEART: Chronic | ICD-10-CM

## 2018-07-30 DIAGNOSIS — Z45.09 ENCOUNTER FOR LOOP RECORDER CHECK: ICD-10-CM

## 2018-07-30 DIAGNOSIS — I25.10 CAD, MULTIPLE VESSEL: ICD-10-CM

## 2018-07-30 DIAGNOSIS — R07.89 ATYPICAL CHEST PAIN: ICD-10-CM

## 2018-07-30 DIAGNOSIS — G47.33 OSA ON CPAP: Primary | ICD-10-CM

## 2018-07-30 DIAGNOSIS — E78.5 HYPERLIPIDEMIA ASSOCIATED WITH TYPE 2 DIABETES MELLITUS: Chronic | ICD-10-CM

## 2018-07-30 DIAGNOSIS — Z95.1 S/P CABG X 1: ICD-10-CM

## 2018-07-30 DIAGNOSIS — I10 ESSENTIAL HYPERTENSION: Chronic | ICD-10-CM

## 2018-07-30 DIAGNOSIS — E11.69 HYPERLIPIDEMIA ASSOCIATED WITH TYPE 2 DIABETES MELLITUS: Chronic | ICD-10-CM

## 2018-07-30 DIAGNOSIS — I20.9 AP (ANGINA PECTORIS): Chronic | ICD-10-CM

## 2018-07-30 DIAGNOSIS — I51.89 LEFT VENTRICULAR DIASTOLIC DYSFUNCTION WITH PRESERVED SYSTOLIC FUNCTION: Chronic | ICD-10-CM

## 2018-07-30 PROCEDURE — 3074F SYST BP LT 130 MM HG: CPT | Mod: CPTII,S$GLB,, | Performed by: PHYSICIAN ASSISTANT

## 2018-07-30 PROCEDURE — 99214 OFFICE O/P EST MOD 30 MIN: CPT | Mod: S$GLB,,, | Performed by: PHYSICIAN ASSISTANT

## 2018-07-30 PROCEDURE — 99999 PR PBB SHADOW E&M-EST. PATIENT-LVL V: CPT | Mod: PBBFAC,,, | Performed by: PHYSICIAN ASSISTANT

## 2018-07-30 PROCEDURE — 93285 PRGRMG DEV EVAL SCRMS IP: CPT | Mod: S$GLB,,, | Performed by: INTERNAL MEDICINE

## 2018-07-30 PROCEDURE — 3078F DIAST BP <80 MM HG: CPT | Mod: CPTII,S$GLB,, | Performed by: PHYSICIAN ASSISTANT

## 2018-07-30 RX ORDER — ISOSORBIDE MONONITRATE 30 MG/1
30 TABLET, EXTENDED RELEASE ORAL DAILY
Qty: 30 TABLET | Refills: 11 | Status: SHIPPED | OUTPATIENT
Start: 2018-07-30 | End: 2018-11-18 | Stop reason: ALTCHOICE

## 2018-07-30 NOTE — PROGRESS NOTES
"Subjective:    Patient ID:  Tiffanie Wsie is a 77 y.o. female who presents for follow-up of Hospital Follow Up and Heartburn      HPI   Mrs. Wise is a 77 year old female patient with a PMHx of CAD s/p CABG, DM, HTN, dyslipidemia, and ischemic/embolic CVA in 12/17 who presents today for hospital follow-up. Patient recently admitted at [facility] with epigastric pain/chest tightness. Workup was negative and patient was subsequently discharged. She returns today and states she is doing ok. States she went to ED because the pain was intense and felt similar to her prior anginal pain. Still having intermittent "bothersome" chest discomfort at home. States can occur at any time, but seems to happen more when is stressed. Will sometimes happen with activity but not always. Does not seem related to eating but will sometimes feel like "indigestion/GERD" at times. GI cocktail in ED did mitigate symptoms. Denies any radiation of pain or any associated SOB, nausea, vomiting, or diaphoresis. Patient reports compliance with her medications, has not used sublingual nitro since discharge.   Loop interrogation today shows no significant findings-bradycardic episodes with HR in upper 30's-40's when asleep.     Review of Systems   Constitution: Negative for chills, decreased appetite, fever, weakness and malaise/fatigue.   HENT: Negative for congestion, hoarse voice and sore throat.    Eyes: Negative for blurred vision and discharge.   Cardiovascular: Positive for chest pain. Negative for claudication, cyanosis, dyspnea on exertion, irregular heartbeat, leg swelling, near-syncope, orthopnea, palpitations and paroxysmal nocturnal dyspnea.   Respiratory: Negative for cough, hemoptysis, shortness of breath, snoring, sputum production and wheezing.    Endocrine: Negative for cold intolerance and heat intolerance.   Hematologic/Lymphatic: Negative for bleeding problem. Does not bruise/bleed easily.   Skin: Negative for rash. " "  Musculoskeletal: Positive for joint pain. Negative for arthritis, back pain, joint swelling, muscle cramps, muscle weakness and myalgias.   Gastrointestinal: Positive for heartburn. Negative for abdominal pain, constipation, diarrhea, melena and nausea.   Genitourinary: Negative for hematuria.   Neurological: Negative for dizziness, focal weakness, headaches, light-headedness, loss of balance, numbness, paresthesias and seizures.        Expressive aphasia     Psychiatric/Behavioral: Positive for memory loss. The patient does not have insomnia.    Allergic/Immunologic: Negative for hives.       BP (!) 128/46   Pulse (!) 54   Ht 4' 10.5" (1.486 m)   Wt 63.6 kg (140 lb 3.4 oz)   LMP  (LMP Unknown)   BMI 28.81 kg/m²     Objective:    Physical Exam   Constitutional: She is oriented to person, place, and time. She appears well-developed and well-nourished. No distress.   HENT:   Head: Normocephalic and atraumatic.   Eyes: Pupils are equal, round, and reactive to light. Right eye exhibits no discharge. Left eye exhibits no discharge.   Neck: Neck supple. No JVD present. No thyromegaly present.   Cardiovascular: Regular rhythm, S1 normal, S2 normal and normal heart sounds.  Bradycardia present.    No murmur heard.  Pulmonary/Chest: Effort normal and breath sounds normal. No respiratory distress. She has no rales.   Abdominal: Soft. She exhibits no distension. There is no tenderness. There is no rebound.   Musculoskeletal: She exhibits no edema.   Neurological: She is alert and oriented to person, place, and time.   Expressive aphasia   Skin: Skin is warm and dry. She is not diaphoretic. No erythema.   Psychiatric: She has a normal mood and affect. Her behavior is normal. Thought content normal.   Nursing note and vitals reviewed.      2D Echo CONCLUSIONS     1 - Biatrial enlargement.     2 - Concentric hypertrophy.     3 - No wall motion abnormalities.     4 - Normal left ventricular systolic function (EF 60-65%). "     5 - Impaired LV relaxation, elevated LAP (grade 2 diastolic dysfunction).     6 - Right ventricular hypertrophy.     7 - Normal right ventricular systolic function .     8 - The estimated PA systolic pressure is 30 mmHg.   Assessment:       1. 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 Atypical chest pain    3. Type 2 diabetes mellitus with hyperglycemia, with long-term current use of insulin    4. S/P CABG x 1    5. Sinus bradycardia    6. Coronary artery disease, angina presence unspecified, unspecified vessel or lesion type, unspecified whether native or transplanted heart    7. Hyperlipidemia associated with type 2 diabetes mellitus    8. Left ventricular diastolic dysfunction with preserved systolic function    9. Essential hypertension    10. CAD, multiple vessel    11. AP (angina pectoris)      Patient who presents today for follow-up. Reports episodic chest pain with typical and atypical features, concerning for angina. Will add Imdur 30 mg daily and assess response. Discussed need for stress test with patient but she wished for me to discuss further with Dr. Short. Continue same meds in meantime. ED if any worsening or severe symptoms.   Plan:   -Start Imdur 30 mg daily  -Continue other meds  -MPI stress test, patient wished for me to discuss with Dr. Short  -ED if any worsening or severe symptoms    Chart reviewed. Dr. Short agreluigi plan as outlined above.     -Will discuss further with Dr. Short

## 2018-07-30 NOTE — PATIENT INSTRUCTIONS
-Start Isosorbide Mononitrate 30 mg once daily    -Can apply 0.5 or 1% hydrocortisone cream to red sites on chest wall from adhesive

## 2018-08-22 ENCOUNTER — OFFICE VISIT (OUTPATIENT)
Dept: CARDIOLOGY | Facility: CLINIC | Age: 78
End: 2018-08-22
Payer: MEDICARE

## 2018-08-22 VITALS
HEART RATE: 62 BPM | BODY MASS INDEX: 28.02 KG/M2 | SYSTOLIC BLOOD PRESSURE: 142 MMHG | DIASTOLIC BLOOD PRESSURE: 52 MMHG | HEIGHT: 59 IN | WEIGHT: 139 LBS

## 2018-08-22 DIAGNOSIS — I10 ESSENTIAL HYPERTENSION: Chronic | ICD-10-CM

## 2018-08-22 DIAGNOSIS — Z79.4 TYPE 2 DIABETES MELLITUS WITH HYPERGLYCEMIA, WITH LONG-TERM CURRENT USE OF INSULIN: ICD-10-CM

## 2018-08-22 DIAGNOSIS — E11.69 HYPERLIPIDEMIA ASSOCIATED WITH TYPE 2 DIABETES MELLITUS: Chronic | ICD-10-CM

## 2018-08-22 DIAGNOSIS — E11.65 TYPE 2 DIABETES MELLITUS WITH HYPERGLYCEMIA, WITH LONG-TERM CURRENT USE OF INSULIN: ICD-10-CM

## 2018-08-22 DIAGNOSIS — K21.9 GASTROESOPHAGEAL REFLUX DISEASE WITHOUT ESOPHAGITIS: Chronic | ICD-10-CM

## 2018-08-22 DIAGNOSIS — I70.0 AORTIC ARCH ATHEROSCLEROSIS: ICD-10-CM

## 2018-08-22 DIAGNOSIS — R00.1 SINUS BRADYCARDIA: Chronic | ICD-10-CM

## 2018-08-22 DIAGNOSIS — I25.118 CORONARY ARTERY DISEASE WITH STABLE ANGINA PECTORIS, UNSPECIFIED VESSEL OR LESION TYPE, UNSPECIFIED WHETHER NATIVE OR TRANSPLANTED HEART: Chronic | ICD-10-CM

## 2018-08-22 DIAGNOSIS — G47.33 OSA ON CPAP: ICD-10-CM

## 2018-08-22 DIAGNOSIS — E03.9 ACQUIRED HYPOTHYROIDISM: Chronic | ICD-10-CM

## 2018-08-22 DIAGNOSIS — E78.5 HYPERLIPIDEMIA ASSOCIATED WITH TYPE 2 DIABETES MELLITUS: Chronic | ICD-10-CM

## 2018-08-22 DIAGNOSIS — I20.9 AP (ANGINA PECTORIS): Primary | Chronic | ICD-10-CM

## 2018-08-22 DIAGNOSIS — I51.89 LEFT VENTRICULAR DIASTOLIC DYSFUNCTION WITH PRESERVED SYSTOLIC FUNCTION: Chronic | ICD-10-CM

## 2018-08-22 PROCEDURE — 99213 OFFICE O/P EST LOW 20 MIN: CPT | Mod: S$GLB,,, | Performed by: NURSE PRACTITIONER

## 2018-08-22 PROCEDURE — 3077F SYST BP >= 140 MM HG: CPT | Mod: CPTII,S$GLB,, | Performed by: NURSE PRACTITIONER

## 2018-08-22 PROCEDURE — 3078F DIAST BP <80 MM HG: CPT | Mod: CPTII,S$GLB,, | Performed by: NURSE PRACTITIONER

## 2018-08-22 PROCEDURE — 99999 PR PBB SHADOW E&M-EST. PATIENT-LVL IV: CPT | Mod: PBBFAC,,, | Performed by: NURSE PRACTITIONER

## 2018-08-22 PROCEDURE — 99499 UNLISTED E&M SERVICE: CPT | Mod: HCNC,S$GLB,, | Performed by: NURSE PRACTITIONER

## 2018-09-18 ENCOUNTER — PATIENT MESSAGE (OUTPATIENT)
Dept: CARDIOLOGY | Facility: CLINIC | Age: 78
End: 2018-09-18

## 2018-09-19 ENCOUNTER — TELEPHONE (OUTPATIENT)
Dept: CARDIOLOGY | Facility: HOSPITAL | Age: 78
End: 2018-09-19

## 2018-09-19 ENCOUNTER — CLINICAL SUPPORT (OUTPATIENT)
Dept: CARDIOLOGY | Facility: CLINIC | Age: 78
End: 2018-09-19
Attending: INTERNAL MEDICINE
Payer: MEDICARE

## 2018-09-19 ENCOUNTER — OFFICE VISIT (OUTPATIENT)
Dept: PULMONOLOGY | Facility: CLINIC | Age: 78
End: 2018-09-19
Payer: MEDICARE

## 2018-09-19 VITALS
HEIGHT: 58 IN | DIASTOLIC BLOOD PRESSURE: 62 MMHG | SYSTOLIC BLOOD PRESSURE: 120 MMHG | BODY MASS INDEX: 28.05 KG/M2 | WEIGHT: 133.63 LBS | HEART RATE: 63 BPM | OXYGEN SATURATION: 97 % | RESPIRATION RATE: 18 BRPM

## 2018-09-19 DIAGNOSIS — G47.33 OSA ON CPAP: Primary | Chronic | ICD-10-CM

## 2018-09-19 DIAGNOSIS — Z45.09 ENCOUNTER FOR LOOP RECORDER CHECK: ICD-10-CM

## 2018-09-19 DIAGNOSIS — I63.9 CRYPTOGENIC STROKE: ICD-10-CM

## 2018-09-19 PROCEDURE — 93299 LOOP RECORDER PROGRAMMING: CPT | Mod: PBBFAC,PO | Performed by: INTERNAL MEDICINE

## 2018-09-19 PROCEDURE — 93285 PRGRMG DEV EVAL SCRMS IP: CPT | Mod: PBBFAC | Performed by: INTERNAL MEDICINE

## 2018-09-19 PROCEDURE — 3074F SYST BP LT 130 MM HG: CPT | Mod: CPTII,,, | Performed by: INTERNAL MEDICINE

## 2018-09-19 PROCEDURE — 93298 REM INTERROG DEV EVAL SCRMS: CPT | Mod: ,,, | Performed by: INTERNAL MEDICINE

## 2018-09-19 PROCEDURE — 3078F DIAST BP <80 MM HG: CPT | Mod: CPTII,,, | Performed by: INTERNAL MEDICINE

## 2018-09-19 PROCEDURE — 99213 OFFICE O/P EST LOW 20 MIN: CPT | Mod: PBBFAC | Performed by: INTERNAL MEDICINE

## 2018-09-19 PROCEDURE — 99999 PR PBB SHADOW E&M-EST. PATIENT-LVL III: CPT | Mod: PBBFAC,,, | Performed by: INTERNAL MEDICINE

## 2018-09-19 PROCEDURE — 99214 OFFICE O/P EST MOD 30 MIN: CPT | Mod: S$PBB,,, | Performed by: INTERNAL MEDICINE

## 2018-09-19 PROCEDURE — 1101F PT FALLS ASSESS-DOCD LE1/YR: CPT | Mod: CPTII,,, | Performed by: INTERNAL MEDICINE

## 2018-09-19 NOTE — ASSESSMENT & PLAN NOTE
Continue AUTOPAP of  4 - 20 CMWP (DME -  [facility])     Discussed therapeutic goals for positive airway pressure therapy(CPAP or BiPAP): Ideal is usage 100% of nights for 6 - 8 hours per night. Minimum usage is 70% of night for at least 4 hours per night used. Pateint expressed understanding. All Questions answered.    Complaince download in 6 Months.

## 2018-09-19 NOTE — PROGRESS NOTES
Subjective:      Patient ID: Tiffanie Wise is a 78 y.o. female.    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    RUDOLPH (generalized anxiety disorder)    Pulmonary nodule, right - stable    Major depressive disorder, single episode, mild    Left ventricular diastolic dysfunction with preserved systolic function    Long-term insulin use in type 2 diabetes    CAD with remote CABG x 1V (LIMA-LAD) in 6/2016 + PCI of RCA and LCx    CAD, multiple vessel    Essential hypertension    Unspecified vitamin D deficiency    Amnesia    History of arterial ischemic stroke, left MCA (middle cerebral artery) 12/2017    Aphasia    Embolic stroke involving left middle cerebral artery    Cytotoxic cerebral edema    Aortic arch atherosclerosis    History of acute hemorrhagic infarction of brain    Cerebrovascular accident (CVA)    Sinus bradycardia    Left arm swelling    Hypomagnesemia    Arthritis of hand    Atypical chest pain    Hypokalemia    Microcytic anemia     Problem list has been reviewed.      Chief Complaint: Sleep Apnea         HPI: Follow up for NAWAF and CPAP complaince assessment.   she  is on APAP  of  4 - 20 CMWP .     She definitely thinks PAP is beneficial to her health and She wants to continue with PAP therapy.    Patient denies snoring, headaches, excessive daytime sleepiness    [place] Sleepiness Scale       EPWORTH SLEEPINESS SCALE 9/19/2018 3/15/2018 2/1/2018 11/6/2017 9/25/2017 12/10/2015 7/14/2015   Sitting and reading 0 0 0 2 2 3 3   Watching TV 0 0 0 1 1 2 0   Sitting, inactive in a public place (e.g. a theatre or a meeting) 0 0 0 1 2 2 2   As a passenger in a car for an hour without a break 0 0 0 0 2 2 3   Lying down to rest in the  afternoon when circumstances permit 1 0 3 3 3 3 3   Sitting and talking to someone 0 0 0 0 0 2 0   Sitting quietly after a lunch without alcohol 0 0 0 1 1 2 3   In a car, while stopped for a few minutes in traffic 0 0 0 0 0 0 0   Total score 1 0 3 8 11 16 14       Compliance Summary  8/16/2018 - 9/14/2018 (30 days)  Days with Device Usage 13 days  Days without Device Usage 17 days  Percent Days with Device Usage 43.3%  Cumulative Usage 2 days 12 hrs. 21 mins. 58 secs.  Maximum Usage (1 Day) 8 hrs. 19 mins. 6 secs.  Average Usage (All Days) 2 hrs. 43 secs.  Average Usage (Days Used) 4 hrs. 38 mins. 36 secs.  Minimum Usage (1 Day) 3 secs.  Percent of Days with Usage >= 4 Hours 30.0%  Percent of Days with Usage < 4 Hours 70.0%  Date Range  Total Blower Time 2 days 23 hrs. 13 mins. 58 secs.  Average AHI 9.4  Auto-CPAP Summary ([facility])  Auto-CPAP Mean Pressure 7.4 cmH2O  Auto-CPAP Peak Average Pressure 8.5 cmH2O  Average Device Pressure <= 90% of Time 9.2 cmH2O  Average Time in Large Leak Per Day 38 mins. 55 secs.    Previous Report Reviewed: office notes     The following portions of the patient's history were reviewed and updated as appropriate: She  has a past medical history of Acute respiratory failure with hypoxia, Anxiety, AP (angina pectoris) (7/18/2013), Arterial ischemic stroke, MCA (middle cerebral artery), left, acute (12/15/2017), Asthma, CAD, multiple vessel (8/2/2016), Chronic ischemic heart disease (7/18/2013), Coronary artery disease (7/18/2013), Depression, Diabetes with neurologic complications, GERD (gastroesophageal reflux disease) (7/18/2013), Heart failure, History of PTCA (7/18/2013), Hypertension (7/18/2013), Hypothyroidism, Leg pain (8/29/2013), Mixed hyperlipidemia (7/18/2013), Myocardial infarction, Osteoporosis, Pneumonia, Pneumonia due to other staphylococcus, Precancerous changes of the vagina, Sleep apnea, Thyroid disease, Tobacco dependence, Trouble in sleeping, Type 2  diabetes mellitus with ophthalmic manifestations, and Urinary incontinence.  She  has a past surgical history that includes Coronary stent placement; Thyroid surgery; Coronary angioplasty; Breast surgery (Left); Hysterectomy (1970); Eye surgery (Bilateral, 2014); Tubal ligation (1970); PLACEMENT-LOOP RECORDER (Left, 2/1/2018); AORTOCORONARY BYPASS-CABG - KENY (N/A, 6/29/2016); HEART CATH-LEFT (Left, 12/17/2014); HEART CATH-LEFT (Left, 10/14/2014); HEART CATH-LEFT (Left, 7/10/2014); CATHETERIZATION, HEART, LEFT (Left, 3/10/2014); and CATHETERIZATION, HEART, LEFT (Left, 11/21/2013).  Her family history includes Alcohol abuse in her sister; Cancer in her son; Cirrhosis in her sister; Diabetes in her paternal grandmother and sister; Fibromyalgia in her daughter and daughter; Heart disease in her mother; Kidney disease in her brother and father.  She  reports that she quit smoking about 10 years ago. She has a 38.00 pack-year smoking history. she has never used smokeless tobacco. She reports that she does not drink alcohol or use drugs.  She has a current medication list which includes the following prescription(s): alendronate, aspirin, atorvastatin, bd insulin syringe ult-fine ii, blood sugar diagnostic, clopidogrel, donepezil, easy comfort lancets, escitalopram oxalate, fluad 2954-5541 (65 yr up)(pf), gabapentin, hydrochlorothiazide, insulin detemir u-100, isosorbide mononitrate, levothyroxine, losartan, metformin, nitroglycerin, pantoprazole, pen needle, diabetic, sitagliptin, and vitamin d.  She has No Known Allergies..    Review of Systems   Constitutional: Positive for fatigue. Negative for fever.   HENT: Negative for postnasal drip, rhinorrhea and congestion.    Respiratory: Positive for apnea, cough, sputum production, chest tightness, shortness of breath, dyspnea on extertion, use of rescue inhaler and Paroxysmal Nocturnal Dyspnea.    Cardiovascular: Negative for chest pain, palpitations and leg swelling.  "  Skin: Negative for rash.   Gastrointestinal: Negative for nausea and abdominal pain.   Neurological: Positive for weakness. Negative for dizziness, syncope and light-headedness.   Hematological: Negative for adenopathy. Does not bruise/bleed easily.   Psychiatric/Behavioral: Positive for sleep disturbance. The patient is not nervous/anxious.         Objective:     Vitals:    09/19/18 1304   BP: 120/62   Pulse: 63   Resp: 18   SpO2: 97%   Weight: 60.6 kg (133 lb 9.6 oz)   Height: 4' 10" (1.473 m)     body mass index is 27.92 kg/m².    Physical Exam   Constitutional: She is oriented to person, place, and time. She appears well-developed and well-nourished.   HENT:   Head: Normocephalic and atraumatic.   Mouth/Throat: Oropharyngeal exudate present.   Eyes: Conjunctivae are normal. Pupils are equal, round, and reactive to light.   Neck: Neck supple. No JVD present. No tracheal deviation present. No thyromegaly present.   Cardiovascular: Normal rate, regular rhythm and normal heart sounds.   Pulmonary/Chest: Effort normal. No respiratory distress. She has decreased breath sounds. She has no wheezes. She has rales in the right lower field and the left lower field. She exhibits no tenderness.   Abdominal: Soft. Bowel sounds are normal.   Musculoskeletal: Normal range of motion. She exhibits no edema.   Lymphadenopathy:     She has no cervical adenopathy.   Neurological: She is alert and oriented to person, place, and time.   Skin: Skin is warm and dry.   Nursing note and vitals reviewed.      Personal Diagnostic Review  CPAP complaince download.     Assessment / plan     Discussed diagnosis, its evaluation, treatment and usual course. All questions answered.    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 Minimum usage is 70% of night for at least 4 hours per night used. Pateint expressed understanding. All Questions answered.    Complaince download in 6 Months.                 TIME SPENT WITH PATIENT: Time spent: 25 minutes in face to face  discussion concerning diagnosis, prognosis, review of lab and test results, benefits of treatment as well as management of disease, counseling of patient and coordination of care between various health  care providers . Greater than half the time spent was used for coordination of care and counseling of patient.     Follow-up in about 6 months (around 3/19/2019) for NAWAF.

## 2018-09-19 NOTE — PATIENT INSTRUCTIONS
Continuous Positive Air Pressure (CPAP)     A mask over the nose gently directs air into the throat to keep the airway open.   Continuous positive air pressure (CPAP) uses gentle air pressure to hold the airway open. CPAP is often the most effective treatment for sleep apnea and severe snoring. It works very well for many people. But keep in mind that it can take several adjustments before the setup is right for you.  How CPAP works  The CPAP machine  is a small portable pump beside the bed. The pump sends air through a hose, which is held over your nose and mouth by a mask. Mild air pressure is gently pushed through your airway. The air pressure nudges sagging tissues aside. This widens the airway so you can breathe better. CPAP may be combined with other kinds of therapy for sleep apnea.  Types of air pressure treatments  There are different types of CPAP. Your doctor or CPAP technician will help you decide which type is best for you:  · Basic CPAP keeps the pressure constant all night long.  · A bilevel device (BiPAP) provides more pressure when you breathe in and less when you breathe out. A BiPAP machine also may be set to provide automatic breaths to maintain breathing if you stop breathing while sleeping.  · An autoCPAP device automatically adjusts pressure throughout the night and in response to changes such as body position, sleep stage, and snoring.  Date Last Reviewed: 8/10/2015  © 8130-4514 [employer]. [address] [place] [address], [place] [address]. All rights reserved. This information is not intended as a substitute for professional medical care. Always follow your healthcare professional's instructions.

## 2018-09-20 NOTE — TELEPHONE ENCOUNTER
----- Message from Bill Cruz MA sent at 9/19/2018  2:44 PM CDT -----  Pt daughter wants to know does her mom need nuc stress test and does she needs to follow up with mid-level Razia Garcia!

## 2018-09-20 NOTE — TELEPHONE ENCOUNTER
Please call pt.  If she is feeling well, she may cancel stress test and see me in November or December at her convenience and cancel mid level appt.    Dr Short

## 2018-09-20 NOTE — TELEPHONE ENCOUNTER
Spoke with daughter The patient has been notified of this information and all questions answered. Voiced understand.

## 2018-10-12 ENCOUNTER — PES CALL (OUTPATIENT)
Dept: ADMINISTRATIVE | Facility: CLINIC | Age: 78
End: 2018-10-12

## 2018-10-16 DIAGNOSIS — E78.00 PURE HYPERCHOLESTEROLEMIA: ICD-10-CM

## 2018-10-16 RX ORDER — ATORVASTATIN CALCIUM 80 MG/1
80 TABLET, FILM COATED ORAL DAILY
Qty: 90 TABLET | Refills: 3 | OUTPATIENT
Start: 2018-10-16

## 2018-11-06 DIAGNOSIS — E78.00 PURE HYPERCHOLESTEROLEMIA: ICD-10-CM

## 2018-11-06 RX ORDER — ATORVASTATIN CALCIUM 80 MG/1
TABLET, FILM COATED ORAL
Qty: 90 TABLET | Refills: 0 | Status: SHIPPED | OUTPATIENT
Start: 2018-11-06 | End: 2018-12-05 | Stop reason: SDUPTHER

## 2018-11-17 ENCOUNTER — HOSPITAL ENCOUNTER (EMERGENCY)
Facility: HOSPITAL | Age: 78
Discharge: HOME | End: 2018-11-18
Attending: EMERGENCY MEDICINE
Payer: MEDICARE

## 2018-11-17 DIAGNOSIS — I63.9 STROKE: ICD-10-CM

## 2018-11-17 DIAGNOSIS — R42 DIZZINESS: Primary | ICD-10-CM

## 2018-11-17 DIAGNOSIS — R27.0 ATAXIA: ICD-10-CM

## 2018-11-17 DIAGNOSIS — R73.9 HYPERGLYCEMIA: ICD-10-CM

## 2018-11-17 PROBLEM — G93.40 ENCEPHALOPATHY ACUTE: Status: ACTIVE | Noted: 2018-11-17

## 2018-11-17 LAB
ALBUMIN SERPL BCP-MCNC: 3.2 G/DL
ALP SERPL-CCNC: 84 U/L
ALT SERPL W/O P-5'-P-CCNC: 11 U/L
ANION GAP SERPL CALC-SCNC: 9 MMOL/L
AST SERPL-CCNC: 16 U/L
BASOPHILS # BLD AUTO: 0.02 K/UL
BASOPHILS NFR BLD: 0.2 %
BILIRUB SERPL-MCNC: 0.5 MG/DL
BUN SERPL-MCNC: 21 MG/DL
CALCIUM SERPL-MCNC: 8.4 MG/DL
CHLORIDE SERPL-SCNC: 97 MMOL/L
CO2 SERPL-SCNC: 30 MMOL/L
CREAT SERPL-MCNC: 1.1 MG/DL
DIFFERENTIAL METHOD: ABNORMAL
EOSINOPHIL # BLD AUTO: 0.2 K/UL
EOSINOPHIL NFR BLD: 1.8 %
ERYTHROCYTE [DISTWIDTH] IN BLOOD BY AUTOMATED COUNT: 16.4 %
EST. GFR  (AFRICAN AMERICAN): 56 ML/MIN/1.73 M^2
EST. GFR  (NON AFRICAN AMERICAN): 48 ML/MIN/1.73 M^2
GLUCOSE SERPL-MCNC: 334 MG/DL
HCT VFR BLD AUTO: 32.8 %
HGB BLD-MCNC: 10.2 G/DL
INR PPP: 0.9
LYMPHOCYTES # BLD AUTO: 2.3 K/UL
LYMPHOCYTES NFR BLD: 25 %
MCH RBC QN AUTO: 24.1 PG
MCHC RBC AUTO-ENTMCNC: 31.1 G/DL
MCV RBC AUTO: 78 FL
MONOCYTES # BLD AUTO: 0.7 K/UL
MONOCYTES NFR BLD: 7.2 %
NEUTROPHILS # BLD AUTO: 5.9 K/UL
NEUTROPHILS NFR BLD: 65.8 %
PLATELET # BLD AUTO: 258 K/UL
PMV BLD AUTO: 11.3 FL
POCT GLUCOSE: 327 MG/DL (ref 70–110)
POTASSIUM SERPL-SCNC: 3.3 MMOL/L
PROT SERPL-MCNC: 6.4 G/DL
PROTHROMBIN TIME: 10.1 SEC
RBC # BLD AUTO: 4.23 M/UL
SODIUM SERPL-SCNC: 136 MMOL/L
WBC # BLD AUTO: 9.03 K/UL

## 2018-11-17 PROCEDURE — 36415 COLL VENOUS BLD VENIPUNCTURE: CPT | Mod: HCNC

## 2018-11-17 PROCEDURE — 84443 ASSAY THYROID STIM HORMONE: CPT | Mod: HCNC

## 2018-11-17 PROCEDURE — 96360 HYDRATION IV INFUSION INIT: CPT | Mod: HCNC

## 2018-11-17 PROCEDURE — 93010 ELECTROCARDIOGRAM REPORT: CPT | Mod: HCNC,,, | Performed by: INTERNAL MEDICINE

## 2018-11-17 PROCEDURE — 83880 ASSAY OF NATRIURETIC PEPTIDE: CPT | Mod: HCNC

## 2018-11-17 PROCEDURE — 84484 ASSAY OF TROPONIN QUANT: CPT | Mod: HCNC

## 2018-11-17 PROCEDURE — 85025 COMPLETE CBC W/AUTO DIFF WBC: CPT | Mod: HCNC

## 2018-11-17 PROCEDURE — G0426 INPT/ED TELECONSULT50: HCPCS | Mod: GT,HCNC,, | Performed by: PSYCHIATRY & NEUROLOGY

## 2018-11-17 PROCEDURE — 99284 EMERGENCY DEPT VISIT MOD MDM: CPT | Mod: 25,HCNC

## 2018-11-17 PROCEDURE — 86850 RBC ANTIBODY SCREEN: CPT | Mod: HCNC

## 2018-11-17 PROCEDURE — 85610 PROTHROMBIN TIME: CPT | Mod: HCNC

## 2018-11-17 PROCEDURE — 80053 COMPREHEN METABOLIC PANEL: CPT | Mod: HCNC

## 2018-11-18 VITALS
HEIGHT: 58 IN | RESPIRATION RATE: 14 BRPM | WEIGHT: 136 LBS | SYSTOLIC BLOOD PRESSURE: 136 MMHG | OXYGEN SATURATION: 95 % | DIASTOLIC BLOOD PRESSURE: 54 MMHG | TEMPERATURE: 98 F | HEART RATE: 48 BPM | BODY MASS INDEX: 28.55 KG/M2

## 2018-11-18 LAB
ABO + RH BLD: NORMAL
BLD GP AB SCN CELLS X3 SERPL QL: NORMAL
BNP SERPL-MCNC: 121 PG/ML
TROPONIN I SERPL DL<=0.01 NG/ML-MCNC: <0.006 NG/ML
TSH SERPL DL<=0.005 MIU/L-ACNC: 2.4 UIU/ML

## 2018-11-18 PROCEDURE — 25000003 PHARM REV CODE 250: Mod: HCNC | Performed by: EMERGENCY MEDICINE

## 2018-11-18 RX ORDER — CLONAZEPAM 0.5 MG/1
0.5 TABLET ORAL 2 TIMES DAILY PRN
COMMUNITY
End: 2019-09-01

## 2018-11-18 RX ORDER — LANOLIN ALCOHOL/MO/W.PET/CERES
400 CREAM (GRAM) TOPICAL ONCE
Status: COMPLETED | OUTPATIENT
Start: 2018-11-18 | End: 2018-11-18

## 2018-11-18 RX ORDER — POTASSIUM CHLORIDE 20 MEQ/15ML
40 SOLUTION ORAL
Status: COMPLETED | OUTPATIENT
Start: 2018-11-18 | End: 2018-11-18

## 2018-11-18 RX ADMIN — POTASSIUM CHLORIDE 40 MEQ: 20 SOLUTION ORAL at 01:11

## 2018-11-18 RX ADMIN — SODIUM CHLORIDE 1000 ML: 0.9 INJECTION, SOLUTION INTRAVENOUS at 01:11

## 2018-11-18 RX ADMIN — MAGNESIUM OXIDE TAB 400 MG (241.3 MG ELEMENTAL MG) 400 MG: 400 (241.3 MG) TAB at 01:11

## 2018-11-18 NOTE — HPI
77 y/o female with onset of dizziness and ataxia yesterday who was brought in tonight because daughter felt her speech is worse than previous. She is having word finding difficulties and daughter says speech is slurred as well. Denies weakness, numbness or visual change. Out of insulin for a few days and glucose has been >500. In ED glucose is 356.

## 2018-11-18 NOTE — ED PROVIDER NOTES
"SCRIBE #1 NOTE: I, Kezia Layton, am scribing for, and in the presence of, Brandt Dietrich MD. I have scribed the HPI, ROS, and PEx of the note.     SCRIBE #2 NOTE: I, Rupal Vieira, am scribing for, and in the presence of,  Jaydon Oquendo MD. I have scribed the remaining portions of the note not scribed by Scribe #1.     History      Chief Complaint   Patient presents with    Dizziness     starting yesterday; reports glucose readings as high as 500 this week; also reports being told she had "high blood pressure" but unsure of number.  Daughter states she feels pt's speech is different today. Hx stroke       Review of patient's allergies indicates:  No Known Allergies     HPI   HPI    11/17/2018, 10:19 PM   History obtained from the patient and Daughter      History of Present Illness: Tiffanie Wise is a 78 y.o. female patient with hx of acute repiratory failure with hypoxia, AP, CAD, Arterial ischemic stroke, and MI who presents to the Emergency Department for evaluation of dizziness which onset gradually yesterday. Symptoms are constant and moderate in severity. Daughter reports pt has had high blood pressure and high glucose reading this week. Daughter reports a change in pt's speech and ability to ambulate which onset over 24 hours ago. No mitigating or exacerbating factors reported. Patient denies any CP, SOB, fever, chills, n/v/d, facial droop, and all other sxs at this time. No further complaints or concerns at this time.         Arrival mode: Personal vehicle      PCP: Evelio Parnell MD       Past Medical History:  Past Medical History:   Diagnosis Date    Acute respiratory failure with hypoxia     Anxiety     AP (angina pectoris) 7/18/2013    Arterial ischemic stroke, MCA (middle cerebral artery), left, acute 12/15/2017    Asthma     CAD, multiple vessel 8/2/2016    Chronic ischemic heart disease 7/18/2013    Coronary artery disease 7/18/2013    Depression     Diabetes with neurologic " complications     GERD (gastroesophageal reflux disease) 7/18/2013    Heart failure     History of PTCA 7/18/2013    Hypertension 7/18/2013    Hypothyroidism     Leg pain 8/29/2013    Mixed hyperlipidemia 7/18/2013    Myocardial infarction     Osteoporosis     Pneumonia     Pneumonia due to other staphylococcus     Precancerous changes of the vagina     Sleep apnea     stopped using CPAP 2015 - sores in nose, couldn't breathe, uses Breathe riht strips now.     Thyroid disease     Tobacco dependence     resolved    Trouble in sleeping     Type 2 diabetes mellitus with ophthalmic manifestations     Urinary incontinence     pullups day, pads at night       Past Surgical History:  Past Surgical History:   Procedure Laterality Date    AORTOCORONARY BYPASS-CABG - KENY N/A 6/29/2016    Performed [facility]ervantes [facility] at [facility] OR    BREAST SURGERY Left     benign cyst    CATHETERIZATION, HEART, LEFT Left 3/10/2014    Performed by Mariam Bell MD at [facility] CATH LAB    CATHETERIZATION, HEART, LEFT Left 11/21/2013    Performed by Brendan Short MD at [facility] CATH LAB    CORONARY ANGIOPLASTY      CORONARY STENT PLACEMENT      x6-7 oer the yeqrs  last 12/17/14 BRIANDA to lcfx    EYE SURGERY Bilateral 2014    cataracts w/IOL   Dr Vance    HEART CATH-LEFT Left 12/17/2014    Performed by [facility]rendan [facility] at [facility] CATH LAB    HEART CATH-LEFT Left 10/14/2014    Performed by [facility] at [facility] CATH LAB    HEART CATH-LEFT Left 7/10/2014    Performed by [facility]rendan Short [facility] at [facility] CATH LAB    HYSTERECTOMY  1970    vag hyst; ovaries intact    PLACEMENT-LOOP RECORDER Left 2/1/2018    Performed by Kole Devine MD at [facility] CATH LAB    THYROID SURGERY      nodule benign, Dr Hankins    TUBAL LIGATION  1970         Family History:  Family History   Problem Relation Age of Onset    Kidney disease Father     Kidney disease Brother     Heart disease Mother     Fibromyalgia Daughter     Cancer  Son         lung    Cirrhosis Sister     Alcohol abuse Sister     Diabetes Sister     Fibromyalgia Daughter     Diabetes Paternal Grandmother     Stroke Neg Hx     Mental illness Neg Hx     Mental retardation Neg Hx        Social History:  Social History     Tobacco Use    Smoking status: Former Smoker     Packs/day: 1.00     Years: 38.00     Pack years: 38.00     Last attempt to quit: 7/8/2008     Years since quitting: 10.3    Smokeless tobacco: Never Used   Substance and Sexual Activity    Alcohol use: No    Drug use: No    Sexual activity: Not Currently     Birth control/protection: None       ROS   Review of Systems   Constitutional: Negative for chills and fever.   HENT: Negative for sore throat.    Respiratory: Negative for shortness of breath.    Cardiovascular: Negative for chest pain.   Gastrointestinal: Negative for abdominal pain, diarrhea, nausea and vomiting.   Genitourinary: Negative for dysuria.   Musculoskeletal: Negative for back pain and neck pain.   Skin: Negative for rash.   Neurological: Positive for dizziness and speech difficulty. Negative for facial asymmetry, weakness, numbness and headaches.   Hematological: Does not bruise/bleed easily.   All other systems reviewed and are negative.    Physical Exam      Initial Vitals [11/17/18 2144]   BP Pulse Resp Temp SpO2   (!) 132/51 (!) 56 18 97.5 °F (36.4 °C) 97 %      MAP       --          Physical Exam  Nursing Notes and Vital Signs Reviewed.  Constitutional: Patient is in no acute distress. Well-developed and well-nourished.  Head: Atraumatic. Normocephalic.  Eyes: PERRL. EOM intact. Conjunctivae are not pale. No scleral icterus. No Nystagmus noted.  ENT: Mucous membranes are moist. Oropharynx is clear and symmetric.    Neck: Supple. Full ROM. No lymphadenopathy.  Cardiovascular: Regular rate. Regular rhythm. No murmurs, rubs, or gallops. Distal pulses are 2+ and symmetric.  Pulmonary/Chest: No respiratory distress. Clear to  "auscultation bilaterally. No wheezing or rales.  Abdominal: Soft and non-distended.  There is no tenderness.  No rebound, guarding, or rigidity. Good bowel sounds.  Genitourinary: No CVA tenderness  Musculoskeletal: Moves all extremities. No obvious deformities. No edema. No calf tenderness.  Skin: Warm and dry.  Neurological: Patient is alert and oriented to person, place and time. Pupils ERRL and EOM normal. No nystagmus noted. Cranial nerves II-XII are intact. Strength is full bilaterally; it is equal and 5/5 in bilateral upper and lower extremities. Light touch sense is intact. Receptive expressive aphasia that is consistent with previous stroke. Ataxia with ambulation concerned for possible posterior stroke.  Psychiatric: Normal affect. Good eye contact. Appropriate in content.    ED Course    Procedures  ED Vital Signs:  Vitals:    11/17/18 2144 11/17/18 2301 11/17/18 2326 11/17/18 2332   BP: (!) 132/51 138/66  (!) 160/67   Pulse: (!) 56 (!) 49 (!) 48 (!) 50   Resp: 18 (!) 24 20 (!) 22   Temp: 97.5 °F (36.4 °C)      TempSrc: Oral      SpO2: 97% 95% 97%    Weight: 61.7 kg (136 lb)      Height: 4' 10" (1.473 m)       11/17/18 2344 11/18/18 0004 11/18/18 0032 11/18/18 0132   BP:  (!) 162/67 (!) 121/56 (!) 116/49   Pulse: (!) 50 (!) 54 (!) 46 (!) 56   Resp: 18 16 14 15   Temp:       TempSrc:       SpO2: 96% 96% (!) 94% 95%   Weight:       Height:           Abnormal Lab Results:  Labs Reviewed   CBC W/ AUTO DIFFERENTIAL - Abnormal; Notable for the following components:       Result Value    Hemoglobin 10.2 (*)     Hematocrit 32.8 (*)     MCV 78 (*)     MCH 24.1 (*)     MCHC 31.1 (*)     RDW 16.4 (*)     All other components within normal limits   COMPREHENSIVE METABOLIC PANEL - Abnormal; Notable for the following components:    Potassium 3.3 (*)     CO2 30 (*)     Glucose 334 (*)     Calcium 8.4 (*)     Albumin 3.2 (*)     eGFR if  56 (*)     eGFR if non  48 (*)     All other " components within normal limits   B-TYPE NATRIURETIC PEPTIDE - Abnormal; Notable for the following components:     (*)     All other components within normal limits   POCT GLUCOSE - Abnormal; Notable for the following components:    POCT Glucose 327 (*)     All other components within normal limits   PROTIME-INR   TSH   TROPONIN I   POCT GLUCOSE, HAND-HELD DEVICE   TYPE & SCREEN   POCT GLUCOSE MONITORING CONTINUOUS        All Lab Results:  Results for orders placed or performed during the hospital encounter of 11/17/18   CBC W/ AUTO DIFFERENTIAL   Result Value Ref Range    WBC 9.03 3.90 - 12.70 K/uL    RBC 4.23 4.00 - 5.40 M/uL    Hemoglobin 10.2 (L) 12.0 - 16.0 g/dL    Hematocrit 32.8 (L) 37.0 - 48.5 %    MCV 78 (L) 82 - 98 fL    MCH 24.1 (L) 27.0 - 31.0 pg    MCHC 31.1 (L) 32.0 - 36.0 g/dL    RDW 16.4 (H) 11.5 - 14.5 %    Platelets 258 150 - 350 K/uL    MPV 11.3 9.2 - 12.9 fL    Gran # (ANC) 5.9 1.8 - 7.7 K/uL    Lymph # 2.3 1.0 - 4.8 K/uL    Mono # 0.7 0.3 - 1.0 K/uL    Eos # 0.2 0.0 - 0.5 K/uL    Baso # 0.02 0.00 - 0.20 K/uL    Gran% 65.8 38.0 - 73.0 %    Lymph% 25.0 18.0 - 48.0 %    Mono% 7.2 4.0 - 15.0 %    Eosinophil% 1.8 0.0 - 8.0 %    Basophil% 0.2 0.0 - 1.9 %    Differential Method Automated    Comprehensive metabolic panel   Result Value Ref Range    Sodium 136 136 - 145 mmol/L    Potassium 3.3 (L) 3.5 - 5.1 mmol/L    Chloride 97 95 - 110 mmol/L    CO2 30 (H) 23 - 29 mmol/L    Glucose 334 (H) 70 - 110 mg/dL    BUN, Bld 21 8 - 23 mg/dL    Creatinine 1.1 0.5 - 1.4 mg/dL    Calcium 8.4 (L) 8.7 - 10.5 mg/dL    Total Protein 6.4 6.0 - 8.4 g/dL    Albumin 3.2 (L) 3.5 - 5.2 g/dL    Total Bilirubin 0.5 0.1 - 1.0 mg/dL    Alkaline Phosphatase 84 55 - 135 U/L    AST 16 10 - 40 U/L    ALT 11 10 - 44 U/L    Anion Gap 9 8 - 16 mmol/L    eGFR if African American 56 (A) >60 mL/min/1.73 m^2    eGFR if non African American 48 (A) >60 mL/min/1.73 m^2   Protime-INR   Result Value Ref Range    Prothrombin Time 10.1  9.0 - 12.5 sec    INR 0.9 0.8 - 1.2   TSH   Result Value Ref Range    TSH 2.397 0.400 - 4.000 uIU/mL   Troponin I   Result Value Ref Range    Troponin I <0.006 0.000 - 0.026 ng/mL   B-Type natriuretic peptide   Result Value Ref Range     (H) 0 - 99 pg/mL   Type & Screen   Result Value Ref Range    Group & Rh O POS     Indirect Bennett NEG    POCT glucose   Result Value Ref Range    POCT Glucose 327 (H) 70 - 110 mg/dL         Imaging Results:  Imaging Results          X-Ray Chest AP Portable (Final result)  Result time 11/17/18 22:58:23    Final result by Robi Garcia MD (11/17/18 22:58:23)                 Impression:      Bilateral upper lobe interstitial infiltrates.      Electronically signed by: Robi Garcia MD  Date:    11/17/2018  Time:    22:58             Narrative:    EXAMINATION:  XR CHEST AP PORTABLE    CLINICAL HISTORY:  Stroke;    TECHNIQUE:  AP view of the chest was performed.    COMPARISON:  12/15/2017 and 07/22/2018    FINDINGS:  Faint reticular interstitial opacities scattered throughout the perihilar regions in the upper lobes.  No alveolar infiltrates.  Status post CABG.  Aortic atherosclerosis.  No pneumothorax.  No acute osseous findings demonstrated.                               CT Head Without Contrast (Final result)  Result time 11/17/18 22:50:52    Final result by Robi Garcia MD (11/17/18 22:50:52)                 Impression:      No CT evidence of acute intracranial abnormality.    Remote left frontal infarct.    All CT scans at this facility are performed  using dose modulation techniques as appropriate to performed exam including the following:  automated exposure control; adjustment of mA and/or kV according to the patients size (this includes techniques or standardized protocols for targeted exams where dose is matched to indication/reason for exam: i.e. extremities or head);  iterative reconstruction technique.      Electronically signed by: Robi Garcia  MD  Date:    11/17/2018  Time:    22:50             Narrative:    EXAMINATION:  CT HEAD WITHOUT CONTRAST    CLINICAL HISTORY:  Dizziness;previous stroke in december 2017, sudden onset ataxia with persistent unsteady gait since yesterday;    TECHNIQUE:  Axial CT imaging was performed through the head without intravenous contrast.    COMPARISON:  Brain MRI on 12/16/2017    FINDINGS:  Wedge-shaped area of gliosis in the left frontal lobe consistent with remote infarct not has evolved since the prior MRI.  No hydrocephalus, midline shift, mass effect, or acute intracranial hemorrhage.  Diffuse cerebral atrophy.  Mild chronic white matter microangiopathy.  Basilar cisterns and posterior fossa are normal. The visualized paranasal sinuses and mastoid air cells are clear. The skull is intact.                                 Per [facility], pt's MRI results show no MR evidence of acute intracranial process. Volume loss, chronic microvascular ischemic change, and chronic infarcts as described. Incompletely visualized lobulated 12 mm T2 hyperintense right parotid lesion.     The EKG was ordered, reviewed, and independently interpreted by the ED provider.  Interpretation time: 22:50  Rate: 48 BPM  Rhythm: sinus bradycardia  Interpretation: Septal infarct, age undetermined. T wave abnormality, consider lateral ischemia. No STEMI.           The Emergency Provider reviewed the vital signs and test results, which are outlined above.    ED Discussion     10:42 PM: Since pt was last seen normal over 24 hours ago, tPA protocol will not be started.    11:30 PM: Dr. Dietrich discussed the pt's case with Dr. Hair (Telestroke) who recommends ordering MRI.    1:44 AM: Dr. Dietrich transfers care of pt to Dr. Oquendo, pending MRI results.      5:44 AM: Dr. Oquendo reassessed pt at this time.  Pt is awake, alert, oriented x3 at this time. Discussed with pt all pertinent ED information and results. Discussed pt dx and plan of tx. Gave pt  all f/u and return to the ED instructions. All questions and concerns were addressed at this time. Pt expresses understanding of information and instructions, and is comfortable with plan to discharge. Pt is stable for discharge.    I discussed with patient and/or family/caretaker that evaluation in the ED does not suggest any emergent or life threatening medical conditions requiring immediate intervention beyond what was provided in the ED, and I believe patient is safe for discharge.  Regardless, an unremarkable evaluation in the ED does not preclude the development or presence of a serious of life threatening condition. As such, patient was instructed to return immediately for any worsening or change in current symptoms.    Current Discharge Medication List      CONTINUE these medications which have NOT CHANGED    Details   alendronate (FOSAMAX) 70 MG tablet TAKE 1 TABLET BY MOUTH EVERY WEEK WITH A FULL GLASS OF WATER ON EMPTY STOMACH. DO NOT EAT OR LIE DOWN FOR 30 MINUTES  Qty: 12 tablet, Refills: 3    Associated Diagnoses: Osteoporosis, unspecified osteoporosis type, unspecified pathological fracture presence      aspirin (ECOTRIN) 81 MG EC tablet Take 81 mg by mouth. Take 81 mg by mouth daily.      !! atorvastatin (LIPITOR) 80 MG tablet Take 1 tablet (80 mg total) by mouth once daily.  Qty: 90 tablet, Refills: 3    Associated Diagnoses: Pure hypercholesterolemia      clonazePAM (KLONOPIN) 0.5 MG tablet Take 0.5 mg by mouth 2 (two) times daily as needed for Anxiety.      clopidogrel (PLAVIX) 75 mg tablet Take 1 tablet (75 mg total) by mouth once daily.  Qty: 90 tablet, Refills: 3    Associated Diagnoses: Coronary artery disease, angina presence unspecified, unspecified vessel or lesion type, unspecified whether native or transplanted heart; Angina pectoris      donepezil (ARICEPT) 10 MG tablet TAKE 1 TABLET BY MOUTH EVERY DAY  Qty: 90 tablet, Refills: 0    Associated Diagnoses: Dementia without behavioral  "disturbance, unspecified dementia type      escitalopram oxalate (LEXAPRO) 20 MG tablet TAKE 1 TABLET BY MOUTH EVERY DAY  Qty: 90 tablet, Refills: 0    Associated Diagnoses: Anxiety      hydroCHLOROthiazide (HYDRODIURIL) 25 MG tablet TAKE 1 TABLET BY MOUTH EVERY DAY  Qty: 90 tablet, Refills: 0    Associated Diagnoses: Essential hypertension      levothyroxine (SYNTHROID) 50 MCG tablet TAKE 1 TABLET BY MOUTH EVERY DAY  Qty: 90 tablet, Refills: 0    Associated Diagnoses: Hypothyroidism, unspecified type      losartan (COZAAR) 100 MG tablet Take 1 tablet (100 mg total) by mouth once daily.  Qty: 90 tablet, Refills: 3    Associated Diagnoses: Hypertension, unspecified type      pantoprazole (PROTONIX) 40 MG tablet TAKE 1 TABLET BY MOUTH DAILY  Qty: 90 tablet, Refills: 0    Associated Diagnoses: Gastroesophageal reflux disease without esophagitis      pregabalin (LYRICA) 50 MG capsule Take 1 capsule (50 mg total) by mouth 3 (three) times daily.  Qty: 90 capsule, Refills: 5    Associated Diagnoses: Diabetic polyneuropathy associated with type 2 diabetes mellitus      SITagliptin (JANUVIA) 100 MG Tab Take 100 mg by mouth once daily.      vitamin D 1000 units Tab Take 1,000 Units by mouth once daily.      !! atorvastatin (LIPITOR) 80 MG tablet TAKE 1 TABLET(80 MG) BY MOUTH EVERY DAY  Qty: 90 tablet, Refills: 0    Associated Diagnoses: Pure hypercholesterolemia      BD INSULIN SYRINGE ULT-FINE II 1/2 mL 31 x 5/16" Syrg       BLOOD SUGAR DIAGNOSTIC (TRUETEST TEST STRIPS MISC) by Misc.(Non-Drug; Combo Route) route. Pt is testing 3 times a day      EASY COMFORT LANCETS 30 gauge [facility] [place]       FLUAD 2091-0672, 65 YR UP,,PF, 45 mcg (15 mcg x 3)/0.5 mL Syrg ADM 0.5ML IM UTD  Refills: 0      insulin detemir (LEVEMIR FLEXTOUCH) 100 unit/mL (3 mL) SubQ InPn pen Inject 36 Units into the skin once daily.  Refills: 0      nitroGLYCERIN (NITROSTAT) 0.4 MG SL tablet Place 1 tablet (0.4 mg total) under the tongue as needed.  Qty: 25 tablet, " "Refills: 6    Associated Diagnoses: Coronary artery disease, angina presence unspecified, unspecified vessel or lesion type, unspecified whether native or transplanted heart; Angina pectoris      pen needle, diabetic 31 gauge x 5/16" Ndle USE TWICE DAILY AND PRN       !! - Potential duplicate medications found. Please discuss with provider.            ED Medication(s):  Medications   potassium chloride 10% oral solution 40 mEq (40 mEq Oral Given 11/18/18 0113)   magnesium oxide tablet 400 mg (400 mg Oral Given 11/18/18 0112)   sodium chloride 0.9% bolus 1,000 mL (0 mLs Intravenous Paused 11/18/18 0155)       Follow-up Information     Evelio Parnell MD In 1 day.    Specialty:  Family Medicine  Contact information:  [address] [facility] [address]  [address]  [facility] [address] [phone]                     Medical Decision Making    Medical Decision Making:   Clinical Tests:   Lab Tests: Ordered and Reviewed  Radiological Study: Ordered and Reviewed  Medical Tests: Ordered and Reviewed           Scribe Attestation:   Scribe #1: I performed the above scribed service and the documentation accurately describes the services I performed. I attest to the accuracy of the note.    Attending:   Physician Attestation Statement for Scribe #1: I, Brandt Dietrich MD, personally performed the services described in this documentation, as scribed by Kezia Layton, in my presence, and it is both accurate and complete.       Scribe Attestation:   Scribe #2: I performed the above scribed service and the documentation accurately describes the services I performed. I attest to the accuracy of the note.    Attending Attestation:           Physician Attestation for Scribe:    Physician Attestation Statement for Scribe #2: I, Jaydon Oquendo MD, reviewed documentation, as scribed by Rupal Vieira in my presence, and it is both accurate and complete. I also acknowledge and confirm the content of the note done by Yolie " #1.          Clinical Impression       ICD-10-CM ICD-9-CM   1. Dizziness R42 780.4   2. Stroke I63.9 434.91   3. Ataxia R27.0 781.3   4. Hyperglycemia R73.9 790.29     Patient does not have an acute stroke (unable to remove above diagnosis from EMR).     Disposition:   Disposition: Discharged  Condition: Stable         Jaydon Oquendo MD  11/18/18 0752

## 2018-11-18 NOTE — ASSESSMENT & PLAN NOTE
Encephalopathy acute likely multifactorial with uncontrolled glucose,dysarthria, worsening aphasia and ataxia. Can not exclude VBA insufficiency so want MRI of brain to r/o posterior fossa pathology

## 2018-11-18 NOTE — CONSULTS
[facility]  Vascular Neurology  Comprehensive Stroke Center  Tele-Consultation Note      Inpatient consult to Telemedicine-Stroke  Consult performed by: Thania Hair MD  Consult ordered by: Brandt Dietrich MD          Consulting Provider: Spoke Physician:: md grace  Current Providers  No providers found    Patient Location: [facility]  Spoke hospital nurse at bedside with patient assisting consultant.     Patient information was obtained from patient and relative(s).       Assessment/Plan:     STROKE DOCUMENTATION     Acute Stroke Times:   Acute Stroke Times   Last Known Normal Date: 11/16/18  Symptom Onset Date: 11/16/18  Stroke Team Called Date: 11/17/18  Stroke Team Called Time: 1059  Stroke Team Arrival Date: 11/17/18  Stroke Team Arrival Time: 2304  CT Interpretation Time: 2302    NIH Scale:  Interval: baseline (upon arrival/admit)  1a. Level Of Consciousness: 0-->Alert: keenly responsive  1b. LOC Questions: 0-->Answers both questions correctly  1c. LOC Commands: 0-->Performs both tasks correctly  2. Best Gaze: 0-->Normal  3. Visual: 0-->No visual loss  4. Facial Palsy: 0-->Normal symmetrical movements  5a. Motor Arm, Left: 0-->No drift: limb holds 90 (or 45) degrees for full 10 secs  5b. Motor Arm, Right: 0-->No drift: limb holds 90 (or 45) degrees for full 10 secs  6a. Motor Leg, Left: 0-->No drift: leg holds 30 degree position for full 5 secs  6b. Motor Leg, Right: 0-->No drift: leg holds 30 degree position for full 5 secs  7. Limb Ataxia: 0-->Absent  8. Sensory: 0-->Normal: no sensory loss  9. Best Language: 1-->Mild-to-moderate aphasia: some obvious loss of fluency or facility of comprehension, without significant limitation on ideas expressed or form of expression. Reduction of speech and/or comprehension, however, makes conversation. . . (see row details)  10. Dysarthria: 1-->Mild-to-moderate dysarthria: patient slurs at  "least some words and, at worst, can be understood with some difficulty  11. Extinction and Inattention (formerly Neglect): 0-->No abnormality  Total (NIH Stroke Scale): 2     Modified [place] Score: 0  Katy Coma Scale:    ABCD2 Score:    [id number] Score:   HAS -BLED Score:   ICH Score:   Hunt & Branham Classification:       Diagnoses:   Encephalopathy acute    Encephalopathy acute likely multifactorial with uncontrolled glucose,dysarthria, worsening aphasia and ataxia. Can not exclude VBA insufficiency so want MRI of brain to r/o posterior fossa pathology         Blood pressure 138/66, pulse (!) 48, temperature 97.5 °F (36.4 °C), temperature source Oral, resp. rate (!) 33, height 4' 10" (1.473 m), weight 61.7 kg (136 lb), SpO2 97 %.  Alteplase Eligible?: No  Alteplase Recommendation: Alteplase not recommended due to Outside of treatment window   Possible Interventional Revascularization Candidate? No; No large vessel occlusion    Disposition Recommendation: admit to inpatient  do not transfer      Subjective:     History of Present Illness:  77 y/o female with onset of dizziness and ataxia yesterday who was brought in tonight because daughter felt her speech is worse than previous. She is having word finding difficulties and daughter says speech is slurred as well. Denies weakness, numbness or visual change. Out of insulin for a few days and glucose has been >500. In ED glucose is 356.      Woke up with symptoms?: no  Last known normal: Last Known Normal Date: 11/16/18      Recent bleeding noted: no  Does the patient take any Blood Thinners? no  Medications: Antiplatelets:  aspirin and clopidogrel/Plavix      Past Medical History: hypertension, diabetes, hyperlipidemia, MI/CAD and stroke    Past Surgical History: no major surgeries within the last 2 weeks    Family History: no relevant history    Social History: no smoking, no drinking, no drugs    Allergies: No Known Allergies     Review of Systems " "  Neurological: Positive for facial asymmetry and speech difficulty.   All other systems reviewed and are negative.    Objective:   Vitals: Blood pressure 138/66, pulse (!) 48, temperature 97.5 °F (36.4 °C), temperature source Oral, resp. rate (!) 33, height 4' 10" (1.473 m), weight 61.7 kg (136 lb), SpO2 97 %.     CT READ: Yes  No hemmorhage. No mass effect. No early infarct signs. old left frontal stroke    Physical Exam   Constitutional: She is oriented to person, place, and time. She appears well-developed and well-nourished.   HENT:   Head: Normocephalic and atraumatic.   Eyes: EOM are normal. Pupils are equal, round, and reactive to light.   Cardiovascular: Normal rate and regular rhythm.   Pulmonary/Chest: Effort normal.   Neurological: She is alert and oriented to person, place, and time. A cranial nerve deficit is present.   Vitals reviewed.            Recommended the emergency room physician to have a brief discussion with the patient and/or family if available regarding the risks and benefits of treatment, and to briefly document the occurrence of that discussion in his clinical encounter note.     The attending portion of this evaluation, treatment, and documentation was performed per Thania Hair MD via audiovisual.    Billing code:  (non-intervention mild to moderate stroke, TIA, some mimics)    · This patient has a critical neurological condition/illness, with some potential for high morbidity and mortality.  · There is a moderate probability for acute neurological change leading to clinical and possibly life-threatening deterioration requiring highest level of physician preparedness for urgent intervention.  · Care was coordinated with other physicians involved in the patient's care.  · Radiologic studies and laboratory data were reviewed and interpreted, and plan of care was re-assessed based on the results.  · Diagnosis, treatment options and prognosis may have been discussed with the " patient and/or family members or caregiver.      In your opinion, this was a: Tier 2    Consult End Time: 11:41 PM     Thania Hair MD  [facility]  Vascular Neurology   [facility]

## 2018-11-18 NOTE — ED NOTES
Pt's daughter at bedside stating that pt has been c/o dizziness over the last 24 hours. Pt recently went to PCP this week with c/o hand pain and was dx with R sided cerumen impaction that was not resolved at visit. Pt was prescribed lyrica for hand pain and dx with neuropathy. It was also mentioned that pt ran out of her insulin several days ago and started back on it today.

## 2018-11-18 NOTE — SUBJECTIVE & OBJECTIVE
"  Woke up with symptoms?: no  Last known normal: Last Known Normal Date: 11/16/18      Recent bleeding noted: no  Does the patient take any Blood Thinners? no  Medications: Antiplatelets:  aspirin and clopidogrel/Plavix      Past Medical History: hypertension, diabetes, hyperlipidemia, MI/CAD and stroke    Past Surgical History: no major surgeries within the last 2 weeks    Family History: no relevant history    Social History: no smoking, no drinking, no drugs    Allergies: No Known Allergies     Review of Systems   Neurological: Positive for facial asymmetry and speech difficulty.   All other systems reviewed and are negative.    Objective:   Vitals: Blood pressure 138/66, pulse (!) 48, temperature 97.5 °F (36.4 °C), temperature source Oral, resp. rate (!) 33, height 4' 10" (1.473 m), weight 61.7 kg (136 lb), SpO2 97 %.     CT READ: Yes  No hemmorhage. No mass effect. No early infarct signs. old left frontal stroke    Physical Exam   Constitutional: She is oriented to person, place, and time. She appears well-developed and well-nourished.   HENT:   Head: Normocephalic and atraumatic.   Eyes: EOM are normal. Pupils are equal, round, and reactive to light.   Cardiovascular: Normal rate and regular rhythm.   Pulmonary/Chest: Effort normal.   Neurological: She is alert and oriented to person, place, and time. A cranial nerve deficit is present.   Vitals reviewed.        "

## 2018-11-25 DIAGNOSIS — Z45.09 ENCOUNTER FOR LOOP RECORDER CHECK: Primary | ICD-10-CM

## 2018-11-25 DIAGNOSIS — I63.9 CRYPTOGENIC STROKE: ICD-10-CM

## 2018-12-05 ENCOUNTER — OFFICE VISIT (OUTPATIENT)
Dept: CARDIOLOGY | Facility: CLINIC | Age: 78
End: 2018-12-05
Payer: MEDICARE

## 2018-12-05 ENCOUNTER — CLINICAL SUPPORT (OUTPATIENT)
Dept: CARDIOLOGY | Facility: CLINIC | Age: 78
End: 2018-12-05
Attending: INTERNAL MEDICINE
Payer: MEDICARE

## 2018-12-05 VITALS
HEIGHT: 58 IN | WEIGHT: 134.94 LBS | HEART RATE: 57 BPM | DIASTOLIC BLOOD PRESSURE: 44 MMHG | BODY MASS INDEX: 28.33 KG/M2 | SYSTOLIC BLOOD PRESSURE: 120 MMHG

## 2018-12-05 DIAGNOSIS — I20.9 AP (ANGINA PECTORIS): Primary | Chronic | ICD-10-CM

## 2018-12-05 DIAGNOSIS — Z45.09 ENCOUNTER FOR LOOP RECORDER CHECK: ICD-10-CM

## 2018-12-05 DIAGNOSIS — Z95.1 S/P CABG X 1: ICD-10-CM

## 2018-12-05 DIAGNOSIS — I51.89 LEFT VENTRICULAR DIASTOLIC DYSFUNCTION WITH PRESERVED SYSTOLIC FUNCTION: Chronic | ICD-10-CM

## 2018-12-05 DIAGNOSIS — Z79.4 TYPE 2 DIABETES MELLITUS WITH HYPERGLYCEMIA, WITH LONG-TERM CURRENT USE OF INSULIN: Chronic | ICD-10-CM

## 2018-12-05 DIAGNOSIS — E78.5 HYPERLIPIDEMIA ASSOCIATED WITH TYPE 2 DIABETES MELLITUS: Chronic | ICD-10-CM

## 2018-12-05 DIAGNOSIS — I25.10 CAD, MULTIPLE VESSEL: ICD-10-CM

## 2018-12-05 DIAGNOSIS — I63.9 CRYPTOGENIC STROKE: ICD-10-CM

## 2018-12-05 DIAGNOSIS — R00.1 SINUS BRADYCARDIA: Chronic | ICD-10-CM

## 2018-12-05 DIAGNOSIS — I25.118 CORONARY ARTERY DISEASE WITH STABLE ANGINA PECTORIS, UNSPECIFIED VESSEL OR LESION TYPE, UNSPECIFIED WHETHER NATIVE OR TRANSPLANTED HEART: Chronic | ICD-10-CM

## 2018-12-05 DIAGNOSIS — I63.512 ARTERIAL ISCHEMIC STROKE, MCA (MIDDLE CEREBRAL ARTERY), LEFT, ACUTE: Chronic | ICD-10-CM

## 2018-12-05 DIAGNOSIS — E11.65 TYPE 2 DIABETES MELLITUS WITH HYPERGLYCEMIA, WITH LONG-TERM CURRENT USE OF INSULIN: Chronic | ICD-10-CM

## 2018-12-05 DIAGNOSIS — G47.33 OSA ON CPAP: ICD-10-CM

## 2018-12-05 DIAGNOSIS — E11.69 HYPERLIPIDEMIA ASSOCIATED WITH TYPE 2 DIABETES MELLITUS: Chronic | ICD-10-CM

## 2018-12-05 DIAGNOSIS — I10 ESSENTIAL HYPERTENSION: Chronic | ICD-10-CM

## 2018-12-05 PROBLEM — G93.40 ENCEPHALOPATHY ACUTE: Status: RESOLVED | Noted: 2018-11-17 | Resolved: 2018-12-05

## 2018-12-05 PROCEDURE — 3074F SYST BP LT 130 MM HG: CPT | Mod: CPTII,HCNC,S$GLB, | Performed by: INTERNAL MEDICINE

## 2018-12-05 PROCEDURE — 93285 PRGRMG DEV EVAL SCRMS IP: CPT | Mod: HCNC,S$GLB,, | Performed by: INTERNAL MEDICINE

## 2018-12-05 PROCEDURE — 99999 PR PBB SHADOW E&M-EST. PATIENT-LVL III: CPT | Mod: PBBFAC,HCNC,, | Performed by: INTERNAL MEDICINE

## 2018-12-05 PROCEDURE — 1101F PT FALLS ASSESS-DOCD LE1/YR: CPT | Mod: CPTII,HCNC,S$GLB, | Performed by: INTERNAL MEDICINE

## 2018-12-05 PROCEDURE — 99499 UNLISTED E&M SERVICE: CPT | Mod: HCNC,S$GLB,, | Performed by: INTERNAL MEDICINE

## 2018-12-05 PROCEDURE — 99214 OFFICE O/P EST MOD 30 MIN: CPT | Mod: HCNC,S$GLB,, | Performed by: INTERNAL MEDICINE

## 2018-12-05 PROCEDURE — 3078F DIAST BP <80 MM HG: CPT | Mod: CPTII,HCNC,S$GLB, | Performed by: INTERNAL MEDICINE

## 2018-12-05 RX ORDER — ISOSORBIDE MONONITRATE 30 MG/1
30 TABLET, EXTENDED RELEASE ORAL DAILY
COMMUNITY
End: 2019-01-14 | Stop reason: SDUPTHER

## 2018-12-05 NOTE — PROGRESS NOTES
Subjective:    Patient ID:  Tiffanie Wise is a 78 y.o. female who presents for evaluation of Hyperlipidemia; Hypertension; Coronary Artery Disease; and Risk Factor Management For Atherosclerosis        HPI Mrs. Wise returns for f/u.  Her current medical conditions include CAD (CABG LIMA-LAD 6/16), DM, HTN, dyslipidemia, GERD. Nonsmoker.  Her prior history is pertinent for following:  NSTEMI 11/10 and underwent LHC and sucessful stenting of 90% RCA (3 x 23 mm Promus BRIANDA).    S/p PCI of 70% mid-LCX 12/12 with Xience BRIANDA.    S/p LHC for worsening anginal sxs on 11/21/13 with successful PCI 90% ISR mid-LCX with Xience BRIANDA.    S/p unstable angina and had PTCA of ISR of her LCX March 2014.  S/p 7/14 PTCA of severe LCX/OM ISR and PTCA of distal LCX at her bifurcation lesion at stent site July 2014 for ACS.  S/p 10/14 repeat revascularization of severe LCX ISR with cutting balloon Oct 2014 for ACS.  S/p 12/14 admit with unstable angina. She had ISR LCX again, Resolute BRIANDA implanted with good results.    Pt underwent repeat LHC again late June 2016 for unstable anginal sxs and had severe 95% distal LAD stenosis not amenable to PCI.  She had patent LCX, RCA stents, chronic occlusion of R PLB, normal LVEF.  She had urgent CABG w LIMA-LAD June 2016.  S/p hospitalization for ischemic CVA Dec 2017 tx with TPA and [place] [facility] (distal left parietal MCA branch occlusion). Unclear mechanism of CVA.  She had some hemorrhage noted in CVA distribution.  Was maintained on asa, plavix at time of discharge.  Metoprolol was changed to Coreg for bradycardia.  Her ecg showed sinus bradycardia, inferior/anterolateral st-t abnl.  Echo showed normal EF, LAE.  Has speech deficits, expressive aphasia.  [place]  S/p ILR 2/18 with Dr. Devine, no a fib noted so far per reports.  Was taken off beta-blockers due to bradycardia.   Now here for f/u.  Family states last weekend she wasn't talking/walking right and they think she  took too much Trazodone meds.  They were worried about TIA.  She did not go to hospital.   She seems back to normal now.  Her expressive aphasia has improved over last year.  CAD seems stable.  She is not having any active anginal sxs on current tx.  No active CHF sxs.  Has chronic SHERMAN, unchanged.  BP is controlled on current meds.  Daughter states has had pain in legs, hands and gabapentin didn't help for neuropathy and Lyrica caused her to feel bad.  ILR interrogated today, no arrhythmias noted on preliminary report.  Prior interrogations since last visit, no a fib noted although has some nocturnal sinus bradycardia.  Has some occasional dizziness.  No syncope.  Echo 7/18 normal EF, DD.  Lipids slipped 7/18  HGAIC above goal 7/18 but trended better.   See in ER 11/8 for elevated BS, and sent home.  ECG unchanged, sinus eleno, chronic st-t abnl.   Using CPAP for NAWAF.      Current Outpatient Medications:     aspirin (ECOTRIN) 81 MG EC tablet, Take 81 mg by mouth. Take 81 mg by mouth daily., Disp: , Rfl:     atorvastatin (LIPITOR) 80 MG tablet, Take 1 tablet (80 mg total) by mouth once daily., Disp: 90 tablet, Rfl: 3    clonazePAM (KLONOPIN) 0.5 MG tablet, Take 0.5 mg by mouth 2 (two) times daily as needed for Anxiety., Disp: , Rfl:     clopidogrel (PLAVIX) 75 mg tablet, Take 1 tablet (75 mg total) by mouth once daily., Disp: 90 tablet, Rfl: 3    donepezil (ARICEPT) 10 MG tablet, TAKE 1 TABLET BY MOUTH EVERY DAY, Disp: 90 tablet, Rfl: 0    escitalopram oxalate (LEXAPRO) 20 MG tablet, TAKE 1 TABLET BY MOUTH EVERY DAY, Disp: 90 tablet, Rfl: 0    hydroCHLOROthiazide (HYDRODIURIL) 25 MG tablet, TAKE 1 TABLET BY MOUTH EVERY DAY, Disp: 90 tablet, Rfl: 0    insulin detemir (LEVEMIR FLEXTOUCH) 100 unit/mL (3 mL) SubQ InPn pen, Inject 36 Units into the skin once daily. (Patient taking differently: Inject 48 Units into the skin once daily. ), Disp: , Rfl: 0    isosorbide mononitrate (IMDUR) 30 MG 24 hr tablet, Take 30  "mg by mouth once daily., Disp: , Rfl:     levothyroxine (SYNTHROID) 50 MCG tablet, TAKE 1 TABLET BY MOUTH EVERY DAY, Disp: 90 tablet, Rfl: 0    losartan (COZAAR) 100 MG tablet, Take 1 tablet (100 mg total) by mouth once daily., Disp: 90 tablet, Rfl: 3    nitroGLYCERIN (NITROSTAT) 0.4 MG SL tablet, Place 1 tablet (0.4 mg total) under the tongue as needed., Disp: 25 tablet, Rfl: 6    pantoprazole (PROTONIX) 40 MG tablet, TAKE 1 TABLET BY MOUTH DAILY, Disp: 90 tablet, Rfl: 0    SITagliptin (JANUVIA) 100 MG Tab, Take 100 mg by mouth once daily., Disp: , Rfl:     vitamin D 1000 units Tab, Take 1,000 Units by mouth once daily., Disp: , Rfl:     alendronate (FOSAMAX) 70 MG tablet, TAKE 1 TABLET BY MOUTH EVERY WEEK WITH A FULL GLASS OF WATER ON EMPTY STOMACH. DO NOT EAT OR LIE DOWN FOR 30 MINUTES, Disp: 12 tablet, Rfl: 3    BD INSULIN SYRINGE ULT-FINE II 1/2 mL 31 x 5/16" Syrg, , Disp: , Rfl:     BLOOD SUGAR DIAGNOSTIC (TRUETEST TEST STRIPS MISC), by Misc.(Non-Drug; Combo Route) route. Pt is testing 3 times a day, Disp: , Rfl:     EASY COMFORT LANCETS 30 gauge Misc, , Disp: , Rfl:     FLUAD 8161-2256, 65 YR UP,,PF, 45 mcg (15 mcg x 3)/0.5 mL Syrg, ADM 0.5ML IM UTD, Disp: , Rfl: 0    pen needle, diabetic 31 gauge x 5/16" Ndle, USE TWICE DAILY AND PRN, Disp: , Rfl:     Review of Systems   Constitution: Positive for weakness.   HENT: Negative.    Eyes: Negative.    Cardiovascular: Positive for dyspnea on exertion.   Respiratory: Positive for shortness of breath.    Endocrine: Negative.    Hematologic/Lymphatic: Negative.    Skin: Negative.    Musculoskeletal: Positive for arthritis and joint pain.   Gastrointestinal: Negative.    Genitourinary: Negative.    Neurological: Positive for dizziness, focal weakness, loss of balance and sensory change.   Psychiatric/Behavioral: Negative.    Allergic/Immunologic: Negative.        BP (!) 120/44 (BP Location: Left arm, Patient Position: Sitting, BP Method: Medium (Manual))  " " Pulse (!) 57   Ht 4' 10" (1.473 m)   Wt 61.2 kg (134 lb 14.7 oz)   LMP  (LMP Unknown)   BMI 28.20 kg/m²     Wt Readings from Last 3 Encounters:   12/05/18 61.2 kg (134 lb 14.7 oz)   11/17/18 61.7 kg (136 lb)   11/13/18 61.2 kg (135 lb)     Temp Readings from Last 3 Encounters:   11/17/18 97.5 °F (36.4 °C) (Oral)   11/13/18 98.8 °F (37.1 °C)   07/22/18 97.7 °F (36.5 °C) (Oral)     BP Readings from Last 3 Encounters:   12/05/18 (!) 120/44   11/18/18 (!) 136/54   11/13/18 (!) 132/100     Pulse Readings from Last 3 Encounters:   12/05/18 (!) 57   11/18/18 (!) 48   11/13/18 72          Objective:    Physical Exam   Constitutional: She is oriented to person, place, and time. Vital signs are normal. She appears well-developed and well-nourished. She is active and cooperative. She does not have a sickly appearance. She does not appear ill. No distress.   HENT:   Head: Normocephalic.   Neck: Neck supple. Normal carotid pulses, no hepatojugular reflux and no JVD present. Carotid bruit is not present. No thyromegaly present.   Cardiovascular: Regular rhythm, S1 normal, S2 normal, normal heart sounds and normal pulses. Bradycardia present. PMI is not displaced. Exam reveals no gallop and no friction rub.   No murmur heard.  Pulses:       Radial pulses are 2+ on the right side, and 2+ on the left side.   Pulmonary/Chest: Effort normal and breath sounds normal. She has no wheezes. She has no rales.   Abdominal: Soft. Normal appearance, normal aorta and bowel sounds are normal. She exhibits no pulsatile liver, no abdominal bruit, no ascites and no mass. There is no splenomegaly or hepatomegaly. There is no tenderness.   Musculoskeletal: She exhibits no edema.   Lymphadenopathy:     She has no cervical adenopathy.   Neurological: She is alert and oriented to person, place, and time.   Skin: Skin is warm. She is not diaphoretic.   Psychiatric: She has a normal mood and affect. Her behavior is normal.   Nursing note and vitals " reviewed.      I have reviewed all pertinent labs and cardiac studies.    Component      Latest Ref Rng & Units 11/17/2018   BNP      0 - 99 pg/mL 121 (H)         Chemistry        Component Value Date/Time     11/17/2018 2300    K 3.3 (L) 11/17/2018 2300    CL 97 11/17/2018 2300    CO2 30 (H) 11/17/2018 2300    BUN 21 11/17/2018 2300    CREATININE 1.1 11/17/2018 2300     (H) 11/17/2018 2300        Component Value Date/Time    CALCIUM 8.4 (L) 11/17/2018 2300    ALKPHOS 84 11/17/2018 2300    AST 16 11/17/2018 2300    ALT 11 11/17/2018 2300    BILITOT 0.5 11/17/2018 2300    ESTGFRAFRICA 56 (A) 11/17/2018 2300    EGFRNONAA 48 (A) 11/17/2018 2300        Lab Results   Component Value Date    HGBA1C 7.8 (H) 07/22/2018     Lab Results   Component Value Date    CHOL 187 07/22/2018    CHOL 116 (L) 12/18/2017    CHOL 145 09/15/2017     Lab Results   Component Value Date    HDL 59 07/22/2018    HDL 43 12/18/2017    HDL 57 09/15/2017     Lab Results   Component Value Date    LDLCALC 104.4 07/22/2018    LDLCALC 50.0 (L) 12/18/2017    LDLCALC 71.4 09/15/2017     Lab Results   Component Value Date    TRIG 118 07/22/2018    TRIG 115 12/18/2017    TRIG 83 09/15/2017     Lab Results   Component Value Date    CHOLHDL 31.6 07/22/2018    CHOLHDL 37.1 12/18/2017    CHOLHDL 39.3 09/15/2017     Lab Results   Component Value Date    WBC 9.03 11/17/2018    HGB 10.2 (L) 11/17/2018    HCT 32.8 (L) 11/17/2018    MCV 78 (L) 11/17/2018     11/17/2018     Date of Procedure: 07/22/2018        TEST DESCRIPTION   Technical Quality: This is a technically challenging study.     Aorta: The aortic root is normal in size, measuring 2.1 cm at sinotubular junction and 2.2 cm at Sinuses of Valsalva. The proximal ascending aorta is normal in size, measuring 2.5 cm across.     Left Atrium: The left atrial volume index is severely enlarged, measuring 82.97 cc/m2.     Left Ventricle: The left ventricle is normal in size, with an end-diastolic  diameter of 3.9 cm, and an end-systolic diameter of 2.4 cm. LV wall thickness is normal, with the septum measuring 1.2 cm and the posterior wall measuring 1.0 cm across. Relative   wall thickness was increased at 0.51, and the LV mass index was increased at 114.0 g/m2 consistent with concentric left ventricular hypertrophy. There are no regional wall motion abnormalities. Left ventricular systolic function appears normal. Visually   estimated ejection fraction is 60-65%. The LV Doppler derived stroke volume equals 32.0 ccs.     Diastolic indices: E wave velocity 1.2 m/s, E/A ratio 1.2,  msec., E/e' ratio(avg) 19. There is pseudonormalization of mitral inflow pattern consistent with significant diastolic dysfunction.     Right Atrium: The right atrium is severely enlarged, measuring 4.5 cm in length and 4.2 cm in width in the apical view.     Right Ventricle: The right ventricle is normal in size measuring 3.2 cm at the base in the apical right ventricle-focused view. RV free wall thickness is increased measuring 0.7 cm in Subcostal view, consistent with RVH. Global right ventricular systolic   function appears normal. The estimated PA systolic pressure is 30 mmHg.     Aortic Valve:  The aortic valve is normal in structure. The peak velocity obtained across the aortic valve is 1.58 m/s, which translates to a peak gradient of 10 mmHg. The mean gradient is 5 mmHg. Using a left ventricular outflow tract diameter of 1.5   cm, a left ventricular outflow tract velocity time integral of 19 cm, and a peak instantaneous transvalvular velocity time integral of 37 cm, the calculated aortic valve area is 0.86 cm2(AVAi is 0.61 cm2/m2).     Mitral Valve:  The mitral valve is normal in structure. There is mitral annular calcification.     Tricuspid Valve:  The tricuspid valve is normal in structure.     Pulmonary Valve:  The pulmonic valve is not well seen.     IVC: IVC is normal in size and collapses > 50% with a sniff,  suggesting normal right atrial pressure of 3 mmHg.     Intracavitary: There is no evidence of pericardial effusion, intracavity mass, thrombi, or vegetation.         CONCLUSIONS     1 - Biatrial enlargement.     2 - Concentric hypertrophy.     3 - No wall motion abnormalities.     4 - Normal left ventricular systolic function (EF 60-65%).     5 - Impaired LV relaxation, elevated LAP (grade 2 diastolic dysfunction).     6 - Right ventricular hypertrophy.     7 - Normal right ventricular systolic function .     8 - The estimated PA systolic pressure is 30 mmHg.             This document has been electronically    SIGNED BY: Romeo Epps MD On: 07/22/2018 18:56        Assessment:       1. AP (angina pectoris)    2. Hyperlipidemia associated with type 2 diabetes mellitus    3. Type 2 diabetes mellitus with hyperglycemia, with long-term current use of insulin    4. Left ventricular diastolic dysfunction with preserved systolic function    5. 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 Essential hypertension    7. History of arterial ischemic stroke, left MCA (middle cerebral artery) 12/2017    8. Sinus bradycardia    9. S/P CABG x 1    10. 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 CAD, multiple vessel         Plan:             Chronic stable CAD/CV conditions.  Discussion with family about medication compliance, avoiding taking unnecessary meds, etc.  If recurrent issues like last weekend (which seem resolved and pt seems fine in clinic today), she needs to go to ER.  She is now living alone although daughter is close by.  Continue OMT for CAD/CV conditions.  Continue ILR monitoring.  Continue asa, plavix.  Monitor sinus eleno, stay off beta-blockers.  Continue CPAP for NAWAF.  Check labs -- CBC, CMP, FLP, HGAIC.  Needs to get her lipids and DM improved.  She is going to see Dr. Brendan Rosenbaum, [facility], Neurology asap.  Phone review for test results.    F/u 3 months.

## 2018-12-11 ENCOUNTER — LAB VISIT (OUTPATIENT)
Dept: LAB | Facility: HOSPITAL | Age: 78
End: 2018-12-11
Attending: INTERNAL MEDICINE
Payer: MEDICARE

## 2018-12-11 DIAGNOSIS — Z79.4 TYPE 2 DIABETES MELLITUS WITH HYPERGLYCEMIA, WITH LONG-TERM CURRENT USE OF INSULIN: Chronic | ICD-10-CM

## 2018-12-11 DIAGNOSIS — I25.118 CORONARY ARTERY DISEASE WITH STABLE ANGINA PECTORIS, UNSPECIFIED VESSEL OR LESION TYPE, UNSPECIFIED WHETHER NATIVE OR TRANSPLANTED HEART: Chronic | ICD-10-CM

## 2018-12-11 DIAGNOSIS — E11.69 HYPERLIPIDEMIA ASSOCIATED WITH TYPE 2 DIABETES MELLITUS: Chronic | ICD-10-CM

## 2018-12-11 DIAGNOSIS — E11.65 TYPE 2 DIABETES MELLITUS WITH HYPERGLYCEMIA, WITH LONG-TERM CURRENT USE OF INSULIN: Chronic | ICD-10-CM

## 2018-12-11 DIAGNOSIS — E78.5 HYPERLIPIDEMIA ASSOCIATED WITH TYPE 2 DIABETES MELLITUS: Chronic | ICD-10-CM

## 2018-12-11 DIAGNOSIS — I10 ESSENTIAL HYPERTENSION: Chronic | ICD-10-CM

## 2018-12-11 LAB
ALBUMIN SERPL BCP-MCNC: 3.1 G/DL
ALP SERPL-CCNC: 124 U/L
ALT SERPL W/O P-5'-P-CCNC: 15 U/L
ANION GAP SERPL CALC-SCNC: 8 MMOL/L
AST SERPL-CCNC: 15 U/L
BILIRUB SERPL-MCNC: 0.8 MG/DL
BUN SERPL-MCNC: 16 MG/DL
CALCIUM SERPL-MCNC: 9.1 MG/DL
CHLORIDE SERPL-SCNC: 98 MMOL/L
CHOLEST SERPL-MCNC: 130 MG/DL
CHOLEST/HDLC SERPL: 2.4 {RATIO}
CO2 SERPL-SCNC: 33 MMOL/L
CREAT SERPL-MCNC: 0.9 MG/DL
EST. GFR  (AFRICAN AMERICAN): >60 ML/MIN/1.73 M^2
EST. GFR  (NON AFRICAN AMERICAN): >60 ML/MIN/1.73 M^2
ESTIMATED AVG GLUCOSE: 229 MG/DL
GLUCOSE SERPL-MCNC: 250 MG/DL
HBA1C MFR BLD HPLC: 9.6 %
HDLC SERPL-MCNC: 54 MG/DL
HDLC SERPL: 41.5 %
LDLC SERPL CALC-MCNC: 55.4 MG/DL
NONHDLC SERPL-MCNC: 76 MG/DL
POTASSIUM SERPL-SCNC: 4 MMOL/L
PROT SERPL-MCNC: 7.1 G/DL
SODIUM SERPL-SCNC: 139 MMOL/L
TRIGL SERPL-MCNC: 103 MG/DL

## 2018-12-11 PROCEDURE — 80053 COMPREHEN METABOLIC PANEL: CPT | Mod: HCNC

## 2018-12-11 PROCEDURE — 36415 COLL VENOUS BLD VENIPUNCTURE: CPT | Mod: HCNC,PO

## 2018-12-11 PROCEDURE — 83036 HEMOGLOBIN GLYCOSYLATED A1C: CPT | Mod: HCNC

## 2018-12-11 PROCEDURE — 80061 LIPID PANEL: CPT | Mod: HCNC

## 2018-12-12 ENCOUNTER — HOSPITAL ENCOUNTER (OUTPATIENT)
Dept: RADIOLOGY | Facility: HOSPITAL | Age: 78
Discharge: HOME | End: 2018-12-12
Attending: FAMILY MEDICINE
Payer: MEDICARE

## 2018-12-12 ENCOUNTER — OFFICE VISIT (OUTPATIENT)
Dept: FAMILY MEDICINE | Facility: CLINIC | Age: 78
End: 2018-12-12
Payer: MEDICARE

## 2018-12-12 VITALS — SYSTOLIC BLOOD PRESSURE: 136 MMHG | DIASTOLIC BLOOD PRESSURE: 60 MMHG | OXYGEN SATURATION: 97 %

## 2018-12-12 DIAGNOSIS — M79.641 PAIN IN BOTH HANDS: Primary | ICD-10-CM

## 2018-12-12 DIAGNOSIS — M79.642 PAIN IN BOTH HANDS: ICD-10-CM

## 2018-12-12 DIAGNOSIS — M79.642 PAIN IN BOTH HANDS: Primary | ICD-10-CM

## 2018-12-12 DIAGNOSIS — M79.641 PAIN IN BOTH HANDS: ICD-10-CM

## 2018-12-12 PROBLEM — E87.6 HYPOKALEMIA: Status: RESOLVED | Noted: 2018-07-22 | Resolved: 2018-12-12

## 2018-12-12 PROBLEM — R47.01 APHASIA: Status: RESOLVED | Noted: 2017-12-15 | Resolved: 2018-12-12

## 2018-12-12 PROBLEM — R00.1 SINUS BRADYCARDIA: Chronic | Status: RESOLVED | Noted: 2017-12-21 | Resolved: 2018-12-12

## 2018-12-12 PROBLEM — E83.42 HYPOMAGNESEMIA: Status: RESOLVED | Noted: 2017-12-27 | Resolved: 2018-12-12

## 2018-12-12 PROBLEM — I63.89 ACUTE HEMORRHAGIC INFARCTION OF BRAIN: Chronic | Status: RESOLVED | Noted: 2017-12-17 | Resolved: 2018-12-12

## 2018-12-12 PROBLEM — M79.89 LEFT ARM SWELLING: Status: RESOLVED | Noted: 2017-12-21 | Resolved: 2018-12-12

## 2018-12-12 PROBLEM — R07.89 ATYPICAL CHEST PAIN: Status: RESOLVED | Noted: 2018-07-22 | Resolved: 2018-12-12

## 2018-12-12 PROBLEM — G93.6 CYTOTOXIC CEREBRAL EDEMA: Status: RESOLVED | Noted: 2017-12-15 | Resolved: 2018-12-12

## 2018-12-12 PROCEDURE — 99999 PR PBB SHADOW E&M-EST. PATIENT-LVL III: CPT | Mod: PBBFAC,HCNC,, | Performed by: FAMILY MEDICINE

## 2018-12-12 PROCEDURE — 3078F DIAST BP <80 MM HG: CPT | Mod: CPTII,S$GLB,, | Performed by: FAMILY MEDICINE

## 2018-12-12 PROCEDURE — 1101F PT FALLS ASSESS-DOCD LE1/YR: CPT | Mod: CPTII,S$GLB,, | Performed by: FAMILY MEDICINE

## 2018-12-12 PROCEDURE — 73130 X-RAY EXAM OF HAND: CPT | Mod: 50,TC,HCNC,PO

## 2018-12-12 PROCEDURE — 3075F SYST BP GE 130 - 139MM HG: CPT | Mod: CPTII,S$GLB,, | Performed by: FAMILY MEDICINE

## 2018-12-12 PROCEDURE — 73130 X-RAY EXAM OF HAND: CPT | Mod: 26,50,HCNC, | Performed by: RADIOLOGY

## 2018-12-12 PROCEDURE — 99214 OFFICE O/P EST MOD 30 MIN: CPT | Mod: S$GLB,,, | Performed by: FAMILY MEDICINE

## 2018-12-12 RX ORDER — DICLOFENAC SODIUM 10 MG/G
2 GEL TOPICAL 2 TIMES DAILY
Qty: 100 G | Refills: 0 | Status: SHIPPED | OUTPATIENT
Start: 2018-12-12 | End: 2019-03-15 | Stop reason: CLARIF

## 2018-12-12 NOTE — PROGRESS NOTES
Subjective:       Patient ID: Tiffanie Wise is a 78 y.o. female.    Chief Complaint: Hand Pain and Foot Pain    78 y old female with bilateral hand  pain for months . Worst at night . Using Nuventix       Review of Systems   Constitutional: Negative.    HENT: Negative.    Eyes: Negative.    Respiratory: Negative.    Cardiovascular: Negative.    Gastrointestinal: Negative.    Genitourinary: Negative.    Musculoskeletal: Positive for arthralgias.   Skin: Negative.    Hematological: Negative.        Objective:      Physical Exam   Constitutional: She is oriented to person, place, and time. She appears well-developed and well-nourished. No distress.   HENT:   Head: Normocephalic and atraumatic.   Right Ear: External ear normal.   Left Ear: External ear normal.   Mouth/Throat: No oropharyngeal exudate.   Eyes: Conjunctivae and EOM are normal. Pupils are equal, round, and reactive to light. Right eye exhibits no discharge. Left eye exhibits no discharge. No scleral icterus.   Neck: Normal range of motion. Neck supple. No JVD present. No tracheal deviation present. No thyromegaly present.   Cardiovascular: Normal rate, regular rhythm and normal heart sounds. Exam reveals no gallop and no friction rub.   No murmur heard.  Pulmonary/Chest: Effort normal and breath sounds normal. No stridor. No respiratory distress. She has no wheezes. She has no rales. She exhibits no tenderness.   Abdominal: Soft. Bowel sounds are normal. She exhibits no distension. There is no tenderness. There is no rebound and no guarding.   Musculoskeletal:        Right hand: She exhibits decreased range of motion, tenderness and bony tenderness.        Left hand: She exhibits decreased range of motion, tenderness and bony tenderness.   Lymphadenopathy:     She has no cervical adenopathy.   Neurological: She is alert and oriented to person, place, and time.   Skin: Skin is warm and dry. She is not diaphoretic.   Psychiatric: She has a normal mood and  affect. Her behavior is normal. Judgment and thought content normal.       Assessment:       1. Pain in both hands        Plan:     Tiffanie was seen today for hand pain and foot pain.    Diagnoses and all orders for this visit:    Pain in both hands  -     X-Ray Hand 3 View Bilateral; Future  -     Rheumatoid factor; Future  -     LUIS; Future  -     CYCLIC CITRUL PEPTIDE ANTIBODY, IGG; Future  -     Sedimentation rate; Future  -     C-reactive protein; Future     Tiffanie was seen today for hand pain and foot pain.    Diagnoses and all orders for this visit:    Pain in both hands  -     X-Ray Hand 3 View Bilateral; Future  -     Rheumatoid factor; Future  -     LUIS; Future  -     CYCLIC CITRUL PEPTIDE ANTIBODY, IGG; Future  -     Sedimentation rate; Future  -     C-reactive protein; Future    Other orders  -     diclofenac sodium (VOLTAREN) 1 % Gel; Apply 2 g topically 2 (two) times daily.

## 2018-12-13 ENCOUNTER — TELEPHONE (OUTPATIENT)
Dept: CARDIOLOGY | Facility: HOSPITAL | Age: 78
End: 2018-12-13

## 2018-12-13 NOTE — TELEPHONE ENCOUNTER
Please call pt  DM needs improvement.  HGAIC 9.6%.  Goal < 7.0%  Cholesterol numbers are great.  Cholesterol is 130.  F/u with her endocrinologist to get DM under better control.  F/u with me next appt    Dr Short

## 2018-12-13 NOTE — TELEPHONE ENCOUNTER
Spoke with patient to advise her of lab results.  DM needs improvement.  HGAIC 9.6%.  Goal < 7.0%  Cholesterol numbers are great.  Cholesterol is 130.  F/u with her endocrinologist to get DM under better control.  F/u with me next appt.  Patient states that she returns to Endocrinology for appt. In January.  Denies any questions/concerns.  Instructed to notify office should any questions/concerns arise.  Patient verbalized understanding.

## 2019-01-29 ENCOUNTER — TELEPHONE (OUTPATIENT)
Dept: PULMONOLOGY | Facility: CLINIC | Age: 79
End: 2019-01-29

## 2019-01-29 NOTE — TELEPHONE ENCOUNTER
----- Message from Shanika Sumner sent at 1/29/2019 10:44 AM CST -----  Contact: DAUGHTER  Requesting a call back regarding new supplies needed. [employer] states a referral for new c pap supplies. Please call [phone].      Thanks,  Shanika Sumner

## 2019-01-29 NOTE — TELEPHONE ENCOUNTER
"[facility], contacted regarding "referral'. JACQUI states no referral needed for supplies. Ms. Kaufman will call patient to verify supplies needed and reorder as needed.  "

## 2019-01-31 ENCOUNTER — TELEPHONE (OUTPATIENT)
Dept: PULMONOLOGY | Facility: CLINIC | Age: 79
End: 2019-01-31

## 2019-01-31 NOTE — TELEPHONE ENCOUNTER
----- Message from Sabrina Hernandez sent at 1/31/2019 10:27 AM CST -----  Contact: Marilee Rosales-[phone]  Would like to consult with the nurse about C-pap supplies. Please call back at [phone]. ángel-

## 2019-01-31 NOTE — TELEPHONE ENCOUNTER
"Returned call to Patient's daughter regarding CPap supplies. Daughter is requesting that any telephone contact be made to daughter's number due to patient's hearing loss. Daughter requesting information for acquiring supplies, "referral" that [employer] claims is needed. Daughter given [facility] phone number to contact directly.   "

## 2019-03-05 ENCOUNTER — PATIENT MESSAGE (OUTPATIENT)
Dept: CARDIOLOGY | Facility: CLINIC | Age: 79
End: 2019-03-05

## 2019-03-05 NOTE — TELEPHONE ENCOUNTER
----- Message from Nikki Perez sent at 3/5/2019  3:30 PM CST -----  Contact: pt daughter _ Ms Rosales  Stated she will like a call before changing the appointment on file, she can be reached at [phone]

## 2019-03-15 ENCOUNTER — CLINICAL SUPPORT (OUTPATIENT)
Dept: CARDIOLOGY | Facility: CLINIC | Age: 79
End: 2019-03-15
Payer: MEDICARE

## 2019-03-15 ENCOUNTER — OFFICE VISIT (OUTPATIENT)
Dept: CARDIOLOGY | Facility: CLINIC | Age: 79
End: 2019-03-15
Payer: MEDICARE

## 2019-03-15 VITALS
WEIGHT: 143.5 LBS | BODY MASS INDEX: 30.12 KG/M2 | DIASTOLIC BLOOD PRESSURE: 38 MMHG | SYSTOLIC BLOOD PRESSURE: 142 MMHG | HEART RATE: 52 BPM | HEIGHT: 58 IN

## 2019-03-15 DIAGNOSIS — Z79.4 TYPE 2 DIABETES MELLITUS WITH HYPERGLYCEMIA, WITH LONG-TERM CURRENT USE OF INSULIN: ICD-10-CM

## 2019-03-15 DIAGNOSIS — I63.512 ARTERIAL ISCHEMIC STROKE, MCA (MIDDLE CEREBRAL ARTERY), LEFT, ACUTE: Chronic | ICD-10-CM

## 2019-03-15 DIAGNOSIS — I63.9 CEREBROVASCULAR ACCIDENT (CVA), UNSPECIFIED MECHANISM: ICD-10-CM

## 2019-03-15 DIAGNOSIS — R56.9 SEIZURE: Primary | ICD-10-CM

## 2019-03-15 DIAGNOSIS — E11.65 TYPE 2 DIABETES MELLITUS WITH HYPERGLYCEMIA, WITH LONG-TERM CURRENT USE OF INSULIN: ICD-10-CM

## 2019-03-15 DIAGNOSIS — E66.09 CLASS 1 OBESITY DUE TO EXCESS CALORIES WITH SERIOUS COMORBIDITY AND BODY MASS INDEX (BMI) OF 30.0 TO 30.9 IN ADULT: ICD-10-CM

## 2019-03-15 DIAGNOSIS — G47.33 OSA ON CPAP: ICD-10-CM

## 2019-03-15 DIAGNOSIS — I51.89 LEFT VENTRICULAR DIASTOLIC DYSFUNCTION WITH PRESERVED SYSTOLIC FUNCTION: Chronic | ICD-10-CM

## 2019-03-15 DIAGNOSIS — Z45.09 ENCOUNTER FOR LOOP RECORDER CHECK: ICD-10-CM

## 2019-03-15 DIAGNOSIS — E78.5 HYPERLIPIDEMIA ASSOCIATED WITH TYPE 2 DIABETES MELLITUS: Chronic | ICD-10-CM

## 2019-03-15 DIAGNOSIS — I20.9 AP (ANGINA PECTORIS): Chronic | ICD-10-CM

## 2019-03-15 DIAGNOSIS — I25.118 CORONARY ARTERY DISEASE WITH STABLE ANGINA PECTORIS, UNSPECIFIED VESSEL OR LESION TYPE, UNSPECIFIED WHETHER NATIVE OR TRANSPLANTED HEART: Chronic | ICD-10-CM

## 2019-03-15 DIAGNOSIS — E11.69 HYPERLIPIDEMIA ASSOCIATED WITH TYPE 2 DIABETES MELLITUS: Chronic | ICD-10-CM

## 2019-03-15 DIAGNOSIS — I63.412 EMBOLIC STROKE INVOLVING LEFT MIDDLE CEREBRAL ARTERY: ICD-10-CM

## 2019-03-15 DIAGNOSIS — I10 ESSENTIAL HYPERTENSION: Chronic | ICD-10-CM

## 2019-03-15 DIAGNOSIS — I63.9 CRYPTOGENIC STROKE: ICD-10-CM

## 2019-03-15 PROCEDURE — 3077F PR MOST RECENT SYSTOLIC BLOOD PRESSURE >= 140 MM HG: ICD-10-PCS | Mod: HCNC,CPTII,S$GLB, | Performed by: INTERNAL MEDICINE

## 2019-03-15 PROCEDURE — 93285 PRGRMG DEV EVAL SCRMS IP: CPT | Mod: HCNC,S$GLB,, | Performed by: INTERNAL MEDICINE

## 2019-03-15 PROCEDURE — 3078F PR MOST RECENT DIASTOLIC BLOOD PRESSURE < 80 MM HG: ICD-10-PCS | Mod: HCNC,CPTII,S$GLB, | Performed by: INTERNAL MEDICINE

## 2019-03-15 PROCEDURE — 99214 PR OFFICE/OUTPT VISIT, EST, LEVL IV, 30-39 MIN: ICD-10-PCS | Mod: HCNC,S$GLB,, | Performed by: INTERNAL MEDICINE

## 2019-03-15 PROCEDURE — 1101F PT FALLS ASSESS-DOCD LE1/YR: CPT | Mod: HCNC,CPTII,S$GLB, | Performed by: INTERNAL MEDICINE

## 2019-03-15 PROCEDURE — 99999 PR PBB SHADOW E&M-EST. PATIENT-LVL III: CPT | Mod: PBBFAC,HCNC,, | Performed by: INTERNAL MEDICINE

## 2019-03-15 PROCEDURE — 1101F PR PT FALLS ASSESS DOC 0-1 FALLS W/OUT INJ PAST YR: ICD-10-PCS | Mod: HCNC,CPTII,S$GLB, | Performed by: INTERNAL MEDICINE

## 2019-03-15 PROCEDURE — 3078F DIAST BP <80 MM HG: CPT | Mod: HCNC,CPTII,S$GLB, | Performed by: INTERNAL MEDICINE

## 2019-03-15 PROCEDURE — 3077F SYST BP >= 140 MM HG: CPT | Mod: HCNC,CPTII,S$GLB, | Performed by: INTERNAL MEDICINE

## 2019-03-15 PROCEDURE — 99214 OFFICE O/P EST MOD 30 MIN: CPT | Mod: HCNC,S$GLB,, | Performed by: INTERNAL MEDICINE

## 2019-03-15 PROCEDURE — 93285 LOOP RECORDER PROGRAMMING: ICD-10-PCS | Mod: HCNC,S$GLB,, | Performed by: INTERNAL MEDICINE

## 2019-03-15 PROCEDURE — 99499 RISK ADDL DX/OHS AUDIT: ICD-10-PCS | Mod: HCNC,S$GLB,, | Performed by: INTERNAL MEDICINE

## 2019-03-15 PROCEDURE — 99999 PR PBB SHADOW E&M-EST. PATIENT-LVL III: ICD-10-PCS | Mod: PBBFAC,HCNC,, | Performed by: INTERNAL MEDICINE

## 2019-03-15 PROCEDURE — 99499 UNLISTED E&M SERVICE: CPT | Mod: HCNC,S$GLB,, | Performed by: INTERNAL MEDICINE

## 2019-03-15 RX ORDER — LACOSAMIDE 50 MG/1
50 TABLET ORAL 2 TIMES DAILY
Qty: 60 TABLET | Refills: 0 | OUTPATIENT
Start: 2019-03-15 | End: 2019-04-14

## 2019-03-15 RX ORDER — ISOSORBIDE MONONITRATE 30 MG/1
30 TABLET, EXTENDED RELEASE ORAL 2 TIMES DAILY
Qty: 60 TABLET | Refills: 11 | Status: ON HOLD
Start: 2019-03-15 | End: 2019-09-01

## 2019-03-15 RX ORDER — LACOSAMIDE 50 MG/1
50 TABLET ORAL 2 TIMES DAILY
COMMUNITY
Start: 2019-03-15 | End: 2019-04-14

## 2019-03-15 RX ORDER — ISOSORBIDE DINITRATE 30 MG/1
30 TABLET ORAL
COMMUNITY
End: 2019-03-15 | Stop reason: ALTCHOICE

## 2019-03-15 NOTE — PROGRESS NOTES
Subjective:    Patient ID:  Tiffanie Wise is a 78 y.o. female who presents for evaluation of Shortness of Breath; Dizziness; Hypertension; Hyperlipidemia; Coronary Artery Disease; and Risk Factor Management For Atherosclerosis        HPI Pt presents for f/u.  Her current medical conditions include CAD (CABG LIMA-LAD 6/16), DM, HTN, dyslipidemia, GERD. Nonsmoker.  Her prior history is pertinent for following:  NSTEMI 11/10 and underwent LHC and sucessful stenting of 90% RCA (3 x 23 mm Promus BRIANDA).    S/p PCI of 70% mid-LCX 12/12 with Xience BRIANDA.    S/p LHC for worsening anginal sxs on 11/21/13 with successful PCI 90% ISR mid-LCX with Xience BRIANDA.    S/p unstable angina and had PTCA of ISR of her LCX March 2014.  S/p 7/14 PTCA of severe LCX/OM ISR and PTCA of distal LCX at her bifurcation lesion at stent site July 2014 for ACS.  S/p 10/14 repeat revascularization of severe LCX ISR with cutting balloon Oct 2014 for ACS.  S/p 12/14 admit with unstable angina. She had ISR LCX again, Resolute BRIANDA implanted with good results.    Pt underwent repeat LHC again late June 2016 for unstable anginal sxs and had severe 95% distal LAD stenosis not amenable to PCI.  She had patent LCX, RCA stents, chronic occlusion of R PLB, normal LVEF.  She had urgent CABG w LIMA-LAD June 2016.  S/p hospitalization for ischemic CVA Dec 2017 tx with TPA and tx to [facility] (distal left parietal MCA branch occlusion). Unclear mechanism of CVA.  She had some hemorrhage noted in CVA distribution.  Was maintained on asa, plavix at time of discharge.  Metoprolol was changed to Coreg for bradycardia.  Her ecg showed sinus bradycardia, inferior/anterolateral st-t abnl.  Echo showed normal EF, LAE.  Has speech deficits, expressive aphasia.  S/p rehab at [facility].  S/p ILR 2/18 with raimundo Mcdonald a fib noted so far per reports.  Was taken off beta-blockers due to bradycardia.   Echo 7/18 normal EF, DD.  Now here.  Again has had issues since last  "visit.  Admitted Feb 2019 to [facility] and had seizures, attributed to pneumonia.  Chart reviewed from [facility].  MRI brain no acute changes.  Echo showed normal EF, LVH.  keppra added but she had somnolence so switched to Vimpat.  CAD stable.  She has chronic anginal sxs, controlled on current medical tx.  She has chronic SHERMAN, stable.  No pnd/orthopnea.  Still with some aphasia.  Compliant with CPAP for NAWAF.  DM remains poorly controlled, HGAIC chronically elevated.  HTN controlled, diastolic pressure low.    Her meds were all clarified in clinic today, taking 15 minutes to review and update med list.  ILR interrogations, no arrhythmias noted.  Lipids well controlled, on Atorvastatin.  Compliant with meds.        Current Outpatient Medications:     aspirin (ECOTRIN) 81 MG EC tablet, Take 81 mg by mouth. Take 81 mg by mouth daily., Disp: , Rfl:     atorvastatin (LIPITOR) 80 MG tablet, Take 1 tablet (80 mg total) by mouth once daily., Disp: 90 tablet, Rfl: 3    BD INSULIN SYRINGE ULT-FINE II 1/2 mL 31 x 5/16" Syrg, , Disp: , Rfl:     BLOOD SUGAR DIAGNOSTIC (TRUETEST TEST STRIPS MISC), by Misc.(Non-Drug; Combo Route) route. Pt is testing 3 times a day, Disp: , Rfl:     clonazePAM (KLONOPIN) 0.5 MG tablet, Take 0.5 mg by mouth 2 (two) times daily as needed for Anxiety., Disp: , Rfl:     clopidogrel (PLAVIX) 75 mg tablet, TAKE 1 TABLET BY MOUTH EVERY DAY, Disp: 90 tablet, Rfl: 0    donepezil (ARICEPT) 10 MG tablet, TAKE 1 TABLET BY MOUTH EVERY DAY, Disp: 90 tablet, Rfl: 0    EASY COMFORT LANCETS 30 gauge Misc, , Disp: , Rfl:     escitalopram oxalate (LEXAPRO) 20 MG tablet, Take 1 tablet (20 mg total) by mouth once daily., Disp: 90 tablet, Rfl: 0    hydroCHLOROthiazide (HYDRODIURIL) 25 MG tablet, Take 1 tablet (25 mg total) by mouth once daily., Disp: 90 tablet, Rfl: 0    lacosamide (VIMPAT) 50 mg Tab, Take 50 mg by mouth 2 (two) times daily., Disp: , Rfl:     levothyroxine (SYNTHROID) 50 MCG tablet, TAKE 1 TABLET BY " "MOUTH EVERY DAY, Disp: 90 tablet, Rfl: 0    losartan (COZAAR) 100 MG tablet, Take 1 tablet (100 mg total) by mouth once daily., Disp: 90 tablet, Rfl: 3    pantoprazole (PROTONIX) 40 MG tablet, TAKE 1 TABLET BY MOUTH EVERY DAY, Disp: 90 tablet, Rfl: 3    pen needle, diabetic 31 gauge x 5/16" Ndle, USE TWICE DAILY AND PRN, Disp: , Rfl:     SITagliptin (JANUVIA) 100 MG Tab, Take 100 mg by mouth once daily., Disp: , Rfl:     vitamin D 1000 units Tab, Take 1,000 Units by mouth once daily., Disp: , Rfl:     insulin detemir (LEVEMIR FLEXTOUCH) 100 unit/mL (3 mL) SubQ InPn pen, Inject 36 Units into the skin once daily. (Patient taking differently: Inject 48 Units into the skin once daily. ), Disp: , Rfl: 0    isosorbide mononitrate (IMDUR) 30 MG 24 hr tablet, Take 1 tablet (30 mg total) by mouth 2 (two) times daily., Disp: 60 tablet, Rfl: 11    nitroGLYCERIN (NITROSTAT) 0.4 MG SL tablet, Place 1 tablet (0.4 mg total) under the tongue as needed., Disp: 25 tablet, Rfl: 6  No current facility-administered medications for this visit.       Review of Systems   Constitution: Positive for weakness and malaise/fatigue.   HENT: Negative.    Eyes: Negative.    Cardiovascular: Positive for dyspnea on exertion.   Respiratory: Positive for shortness of breath.    Endocrine: Negative.    Hematologic/Lymphatic: Negative.    Skin: Negative.    Musculoskeletal: Positive for arthritis and joint pain.   Gastrointestinal: Negative.    Genitourinary: Negative.    Neurological: Positive for dizziness, focal weakness and light-headedness.   Psychiatric/Behavioral: Negative.    Allergic/Immunologic: Negative.        BP (!) 142/38 (BP Location: Left arm, Patient Position: Standing, BP Method: Medium (Manual))   Pulse (!) 52   Ht 4' 10" (1.473 m)   Wt 65.1 kg (143 lb 8.3 oz)   LMP  (LMP Unknown)   BMI 30.00 kg/m²   Wt Readings from Last 3 Encounters:   03/15/19 65.1 kg (143 lb 8.3 oz)   12/12/18 (P) 62.1 kg (136 lb 12.7 oz)   12/05/18 " 61.2 kg (134 lb 14.7 oz)     Temp Readings from Last 3 Encounters:   12/12/18 (P) 97.5 °F (36.4 °C) ((P) Tympanic)   11/17/18 97.5 °F (36.4 °C) (Oral)   11/13/18 98.8 °F (37.1 °C)     BP Readings from Last 3 Encounters:   03/15/19 (!) 142/38   12/12/18 136/60   12/05/18 (!) 120/44     Pulse Readings from Last 3 Encounters:   03/15/19 (!) 52   12/12/18 (P) 64   12/05/18 (!) 57          Objective:    Physical Exam   Constitutional: She is oriented to person, place, and time. Vital signs are normal. She appears well-developed and well-nourished. She is active and cooperative. She does not have a sickly appearance. She does not appear ill. No distress.   HENT:   Head: Normocephalic.   Neck: Neck supple. Normal carotid pulses, no hepatojugular reflux and no JVD present. Carotid bruit is not present. No thyromegaly present.   Cardiovascular: Regular rhythm, S1 normal, S2 normal, normal heart sounds and normal pulses. Bradycardia present. PMI is not displaced. Exam reveals no gallop and no friction rub.   No murmur heard.  Pulses:       Radial pulses are 2+ on the right side, and 2+ on the left side.   Pulmonary/Chest: Effort normal and breath sounds normal. She has no wheezes. She has no rales.   Abdominal: Soft. Normal appearance, normal aorta and bowel sounds are normal. She exhibits no ascites and no mass. There is no tenderness.   Musculoskeletal: She exhibits no edema.   Lymphadenopathy:     She has no cervical adenopathy.   Neurological: She is alert and oriented to person, place, and time.   Skin: Skin is warm. She is not diaphoretic.   Psychiatric: She has a normal mood and affect. Her behavior is normal.   Nursing note and vitals reviewed.      I have reviewed all pertinent labs and cardiac studies.      Chemistry        Component Value Date/Time     12/11/2018 0919    K 4.0 12/11/2018 0919    CL 98 12/11/2018 0919    CO2 33 (H) 12/11/2018 0919    BUN 16 12/11/2018 0919    CREATININE 0.9 12/11/2018 0919      (H) 12/11/2018 0919        Component Value Date/Time    CALCIUM 9.1 12/11/2018 0919    ALKPHOS 124 12/11/2018 0919    AST 15 12/11/2018 0919    ALT 15 12/11/2018 0919    BILITOT 0.8 12/11/2018 0919    ESTGFRAFRICA >60.0 12/11/2018 0919    EGFRNONAA >60.0 12/11/2018 0919        Lab Results   Component Value Date    WBC 9.03 11/17/2018    HGB 10.2 (L) 11/17/2018    HCT 32.8 (L) 11/17/2018    MCV 78 (L) 11/17/2018     11/17/2018     Lab Results   Component Value Date    HGBA1C 9.6 (H) 12/11/2018     Lab Results   Component Value Date    CHOL 130 12/11/2018    CHOL 187 07/22/2018    CHOL 116 (L) 12/18/2017     Lab Results   Component Value Date    HDL 54 12/11/2018    HDL 59 07/22/2018    HDL 43 12/18/2017     Lab Results   Component Value Date    LDLCALC 55.4 (L) 12/11/2018    LDLCALC 104.4 07/22/2018    LDLCALC 50.0 (L) 12/18/2017     Lab Results   Component Value Date    TRIG 103 12/11/2018    TRIG 118 07/22/2018    TRIG 115 12/18/2017     Lab Results   Component Value Date    CHOLHDL 41.5 12/11/2018    CHOLHDL 31.6 07/22/2018    CHOLHDL 37.1 12/18/2017           Assessment:       1. Seizure    2. NWAAF on CPAP    3. AP (angina pectoris)    4. 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 Embolic stroke involving left middle cerebral artery    6. Cerebrovascular accident (CVA), unspecified mechanism    7. Essential hypertension    8. History of arterial ischemic stroke, left MCA (middle cerebral artery) 12/2017    9. Hyperlipidemia associated with type 2 diabetes mellitus    10. Left ventricular diastolic dysfunction with preserved systolic function    11. Type 2 diabetes mellitus with hyperglycemia, with long-term current use of insulin    12. Class 1 obesity due to excess calories with serious comorbidity and body mass index (BMI) of 30.0 to 30.9 in adult         Plan:             Complex patient with  extensive serious medical conditions.  Stable CAD/CV conditions on current medical tx.  Lower Imdur to 30 mg bid from 30 mg qam, 60 mg pm dosing.  Continue f/u with ILR monitoring and f/u with Dr. Devine, EP service.  F/u with Neurology appt later this month.  Needs to lower DM HGAIC to goal, chronic poor control!  Cardiac diet  Continue CPAP for NAWAF.  Risk factor modification  Weight control.  F/u 4 months.

## 2019-03-19 ENCOUNTER — OFFICE VISIT (OUTPATIENT)
Dept: PULMONOLOGY | Facility: CLINIC | Age: 79
End: 2019-03-19
Payer: MEDICARE

## 2019-03-19 VITALS
DIASTOLIC BLOOD PRESSURE: 60 MMHG | OXYGEN SATURATION: 98 % | HEART RATE: 60 BPM | HEIGHT: 58 IN | WEIGHT: 140.31 LBS | SYSTOLIC BLOOD PRESSURE: 120 MMHG | RESPIRATION RATE: 16 BRPM | BODY MASS INDEX: 29.45 KG/M2

## 2019-03-19 DIAGNOSIS — F32.0 MAJOR DEPRESSIVE DISORDER, SINGLE EPISODE, MILD: Chronic | ICD-10-CM

## 2019-03-19 DIAGNOSIS — G47.33 OSA ON CPAP: Primary | Chronic | ICD-10-CM

## 2019-03-19 DIAGNOSIS — I70.0 AORTIC ARCH ATHEROSCLEROSIS: Chronic | ICD-10-CM

## 2019-03-19 PROCEDURE — 99499 UNLISTED E&M SERVICE: CPT | Mod: HCNC,S$GLB,, | Performed by: INTERNAL MEDICINE

## 2019-03-19 PROCEDURE — 3074F PR MOST RECENT SYSTOLIC BLOOD PRESSURE < 130 MM HG: ICD-10-PCS | Mod: HCNC,CPTII,S$GLB, | Performed by: INTERNAL MEDICINE

## 2019-03-19 PROCEDURE — 3078F DIAST BP <80 MM HG: CPT | Mod: HCNC,CPTII,S$GLB, | Performed by: INTERNAL MEDICINE

## 2019-03-19 PROCEDURE — 99214 OFFICE O/P EST MOD 30 MIN: CPT | Mod: HCNC,S$GLB,, | Performed by: INTERNAL MEDICINE

## 2019-03-19 PROCEDURE — 3074F SYST BP LT 130 MM HG: CPT | Mod: HCNC,CPTII,S$GLB, | Performed by: INTERNAL MEDICINE

## 2019-03-19 PROCEDURE — 99999 PR PBB SHADOW E&M-EST. PATIENT-LVL III: CPT | Mod: PBBFAC,HCNC,, | Performed by: INTERNAL MEDICINE

## 2019-03-19 PROCEDURE — 99499 RISK ADDL DX/OHS AUDIT: ICD-10-PCS | Mod: HCNC,S$GLB,, | Performed by: INTERNAL MEDICINE

## 2019-03-19 PROCEDURE — 1101F PR PT FALLS ASSESS DOC 0-1 FALLS W/OUT INJ PAST YR: ICD-10-PCS | Mod: HCNC,CPTII,S$GLB, | Performed by: INTERNAL MEDICINE

## 2019-03-19 PROCEDURE — 3078F PR MOST RECENT DIASTOLIC BLOOD PRESSURE < 80 MM HG: ICD-10-PCS | Mod: HCNC,CPTII,S$GLB, | Performed by: INTERNAL MEDICINE

## 2019-03-19 PROCEDURE — 99214 PR OFFICE/OUTPT VISIT, EST, LEVL IV, 30-39 MIN: ICD-10-PCS | Mod: HCNC,S$GLB,, | Performed by: INTERNAL MEDICINE

## 2019-03-19 PROCEDURE — 99999 PR PBB SHADOW E&M-EST. PATIENT-LVL III: ICD-10-PCS | Mod: PBBFAC,HCNC,, | Performed by: INTERNAL MEDICINE

## 2019-03-19 PROCEDURE — 1101F PT FALLS ASSESS-DOCD LE1/YR: CPT | Mod: HCNC,CPTII,S$GLB, | Performed by: INTERNAL MEDICINE

## 2019-03-19 NOTE — ASSESSMENT & PLAN NOTE
Continue AUTOPAP of  4 - 20 CMWP (DME -  [facility])     Discussed therapeutic goals for positive airway pressure therapy(CPAP or BiPAP): Ideal is usage 100% of nights for 6 - 8 hours per night. Minimum usage is 70% of night for at least 4 hours per night used. Pateint expressed understanding. All Questions answered.    Complaince download in 6 Months.    CPAP supplies renewed.

## 2019-03-19 NOTE — PATIENT INSTRUCTIONS
Continuous Positive Air Pressure (CPAP)     A mask over the nose gently directs air into the throat to keep the airway open.   Continuous positive air pressure (CPAP) uses gentle air pressure to hold the airway open. CPAP is often the most effective treatment for sleep apnea and severe snoring. It works very well for many people. But keep in mind that it can take several adjustments before the setup is right for you.  How CPAP works  The CPAP machine  is a small portable pump beside the bed. The pump sends air through a hose, which is held over your nose and mouth by a mask. Mild air pressure is gently pushed through your airway. The air pressure nudges sagging tissues aside. This widens the airway so you can breathe better. CPAP may be combined with other kinds of therapy for sleep apnea.  Types of air pressure treatments  There are different types of CPAP. Your doctor or CPAP technician will help you decide which type is best for you:  · Basic CPAP keeps the pressure constant all night long.  · A bilevel device (BiPAP) provides more pressure when you breathe in and less when you breathe out. A BiPAP machine also may be set to provide automatic breaths to maintain breathing if you stop breathing while sleeping.  · An autoCPAP device automatically adjusts pressure throughout the night and in response to changes such as body position, sleep stage, and snoring.  Date Last Reviewed: 8/10/2015  © 0066-0518 [employer], [email or web address]. [address] [place] [address], [place] [address]. All rights reserved. This information is not intended as a substitute for professional medical care. Always follow your healthcare professional's instructions.

## 2019-03-19 NOTE — PROGRESS NOTES
Subjective:      Patient ID: Tiffanie Wise is a 78 y.o. female.    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    RUDOLPH (generalized anxiety disorder)    Pulmonary nodule, right - stable    Major depressive disorder, single episode, mild    Left ventricular diastolic dysfunction with preserved systolic function    Long-term insulin use in type 2 diabetes    CAD with remote CABG x 1V (LIMA-LAD) in 6/2016 + PCI of RCA and LCx    Essential hypertension    Unspecified vitamin D deficiency    Amnesia    History of arterial ischemic stroke, 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      Chief Complaint: Sleep Apnea         HPI: Follow up for NAWAF and CPAP complaince assessment.     she  is on APAP  of  4 - 20 CMWP. She is compliant with  her AUTOPAP      She definitely thinks PAP is beneficial to her health and She wants to continue with PAP therapy.    Patient denies snoring, headaches, excessive daytime sleepiness    [place] Sleepiness Scale       EPWORTH SLEEPINESS SCALE 3/19/2019 9/19/2018 3/15/2018 2/1/2018 11/6/2017 9/25/2017 12/10/2015   Sitting and reading 0 0 0 0 2 2 3   Watching TV 0 0 0 0 1 1 2   Sitting, inactive in a public place (e.g. a theatre or a meeting) 0 0 0 0 1 2 2   As a passenger in a car for an hour without a break 0 0 0 0 0 2 2   Lying down to rest in the afternoon when circumstances permit 3 1 0 3 3 3 3   Sitting and talking to  someone 0 0 0 0 0 0 2   Sitting quietly after a lunch without alcohol 0 0 0 0 1 1 2   In a car, while stopped for a few minutes in traffic 0 0 0 0 0 0 0   Total score 3 1 0 3 8 11 16     Compliance Summary  12/18/2018 - 3/17/2019 (90 days)  Days with Device Usage 67 days  Days without Device Usage 23 days  Percent Days with Device Usage 74.4%  Cumulative Usage 16 days 6 hrs. 13 mins. 9 secs.  Maximum Usage (1 Day) 11 hrs. 25 mins. 21 secs.  Average Usage (All Days) 4 hrs. 20 mins. 8 secs.  Average Usage (Days Used) 5 hrs. 49 mins. 27 secs.  Minimum Usage (1 Day) 3 secs.  Percent of Days with Usage >= 4 Hours 54.4%  Percent of Days with Usage < 4 Hours 45.6%  Date Range  Total Blower Time 17 days 5 hrs. 54 mins. 9 secs.  Average AHI 7.6  Auto-CPAP Summary ([facility])  Auto-CPAP Mean Pressure 8.8 cmH2O  Auto-CPAP Peak Average Pressure 12.3 cmH2O  Average Device Pressure <= 90% of Time 11.0 cmH2O  Average Time in Large Leak Per Day 25 mins. 42 secs    A full  review of systems, past , family  and social histories was performed except as mentioned in the note above, these are non contributory to the main issues discussed today.       Previous Report Reviewed: office notes     The following portions of the patient's history were reviewed and updated as appropriate: She  has a past medical history of Acute respiratory failure with hypoxia, Anxiety, AP (angina pectoris) (7/18/2013), Arterial ischemic stroke, MCA (middle cerebral artery), left, acute (12/15/2017), Asthma, CAD, multiple vessel (8/2/2016), Chronic ischemic heart disease (7/18/2013), Class 1 obesity due to excess calories with serious comorbidity and body mass index (BMI) of 30.0 to 30.9 in adult (3/15/2019), Coronary artery disease (7/18/2013), Depression, Diabetes with neurologic complications, GERD (gastroesophageal reflux disease) (7/18/2013), Heart failure, History of PTCA (7/18/2013), Hypertension (7/18/2013), Hypothyroidism, Leg pain  (8/29/2013), Mixed hyperlipidemia (7/18/2013), Myocardial infarction, Osteoporosis, Pneumonia, Pneumonia due to other staphylococcus, Precancerous changes of the vagina, Seizure (3/15/2019), Sleep apnea, Thyroid disease, Tobacco dependence, Trouble in sleeping, Type 2 diabetes mellitus with ophthalmic manifestations, and Urinary incontinence.  She  has a past surgical history that includes Coronary stent placement; Thyroid surgery; Coronary angioplasty; Breast surgery (Left); Hysterectomy (1970); Eye surgery (Bilateral, 2014); and Tubal ligation (1970).  Her family history includes Alcohol abuse in her sister; Cancer in her son; Cirrhosis in her sister; Diabetes in her paternal grandmother and sister; Fibromyalgia in her daughter and daughter; Heart disease in her mother; Kidney disease in her brother and father.  She  reports that she quit smoking about 10 years ago. She has a 38.00 pack-year smoking history. she has never used smokeless tobacco. She reports that she does not drink alcohol or use drugs.  She has a current medication list which includes the following prescription(s): aspirin, atorvastatin, bd insulin syringe ult-fine ii, blood sugar diagnostic, clonazepam, clopidogrel, donepezil, easy comfort lancets, escitalopram oxalate, hydrochlorothiazide, isosorbide mononitrate, lacosamide, levothyroxine, losartan, nitroglycerin, pantoprazole, pen needle, diabetic, sitagliptin, vitamin d, and insulin detemir u-100.  She has No Known Allergies..    Review of Systems   Constitutional: Positive for fatigue. Negative for fever.   HENT: Negative for postnasal drip, rhinorrhea and congestion.    Respiratory: Positive for apnea, cough, sputum production, chest tightness, shortness of breath, dyspnea on extertion, use of rescue inhaler and Paroxysmal Nocturnal Dyspnea.    Cardiovascular: Negative for chest pain, palpitations and leg swelling.   Skin: Negative for rash.   Gastrointestinal: Negative for nausea and  "abdominal pain.   Neurological: Positive for weakness. Negative for dizziness, syncope and light-headedness.   Hematological: Negative for adenopathy. Does not bruise/bleed easily.   Psychiatric/Behavioral: Positive for sleep disturbance. The patient is not nervous/anxious.         Objective:     Vitals:    03/19/19 1119   BP: 120/60   Pulse: 60   Resp: 16   SpO2: 98%   Weight: 63.6 kg (140 lb 5.2 oz)   Height: 4' 10" (1.473 m)     body mass index is 29.33 kg/m².    Physical Exam   Constitutional: She is oriented to person, place, and time. She appears well-developed and well-nourished.   HENT:   Head: Normocephalic and atraumatic.   Mouth/Throat: Oropharyngeal exudate present.   Eyes: Conjunctivae are normal. Pupils are equal, round, and reactive to light.   Neck: Neck supple. No JVD present. No tracheal deviation present. No thyromegaly present.   Cardiovascular: Normal rate, regular rhythm and normal heart sounds.   Pulmonary/Chest: Effort normal. No respiratory distress. She has decreased breath sounds. She has no wheezes. She has rales in the right lower field and the left lower field. She exhibits no tenderness.   Abdominal: Soft. Bowel sounds are normal.   Musculoskeletal: Normal range of motion. She exhibits no edema.   Lymphadenopathy:     She has no cervical adenopathy.   Neurological: She is alert and oriented to person, place, and time.   Skin: Skin is warm and dry.   Nursing note and vitals reviewed.      Personal Diagnostic Review  CPAP complaince download.     Assessment / plan     Discussed diagnosis, its evaluation, treatment and usual course. All questions answered.    Problem List Items Addressed This Visit        Psychiatric    Major depressive disorder, single episode, mild    Overview     Stable and controlled on LEXAPRO.           Current Assessment & Plan     Per PCP.            Cardiac/Vascular    Aortic arch atherosclerosis    Overview     Stable and controlled.          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 Minimum usage is 70% of night for at least 4 hours per night used. Pateint expressed understanding. All Questions answered.    Complaince download in 6 Months.    CPAP supplies renewed.          Relevant Orders    CPAP/BIPAP SUPPLIES            TIME SPENT WITH PATIENT: Time spent: 25 minutes in face to face  discussion concerning diagnosis, prognosis, review of lab and test results, benefits of treatment as well as management of disease, counseling of patient and coordination of care between various health  care providers . Greater than half the time spent was used for coordination of care and counseling of patient.     Follow-up in about 6 months (around 9/19/2019) for NAWAF.

## 2019-03-20 ENCOUNTER — PES CALL (OUTPATIENT)
Dept: ADMINISTRATIVE | Facility: CLINIC | Age: 79
End: 2019-03-20

## 2019-06-09 ENCOUNTER — HOSPITAL ENCOUNTER (EMERGENCY)
Facility: HOSPITAL | Age: 79
Discharge: HOME | End: 2019-06-09
Attending: EMERGENCY MEDICINE
Payer: MEDICARE

## 2019-06-09 VITALS
RESPIRATION RATE: 20 BRPM | OXYGEN SATURATION: 100 % | DIASTOLIC BLOOD PRESSURE: 88 MMHG | SYSTOLIC BLOOD PRESSURE: 131 MMHG | WEIGHT: 145.69 LBS | HEART RATE: 56 BPM | TEMPERATURE: 97 F | BODY MASS INDEX: 30.45 KG/M2

## 2019-06-09 DIAGNOSIS — R73.9 HYPERGLYCEMIA: Primary | ICD-10-CM

## 2019-06-09 DIAGNOSIS — R07.89 CHEST WALL PAIN: ICD-10-CM

## 2019-06-09 LAB
ACANTHOCYTES BLD QL SMEAR: PRESENT
ALBUMIN SERPL BCP-MCNC: 3.3 G/DL (ref 3.5–5.2)
ALLENS TEST: ABNORMAL
ALP SERPL-CCNC: 120 U/L (ref 55–135)
ALT SERPL W/O P-5'-P-CCNC: 14 U/L (ref 10–44)
ANION GAP SERPL CALC-SCNC: 14 MMOL/L (ref 8–16)
ANISOCYTOSIS BLD QL SMEAR: SLIGHT
AST SERPL-CCNC: 14 U/L (ref 10–40)
BACTERIA #/AREA URNS HPF: NORMAL /HPF
BASOPHILS # BLD AUTO: 0.02 K/UL (ref 0–0.2)
BASOPHILS NFR BLD: 0.2 % (ref 0–1.9)
BILIRUB SERPL-MCNC: 0.8 MG/DL (ref 0.1–1)
BILIRUB UR QL STRIP: NEGATIVE
BUN SERPL-MCNC: 15 MG/DL (ref 8–23)
BURR CELLS BLD QL SMEAR: ABNORMAL
CALCIUM SERPL-MCNC: 9.5 MG/DL (ref 8.7–10.5)
CHLORIDE SERPL-SCNC: 97 MMOL/L (ref 95–110)
CLARITY UR: CLEAR
CO2 SERPL-SCNC: 23 MMOL/L (ref 23–29)
COLOR UR: YELLOW
CREAT SERPL-MCNC: 1.3 MG/DL (ref 0.5–1.4)
DELSYS: ABNORMAL
DIFFERENTIAL METHOD: ABNORMAL
EOSINOPHIL # BLD AUTO: 0.1 K/UL (ref 0–0.5)
EOSINOPHIL NFR BLD: 1.2 % (ref 0–8)
ERYTHROCYTE [DISTWIDTH] IN BLOOD BY AUTOMATED COUNT: 16.2 % (ref 11.5–14.5)
EST. GFR  (AFRICAN AMERICAN): 45 ML/MIN/1.73 M^2
EST. GFR  (NON AFRICAN AMERICAN): 39 ML/MIN/1.73 M^2
FIO2: 21
GLUCOSE SERPL-MCNC: 483 MG/DL (ref 70–110)
GLUCOSE UR QL STRIP: ABNORMAL
HCO3 UR-SCNC: 24.7 MMOL/L (ref 24–28)
HCT VFR BLD AUTO: 36.4 % (ref 37–48.5)
HGB BLD-MCNC: 11.1 G/DL (ref 12–16)
HGB UR QL STRIP: NEGATIVE
HYPOCHROMIA BLD QL SMEAR: ABNORMAL
KETONES UR QL STRIP: NEGATIVE
LEUKOCYTE ESTERASE UR QL STRIP: NEGATIVE
LYMPHOCYTES # BLD AUTO: 1.5 K/UL (ref 1–4.8)
LYMPHOCYTES NFR BLD: 17 % (ref 18–48)
MCH RBC QN AUTO: 22 PG (ref 27–31)
MCHC RBC AUTO-ENTMCNC: 30.5 G/DL (ref 32–36)
MCV RBC AUTO: 72 FL (ref 82–98)
MICROSCOPIC COMMENT: NORMAL
MODE: ABNORMAL
MONOCYTES # BLD AUTO: 0.9 K/UL (ref 0.3–1)
MONOCYTES NFR BLD: 9.5 % (ref 4–15)
NEUTROPHILS # BLD AUTO: 6.5 K/UL (ref 1.8–7.7)
NEUTROPHILS NFR BLD: 72.2 % (ref 38–73)
NITRITE UR QL STRIP: NEGATIVE
OVALOCYTES BLD QL SMEAR: ABNORMAL
PCO2 BLDA: 45.5 MMHG (ref 35–45)
PH SMN: 7.34 [PH] (ref 7.35–7.45)
PH UR STRIP: 6 [PH] (ref 5–8)
PLATELET # BLD AUTO: 277 K/UL (ref 150–350)
PLATELET BLD QL SMEAR: ABNORMAL
PMV BLD AUTO: 11.4 FL (ref 9.2–12.9)
PO2 BLDA: 24 MMHG (ref 40–60)
POC BE: -1 MMOL/L
POC SATURATED O2: 40 % (ref 95–100)
POCT GLUCOSE: 251 MG/DL (ref 70–110)
POCT GLUCOSE: 369 MG/DL (ref 70–110)
POCT GLUCOSE: 409 MG/DL (ref 70–110)
POCT GLUCOSE: 464 MG/DL (ref 70–110)
POIKILOCYTOSIS BLD QL SMEAR: SLIGHT
POLYCHROMASIA BLD QL SMEAR: ABNORMAL
POTASSIUM SERPL-SCNC: 3.7 MMOL/L (ref 3.5–5.1)
PROT SERPL-MCNC: 6.8 G/DL (ref 6–8.4)
PROT UR QL STRIP: NEGATIVE
RBC # BLD AUTO: 5.04 M/UL (ref 4–5.4)
SAMPLE: ABNORMAL
SITE: ABNORMAL
SODIUM SERPL-SCNC: 134 MMOL/L (ref 136–145)
SP GR UR STRIP: <=1.005 (ref 1–1.03)
SPHEROCYTES BLD QL SMEAR: ABNORMAL
STOMATOCYTES BLD QL SMEAR: PRESENT
TROPONIN I SERPL DL<=0.01 NG/ML-MCNC: 0.01 NG/ML (ref 0–0.03)
URN SPEC COLLECT METH UR: ABNORMAL
UROBILINOGEN UR STRIP-ACNC: NEGATIVE EU/DL
WBC # BLD AUTO: 9.07 K/UL (ref 3.9–12.7)
YEAST URNS QL MICRO: NORMAL

## 2019-06-09 PROCEDURE — 99285 EMERGENCY DEPT VISIT HI MDM: CPT | Mod: 25,HCNC

## 2019-06-09 PROCEDURE — 96361 HYDRATE IV INFUSION ADD-ON: CPT | Mod: HCNC

## 2019-06-09 PROCEDURE — 85025 COMPLETE CBC W/AUTO DIFF WBC: CPT | Mod: HCNC

## 2019-06-09 PROCEDURE — 80053 COMPREHEN METABOLIC PANEL: CPT | Mod: HCNC

## 2019-06-09 PROCEDURE — 96374 THER/PROPH/DIAG INJ IV PUSH: CPT | Mod: HCNC

## 2019-06-09 PROCEDURE — 82962 GLUCOSE BLOOD TEST: CPT | Mod: HCNC,91

## 2019-06-09 PROCEDURE — 63600175 PHARM REV CODE 636 W HCPCS: Mod: HCNC | Performed by: EMERGENCY MEDICINE

## 2019-06-09 PROCEDURE — 25000003 PHARM REV CODE 250: Mod: HCNC | Performed by: EMERGENCY MEDICINE

## 2019-06-09 PROCEDURE — 93010 EKG 12-LEAD: ICD-10-PCS | Mod: HCNC,,, | Performed by: INTERNAL MEDICINE

## 2019-06-09 PROCEDURE — 99900035 HC TECH TIME PER 15 MIN (STAT): Mod: HCNC

## 2019-06-09 PROCEDURE — 81000 URINALYSIS NONAUTO W/SCOPE: CPT | Mod: HCNC

## 2019-06-09 PROCEDURE — 93005 ELECTROCARDIOGRAM TRACING: CPT | Mod: HCNC

## 2019-06-09 PROCEDURE — 96372 THER/PROPH/DIAG INJ SC/IM: CPT | Mod: 59,HCNC

## 2019-06-09 PROCEDURE — 93010 ELECTROCARDIOGRAM REPORT: CPT | Mod: HCNC,,, | Performed by: INTERNAL MEDICINE

## 2019-06-09 PROCEDURE — 36415 COLL VENOUS BLD VENIPUNCTURE: CPT | Mod: HCNC

## 2019-06-09 PROCEDURE — 84484 ASSAY OF TROPONIN QUANT: CPT | Mod: HCNC

## 2019-06-09 PROCEDURE — 82803 BLOOD GASES ANY COMBINATION: CPT | Mod: HCNC

## 2019-06-09 RX ADMIN — SODIUM CHLORIDE 1000 ML: 0.9 INJECTION, SOLUTION INTRAVENOUS at 05:06

## 2019-06-09 RX ADMIN — SODIUM CHLORIDE 1000 ML: 0.9 INJECTION, SOLUTION INTRAVENOUS at 06:06

## 2019-06-09 RX ADMIN — INSULIN HUMAN 6 UNITS: 100 INJECTION, SOLUTION PARENTERAL at 06:06

## 2019-06-09 NOTE — ED PROVIDER NOTES
SCRIBE #1 NOTE: I, Tisha Cruz, am scribing for, and in the presence of, Brian Wallace Jr., MD. I have scribed the entire note.       History     Chief Complaint   Patient presents with    Dizziness     Pt reports dizziness, weakness, headache, and blood glucose of 421 (took insulin at 12:00 )     Review of patient's allergies indicates:  No Known Allergies      History of Present Illness     HPI    6/9/2019, 4:58 PM  History obtained from the patient      History of Present Illness: Tiffanie Wise is a 78 y.o. female patient with a PMHx of HTN, DM, MI, CVA with residual speech deficit who presents to the Emergency Department for evaluation of dizziness which onset suddenly a few days ago. Sxs are episodic and moderate in severity. No mitigating or exacerbating factors reported. Associated sx include HA. Patient reports that her blood glucose levels has been elevated all week. She is supposed to take her insulin at 9 AM but took it at 12 PM today. Her CBG was 523 today. Patient denies fever, chills, fatigue, CP, SOB, abd pain, N/V/D, extremity weakness/numbness. Patient had CABG 2 years ago and is c/o chest wall pain to the incision site. No erythema, swelling, drainage associated.      Arrival mode: Personal vehicle    PCP: Evelio Parnell MD        Past Medical History:  Past Medical History:   Diagnosis Date    Acute respiratory failure with hypoxia     Anxiety     AP (angina pectoris) 7/18/2013    Arterial ischemic stroke, MCA (middle cerebral artery), left, acute 12/15/2017    Asthma     CAD, multiple vessel 8/2/2016    Chronic ischemic heart disease 7/18/2013    Class 1 obesity due to excess calories with serious comorbidity and body mass index (BMI) of 30.0 to 30.9 in adult 3/15/2019    Coronary artery disease 7/18/2013    Depression     Diabetes with neurologic complications     GERD (gastroesophageal reflux disease) 7/18/2013    Heart failure     History of PTCA 7/18/2013    Hypertension  7/18/2013    Hypothyroidism     Leg pain 8/29/2013    Mixed hyperlipidemia 7/18/2013    Myocardial infarction     Osteoporosis     Pneumonia     Pneumonia due to other staphylococcus     Precancerous changes of the vagina     Seizure 3/15/2019    Sleep apnea     stopped using CPAP 2015 - sores in nose, couldn't breathe, uses Breathe riht strips now.     Thyroid disease     Tobacco dependence     resolved    Trouble in sleeping     Type 2 diabetes mellitus with ophthalmic manifestations     Urinary incontinence     pullups day, pads at night       Past Surgical History:  Past Surgical History:   Procedure Laterality Date    AORTOCORONARY BYPASS-CABG - KENY N/A 6/29/2016    [facility] at [facility] OR    BREAST SURGERY Left     benign cyst    CATHETERIZATION, HEART, LEFT Left 3/10/2014    Performed by Mariam Bell MD at [facility] CATH LAB    CATHETERIZATION, HEART, LEFT Left 11/21/2013    Performed by Brendan Short MD at [facility] CATH LAB    CORONARY ANGIOPLASTY      CORONARY STENT PLACEMENT      x6-7 oer the yeqrs  last 12/17/14 BRIANDA to lcfx    EYE SURGERY Bilateral 2014    cataracts w/IOL   Dr Vance    HEART CATH-LEFT Left 12/17/2014    Performed by [facility] at [facility] CATH LAB    HEART CATH-LEFT Left 10/14/2014    Performed by [facility] at [facility] CATH LAB    HEART CATH-LEFT Left 7/10/2014    Performed by Brendan [facility] at [facility] CATH LAB    HYSTERECTOMY  1970    vag hyst; ovaries intact    PLACEMENT-LOOP RECORDER Left 2/1/2018    Performed by Kole Devine MD at [facility] CATH LAB    THYROID SURGERY      nodule benign, Dr Hankins    TUBAL LIGATION  1970         Family History:  Family History   Problem Relation Age of Onset    Kidney disease Father     Kidney disease Brother     Heart disease Mother     Fibromyalgia Daughter     Cancer Son         lung    Cirrhosis Sister     Alcohol abuse Sister     Diabetes Sister     Fibromyalgia Daughter      Diabetes Paternal Grandmother     Stroke Neg Hx     Mental illness Neg Hx     Mental retardation Neg Hx        Social History:  Social History     Tobacco Use    Smoking status: Former Smoker     Packs/day: 1.00     Years: 38.00     Pack years: 38.00     Last attempt to quit: 7/8/2008     Years since quitting: 10.9    Smokeless tobacco: Never Used   Substance and Sexual Activity    Alcohol use: No    Drug use: No    Sexual activity: Not Currently     Birth control/protection: None        Review of Systems     Review of Systems   Constitutional: Negative for chills and fever.        (+) elevated blood glucose   HENT: Negative for sore throat.    Respiratory: Negative for shortness of breath.    Cardiovascular: Negative for chest pain.   Gastrointestinal: Negative for abdominal pain, constipation, diarrhea, nausea and vomiting.   Genitourinary: Negative for dysuria.   Musculoskeletal: Negative for back pain.        (+) chest wall pain    Skin: Negative for color change, rash and wound.   Neurological: Positive for dizziness and headaches. Negative for syncope, weakness, light-headedness and numbness.   Hematological: Does not bruise/bleed easily.   All other systems reviewed and are negative.     Physical Exam     Initial Vitals   BP Pulse Resp Temp SpO2   06/09/19 1647 06/09/19 1647 06/09/19 1647 06/09/19 1542 06/09/19 1647   (!) 153/66 69 18 98.6 °F (37 °C) 96 %      MAP       --                 Physical Exam  Nursing Notes and Vital Signs Reviewed.  Constitutional: Patient is in no acute distress. Well-developed and well-nourished.  Head: Atraumatic. Normocephalic.  Eyes: PERRL. EOM intact. Conjunctivae are not pale. No scleral icterus.  ENT: Mucous membranes are moist. Oropharynx is clear and symmetric.    Neck: Supple. Full ROM. No lymphadenopathy.  Cardiovascular: Regular rate. Regular rhythm. No murmurs, rubs, or gallops. Distal pulses are 2+ and symmetric.  Pulmonary/Chest: No respiratory distress.  Clear to auscultation bilaterally. No wheezing or rales.  Abdominal: Soft and non-distended.  There is no tenderness.  No rebound, guarding, or rigidity. Good bowel sounds.  Genitourinary: No CVA tenderness  Musculoskeletal: Moves all extremities. No obvious deformities. No edema. No calf tenderness.  Skin: Warm and dry.  Neurological:  Alert, awake, and appropriate.  Normal speech.  No acute focal neurological deficits are appreciated.  Psychiatric: Normal affect. Good eye contact. Appropriate in content.     ED Course   Procedures  ED Vital Signs:  Vitals:    06/09/19 1542 06/09/19 1647 06/09/19 1715 06/09/19 1733   BP:  (!) 153/66  130/74   Pulse:  69 (!) 56 (!) 52   Resp:  18  20   Temp: 98.6 °F (37 °C) 97.1 °F (36.2 °C)     TempSrc: Oral Oral     SpO2:  96%  100%   Weight:    66.1 kg (145 lb 11.2 oz)    06/09/19 1833 06/09/19 1903   BP: (!) 170/67 (!) 169/74   Pulse: (!) 53 (!) 52   Resp: 20 20   Temp:     TempSrc:     SpO2: 100% 100%   Weight:         Abnormal Lab Results:  Labs Reviewed   CBC W/ AUTO DIFFERENTIAL - Abnormal; Notable for the following components:       Result Value    Hemoglobin 11.1 (*)     Hematocrit 36.4 (*)     Mean Corpuscular Volume 72 (*)     Mean Corpuscular Hemoglobin 22.0 (*)     Mean Corpuscular Hemoglobin Conc 30.5 (*)     RDW 16.2 (*)     Lymph% 17.0 (*)     All other components within normal limits   COMPREHENSIVE METABOLIC PANEL - Abnormal; Notable for the following components:    Sodium 134 (*)     Glucose 483 (*)     Albumin 3.3 (*)     eGFR if  45 (*)     eGFR if non  39 (*)     All other components within normal limits    Narrative:      GLU critical result(s) called and verbal readback obtained from   Alexandra carlton RN, 06/09/2019 18:12   URINALYSIS, REFLEX TO URINE CULTURE - Abnormal; Notable for the following components:    Specific Gravity, UA <=1.005 (*)     Glucose, UA 3+ (*)     All other components within normal limits    Narrative:      Preferred Collection Type->Urine, Clean Catch   POCT GLUCOSE - Abnormal; Notable for the following components:    POCT Glucose 464 (*)     All other components within normal limits   POCT GLUCOSE - Abnormal; Notable for the following components:    POCT Glucose 409 (*)     All other components within normal limits   ISTAT PROCEDURE - Abnormal; Notable for the following components:    POC PH 7.344 (*)     POC PCO2 45.5 (*)     POC PO2 24 (*)     POC SATURATED O2 40 (*)     All other components within normal limits   POCT GLUCOSE - Abnormal; Notable for the following components:    POCT Glucose 369 (*)     All other components within normal limits   POCT GLUCOSE - Abnormal; Notable for the following components:    POCT Glucose 251 (*)     All other components within normal limits   TROPONIN I   URINALYSIS MICROSCOPIC    Narrative:     Preferred Collection Type->Urine, Clean Catch   POCT GLUCOSE MONITORING CONTINUOUS   POCT GLUCOSE MONITORING CONTINUOUS        All Lab Results:  Results for orders placed or performed during the hospital encounter of 06/09/19   CBC auto differential   Result Value Ref Range    WBC 9.07 3.90 - 12.70 K/uL    RBC 5.04 4.00 - 5.40 M/uL    Hemoglobin 11.1 (L) 12.0 - 16.0 g/dL    Hematocrit 36.4 (L) 37.0 - 48.5 %    Mean Corpuscular Volume 72 (L) 82 - 98 fL    Mean Corpuscular Hemoglobin 22.0 (L) 27.0 - 31.0 pg    Mean Corpuscular Hemoglobin Conc 30.5 (L) 32.0 - 36.0 g/dL    RDW 16.2 (H) 11.5 - 14.5 %    Platelets 277 150 - 350 K/uL    MPV 11.4 9.2 - 12.9 fL    Gran # (ANC) 6.5 1.8 - 7.7 K/uL    Lymph # 1.5 1.0 - 4.8 K/uL    Mono # 0.9 0.3 - 1.0 K/uL    Eos # 0.1 0.0 - 0.5 K/uL    Baso # 0.02 0.00 - 0.20 K/uL    Gran% 72.2 38.0 - 73.0 %    Lymph% 17.0 (L) 18.0 - 48.0 %    Mono% 9.5 4.0 - 15.0 %    Eosinophil% 1.2 0.0 - 8.0 %    Basophil% 0.2 0.0 - 1.9 %    Platelet Estimate Appears normal     Aniso Slight     Poik Slight     Poly Occasional     Hypo Occasional     Ovalocytes Occasional      Dover Cells Occasional     Stomatocytes Present     Spherocytes Occasional     Acanthocytes Present     Differential Method Automated    Comprehensive metabolic panel   Result Value Ref Range    Sodium 134 (L) 136 - 145 mmol/L    Potassium 3.7 3.5 - 5.1 mmol/L    Chloride 97 95 - 110 mmol/L    CO2 23 23 - 29 mmol/L    Glucose 483 (HH) 70 - 110 mg/dL    BUN, Bld 15 8 - 23 mg/dL    Creatinine 1.3 0.5 - 1.4 mg/dL    Calcium 9.5 8.7 - 10.5 mg/dL    Total Protein 6.8 6.0 - 8.4 g/dL    Albumin 3.3 (L) 3.5 - 5.2 g/dL    Total Bilirubin 0.8 0.1 - 1.0 mg/dL    Alkaline Phosphatase 120 55 - 135 U/L    AST 14 10 - 40 U/L    ALT 14 10 - 44 U/L    Anion Gap 14 8 - 16 mmol/L    eGFR if African American 45 (A) >60 mL/min/1.73 m^2    eGFR if non African American 39 (A) >60 mL/min/1.73 m^2   Troponin I   Result Value Ref Range    Troponin I 0.010 0.000 - 0.026 ng/mL   Urinalysis, Reflex to Urine Culture Urine, Clean Catch   Result Value Ref Range    Specimen UA Urine, Clean Catch     Color, UA Yellow Yellow, Straw, Sadie    Appearance, UA Clear Clear    pH, UA 6.0 5.0 - 8.0    Specific Gravity, UA <=1.005 (A) 1.005 - 1.030    Protein, UA Negative Negative    Glucose, UA 3+ (A) Negative    Ketones, UA Negative Negative    Bilirubin (UA) Negative Negative    Occult Blood UA Negative Negative    Nitrite, UA Negative Negative    Urobilinogen, UA Negative <2.0 EU/dL    Leukocytes, UA Negative Negative   Urinalysis Microscopic   Result Value Ref Range    Bacteria None None-Occ /hpf    Yeast, UA None None    Microscopic Comment SEE COMMENT    POCT glucose   Result Value Ref Range    POCT Glucose 464 (HH) 70 - 110 mg/dL   POCT glucose   Result Value Ref Range    POCT Glucose 409 (H) 70 - 110 mg/dL   ISTAT PROCEDURE   Result Value Ref Range    POC PH 7.344 (L) 7.35 - 7.45    POC PCO2 45.5 (H) 35 - 45 mmHg    POC PO2 24 (LL) 40 - 60 mmHg    POC HCO3 24.7 24 - 28 mmol/L    POC BE -1 -2 to 2 mmol/L    POC SATURATED O2 40 (L) 95 - 100 %    Sample  VENOUS     Site Other     Allens Test N/A     DelSys Room Air     Mode SPONT     FiO2 21    POCT glucose   Result Value Ref Range    POCT Glucose 369 (H) 70 - 110 mg/dL   POCT glucose   Result Value Ref Range    POCT Glucose 251 (H) 70 - 110 mg/dL     Imaging Results:  Imaging Results          X-Ray Chest AP Portable (Final result)  Result time 06/09/19 17:32:27    Final result by Ritika Etienne MD (06/09/19 17:32:27)                 Impression:      No cardiopulmonary process noted.      Electronically signed by: Ritika Etienne  Date:    06/09/2019  Time:    17:32             Narrative:    EXAMINATION:  XR CHEST AP PORTABLE    CLINICAL HISTORY:  chest wall pain;    COMPARISON:  Two thousand eighteen    FINDINGS:  No infiltrate or effusion identified.  Cardiomediastinal silhouette is within normal limits.                                 The EKG was ordered, reviewed, and independently interpreted by the ED provider.  Interpretation time: 17:11  Rate: 58 BPM  Rhythm: sinus bradycardia  Interpretation: Septal infarct. ST & T wave abnormality , consider inferolateral ischemia. No STEMI.         The Emergency Provider reviewed the vital signs and test results, which are outlined above.     ED Discussion     7:49 PM: Reassessed pt at this time. Pt states her condition has improved at this time. Discussed with pt all pertinent ED information and results. Discussed pt dx and plan of tx. Gave pt all f/u and return to the ED instructions. All questions and concerns were addressed at this time. Pt expresses understanding of information and instructions, and is comfortable with plan to discharge. Pt is stable for discharge.    I discussed with patient and/or family/caretaker that evaluation in the ED does not suggest any emergent or life threatening medical conditions requiring immediate intervention beyond what was provided in the ED, and I believe patient is safe for discharge.  Regardless, an unremarkable  evaluation in the ED does not preclude the development or presence of a serious of life threatening condition. As such, patient was instructed to return immediately for any worsening or change in current symptoms.    Patient's headache is either consistent with previous headache and/or lacks features concerning for emergent or life threatening condition.  I do not suspect SAH, meningitis, increased IC pressure, infectious, toxic, vascular, CNS, or other EMC.  I have discussed this at length with patient and/or family/caretaker.    7:51 PM  Patient is stable nontoxic.  She is hyperglycemia but is not DKA.  She has normal gap.  Patient is safe for discharge after sugar has been brought down.  I discussed all findings with the patient and family verbalized understanding agreement with all.  They seem reliable.  They are very appreciative of the care rendered today.    ED Medication(s):  Medications   sodium chloride 0.9% bolus 1,000 mL (0 mLs Intravenous Stopped 6/9/19 1834)   insulin regular injection 6 Units (6 Units Intravenous Given 6/9/19 1811)   insulin regular injection 6 Units (6 Units Subcutaneous Given 6/9/19 1811)   sodium chloride 0.9% bolus 1,000 mL (1,000 mLs Intravenous New Bag 6/9/19 1833)       New Prescriptions    No medications on file       Follow-up Information     Evelio Parnell MD In 1 day.    Specialty:  Family Medicine  Contact information:  [address]  [address]  [place] [address] [phone]                     Medical Decision Making:   Clinical Tests:   Lab Tests: Ordered and Reviewed  Radiological Study: Ordered and Reviewed  Medical Tests: Ordered and Reviewed           Scribe Attestation:   Scribe #1: I performed the above scribed service and the documentation accurately describes the services I performed. I attest to the accuracy of the note.     Attending:   Physician Attestation Statement for Scribe #1: I, Brian Wallace Jr., MD, personally performed the services  described in this documentation, as scribed by Tisha Cruz, in my presence, and it is both accurate and complete.           Clinical Impression       ICD-10-CM ICD-9-CM   1. Hyperglycemia R73.9 790.29   2. Chest wall pain R07.89 786.52       Disposition:   Disposition: Discharged  Condition: Stable         Brian Wallace Jr., MD  06/09/19 1952

## 2019-06-10 NOTE — DISCHARGE INSTRUCTIONS
Drink plenty of fluids.  Continue all of her home medications.  Follow up with your doctor tomorrow as for re-evaluation.  Return as needed for any worsening symptoms, problems, questions or concerns.

## 2019-06-19 ENCOUNTER — CLINICAL SUPPORT (OUTPATIENT)
Dept: CARDIOLOGY | Facility: CLINIC | Age: 79
End: 2019-06-19

## 2019-06-19 ENCOUNTER — OFFICE VISIT (OUTPATIENT)
Dept: CARDIOLOGY | Facility: CLINIC | Age: 79
End: 2019-06-19
Payer: MEDICARE

## 2019-06-19 DIAGNOSIS — Z45.09 ENCOUNTER FOR LOOP RECORDER CHECK: ICD-10-CM

## 2019-06-19 DIAGNOSIS — I63.512 ARTERIAL ISCHEMIC STROKE, MCA (MIDDLE CEREBRAL ARTERY), LEFT, ACUTE: Primary | Chronic | ICD-10-CM

## 2019-06-19 DIAGNOSIS — I63.412 EMBOLIC STROKE INVOLVING LEFT MIDDLE CEREBRAL ARTERY: ICD-10-CM

## 2019-06-19 DIAGNOSIS — I10 ESSENTIAL HYPERTENSION: Chronic | ICD-10-CM

## 2019-06-19 DIAGNOSIS — I63.9 CRYPTOGENIC STROKE: ICD-10-CM

## 2019-06-19 PROCEDURE — 1101F PR PT FALLS ASSESS DOC 0-1 FALLS W/OUT INJ PAST YR: ICD-10-PCS | Mod: HCNC,CPTII,S$GLB, | Performed by: INTERNAL MEDICINE

## 2019-06-19 PROCEDURE — 99499 UNLISTED E&M SERVICE: CPT | Mod: HCNC,S$GLB,, | Performed by: INTERNAL MEDICINE

## 2019-06-19 PROCEDURE — 99214 PR OFFICE/OUTPT VISIT, EST, LEVL IV, 30-39 MIN: ICD-10-PCS | Mod: HCNC,S$GLB,, | Performed by: INTERNAL MEDICINE

## 2019-06-19 PROCEDURE — 99499 RISK ADDL DX/OHS AUDIT: ICD-10-PCS | Mod: HCNC,S$GLB,, | Performed by: INTERNAL MEDICINE

## 2019-06-19 PROCEDURE — 99214 OFFICE O/P EST MOD 30 MIN: CPT | Mod: HCNC,S$GLB,, | Performed by: INTERNAL MEDICINE

## 2019-06-19 PROCEDURE — 1101F PT FALLS ASSESS-DOCD LE1/YR: CPT | Mod: HCNC,CPTII,S$GLB, | Performed by: INTERNAL MEDICINE

## 2019-06-19 NOTE — PROGRESS NOTES
Subjective:    Patient ID:  Tiffanie Wise is a 78 y.o. female who presents for follow-up of Cerebrovascular Accident      78 yoF CAD s/p PCI, CABG, HTN, DM here for ILR consideration.     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 She has recovered partially but still has aphasia. She had normal EF and no arrhythmia during her hospitalization. She is on aspirin and plavix for her CVA. She continues to go to rehab for her CVA. Sulma Pichardo and Han recommended ILR. Her daughter is present and speaks for her mother. No known history of arrhythmia. She has NAWAF as well as CABG. Available ECGs show SR.     6/18: ILR implanted 2/1/18. No arrhythmias on ILR. Some pain at the site.     Interval history: No arrhythmias on ILR.     Past Medical History:  No date: Acute respiratory failure with hypoxia  No date: Anxiety  7/18/2013: AP (angina pectoris)  12/15/2017: Arterial ischemic stroke, MCA (middle cerebral*  No date: Asthma  8/2/2016: CAD, multiple vessel  7/18/2013: Chronic ischemic heart disease  7/18/2013: Coronary artery disease  No date: Depression  No date: Diabetes with neurologic complications  7/18/2013: GERD (gastroesophageal reflux disease)  No date: Heart failure  7/18/2013: History of PTCA  7/18/2013: Hypertension  No date: Hypothyroidism  8/29/2013: Leg pain  7/18/2013: Mixed hyperlipidemia  No date: Myocardial infarction  No date: Osteoporosis  No date: Pneumonia  No date: Pneumonia due to other staphylococcus  No date: Precancerous changes of the vagina  No date: Sleep apnea      Comment: stopped using CPAP 2015 - sores in nose,                couldn't breathe, uses Breathe riht strips now.  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*  No date: Urinary incontinence      Comment: pullups day, pads at night    Past Surgical History:  No date: BREAST  SURGERY Left      Comment: benign cyst  No date: CORONARY ANGIOPLASTY  No date: CORONARY STENT PLACEMENT      Comment: x6-7 oer the yeqrs  last 12/17/14 BRIANDA to lcfx  2014: EYE SURGERY Bilateral      Comment: cataracts w/IOL   Dr Vance  1970: HYSTERECTOMY      Comment: vag hyst; ovaries intact  No date: THYROID SURGERY      Comment: nodule benign, Dr Hankins  1970: TUBAL LIGATION    Social History    Marital status:              Spouse name:                       Years of education:                 Number of children:               Occupational History    None on file    Social History Main Topics    Smoking status: Former Smoker                                                                Packs/day: 1.00      Years: 38.00          Quit date: 7/8/2008    Smokeless tobacco: Never Used                        Alcohol use: No              Drug use: No              Sexual activity: Not Currently           Birth control/protection: None    Other Topics            Concern    None on file    Social History Narrative    ( 2nd marriage,  x 1). She has 4 children and 3 step children.. 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 She still drives. Does not have a Living Will or Advanced directive.        Review of patient's family history indicates:  Problem: Kidney disease      Relation: Father       Age of Onset: (Not Specified)   Problem: Kidney disease      Relation: Brother       Age of Onset: (Not Specified)   Problem: Heart disease      Relation: Mother       Age of Onset: (Not Specified)   Problem: Fibromyalgia      Relation: Daughter       Age of Onset: (Not Specified)   Problem: Cancer      Relation: Son       Age of Onset: (Not Specified)       Comment: lung  Problem: Cirrhosis      Relation: Sister       Age of Onset: (Not Specified)   Problem: Alcohol abuse      Relation: Sister       Age of Onset: (Not Specified)   Problem:  Diabetes      Relation: Sister       Age of Onset: (Not Specified)   Problem: Fibromyalgia      Relation: Daughter       Age of Onset: (Not Specified)   Problem: Diabetes      Relation: Paternal Grandmother       Age of Onset: (Not Specified)   Problem: Stroke      Relation: Neg Hx       Age of Onset: (Not Specified)   Problem: Mental illness      Relation: Neg Hx       Age of Onset: (Not Specified)   Problem: Mental retardation      Relation: Neg Hx       Age of Onset: (Not Specified)         Review of Systems   Unable to perform ROS: other (aphasia)   Constitution: Negative for malaise/fatigue.   HENT: Negative.    Eyes: Negative.    Cardiovascular: Negative for chest pain, dyspnea on exertion, leg swelling, near-syncope, palpitations and syncope.   Respiratory: Negative.  Negative for shortness of breath.    Endocrine: Negative.    Hematologic/Lymphatic: Negative.    Skin: Negative.    Musculoskeletal: Negative.    Gastrointestinal: Negative.    Genitourinary: Negative.    Neurological: Negative.  Negative for dizziness and light-headedness.   Psychiatric/Behavioral: Negative.    Allergic/Immunologic: Negative.         Objective:    Physical Exam   Constitutional: She is oriented to person, place, and time. She appears well-developed and well-nourished. No distress.   HENT:   Head: Normocephalic and atraumatic.   Eyes: Pupils are equal, round, and reactive to light. EOM are normal.   Neck: Normal range of motion. No JVD present. No thyromegaly present.   Cardiovascular: Normal rate, regular rhythm, S1 normal, S2 normal and normal heart sounds. PMI is not displaced. Exam reveals no gallop and no friction rub.   No murmur heard.  Pulmonary/Chest: Effort normal and breath sounds normal. No respiratory distress. She has no wheezes. She has no rales.   Abdominal: Soft. Bowel sounds are normal. She exhibits no distension. There is no tenderness. There is no rebound and no guarding.   Musculoskeletal: Normal range of  motion. She exhibits no edema or tenderness.   Neurological: She is alert and oriented to person, place, and time. No cranial nerve deficit.   Skin: Skin is warm and dry. No rash noted. No erythema.   Psychiatric: She has a normal mood and affect. Her behavior is normal. Judgment and thought content normal.   Vitals reviewed.        Assessment:       1. History of arterial ischemic stroke, left MCA (middle cerebral artery) 12/2017    2. Embolic stroke involving left middle cerebral artery    3. Essential hypertension         Plan:       78 yoF cryptogenic CVA s/p ILR here for monitoring. No arrhythmias since implant. Continue to monitor. When she reaches JIMMY, she does not wish to undergo removal. RTC 1y

## 2019-07-19 ENCOUNTER — PES CALL (OUTPATIENT)
Dept: ADMINISTRATIVE | Facility: CLINIC | Age: 79
End: 2019-07-19

## 2019-07-25 ENCOUNTER — OFFICE VISIT (OUTPATIENT)
Dept: CARDIOLOGY | Facility: CLINIC | Age: 79
End: 2019-07-25
Payer: MEDICARE

## 2019-07-25 VITALS
HEIGHT: 58 IN | DIASTOLIC BLOOD PRESSURE: 50 MMHG | WEIGHT: 145.75 LBS | BODY MASS INDEX: 30.59 KG/M2 | HEART RATE: 60 BPM | SYSTOLIC BLOOD PRESSURE: 134 MMHG

## 2019-07-25 DIAGNOSIS — E11.65 TYPE 2 DIABETES MELLITUS WITH HYPERGLYCEMIA, WITH LONG-TERM CURRENT USE OF INSULIN: Chronic | ICD-10-CM

## 2019-07-25 DIAGNOSIS — I63.9 CEREBROVASCULAR ACCIDENT (CVA), UNSPECIFIED MECHANISM: ICD-10-CM

## 2019-07-25 DIAGNOSIS — I63.412 EMBOLIC STROKE INVOLVING LEFT MIDDLE CEREBRAL ARTERY: ICD-10-CM

## 2019-07-25 DIAGNOSIS — E11.69 HYPERLIPIDEMIA ASSOCIATED WITH TYPE 2 DIABETES MELLITUS: Chronic | ICD-10-CM

## 2019-07-25 DIAGNOSIS — Z79.4 TYPE 2 DIABETES MELLITUS WITH HYPERGLYCEMIA, WITH LONG-TERM CURRENT USE OF INSULIN: Chronic | ICD-10-CM

## 2019-07-25 DIAGNOSIS — I25.118 CORONARY ARTERY DISEASE WITH STABLE ANGINA PECTORIS, UNSPECIFIED VESSEL OR LESION TYPE, UNSPECIFIED WHETHER NATIVE OR TRANSPLANTED HEART: Primary | Chronic | ICD-10-CM

## 2019-07-25 DIAGNOSIS — E78.5 HYPERLIPIDEMIA ASSOCIATED WITH TYPE 2 DIABETES MELLITUS: Chronic | ICD-10-CM

## 2019-07-25 DIAGNOSIS — G47.33 OSA ON CPAP: ICD-10-CM

## 2019-07-25 DIAGNOSIS — I63.512 ARTERIAL ISCHEMIC STROKE, MCA (MIDDLE CEREBRAL ARTERY), LEFT, ACUTE: Chronic | ICD-10-CM

## 2019-07-25 DIAGNOSIS — I20.9 AP (ANGINA PECTORIS): Chronic | ICD-10-CM

## 2019-07-25 DIAGNOSIS — I70.0 AORTIC ARCH ATHEROSCLEROSIS: ICD-10-CM

## 2019-07-25 DIAGNOSIS — E66.09 CLASS 1 OBESITY DUE TO EXCESS CALORIES WITH SERIOUS COMORBIDITY AND BODY MASS INDEX (BMI) OF 30.0 TO 30.9 IN ADULT: ICD-10-CM

## 2019-07-25 DIAGNOSIS — I51.89 LEFT VENTRICULAR DIASTOLIC DYSFUNCTION WITH PRESERVED SYSTOLIC FUNCTION: Chronic | ICD-10-CM

## 2019-07-25 DIAGNOSIS — I10 ESSENTIAL HYPERTENSION: Chronic | ICD-10-CM

## 2019-07-25 PROCEDURE — 3078F DIAST BP <80 MM HG: CPT | Mod: HCNC,CPTII,S$GLB, | Performed by: INTERNAL MEDICINE

## 2019-07-25 PROCEDURE — 3075F PR MOST RECENT SYSTOLIC BLOOD PRESS GE 130-139MM HG: ICD-10-PCS | Mod: HCNC,CPTII,S$GLB, | Performed by: INTERNAL MEDICINE

## 2019-07-25 PROCEDURE — 3078F PR MOST RECENT DIASTOLIC BLOOD PRESSURE < 80 MM HG: ICD-10-PCS | Mod: HCNC,CPTII,S$GLB, | Performed by: INTERNAL MEDICINE

## 2019-07-25 PROCEDURE — 1101F PR PT FALLS ASSESS DOC 0-1 FALLS W/OUT INJ PAST YR: ICD-10-PCS | Mod: HCNC,CPTII,S$GLB, | Performed by: INTERNAL MEDICINE

## 2019-07-25 PROCEDURE — 99999 PR PBB SHADOW E&M-EST. PATIENT-LVL III: ICD-10-PCS | Mod: PBBFAC,HCNC,, | Performed by: INTERNAL MEDICINE

## 2019-07-25 PROCEDURE — 99214 PR OFFICE/OUTPT VISIT, EST, LEVL IV, 30-39 MIN: ICD-10-PCS | Mod: HCNC,S$GLB,, | Performed by: INTERNAL MEDICINE

## 2019-07-25 PROCEDURE — 1101F PT FALLS ASSESS-DOCD LE1/YR: CPT | Mod: HCNC,CPTII,S$GLB, | Performed by: INTERNAL MEDICINE

## 2019-07-25 PROCEDURE — 99999 PR PBB SHADOW E&M-EST. PATIENT-LVL III: CPT | Mod: PBBFAC,HCNC,, | Performed by: INTERNAL MEDICINE

## 2019-07-25 PROCEDURE — 99499 RISK ADDL DX/OHS AUDIT: ICD-10-PCS | Mod: HCNC,S$GLB,, | Performed by: INTERNAL MEDICINE

## 2019-07-25 PROCEDURE — 99499 UNLISTED E&M SERVICE: CPT | Mod: HCNC,S$GLB,, | Performed by: INTERNAL MEDICINE

## 2019-07-25 PROCEDURE — 99214 OFFICE O/P EST MOD 30 MIN: CPT | Mod: HCNC,S$GLB,, | Performed by: INTERNAL MEDICINE

## 2019-07-25 PROCEDURE — 3075F SYST BP GE 130 - 139MM HG: CPT | Mod: HCNC,CPTII,S$GLB, | Performed by: INTERNAL MEDICINE

## 2019-07-25 NOTE — PROGRESS NOTES
Subjective:    Patient ID:  Tiffanie Wise is a 78 y.o. female who presents for evaluation of Hypertension; Hyperlipidemia; Coronary Artery Disease; and Risk Factor Management For Atherosclerosis      HPI Pt presents for f/u.  Her current medical conditions include CAD (CABG LIMA-LAD 6/16), DM, HTN, dyslipidemia, GERD. Nonsmoker.  Her prior history is pertinent for following:  NSTEMI 11/10 and underwent LHC and sucessful stenting of 90% RCA (3 x 23 mm Promus BRIANDA).    S/p PCI of 70% mid-LCX 12/12 with Xience BRIANDA.    S/p LHC for worsening anginal sxs on 11/21/13 with successful PCI 90% ISR mid-LCX with Xience BRIANDA.    S/p unstable angina and had PTCA of ISR of her LCX March 2014.  S/p 7/14 PTCA of severe LCX/OM ISR and PTCA of distal LCX at her bifurcation lesion at stent site July 2014 for ACS.  S/p 10/14 repeat revascularization of severe LCX ISR with cutting balloon Oct 2014 for ACS.  S/p 12/14 admit with unstable angina. She had ISR LCX again, Resolute BRIANDA implanted with good results.    Pt underwent repeat LHC again late June 2016 for unstable anginal sxs and had severe 95% distal LAD stenosis not amenable to PCI.  She had patent LCX, RCA stents, chronic occlusion of R PLB, normal LVEF.  She had urgent CABG w LIMA-LAD June 2016.  S/p hospitalization for ischemic CVA Dec 2017 tx with TPA and tx to [facility] (distal left parietal MCA branch occlusion). Unclear mechanism of CVA.  She had some hemorrhage noted in CVA distribution.  Was maintained on asa, plavix at time of discharge.  Metoprolol was changed to Coreg for bradycardia.  Her ecg showed sinus bradycardia, inferior/anterolateral st-t abnl.  Echo showed normal EF, LAE.  Has speech deficits, expressive aphasia.  S/p rehab at [facility].  S/p ILR 2/18 with Dr. Devine, no a fib noted so far per reports.  Was taken off beta-blockers due to bradycardia.   Echo 7/18 normal EF, DD.  Admitted Feb 2019 to [facility] and had seizures, attributed to pneumonia.  Chart reviewed  from [facility].  MRI brain no acute changes.  Echo showed normal EF, LVH.  Now here.  Has generalized weakness, off balance issues.  Poses fall risk but no falls per pt.  CAD seems stable.  She has chronic controlled angina and has been significantly improved since her CABG few years ago.  No active angina right now.  Has chronic SHERMAN, unchanged.  No pnd/orthopnea.  Compliant with CPAP for NAWAF.  ILR no arrhythmias, per Dr. Devine, EP, note last month.  She sees Dr. De Leon, endocrine, for DM control.  DM has been poorly controlled on chronic basis.  Lipids stable.  No recurrent TIA/CVA sxs.  Obesity stable, no significant gain/loss since last appt.    Current Outpatient Medications:     aspirin (ECOTRIN) 81 MG EC tablet, Take 81 mg by mouth. Take 81 mg by mouth daily., Disp: , Rfl:     atorvastatin (LIPITOR) 80 MG tablet, Take 1 tablet (80 mg total) by mouth once daily., Disp: 90 tablet, Rfl: 3    clonazePAM (KLONOPIN) 0.5 MG tablet, Take 0.5 mg by mouth 2 (two) times daily as needed for Anxiety., Disp: , Rfl:     clopidogrel (PLAVIX) 75 mg tablet, TAKE 1 TABLET BY MOUTH EVERY DAY, Disp: 90 tablet, Rfl: 0    donepezil (ARICEPT) 10 MG tablet, TAKE 1 TABLET BY MOUTH EVERY DAY, Disp: 90 tablet, Rfl: 0    escitalopram oxalate (LEXAPRO) 20 MG tablet, Take 1 tablet (20 mg total) by mouth once daily., Disp: 90 tablet, Rfl: 0    hydroCHLOROthiazide (HYDRODIURIL) 25 MG tablet, TAKE 1 TABLET BY MOUTH ONCE DAILY, Disp: 90 tablet, Rfl: 0    insulin detemir (LEVEMIR FLEXTOUCH) 100 unit/mL (3 mL) SubQ InPn pen, Inject 36 Units into the skin once daily. (Patient taking differently: Inject 48 Units into the skin once daily. ), Disp: , Rfl: 0    isosorbide mononitrate (IMDUR) 30 MG 24 hr tablet, Take 1 tablet (30 mg total) by mouth 2 (two) times daily., Disp: 60 tablet, Rfl: 11    isosorbide mononitrate (IMDUR) 60 MG 24 hr tablet, TAKE 1 TABLET BY MOUTH EVERY DAY, Disp: 90 tablet, Rfl: 0    levothyroxine (SYNTHROID) 50 MCG  "tablet, TAKE 1 TABLET BY MOUTH EVERY DAY, Disp: 90 tablet, Rfl: 0    losartan (COZAAR) 100 MG tablet, Take 1 tablet (100 mg total) by mouth once daily., Disp: 90 tablet, Rfl: 3    nitroGLYCERIN (NITROSTAT) 0.4 MG SL tablet, Place 1 tablet (0.4 mg total) under the tongue as needed., Disp: 25 tablet, Rfl: 6    pantoprazole (PROTONIX) 40 MG tablet, TAKE 1 TABLET BY MOUTH EVERY DAY, Disp: 90 tablet, Rfl: 3    SITagliptin (JANUVIA) 100 MG Tab, Take 100 mg by mouth once daily., Disp: , Rfl:     vitamin D 1000 units Tab, Take 1,000 Units by mouth once daily., Disp: , Rfl:     BD INSULIN SYRINGE ULT-FINE II 1/2 mL 31 x 5/16" Syrg, , Disp: , Rfl:     BLOOD SUGAR DIAGNOSTIC (TRUETEST TEST STRIPS MISC), by Misc.(Non-Drug; Combo Route) route. Pt is testing 3 times a day, Disp: , Rfl:     EASY COMFORT LANCETS 30 gauge Misc, , Disp: , Rfl:     pen needle, diabetic 31 gauge x 5/16" Ndle, USE TWICE DAILY AND PRN, Disp: , Rfl:         Current Outpatient Medications:     aspirin (ECOTRIN) 81 MG EC tablet, Take 81 mg by mouth. Take 81 mg by mouth daily., Disp: , Rfl:     atorvastatin (LIPITOR) 80 MG tablet, Take 1 tablet (80 mg total) by mouth once daily., Disp: 90 tablet, Rfl: 3    clonazePAM (KLONOPIN) 0.5 MG tablet, Take 0.5 mg by mouth 2 (two) times daily as needed for Anxiety., Disp: , Rfl:     clopidogrel (PLAVIX) 75 mg tablet, TAKE 1 TABLET BY MOUTH EVERY DAY, Disp: 90 tablet, Rfl: 0    donepezil (ARICEPT) 10 MG tablet, TAKE 1 TABLET BY MOUTH EVERY DAY, Disp: 90 tablet, Rfl: 0    escitalopram oxalate (LEXAPRO) 20 MG tablet, Take 1 tablet (20 mg total) by mouth once daily., Disp: 90 tablet, Rfl: 0    hydroCHLOROthiazide (HYDRODIURIL) 25 MG tablet, TAKE 1 TABLET BY MOUTH ONCE DAILY, Disp: 90 tablet, Rfl: 0    insulin detemir (LEVEMIR FLEXTOUCH) 100 unit/mL (3 mL) SubQ InPn pen, Inject 36 Units into the skin once daily. (Patient taking differently: Inject 48 Units into the skin once daily. ), Disp: , Rfl: 0    " "isosorbide mononitrate (IMDUR) 30 MG 24 hr tablet, Take 1 tablet (30 mg total) by mouth 2 (two) times daily., Disp: 60 tablet, Rfl: 11    isosorbide mononitrate (IMDUR) 60 MG 24 hr tablet, TAKE 1 TABLET BY MOUTH EVERY DAY, Disp: 90 tablet, Rfl: 0    levothyroxine (SYNTHROID) 50 MCG tablet, TAKE 1 TABLET BY MOUTH EVERY DAY, Disp: 90 tablet, Rfl: 0    losartan (COZAAR) 100 MG tablet, Take 1 tablet (100 mg total) by mouth once daily., Disp: 90 tablet, Rfl: 3    nitroGLYCERIN (NITROSTAT) 0.4 MG SL tablet, Place 1 tablet (0.4 mg total) under the tongue as needed., Disp: 25 tablet, Rfl: 6    pantoprazole (PROTONIX) 40 MG tablet, TAKE 1 TABLET BY MOUTH EVERY DAY, Disp: 90 tablet, Rfl: 3    SITagliptin (JANUVIA) 100 MG Tab, Take 100 mg by mouth once daily., Disp: , Rfl:     vitamin D 1000 units Tab, Take 1,000 Units by mouth once daily., Disp: , Rfl:     BD INSULIN SYRINGE ULT-FINE II 1/2 mL 31 x 5/16" Syrg, , Disp: , Rfl:     BLOOD SUGAR DIAGNOSTIC (TRUETEST TEST STRIPS MISC), by Misc.(Non-Drug; Combo Route) route. Pt is testing 3 times a day, Disp: , Rfl:     EASY COMFORT LANCETS 30 gauge Misc, , Disp: , Rfl:     pen needle, diabetic 31 gauge x 5/16" Ndle, USE TWICE DAILY AND PRN, Disp: , Rfl:       Review of Systems   Constitution: Positive for malaise/fatigue.   HENT: Negative.    Eyes: Negative.    Cardiovascular: Positive for dyspnea on exertion.   Respiratory: Positive for shortness of breath.    Endocrine: Negative.    Hematologic/Lymphatic: Negative.    Skin: Negative.    Musculoskeletal: Positive for arthritis and joint pain.   Gastrointestinal: Negative.    Genitourinary: Negative.    Neurological: Positive for focal weakness, loss of balance and weakness.   Psychiatric/Behavioral: Negative.    Allergic/Immunologic: Negative.        BP (!) 134/50 (BP Location: Left arm, Patient Position: Sitting, BP Method: Medium (Manual))   Pulse 60   Ht 4' 10" (1.473 m)   Wt 66.1 kg (145 lb 11.6 oz)   LMP  (LMP " Unknown)   BMI 30.46 kg/m²     Wt Readings from Last 3 Encounters:   07/25/19 66.1 kg (145 lb 11.6 oz)   06/09/19 66.1 kg (145 lb 11.2 oz)   03/19/19 63.6 kg (140 lb 5.2 oz)     Temp Readings from Last 3 Encounters:   06/09/19 97.1 °F (36.2 °C) (Oral)   12/12/18 (P) 97.5 °F (36.4 °C) ((P) Tympanic)   11/17/18 97.5 °F (36.4 °C) (Oral)     BP Readings from Last 3 Encounters:   07/25/19 (!) 134/50   06/09/19 131/88   03/19/19 120/60     Pulse Readings from Last 3 Encounters:   07/25/19 60   06/09/19 (!) 56   03/19/19 60          Objective:    Physical Exam   Constitutional: Vital signs are normal. She appears well-developed and well-nourished. She is active and cooperative. She does not have a sickly appearance. She does not appear ill. No distress.   HENT:   Head: Normocephalic.   Neck: Neck supple. Normal carotid pulses, no hepatojugular reflux and no JVD present. Carotid bruit is not present. No thyromegaly present.   Cardiovascular: Normal rate, regular rhythm, S1 normal, S2 normal, normal heart sounds and normal pulses. PMI is not displaced. Exam reveals no gallop and no friction rub.   No murmur heard.  Pulses:       Radial pulses are 2+ on the right side, and 2+ on the left side.   Pulmonary/Chest: Effort normal and breath sounds normal. She has no wheezes. She has no rales.   Abdominal: Soft. Normal appearance, normal aorta and bowel sounds are normal. She exhibits no pulsatile liver, no abdominal bruit, no ascites and no mass. There is no splenomegaly or hepatomegaly. There is no tenderness.   Musculoskeletal: She exhibits no edema.   Lymphadenopathy:     She has no cervical adenopathy.   Neurological: She is alert.   Skin: Skin is warm. She is not diaphoretic.   Psychiatric: She has a normal mood and affect.   Nursing note and vitals reviewed.      I have reviewed all pertinent labs and cardiac studies.      Chemistry        Component Value Date/Time     (L) 06/09/2019 1716    K 3.7 06/09/2019 1716     CL 97 06/09/2019 1716    CO2 23 06/09/2019 1716    BUN 15 06/09/2019 1716    CREATININE 1.3 06/09/2019 1716     (HH) 06/09/2019 1716        Component Value Date/Time    CALCIUM 9.5 06/09/2019 1716    ALKPHOS 120 06/09/2019 1716    AST 14 06/09/2019 1716    ALT 14 06/09/2019 1716    BILITOT 0.8 06/09/2019 1716    ESTGFRAFRICA 45 (A) 06/09/2019 1716    EGFRNONAA 39 (A) 06/09/2019 1716        Lab Results   Component Value Date    WBC 9.07 06/09/2019    HGB 11.1 (L) 06/09/2019    HCT 36.4 (L) 06/09/2019    MCV 72 (L) 06/09/2019     06/09/2019     Lab Results   Component Value Date    HGBA1C 9.6 (H) 12/11/2018     Lab Results   Component Value Date    CHOL 130 12/11/2018    CHOL 187 07/22/2018    CHOL 116 (L) 12/18/2017     Lab Results   Component Value Date    HDL 54 12/11/2018    HDL 59 07/22/2018    HDL 43 12/18/2017     Lab Results   Component Value Date    LDLCALC 55.4 (L) 12/11/2018    LDLCALC 104.4 07/22/2018    LDLCALC 50.0 (L) 12/18/2017     Lab Results   Component Value Date    TRIG 103 12/11/2018    TRIG 118 07/22/2018    TRIG 115 12/18/2017     Lab Results   Component Value Date    CHOLHDL 41.5 12/11/2018    CHOLHDL 31.6 07/22/2018    CHOLHDL 37.1 12/18/2017           Assessment:       1. Coronary artery disease with stable angina pectoris, unspecified vessel or lesion type, unspecified whether native or transplanted heart    2. Type 2 diabetes mellitus with hyperglycemia, with long-term current use of insulin    3. 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 Hyperlipidemia associated with type 2 diabetes mellitus    5. Left ventricular diastolic dysfunction with preserved systolic function    6. History of arterial ischemic stroke, left MCA (middle cerebral artery) 12/2017    7. Aortic arch atherosclerosis    8. AP (angina pectoris)    9. Cerebrovascular accident (CVA), unspecified mechanism    10. Class 1 obesity due to excess calories with serious comorbidity and body mass index (BMI) of 30.0 to 30.9 in adult    11.  Embolic stroke involving left middle cerebral artery    12. Essential hypertension         Plan:             Stable CV conditions on current medical tx.  Reviewed all above med conditions and formulated tx plan.  Continue OMT for CV conditions.  Fall precautions discussed with pt/family.  Continue CPAP for NAWAF.  Continue ILR monitoring.  Needs to work hard for optimal DM control -- f/u with Dr. De Leon soon.  No changes in meds today.  F/u 6 months.

## 2019-09-01 ENCOUNTER — HOSPITAL ENCOUNTER (OUTPATIENT)
Facility: HOSPITAL | Age: 79
Discharge: HOME | End: 2019-09-02
Attending: EMERGENCY MEDICINE | Admitting: INTERNAL MEDICINE
Payer: MEDICARE

## 2019-09-01 DIAGNOSIS — R00.1 BRADYCARDIA: ICD-10-CM

## 2019-09-01 DIAGNOSIS — F41.1 GAD (GENERALIZED ANXIETY DISORDER): Chronic | ICD-10-CM

## 2019-09-01 DIAGNOSIS — R07.9 CHEST PAIN: Primary | ICD-10-CM

## 2019-09-01 DIAGNOSIS — I25.118 CORONARY ARTERY DISEASE WITH STABLE ANGINA PECTORIS, UNSPECIFIED VESSEL OR LESION TYPE, UNSPECIFIED WHETHER NATIVE OR TRANSPLANTED HEART: Chronic | ICD-10-CM

## 2019-09-01 PROBLEM — Z86.73 HISTORY OF CVA (CEREBROVASCULAR ACCIDENT): Status: ACTIVE | Noted: 2017-12-18

## 2019-09-01 LAB
ACANTHOCYTES BLD QL SMEAR: PRESENT
ALBUMIN SERPL BCP-MCNC: 3.1 G/DL (ref 3.5–5.2)
ALP SERPL-CCNC: 116 U/L (ref 55–135)
ALT SERPL W/O P-5'-P-CCNC: 13 U/L (ref 10–44)
ANION GAP SERPL CALC-SCNC: 12 MMOL/L (ref 8–16)
ANISOCYTOSIS BLD QL SMEAR: SLIGHT
APTT BLDCRRT: 26.2 SEC (ref 21–32)
AST SERPL-CCNC: 13 U/L (ref 10–40)
BACTERIA #/AREA URNS HPF: NORMAL /HPF
BASOPHILS # BLD AUTO: 0.03 K/UL (ref 0–0.2)
BASOPHILS NFR BLD: 0.4 % (ref 0–1.9)
BILIRUB SERPL-MCNC: 0.7 MG/DL (ref 0.1–1)
BILIRUB UR QL STRIP: NEGATIVE
BNP SERPL-MCNC: 114 PG/ML (ref 0–99)
BUN SERPL-MCNC: 17 MG/DL (ref 8–23)
BURR CELLS BLD QL SMEAR: ABNORMAL
CALCIUM SERPL-MCNC: 9.3 MG/DL (ref 8.7–10.5)
CHLORIDE SERPL-SCNC: 99 MMOL/L (ref 95–110)
CLARITY UR: CLEAR
CO2 SERPL-SCNC: 25 MMOL/L (ref 23–29)
COLOR UR: YELLOW
CREAT SERPL-MCNC: 1 MG/DL (ref 0.5–1.4)
DACRYOCYTES BLD QL SMEAR: ABNORMAL
DIFFERENTIAL METHOD: ABNORMAL
EOSINOPHIL # BLD AUTO: 0.1 K/UL (ref 0–0.5)
EOSINOPHIL NFR BLD: 1.5 % (ref 0–8)
ERYTHROCYTE [DISTWIDTH] IN BLOOD BY AUTOMATED COUNT: 16.6 % (ref 11.5–14.5)
EST. GFR  (AFRICAN AMERICAN): >60 ML/MIN/1.73 M^2
EST. GFR  (NON AFRICAN AMERICAN): 54 ML/MIN/1.73 M^2
GLUCOSE SERPL-MCNC: 283 MG/DL (ref 70–110)
GLUCOSE UR QL STRIP: ABNORMAL
HCT VFR BLD AUTO: 34.9 % (ref 37–48.5)
HGB BLD-MCNC: 10.5 G/DL (ref 12–16)
HGB UR QL STRIP: NEGATIVE
HYPOCHROMIA BLD QL SMEAR: ABNORMAL
INR PPP: 0.9 (ref 0.8–1.2)
KETONES UR QL STRIP: NEGATIVE
LEUKOCYTE ESTERASE UR QL STRIP: NEGATIVE
LYMPHOCYTES # BLD AUTO: 1.8 K/UL (ref 1–4.8)
LYMPHOCYTES NFR BLD: 23 % (ref 18–48)
MCH RBC QN AUTO: 21.5 PG (ref 27–31)
MCHC RBC AUTO-ENTMCNC: 30.1 G/DL (ref 32–36)
MCV RBC AUTO: 72 FL (ref 82–98)
MICROSCOPIC COMMENT: NORMAL
MONOCYTES # BLD AUTO: 0.7 K/UL (ref 0.3–1)
MONOCYTES NFR BLD: 8.2 % (ref 4–15)
NEUTROPHILS # BLD AUTO: 5.3 K/UL (ref 1.8–7.7)
NEUTROPHILS NFR BLD: 67 % (ref 38–73)
NITRITE UR QL STRIP: NEGATIVE
OVALOCYTES BLD QL SMEAR: ABNORMAL
PH UR STRIP: 6 [PH] (ref 5–8)
PLATELET # BLD AUTO: 287 K/UL (ref 150–350)
PLATELET BLD QL SMEAR: ABNORMAL
PMV BLD AUTO: 11.3 FL (ref 9.2–12.9)
POCT GLUCOSE: 271 MG/DL (ref 70–110)
POIKILOCYTOSIS BLD QL SMEAR: SLIGHT
POTASSIUM SERPL-SCNC: 3.8 MMOL/L (ref 3.5–5.1)
PROT SERPL-MCNC: 6.8 G/DL (ref 6–8.4)
PROT UR QL STRIP: NEGATIVE
PROTHROMBIN TIME: 9.9 SEC (ref 9–12.5)
RBC # BLD AUTO: 4.88 M/UL (ref 4–5.4)
SODIUM SERPL-SCNC: 136 MMOL/L (ref 136–145)
SP GR UR STRIP: 1.01 (ref 1–1.03)
SQUAMOUS #/AREA URNS HPF: 1 /HPF
TARGETS BLD QL SMEAR: ABNORMAL
TROPONIN I SERPL DL<=0.01 NG/ML-MCNC: 0.01 NG/ML (ref 0–0.03)
TROPONIN I SERPL DL<=0.01 NG/ML-MCNC: 0.02 NG/ML (ref 0–0.03)
TSH SERPL DL<=0.005 MIU/L-ACNC: 3.79 UIU/ML (ref 0.4–4)
URN SPEC COLLECT METH UR: ABNORMAL
UROBILINOGEN UR STRIP-ACNC: NEGATIVE EU/DL
WBC # BLD AUTO: 7.93 K/UL (ref 3.9–12.7)
YEAST URNS QL MICRO: NORMAL

## 2019-09-01 PROCEDURE — 96372 THER/PROPH/DIAG INJ SC/IM: CPT | Mod: 59

## 2019-09-01 PROCEDURE — 84484 ASSAY OF TROPONIN QUANT: CPT | Mod: HCNC

## 2019-09-01 PROCEDURE — 85730 THROMBOPLASTIN TIME PARTIAL: CPT | Mod: HCNC

## 2019-09-01 PROCEDURE — 36415 COLL VENOUS BLD VENIPUNCTURE: CPT | Mod: HCNC

## 2019-09-01 PROCEDURE — 85610 PROTHROMBIN TIME: CPT | Mod: HCNC

## 2019-09-01 PROCEDURE — 25000003 PHARM REV CODE 250: Mod: HCNC | Performed by: EMERGENCY MEDICINE

## 2019-09-01 PROCEDURE — 83036 HEMOGLOBIN GLYCOSYLATED A1C: CPT | Mod: HCNC

## 2019-09-01 PROCEDURE — 25500020 PHARM REV CODE 255: Mod: HCNC | Performed by: PHYSICIAN ASSISTANT

## 2019-09-01 PROCEDURE — 93010 EKG 12-LEAD: ICD-10-PCS | Mod: HCNC,,, | Performed by: INTERNAL MEDICINE

## 2019-09-01 PROCEDURE — G0378 HOSPITAL OBSERVATION PER HR: HCPCS | Mod: HCNC

## 2019-09-01 PROCEDURE — 81000 URINALYSIS NONAUTO W/SCOPE: CPT | Mod: HCNC

## 2019-09-01 PROCEDURE — 84484 ASSAY OF TROPONIN QUANT: CPT | Mod: 91,HCNC

## 2019-09-01 PROCEDURE — 93010 ELECTROCARDIOGRAM REPORT: CPT | Mod: HCNC,,, | Performed by: INTERNAL MEDICINE

## 2019-09-01 PROCEDURE — 83880 ASSAY OF NATRIURETIC PEPTIDE: CPT | Mod: HCNC

## 2019-09-01 PROCEDURE — 80053 COMPREHEN METABOLIC PANEL: CPT | Mod: HCNC

## 2019-09-01 PROCEDURE — 82962 GLUCOSE BLOOD TEST: CPT | Mod: HCNC

## 2019-09-01 PROCEDURE — 63600175 PHARM REV CODE 636 W HCPCS: Mod: HCNC | Performed by: PHYSICIAN ASSISTANT

## 2019-09-01 PROCEDURE — 85025 COMPLETE CBC W/AUTO DIFF WBC: CPT | Mod: HCNC

## 2019-09-01 PROCEDURE — 99285 EMERGENCY DEPT VISIT HI MDM: CPT | Mod: 25,HCNC

## 2019-09-01 PROCEDURE — 84443 ASSAY THYROID STIM HORMONE: CPT | Mod: HCNC

## 2019-09-01 PROCEDURE — 93005 ELECTROCARDIOGRAM TRACING: CPT | Mod: HCNC

## 2019-09-01 RX ORDER — IBUPROFEN 200 MG
24 TABLET ORAL
Status: DISCONTINUED | OUTPATIENT
Start: 2019-09-01 | End: 2019-09-02 | Stop reason: HOSPADM

## 2019-09-01 RX ORDER — ASPIRIN 81 MG/1
81 TABLET ORAL DAILY
Status: DISCONTINUED | OUTPATIENT
Start: 2019-09-02 | End: 2019-09-02 | Stop reason: HOSPADM

## 2019-09-01 RX ORDER — PANTOPRAZOLE SODIUM 40 MG/1
40 TABLET, DELAYED RELEASE ORAL DAILY
Status: DISCONTINUED | OUTPATIENT
Start: 2019-09-02 | End: 2019-09-02 | Stop reason: HOSPADM

## 2019-09-01 RX ORDER — ONDANSETRON 8 MG/1
8 TABLET, ORALLY DISINTEGRATING ORAL EVERY 8 HOURS PRN
Status: DISCONTINUED | OUTPATIENT
Start: 2019-09-01 | End: 2019-09-02 | Stop reason: HOSPADM

## 2019-09-01 RX ORDER — HYDROCODONE BITARTRATE AND ACETAMINOPHEN 10; 325 MG/1; MG/1
1 TABLET ORAL
Status: COMPLETED | OUTPATIENT
Start: 2019-09-01 | End: 2019-09-01

## 2019-09-01 RX ORDER — ISOSORBIDE MONONITRATE 30 MG/1
30 TABLET, EXTENDED RELEASE ORAL 2 TIMES DAILY
COMMUNITY
End: 2019-10-04 | Stop reason: SDUPTHER

## 2019-09-01 RX ORDER — ENOXAPARIN SODIUM 100 MG/ML
40 INJECTION SUBCUTANEOUS EVERY 24 HOURS
Status: DISCONTINUED | OUTPATIENT
Start: 2019-09-01 | End: 2019-09-02 | Stop reason: HOSPADM

## 2019-09-01 RX ORDER — DONEPEZIL HYDROCHLORIDE 5 MG/1
10 TABLET, FILM COATED ORAL DAILY
Status: DISCONTINUED | OUTPATIENT
Start: 2019-09-02 | End: 2019-09-02 | Stop reason: HOSPADM

## 2019-09-01 RX ORDER — ISOSORBIDE MONONITRATE 30 MG/1
30 TABLET, EXTENDED RELEASE ORAL 2 TIMES DAILY
Status: DISCONTINUED | OUTPATIENT
Start: 2019-09-01 | End: 2019-09-02 | Stop reason: HOSPADM

## 2019-09-01 RX ORDER — ESCITALOPRAM OXALATE 10 MG/1
20 TABLET ORAL DAILY
Status: DISCONTINUED | OUTPATIENT
Start: 2019-09-02 | End: 2019-09-02 | Stop reason: HOSPADM

## 2019-09-01 RX ORDER — ASPIRIN 325 MG
325 TABLET ORAL
Status: COMPLETED | OUTPATIENT
Start: 2019-09-01 | End: 2019-09-01

## 2019-09-01 RX ORDER — IBUPROFEN 200 MG
16 TABLET ORAL
Status: DISCONTINUED | OUTPATIENT
Start: 2019-09-01 | End: 2019-09-02 | Stop reason: HOSPADM

## 2019-09-01 RX ORDER — GLUCAGON 1 MG
1 KIT INJECTION
Status: DISCONTINUED | OUTPATIENT
Start: 2019-09-01 | End: 2019-09-02 | Stop reason: HOSPADM

## 2019-09-01 RX ORDER — ACETAMINOPHEN 325 MG/1
650 TABLET ORAL EVERY 6 HOURS PRN
Status: DISCONTINUED | OUTPATIENT
Start: 2019-09-01 | End: 2019-09-02 | Stop reason: HOSPADM

## 2019-09-01 RX ORDER — LEVOTHYROXINE SODIUM 50 UG/1
50 TABLET ORAL
Status: DISCONTINUED | OUTPATIENT
Start: 2019-09-02 | End: 2019-09-02 | Stop reason: HOSPADM

## 2019-09-01 RX ORDER — HYDROCHLOROTHIAZIDE 25 MG/1
25 TABLET ORAL DAILY
Status: DISCONTINUED | OUTPATIENT
Start: 2019-09-02 | End: 2019-09-02 | Stop reason: HOSPADM

## 2019-09-01 RX ORDER — HYDROCODONE BITARTRATE AND ACETAMINOPHEN 5; 325 MG/1; MG/1
1 TABLET ORAL EVERY 6 HOURS PRN
Status: DISCONTINUED | OUTPATIENT
Start: 2019-09-01 | End: 2019-09-02 | Stop reason: HOSPADM

## 2019-09-01 RX ORDER — LOSARTAN POTASSIUM 50 MG/1
100 TABLET ORAL DAILY
Status: DISCONTINUED | OUTPATIENT
Start: 2019-09-02 | End: 2019-09-02 | Stop reason: HOSPADM

## 2019-09-01 RX ORDER — CLOPIDOGREL BISULFATE 75 MG/1
75 TABLET ORAL DAILY
Status: DISCONTINUED | OUTPATIENT
Start: 2019-09-02 | End: 2019-09-02 | Stop reason: HOSPADM

## 2019-09-01 RX ORDER — INSULIN ASPART 100 [IU]/ML
0-5 INJECTION, SOLUTION INTRAVENOUS; SUBCUTANEOUS
Status: DISCONTINUED | OUTPATIENT
Start: 2019-09-01 | End: 2019-09-02 | Stop reason: HOSPADM

## 2019-09-01 RX ADMIN — IOHEXOL 100 ML: 350 INJECTION, SOLUTION INTRAVENOUS at 10:09

## 2019-09-01 RX ADMIN — ASPIRIN 325 MG ORAL TABLET 325 MG: 325 PILL ORAL at 05:09

## 2019-09-01 RX ADMIN — HYDROCODONE BITARTRATE AND ACETAMINOPHEN 1 TABLET: 10; 325 TABLET ORAL at 06:09

## 2019-09-01 RX ADMIN — ENOXAPARIN SODIUM 40 MG: 100 INJECTION SUBCUTANEOUS at 11:09

## 2019-09-01 NOTE — ED NOTES
Pt reports CP that started this am. One nitro tab relieved pain. Pain returned this afternoon so pt took another nitro and pain was relieved. Pt c/o weakness and dizziness since Thursday. Family reports her blood sugar was 480 this am.     Patient moved to ED room 9, patient assisted onto stretcher and changed into a gown. Patient placed on cardiac monitor, continuous pulse oximetry and automatic blood pressure cuff. Bed placed in low locked position, side rails up x 2, call light is within reach of patient or family, orientation to room and explanation of wait provided to family and patient, alarms set and turned on for monitor and pulse ox, awaiting MD evaluation and orders, will continue to monitor.    Patient identifies self as Tiffanie Wise      LOC: The patient is awake, alert and aware of environment with an appropriate affect, the patient is oriented x 3 and speaking appropriately.  APPEARANCE: Patient resting comfortably and in no acute distress, patient is clean and well groomed, patient's clothing is properly fastened.  SKIN: The skin is warm and dry, color consistent with ethnicity, patient has normal skin turgor and moist mucus membranes, skin intact, no breakdown or bruising noted.  MUSCULOSKELETAL: Patient moving all extremities well, no obvious swelling or deformities noted.  RESPIRATORY: Airway is open and patent, respirations are spontaneous, patient has a normal effort and rate, no accessory muscle use noted.  CARDIAC: Patient is bradycardic on monitor, no peripheral edema noted, capillary refill < 3 seconds.  ABDOMEN: Soft and non tender to palpation, no distention noted.  NEUROLOGIC: PERRL, eyes open spontaneously, behavior appropriate to situation, follows commands, facial expression symmetrical, bilateral hand grasp equal and even, purposeful motor response noted, normal sensation in all extremities when touched with a finger.

## 2019-09-01 NOTE — ED PROVIDER NOTES
"SCRIBE #1 NOTE: I, Tammy Milton, am scribing for, and in the presence of, Juan Jose Cruz Jr., MD. I have scribed the entire note.      History      Chief Complaint   Patient presents with    Chest Pain     Pt states, "I am having chest pain and high blood pressure."       Review of patient's allergies indicates:  No Known Allergies     HPI   HPI    9/1/2019, 5:17 PM   History obtained from the patient and daughter      History of Present Illness: Tiffanie Wise is a 78 y.o. female patient with PMHx of CAD, DM2, GERD, HTN, HLD, MI, and seizures who presents to the Emergency Department for CP which onset gradually today. Symptoms are constant and moderate in severity. No mitigating or exacerbating factors reported. Associated sxs include dizziness, diarrhea, fatigue, and elevated blood sugar (480's-500). Patient denies any fever, chills, diaphoresis, cough, SOB, wheezing, palpations, abd pain, dysuria, back pain, HA, lightheadedness, extremity weakness/numbness, and all other sxs at this time. Prior Tx includes NTG with some relief of sxs. No further complaints or concerns at this time.         Arrival mode: Personal vehicle      PCP: Evelio Parnell MD       Past Medical History:  Past Medical History:   Diagnosis Date    Acute respiratory failure with hypoxia     Anxiety     AP (angina pectoris) 7/18/2013    Arterial ischemic stroke, MCA (middle cerebral artery), left, acute 12/15/2017    Asthma     CAD, multiple vessel 8/2/2016    Chronic ischemic heart disease 7/18/2013    Class 1 obesity due to excess calories with serious comorbidity and body mass index (BMI) of 30.0 to 30.9 in adult 3/15/2019    Coronary artery disease 7/18/2013    Depression     Diabetes with neurologic complications     GERD (gastroesophageal reflux disease) 7/18/2013    Heart failure     History of PTCA 7/18/2013    Hypertension 7/18/2013    Hypothyroidism     Leg pain 8/29/2013    Mixed hyperlipidemia 7/18/2013    " Myocardial infarction     Osteoporosis     Pneumonia     Pneumonia due to other staphylococcus     Precancerous changes of the vagina     Seizure 3/15/2019    Sleep apnea     stopped using CPAP 2015 - sores in nose, couldn't breathe, uses Breathe riht strips now.     Thyroid disease     Tobacco dependence     resolved    Trouble in sleeping     Type 2 diabetes mellitus with ophthalmic manifestations     Urinary incontinence     pullups day, pads at night       Past Surgical History:  Past Surgical History:   Procedure Laterality Date    AORTOCORONARY BYPASS-CABG - KENY N/A 6/29/2016    Performed by [facility]leg [facility]antes [facility] at [facility] OR    BREAST SURGERY Left     benign cyst    CATHETERIZATION, HEART, LEFT Left 3/10/2014    Performed by Mariam Bell MD at [facility] CATH LAB    CATHETERIZATION, HEART, LEFT Left 11/21/2013    Performed by Brendan Short [facility] at [facility] CATH LAB    CORONARY ANGIOPLASTY      CORONARY STENT PLACEMENT      x6-7 oer the yeqrs  last 12/17/14 BRIANDA to lcfx    EYE SURGERY Bilateral 2014    cataracts w/IOL   Dr Vance    HEART CATH-LEFT Left 12/17/2014    Performed by [facility] at [facility] CATH LAB    HEART CATH-LEFT Left 10/14/2014    Per[facility] by [facility] at [facility] CATH LAB    HEART CATH-LEFT Left 7/10/2014    Performed by [facility] at [facility] CATH LAB    HYSTERECTOMY  1970    vag hyst; ovaries intact    PLACEMENT-LOOP RECORDER Left 2/1/2018    Performed by Kole Devine MD at [facility] CATH LAB    THYROID SURGERY      nodule benign, Dr Hankins    TUBAL LIGATION  1970         Family History:  Family History   Problem Relation Age of Onset    Kidney disease Father     Kidney disease Brother     Heart disease Mother     Fibromyalgia Daughter     Cancer Son         lung    Cirrhosis Sister     Alcohol abuse Sister     Diabetes Sister     Fibromyalgia Daughter     Diabetes Paternal Grandmother     Stroke Neg Hx     Mental illness Neg Hx     Mental  retardation Neg Hx        Social History:  Social History     Tobacco Use    Smoking status: Former Smoker     Packs/day: 1.00     Years: 38.00     Pack years: 38.00     Last attempt to quit: 2008     Years since quittin.1    Smokeless tobacco: Never Used   Substance and Sexual Activity    Alcohol use: No    Drug use: No    Sexual activity: Not Currently     Birth control/protection: None       ROS   Review of Systems   Constitutional: Positive for fatigue. Negative for chills, diaphoresis and fever.        (+) elevated blood sugar (480's-500)   HENT: Negative for sore throat.    Respiratory: Negative for cough, shortness of breath and wheezing.    Cardiovascular: Positive for chest pain. Negative for palpitations.   Gastrointestinal: Positive for diarrhea. Negative for abdominal pain, nausea and vomiting.   Genitourinary: Negative for dysuria.   Musculoskeletal: Negative for back pain.   Skin: Negative for rash.   Neurological: Positive for dizziness. Negative for weakness, light-headedness, numbness and headaches.   Hematological: Does not bruise/bleed easily.   All other systems reviewed and are negative.    Physical Exam      Initial Vitals [19 1657]   BP Pulse Resp Temp SpO2   125/74 (!) 54 20 97.5 °F (36.4 °C) 98 %      MAP       --          Physical Exam  Nursing Notes and Vital Signs Reviewed.  Constitutional: Patient is in no acute distress. Fatigued.  Head: Atraumatic. Normocephalic.  Eyes: PERRL. EOM intact. Conjunctivae are not pale. No scleral icterus.  ENT: Mucous membranes are moist. Oropharynx is clear and symmetric.    Neck: Supple. Full ROM. No lymphadenopathy.  Cardiovascular: Regular rate. Regular rhythm. No murmurs, rubs, or gallops. Distal pulses are 2+ and symmetric.  Pulmonary/Chest: No respiratory distress. Clear to auscultation bilaterally. No wheezing or rales.  Abdominal: Soft and non-distended.  There is no tenderness.  No rebound, guarding, or rigidity. Good bowel  "sounds. Pt c/o loose stool.  Genitourinary: No CVA tenderness  Musculoskeletal: Moves all extremities. No obvious deformities. No edema. No calf tenderness.  Skin: Warm and dry.  Neurological:  Alert, awake, and appropriate.  Normal speech.  No acute focal neurological deficits are appreciated.  Psychiatric: Anxious. Good eye contact. Appropriate in content.    ED Course    Procedures  ED Vital Signs:  Vitals:    09/01/19 1657 09/01/19 1800   BP: 125/74 (!) 141/97   Pulse: (!) 54 (!) 49   Resp: 20 18   Temp: 97.5 °F (36.4 °C)    TempSrc: Oral    SpO2: 98% 99%   Weight: 65.2 kg (143 lb 13.6 oz)    Height: 4' 11" (1.499 m)        Abnormal Lab Results:  Labs Reviewed   CBC W/ AUTO DIFFERENTIAL - Abnormal; Notable for the following components:       Result Value    Hemoglobin 10.5 (*)     Hematocrit 34.9 (*)     Mean Corpuscular Volume 72 (*)     Mean Corpuscular Hemoglobin 21.5 (*)     Mean Corpuscular Hemoglobin Conc 30.1 (*)     RDW 16.6 (*)     All other components within normal limits   COMPREHENSIVE METABOLIC PANEL - Abnormal; Notable for the following components:    Glucose 283 (*)     Albumin 3.1 (*)     eGFR if non  54 (*)     All other components within normal limits   URINALYSIS, REFLEX TO URINE CULTURE - Abnormal; Notable for the following components:    Glucose, UA 3+ (*)     All other components within normal limits    Narrative:     Preferred Collection Type->Urine, Clean Catch   POCT GLUCOSE - Abnormal; Notable for the following components:    POCT Glucose 271 (*)     All other components within normal limits   TROPONIN I   PROTIME-INR   APTT   TROPONIN I   B-TYPE NATRIURETIC PEPTIDE   URINALYSIS MICROSCOPIC   POCT GLUCOSE MONITORING CONTINUOUS        All Lab Results:  Results for orders placed or performed during the hospital encounter of 09/01/19   CBC auto differential   Result Value Ref Range    WBC 7.93 3.90 - 12.70 K/uL    RBC 4.88 4.00 - 5.40 M/uL    Hemoglobin 10.5 (L) 12.0 - 16.0 " g/dL    Hematocrit 34.9 (L) 37.0 - 48.5 %    Mean Corpuscular Volume 72 (L) 82 - 98 fL    Mean Corpuscular Hemoglobin 21.5 (L) 27.0 - 31.0 pg    Mean Corpuscular Hemoglobin Conc 30.1 (L) 32.0 - 36.0 g/dL    RDW 16.6 (H) 11.5 - 14.5 %    Platelets 287 150 - 350 K/uL    MPV 11.3 9.2 - 12.9 fL   Comprehensive metabolic panel   Result Value Ref Range    Sodium 136 136 - 145 mmol/L    Potassium 3.8 3.5 - 5.1 mmol/L    Chloride 99 95 - 110 mmol/L    CO2 25 23 - 29 mmol/L    Glucose 283 (H) 70 - 110 mg/dL    BUN, Bld 17 8 - 23 mg/dL    Creatinine 1.0 0.5 - 1.4 mg/dL    Calcium 9.3 8.7 - 10.5 mg/dL    Total Protein 6.8 6.0 - 8.4 g/dL    Albumin 3.1 (L) 3.5 - 5.2 g/dL    Total Bilirubin 0.7 0.1 - 1.0 mg/dL    Alkaline Phosphatase 116 55 - 135 U/L    AST 13 10 - 40 U/L    ALT 13 10 - 44 U/L    Anion Gap 12 8 - 16 mmol/L    eGFR if African American >60 >60 mL/min/1.73 m^2    eGFR if non African American 54 (A) >60 mL/min/1.73 m^2   Troponin I #1   Result Value Ref Range    Troponin I 0.016 0.000 - 0.026 ng/mL   Urinalysis, Reflex to Urine Culture Urine, Clean Catch   Result Value Ref Range    Specimen UA Urine, Clean Catch     Color, UA Yellow Yellow, Straw, Sadie    Appearance, UA Clear Clear    pH, UA 6.0 5.0 - 8.0    Specific Gravity, UA 1.010 1.005 - 1.030    Protein, UA Negative Negative    Glucose, UA 3+ (A) Negative    Ketones, UA Negative Negative    Bilirubin (UA) Negative Negative    Occult Blood UA Negative Negative    Nitrite, UA Negative Negative    Urobilinogen, UA Negative <2.0 EU/dL    Leukocytes, UA Negative Negative   Protime-INR   Result Value Ref Range    Prothrombin Time 9.9 9.0 - 12.5 sec    INR 0.9 0.8 - 1.2   APTT   Result Value Ref Range    aPTT 26.2 21.0 - 32.0 sec   POCT glucose   Result Value Ref Range    POCT Glucose 271 (H) 70 - 110 mg/dL         Imaging Results:  Imaging Results          X-Ray Chest AP Portable (Final result)  Result time 09/01/19 17:50:47    Final result by Shaka Cross MD  (09/01/19 17:50:47)                 Impression:      No acute process seen.      Electronically signed by: Shaka Cross MD  Date:    09/01/2019  Time:    17:50             Narrative:    EXAMINATION:  XR CHEST AP PORTABLE    CLINICAL HISTORY:  Chest Pain;    FINDINGS:  Single view of the chest.  Aorta demonstrates atherosclerotic disease.    Cardiac silhouette is normal.  The lungs demonstrate no evidence of active disease.  No evidence of pleural effusion or pneumothorax.  Bones demonstrate moderate degenerative changes.                                    The EKG was ordered, reviewed, and independently interpreted by the ED provider.  Interpretation time: 1703  Rate: 46 BPM  Rhythm: sinus bradycardia  Interpretation: No acute ST changes. No STEMI.      The Emergency Provider reviewed the vital signs and test results, which are outlined above.    ED Discussion     6:42 PM: Discussed case with DEEPAK Gardiner ([facility]). Dr. Elliott agrees with current care and management of pt and accepts admission.   Admitting Service:   Admitting Physician: Dr. Elliott  Admit to: Obs (Tele)    6:42 PM: Re-evaluated pt. I have discussed test results, shared treatment plan, and the need for admission with patient and family at bedside. Pt and family express understanding at this time and agree with all information. All questions answered. Pt and family have no further questions or concerns at this time. Pt is ready for admit.      ED Medication(s):  Medications   HYDROcodone-acetaminophen  mg per tablet 1 tablet (has no administration in time range)   aspirin tablet 325 mg (325 mg Oral Given 9/1/19 1742)          New Prescriptions    No medications on file         Medical Decision Making    Medical Decision Making:   Clinical Tests:   Lab Tests: Ordered and Reviewed  Radiological Study: Ordered and Reviewed  Medical Tests: Ordered and Reviewed           Scribe Attestation:   Scribe #1: I performed the above scribed  service and the documentation accurately describes the services I performed. I attest to the accuracy of the note.    Attending:   Physician Attestation Statement for Scribe #1: I, Juan Jose Cruz Jr., MD, personally performed the services described in this documentation, as scribed by Tammy Milton, in my presence, and it is both accurate and complete.          Clinical Impression       ICD-10-CM ICD-9-CM   1. Coronary artery disease with stable angina pectoris, unspecified vessel or lesion type, unspecified whether native or transplanted heart I25.118 414.00     413.9   2. Chest pain R07.9 786.50   3. RUDOLPH (generalized anxiety disorder) F41.1 300.02       Disposition:   Disposition: Placed in Observation  Condition: Fair         Juan Jose Cruz Jr., MD  09/01/19 3838

## 2019-09-02 VITALS
OXYGEN SATURATION: 96 % | HEART RATE: 47 BPM | RESPIRATION RATE: 18 BRPM | DIASTOLIC BLOOD PRESSURE: 75 MMHG | BODY MASS INDEX: 29.38 KG/M2 | SYSTOLIC BLOOD PRESSURE: 123 MMHG | HEIGHT: 59 IN | TEMPERATURE: 98 F | WEIGHT: 145.75 LBS

## 2019-09-02 LAB
ACANTHOCYTES BLD QL SMEAR: PRESENT
ANION GAP SERPL CALC-SCNC: 10 MMOL/L (ref 8–16)
ANISOCYTOSIS BLD QL SMEAR: SLIGHT
BASOPHILS # BLD AUTO: 0.03 K/UL (ref 0–0.2)
BASOPHILS NFR BLD: 0.3 % (ref 0–1.9)
BUN SERPL-MCNC: 19 MG/DL (ref 8–23)
BURR CELLS BLD QL SMEAR: ABNORMAL
CALCIUM SERPL-MCNC: 9 MG/DL (ref 8.7–10.5)
CHLORIDE SERPL-SCNC: 99 MMOL/L (ref 95–110)
CO2 SERPL-SCNC: 30 MMOL/L (ref 23–29)
CREAT SERPL-MCNC: 1.3 MG/DL (ref 0.5–1.4)
DACRYOCYTES BLD QL SMEAR: ABNORMAL
DIFFERENTIAL METHOD: ABNORMAL
EOSINOPHIL # BLD AUTO: 0.2 K/UL (ref 0–0.5)
EOSINOPHIL NFR BLD: 2.1 % (ref 0–8)
ERYTHROCYTE [DISTWIDTH] IN BLOOD BY AUTOMATED COUNT: 16.8 % (ref 11.5–14.5)
EST. GFR  (AFRICAN AMERICAN): 45 ML/MIN/1.73 M^2
EST. GFR  (NON AFRICAN AMERICAN): 39 ML/MIN/1.73 M^2
ESTIMATED AVG GLUCOSE: 338 MG/DL (ref 68–131)
GLUCOSE SERPL-MCNC: 170 MG/DL (ref 70–110)
HBA1C MFR BLD HPLC: 13.4 % (ref 4–5.6)
HCT VFR BLD AUTO: 34.2 % (ref 37–48.5)
HGB BLD-MCNC: 10.2 G/DL (ref 12–16)
HYPOCHROMIA BLD QL SMEAR: ABNORMAL
LYMPHOCYTES # BLD AUTO: 2.7 K/UL (ref 1–4.8)
LYMPHOCYTES NFR BLD: 27.9 % (ref 18–48)
MAGNESIUM SERPL-MCNC: 1.4 MG/DL (ref 1.6–2.6)
MCH RBC QN AUTO: 21.7 PG (ref 27–31)
MCHC RBC AUTO-ENTMCNC: 29.8 G/DL (ref 32–36)
MCV RBC AUTO: 73 FL (ref 82–98)
MONOCYTES # BLD AUTO: 0.9 K/UL (ref 0.3–1)
MONOCYTES NFR BLD: 9 % (ref 4–15)
NEUTROPHILS # BLD AUTO: 5.9 K/UL (ref 1.8–7.7)
NEUTROPHILS NFR BLD: 60.8 % (ref 38–73)
OVALOCYTES BLD QL SMEAR: ABNORMAL
PHOSPHATE SERPL-MCNC: 5.3 MG/DL (ref 2.7–4.5)
PLATELET # BLD AUTO: 304 K/UL (ref 150–350)
PLATELET BLD QL SMEAR: ABNORMAL
PMV BLD AUTO: 11.4 FL (ref 9.2–12.9)
POCT GLUCOSE: 162 MG/DL (ref 70–110)
POCT GLUCOSE: 181 MG/DL (ref 70–110)
POIKILOCYTOSIS BLD QL SMEAR: SLIGHT
POLYCHROMASIA BLD QL SMEAR: ABNORMAL
POTASSIUM SERPL-SCNC: 3.7 MMOL/L (ref 3.5–5.1)
RBC # BLD AUTO: 4.7 M/UL (ref 4–5.4)
SCHISTOCYTES BLD QL SMEAR: PRESENT
SODIUM SERPL-SCNC: 139 MMOL/L (ref 136–145)
TARGETS BLD QL SMEAR: ABNORMAL
TROPONIN I SERPL DL<=0.01 NG/ML-MCNC: <0.006 NG/ML (ref 0–0.03)
WBC # BLD AUTO: 9.75 K/UL (ref 3.9–12.7)

## 2019-09-02 PROCEDURE — 85025 COMPLETE CBC W/AUTO DIFF WBC: CPT | Mod: HCNC

## 2019-09-02 PROCEDURE — 96374 THER/PROPH/DIAG INJ IV PUSH: CPT

## 2019-09-02 PROCEDURE — G0378 HOSPITAL OBSERVATION PER HR: HCPCS | Mod: HCNC

## 2019-09-02 PROCEDURE — 84100 ASSAY OF PHOSPHORUS: CPT | Mod: HCNC

## 2019-09-02 PROCEDURE — 25000003 PHARM REV CODE 250: Mod: HCNC | Performed by: PHYSICIAN ASSISTANT

## 2019-09-02 PROCEDURE — 99214 OFFICE O/P EST MOD 30 MIN: CPT | Mod: HCNC,,, | Performed by: INTERNAL MEDICINE

## 2019-09-02 PROCEDURE — 99214 PR OFFICE/OUTPT VISIT, EST, LEVL IV, 30-39 MIN: ICD-10-PCS | Mod: HCNC,,, | Performed by: INTERNAL MEDICINE

## 2019-09-02 PROCEDURE — 84484 ASSAY OF TROPONIN QUANT: CPT | Mod: HCNC

## 2019-09-02 PROCEDURE — 63600175 PHARM REV CODE 636 W HCPCS: Mod: HCNC | Performed by: NURSE PRACTITIONER

## 2019-09-02 PROCEDURE — 36415 COLL VENOUS BLD VENIPUNCTURE: CPT | Mod: HCNC

## 2019-09-02 PROCEDURE — 80048 BASIC METABOLIC PNL TOTAL CA: CPT | Mod: HCNC

## 2019-09-02 PROCEDURE — 83735 ASSAY OF MAGNESIUM: CPT | Mod: HCNC

## 2019-09-02 RX ORDER — MAGNESIUM SULFATE HEPTAHYDRATE 40 MG/ML
2 INJECTION, SOLUTION INTRAVENOUS ONCE
Status: COMPLETED | OUTPATIENT
Start: 2019-09-02 | End: 2019-09-02

## 2019-09-02 RX ADMIN — ISOSORBIDE MONONITRATE 30 MG: 30 TABLET, EXTENDED RELEASE ORAL at 09:09

## 2019-09-02 RX ADMIN — LEVOTHYROXINE SODIUM 50 MCG: 50 TABLET ORAL at 05:09

## 2019-09-02 RX ADMIN — HYDROCODONE BITARTRATE AND ACETAMINOPHEN 1 TABLET: 5; 325 TABLET ORAL at 12:09

## 2019-09-02 RX ADMIN — DONEPEZIL HYDROCHLORIDE 10 MG: 5 TABLET, FILM COATED ORAL at 09:09

## 2019-09-02 RX ADMIN — CLOPIDOGREL BISULFATE 75 MG: 75 TABLET ORAL at 09:09

## 2019-09-02 RX ADMIN — PANTOPRAZOLE SODIUM 40 MG: 40 TABLET, DELAYED RELEASE ORAL at 09:09

## 2019-09-02 RX ADMIN — MAGNESIUM SULFATE IN WATER 2 G: 40 INJECTION, SOLUTION INTRAVENOUS at 03:09

## 2019-09-02 RX ADMIN — ESCITALOPRAM OXALATE 20 MG: 10 TABLET ORAL at 09:09

## 2019-09-02 RX ADMIN — ASPIRIN 81 MG: 81 TABLET, COATED ORAL at 09:09

## 2019-09-02 RX ADMIN — HYDROCHLOROTHIAZIDE 25 MG: 25 TABLET ORAL at 09:09

## 2019-09-02 RX ADMIN — LOSARTAN POTASSIUM 100 MG: 50 TABLET, FILM COATED ORAL at 09:09

## 2019-09-02 RX ADMIN — HYDROCODONE BITARTRATE AND ACETAMINOPHEN 1 TABLET: 5; 325 TABLET ORAL at 05:09

## 2019-09-02 RX ADMIN — HYDROCODONE BITARTRATE AND ACETAMINOPHEN 1 TABLET: 5; 325 TABLET ORAL at 10:09

## 2019-09-02 NOTE — CONSULTS
[facility]  Cardiology  Consult Note    Patient Name: Tiffanie Wise  MRN: [id number]  Admission Date: 9/1/2019  Hospital Length of Stay: 0 days  Code Status: Full Code   Attending Provider: Aneta Daniel MD   Consulting Provider: Romeo Epps MD  Primary Care Physician: Evelio Parnell MD  Principal Problem:Chest pain    Patient information was obtained from patient and ER records.     Inpatient consult to Cardiology  Consult performed by: Romeo Epps MD  Consult ordered by: ISRAEL Osorio        Subjective:         HPI:   77 yo female, consult for chest pain.  PMH CAD s/p CABG in 2016, h/o CVA in  with residual aphasia, DM, HTN, HLD and s/p ILR  C/o left chest pain for 2 weeks, daily, sharp pain and lasted for seconds. Yesterday, BS > 400 and was sent to ER.  EKG chronic sinus bradycardia and chronic ST depression on anterolateral leads.   Troponin negative x2.   ECHO showed normal EF, RVH and biatrial enlargement        Past Medical History:   Diagnosis Date    Anxiety     AP (angina pectoris) 7/18/2013    Arterial ischemic stroke, MCA (middle cerebral artery), left, acute 12/15/2017    Asthma     Class 1 obesity due to excess calories with serious comorbidity and body mass index (BMI) of 30.0 to 30.9 in adult 3/15/2019    Coronary artery disease 7/18/2013    Depression     Diastolic heart failure     GERD (gastroesophageal reflux disease) 7/18/2013    History of PTCA 7/18/2013    Hypertension 7/18/2013    Hypothyroidism     Mixed hyperlipidemia 7/18/2013    Osteoporosis     Pneumonia due to other staphylococcus     Precancerous changes of the vagina     Seizure 3/15/2019    Sleep apnea     stopped using CPAP 2015 - sores in nose, couldn't breathe, uses Breathe riht strips now.     Trouble in sleeping     Type 2 diabetes mellitus with ophthalmic manifestations     Urinary incontinence     pullups day, pads at night       Past Surgical History:   Procedure  "Laterality Date    AORTOCORONARY BYPASS-CABG - KENY N/A 6/29/2016    Performed by [facility]antes [facility] at [facility] OR    BREAST SURGERY Left     benign cyst    CATHETERIZATION, HEART, LEFT Left 3/10/2014    Performed by Mariam Bell [facility] at [facility] CATH LAB    CATHETERIZATION, HEART, LEFT Left 11/21/2013    Performed by Brendan Short [facility] at [facility] CATH LAB    CORONARY ANGIOPLASTY      CORONARY STENT PLACEMENT      x6-7 oer the yeqrs  last 12/17/14 BRIANDA to lcfx    EYE SURGERY Bilateral 2014    cataracts w/IOL   Dr Vance    HEART CATH-LEFT Left 12/17/2014    Performed by [facility] at [facility] CATH LAB    HEART CATH-LEFT Left 10/14/2014    Performed by [facility] at [facility] CATH LAB    HEART CATH-LEFT Left 7/10/2014    Performed by [facility] at [facility] CATH LAB    HYSTERECTOMY  1970    vag hyst; ovaries intact    PLACEMENT-LOOP RECORDER Left 2/1/2018    Performed by Kole Devine MD at [facility] CATH LAB    THYROID SURGERY      nodule benign, Dr Hankins    TUBAL LIGATION  1970       Review of patient's allergies indicates:  No Known Allergies    No current facility-administered medications on file prior to encounter.      Current Outpatient Medications on File Prior to Encounter   Medication Sig    aspirin (ECOTRIN) 81 MG EC tablet Take 81 mg by mouth. Take 81 mg by mouth daily.    atorvastatin (LIPITOR) 80 MG tablet Take 1 tablet (80 mg total) by mouth once daily.    BD INSULIN SYRINGE ULT-FINE II 1/2 mL 31 x 5/16" Syrg     BLOOD SUGAR DIAGNOSTIC (TRUETEST TEST STRIPS MISC) by Misc.(Non-Drug; Combo Route) route. Pt is testing 3 times a day    clopidogrel (PLAVIX) 75 mg tablet TAKE 1 TABLET BY MOUTH EVERY DAY    donepezil (ARICEPT) 10 MG tablet TAKE 1 TABLET BY MOUTH EVERY DAY    EASY COMFORT LANCETS 30 gauge Misc     escitalopram oxalate (LEXAPRO) 20 MG tablet TAKE 1 TABLET BY MOUTH ONCE DAILY    hydroCHLOROthiazide (HYDRODIURIL) 25 MG tablet TAKE 1 TABLET BY MOUTH ONCE DAILY    " "isosorbide mononitrate (IMDUR) 30 MG 24 hr tablet Take 30 mg by mouth 2 (two) times daily.    levothyroxine (SYNTHROID) 50 MCG tablet TAKE 1 TABLET BY MOUTH EVERY DAY    losartan (COZAAR) 100 MG tablet Take 1 tablet (100 mg total) by mouth once daily.    nitroGLYCERIN (NITROSTAT) 0.4 MG SL tablet Place 1 tablet (0.4 mg total) under the tongue as needed.    pantoprazole (PROTONIX) 40 MG tablet TAKE 1 TABLET BY MOUTH EVERY DAY    pen needle, diabetic 31 gauge x 5/16" Ndle USE TWICE DAILY AND PRN    SITagliptin (JANUVIA) 100 MG Tab Take 100 mg by mouth once daily.    vitamin D 1000 units Tab Take 1,000 Units by mouth once daily.    insulin detemir (LEVEMIR FLEXTOUCH) 100 unit/mL (3 mL) SubQ InPn pen Inject 36 Units into the skin once daily. (Patient taking differently: Inject 48 Units into the skin once daily. )     Family History     Problem Relation (Age of Onset)    Alcohol abuse Sister    Cancer Son    Cirrhosis Sister    Diabetes Sister, Paternal Grandmother    Fibromyalgia Daughter, Daughter    Heart disease Mother    Kidney disease Father, Brother        Tobacco Use    Smoking status: Former Smoker     Packs/day: 1.00     Years: 38.00     Pack years: 38.00     Last attempt to quit: 2008     Years since quittin.1    Smokeless tobacco: Never Used   Substance and Sexual Activity    Alcohol use: No    Drug use: No    Sexual activity: Not Currently     Birth control/protection: None     Review of Systems   Constitution: Negative for decreased appetite, diaphoresis, fever, malaise/fatigue and night sweats.   HENT: Negative for nosebleeds.    Eyes: Negative for blurred vision and double vision.   Cardiovascular: Positive for chest pain. Negative for claudication, dyspnea on exertion, irregular heartbeat, leg swelling, near-syncope, orthopnea, palpitations, paroxysmal nocturnal dyspnea and syncope.   Respiratory: Negative for cough, shortness of breath, sleep disturbances due to breathing, snoring, " sputum production and wheezing.    Endocrine: Negative for cold intolerance and polyuria.   Hematologic/Lymphatic: Does not bruise/bleed easily.   Skin: Negative for rash.   Musculoskeletal: Negative for back pain, falls, joint pain, joint swelling and neck pain.   Gastrointestinal: Negative for abdominal pain, heartburn, nausea and vomiting.   Genitourinary: Negative for dysuria, frequency and hematuria.   Neurological: Positive for focal weakness. Negative for difficulty with concentration, dizziness, headaches, light-headedness, numbness, seizures and weakness.        Aphasia   Psychiatric/Behavioral: Negative for depression, memory loss and substance abuse. The patient does not have insomnia.    Allergic/Immunologic: Negative for HIV exposure and hives.     Objective:     Vital Signs (Most Recent):  Temp: 97.6 °F (36.4 °C) (09/02/19 0741)  Pulse: (!) 50 (09/02/19 0741)  Resp: 20 (09/02/19 0741)  BP: (!) 127/51 (09/02/19 0741)  SpO2: 95 % (09/02/19 0741) Vital Signs (24h Range):  Temp:  [96.5 °F (35.8 °C)-98 °F (36.7 °C)] 97.6 °F (36.4 °C)  Pulse:  [44-54] 50  Resp:  [11-20] 20  SpO2:  [95 %-100 %] 95 %  BP: (102-170)/(51-97) 127/51     Weight: 66.1 kg (145 lb 11.6 oz)  Body mass index is 29.43 kg/m².    SpO2: 95 %  O2 Device (Oxygen Therapy): room air      Intake/Output Summary (Last 24 hours) at 9/2/2019 1131  Last data filed at 9/2/2019 0600  Gross per 24 hour   Intake 120 ml   Output --   Net 120 ml       Lines/Drains/Airways     Peripheral Intravenous Line                 Peripheral IV - Single Lumen 09/01/19 1724 20 G Right Forearm less than 1 day         Peripheral IV - Single Lumen 09/01/19 2222 20 G Right Antecubital less than 1 day                Physical Exam   Constitutional: She is oriented to person, place, and time. She appears well-nourished.   HENT:   Head: Normocephalic.   Eyes: Pupils are equal, round, and reactive to light.   Neck: Normal carotid pulses and no JVD present. Carotid bruit is not  present. No thyromegaly present.   Cardiovascular: Normal rate, regular rhythm, normal heart sounds and normal pulses.  No extrasystoles are present. PMI is not displaced. Exam reveals no gallop and no S3.   No murmur heard.  Pulmonary/Chest: Breath sounds normal. No stridor. No respiratory distress.   B/l chest + tenderness   Abdominal: Soft. Bowel sounds are normal. There is no tenderness. There is no rebound.   Neurological: She is alert and oriented to person, place, and time.   Skin: Skin is intact. No rash noted.   Psychiatric: Her behavior is normal.       Significant Labs:   ABG: No results for input(s): PH, PCO2, HCO3, POCSATURATED, BE in the last 48 hours., Blood Culture: No results for input(s): LABBLOO in the last 48 hours., BMP:   Recent Labs   Lab 19   * 170*    139   K 3.8 3.7   CL 99 99   CO2 25 30*   BUN 17 19   CREATININE 1.0 1.3   CALCIUM 9.3 9.0   MG  --  1.4*   , CMP   Recent Labs   Lab 19    139   K 3.8 3.7   CL 99 99   CO2 25 30*   * 170*   BUN  19   CREATININE 1.0 1.3   CALCIUM 9.3 9.0   PROT 6.8  --    ALBUMIN 3.1*  --    BILITOT 0.7  --    ALKPHOS 116  --    AST 13  --    ALT 13  --    ANIONGAP 12 10   ESTGFRAFRICA >60 45*   EGFRNONAA 54* 39*   , CBC   Recent Labs   Lab 19   WBC 7.93 9.75   HGB 10.5* 10.2*   HCT 34.9* 34.2*    304   , INR   Recent Labs   Lab 19   INR 0.9   , Lipid Panel No results for input(s): CHOL, HDL, LDLCALC, TRIG, CHOLHDL in the last 48 hours. and Troponin   Recent Labs   Lab 191 19   TROPONINI 0.016 0.013 <0.006       Significant Imaging: EKG:      Assessment and Plan:     * Chest pain  Atypical chest pain with some reproducible components  CAD s/p CABG  H/o CVA  DM  HTN  HLD  S/p ILR  Severe sinus bradycardia    -can not do lexican nuke stress due to severe bradycardia  -arrange  nuke stress as  OP  -continue ASA, Plavix, Statin,losartan, Hydralazine and isosorbide  -ok to d/c from cardiology standpoint    Bradycardia  See above note    History of arterial ischemic stroke, left MCA (middle cerebral artery) 12/2017  See above note    Essential hypertension  See above note    CAD with remote CABG x 1V (LIMA-LAD) in 6/2016 + PCI of RCA and LCx  See above note        VTE Risk Mitigation (From admission, onward)        Ordered     enoxaparin injection 40 mg  Daily      09/01/19 0674          Thank you for your consult. I will sign off. Please contact us if you have any additional questions.    Romeo Epps MD  Cardiology   [facility]

## 2019-09-02 NOTE — PLAN OF CARE
Problem: Adult Inpatient Plan of Care  Goal: Readiness for Transition of Care    Intervention: Mutually Develop Transition Plan     09/02/19 1040   Discharge Needs Assessment   Equipment Currently Used at Home none   Social Work Plan   Patient/Family in Agreement with Plan yes   Living Environment   Able to Return to Prior Arrangements yes   (RETIRED) Current Health   Expected Length of Stay (days)   (tbd)   OTHER   Communicated expected length of stay with patient/caregiver yes   Is patient able to care for self after discharge? Yes   Who are your caregiver(s) and their phone number(s)? Saranya (daughter) [phone]

## 2019-09-02 NOTE — ASSESSMENT & PLAN NOTE
-Continue ASA, Plavix, losartan, Imdur and Lipitor.   -Hold beta-blocker due to bradycardia.   -Trend troponin.

## 2019-09-02 NOTE — ASSESSMENT & PLAN NOTE
Atypical chest pain with some reproducible components  CAD s/p CABG  H/o CVA  DM  HTN  HLD  S/p ILR  Severe sinus bradycardia    -can not do lexican nuke stress due to severe bradycardia  -arrange  nuke stress as OP  -continue ASA, Plavix, Statin,losartan, Hydralazine and isosorbide  -ok to d/c from cardiology standpoint

## 2019-09-02 NOTE — PLAN OF CARE
Problem: Fall Injury Risk  Goal: Absence of Fall and Fall-Related Injury  Outcome: Ongoing (interventions implemented as appropriate)  Intervention: Identify and Manage Contributors to Fall Injury Risk  Lying in bed, no distress noted, resp easy, denies pain at this time, no injuries noted during this shift.  Call light in reach.

## 2019-09-02 NOTE — HOSPITAL COURSE
Tiffanie Wise is a 78 year old female placed on observation for chest pain. Serial troponin 0.016>>0.013>>0.006. Chest pain now resolved. Cardiology consulted -- can not do a nuke stress test due to severe bradycardia. Bradycardia appears to be chronic. Currently asymptomatic. Pt HR responses with activity, HR increased to 81 with ambulation. Cardiology will arrange for dobutamine nuke stress test as outpatient. Continue ASA, Plavix, Statin, ARB, Hydralazine and isosorbide. Okay to d/c from a cardiology standpoint. Electrolytes repleted. Pt will follow up with Dr. Short (cardiology) within 1 week after discharge for hospital follow up. Pt will follow up with PCP within 2-3 days after discharge for hospital follow up. This patient was seen and examined on the date of discharge and determined suitable for discharge.

## 2019-09-02 NOTE — ASSESSMENT & PLAN NOTE
-Atypical for ACS. Check CTA to further evaluate for lung pathology.   -Continue ASA, Plavix, losartan, Imdur and Lipitor.   -Hold beta-blocker due to bradycardia.   -Trend troponin.

## 2019-09-02 NOTE — PLAN OF CARE
CM spoke to patient who is awake and alert, able to make needs known. Patients daughter states that before hospitalization patient was not using any DME equipment or oxygen at home. Patient and daughter are aware of the anticipated discharge plans at this tCM provided a transitional care folder, information on advanced directives, information on pharmacy bedside delivery, and discharge planning begins on admission with contact information for any needs/questions.    D/C Plan: Home   PCP: dr. Evelio Parnell  Preferred Pharmacy: [facility]  Discharge transportation: family   [facility]: active   Pharmacy Bedside Delivery: yes          09/02/19 1040   Discharge Assessment   Assessment Type Discharge Planning Assessment   Confirmed/corrected address and phone number on facesheet? Yes   Assessment information obtained from? Patient;Caregiver   Expected Length of Stay (days)   (tbd)   Communicated expected length of stay with patient/caregiver yes   Prior to hospitilization cognitive status: Alert/Oriented   Prior to hospitalization functional status: Independent   Current cognitive status: Alert/Oriented   Current Functional Status: Independent   Facility Arrived From: home    Lives With alone   Able to Return to Prior Arrangements yes   Is patient able to care for self after discharge? Yes   Who are your caregiver(s) and their phone number(s)? Saranya (daughter) [phone]   Patient currently being followed by outpatient case management? No   Patient currently receives any other outside agency services? No   Equipment Currently Used at Home none   Do you have any problems affording any of your prescribed medications? No   Is the patient taking medications as prescribed? yes   Does the patient have transportation home? Yes   Does the patient receive services at the [facility]? No   Discharge Plan A Home   DME Needed Upon Discharge  none   Patient/Family in Agreement with Plan yes

## 2019-09-02 NOTE — HPI
79 yo female, consult for chest pain.  PMH CAD s/p CABG in 2016, h/o CVA in  with residual aphasia, DM, HTN, HLD and s/p ILR  C/o left chest pain for 2 weeks, daily, sharp pain and lasted for seconds. Yesterday, BS > 400 and was sent to ER.  EKG chronic sinus bradycardia and chronic ST depression on anterolateral leads.   Troponin negative x2.   ECHO showed normal EF, RVH and biatrial enlargement

## 2019-09-02 NOTE — H&P
"[facility] Hospital Medicine  History & Physical    Patient Name: Tiffanie Wise  MRN: [id number]  Admission Date: 9/1/2019  Attending Physician: Manas Elliott MD   Primary Care Provider: Evelio Parnell MD         Patient information was obtained from patient, past medical records and ER records.     Subjective:     Principal Problem:Chest pain    Chief Complaint:   Chief Complaint   Patient presents with    Chest Pain     Pt states, "I am having chest pain and high blood pressure."        HPI: Tiffanie Wise is a 78 year old female with CAD and history of stroke with residual expressive aphasia who presented to the ED with complaints of not feeling well and having elevated blood glucose for 4 days prior to presentation. She reports having to stay in bed since the onset of symptoms. There is associated left sided chest pain that lasts minutes and resolves spontaneously. The pain is worse with taking a deep breath. There is an associated nonproductive cough and she reports an episode of nausea this morning. The patient notes chronic SOB that is worse with laying flat and she describes associated pedal edema. She denies fever and chills. In the ED, she was found to have a blood glucose of 283, normal troponin and a mildly elevated BNP of 114. EKG and chest x-ray were unremarkable. Code status was discussed with the patient. She is a full code. Her daughter, Malvin Frye is her surrogate medical decision maker.     Past Medical History:   Diagnosis Date    Anxiety     AP (angina pectoris) 7/18/2013    Arterial ischemic stroke, MCA (middle cerebral artery), left, acute 12/15/2017    Asthma     Class 1 obesity due to excess calories with serious comorbidity and body mass index (BMI) of 30.0 to 30.9 in adult 3/15/2019    Coronary artery disease 7/18/2013    Depression     Diastolic heart failure     GERD (gastroesophageal reflux disease) 7/18/2013    History of PTCA 7/18/2013    Hypertension " 7/18/2013    Hypothyroidism     Mixed hyperlipidemia 7/18/2013    Osteoporosis     Pneumonia due to other staphylococcus     Precancerous changes of the vagina     Seizure 3/15/2019    Sleep apnea     stopped using CPAP 2015 - sores in nose, couldn't breathe, uses Breathe riht strips now.     Trouble in sleeping     Type 2 diabetes mellitus with ophthalmic manifestations     Urinary incontinence     pullups day, pads at night       Past Surgical History:   Procedure Laterality Date    AORTOCORONARY BYPASS-CABG - KENY N/A 6/29/2016    Per[facility] at [facility] OR    BREAST SURGERY Left     benign cyst    CATHETERIZATION, HEART, LEFT Left 3/10/2014    Performed by Mariam Bell [facility] at [facility] CATH LAB    CATHETERIZATION, HEART, LEFT Left 11/21/2013    Performed by Brendan Short MD at [facility] CATH LAB    CORONARY ANGIOPLASTY      CORONARY STENT PLACEMENT      x6-7 oer the yeqrs  last 12/17/14 BRIANDA to lcfx    EYE SURGERY Bilateral 2014    cataracts w/IOL   Dr Vance    HEART CATH-LEFT Left 12/17/2014    Performed by [facility] at [facility] CATH LAB    HEART CATH-LEFT Left 10/14/2014    Performed by [facility] at [facility] CATH LAB    HEART CATH-LEFT Left 7/10/2014    Performed by [facility] at [facility] CATH LAB    HYSTERECTOMY  1970    vag hyst; ovaries intact    PLACEMENT-LOOP RECORDER Left 2/1/2018    Performed by Kole Devine MD at [facility] CATH LAB    THYROID SURGERY      nodule benign, Dr Hankins    TUBAL LIGATION  1970       Review of patient's allergies indicates:  No Known Allergies    No current facility-administered medications on file prior to encounter.      Current Outpatient Medications on File Prior to Encounter   Medication Sig    aspirin (ECOTRIN) 81 MG EC tablet Take 81 mg by mouth. Take 81 mg by mouth daily.    atorvastatin (LIPITOR) 80 MG tablet Take 1 tablet (80 mg total) by mouth once daily.    BD INSULIN SYRINGE ULT-FINE II 1/2 mL 31 x  "5/16" Syrg     BLOOD SUGAR DIAGNOSTIC (TRUETEST TEST STRIPS MISC) by Misc.(Non-Drug; Combo Route) route. Pt is testing 3 times a day    clopidogrel (PLAVIX) 75 mg tablet TAKE 1 TABLET BY MOUTH EVERY DAY    donepezil (ARICEPT) 10 MG tablet TAKE 1 TABLET BY MOUTH EVERY DAY    EASY COMFORT LANCETS 30 gauge Misc     escitalopram oxalate (LEXAPRO) 20 MG tablet TAKE 1 TABLET BY MOUTH ONCE DAILY    hydroCHLOROthiazide (HYDRODIURIL) 25 MG tablet TAKE 1 TABLET BY MOUTH ONCE DAILY    hydroCHLOROthiazide (HYDRODIURIL) 25 MG tablet TAKE 1 TABLET BY MOUTH ONCE DAILY    isosorbide mononitrate (IMDUR) 30 MG 24 hr tablet Take 1 tablet (30 mg total) by mouth 2 (two) times daily.    levothyroxine (SYNTHROID) 50 MCG tablet TAKE 1 TABLET BY MOUTH EVERY DAY    losartan (COZAAR) 100 MG tablet Take 1 tablet (100 mg total) by mouth once daily.    nitroGLYCERIN (NITROSTAT) 0.4 MG SL tablet Place 1 tablet (0.4 mg total) under the tongue as needed.    pantoprazole (PROTONIX) 40 MG tablet TAKE 1 TABLET BY MOUTH EVERY DAY    pen needle, diabetic 31 gauge x 5/16" Ndle USE TWICE DAILY AND PRN    SITagliptin (JANUVIA) 100 MG Tab Take 100 mg by mouth once daily.    vitamin D 1000 units Tab Take 1,000 Units by mouth once daily.    insulin detemir (LEVEMIR FLEXTOUCH) 100 unit/mL (3 mL) SubQ InPn pen Inject 36 Units into the skin once daily. (Patient taking differently: Inject 48 Units into the skin once daily. )    [DISCONTINUED] clonazePAM (KLONOPIN) 0.5 MG tablet Take 0.5 mg by mouth 2 (two) times daily as needed for Anxiety.    [DISCONTINUED] isosorbide mononitrate (IMDUR) 60 MG 24 hr tablet TAKE 1 TABLET BY MOUTH EVERY DAY     Family History     Problem Relation (Age of Onset)    Alcohol abuse Sister    Cancer Son    Cirrhosis Sister    Diabetes Sister, Paternal Grandmother    Fibromyalgia Daughter, Daughter    Heart disease Mother    Kidney disease Father, Brother        Tobacco Use    Smoking status: Former Smoker     " Packs/day: 1.00     Years: 38.00     Pack years: 38.00     Last attempt to quit: 2008     Years since quittin.1    Smokeless tobacco: Never Used   Substance and Sexual Activity    Alcohol use: No    Drug use: No    Sexual activity: Not Currently     Birth control/protection: None     Review of Systems   Constitutional: Negative for appetite change, chills, diaphoresis, fatigue and fever.   HENT: Negative for congestion, ear pain, mouth sores, sore throat and trouble swallowing.    Eyes: Negative for pain and visual disturbance.   Respiratory: Positive for cough and shortness of breath. Negative for chest tightness.    Cardiovascular: Positive for chest pain and leg swelling. Negative for palpitations.   Gastrointestinal: Positive for nausea. Negative for abdominal pain and constipation.   Endocrine: Negative for cold intolerance, heat intolerance, polydipsia and polyuria.   Genitourinary: Negative for dysuria, frequency and hematuria.   Musculoskeletal: Negative for arthralgias, back pain, myalgias and neck pain.   Skin: Negative for pallor, rash and wound.   Allergic/Immunologic: Negative for environmental allergies and immunocompromised state.   Neurological: Negative for dizziness, seizures, syncope, weakness, numbness and headaches.   Hematological: Negative for adenopathy. Does not bruise/bleed easily.   Psychiatric/Behavioral: Negative for agitation, confusion and sleep disturbance.     Objective:     Vital Signs (Most Recent):  Temp: 96.5 °F (35.8 °C) (19)  Pulse: (!) 45 (19)  Resp: 18 (19)  BP: (!) 170/70 (19)  SpO2: 95 % (19) Vital Signs (24h Range):  Temp:  [96.5 °F (35.8 °C)-98 °F (36.7 °C)] 96.5 °F (35.8 °C)  Pulse:  [44-54] 45  Resp:  [11-20] 18  SpO2:  [95 %-100 %] 95 %  BP: (125-170)/(63-97) 170/70     Weight: 65.2 kg (143 lb 13.6 oz)  Body mass index is 29.05 kg/m².    Physical Exam   Constitutional: She is oriented to person, place,  and time. She appears well-developed and well-nourished. No distress.   HENT:   Head: Normocephalic and atraumatic.   Eyes: Conjunctivae are normal.   PERRL   Neck: Neck supple. No JVD present.   Cardiovascular: Normal rate, regular rhythm and normal heart sounds.   Pulmonary/Chest: Effort normal and breath sounds normal.   Coarse breath sounds bilaterally.    Abdominal: Soft. Bowel sounds are normal. She exhibits no distension. There is no tenderness.   Musculoskeletal: Normal range of motion.   Neurological: She is alert and oriented to person, place, and time.   Skin: Skin is warm and dry.   Psychiatric: She has a normal mood and affect. Her behavior is normal. Thought content normal. Her speech is delayed (due to expressive aphasia).   Nursing note and vitals reviewed.          Significant Labs:   CBC:   Recent Labs   Lab 09/01/19  1737   WBC 7.93   HGB 10.5*   HCT 34.9*        CMP:   Recent Labs   Lab 09/01/19  1737      K 3.8   CL 99   CO2 25   *   BUN 17   CREATININE 1.0   CALCIUM 9.3   PROT 6.8   ALBUMIN 3.1*   BILITOT 0.7   ALKPHOS 116   AST 13   ALT 13   ANIONGAP 12   EGFRNONAA 54*     All pertinent labs within the past 24 hours have been reviewed.    Significant Imaging: I have reviewed all pertinent imaging results/findings within the past 24 hours.   Imaging Results          X-Ray Chest AP Portable (Final result)  Result time 09/01/19 17:50:47    Final result by Shaka Cross MD (09/01/19 17:50:47)                 Impression:      No acute process seen.      Electronically signed by: Shaka Cross MD  Date:    09/01/2019  Time:    17:50             Narrative:    EXAMINATION:  XR CHEST AP PORTABLE    CLINICAL HISTORY:  Chest Pain;    FINDINGS:  Single view of the chest.  Aorta demonstrates atherosclerotic disease.    Cardiac silhouette is normal.  The lungs demonstrate no evidence of active disease.  No evidence of pleural effusion or pneumothorax.  Bones demonstrate moderate  degenerative changes.                                  Assessment/Plan:     * Chest pain  -Atypical for ACS. Check CTA to further evaluate for lung pathology.   -Continue ASA, Plavix, losartan, Imdur and Lipitor.   -Hold beta-blocker due to bradycardia.   -Trend troponin.       Bradycardia  -Appears chronic and patient is asymptomatic.   -Check TSH.       CAD with remote CABG x 1V (LIMA-LAD) in 6/2016 + PCI of RCA and LCx  -Continue ASA, Plavix, losartan, Imdur and Lipitor.   -Hold beta-blocker due to bradycardia.   -Trend troponin.       Left ventricular diastolic dysfunction with preserved systolic function  -Appears compensated.   -Hold beta-blockers due to bradycardia.   -Monitor volume status.       History of arterial ischemic stroke, left MCA (middle cerebral artery) 12/2017  -Stable.   -Continue ASA, Plavix and statin.       Essential hypertension  -Stable.   -Continue losartan, HCTZ and Imdur.       Anxiety and depression  Continue Lexapro.     Type 2 diabetes mellitus with hyperglycemia  -NISS.   -ADA diet.   -Check hemoglobin A1C.       Hyperlipidemia associated with type 2 diabetes mellitus  Continue statin.       Acquired hypothyroidism  -Continue levothyroxine.   -Follow up TSH.       VTE Risk Mitigation (From admission, onward)        Ordered     enoxaparin injection 40 mg  Daily      09/01/19 7187             DEEPAK Gardiner  Department of Hospital Medicine   [facility]

## 2019-09-02 NOTE — SUBJECTIVE & OBJECTIVE
Past Medical History:   Diagnosis Date    Anxiety     AP (angina pectoris) 7/18/2013    Arterial ischemic stroke, MCA (middle cerebral artery), left, acute 12/15/2017    Asthma     Class 1 obesity due to excess calories with serious comorbidity and body mass index (BMI) of 30.0 to 30.9 in adult 3/15/2019    Coronary artery disease 7/18/2013    Depression     Diastolic heart failure     GERD (gastroesophageal reflux disease) 7/18/2013    History of PTCA 7/18/2013    Hypertension 7/18/2013    Hypothyroidism     Mixed hyperlipidemia 7/18/2013    Osteoporosis     Pneumonia due to other staphylococcus     Precancerous changes of the vagina     Seizure 3/15/2019    Sleep apnea     stopped using CPAP 2015 - sores in nose, couldn't breathe, uses Breathe riht strips now.     Trouble in sleeping     Type 2 diabetes mellitus with ophthalmic manifestations     Urinary incontinence     pullups day, pads at night       Past Surgical History:   Procedure Laterality Date    AORTOCORONARY BYPASS-CABG - KENY N/A 6/29/2016    Performed by Oleg Cervantes [facility] at [facility] OR    BREAST SURGERY Left     benign cyst    CATHETERIZATION, HEART, LEFT Left 3/10/2014    Performed by Mariam Bell MD at [facility] CATH LAB    CATHETERIZATION, HEART, LEFT Left 11/21/2013    Performed by Brendan Short MD at [facility] CATH LAB    CORONARY ANGIOPLASTY      CORONARY STENT PLACEMENT      x6-7 oer the yeqrs  last 12/17/14 BRIANDA to lcfx    EYE SURGERY Bilateral 2014    cataracts w/IOL   Dr Vance    HEART CATH-LEFT Left 12/17/2014    Performed by Brendan Short MD at [facility] CATH LAB    HEART CATH-LEFT Left 10/14/2014    Performed by [facility]rendan [facility] at [facility] CATH LAB    HEART CATH-LEFT Left 7/10/2014    Performed by Brendan Short MD at [facility] CATH LAB    HYSTERECTOMY  1970    vag hyst; ovaries intact    PLACEMENT-LOOP RECORDER Left 2/1/2018    Performed by Kole Devine MD at [facility] CATH LAB    THYROID SURGERY      nodule  "benign, Dr Hankins    TUBAL LIGATION  1970       Review of patient's allergies indicates:  No Known Allergies    No current facility-administered medications on file prior to encounter.      Current Outpatient Medications on File Prior to Encounter   Medication Sig    aspirin (ECOTRIN) 81 MG EC tablet Take 81 mg by mouth. Take 81 mg by mouth daily.    atorvastatin (LIPITOR) 80 MG tablet Take 1 tablet (80 mg total) by mouth once daily.    BD INSULIN SYRINGE ULT-FINE II 1/2 mL 31 x 5/16" Syrg     BLOOD SUGAR DIAGNOSTIC (TRUETEST TEST STRIPS MISC) by Misc.(Non-Drug; Combo Route) route. Pt is testing 3 times a day    clopidogrel (PLAVIX) 75 mg tablet TAKE 1 TABLET BY MOUTH EVERY DAY    donepezil (ARICEPT) 10 MG tablet TAKE 1 TABLET BY MOUTH EVERY DAY    EASY COMFORT LANCETS 30 gauge Misc     escitalopram oxalate (LEXAPRO) 20 MG tablet TAKE 1 TABLET BY MOUTH ONCE DAILY    hydroCHLOROthiazide (HYDRODIURIL) 25 MG tablet TAKE 1 TABLET BY MOUTH ONCE DAILY    isosorbide mononitrate (IMDUR) 30 MG 24 hr tablet Take 30 mg by mouth 2 (two) times daily.    levothyroxine (SYNTHROID) 50 MCG tablet TAKE 1 TABLET BY MOUTH EVERY DAY    losartan (COZAAR) 100 MG tablet Take 1 tablet (100 mg total) by mouth once daily.    nitroGLYCERIN (NITROSTAT) 0.4 MG SL tablet Place 1 tablet (0.4 mg total) under the tongue as needed.    pantoprazole (PROTONIX) 40 MG tablet TAKE 1 TABLET BY MOUTH EVERY DAY    pen needle, diabetic 31 gauge x 5/16" Ndle USE TWICE DAILY AND PRN    SITagliptin (JANUVIA) 100 MG Tab Take 100 mg by mouth once daily.    vitamin D 1000 units Tab Take 1,000 Units by mouth once daily.    insulin detemir (LEVEMIR FLEXTOUCH) 100 unit/mL (3 mL) SubQ InPn pen Inject 36 Units into the skin once daily. (Patient taking differently: Inject 48 Units into the skin once daily. )     Family History     Problem Relation (Age of Onset)    Alcohol abuse Sister    Cancer Son    Cirrhosis Sister    Diabetes Sister, Paternal " Grandmother    Fibromyalgia Daughter, Daughter    Heart disease Mother    Kidney disease Father, Brother        Tobacco Use    Smoking status: Former Smoker     Packs/day: 1.00     Years: 38.00     Pack years: 38.00     Last attempt to quit: 2008     Years since quittin.1    Smokeless tobacco: Never Used   Substance and Sexual Activity    Alcohol use: No    Drug use: No    Sexual activity: Not Currently     Birth control/protection: None     Review of Systems   Constitution: Negative for decreased appetite, diaphoresis, fever, malaise/fatigue and night sweats.   HENT: Negative for nosebleeds.    Eyes: Negative for blurred vision and double vision.   Cardiovascular: Positive for chest pain. Negative for claudication, dyspnea on exertion, irregular heartbeat, leg swelling, near-syncope, orthopnea, palpitations, paroxysmal nocturnal dyspnea and syncope.   Respiratory: Negative for cough, shortness of breath, sleep disturbances due to breathing, snoring, sputum production and wheezing.    Endocrine: Negative for cold intolerance and polyuria.   Hematologic/Lymphatic: Does not bruise/bleed easily.   Skin: Negative for rash.   Musculoskeletal: Negative for back pain, falls, joint pain, joint swelling and neck pain.   Gastrointestinal: Negative for abdominal pain, heartburn, nausea and vomiting.   Genitourinary: Negative for dysuria, frequency and hematuria.   Neurological: Positive for focal weakness. Negative for difficulty with concentration, dizziness, headaches, light-headedness, numbness, seizures and weakness.        Aphasia   Psychiatric/Behavioral: Negative for depression, memory loss and substance abuse. The patient does not have insomnia.    Allergic/Immunologic: Negative for HIV exposure and hives.     Objective:     Vital Signs (Most Recent):  Temp: 97.6 °F (36.4 °C) (19)  Pulse: (!) 50 (19)  Resp: 20 (19)  BP: (!) 127/51 (19)  SpO2: 95 % (19  0741) Vital Signs (24h Range):  Temp:  [96.5 °F (35.8 °C)-98 °F (36.7 °C)] 97.6 °F (36.4 °C)  Pulse:  [44-54] 50  Resp:  [11-20] 20  SpO2:  [95 %-100 %] 95 %  BP: (102-170)/(51-97) 127/51     Weight: 66.1 kg (145 lb 11.6 oz)  Body mass index is 29.43 kg/m².    SpO2: 95 %  O2 Device (Oxygen Therapy): room air      Intake/Output Summary (Last 24 hours) at 9/2/2019 1131  Last data filed at 9/2/2019 0600  Gross per 24 hour   Intake 120 ml   Output --   Net 120 ml       Lines/Drains/Airways     Peripheral Intravenous Line                 Peripheral IV - Single Lumen 09/01/19 1724 20 G Right Forearm less than 1 day         Peripheral IV - Single Lumen 09/01/19 2222 20 G Right Antecubital less than 1 day                Physical Exam   Constitutional: She is oriented to person, place, and time. She appears well-nourished.   HENT:   Head: Normocephalic.   Eyes: Pupils are equal, round, and reactive to light.   Neck: Normal carotid pulses and no JVD present. Carotid bruit is not present. No thyromegaly present.   Cardiovascular: Normal rate, regular rhythm, normal heart sounds and normal pulses.  No extrasystoles are present. PMI is not displaced. Exam reveals no gallop and no S3.   No murmur heard.  Pulmonary/Chest: Breath sounds normal. No stridor. No respiratory distress.   B/l chest + tenderness   Abdominal: Soft. Bowel sounds are normal. There is no tenderness. There is no rebound.   Neurological: She is alert and oriented to person, place, and time.   Skin: Skin is intact. No rash noted.   Psychiatric: Her behavior is normal.       Significant Labs:   ABG: No results for input(s): PH, PCO2, HCO3, POCSATURATED, BE in the last 48 hours., Blood Culture: No results for input(s): LABBLOO in the last 48 hours., BMP:   Recent Labs   Lab 09/01/19  1737 09/02/19  0433   * 170*    139   K 3.8 3.7   CL 99 99   CO2 25 30*   BUN 17 19   CREATININE 1.0 1.3   CALCIUM 9.3 9.0   MG  --  1.4*   , CMP   Recent Labs   Lab  09/01/19 1737 09/02/19 0433    139   K 3.8 3.7   CL 99 99   CO2 25 30*   * 170*   BUN 17 19   CREATININE 1.0 1.3   CALCIUM 9.3 9.0   PROT 6.8  --    ALBUMIN 3.1*  --    BILITOT 0.7  --    ALKPHOS 116  --    AST 13  --    ALT 13  --    ANIONGAP 12 10   ESTGFRAFRICA >60 45*   EGFRNONAA 54* 39*   , CBC   Recent Labs   Lab 09/01/19 1737 09/02/19 0433   WBC 7.93 9.75   HGB 10.5* 10.2*   HCT 34.9* 34.2*    304   , INR   Recent Labs   Lab 09/01/19 1737   INR 0.9   , Lipid Panel No results for input(s): CHOL, HDL, LDLCALC, TRIG, CHOLHDL in the last 48 hours. and Troponin   Recent Labs   Lab 09/01/19 1737 09/01/19  2231 09/02/19  0433   TROPONINI 0.016 0.013 <0.006       Significant Imaging: EKG:

## 2019-09-02 NOTE — DISCHARGE SUMMARY
[facility] Hospital Medicine  Discharge Summary      Patient Name: Tiffanie Wise  MRN: [id number]  Admission Date: 9/1/2019  Hospital Length of Stay: 0 days  Discharge Date and Time:  09/02/2019 2:49 PM  Attending Physician: Aneta Daniel MD   Discharging Provider: ISRAEL Vera  Primary Care Provider: Evelio Parnell MD      HPI:   Tiffanie Wise is a 78 year old female with CAD and history of stroke with residual expressive aphasia who presented to the ED with complaints of not feeling well and having elevated blood glucose for 4 days prior to presentation. She reports having to stay in bed since the onset of symptoms. There is associated left sided chest pain that lasts minutes and resolves spontaneously. The pain is worse with taking a deep breath. There is an associated nonproductive cough and she reports an episode of nausea this morning. The patient notes chronic SOB that is worse with laying flat and she describes associated pedal edema. She denies fever and chills. In the ED, she was found to have a blood glucose of 283, normal troponin and a mildly elevated BNP of 114. EKG and chest x-ray were unremarkable. Code status was discussed with the patient. She is a full code. Her daughter, Malvin Frye is her surrogate medical decision maker.     * No surgery found *      Hospital Course:   Tiffanie Wise is a 78 year old female placed on observation for chest pain. Serial troponin 0.016>>0.013>>0.006. Chest pain now resolved. Cardiology consulted -- can not do a nuke stress test due to severe bradycardia. Bradycardia appears to be chronic. Currently asymptomatic. Pt HR responses with activity, HR increased to 81 with ambulation. Cardiology will arrange for dobutamine nuke stress test as outpatient. Continue ASA, Plavix, Statin, ARB, Hydralazine and isosorbide. Okay to d/c from a cardiology standpoint. Electrolytes repleted. Pt will follow up with Dr. Short (cardiology) within 1 week  after discharge for hospital follow up. Pt will follow up with PCP within 2-3 days after discharge for hospital follow up. This patient was seen and examined on the date of discharge and determined suitable for discharge.      Consults:   Consults (From admission, onward)        Status Ordering Provider     Inpatient consult to Cardiology  Once     Provider:  Romeo Epps MD    Completed MEG PARRISH     IP consult to case management  Once     Provider:  (Not yet assigned)    Completed ABBIE MCLEAN          Final Active Diagnoses:    Diagnosis Date Noted POA    PRINCIPAL PROBLEM:  Chest pain [R07.9] 09/01/2019 Yes    Bradycardia [R00.1] 09/01/2019 Yes    History of arterial ischemic stroke, left MCA (middle cerebral artery) 12/2017 [I63.512] 12/15/2017 Yes     Chronic    Essential hypertension [I10] 12/29/2016 Yes     Chronic    CAD with remote CABG x 1V (LIMA-LAD) in 6/2016 + PCI of RCA and LCx [I25.10]  Yes     Chronic    Left ventricular diastolic dysfunction with preserved systolic function [I51.9] 06/02/2016 Yes     Chronic    Anxiety and depression [F41.9, F32.9] 10/27/2015 Yes    Hyperlipidemia associated with type 2 diabetes mellitus [E11.69, E78.5] 07/18/2013 Yes     Chronic    Type 2 diabetes mellitus with hyperglycemia [E11.65] 07/18/2013 Yes     Chronic    Acquired hypothyroidism [E03.9] 04/15/2013 Yes     Chronic      Problems Resolved During this Admission:       Discharged Condition: stable    Disposition: Home or Self Care    Follow Up:  Follow-up Information     Evelio Parnell MD. Schedule an appointment as soon as possible for a visit in 3 days.    Specialty:  Family Medicine  Why:  hospital follow up  Contact information:  [address]  [place] [address]  [phone]             Brendan Short MD. Schedule an appointment as soon as possible for a visit in 1 week.    Specialties:  Cardiology, INTERVENTIONAL CARDIOLOGY  Why:  hospital follow up  Contact  information:  [address] [facility] [place] [address]  [phone]                 Patient Instructions:      Notify your health care provider if you experience any of the following:  increased confusion or weakness     Notify your health care provider if you experience any of the following:  persistent dizziness, light-headedness, or visual disturbances     Notify your health care provider if you experience any of the following:  difficulty breathing or increased cough     Activity as tolerated       Significant Diagnostic Studies: Labs:   BMP:   Recent Labs   Lab 09/01/19 1737 09/02/19 0433   * 170*    139   K 3.8 3.7   CL 99 99   CO2 25 30*   BUN 17 19   CREATININE 1.0 1.3   CALCIUM 9.3 9.0   MG  --  1.4*   , CBC   Recent Labs   Lab 09/01/19 1737 09/02/19 0433   WBC 7.93 9.75   HGB 10.5* 10.2*   HCT 34.9* 34.2*    304    and Troponin   Recent Labs   Lab 09/02/19 0433   TROPONINI <0.006     Radiology:   Imaging Results          X-Ray Chest AP Portable (Final result)  Result time 09/01/19 17:50:47    Final result by Shaka Cross MD (09/01/19 17:50:47)                 Impression:      No acute process seen.      Electronically signed by: Shaka Cross MD  Date:    09/01/2019  Time:    17:50             Narrative:    EXAMINATION:  XR CHEST AP PORTABLE    CLINICAL HISTORY:  Chest Pain;    FINDINGS:  Single view of the chest.  Aorta demonstrates atherosclerotic disease.    Cardiac silhouette is normal.  The lungs demonstrate no evidence of active disease.  No evidence of pleural effusion or pneumothorax.  Bones demonstrate moderate degenerative changes.                                Pending Diagnostic Studies:     Procedure Component Value Units Date/Time    Troponin I #2 [[id number]] Collected:  09/01/19 1737    Order Status:  Sent Lab Status:  In process Updated:  09/01/19 1737    Specimen:  Blood          Medications:  Reconciled Home Medications:      Medication List      CHANGE how  "you take these medications    hydroCHLOROthiazide 25 MG tablet  Commonly known as:  HYDRODIURIL  TAKE 1 TABLET BY MOUTH ONCE DAILY  What changed:  Another medication with the same name was removed. Continue taking this medication, and follow the directions you see here.     insulin detemir U-100 100 unit/mL (3 mL) Inpn pen  Commonly known as:  LEVEMIR FLEXTOUCH  Inject 36 Units into the skin once daily.  What changed:  how much to take     isosorbide mononitrate 30 MG 24 hr tablet  Commonly known as:  IMDUR  Take 30 mg by mouth 2 (two) times daily.  What changed:  Another medication with the same name was removed. Continue taking this medication, and follow the directions you see here.        CONTINUE taking these medications    aspirin 81 MG EC tablet  Commonly known as:  ECOTRIN  Take 81 mg by mouth. Take 81 mg by mouth daily.     atorvastatin 80 MG tablet  Commonly known as:  LIPITOR  Take 1 tablet (80 mg total) by mouth once daily.     BD INSULIN SYRINGE ULT-FINE II 0.5 mL 31 gauge x 5/16" Syrg  Generic drug:  insulin syringe-needle U-100     clopidogrel 75 mg tablet  Commonly known as:  PLAVIX  TAKE 1 TABLET BY MOUTH EVERY DAY     donepezil 10 MG tablet  Commonly known as:  ARICEPT  TAKE 1 TABLET BY MOUTH EVERY DAY     EASY COMFORT LANCETS 30 gauge Misc  Generic drug:  lancets     escitalopram oxalate 20 MG tablet  Commonly known as:  LEXAPRO  TAKE 1 TABLET BY MOUTH ONCE DAILY     JANUVIA 100 MG Tab  Generic drug:  SITagliptin  Take 100 mg by mouth once daily.     levothyroxine 50 MCG tablet  Commonly known as:  SYNTHROID  TAKE 1 TABLET BY MOUTH EVERY DAY     losartan 100 MG tablet  Commonly known as:  COZAAR  Take 1 tablet (100 mg total) by mouth once daily.     nitroGLYCERIN 0.4 MG SL tablet  Commonly known as:  NITROSTAT  Place 1 tablet (0.4 mg total) under the tongue as needed.     pantoprazole 40 MG tablet  Commonly known as:  PROTONIX  TAKE 1 TABLET BY MOUTH EVERY DAY     pen needle, diabetic 31 gauge x " "5/16" Ndle  USE TWICE DAILY AND PRN     TRUETEST TEST STRIPS MISC  by Misc.(Non-Drug; Combo Route) route. Pt is testing 3 times a day     vitamin D 1000 units Tab  Commonly known as:  VITAMIN D3  Take 1,000 Units by mouth once daily.            Indwelling Lines/Drains at time of discharge:   Lines/Drains/Airways          None          Time spent on the discharge of patient: 35 minutes  Patient was seen and examined on the date of discharge and determined to be suitable for discharge.         ISRAEL Vera  Department of Hospital Medicine  [facility]  "

## 2019-09-02 NOTE — PROGRESS NOTES
Ambulated patient to the end of the talbot by the windows - her heart rate increased up to 81 and then decreased back to the 40's when she got back into bed. Notified Razia Vasquez NP via secure chat.

## 2019-09-02 NOTE — HPI
Tiffanie Wise is a 78 year old female with CAD and history of stroke with residual expressive aphasia who presented to the ED with complaints of not feeling well and having elevated blood glucose for 4 days prior to presentation. She reports having to stay in bed since the onset of symptoms. There is associated left sided chest pain that lasts minutes and resolves spontaneously. The pain is worse with taking a deep breath. There is an associated nonproductive cough and she reports an episode of nausea this morning. The patient notes chronic SOB that is worse with laying flat and she describes associated pedal edema. She denies fever and chills. In the ED, she was found to have a blood glucose of 283, normal troponin and a mildly elevated BNP of 114. EKG and chest x-ray were unremarkable. Code status was discussed with the patient. She is a full code. Her daughter, Malvin Frye is her surrogate medical decision maker.

## 2019-09-02 NOTE — NURSING
Discharged order received and reviewed with patient and family. Patient instructed when to take each medication next dose. . IV removed, tele removed. Patient assisted with dressing by staff. Patient transported to front lobby via wheelchair by staff for family to transport home.

## 2019-09-02 NOTE — SUBJECTIVE & OBJECTIVE
Past Medical History:   Diagnosis Date    Anxiety     AP (angina pectoris) 7/18/2013    Arterial ischemic stroke, MCA (middle cerebral artery), left, acute 12/15/2017    Asthma     Class 1 obesity due to excess calories with serious comorbidity and body mass index (BMI) of 30.0 to 30.9 in adult 3/15/2019    Coronary artery disease 7/18/2013    Depression     Diastolic heart failure     GERD (gastroesophageal reflux disease) 7/18/2013    History of PTCA 7/18/2013    Hypertension 7/18/2013    Hypothyroidism     Mixed hyperlipidemia 7/18/2013    Osteoporosis     Pneumonia due to other staphylococcus     Precancerous changes of the vagina     Seizure 3/15/2019    Sleep apnea     stopped using CPAP 2015 - sores in nose, couldn't breathe, uses Breathe riht strips now.     Trouble in sleeping     Type 2 diabetes mellitus with ophthalmic manifestations     Urinary incontinence     pullups day, pads at night       Past Surgical History:   Procedure Laterality Date    AORTOCORONARY BYPASS-CABG - KENY N/A 6/29/2016    Performed by [facility]leg [facility]ervantes [facility] at [facility] OR    BREAST SURGERY Left     benign cyst    CATHETERIZATION, HEART, LEFT Left 3/10/2014    Performed by Mariam Bell MD at [facility] CATH LAB    CATHETERIZATION, HEART, LEFT Left 11/21/2013    Performed by Brendan Short MD at [facility] CATH LAB    CORONARY ANGIOPLASTY      CORONARY STENT PLACEMENT      x6-7 oer the yeqrs  last 12/17/14 BRIANDA to [facility]    EYE SURGERY Bilateral 2014    cataracts w/IOL   Dr Vance    HEART CATH-LEFT Left 12/17/2014    Performed by Brendan Short [facility] at [facility] CATH LAB    HEART CATH-LEFT Left 10/14/2014    Performed by [facility]rendan [facility] at [facility] CATH LAB    HEART CATH-LEFT Left 7/10/2014    Performed by Brendan [facility] at [facility] CATH LAB    HYSTERECTOMY  1970    vag hyst; ovaries intact    PLACEMENT-LOOP RECORDER Left 2/1/2018    Performed by Kole Devine MD at [facility] CATH LAB    THYROID SURGERY      nodule  "benign, Dr Hankins    TUBAL LIGATION  1970       Review of patient's allergies indicates:  No Known Allergies    No current facility-administered medications on file prior to encounter.      Current Outpatient Medications on File Prior to Encounter   Medication Sig    aspirin (ECOTRIN) 81 MG EC tablet Take 81 mg by mouth. Take 81 mg by mouth daily.    atorvastatin (LIPITOR) 80 MG tablet Take 1 tablet (80 mg total) by mouth once daily.    BD INSULIN SYRINGE ULT-FINE II 1/2 mL 31 x 5/16" Syrg     BLOOD SUGAR DIAGNOSTIC (TRUETEST TEST STRIPS MISC) by Misc.(Non-Drug; Combo Route) route. Pt is testing 3 times a day    clopidogrel (PLAVIX) 75 mg tablet TAKE 1 TABLET BY MOUTH EVERY DAY    donepezil (ARICEPT) 10 MG tablet TAKE 1 TABLET BY MOUTH EVERY DAY    EASY COMFORT LANCETS 30 gauge Misc     escitalopram oxalate (LEXAPRO) 20 MG tablet TAKE 1 TABLET BY MOUTH ONCE DAILY    hydroCHLOROthiazide (HYDRODIURIL) 25 MG tablet TAKE 1 TABLET BY MOUTH ONCE DAILY    hydroCHLOROthiazide (HYDRODIURIL) 25 MG tablet TAKE 1 TABLET BY MOUTH ONCE DAILY    isosorbide mononitrate (IMDUR) 30 MG 24 hr tablet Take 1 tablet (30 mg total) by mouth 2 (two) times daily.    levothyroxine (SYNTHROID) 50 MCG tablet TAKE 1 TABLET BY MOUTH EVERY DAY    losartan (COZAAR) 100 MG tablet Take 1 tablet (100 mg total) by mouth once daily.    nitroGLYCERIN (NITROSTAT) 0.4 MG SL tablet Place 1 tablet (0.4 mg total) under the tongue as needed.    pantoprazole (PROTONIX) 40 MG tablet TAKE 1 TABLET BY MOUTH EVERY DAY    pen needle, diabetic 31 gauge x 5/16" Ndle USE TWICE DAILY AND PRN    SITagliptin (JANUVIA) 100 MG Tab Take 100 mg by mouth once daily.    vitamin D 1000 units Tab Take 1,000 Units by mouth once daily.    insulin detemir (LEVEMIR FLEXTOUCH) 100 unit/mL (3 mL) SubQ InPn pen Inject 36 Units into the skin once daily. (Patient taking differently: Inject 48 Units into the skin once daily. )    [DISCONTINUED] clonazePAM (KLONOPIN) " 0.5 MG tablet Take 0.5 mg by mouth 2 (two) times daily as needed for Anxiety.    [DISCONTINUED] isosorbide mononitrate (IMDUR) 60 MG 24 hr tablet TAKE 1 TABLET BY MOUTH EVERY DAY     Family History     Problem Relation (Age of Onset)    Alcohol abuse Sister    Cancer Son    Cirrhosis Sister    Diabetes Sister, Paternal Grandmother    Fibromyalgia Daughter, Daughter    Heart disease Mother    Kidney disease Father, Brother        Tobacco Use    Smoking status: Former Smoker     Packs/day: 1.00     Years: 38.00     Pack years: 38.00     Last attempt to quit: 2008     Years since quittin.1    Smokeless tobacco: Never Used   Substance and Sexual Activity    Alcohol use: No    Drug use: No    Sexual activity: Not Currently     Birth control/protection: None     Review of Systems   Constitutional: Negative for appetite change, chills, diaphoresis, fatigue and fever.   HENT: Negative for congestion, ear pain, mouth sores, sore throat and trouble swallowing.    Eyes: Negative for pain and visual disturbance.   Respiratory: Positive for cough and shortness of breath. Negative for chest tightness.    Cardiovascular: Positive for chest pain and leg swelling. Negative for palpitations.   Gastrointestinal: Positive for nausea. Negative for abdominal pain and constipation.   Endocrine: Negative for cold intolerance, heat intolerance, polydipsia and polyuria.   Genitourinary: Negative for dysuria, frequency and hematuria.   Musculoskeletal: Negative for arthralgias, back pain, myalgias and neck pain.   Skin: Negative for pallor, rash and wound.   Allergic/Immunologic: Negative for environmental allergies and immunocompromised state.   Neurological: Negative for dizziness, seizures, syncope, weakness, numbness and headaches.   Hematological: Negative for adenopathy. Does not bruise/bleed easily.   Psychiatric/Behavioral: Negative for agitation, confusion and sleep disturbance.     Objective:     Vital Signs (Most  Recent):  Temp: 96.5 °F (35.8 °C) (09/01/19 2040)  Pulse: (!) 45 (09/01/19 2040)  Resp: 18 (09/01/19 2040)  BP: (!) 170/70 (09/01/19 2040)  SpO2: 95 % (09/01/19 2040) Vital Signs (24h Range):  Temp:  [96.5 °F (35.8 °C)-98 °F (36.7 °C)] 96.5 °F (35.8 °C)  Pulse:  [44-54] 45  Resp:  [11-20] 18  SpO2:  [95 %-100 %] 95 %  BP: (125-170)/(63-97) 170/70     Weight: 65.2 kg (143 lb 13.6 oz)  Body mass index is 29.05 kg/m².    Physical Exam   Constitutional: She is oriented to person, place, and time. She appears well-developed and well-nourished. No distress.   HENT:   Head: Normocephalic and atraumatic.   Eyes: Conjunctivae are normal.   PERRL   Neck: Neck supple. No JVD present.   Cardiovascular: Normal rate, regular rhythm and normal heart sounds.   Pulmonary/Chest: Effort normal and breath sounds normal.   Coarse breath sounds bilaterally.    Abdominal: Soft. Bowel sounds are normal. She exhibits no distension. There is no tenderness.   Musculoskeletal: Normal range of motion.   Neurological: She is alert and oriented to person, place, and time.   Skin: Skin is warm and dry.   Psychiatric: She has a normal mood and affect. Her behavior is normal. Thought content normal. Her speech is delayed (due to expressive aphasia).   Nursing note and vitals reviewed.          Significant Labs:   CBC:   Recent Labs   Lab 09/01/19  1737   WBC 7.93   HGB 10.5*   HCT 34.9*        CMP:   Recent Labs   Lab 09/01/19  1737      K 3.8   CL 99   CO2 25   *   BUN 17   CREATININE 1.0   CALCIUM 9.3   PROT 6.8   ALBUMIN 3.1*   BILITOT 0.7   ALKPHOS 116   AST 13   ALT 13   ANIONGAP 12   EGFRNONAA 54*     All pertinent labs within the past 24 hours have been reviewed.    Significant Imaging: I have reviewed all pertinent imaging results/findings within the past 24 hours.   Imaging Results          X-Ray Chest AP Portable (Final result)  Result time 09/01/19 17:50:47    Final result by Shaka Cross MD (09/01/19 17:50:47)                  Impression:      No acute process seen.      Electronically signed by: Shaka Cross MD  Date:    09/01/2019  Time:    17:50             Narrative:    EXAMINATION:  XR CHEST AP PORTABLE    CLINICAL HISTORY:  Chest Pain;    FINDINGS:  Single view of the chest.  Aorta demonstrates atherosclerotic disease.    Cardiac silhouette is normal.  The lungs demonstrate no evidence of active disease.  No evidence of pleural effusion or pneumothorax.  Bones demonstrate moderate degenerative changes.

## 2019-09-03 ENCOUNTER — TELEPHONE (OUTPATIENT)
Dept: CARDIOLOGY | Facility: CLINIC | Age: 79
End: 2019-09-03

## 2019-09-03 DIAGNOSIS — R07.89 OTHER CHEST PAIN: ICD-10-CM

## 2019-09-03 NOTE — TELEPHONE ENCOUNTER
----- Message from Annette Santiago MA sent at 9/3/2019  9:26 AM CDT -----  Needs order    ----- Message -----  From: Romeo Epps MD  Sent: 2019  11:37 AM  To: Annette Santiago MA    She needs  nuke stress as OP

## 2019-09-04 ENCOUNTER — TELEPHONE (OUTPATIENT)
Dept: CARDIOLOGY | Facility: CLINIC | Age: 79
End: 2019-09-04

## 2019-09-04 NOTE — TELEPHONE ENCOUNTER
Attempted without success to contact pt.  Dr Epps would like pt to have dobutamine NM stress.  Order is in, I called to schedule. Left vm to return call.

## 2019-09-05 ENCOUNTER — PATIENT MESSAGE (OUTPATIENT)
Dept: CARDIOLOGY | Facility: CLINIC | Age: 79
End: 2019-09-05

## 2019-09-16 ENCOUNTER — HOSPITAL ENCOUNTER (OUTPATIENT)
Dept: PULMONOLOGY | Facility: HOSPITAL | Age: 79
Discharge: HOME | End: 2019-09-16
Attending: INTERNAL MEDICINE
Payer: MEDICARE

## 2019-09-16 ENCOUNTER — HOSPITAL ENCOUNTER (OUTPATIENT)
Dept: RADIOLOGY | Facility: HOSPITAL | Age: 79
Discharge: HOME | End: 2019-09-16
Attending: INTERNAL MEDICINE
Payer: MEDICARE

## 2019-09-16 DIAGNOSIS — R07.89 OTHER CHEST PAIN: ICD-10-CM

## 2019-09-16 PROCEDURE — 93017 CV STRESS TEST TRACING ONLY: CPT | Mod: HCNC

## 2019-09-16 PROCEDURE — 63600175 PHARM REV CODE 636 W HCPCS: Mod: HCNC | Performed by: PHYSICIAN ASSISTANT

## 2019-09-16 PROCEDURE — A9502 TC99M TETROFOSMIN: HCPCS | Mod: HCNC

## 2019-09-16 RX ORDER — REGADENOSON 0.08 MG/ML
0.4 INJECTION, SOLUTION INTRAVENOUS ONCE
Status: COMPLETED | OUTPATIENT
Start: 2019-09-16 | End: 2019-09-16

## 2019-09-16 RX ADMIN — REGADENOSON 0.4 MG: 0.08 INJECTION, SOLUTION INTRAVENOUS at 04:09

## 2019-09-18 ENCOUNTER — OFFICE VISIT (OUTPATIENT)
Dept: PULMONOLOGY | Facility: CLINIC | Age: 79
End: 2019-09-18
Payer: MEDICARE

## 2019-09-18 ENCOUNTER — CLINICAL SUPPORT (OUTPATIENT)
Dept: CARDIOLOGY | Facility: CLINIC | Age: 79
End: 2019-09-18
Attending: INTERNAL MEDICINE
Payer: MEDICARE

## 2019-09-18 VITALS
HEIGHT: 59 IN | HEART RATE: 54 BPM | DIASTOLIC BLOOD PRESSURE: 68 MMHG | SYSTOLIC BLOOD PRESSURE: 130 MMHG | WEIGHT: 143.88 LBS | OXYGEN SATURATION: 97 % | BODY MASS INDEX: 29 KG/M2 | RESPIRATION RATE: 18 BRPM

## 2019-09-18 DIAGNOSIS — Z45.09 ENCOUNTER FOR LOOP RECORDER CHECK: ICD-10-CM

## 2019-09-18 DIAGNOSIS — G47.33 OSA ON CPAP: Primary | ICD-10-CM

## 2019-09-18 DIAGNOSIS — I63.9 CRYPTOGENIC STROKE: ICD-10-CM

## 2019-09-18 LAB — DIASTOLIC DYSFUNCTION: NO

## 2019-09-18 PROCEDURE — 99999 PR PBB SHADOW E&M-EST. PATIENT-LVL III: ICD-10-PCS | Mod: PBBFAC,HCNC,, | Performed by: INTERNAL MEDICINE

## 2019-09-18 PROCEDURE — 78452 NM MULTI PHARM STRESS CARDIAC COMPONENT: ICD-10-PCS | Mod: 26,HCNC,, | Performed by: INTERNAL MEDICINE

## 2019-09-18 PROCEDURE — 99214 PR OFFICE/OUTPT VISIT, EST, LEVL IV, 30-39 MIN: ICD-10-PCS | Mod: HCNC,S$GLB,, | Performed by: INTERNAL MEDICINE

## 2019-09-18 PROCEDURE — 93016 NM MULTI PHARM STRESS CARDIAC COMPONENT: ICD-10-PCS | Mod: HCNC,,, | Performed by: INTERNAL MEDICINE

## 2019-09-18 PROCEDURE — 3075F PR MOST RECENT SYSTOLIC BLOOD PRESS GE 130-139MM HG: ICD-10-PCS | Mod: HCNC,CPTII,S$GLB, | Performed by: INTERNAL MEDICINE

## 2019-09-18 PROCEDURE — 93016 CV STRESS TEST SUPVJ ONLY: CPT | Mod: HCNC,,, | Performed by: INTERNAL MEDICINE

## 2019-09-18 PROCEDURE — 1101F PT FALLS ASSESS-DOCD LE1/YR: CPT | Mod: HCNC,CPTII,S$GLB, | Performed by: INTERNAL MEDICINE

## 2019-09-18 PROCEDURE — 3078F DIAST BP <80 MM HG: CPT | Mod: HCNC,CPTII,S$GLB, | Performed by: INTERNAL MEDICINE

## 2019-09-18 PROCEDURE — 93018 CV STRESS TEST I&R ONLY: CPT | Mod: HCNC,,, | Performed by: INTERNAL MEDICINE

## 2019-09-18 PROCEDURE — 3075F SYST BP GE 130 - 139MM HG: CPT | Mod: HCNC,CPTII,S$GLB, | Performed by: INTERNAL MEDICINE

## 2019-09-18 PROCEDURE — 1101F PR PT FALLS ASSESS DOC 0-1 FALLS W/OUT INJ PAST YR: ICD-10-PCS | Mod: HCNC,CPTII,S$GLB, | Performed by: INTERNAL MEDICINE

## 2019-09-18 PROCEDURE — 3078F PR MOST RECENT DIASTOLIC BLOOD PRESSURE < 80 MM HG: ICD-10-PCS | Mod: HCNC,CPTII,S$GLB, | Performed by: INTERNAL MEDICINE

## 2019-09-18 PROCEDURE — 78452 HT MUSCLE IMAGE SPECT MULT: CPT | Mod: 26,HCNC,, | Performed by: INTERNAL MEDICINE

## 2019-09-18 PROCEDURE — 99999 PR PBB SHADOW E&M-EST. PATIENT-LVL III: CPT | Mod: PBBFAC,HCNC,, | Performed by: INTERNAL MEDICINE

## 2019-09-18 PROCEDURE — 93285 LOOP RECORDER PROGRAMMING: ICD-10-PCS | Mod: HCNC,S$GLB,, | Performed by: INTERNAL MEDICINE

## 2019-09-18 PROCEDURE — 99214 OFFICE O/P EST MOD 30 MIN: CPT | Mod: HCNC,S$GLB,, | Performed by: INTERNAL MEDICINE

## 2019-09-18 PROCEDURE — 93018 NM MULTI PHARM STRESS CARDIAC COMPONENT: ICD-10-PCS | Mod: HCNC,,, | Performed by: INTERNAL MEDICINE

## 2019-09-18 PROCEDURE — 93285 PRGRMG DEV EVAL SCRMS IP: CPT | Mod: HCNC,S$GLB,, | Performed by: INTERNAL MEDICINE

## 2019-09-18 NOTE — ASSESSMENT & PLAN NOTE
Continue AUTOPAP of  4 - 20 CMWP (DME -  [facility])     Discussed therapeutic goals for positive airway pressure therapy(CPAP or BiPAP): Ideal is usage 100% of nights for 6 - 8 hours per night. Minimum usage is 70% of night for at least 4 hours per night used. Pateint expressed understanding. All Questions answered.    Complaince download in 12 Months.    CPAP supplies renewed.

## 2019-09-18 NOTE — PATIENT INSTRUCTIONS
Obstructive Sleep Apnea  Obstructive sleep apnea is a condition that causes your air passages to become narrowed or blocked during sleep. As a result, breathing stops for short periods. Your body wakes up enough for breathing to begin again, though you don't remember it. The cycle of stopped breathing and brief awakenings can repeat dozens of times a night. This prevents the body from getting to the deeper stages of sleep that are needed for good rest and may cause your body's oxygen level to fall.  Signs of sleep apnea include loud snoring, noisy breathing, and gasping sounds during sleep. Daytime symptoms include waking up tired after a full night's sleep, waking up with headaches, feeling very sleepy or falling asleep during the day, and having problems with memory or concentration.  Risk factors for sleep apnea include:  · Being overweight  · Being a man, or a woman in menopause  · Smoking  · Using alcohol or sedating medicines  · Having enlarged structures in the nose or throat  Home care  Lifestyle changes that can help treat snoring and sleep apnea include the following:  · If you are overweight, lose weight. Talk to your healthcare provider about a weight-loss plan for you.  · Avoid alcohol for 3 to 4 hours before bedtime. Avoid sedating medications. Ask your healthcare provider about the medicines you take.  · If you smoke, talk to your healthcare provider about ways to quit.  · Sleep on your side. This can help prevent gravity from pulling relaxed throat tissues into your breathing passages.  · If you have allergies or sinus problems that block your nose, ask your healthcare provider for help.  Follow-up care  Follow up with your healthcare provider, or as advised. A diagnosis of sleep apnea is made with a sleep study. Your healthcare provider can tell you more about this test.  When to seek medical advice  Sleep apnea can make you more likely to have certain health problems. These include high blood  pressure, heart attack, stroke, and sexual dysfunction. If you have sleep apnea, talk to your healthcare provider about the best treatments for you.  Date Last Reviewed: 4/1/2017  © 2442-1784 [employer]. [address] [place] [address], [place] [address]. All rights reserved. This information is not intended as a substitute for professional medical care. Always follow your healthcare professional's instructions.

## 2019-09-18 NOTE — PROGRESS NOTES
Subjective:      Patient ID: Tiffanie Wise is a 79 y.o. female.    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    Anxiety and depression    Pulmonary nodule, right - stable    Left ventricular diastolic dysfunction with preserved systolic function    Long-term insulin use in type 2 diabetes    CAD with remote CABG x 1V (LIMA-LAD) in 6/2016 + PCI of RCA and LCx    Essential hypertension    Unspecified vitamin D deficiency    Amnesia    History of arterial ischemic stroke, left MCA (middle cerebral artery) 12/2017    Aortic arch atherosclerosis    Arthritis of hand    Microcytic anemia    Seizure    Class 1 obesity due to excess calories with serious comorbidity and body mass index (BMI) of 30.0 to 30.9 in adult    Chest pain    Bradycardia     Problem list has been reviewed.      Chief Complaint: Sleep Apnea         HPI: Follow up for NAWAF and CPAP complaince assessment.     she  is on APAP  of  4 - 20 CMWP. She is compliant with  her AUTOPAP      She definitely thinks PAP is beneficial to her health and She wants to continue with PAP therapy.    Patient denies snoring, headaches, excessive daytime sleepiness    [place] Sleepiness Scale       EPWORTH SLEEPINESS SCALE 9/18/2019 3/19/2019 9/19/2018 3/15/2018 2/1/2018 11/6/2017 9/25/2017   Sitting and reading 0 0 0 0 0 2 2   Watching TV 0 0 0 0 0 1 1   Sitting, inactive in a public place (e.g. a theatre or a meeting) 0 0 0 0 0 1 2   As a passenger in a car for an hour without a break 1 0 0 0 0 0 2   Lying down to rest in the afternoon when circumstances permit 3 3 1 0 3 3 3   Sitting and talking to someone 0 0 0 0 0 0 0   Sitting quietly after a lunch without alcohol 0 0 0 0 0 1 1   In a car, while stopped for a few minutes  in traffic 0 0 0 0 0 0 0   Total score 4 3 1 0 3 8 11     Compliance Summary  6/20/2019 - 9/17/2019 (90 days)  Days with Device Usage 86 days  Days without Device Usage 4 days  Percent Days with Device Usage 95.6%  Cumulative Usage 23 days 19 hrs. 15 mins. 20 secs.  Maximum Usage (1 Day) 10 hrs. 51 mins. 9 secs.  Average Usage (All Days) 6 hrs. 20 mins. 50 secs.  Average Usage (Days Used) 6 hrs. 38 mins. 33 secs.  Minimum Usage (1 Day) 49 secs.  Percent of Days with Usage >= 4 Hours 88.9%  Percent of Days with Usage < 4 Hours 11.1%  Date Range  Total Blower Time 27 days 20 hrs. 59 mins. 5 secs.  Average AHI 8.0  Auto-CPAP Summary ([facility])  Auto-CPAP Mean Pressure 10.4 cmH2O  Auto-CPAP Peak Average Pressure 14.7 cmH2O  Average Device Pressure <= 90% of Time 12.7 cmH2O  Average Time in Large Leak Per Day 15 mins. 13 secs.        A full  review of systems, past , family  and social histories was performed except as mentioned in the note above, these are non contributory to the main issues discussed today.       Previous Report Reviewed: office notes     The following portions of the patient's history were reviewed and updated as appropriate: She  has a past medical history of Anxiety, AP (angina pectoris) (7/18/2013), Arterial ischemic stroke, MCA (middle cerebral artery), left, acute (12/15/2017), Asthma, Class 1 obesity due to excess calories with serious comorbidity and body mass index (BMI) of 30.0 to 30.9 in adult (3/15/2019), Coronary artery disease (7/18/2013), Depression, Diastolic heart failure, GERD (gastroesophageal reflux disease) (7/18/2013), History of PTCA (7/18/2013), Hypertension (7/18/2013), Hypothyroidism, Mixed hyperlipidemia (7/18/2013), Osteoporosis, Pneumonia due to other staphylococcus, Precancerous changes of the vagina, Seizure (3/15/2019), Sleep apnea, Trouble in sleeping, Type 2 diabetes mellitus with ophthalmic manifestations, and Urinary incontinence.  She  has a past surgical  history that includes Coronary stent placement; Thyroid surgery; Coronary angioplasty; Breast surgery (Left); Hysterectomy (1970); Eye surgery (Bilateral, 2014); and Tubal ligation (1970).  Her family history includes Alcohol abuse in her sister; Cancer in her son; Cirrhosis in her sister; Diabetes in her paternal grandmother and sister; Fibromyalgia in her daughter and daughter; Heart disease in her mother; Kidney disease in her brother and father.  She  reports that she quit smoking about 11 years ago. She has a 38.00 pack-year smoking history. She has never used smokeless tobacco. She reports that she does not drink alcohol or use drugs.  She has a current medication list which includes the following prescription(s): aspirin, atorvastatin, bd insulin syringe ult-fine ii, blood sugar diagnostic, clopidogrel, donepezil, easy comfort lancets, escitalopram oxalate, hydrochlorothiazide, insulin detemir u-100, isosorbide mononitrate, levothyroxine, losartan, nitroglycerin, pantoprazole, pen needle, diabetic, sitagliptin, and vitamin d.  She has No Known Allergies..    Review of Systems   Constitutional: Positive for fatigue. Negative for fever.   HENT: Negative for postnasal drip, rhinorrhea and congestion.    Respiratory: Positive for apnea, cough, sputum production, chest tightness, shortness of breath, dyspnea on extertion, use of rescue inhaler and Paroxysmal Nocturnal Dyspnea.    Cardiovascular: Negative for chest pain, palpitations and leg swelling.   Skin: Negative for rash.   Gastrointestinal: Negative for nausea and abdominal pain.   Neurological: Positive for weakness. Negative for dizziness, syncope and light-headedness.   Hematological: Negative for adenopathy. Does not bruise/bleed easily.   Psychiatric/Behavioral: Positive for sleep disturbance. The patient is not nervous/anxious.         Objective:     Vitals:    09/18/19 1400   BP: 130/68   Pulse: (!) 54   Resp: 18   SpO2: 97%   Weight: 65.2 kg (143 lb  "13.6 oz)   Height: 4' 11" (1.499 m)     body mass index is 29.05 kg/m².    Physical Exam   Constitutional: She is oriented to person, place, and time. She appears well-developed and well-nourished.   HENT:   Head: Normocephalic and atraumatic.   Mouth/Throat: Oropharyngeal exudate present.   Eyes: Pupils are equal, round, and reactive to light. Conjunctivae are normal.   Neck: Neck supple. No JVD present. No tracheal deviation present. No thyromegaly present.   Cardiovascular: Normal rate, regular rhythm and normal heart sounds.   Pulmonary/Chest: Effort normal. No respiratory distress. She has decreased breath sounds. She has no wheezes. She has rales in the right lower field and the left lower field. She exhibits no tenderness.   Abdominal: Soft. Bowel sounds are normal.   Musculoskeletal: Normal range of motion. She exhibits no edema.   Lymphadenopathy:     She has no cervical adenopathy.   Neurological: She is alert and oriented to person, place, and time.   Skin: Skin is warm and dry.   Nursing note and vitals reviewed.      Personal Diagnostic Review  CPAP complaince download.     Assessment / plan     Discussed diagnosis, its evaluation, treatment and usual course. All questions answered.    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 Minimum usage is 70% of night for at least 4 hours per night used. Pateint expressed understanding. All Questions answered.    Complaince download in 12 Months.    CPAP supplies renewed.          Relevant Orders    CPAP/BIPAP SUPPLIES            TIME SPENT WITH PATIENT: Time spent: 25 minutes in face to face  discussion concerning diagnosis, prognosis, review of lab and test results, benefits of treatment as well as management " of disease, counseling of patient and coordination of care between various health  care providers . Greater than half the time spent was used for coordination of care and counseling of patient.     Follow up in about 1 year (around 9/18/2020) for NAWAF.

## 2019-09-20 ENCOUNTER — TELEPHONE (OUTPATIENT)
Dept: CARDIOLOGY | Facility: CLINIC | Age: 79
End: 2019-09-20

## 2019-09-20 NOTE — TELEPHONE ENCOUNTER
The patient has been notified of this information and all questions answered.  Verbalized understanding and will call back with further questions.

## 2019-11-25 ENCOUNTER — CLINICAL SUPPORT (OUTPATIENT)
Dept: CARDIOLOGY | Facility: CLINIC | Age: 79
End: 2019-11-25
Attending: INTERNAL MEDICINE
Payer: MEDICARE

## 2019-11-25 DIAGNOSIS — I63.9 CRYPTOGENIC STROKE: ICD-10-CM

## 2019-11-25 DIAGNOSIS — Z45.09 ENCOUNTER FOR LOOP RECORDER CHECK: ICD-10-CM

## 2019-11-25 PROCEDURE — 93285 PRGRMG DEV EVAL SCRMS IP: CPT | Mod: HCNC,S$GLB,, | Performed by: INTERNAL MEDICINE

## 2019-11-25 PROCEDURE — 93285 LOOP RECORDER PROGRAMMING: ICD-10-PCS | Mod: HCNC,S$GLB,, | Performed by: INTERNAL MEDICINE

## 2020-01-31 ENCOUNTER — OFFICE VISIT (OUTPATIENT)
Dept: CARDIOLOGY | Facility: CLINIC | Age: 80
End: 2020-01-31
Payer: MEDICARE

## 2020-01-31 VITALS
HEIGHT: 59 IN | WEIGHT: 157.19 LBS | BODY MASS INDEX: 31.69 KG/M2 | DIASTOLIC BLOOD PRESSURE: 44 MMHG | HEART RATE: 65 BPM | SYSTOLIC BLOOD PRESSURE: 156 MMHG | OXYGEN SATURATION: 97 %

## 2020-01-31 DIAGNOSIS — E11.69 HYPERLIPIDEMIA ASSOCIATED WITH TYPE 2 DIABETES MELLITUS: Chronic | ICD-10-CM

## 2020-01-31 DIAGNOSIS — I20.9 AP (ANGINA PECTORIS): Chronic | ICD-10-CM

## 2020-01-31 DIAGNOSIS — R00.1 BRADYCARDIA: ICD-10-CM

## 2020-01-31 DIAGNOSIS — G47.33 OSA ON CPAP: ICD-10-CM

## 2020-01-31 DIAGNOSIS — Z79.4 TYPE 2 DIABETES MELLITUS WITH HYPERGLYCEMIA, WITH LONG-TERM CURRENT USE OF INSULIN: Chronic | ICD-10-CM

## 2020-01-31 DIAGNOSIS — E11.65 TYPE 2 DIABETES MELLITUS WITH HYPERGLYCEMIA, WITH LONG-TERM CURRENT USE OF INSULIN: Chronic | ICD-10-CM

## 2020-01-31 DIAGNOSIS — Z95.1 S/P CABG (CORONARY ARTERY BYPASS GRAFT): ICD-10-CM

## 2020-01-31 DIAGNOSIS — I51.89 LEFT VENTRICULAR DIASTOLIC DYSFUNCTION WITH PRESERVED SYSTOLIC FUNCTION: Chronic | ICD-10-CM

## 2020-01-31 DIAGNOSIS — E78.5 HYPERLIPIDEMIA ASSOCIATED WITH TYPE 2 DIABETES MELLITUS: Chronic | ICD-10-CM

## 2020-01-31 DIAGNOSIS — E66.09 CLASS 1 OBESITY DUE TO EXCESS CALORIES WITH SERIOUS COMORBIDITY AND BODY MASS INDEX (BMI) OF 30.0 TO 30.9 IN ADULT: ICD-10-CM

## 2020-01-31 DIAGNOSIS — I63.512 ARTERIAL ISCHEMIC STROKE, MCA (MIDDLE CEREBRAL ARTERY), LEFT, ACUTE: Chronic | ICD-10-CM

## 2020-01-31 DIAGNOSIS — Z98.61 HISTORY OF PTCA: ICD-10-CM

## 2020-01-31 DIAGNOSIS — I25.118 CORONARY ARTERY DISEASE WITH STABLE ANGINA PECTORIS, UNSPECIFIED VESSEL OR LESION TYPE, UNSPECIFIED WHETHER NATIVE OR TRANSPLANTED HEART: Primary | Chronic | ICD-10-CM

## 2020-01-31 DIAGNOSIS — I10 ESSENTIAL HYPERTENSION: Chronic | ICD-10-CM

## 2020-01-31 DIAGNOSIS — I25.10 CAD, MULTIPLE VESSEL: ICD-10-CM

## 2020-01-31 DIAGNOSIS — I70.0 AORTIC ARCH ATHEROSCLEROSIS: ICD-10-CM

## 2020-01-31 PROCEDURE — 99214 OFFICE O/P EST MOD 30 MIN: CPT | Mod: HCNC,S$GLB,, | Performed by: INTERNAL MEDICINE

## 2020-01-31 PROCEDURE — 3077F PR MOST RECENT SYSTOLIC BLOOD PRESSURE >= 140 MM HG: ICD-10-PCS | Mod: HCNC,CPTII,S$GLB, | Performed by: INTERNAL MEDICINE

## 2020-01-31 PROCEDURE — 99999 PR PBB SHADOW E&M-EST. PATIENT-LVL IV: ICD-10-PCS | Mod: PBBFAC,HCNC,, | Performed by: INTERNAL MEDICINE

## 2020-01-31 PROCEDURE — 3077F SYST BP >= 140 MM HG: CPT | Mod: HCNC,CPTII,S$GLB, | Performed by: INTERNAL MEDICINE

## 2020-01-31 PROCEDURE — 1126F PR PAIN SEVERITY QUANTIFIED, NO PAIN PRESENT: ICD-10-PCS | Mod: HCNC,S$GLB,, | Performed by: INTERNAL MEDICINE

## 2020-01-31 PROCEDURE — 99499 RISK ADDL DX/OHS AUDIT: ICD-10-PCS | Mod: S$GLB,,, | Performed by: INTERNAL MEDICINE

## 2020-01-31 PROCEDURE — 3078F PR MOST RECENT DIASTOLIC BLOOD PRESSURE < 80 MM HG: ICD-10-PCS | Mod: HCNC,CPTII,S$GLB, | Performed by: INTERNAL MEDICINE

## 2020-01-31 PROCEDURE — 3078F DIAST BP <80 MM HG: CPT | Mod: HCNC,CPTII,S$GLB, | Performed by: INTERNAL MEDICINE

## 2020-01-31 PROCEDURE — 1159F PR MEDICATION LIST DOCUMENTED IN MEDICAL RECORD: ICD-10-PCS | Mod: HCNC,S$GLB,, | Performed by: INTERNAL MEDICINE

## 2020-01-31 PROCEDURE — 99499 UNLISTED E&M SERVICE: CPT | Mod: S$GLB,,, | Performed by: INTERNAL MEDICINE

## 2020-01-31 PROCEDURE — 1159F MED LIST DOCD IN RCRD: CPT | Mod: HCNC,S$GLB,, | Performed by: INTERNAL MEDICINE

## 2020-01-31 PROCEDURE — 1101F PR PT FALLS ASSESS DOC 0-1 FALLS W/OUT INJ PAST YR: ICD-10-PCS | Mod: HCNC,CPTII,S$GLB, | Performed by: INTERNAL MEDICINE

## 2020-01-31 PROCEDURE — 99214 PR OFFICE/OUTPT VISIT, EST, LEVL IV, 30-39 MIN: ICD-10-PCS | Mod: HCNC,S$GLB,, | Performed by: INTERNAL MEDICINE

## 2020-01-31 PROCEDURE — 1126F AMNT PAIN NOTED NONE PRSNT: CPT | Mod: HCNC,S$GLB,, | Performed by: INTERNAL MEDICINE

## 2020-01-31 PROCEDURE — 99999 PR PBB SHADOW E&M-EST. PATIENT-LVL IV: CPT | Mod: PBBFAC,HCNC,, | Performed by: INTERNAL MEDICINE

## 2020-01-31 PROCEDURE — 1101F PT FALLS ASSESS-DOCD LE1/YR: CPT | Mod: HCNC,CPTII,S$GLB, | Performed by: INTERNAL MEDICINE

## 2020-01-31 NOTE — PROGRESS NOTES
Subjective:    Patient ID:  Tiffanie Wise is a 79 y.o. female who presents for evaluation of Hypertension; Hyperlipidemia; Coronary Artery Disease; and Risk Factor Management For Atherosclerosis      HPI Pt presents for f/u.  Her current medical conditions include CAD (CABG LIMA-LAD 6/16), DM, NAWAF, obesity, h/o CVA, HTN, dyslipidemia, GERD. Nonsmoker.  Her prior history is pertinent for following:  NSTEMI 11/10 and underwent LHC and sucessful stenting of 90% RCA (3 x 23 mm Promus BRIANDA).    S/p PCI of 70% mid-LCX 12/12 with Xience BRIANDA.    S/p LHC for worsening anginal sxs on 11/21/13 with successful PCI 90% ISR mid-LCX with Xience BRIANDA.    S/p unstable angina and had PTCA of ISR of her LCX March 2014.  S/p 7/14 PTCA of severe LCX/OM ISR and PTCA of distal LCX at her bifurcation lesion at stent site July 2014 for ACS.  S/p 10/14 repeat revascularization of severe LCX ISR with cutting balloon Oct 2014 for ACS.  S/p 12/14 admit with unstable angina. She had ISR LCX again, Resolute BRIANDA implanted with good results.    Pt underwent repeat LHC again late June 2016 for unstable anginal sxs and had severe 95% distal LAD stenosis not amenable to PCI.  She had patent LCX, RCA stents, chronic occlusion of R PLB, normal LVEF.  She had urgent CABG w LIMA-LAD June 2016.  S/p hospitalization for ischemic CVA Dec 2017 tx with TPA and [place] [facility] [place]distal left parietal MCA branch occlusion). Unclear mechanism of CVA.  She had some hemorrhage noted in CVA distribution.  Was maintained on asa, plavix at time of discharge.  Metoprolol was changed to Coreg for bradycardia.  Her ecg showed sinus bradycardia, inferior/anterolateral st-t abnl.  Echo showed normal EF, LAE.  Has speech deficits, expressive aphasia.  S/p rehab at [facility].  S/p ILR 2/18 with Dr. Devine, no a fib noted so far per reports.  Was taken off beta-blockers due to bradycardia.   Echo 7/18 normal EF, DD.  Admitted Feb 2019 to [facility] and had seizures, attributed to  "pneumonia.  Chart reviewed from [facility].  MRI brain no acute changes.  Echo showed normal EF, LVH.  Now here.  Still has speech limitations from her CVA.  She was admitted 9/19 with atypical cp.  Cards consult done.  Stress MPI no ischemia, normal EF.  - troponin levels.  She has chronic angina.  Controlled on current meds.  Takes sl ntg on occasion for breakthrough angina.  Chronic SHERMAN, unchanged.  DM HGAIC 10.9% on recent labs.  Lipids controlled.   BP stable.   Amlodipine increased to 10 mg qd by PCP last week.  Compliant with meds.   ILR no arrhythmias.   No recurrent CVA issues.  Sinus eleno stable.   Weight stable.     Current Outpatient Medications:     amLODIPine (NORVASC) 10 MG tablet, Take 1 tablet (10 mg total) by mouth once daily., Disp: 90 tablet, Rfl: 1    aspirin (ECOTRIN) 81 MG EC tablet, Take 81 mg by mouth. Take 81 mg by mouth daily., Disp: , Rfl:     atorvastatin (LIPITOR) 80 MG tablet, Take 1 tablet (80 mg total) by mouth once daily., Disp: 90 tablet, Rfl: 3    BD INSULIN SYRINGE ULT-FINE II 1/2 mL 31 x 5/16" Syrg, , Disp: , Rfl:     BLOOD SUGAR DIAGNOSTIC (TRUETEST TEST STRIPS MISC), by Misc.(Non-Drug; Combo Route) route. Pt is testing 3 times a day, Disp: , Rfl:     clopidogrel (PLAVIX) 75 mg tablet, Take 1 tablet (75 mg total) by mouth once daily., Disp: 90 tablet, Rfl: 3    donepezil (ARICEPT) 10 MG tablet, Take 1 tablet (10 mg total) by mouth once daily., Disp: 90 tablet, Rfl: 3    EASY COMFORT LANCETS 30 gauge Misc, , Disp: , Rfl:     escitalopram oxalate (LEXAPRO) 20 MG tablet, Take 1 tablet (20 mg total) by mouth once daily., Disp: 90 tablet, Rfl: 3    ferrous gluconate (FERGON) 240 (27 FE) MG tablet, Take 1 tablet (240 mg total) by mouth 2 (two) times daily with meals., Disp: 60 tablet, Rfl: 3    hydroCHLOROthiazide (HYDRODIURIL) 25 MG tablet, Take 1 tablet (25 mg total) by mouth once daily., Disp: 90 tablet, Rfl: 3    insulin aspart protamine-insulin aspart (NOVOLOG MIX " "70-30FLEXPEN U-100) 100 unit/mL (70-30) InPn pen, 40 bid, Disp: , Rfl: 0    isosorbide mononitrate (IMDUR) 30 MG 24 hr tablet, Take 1 tablet (30 mg total) by mouth 2 (two) times daily., Disp: 180 tablet, Rfl: 3    levothyroxine (SYNTHROID) 50 MCG tablet, Take 1 tablet (50 mcg total) by mouth once daily., Disp: 90 tablet, Rfl: 3    losartan (COZAAR) 100 MG tablet, Take 1 tablet (100 mg total) by mouth once daily., Disp: 90 tablet, Rfl: 3    nitroGLYCERIN (NITROSTAT) 0.4 MG SL tablet, Place 1 tablet (0.4 mg total) under the tongue as needed., Disp: 25 tablet, Rfl: 6    pantoprazole (PROTONIX) 40 MG tablet, Take 1 tablet (40 mg total) by mouth once daily., Disp: 90 tablet, Rfl: 3    pen needle, diabetic 31 gauge x 5/16" Ndle, USE TWICE DAILY AND PRN, Disp: , Rfl:     pregabalin (LYRICA) 100 MG capsule, Take 100 mg by mouth every evening., Disp: , Rfl:     SITagliptin (JANUVIA) 100 MG Tab, Take 100 mg by mouth once daily., Disp: , Rfl:     vitamin D 1000 units Tab, Take 1,000 Units by mouth once daily., Disp: , Rfl:       Review of Systems   Constitution: Negative.   HENT: Negative.    Eyes: Negative.    Cardiovascular: Positive for chest pain and dyspnea on exertion.   Respiratory: Positive for shortness of breath.    Endocrine: Negative.    Hematologic/Lymphatic: Negative.    Skin: Negative.    Musculoskeletal: Negative.    Gastrointestinal: Negative.    Genitourinary: Negative.    Neurological: Positive for difficulty with concentration, focal weakness and weakness.   Psychiatric/Behavioral: Negative.    Allergic/Immunologic: Negative.        BP (!) 156/44 (BP Location: Right arm, Patient Position: Sitting, BP Method: Large (Manual))   Pulse 65   Ht 4' 11" (1.499 m)   Wt 71.3 kg (157 lb 3 oz)   LMP  (LMP Unknown)   SpO2 97%   BMI 31.75 kg/m²     Wt Readings from Last 3 Encounters:   01/31/20 71.3 kg (157 lb 3 oz)   01/17/20 69.9 kg (154 lb)   12/27/19 69.3 kg (152 lb 12.8 oz)     Temp Readings from " Last 3 Encounters:   12/27/19 98.6 °F (37 °C) (Oral)   09/02/19 97.9 °F (36.6 °C) (Axillary)   06/09/19 97.1 °F (36.2 °C) (Oral)     BP Readings from Last 3 Encounters:   01/31/20 (!) 156/44   01/17/20 (!) 154/67   12/27/19 (!) 151/68     Pulse Readings from Last 3 Encounters:   01/31/20 65   01/17/20 (!) 57   12/27/19 67          Objective:    Physical Exam   Constitutional: She is oriented to person, place, and time. Vital signs are normal. She appears well-developed and well-nourished. She is active and cooperative. She does not have a sickly appearance. She does not appear ill. No distress.   HENT:   Head: Normocephalic.   Neck: Neck supple. No JVD present. Carotid bruit is not present. No thyromegaly present.   Cardiovascular: Normal rate, regular rhythm, S1 normal, S2 normal, normal heart sounds and normal pulses. PMI is not displaced. Exam reveals no gallop and no friction rub.   No murmur heard.  Pulses:       Radial pulses are 2+ on the right side, and 2+ on the left side.   Pulmonary/Chest: Effort normal and breath sounds normal. She has no wheezes. She has no rales.   Abdominal: Soft. Normal appearance and bowel sounds are normal. She exhibits no abdominal bruit and no mass. There is no tenderness.   obese   Musculoskeletal: She exhibits no edema.   Lymphadenopathy:     She has no cervical adenopathy.   Neurological: She is alert and oriented to person, place, and time.   Skin: Skin is warm. She is not diaphoretic.   Psychiatric: She has a normal mood and affect. Her behavior is normal.   Nursing note and vitals reviewed.      I have reviewed all pertinent labs and cardiac studies.      Chemistry        Component Value Date/Time     09/02/2019 0433    K 3.7 09/02/2019 0433    CL 99 09/02/2019 0433    CO2 30 (H) 09/02/2019 0433    BUN 19 09/02/2019 0433    CREATININE 1.3 09/02/2019 0433     (H) 09/02/2019 0433        Component Value Date/Time    CALCIUM 9.0 09/02/2019 0433    ALKPHOS 116  09/01/2019 1737    AST 13 09/01/2019 1737    ALT 13 09/01/2019 1737    BILITOT 0.7 09/01/2019 1737    ESTGFRAFRICA 45 (A) 09/02/2019 0433    EGFRNONAA 39 (A) 09/02/2019 0433        Lab Results   Component Value Date    WBC 11.2 (H) 01/17/2020    HGB 10.0 (L) 01/17/2020    HCT 34.3 01/17/2020    MCV 75 (L) 01/17/2020     01/17/2020       Lab Results   Component Value Date    HGBA1C 10.9 (H) 01/17/2020     Lab Results   Component Value Date    CHOL 130 12/11/2018    CHOL 187 07/22/2018    CHOL 116 (L) 12/18/2017     Lab Results   Component Value Date    HDL 54 12/11/2018    HDL 59 07/22/2018    HDL 43 12/18/2017     Lab Results   Component Value Date    LDLCALC 55.4 (L) 12/11/2018    LDLCALC 104.4 07/22/2018    LDLCALC 50.0 (L) 12/18/2017     Lab Results   Component Value Date    TRIG 103 12/11/2018    TRIG 118 07/22/2018    TRIG 115 12/18/2017     Lab Results   Component Value Date    CHOLHDL 41.5 12/11/2018    CHOLHDL 31.6 07/22/2018    CHOLHDL 37.1 12/18/2017        Narrative     Date of Procedure: 09/18/2019    PRE-TEST DATA   EKG: Resting electrocardiogram reveals sinus bradycardia at a rate of 52 bpm, and evidence of septal MI. There was left axis deviation and ST or T wave suggestive of ischemia.     TEST DESCRIPTION   The patient received 0.4 mg of Regadenoson as an IV bolus. Peak heart rate was 80 bpm, which is 59% of the age predicted maximum heart rate. .     EKG Conclusions:    1. The EKG portion of this study is negative for ischemia at a peak heart rate of 80 bpm (59% of predicted).   2. Blood pressure remained stable throughout the protocol  (Presenting BP: 139/53 Peak BP: 161/53).   3. No significant arrhythmias were present.   4. There were no symptoms of chest discomfort or significant dyspnea throughout the protocol.     Nuclear Procedure:  Following a single isotope protocol, 10 mCi of Tc99 labeled Tetrofosmin was given at rest and tomographic imaging was performed. Regadenoson pharmacologic  "stress testing was performed as described above. Immediately following the IV bolus of regadenoson,   30 mCi of Tc99 labeled Tetrofosmin was given and tomographic imaging was performed. The site of the IV injection was the right AC. Images were obtained on a [employer] camera.     Comparison:  Study from 1/12/2016 demonstrated, "NORMAL MYOCARDIAL PERFUSION  1. The perfusion scan is free of evidence for myocardial ischemia or injury.   2. There is a mild intensity fixed defect in the anteroapical wall of the left ventricle, secondary to breast attenuation.   3. Resting wall motion is physiologic.   4. Resting LV function is normal.   5. The ventricular volumes are normal at rest and stress.   6. The extracardiac distribution of radioactivity is normal.   ."     Comments:  This is a technically adequate study. Inspection of the transaxial images demonstrated no significant cranial, caudal, or lateral patient motion in the camera between rest and stress acquisitions. There is mild decrease activity of radiotracer in the   anteroapical wall of the left ventricle, which appears similar on resting images associated with radial thickening and an overlying breast shadow on cine raw data, suggestive of breast attenuation. The remainder of the myocardium demonstrates normal   radiotracer uptake. The extracardiac distribution of radioactivity is normal. The left ventricular cavity is normal in size and does not increase with stress. On gated SPECT, left ventricular motion is normal at rest.     Nuclear Quantitative Functional Analysis:   LVEF: >= 70 %  LVED Volume: 62 ml  LVES Volume: 12 ml    Impression: NORMAL MYOCARDIAL PERFUSION  1. The perfusion scan is free of evidence for myocardial ischemia or injury.   2. There is a mild intensity fixed defect in the anteroapical wall of the left ventricle, secondary to breast attenuation.   3. Resting wall motion is physiologic.   4. Resting LV function is normal.   5. The " ventricular volumes are normal at rest and stress.   6. The extracardiac distribution of radioactivity is normal.   7. When compared to the previous study from 01/12/2016, no ischemic changes.           This document has been electronically    SIGNED BY: Benjamin Garcia MD On: 09/18/2019 17:06           Assessment:       1. Coronary artery disease with stable angina pectoris, unspecified vessel or lesion type, unspecified whether native or transplanted heart    2. Essential hypertension    3. Left ventricular diastolic dysfunction with preserved systolic function    4. Hyperlipidemia associated with type 2 diabetes mellitus    5. Type 2 diabetes mellitus with hyperglycemia, with long-term current use of insulin    6. History of arterial ischemic stroke, left MCA (middle cerebral artery) 12/2017    7. Class 1 obesity due to excess calories with serious comorbidity and body mass index (BMI) of 30.0 to 30.9 in adult    8. AP (angina pectoris)    9. Bradycardia    10. Aortic arch atherosclerosis    11. History of PTCA    12. CAD, multiple vessel    13. S/P CABG (coronary artery bypass graft)    14. 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  Reviewed all tests and above medical conditions with patient in detail and formulated treatment plan.  Continue optimal medical treatment for cardiovascular conditions.  Sl ntg for breakthrough angina.  Cardiac low salt diet discussed.  Daily walking.  Fall precautions.   Maintaining healthy weight and weight loss goals (if needed) were discussed in clinic.  No changes in meds today.  Pt needs to lower DM HGAIC to goal.   Continue ILR monitoring.  Continue CPAP for NAWAF.  F/u in 6 months.

## 2020-02-20 ENCOUNTER — PATIENT MESSAGE (OUTPATIENT)
Dept: PULMONOLOGY | Facility: CLINIC | Age: 80
End: 2020-02-20

## 2020-03-11 ENCOUNTER — HOSPITAL ENCOUNTER (OUTPATIENT)
Dept: RADIOLOGY | Facility: HOSPITAL | Age: 80
Discharge: HOME | End: 2020-03-11
Attending: FAMILY MEDICINE
Payer: MEDICARE

## 2020-03-11 DIAGNOSIS — R05.9 COUGH: ICD-10-CM

## 2020-03-11 PROCEDURE — 71046 XR CHEST PA AND LATERAL: ICD-10-PCS | Mod: 26,HCNC,, | Performed by: RADIOLOGY

## 2020-03-11 PROCEDURE — 71046 X-RAY EXAM CHEST 2 VIEWS: CPT | Mod: TC,HCNC,PO

## 2020-03-11 PROCEDURE — 71046 X-RAY EXAM CHEST 2 VIEWS: CPT | Mod: 26,HCNC,, | Performed by: RADIOLOGY

## 2020-04-23 ENCOUNTER — PATIENT MESSAGE (OUTPATIENT)
Dept: CARDIOLOGY | Facility: CLINIC | Age: 80
End: 2020-04-23

## 2020-06-26 DIAGNOSIS — I10 ESSENTIAL HYPERTENSION: Primary | ICD-10-CM

## 2020-07-15 ENCOUNTER — HOSPITAL ENCOUNTER (OUTPATIENT)
Dept: CARDIOLOGY | Facility: HOSPITAL | Age: 80
Discharge: HOME | End: 2020-07-15
Attending: INTERNAL MEDICINE
Payer: MEDICARE

## 2020-07-15 ENCOUNTER — TELEPHONE (OUTPATIENT)
Dept: CARDIOLOGY | Facility: HOSPITAL | Age: 80
End: 2020-07-15

## 2020-07-15 DIAGNOSIS — Z45.09 ENCOUNTER FOR LOOP RECORDER CHECK: ICD-10-CM

## 2020-07-15 DIAGNOSIS — I63.9 CRYPTOGENIC STROKE: ICD-10-CM

## 2020-07-15 PROCEDURE — 93285 PRGRMG DEV EVAL SCRMS IP: CPT | Mod: 26,,, | Performed by: INTERNAL MEDICINE

## 2020-07-15 PROCEDURE — 93285 CARDIAC DEVICE CHECK - IN CLINIC & HOSPITAL: ICD-10-PCS | Mod: 26,,, | Performed by: INTERNAL MEDICINE

## 2020-07-15 NOTE — TELEPHONE ENCOUNTER
Defer to Dr. Devine EP, on ILR end of life and management.  Please contact him.     Thank you,        Dr Short

## 2020-07-15 NOTE — TELEPHONE ENCOUNTER
----- Message from Keyon Vinson RN sent at 7/15/2020  4:13 PM CDT -----  Regarding: EOL ILR  Hi Dr. Devine and Dr. Short,    I saw this patient in the clinic today. She has an ILR that was implanted on 2/1/18 by Dr. Devine for a cryptogenic stroke. The patient's ILR battery is depleted and the ILR is no longer functional. The patient expressed she does not want to have the device removed.     Thanks,  Keyon Vinson  Arrhythmia Diagnostic RN [facility]

## 2020-07-31 ENCOUNTER — HOSPITAL ENCOUNTER (OUTPATIENT)
Dept: CARDIOLOGY | Facility: HOSPITAL | Age: 80
Discharge: HOME | End: 2020-07-31
Attending: INTERNAL MEDICINE
Payer: MEDICARE

## 2020-07-31 ENCOUNTER — OFFICE VISIT (OUTPATIENT)
Dept: CARDIOLOGY | Facility: CLINIC | Age: 80
End: 2020-07-31
Payer: MEDICARE

## 2020-07-31 VITALS
HEART RATE: 72 BPM | OXYGEN SATURATION: 95 % | SYSTOLIC BLOOD PRESSURE: 122 MMHG | DIASTOLIC BLOOD PRESSURE: 50 MMHG | WEIGHT: 152.13 LBS | BODY MASS INDEX: 30.67 KG/M2 | HEIGHT: 59 IN

## 2020-07-31 DIAGNOSIS — I20.9 AP (ANGINA PECTORIS): Primary | Chronic | ICD-10-CM

## 2020-07-31 DIAGNOSIS — I25.10 CORONARY ARTERY DISEASE, ANGINA PRESENCE UNSPECIFIED, UNSPECIFIED VESSEL OR LESION TYPE, UNSPECIFIED WHETHER NATIVE OR TRANSPLANTED HEART: ICD-10-CM

## 2020-07-31 DIAGNOSIS — I15.2 HYPERTENSION ASSOCIATED WITH DIABETES: Chronic | ICD-10-CM

## 2020-07-31 DIAGNOSIS — Z98.61 HISTORY OF PTCA: ICD-10-CM

## 2020-07-31 DIAGNOSIS — I10 ESSENTIAL HYPERTENSION: ICD-10-CM

## 2020-07-31 DIAGNOSIS — I70.0 AORTIC ARCH ATHEROSCLEROSIS: ICD-10-CM

## 2020-07-31 DIAGNOSIS — G47.33 OSA ON CPAP: ICD-10-CM

## 2020-07-31 DIAGNOSIS — E78.5 HYPERLIPIDEMIA ASSOCIATED WITH TYPE 2 DIABETES MELLITUS: Chronic | ICD-10-CM

## 2020-07-31 DIAGNOSIS — I20.9 ANGINA PECTORIS: ICD-10-CM

## 2020-07-31 DIAGNOSIS — E11.65 TYPE 2 DIABETES MELLITUS WITH HYPERGLYCEMIA, WITH LONG-TERM CURRENT USE OF INSULIN: Chronic | ICD-10-CM

## 2020-07-31 DIAGNOSIS — Z79.4 TYPE 2 DIABETES MELLITUS WITH HYPERGLYCEMIA, WITH LONG-TERM CURRENT USE OF INSULIN: Chronic | ICD-10-CM

## 2020-07-31 DIAGNOSIS — R00.1 BRADYCARDIA: ICD-10-CM

## 2020-07-31 DIAGNOSIS — I25.10 CAD, MULTIPLE VESSEL: ICD-10-CM

## 2020-07-31 DIAGNOSIS — R94.31 ABNORMAL ECG: ICD-10-CM

## 2020-07-31 DIAGNOSIS — E11.69 HYPERLIPIDEMIA ASSOCIATED WITH TYPE 2 DIABETES MELLITUS: Chronic | ICD-10-CM

## 2020-07-31 DIAGNOSIS — Z95.1 S/P CABG (CORONARY ARTERY BYPASS GRAFT): ICD-10-CM

## 2020-07-31 DIAGNOSIS — E11.59 HYPERTENSION ASSOCIATED WITH DIABETES: Chronic | ICD-10-CM

## 2020-07-31 DIAGNOSIS — I25.118 CORONARY ARTERY DISEASE WITH STABLE ANGINA PECTORIS, UNSPECIFIED VESSEL OR LESION TYPE, UNSPECIFIED WHETHER NATIVE OR TRANSPLANTED HEART: Chronic | ICD-10-CM

## 2020-07-31 DIAGNOSIS — I51.89 LEFT VENTRICULAR DIASTOLIC DYSFUNCTION WITH PRESERVED SYSTOLIC FUNCTION: Chronic | ICD-10-CM

## 2020-07-31 DIAGNOSIS — E66.09 CLASS 1 OBESITY DUE TO EXCESS CALORIES WITH SERIOUS COMORBIDITY AND BODY MASS INDEX (BMI) OF 30.0 TO 30.9 IN ADULT: ICD-10-CM

## 2020-07-31 DIAGNOSIS — I63.512 ARTERIAL ISCHEMIC STROKE, MCA (MIDDLE CEREBRAL ARTERY), LEFT, ACUTE: Chronic | ICD-10-CM

## 2020-07-31 PROCEDURE — 3074F SYST BP LT 130 MM HG: CPT | Mod: CPTII,S$GLB,, | Performed by: INTERNAL MEDICINE

## 2020-07-31 PROCEDURE — 99999 PR PBB SHADOW E&M-EST. PATIENT-LVL III: CPT | Mod: PBBFAC,,, | Performed by: INTERNAL MEDICINE

## 2020-07-31 PROCEDURE — 1159F PR MEDICATION LIST DOCUMENTED IN MEDICAL RECORD: ICD-10-PCS | Mod: S$GLB,,, | Performed by: INTERNAL MEDICINE

## 2020-07-31 PROCEDURE — 3078F DIAST BP <80 MM HG: CPT | Mod: CPTII,S$GLB,, | Performed by: INTERNAL MEDICINE

## 2020-07-31 PROCEDURE — 1159F MED LIST DOCD IN RCRD: CPT | Mod: S$GLB,,, | Performed by: INTERNAL MEDICINE

## 2020-07-31 PROCEDURE — 1126F PR PAIN SEVERITY QUANTIFIED, NO PAIN PRESENT: ICD-10-PCS | Mod: S$GLB,,, | Performed by: INTERNAL MEDICINE

## 2020-07-31 PROCEDURE — 93005 ELECTROCARDIOGRAM TRACING: CPT

## 2020-07-31 PROCEDURE — 99999 PR PBB SHADOW E&M-EST. PATIENT-LVL III: ICD-10-PCS | Mod: PBBFAC,,, | Performed by: INTERNAL MEDICINE

## 2020-07-31 PROCEDURE — 99214 PR OFFICE/OUTPT VISIT, EST, LEVL IV, 30-39 MIN: ICD-10-PCS | Mod: S$GLB,,, | Performed by: INTERNAL MEDICINE

## 2020-07-31 PROCEDURE — 93010 EKG 12-LEAD: ICD-10-PCS | Mod: ,,, | Performed by: INTERNAL MEDICINE

## 2020-07-31 PROCEDURE — 99214 OFFICE O/P EST MOD 30 MIN: CPT | Mod: S$GLB,,, | Performed by: INTERNAL MEDICINE

## 2020-07-31 PROCEDURE — 1126F AMNT PAIN NOTED NONE PRSNT: CPT | Mod: S$GLB,,, | Performed by: INTERNAL MEDICINE

## 2020-07-31 PROCEDURE — 3074F PR MOST RECENT SYSTOLIC BLOOD PRESSURE < 130 MM HG: ICD-10-PCS | Mod: CPTII,S$GLB,, | Performed by: INTERNAL MEDICINE

## 2020-07-31 PROCEDURE — 3078F PR MOST RECENT DIASTOLIC BLOOD PRESSURE < 80 MM HG: ICD-10-PCS | Mod: CPTII,S$GLB,, | Performed by: INTERNAL MEDICINE

## 2020-07-31 PROCEDURE — 93010 ELECTROCARDIOGRAM REPORT: CPT | Mod: ,,, | Performed by: INTERNAL MEDICINE

## 2020-07-31 RX ORDER — NITROGLYCERIN 0.4 MG/1
0.4 TABLET SUBLINGUAL
Qty: 25 TABLET | Refills: 6 | Status: SHIPPED | OUTPATIENT
Start: 2020-07-31 | End: 2020-08-03 | Stop reason: SDUPTHER

## 2020-07-31 NOTE — PROGRESS NOTES
Subjective:    Patient ID:  Tiffanie Wise is a 79 y.o. female who presents for evaluation of Hypertension, Hyperlipidemia, Coronary Artery Disease, and Risk Factor Management For Atherosclerosis      HPI Pt presents for f/u.  Her current medical conditions include CAD (CABG LIMA-LAD 6/16), DM, NAWAF, obesity, h/o CVA, HTN, dyslipidemia, GERD. Nonsmoker.  Her prior history is pertinent for following:  NSTEMI 11/10 and underwent LHC and sucessful stenting of 90% RCA (3 x 23 mm Promus BRIANDA).    S/p PCI of 70% mid-LCX 12/12 with Xience BRIANDA.    S/p LHC for worsening anginal sxs on 11/21/13 with successful PCI 90% ISR mid-LCX with Xience BRIANDA.    S/p unstable angina and had PTCA of ISR of her LCX March 2014.  S/p 7/14 PTCA of severe LCX/OM ISR and PTCA of distal LCX at her bifurcation lesion at stent site July 2014 for ACS.  S/p 10/14 repeat revascularization of severe LCX ISR with cutting balloon Oct 2014 for ACS.  S/p 12/14 admit with unstable angina. She had ISR LCX again, Resolute BRIANDA implanted with good results.    Pt underwent repeat LHC again late June 2016 for unstable anginal sxs and had severe 95% distal LAD stenosis not amenable to PCI.  She had patent LCX, RCA stents, chronic occlusion of R PLB, normal LVEF.  She had urgent CABG w LIMA-LAD June 2016.  S/p hospitalization for ischemic CVA Dec 2017 tx with TPA and [place] [facility] [place]al [place] parietal MCA branch [place]). Unclear mechanism of CVA.  She had some hemorrhage noted in CVA distribution.  Was maintained on asa, plavix at time of discharge.  Metoprolol was changed to Coreg for bradycardia.  Her ecg showed sinus bradycardia, inferior/anterolateral st-t abnl.  Echo showed normal EF, LAE.  Has speech deficits, expressive aphasia.  S/p rehab at [facility].  S/p ILR 2/18 with Dr. Devine, no a fib noted so far per reports.  Was taken off beta-blockers due to bradycardia.   Echo 7/18 normal EF, DD.  Admitted Feb 2019 to [facility] and had seizures, attributed to  "pneumonia.  Chart reviewed from [facility].  MRI brain no acute changes.  Echo showed normal EF, LVH.  Still has speech limitations from her CVA.  She was admitted 9/19 with atypical cp.  Cards consult done.  Stress MPI no ischemia, normal EF.  - troponin levels.  Now here.  CAD seems stable.   Angina controlled on current med tx.  Uses sl ntg prn for breakthrough angina.  Has not had to take one in "long time".  No CHF sxs.  Some chronic SHERMAN, unchanged.  ILR end of life.  ecg today NSR, left axis deviation, chronic inferolateral & anterolateral ST-T abnl.   Weight down 5 pounds.  BP stable.  Lipids well controlled last check.  DM HGAIC 10.9% last check Jan 2020.   She states her blood sugar runs 120 or less at home.  Uses cpap.        Current Outpatient Medications:     amLODIPine (NORVASC) 10 MG tablet, Take 1 tablet (10 mg total) by mouth once daily., Disp: 90 tablet, Rfl: 1    aspirin (ECOTRIN) 81 MG EC tablet, Take 81 mg by mouth. Take 81 mg by mouth daily., Disp: , Rfl:     atorvastatin (LIPITOR) 80 MG tablet, Take 1 tablet (80 mg total) by mouth once daily., Disp: 90 tablet, Rfl: 3    BD INSULIN SYRINGE ULT-FINE II 1/2 mL 31 x 5/16" Syrg, , Disp: , Rfl:     BLOOD SUGAR DIAGNOSTIC (TRUETEST TEST STRIPS MISC), by Misc.(Non-Drug; Combo Route) route. Pt is testing 3 times a day, Disp: , Rfl:     clopidogrel (PLAVIX) 75 mg tablet, Take 1 tablet (75 mg total) by mouth once daily., Disp: 90 tablet, Rfl: 3    donepezil (ARICEPT) 10 MG tablet, Take 1 tablet (10 mg total) by mouth once daily., Disp: 90 tablet, Rfl: 3    EASY COMFORT LANCETS 30 gauge Misc, , Disp: , Rfl:     escitalopram oxalate (LEXAPRO) 20 MG tablet, Take 1 tablet (20 mg total) by mouth once daily., Disp: 90 tablet, Rfl: 3    ferrous gluconate (FERGON) 240 (27 FE) MG tablet, Take 1 tablet (240 mg total) by mouth 2 (two) times daily with meals., Disp: 60 tablet, Rfl: 3    hydroCHLOROthiazide (HYDRODIURIL) 25 MG tablet, Take 1 tablet (25 mg total) " "by mouth once daily., Disp: 90 tablet, Rfl: 3    insulin aspart protamine-insulin aspart (NOVOLOG MIX 70-30FLEXPEN U-100) 100 unit/mL (70-30) InPn pen, 40 bid, Disp: , Rfl: 0    ipratropium (ATROVENT) 0.03 % nasal spray, 2 sprays by Nasal route 3 (three) times daily as needed for Rhinitis., Disp: 30 mL, Rfl: 0    isosorbide mononitrate (IMDUR) 30 MG 24 hr tablet, Take 1 tablet (30 mg total) by mouth 2 (two) times daily., Disp: 180 tablet, Rfl: 3    levothyroxine (SYNTHROID) 50 MCG tablet, Take 1 tablet (50 mcg total) by mouth once daily., Disp: 90 tablet, Rfl: 3    losartan (COZAAR) 100 MG tablet, Take 1 tablet (100 mg total) by mouth once daily., Disp: 90 tablet, Rfl: 3    nitroGLYCERIN (NITROSTAT) 0.4 MG SL tablet, Place 1 tablet (0.4 mg total) under the tongue as needed., Disp: 25 tablet, Rfl: 6    pantoprazole (PROTONIX) 40 MG tablet, Take 1 tablet (40 mg total) by mouth once daily., Disp: 90 tablet, Rfl: 3    pen needle, diabetic 31 gauge x 5/16" Ndle, USE TWICE DAILY AND PRN, Disp: , Rfl:     pregabalin (LYRICA) 100 MG capsule, Take 100 mg by mouth every evening., Disp: , Rfl:     SITagliptin (JANUVIA) 100 MG Tab, Take 100 mg by mouth once daily., Disp: , Rfl:     vitamin D 1000 units Tab, Take 1,000 Units by mouth once daily., Disp: , Rfl:       Review of Systems   Constitution: Positive for weight loss.   HENT: Negative.    Eyes: Negative.    Cardiovascular: Positive for dyspnea on exertion.   Respiratory: Positive for shortness of breath.    Endocrine: Negative.    Hematologic/Lymphatic: Negative.    Skin: Negative.    Musculoskeletal: Positive for arthritis.   Gastrointestinal: Negative.    Genitourinary: Negative.    Neurological: Positive for weakness.   Psychiatric/Behavioral: Negative.    Allergic/Immunologic: Negative.        BP (!) 122/50 (BP Location: Right arm, Patient Position: Sitting, BP Method: Medium (Manual))   Pulse 72   Ht 4' 11" (1.499 m)   Wt 69 kg (152 lb 1.9 oz)   LMP  " (LMP Unknown)   SpO2 95%   BMI 30.72 kg/m²     Wt Readings from Last 3 Encounters:   07/31/20 69 kg (152 lb 1.9 oz)   03/11/20 70.8 kg (156 lb)   02/28/20 70.8 kg (156 lb)     Temp Readings from Last 3 Encounters:   02/28/20 98 °F (36.7 °C) (Oral)   12/27/19 98.6 °F (37 °C) (Oral)   09/02/19 97.9 °F (36.6 °C) (Axillary)     BP Readings from Last 3 Encounters:   07/31/20 (!) 122/50   03/11/20 (!) 143/72   02/28/20 132/60     Pulse Readings from Last 3 Encounters:   07/31/20 72   03/11/20 63   02/28/20 (!) 58          Objective:    Physical Exam   Constitutional: Vital signs are normal. She appears well-developed and well-nourished. She is active and cooperative. She does not have a sickly appearance. She does not appear ill. No distress.   HENT:   Head: Normocephalic.   Neck: Neck supple. Normal carotid pulses, no hepatojugular reflux and no JVD present. Carotid bruit is not present. No thyromegaly present.   Cardiovascular: Normal rate, regular rhythm, S1 normal, S2 normal, normal heart sounds and normal pulses. PMI is not displaced. Exam reveals no gallop and no friction rub.   No murmur heard.  Pulses:       Radial pulses are 2+ on the right side and 2+ on the left side.   Pulmonary/Chest: Effort normal and breath sounds normal. She has no wheezes. She has no rales.   Abdominal: Soft. Normal appearance, normal aorta and bowel sounds are normal. She exhibits no pulsatile liver, no abdominal bruit, no ascites and no mass. There is no splenomegaly or hepatomegaly. There is no abdominal tenderness.   Musculoskeletal:         General: No edema.   Lymphadenopathy:     She has no cervical adenopathy.   Neurological: She is alert.   Skin: Skin is warm. She is not diaphoretic.   Psychiatric: She has a normal mood and affect. Her behavior is normal.   Nursing note and vitals reviewed.      I have reviewed all pertinent labs and cardiac studies.      Chemistry        Component Value Date/Time     09/02/2019 0433    K  3.7 09/02/2019 0433    CL 99 09/02/2019 0433    CO2 30 (H) 09/02/2019 0433    BUN 19 09/02/2019 0433    CREATININE 1.3 09/02/2019 0433     (H) 09/02/2019 0433        Component Value Date/Time    CALCIUM 9.0 09/02/2019 0433    ALKPHOS 116 09/01/2019 1737    AST 13 09/01/2019 1737    ALT 13 09/01/2019 1737    BILITOT 0.7 09/01/2019 1737    ESTGFRAFRICA 45 (A) 09/02/2019 0433    EGFRNONAA 39 (A) 09/02/2019 0433        Lab Results   Component Value Date    WBC 11.2 (H) 01/17/2020    HGB 10.0 (L) 01/17/2020    HCT 34.3 01/17/2020    MCV 75 (L) 01/17/2020     01/17/2020       Lab Results   Component Value Date    HGBA1C 10.9 (H) 01/17/2020     Lab Results   Component Value Date    CHOL 130 12/11/2018    CHOL 187 07/22/2018    CHOL 116 (L) 12/18/2017     Lab Results   Component Value Date    HDL 54 12/11/2018    HDL 59 07/22/2018    HDL 43 12/18/2017     Lab Results   Component Value Date    LDLCALC 55.4 (L) 12/11/2018    LDLCALC 104.4 07/22/2018    LDLCALC 50.0 (L) 12/18/2017     Lab Results   Component Value Date    TRIG 103 12/11/2018    TRIG 118 07/22/2018    TRIG 115 12/18/2017     Lab Results   Component Value Date    CHOLHDL 41.5 12/11/2018    CHOLHDL 31.6 07/22/2018    CHOLHDL 37.1 12/18/2017           Assessment:       1. AP (angina pectoris)    2. Bradycardia    3. Coronary artery disease with stable angina pectoris, unspecified vessel or lesion type, unspecified whether native or transplanted heart    4. CAD, multiple vessel    5. Type 2 diabetes mellitus with hyperglycemia, with long-term current use of insulin    6. S/P CABG (coronary artery bypass graft)    7. NAWAF on CPAP    8. Left ventricular diastolic dysfunction with preserved systolic function    9. Hypertension associated with diabetes    10. Hyperlipidemia associated with type 2 diabetes mellitus    11. History of PTCA    12. History of arterial ischemic stroke, left MCA (middle cerebral artery) 12/2017    13. Class 1 obesity due to  excess calories with serious comorbidity and body mass index (BMI) of 30.0 to 30.9 in adult    14. Aortic arch atherosclerosis    15. Coronary artery disease, angina presence unspecified, unspecified vessel or lesion type, unspecified whether native or transplanted heart    16. Angina pectoris    17. Abnormal ECG         Plan:               Stable cardiovascular conditions at present time on current medical treatment.  Angina controlled. Continue med tx for CAD.  Sl ntg prn for breakthrough angina.  Reviewed all tests and above medical conditions with patient in detail and formulated treatment plan.  Continue optimal medical treatment for cardiovascular conditions.  Cardiac low salt diet discussed.  Daily exercise encouraged,  as tolerated.  Maintaining healthy weight and weight loss goals (if needed) were discussed in clinic.  Continue CPAP for NAWAF.  Needs to get her DM under optimal control.  Discussed today.    Continue current DM meds and f/u with diabetic doctor.  No changes in meds today.  Per chart, pt declined ILR removal due to end of life.  F/u in 6 months with echo.

## 2020-08-03 DIAGNOSIS — I20.9 ANGINA PECTORIS: ICD-10-CM

## 2020-08-03 RX ORDER — NITROGLYCERIN 0.4 MG/1
0.4 TABLET SUBLINGUAL EVERY 5 MIN PRN
Qty: 25 TABLET | Refills: 6 | Status: SHIPPED | OUTPATIENT
Start: 2020-08-03 | End: 2020-08-06 | Stop reason: SDUPTHER

## 2020-08-06 DIAGNOSIS — I20.9 ANGINA PECTORIS: ICD-10-CM

## 2020-08-06 RX ORDER — NITROGLYCERIN 0.4 MG/1
0.4 TABLET SUBLINGUAL EVERY 5 MIN PRN
Qty: 25 TABLET | Refills: 6 | Status: SHIPPED | OUTPATIENT
Start: 2020-08-06

## 2020-11-04 ENCOUNTER — IMMUNIZATION (OUTPATIENT)
Dept: PHARMACY | Facility: CLINIC | Age: 80
End: 2020-11-04
Payer: MEDICARE

## 2020-11-04 ENCOUNTER — OFFICE VISIT (OUTPATIENT)
Dept: PULMONOLOGY | Facility: CLINIC | Age: 80
End: 2020-11-04
Payer: MEDICARE

## 2020-11-04 ENCOUNTER — IMMUNIZATION (OUTPATIENT)
Dept: PHARMACY | Facility: CLINIC | Age: 80
End: 2020-11-04

## 2020-11-04 ENCOUNTER — PATIENT MESSAGE (OUTPATIENT)
Dept: PHARMACY | Facility: CLINIC | Age: 80
End: 2020-11-04

## 2020-11-04 VITALS
DIASTOLIC BLOOD PRESSURE: 72 MMHG | RESPIRATION RATE: 16 BRPM | WEIGHT: 149.25 LBS | BODY MASS INDEX: 30.09 KG/M2 | SYSTOLIC BLOOD PRESSURE: 124 MMHG | OXYGEN SATURATION: 96 % | HEIGHT: 59 IN | HEART RATE: 64 BPM

## 2020-11-04 DIAGNOSIS — G47.33 OSA ON CPAP: Primary | Chronic | ICD-10-CM

## 2020-11-04 DIAGNOSIS — R91.1 PULMONARY NODULE, RIGHT: Chronic | ICD-10-CM

## 2020-11-04 DIAGNOSIS — E66.09 CLASS 1 OBESITY DUE TO EXCESS CALORIES WITH SERIOUS COMORBIDITY AND BODY MASS INDEX (BMI) OF 30.0 TO 30.9 IN ADULT: Chronic | ICD-10-CM

## 2020-11-04 DIAGNOSIS — R06.09 DYSPNEA ON EXERTION: ICD-10-CM

## 2020-11-04 PROCEDURE — 1101F PT FALLS ASSESS-DOCD LE1/YR: CPT | Mod: CPTII,S$GLB,, | Performed by: INTERNAL MEDICINE

## 2020-11-04 PROCEDURE — 3078F DIAST BP <80 MM HG: CPT | Mod: CPTII,S$GLB,, | Performed by: INTERNAL MEDICINE

## 2020-11-04 PROCEDURE — 99999 PR PBB SHADOW E&M-EST. PATIENT-LVL V: CPT | Mod: PBBFAC,,, | Performed by: INTERNAL MEDICINE

## 2020-11-04 PROCEDURE — 1159F PR MEDICATION LIST DOCUMENTED IN MEDICAL RECORD: ICD-10-PCS | Mod: S$GLB,,, | Performed by: INTERNAL MEDICINE

## 2020-11-04 PROCEDURE — G0008 ADMIN INFLUENZA VIRUS VAC: HCPCS | Mod: S$GLB,,, | Performed by: INTERNAL MEDICINE

## 2020-11-04 PROCEDURE — 99214 PR OFFICE/OUTPT VISIT, EST, LEVL IV, 30-39 MIN: ICD-10-PCS | Mod: 25,S$GLB,, | Performed by: INTERNAL MEDICINE

## 2020-11-04 PROCEDURE — 3074F PR MOST RECENT SYSTOLIC BLOOD PRESSURE < 130 MM HG: ICD-10-PCS | Mod: CPTII,S$GLB,, | Performed by: INTERNAL MEDICINE

## 2020-11-04 PROCEDURE — 1159F MED LIST DOCD IN RCRD: CPT | Mod: S$GLB,,, | Performed by: INTERNAL MEDICINE

## 2020-11-04 PROCEDURE — 90694 FLU VACCINE - QUADRIVALENT - ADJUVANTED: ICD-10-PCS | Mod: S$GLB,,, | Performed by: INTERNAL MEDICINE

## 2020-11-04 PROCEDURE — 3078F PR MOST RECENT DIASTOLIC BLOOD PRESSURE < 80 MM HG: ICD-10-PCS | Mod: CPTII,S$GLB,, | Performed by: INTERNAL MEDICINE

## 2020-11-04 PROCEDURE — 99999 PR PBB SHADOW E&M-EST. PATIENT-LVL V: ICD-10-PCS | Mod: PBBFAC,,, | Performed by: INTERNAL MEDICINE

## 2020-11-04 PROCEDURE — 90694 VACC AIIV4 NO PRSRV 0.5ML IM: CPT | Mod: S$GLB,,, | Performed by: INTERNAL MEDICINE

## 2020-11-04 PROCEDURE — 1101F PR PT FALLS ASSESS DOC 0-1 FALLS W/OUT INJ PAST YR: ICD-10-PCS | Mod: CPTII,S$GLB,, | Performed by: INTERNAL MEDICINE

## 2020-11-04 PROCEDURE — 3074F SYST BP LT 130 MM HG: CPT | Mod: CPTII,S$GLB,, | Performed by: INTERNAL MEDICINE

## 2020-11-04 PROCEDURE — G0008 FLU VACCINE - QUADRIVALENT - ADJUVANTED: ICD-10-PCS | Mod: S$GLB,,, | Performed by: INTERNAL MEDICINE

## 2020-11-04 PROCEDURE — 99214 OFFICE O/P EST MOD 30 MIN: CPT | Mod: 25,S$GLB,, | Performed by: INTERNAL MEDICINE

## 2020-11-04 NOTE — PROGRESS NOTES
Subjective:      Patient ID: Tiffanie Wise is a 80 y.o. female.    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    Anxiety and depression    Pulmonary nodule, right - stable    Left ventricular diastolic dysfunction with preserved systolic function    Long-term insulin use in type 2 diabetes    CAD with remote CABG x 1V (LIMA-LAD) in 6/2016 + PCI of RCA and LCx    Hypertension associated with diabetes    Unspecified vitamin D deficiency    Amnesia    History of arterial ischemic stroke, left MCA (middle cerebral artery) 12/2017    Aortic arch atherosclerosis    Arthritis of hand    Microcytic anemia    Seizure    Class 1 obesity due to excess calories with serious comorbidity and body mass index (BMI) of 30.0 to 30.9 in adult    Chest pain    Bradycardia    History of PTCA    CAD, multiple vessel    S/P CABG (coronary artery bypass graft)    Abnormal ECG    Dyspnea on exertion     Problem list has been reviewed.      Chief Complaint: Sleep Apnea and Pulmonary Nodules         HPI: Follow up for NAWAF and CPAP complaince assessment.     she  is on APAP  of  4 - 20 CMWP. She is compliant with  her AUTOPAP      She definitely thinks PAP is beneficial to her health and She wants to continue with PAP therapy.    Patient denies snoring, headaches, excessive daytime sleepiness    [place] Sleepiness Scale       EPWORTH SLEEPINESS SCALE 11/4/2020 9/18/2019 3/19/2019 9/19/2018 3/15/2018 2/1/2018 11/6/2017   Sitting and reading 0 0 0 0 0 0 2   Watching TV 0 0 0 0 0 0 1   Sitting, inactive in a public place (e.g. a theatre or a meeting) 0 0 0 0 0 0 1   As a passenger in a car for an hour without a break 0 1 0 0 0 0 0   Lying down to rest in the afternoon when circumstances permit 3  3 3 1 0 3 3   Sitting and talking to someone 0 0 0 0 0 0 0   Sitting quietly after a lunch without alcohol 0 0 0 0 0 0 1   In a car, while stopped for a few minutes in traffic 0 0 0 0 0 0 0   Total score 3 4 3 1 0 3 8         Immunization:    []        Pneumococcal Polysaccharide  Vaccine (23 Valent) (IM)  []        Pneumococcal Conjugate Vaccine (13 Valent) (IM)  [x]        Influenza - High Dose (65+) (PF) (IM)    She reports intermittent SHERMAN of recent onset    Dyspnea Characteristics:   Exertional Dyspnea   Dyspnea Duration:  Sub acute  Dyspnea Severity:  LUIS 7  Dyspnea Timing:  Exertional only  Dyspnea Contributing Factors:  Tobacco Abuse   Dyspnea Associated Symptoms:   Cough and  Sputum Production       Modified Luis Dyspnea Scale      0  Nothing at all    0.5  Very, very slight (just noticeable)    1  Very slight    2  Slight    3  Moderate    4 Somewhat severe    5 Severe      6    7  Very severe    8    9   Very, very severe (almost maximal)  10  Maximal    Her current respiratory therapy regimen is ATROVENT  which provides relief. She is  adherent with her regimen.        A full  review of systems, past , family  and social histories was performed except as mentioned in the note above, these are non contributory to the main issues discussed today.       Previous Report Reviewed: office notes     The following portions of the patient's history were reviewed and updated as appropriate: She  has a past medical history of Anxiety, AP (angina pectoris) (7/18/2013), Arterial ischemic stroke, MCA (middle cerebral artery), left, acute (12/15/2017), Asthma, Class 1 obesity due to excess calories with serious comorbidity and body mass index (BMI) of 30.0 to 30.9 in adult (3/15/2019), Coronary artery disease (7/18/2013), Depression, Diastolic heart failure, GERD (gastroesophageal reflux disease) (7/18/2013), History of PTCA (7/18/2013), Hypertension (7/18/2013), Hypothyroidism, Mixed hyperlipidemia (7/18/2013),  Osteoporosis, Pneumonia due to other staphylococcus, Precancerous changes of the vagina, Seizure (3/15/2019), Sleep apnea, Trouble in sleeping, Type 2 diabetes mellitus with ophthalmic manifestations, and Urinary incontinence.  She  has a past surgical history that includes Coronary stent placement; Thyroid surgery; Coronary angioplasty; Breast surgery (Left); Hysterectomy (1970); Eye surgery (Bilateral, 2014); and Tubal ligation (1970).  Her family history includes Alcohol abuse in her sister; Cancer in her son; Cirrhosis in her sister; Diabetes in her paternal grandmother and sister; Fibromyalgia in her daughter and daughter; Heart disease in her mother; Kidney disease in her brother and father.  She  reports that she quit smoking about 12 years ago. She has a 38.00 pack-year smoking history. She has never used smokeless tobacco. She reports that she does not drink alcohol or use drugs.  She has a current medication list which includes the following prescription(s): amlodipine, aspirin, atorvastatin, bd insulin syringe ult-fine ii, blood sugar diagnostic, clopidogrel, donepezil, easy comfort lancets, escitalopram oxalate, ferrous gluconate, hydrochlorothiazide, insulin aspart protamine-insulin aspart, ipratropium, isosorbide mononitrate, levothyroxine, losartan, nitroglycerin, pantoprazole, pen needle, diabetic, pregabalin, sitagliptin, and vitamin d.  She has No Known Allergies..    Review of Systems   Constitutional: Positive for fatigue. Negative for fever.   HENT: Negative for postnasal drip, rhinorrhea and congestion.    Respiratory: Positive for apnea, cough, sputum production, chest tightness, shortness of breath, dyspnea on extertion, use of rescue inhaler and Paroxysmal Nocturnal Dyspnea.    Cardiovascular: Negative for chest pain, palpitations and leg swelling.   Skin: Negative for rash.   Gastrointestinal: Negative for nausea and abdominal pain.   Neurological: Positive for weakness. Negative for  "dizziness, syncope and light-headedness.   Hematological: Negative for adenopathy. Does not bruise/bleed easily.   Psychiatric/Behavioral: Positive for sleep disturbance. The patient is not nervous/anxious.         Objective:     Vitals:    11/04/20 1352   BP: 124/72   Pulse: 64   Resp: 16   SpO2: 96%   Weight: 67.7 kg (149 lb 4 oz)   Height: 4' 11" (1.499 m)     body mass index is 30.15 kg/m².    Physical Exam  Vitals signs and nursing note reviewed.   Constitutional:       Appearance: She is well-developed.   HENT:      Head: Normocephalic and atraumatic.      Mouth/Throat:      Pharynx: Oropharyngeal exudate present.   Eyes:      Conjunctiva/sclera: Conjunctivae normal.      Pupils: Pupils are equal, round, and reactive to light.   Neck:      Musculoskeletal: Neck supple.      Thyroid: No thyromegaly.      Vascular: No JVD.      Trachea: No tracheal deviation.   Cardiovascular:      Rate and Rhythm: Normal rate and regular rhythm.      Heart sounds: Normal heart sounds.   Pulmonary:      Effort: Pulmonary effort is normal. No respiratory distress.      Breath sounds: Examination of the right-lower field reveals rales. Examination of the left-lower field reveals rales. Decreased breath sounds and rales present. No wheezing.   Chest:      Chest wall: No tenderness.   Abdominal:      General: Bowel sounds are normal.      Palpations: Abdomen is soft.   Musculoskeletal: Normal range of motion.   Lymphadenopathy:      Cervical: No cervical adenopathy.   Skin:     General: Skin is warm and dry.   Neurological:      Mental Status: She is alert and oriented to person, place, and time.         Personal Diagnostic Review  CPAP complaince download.     Assessment / plan     Discussed diagnosis, its evaluation, treatment and usual course. All questions answered.    Problem List Items Addressed This Visit        Pulmonary    Pulmonary nodule, right - stable    Overview     12/10/15: CT chest:       Stable CT scan of the chest " without contrast.  Small stable 4-mm soft opacity right middle lobe.  No detrimental interval change.Coronary and aortic valvular calcification.  Aortic and renal arterial calcification.               Current Assessment & Plan     Annual imaging surveillance            Endocrine    Class 1 obesity due to excess calories with serious comorbidity and body mass index (BMI) of 30.0 to 30.9 in adult    Current Assessment & Plan     General weight loss/lifestyle modification strategies discussed (elicit support from others; identify saboteurs; non-food rewards, etc).  Diet interventions: low calorie (1000 kCal/d) deficit diet.  Informal exercise measures discussed, e.g. taking stairs instead of elevator.            Other    NAWAF on CPAP - Primary    Current Assessment & Plan     Continue AUTOPAP of  4 - 20 CMWP (DME -  [facility])     Discussed therapeutic goals for positive airway pressure therapy(CPAP or BiPAP): Ideal is usage 100% of nights for 6 - 8 hours per night. Minimum usage is 70% of night for at least 4 hours per night used. Pateint expressed understanding. All Questions answered.    Complaince download in 12 Months.    CPAP supplies renewed.          Relevant Orders    CPAP/BIPAP SUPPLIES    Dyspnea on exertion    Current Assessment & Plan     Etiology unclear.  Multifactorial etiology suspected.  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  [x] Cardiac illess  []         Sleep disorder    EVALUATION:  [x]        Complete PFT with bronchodilator.  []        Bronchial challenge with methacholine.   [x]        Stress test, pulmonary.  [x]        PULM - Arterial Blood Gases  [x]        Chest X Ray  []        CT scan of chest.   []        LUIS  []         Sedimentation rate  []        C-reactive protein  []        Anti-neutrophilic cytoplasmic antibody          PLAN:  Discussed diagnosis, its evaluation, treatment and usual course.  All questions answered.                 Relevant Orders    Complete PFT with bronchodilator    PULM - Arterial Blood Gases--in addition to PFT only    Pulmonary stress test    X-Ray Chest PA And Lateral            TIME SPENT WITH PATIENT: Time spent: 40 minutes in face to face  discussion concerning diagnosis, prognosis, review of lab and test results, benefits of treatment as well as management of disease, counseling of patient and coordination of care between various health  care providers . Greater than half the time spent was used for coordination of care and counseling of patient.     Follow up in about 1 month (around 12/4/2020) for NAWAF, Obesity, Shortness of breath.

## 2020-11-04 NOTE — PATIENT INSTRUCTIONS
Lung Anatomy  Your lungs take air in to give your body oxygen, which the body needs to work. Your lungs, like all the tissues in your body, are made up of billions of tiny specialized cells. Old lung cells die and are replaced by new, identical lung cells. This natural process helps ensure healthy lungs.    Date Last Reviewed: 11/1/2016  © 4350-3701 [employer]. [address] [place] [address], [place] [address]. All rights reserved. This information is not intended as a substitute for professional medical care. Always follow your healthcare professional's instructions.

## 2020-11-04 NOTE — ASSESSMENT & PLAN NOTE
Etiology unclear.  Multifactorial etiology suspected.  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  [x] Cardiac illess  []         Sleep disorder    EVALUATION:  [x]        Complete PFT with bronchodilator.  []        Bronchial challenge with methacholine.   [x]        Stress test, pulmonary.  [x]        PULM - Arterial Blood Gases  [x]        Chest X Ray  []        CT scan of chest.   []        LUIS  []        Sedimentation rate  []        C-reactive protein  []        Anti-neutrophilic cytoplasmic antibody          PLAN:  Discussed diagnosis, its evaluation, treatment and usual course.  All questions answered.

## 2020-11-19 ENCOUNTER — OFFICE VISIT (OUTPATIENT)
Dept: OTOLARYNGOLOGY | Facility: CLINIC | Age: 80
End: 2020-11-19
Payer: MEDICARE

## 2020-11-19 ENCOUNTER — HOSPITAL ENCOUNTER (OUTPATIENT)
Dept: RADIOLOGY | Facility: HOSPITAL | Age: 80
Discharge: HOME | End: 2020-11-19
Attending: PHYSICIAN ASSISTANT
Payer: MEDICARE

## 2020-11-19 VITALS
SYSTOLIC BLOOD PRESSURE: 149 MMHG | HEART RATE: 58 BPM | BODY MASS INDEX: 29.79 KG/M2 | TEMPERATURE: 98 F | DIASTOLIC BLOOD PRESSURE: 68 MMHG | WEIGHT: 147.5 LBS

## 2020-11-19 DIAGNOSIS — R22.1 LOCALIZED SWELLING, MASS AND LUMP, NECK: ICD-10-CM

## 2020-11-19 DIAGNOSIS — R22.1 NECK MASS: ICD-10-CM

## 2020-11-19 DIAGNOSIS — R22.0 MASS OF SOFT PALATE: Primary | ICD-10-CM

## 2020-11-19 DIAGNOSIS — H92.01 RIGHT EAR PAIN: ICD-10-CM

## 2020-11-19 PROCEDURE — 3078F DIAST BP <80 MM HG: CPT | Mod: CPTII,S$GLB,, | Performed by: PHYSICIAN ASSISTANT

## 2020-11-19 PROCEDURE — 99999 PR PBB SHADOW E&M-EST. PATIENT-LVL IV: CPT | Mod: PBBFAC,,, | Performed by: PHYSICIAN ASSISTANT

## 2020-11-19 PROCEDURE — 1159F PR MEDICATION LIST DOCUMENTED IN MEDICAL RECORD: ICD-10-PCS | Mod: S$GLB,,, | Performed by: PHYSICIAN ASSISTANT

## 2020-11-19 PROCEDURE — 25500020 PHARM REV CODE 255: Performed by: PHYSICIAN ASSISTANT

## 2020-11-19 PROCEDURE — 1125F PR PAIN SEVERITY QUANTIFIED, PAIN PRESENT: ICD-10-PCS | Mod: S$GLB,,, | Performed by: PHYSICIAN ASSISTANT

## 2020-11-19 PROCEDURE — 99999 PR PBB SHADOW E&M-EST. PATIENT-LVL IV: ICD-10-PCS | Mod: PBBFAC,,, | Performed by: PHYSICIAN ASSISTANT

## 2020-11-19 PROCEDURE — 3077F PR MOST RECENT SYSTOLIC BLOOD PRESSURE >= 140 MM HG: ICD-10-PCS | Mod: CPTII,S$GLB,, | Performed by: PHYSICIAN ASSISTANT

## 2020-11-19 PROCEDURE — 70491 CT SOFT TISSUE NECK W/DYE: CPT | Mod: TC

## 2020-11-19 PROCEDURE — 3077F SYST BP >= 140 MM HG: CPT | Mod: CPTII,S$GLB,, | Performed by: PHYSICIAN ASSISTANT

## 2020-11-19 PROCEDURE — 99204 PR OFFICE/OUTPT VISIT, NEW, LEVL IV, 45-59 MIN: ICD-10-PCS | Mod: S$GLB,,, | Performed by: PHYSICIAN ASSISTANT

## 2020-11-19 PROCEDURE — 3078F PR MOST RECENT DIASTOLIC BLOOD PRESSURE < 80 MM HG: ICD-10-PCS | Mod: CPTII,S$GLB,, | Performed by: PHYSICIAN ASSISTANT

## 2020-11-19 PROCEDURE — 1159F MED LIST DOCD IN RCRD: CPT | Mod: S$GLB,,, | Performed by: PHYSICIAN ASSISTANT

## 2020-11-19 PROCEDURE — 99204 OFFICE O/P NEW MOD 45 MIN: CPT | Mod: S$GLB,,, | Performed by: PHYSICIAN ASSISTANT

## 2020-11-19 PROCEDURE — 1125F AMNT PAIN NOTED PAIN PRSNT: CPT | Mod: S$GLB,,, | Performed by: PHYSICIAN ASSISTANT

## 2020-11-19 RX ADMIN — IOHEXOL 100 ML: 350 INJECTION, SOLUTION INTRAVENOUS at 06:11

## 2020-11-19 NOTE — H&P (VIEW-ONLY)
"Subjective:       Patient ID: Tiffanie Wise is a 80 y.o. female.    Chief Complaint: Otalgia    Patient is a very pleasant 80 year old female here to see me today for the first time for evaluation of RIGHT otalgia, worsening over past one year.  She describes constant pain deep in the ear and beneath it into her neck; now radiating into her shoulder as well.  She denies alleviating or exacerbating factors.   Denies ear drainage.  She has seen outside ENT last year with complaints of right ear pain and had cerumen removed from her ear.  She had a stroke in 2015 and has had issues with her speech and hearing since that time.   She denies issues with her swallowing unless she "eats too fast" she will choke slightly.  Denies sore throat.  She has hx of smoking but quit years ago (she can't remember how long she smoked ?25 years).  Her daughter Connie is here with her today and says something is wrong as she's seen her trembling in pain.  She has given her Lortab to help.  No fever.    Review of Systems   Constitutional: Negative for activity change, appetite change and fever.   HENT: Positive for ear pain (RIGHT) and trouble swallowing (chokes if eats too fast). Negative for nasal congestion, ear discharge, facial swelling, nosebleeds, rhinorrhea, sinus pressure/congestion and sore throat.    Eyes: Negative for discharge.   Respiratory: Negative for cough and shortness of breath.    Cardiovascular: Negative for chest pain.   Gastrointestinal: Negative for diarrhea, nausea and vomiting.   Musculoskeletal: Positive for arthralgias (right shoulder) and gait problem (unsteady at times).   Allergic/Immunologic: Negative for food allergies.   Neurological: Positive for speech difficulty (since CVA in 2015). Negative for light-headedness and headaches.   Hematological: Negative for adenopathy.   Psychiatric/Behavioral: Negative for confusion.         Objective:      Physical Exam  Vitals signs reviewed.   Constitutional:       " General: She is not in acute distress.     Appearance: She is well-developed.   HENT:      Head: Normocephalic and atraumatic.      Jaw: Tenderness present. No trismus.      Right Ear: Tympanic membrane, ear canal and external ear normal. Decreased hearing noted. No middle ear effusion. Tympanic membrane is not erythematous.      Left Ear: Tympanic membrane, ear canal and external ear normal. Decreased hearing noted.  No middle ear effusion. Tympanic membrane is not erythematous.      Nose: Nose normal. No nasal deformity, mucosal edema or rhinorrhea.      Mouth/Throat:      Mouth: Mucous membranes are moist. Mucous membranes are not pale and not dry.      Dentition: Normal dentition.      Palate: Mass (firm mass right soft palate; 2 cm, tender with some ulceration) present.      Pharynx: No oropharyngeal exudate or uvula swelling.      Comments: Uvula pushed left due to right soft palate mass  Eyes:      General: Lids are normal. No scleral icterus.     Extraocular Movements:      Right eye: Normal extraocular motion and no nystagmus.      Left eye: Normal extraocular motion and no nystagmus.      Conjunctiva/sclera: Conjunctivae normal.      Right eye: Right conjunctiva is not injected. No chemosis.     Left eye: Left conjunctiva is not injected. No chemosis.     Pupils: Pupils are equal, round, and reactive to light.   Neck:      Thyroid: No thyroid mass.      Trachea: Trachea and phonation normal. No tracheal tenderness or tracheal deviation.      Comments: Firm 2 cm mass right neck posterior cervical, not mobile, tender to palpation    Pulmonary:      Effort: Pulmonary effort is normal. No respiratory distress.      Breath sounds: No stridor.   Abdominal:      General: There is no distension.   Lymphadenopathy:      Head:      Right side of head: No submental, submandibular, preauricular or posterior auricular adenopathy.      Left side of head: No submental, submandibular, preauricular or posterior auricular  "adenopathy.   Skin:     General: Skin is warm and dry.      Findings: No erythema or rash.   Neurological:      Mental Status: She is alert and oriented to person, place, and time.      Cranial Nerves: No cranial nerve deficit.   Psychiatric:         Behavior: Behavior normal. Behavior is cooperative.         Assessment:       1. Mass of soft palate    2. Neck mass    3. Right ear pain    4. Localized swelling, mass and lump, neck        Plan:         Discussed with patient and her daughter that she has mass of right soft palate and I palpate mass in right neck as well.    Recommend CT Neck with contrast ASAP and I'll call her with results.  Discussed that she will likely need biopsy and then referral to Head & Neck and Oncology.    [unfilled] presents today for initial evaluation of a neck mass.  This represents a problem of high complexity.      We discussed the differential diagnosis of masses present for greater than 2-3 weeks within the neck and lower face is quite broad.     In children under 16, the majority of masses are congenital (branchial cleft cyst, thyroglossal duct cyst) or inflammatory.  Patients between 16-40 have a somewhat higher risk of neoplastic masses, but the majority remain the same as their younger counterparts.  In adults over 40, 80% of neck masses are neoplastic and 80% of those are malignant (excluding thyroid and parotid masses).     Inflammatory "masses" commonly occur where lymph nodes have enlarged while forming the inflammatory response to a pathogen.  Typically, patients with this have acute onset with associated fever, tenderness, erythema, rubor and commonly evidence of a primary infection source (odontogenic, pharyngitis, etc).  Where there is no real evidence to support an infectious source, empiric antibiotics should be foregone and further workup carried out as most neck masses in adults are neoplastic.     Noninfectious inflammatory lymphadenopathy may occur in patients with " sarcoidosis or other granulomatous diseases.    Neoplasms of the neck can be divided into malignant and nonmalignant entitities.  Non malignant masses most often occur in the thyroid or parotid glands. Neck masses that are non-infectious; have been present for =2 weeks or an unknown duration; and are fixed, firm, >1.5 cm in size, and with ulceration of overlying skin are at increased risk of being malignant.   Lymphoma is one of the few entities where malignancy occurs in a lymph node of the neck that is not metastatic in nature; therefore a thorough search must be made to identify the primary site.  Patient risk factors and the location of the lymph node can be helpful in directing this search.    Ultimately, in the vast majority of cases, evaluation should begin with a thorough head and neck exam, flexible fiberoptic laryngoscopy, imaging, typically CT (except for thyroid) with contrast and fine needle aspiration.  Further evaluation is determined based on the results of this.

## 2020-11-20 ENCOUNTER — TELEPHONE (OUTPATIENT)
Dept: RADIOLOGY | Facility: HOSPITAL | Age: 80
End: 2020-11-20

## 2020-11-20 ENCOUNTER — TELEPHONE (OUTPATIENT)
Dept: OTOLARYNGOLOGY | Facility: CLINIC | Age: 80
End: 2020-11-20

## 2020-11-20 ENCOUNTER — TELEPHONE (OUTPATIENT)
Dept: DERMATOLOGY | Facility: CLINIC | Age: 80
End: 2020-11-20

## 2020-11-20 DIAGNOSIS — J39.2 OROPHARYNGEAL MASS: Primary | ICD-10-CM

## 2020-11-20 NOTE — TELEPHONE ENCOUNTER
Spoke with Connie the patient's daughter and gave her the details for the upcoming appt with Dr. Chen.  She did not have any additional questions for me at this time.

## 2020-11-20 NOTE — TELEPHONE ENCOUNTER
Called and reviewed results of CT Neck with pt's daughter Connie last night.  Will check with Cardiology (Dr. Read) whether pt can hold Plavix x 5 days for FNA.  Referrals to Heme/Onc and Rad/Onc today.

## 2020-11-20 NOTE — TELEPHONE ENCOUNTER
Attempted to call patient's daughter to schedule appt with Dr. Chen.  I made her an appt with him.

## 2020-11-20 NOTE — TELEPHONE ENCOUNTER
Called and spoke with patient.  Please schedule patient to see Dr. Chen at his next visit to [facility], 12/4.  Dx: soft palate mass, biopsy pending.  Needs preprocedure COVID test.  Patient also needs heme-onc appointment, can you look and see if referral has been placed?

## 2020-11-20 NOTE — TELEPHONE ENCOUNTER
----- Message from Alisha Hay PA-C sent at 11/20/2020 11:26 AM CST -----  Please call her daughter Connie when you can.  Her cell phone is in chart.  Her father had throat cancer and was treated at [facility] and I think she said with Dr. Barnes.  She has questions whether to do biopsy or is that going to cause it to spread?  Not sure if she wants to see Chen.

## 2020-11-23 ENCOUNTER — DOCUMENTATION ONLY (OUTPATIENT)
Dept: HEMATOLOGY/ONCOLOGY | Facility: CLINIC | Age: 80
End: 2020-11-23

## 2020-11-23 NOTE — NURSING
Received referral for pt to see oncology for oropharyngeal mass.  I have worked with dr Osman's nurse to set up appt. And have left message for daughter Connie to call me back at my direct number to review appt.

## 2020-11-24 ENCOUNTER — DOCUMENTATION ONLY (OUTPATIENT)
Dept: HEMATOLOGY/ONCOLOGY | Facility: CLINIC | Age: 80
End: 2020-11-24

## 2020-11-24 NOTE — NURSING
Spoke with pt daughter Connie over the phone today to review upcoming appts.  We reviewed Oncology, radiation oncology and dr grant appts dates times and locations.  We also reviewed my role as nurse navigator and she has my direct contact information as well as after hours number.  Pt daughter states her mother has early dementia, speech and hearing deficits from previous stroke and therefore she takes care of phone calls and accompanies her mother to all appts.  She inquired about cancer services available at both locations and I explained that if her mother needed radiation and chemo that it would most likely be done at the cancer center location . She wanted to know about the center , how long it had been here and experience levels of the staff.  I explained to my knowledge and answered all questions asked.  She stated satisfaction for answers and thanked me for the information. She will attend upcoming appts with her mother and call me with any further concerns in the meantime. We also reviewed that all new treatment patients get referred to social work team and  and she was excited about that. I will follow pt

## 2020-11-25 ENCOUNTER — HOSPITAL ENCOUNTER (OUTPATIENT)
Dept: RADIOLOGY | Facility: HOSPITAL | Age: 80
Discharge: HOME | End: 2020-11-25
Attending: PHYSICIAN ASSISTANT
Payer: MEDICARE

## 2020-11-25 DIAGNOSIS — R22.0 MASS OF SOFT PALATE: ICD-10-CM

## 2020-11-25 DIAGNOSIS — R22.1 NECK MASS: ICD-10-CM

## 2020-11-25 PROCEDURE — 88185 FLOWCYTOMETRY/TC ADD-ON: CPT | Performed by: PATHOLOGY

## 2020-11-25 PROCEDURE — 88342 IMHCHEM/IMCYTCHM 1ST ANTB: CPT | Mod: 26,,, | Performed by: PATHOLOGY

## 2020-11-25 PROCEDURE — 88189 PR  FLOWCYTOMETRY/READ, 16 & > MARKERS: ICD-10-PCS | Mod: ,,, | Performed by: PATHOLOGY

## 2020-11-25 PROCEDURE — 88342 IMHCHEM/IMCYTCHM 1ST ANTB: CPT | Performed by: PATHOLOGY

## 2020-11-25 PROCEDURE — 88305 TISSUE EXAM BY PATHOLOGIST: ICD-10-PCS | Mod: 26,,, | Performed by: PATHOLOGY

## 2020-11-25 PROCEDURE — 88341 IMHCHEM/IMCYTCHM EA ADD ANTB: CPT | Performed by: PATHOLOGY

## 2020-11-25 PROCEDURE — 88341 IMHCHEM/IMCYTCHM EA ADD ANTB: CPT | Mod: 26,,, | Performed by: PATHOLOGY

## 2020-11-25 PROCEDURE — 88189 FLOWCYTOMETRY/READ 16 & >: CPT | Mod: ,,, | Performed by: PATHOLOGY

## 2020-11-25 PROCEDURE — 88305 TISSUE EXAM BY PATHOLOGIST: CPT | Mod: 26,,, | Performed by: PATHOLOGY

## 2020-11-25 PROCEDURE — 88184 FLOWCYTOMETRY/ TC 1 MARKER: CPT | Performed by: PATHOLOGY

## 2020-11-25 PROCEDURE — 88341 PR IHC OR ICC EACH ADD'L SINGLE ANTIBODY  STAINPR: ICD-10-PCS | Mod: 26,,, | Performed by: PATHOLOGY

## 2020-11-25 PROCEDURE — 76942 ECHO GUIDE FOR BIOPSY: CPT | Mod: TC

## 2020-11-25 PROCEDURE — 88342 CHG IMMUNOCYTOCHEMISTRY: ICD-10-PCS | Mod: 26,,, | Performed by: PATHOLOGY

## 2020-11-25 PROCEDURE — 88305 TISSUE EXAM BY PATHOLOGIST: CPT | Performed by: PATHOLOGY

## 2020-11-25 NOTE — DISCHARGE SUMMARY
Pre Op Diagnosis: right neck mass     Post Op Diagnosis: same     Procedure:  Right neck mass biopsy     Procedure performed by: Tay HERRING, Nigel LEACH     Written Informed Consent Obtained: Yes     Specimen Removed:  yes     Estimated Blood Loss:  minimal     Findings: Local anesthesia and moderate sedation were used.     The patient tolerated the procedure well and there were no complications.      Sterile technique was performed in the right neck, lidocaine was used as a local anesthetic.  Multiple samples taken from the right neck mass.  Pt tolerated the procedure well without immediate complications.  Please see radiologist report for details. F/u with PCP and/or ordering physician.

## 2020-11-28 ENCOUNTER — NURSE TRIAGE (OUTPATIENT)
Dept: ADMINISTRATIVE | Facility: CLINIC | Age: 80
End: 2020-11-28

## 2020-11-28 ENCOUNTER — HOSPITAL ENCOUNTER (EMERGENCY)
Facility: HOSPITAL | Age: 80
Discharge: HOME | End: 2020-11-28
Attending: EMERGENCY MEDICINE
Payer: MEDICARE

## 2020-11-28 VITALS
HEART RATE: 60 BPM | BODY MASS INDEX: 30.23 KG/M2 | SYSTOLIC BLOOD PRESSURE: 176 MMHG | OXYGEN SATURATION: 95 % | DIASTOLIC BLOOD PRESSURE: 73 MMHG | TEMPERATURE: 98 F | RESPIRATION RATE: 15 BRPM | WEIGHT: 149.94 LBS | HEIGHT: 59 IN

## 2020-11-28 DIAGNOSIS — K13.79 BLEEDING FROM MOUTH: Primary | ICD-10-CM

## 2020-11-28 PROCEDURE — 99283 EMERGENCY DEPT VISIT LOW MDM: CPT

## 2020-11-28 PROCEDURE — 25000003 PHARM REV CODE 250: Performed by: EMERGENCY MEDICINE

## 2020-11-28 RX ORDER — OXYCODONE AND ACETAMINOPHEN 5; 325 MG/1; MG/1
1 TABLET ORAL EVERY 12 HOURS PRN
Qty: 8 TABLET | Refills: 0 | Status: SHIPPED | OUTPATIENT
Start: 2020-11-28 | End: 2020-12-01 | Stop reason: SDUPTHER

## 2020-11-28 RX ORDER — OXYCODONE AND ACETAMINOPHEN 5; 325 MG/1; MG/1
1 TABLET ORAL ONCE
Status: COMPLETED | OUTPATIENT
Start: 2020-11-28 | End: 2020-11-28

## 2020-11-28 RX ADMIN — OXYCODONE HYDROCHLORIDE AND ACETAMINOPHEN 1 TABLET: 5; 325 TABLET ORAL at 01:11

## 2020-11-28 NOTE — TELEPHONE ENCOUNTER
"Speaking to daughter (Connie) on behalf of pt    Went to ENT last week, determined she had lump on soft palate, biopsy done Wednesday. Pt woke up this morning, went to spit because she thought she was having phlegm and spit up "a bunch of blood", caller is not currently with pt. Pt states only one episode this morning but daughter is not able to confirm because she is not with her.     Advised daughter to go check on pt, if bleeding persists, bring to ED or call back for triage. VU. Unable to call pt because she is hearing/seeing impaired.     Reason for Disposition   RN needs further essential information from caller in order to complete triage   Nursing judgment    Protocols used: INFORMATION ONLY CALL-A-AH, NO GUIDELINE OR REFERENCE [id number]      "

## 2020-11-28 NOTE — TELEPHONE ENCOUNTER
"    Reason for Disposition   Unclear to triager if the patient is coughing up blood or vomiting blood    Additional Information   Negative: Severe difficulty breathing (e.g., struggling for each breath, speaks in single words)   Negative: [1] Chest pain AND [2] difficulty breathing   Negative: Bluish (or gray) lips or face now   Negative: Passed out (i.e., lost consciousness, collapsed and was not responding)   Negative: Shock suspected (e.g., cold/pale/clammy skin, too weak to stand, low BP, rapid pulse)   Negative: Difficult to awaken or acting confused (e.g., disoriented, slurred speech)   Negative: Recent chest injury (i.e., past 24 hours)   Negative: [1] Coughed up blood AND [2] large amount (example: "a cup of blood")   Negative: Sounds like a life-threatening emergency to the triager   Negative: Difficulty breathing   Negative: Chest pain    Protocols used: COUGHING UP BLOOD-A-AH    Tiffanie had a biopsy of mass in soft palate 11/25/2020 by otolaryngology.  This morning, she coughed up blood, thick phlegm, and a large clot.  Filled the tissue. Her daughter Connie says she told her it was "a lot", but Connie was not at her mom's home and did not see it. Connie states mom has speech and hearing difficulties, early dementia, and difficult historian.  Per [facility] triage protocol, recommend ED now for evaluation.  Connie will bring her now. Message to Evelio Parnell MD, pcp, and to Alisha Hay PA-C otolaryngology.  Please contact caller directly with any additional care advice.      "

## 2020-11-28 NOTE — ED PROVIDER NOTES
SCRIBE #1 NOTE: I, Karol Pearson, am scribing for, and in the presence of, Shaka Church MD. I have scribed the entire note.       History     Chief Complaint   Patient presents with    Post-op Problem     had biopsy done yesterday, bleeding into her mouth. bleeding stopped but was told to come to ER and get checked out.     Review of patient's allergies indicates:  No Known Allergies      History of Present Illness     HPI    11/28/2020, 1:18 PM  History obtained from the patient      History of Present Illness: Tiffanie Wise is a 80 y.o. female patient with a PMHx of CAD, angina pectoris, GERD, HTN, mixed HLD, depression, asthma, sleep apnea, pneumonia, type 2 DM, anxiety, osteoporosis, urinary incontinence, arterial ischemic stroke, seizure, hypothyroidism, diastolic heart failure who presents to the Emergency Department for evaluation of bleeding which onset today. Pt had a biopsy done for R neck mass a few days ago.  She has had no bleeding since then.  When she awoke today, she reports spitting up mucus with blood in the mucus.  She has not had any persistent bleeding since.  Denies any external bleeding outside of the mouth.  She reports the needle for the biospy was inserted externally through the skin and not internally through the oral cavity. and there was no insertion of needles inside the mouth  Symptoms are intermittent and moderate in severity. No mitigating or exacerbating factors reported. Associated symptoms include chronic right shoulder pain. Family states they are in the midst of a work-up to look for metatstatic disease. Patient denies any fever, n/v/d, SOB, CP, abd pain and all other sxs at this time. Prior Tx includes Aleve and Tylenol with mild relief of sxs. No further complaints or concerns at this time.       Arrival mode: Personal vehicle      PCP: Evelio Parnell MD        Past Medical History:  Past Medical History:   Diagnosis Date    Anxiety     AP (angina pectoris) 7/18/2013     Arterial ischemic stroke, MCA (middle cerebral artery), left, acute 12/15/2017    Asthma     Class 1 obesity due to excess calories with serious comorbidity and body mass index (BMI) of 30.0 to 30.9 in adult 3/15/2019    Coronary artery disease 2013    Depression     Diastolic heart failure     GERD (gastroesophageal reflux disease) 2013    History of PTCA 2013    Hypertension 2013    Hypothyroidism     Mixed hyperlipidemia 2013    Osteoporosis     Pneumonia due to other staphylococcus     Precancerous changes of the vagina     Seizure 3/15/2019    Sleep apnea     stopped using CPAP  - sores in nose, couldn't breathe, uses Breathe riht strips now.     Trouble in sleeping     Type 2 diabetes mellitus with ophthalmic manifestations     Urinary incontinence     pullups day, pads at night       Past Surgical History:  Past Surgical History:   Procedure Laterality Date    BREAST SURGERY Left     benign cyst    CORONARY ANGIOPLASTY      CORONARY STENT PLACEMENT      x6-7 oer the yeqrs  last 14 BRIANDA to lcfx    EYE SURGERY Bilateral     cataracts w/IOL   Dr Vance    HYSTERECTOMY  1970    vag hyst; ovaries intact    THYROID SURGERY      nodule benign, Dr Hankins    TUBAL LIGATION           Family History:  Family History   Problem Relation Age of Onset    Kidney disease Father     Kidney disease Brother     Heart disease Mother     Fibromyalgia Daughter     Cancer Son         lung    Cirrhosis Sister     Alcohol abuse Sister     Diabetes Sister     Fibromyalgia Daughter     Diabetes Paternal Grandmother     Stroke Neg Hx     Mental illness Neg Hx     Mental retardation Neg Hx        Social History:  Social History     Tobacco Use    Smoking status: Former Smoker     Packs/day: 1.00     Years: 38.00     Pack years: 38.00     Quit date: 2008     Years since quittin.4    Smokeless tobacco: Never Used   Substance and Sexual  Activity    Alcohol use: No    Drug use: No    Sexual activity: Not Currently     Birth control/protection: None        Review of Systems     Review of Systems   Constitutional: Negative for fever.   HENT: Negative for sore throat.         (+) throat pain   Respiratory: Positive for cough (mixture of mucous and bright red blood came up). Negative for shortness of breath.    Cardiovascular: Negative for chest pain.   Gastrointestinal: Negative for abdominal pain, diarrhea, nausea and vomiting.   Genitourinary: Negative for dysuria.   Musculoskeletal: Negative for back pain.        (+) R shoulder pain   Skin: Negative for rash.   Neurological: Negative for weakness.   Hematological: Does not bruise/bleed easily.   All other systems reviewed and are negative.       Physical Exam     Initial Vitals [11/28/20 1230]   BP Pulse Resp Temp SpO2   (!) 183/77 (!) 59 18 98.4 °F (36.9 °C) 95 %      MAP       --          Physical Exam  Nursing Notes and Vital Signs Reviewed.  Constitutional: Patient is in no acute distress. Well-developed and well-nourished.  Head: Atraumatic. Normocephalic.  Eyes: PERRL. EOM intact. Conjunctivae are not pale. No scleral icterus.  ENT:   Normal posterior oropharynx without any active bleeding.  Airway patent.  No stridor  Neck:  Right neck mass with needle puncture site noted in the skin.  No bleeding from puncture site.  Cardiovascular: Regular rate. Regular rhythm. No murmurs, rubs, or gallops. Distal pulses are 2+ and symmetric.  Pulmonary/Chest: No respiratory distress. Clear to auscultation bilaterally. No wheezing or rales.  Abdominal: Soft and non-distended.  There is no tenderness.  No rebound, guarding, or rigidity. Good bowel sounds.  Genitourinary: No CVA tenderness  Musculoskeletal: Moves all extremities. No obvious deformities. No edema. No calf tenderness.  Skin: Warm and dry.  Neurological:  Alert, awake, and appropriate.  Normal speech.  No acute focal neurological deficits are  "appreciated.  Psychiatric: Normal affect. Good eye contact. Appropriate in content.     ED Course   Procedures  ED Vital Signs:  Vitals:    11/28/20 1230 11/28/20 1326 11/28/20 1517   BP: (!) 183/77  (!) 176/73   Pulse: (!) 59  60   Resp: 18 15 15   Temp: 98.4 °F (36.9 °C)  98.4 °F (36.9 °C)   TempSrc: Oral  Oral   SpO2: 95%  95%   Weight: 68 kg (149 lb 14.6 oz)     Height: 4' 11" (1.499 m)         Abnormal Lab Results:  Labs Reviewed - No data to display     All Lab Results:  None.    Imaging Results:  Imaging Results    None                     The Emergency Provider reviewed the vital signs and test results, which are outlined above.     ED Discussion     2:07 PM: Re-evaluated pt. Pt was able to tolerate PO and has had no recurrence of bleeding. Patient  is stable for discharge. Pt is requesting a prescription for pain medication for her right shoulder at this time.    2:13 PM: Reassessed pt at this time.  Pt states her condition has improved at this time. Discussed with pt all pertinent ED information and results. Discussed pt dx and plan of tx. Gave pt all f/u and return to the ED instructions. All questions and concerns were addressed at this time. Pt expresses understanding of information and instructions, and is comfortable with plan to discharge. Pt is stable for discharge.    I discussed with patient and/or family/caretaker that evaluation in the ED does not suggest any emergent or life threatening medical conditions requiring immediate intervention beyond what was provided in the ED, and I believe patient is safe for discharge.  Regardless, an unremarkable evaluation in the ED does not preclude the development or presence of a serious of life threatening condition. As such, patient was instructed to return immediately for any worsening or change in current symptoms.                   ED Medication(s):  Medications   oxyCODONE-acetaminophen 5-325 mg per tablet 1 tablet (1 tablet Oral Given 11/28/20 1326) "       Discharge Medication List as of 11/28/2020  2:56 PM          Follow-up Information     Follow-up with your oncologist.           [facility].    Specialty: Emergency Medicine  Why: As needed, If symptoms worsen  Contact information:  [address]  [facility] [address] [phone]                     Scribe Attestation:   Scribe #1: I performed the above scribed service and the documentation accurately describes the services I performed. I attest to the accuracy of the note.     Attending:   Physician Attestation Statement for Scribe #1: I, Shaka Church MD, personally performed the services described in this documentation, as scribed by Karol Pearson, in my presence, and it is both accurate and complete.           Clinical Impression       ICD-10-CM ICD-9-CM   1. Bleeding from mouth  K13.79 528.9       Disposition:   Disposition: Discharged  Condition: Stable         Shaka Church MD  12/03/20 1046

## 2020-11-30 LAB
FLOW CYTOMETRY ANTIBODIES ANALYZED - LYMPH NODE: NORMAL
FLOW CYTOMETRY COMMENT - LYMPH NODE: NORMAL
FLOW CYTOMETRY INTERPRETATION - LYMPH NODE: NORMAL

## 2020-11-30 NOTE — TELEPHONE ENCOUNTER
This message is considered a duplicate.  The more urgently marked message was sent to Alisha Hay for further review.

## 2020-12-01 ENCOUNTER — OFFICE VISIT (OUTPATIENT)
Dept: HEMATOLOGY/ONCOLOGY | Facility: CLINIC | Age: 80
End: 2020-12-01
Payer: MEDICARE

## 2020-12-01 ENCOUNTER — LAB VISIT (OUTPATIENT)
Dept: LAB | Facility: HOSPITAL | Age: 80
End: 2020-12-01
Attending: INTERNAL MEDICINE
Payer: MEDICARE

## 2020-12-01 ENCOUNTER — OFFICE VISIT (OUTPATIENT)
Dept: RADIATION ONCOLOGY | Facility: CLINIC | Age: 80
End: 2020-12-01
Payer: MEDICARE

## 2020-12-01 ENCOUNTER — PATIENT MESSAGE (OUTPATIENT)
Dept: OTOLARYNGOLOGY | Facility: CLINIC | Age: 80
End: 2020-12-01

## 2020-12-01 VITALS
HEART RATE: 59 BPM | SYSTOLIC BLOOD PRESSURE: 136 MMHG | BODY MASS INDEX: 29.53 KG/M2 | HEIGHT: 59 IN | WEIGHT: 146.5 LBS | DIASTOLIC BLOOD PRESSURE: 61 MMHG | TEMPERATURE: 97 F | OXYGEN SATURATION: 96 % | RESPIRATION RATE: 18 BRPM

## 2020-12-01 VITALS
TEMPERATURE: 97 F | WEIGHT: 148.56 LBS | HEIGHT: 59 IN | SYSTOLIC BLOOD PRESSURE: 134 MMHG | OXYGEN SATURATION: 99 % | DIASTOLIC BLOOD PRESSURE: 57 MMHG | BODY MASS INDEX: 29.95 KG/M2 | HEART RATE: 67 BPM

## 2020-12-01 DIAGNOSIS — D50.9 IRON DEFICIENCY ANEMIA, UNSPECIFIED IRON DEFICIENCY ANEMIA TYPE: ICD-10-CM

## 2020-12-01 DIAGNOSIS — Z12.89 ENCOUNTER FOR SCREENING FOR MALIGNANT NEOPLASM OF OTHER SITES: ICD-10-CM

## 2020-12-01 DIAGNOSIS — R04.2 HEMOPTYSIS: ICD-10-CM

## 2020-12-01 DIAGNOSIS — H92.01 RIGHT EAR PAIN: ICD-10-CM

## 2020-12-01 DIAGNOSIS — G89.3 CANCER RELATED PAIN: ICD-10-CM

## 2020-12-01 DIAGNOSIS — C05.1 MALIGNANT NEOPLASM OF SOFT PALATE: ICD-10-CM

## 2020-12-01 DIAGNOSIS — J39.2 OROPHARYNGEAL MASS: Primary | ICD-10-CM

## 2020-12-01 LAB
ALBUMIN SERPL BCP-MCNC: 3.2 G/DL (ref 3.5–5.2)
ALP SERPL-CCNC: 109 U/L (ref 55–135)
ALT SERPL W/O P-5'-P-CCNC: 15 U/L (ref 10–44)
ANION GAP SERPL CALC-SCNC: 8 MMOL/L (ref 8–16)
AST SERPL-CCNC: 21 U/L (ref 10–40)
BASOPHILS # BLD AUTO: 0.06 K/UL (ref 0–0.2)
BASOPHILS NFR BLD: 0.5 % (ref 0–1.9)
BILIRUB SERPL-MCNC: 1 MG/DL (ref 0.1–1)
BUN SERPL-MCNC: 16 MG/DL (ref 8–23)
CALCIUM SERPL-MCNC: 9.2 MG/DL (ref 8.7–10.5)
CHLORIDE SERPL-SCNC: 99 MMOL/L (ref 95–110)
CO2 SERPL-SCNC: 33 MMOL/L (ref 23–29)
CREAT SERPL-MCNC: 1.1 MG/DL (ref 0.5–1.4)
DIFFERENTIAL METHOD: ABNORMAL
EOSINOPHIL # BLD AUTO: 0.1 K/UL (ref 0–0.5)
EOSINOPHIL NFR BLD: 0.9 % (ref 0–8)
ERYTHROCYTE [DISTWIDTH] IN BLOOD BY AUTOMATED COUNT: 20.3 % (ref 11.5–14.5)
EST. GFR  (AFRICAN AMERICAN): 55 ML/MIN/1.73 M^2
EST. GFR  (NON AFRICAN AMERICAN): 48 ML/MIN/1.73 M^2
FERRITIN SERPL-MCNC: 31 NG/ML (ref 20–300)
GLUCOSE SERPL-MCNC: 115 MG/DL (ref 70–110)
HCT VFR BLD AUTO: 42.7 % (ref 37–48.5)
HGB BLD-MCNC: 13.2 G/DL (ref 12–16)
IMM GRANULOCYTES # BLD AUTO: 0.05 K/UL (ref 0–0.04)
IMM GRANULOCYTES NFR BLD AUTO: 0.4 % (ref 0–0.5)
IRON SERPL-MCNC: 47 UG/DL (ref 30–160)
LYMPHOCYTES # BLD AUTO: 1.4 K/UL (ref 1–4.8)
LYMPHOCYTES NFR BLD: 11.5 % (ref 18–48)
MCH RBC QN AUTO: 25.3 PG (ref 27–31)
MCHC RBC AUTO-ENTMCNC: 30.9 G/DL (ref 32–36)
MCV RBC AUTO: 82 FL (ref 82–98)
MONOCYTES # BLD AUTO: 0.9 K/UL (ref 0.3–1)
MONOCYTES NFR BLD: 7 % (ref 4–15)
NEUTROPHILS # BLD AUTO: 9.8 K/UL (ref 1.8–7.7)
NEUTROPHILS NFR BLD: 79.7 % (ref 38–73)
NRBC BLD-RTO: 0 /100 WBC
PLATELET # BLD AUTO: 281 K/UL (ref 150–350)
PMV BLD AUTO: 10.8 FL (ref 9.2–12.9)
POTASSIUM SERPL-SCNC: 3.7 MMOL/L (ref 3.5–5.1)
PROT SERPL-MCNC: 7.2 G/DL (ref 6–8.4)
RBC # BLD AUTO: 5.22 M/UL (ref 4–5.4)
SATURATED IRON: 12 % (ref 20–50)
SODIUM SERPL-SCNC: 140 MMOL/L (ref 136–145)
TOTAL IRON BINDING CAPACITY: 397 UG/DL (ref 250–450)
TRANSFERRIN SERPL-MCNC: 268 MG/DL (ref 200–375)
WBC # BLD AUTO: 12.26 K/UL (ref 3.9–12.7)

## 2020-12-01 PROCEDURE — 3078F PR MOST RECENT DIASTOLIC BLOOD PRESSURE < 80 MM HG: ICD-10-PCS | Mod: CPTII,S$GLB,, | Performed by: INTERNAL MEDICINE

## 2020-12-01 PROCEDURE — 1125F AMNT PAIN NOTED PAIN PRSNT: CPT | Mod: S$GLB,,, | Performed by: INTERNAL MEDICINE

## 2020-12-01 PROCEDURE — 3075F SYST BP GE 130 - 139MM HG: CPT | Mod: CPTII,S$GLB,, | Performed by: INTERNAL MEDICINE

## 2020-12-01 PROCEDURE — 1159F MED LIST DOCD IN RCRD: CPT | Mod: S$GLB,,, | Performed by: RADIOLOGY

## 2020-12-01 PROCEDURE — 3075F PR MOST RECENT SYSTOLIC BLOOD PRESS GE 130-139MM HG: ICD-10-PCS | Mod: CPTII,S$GLB,, | Performed by: RADIOLOGY

## 2020-12-01 PROCEDURE — 1101F PT FALLS ASSESS-DOCD LE1/YR: CPT | Mod: CPTII,S$GLB,, | Performed by: RADIOLOGY

## 2020-12-01 PROCEDURE — 83540 ASSAY OF IRON: CPT

## 2020-12-01 PROCEDURE — 1101F PT FALLS ASSESS-DOCD LE1/YR: CPT | Mod: CPTII,S$GLB,, | Performed by: INTERNAL MEDICINE

## 2020-12-01 PROCEDURE — 1125F PR PAIN SEVERITY QUANTIFIED, PAIN PRESENT: ICD-10-PCS | Mod: S$GLB,,, | Performed by: INTERNAL MEDICINE

## 2020-12-01 PROCEDURE — 1159F PR MEDICATION LIST DOCUMENTED IN MEDICAL RECORD: ICD-10-PCS | Mod: S$GLB,,, | Performed by: RADIOLOGY

## 2020-12-01 PROCEDURE — 99205 OFFICE O/P NEW HI 60 MIN: CPT | Mod: S$GLB,,, | Performed by: INTERNAL MEDICINE

## 2020-12-01 PROCEDURE — 3288F PR FALLS RISK ASSESSMENT DOCUMENTED: ICD-10-PCS | Mod: CPTII,S$GLB,, | Performed by: INTERNAL MEDICINE

## 2020-12-01 PROCEDURE — 1159F PR MEDICATION LIST DOCUMENTED IN MEDICAL RECORD: ICD-10-PCS | Mod: S$GLB,,, | Performed by: INTERNAL MEDICINE

## 2020-12-01 PROCEDURE — 3078F PR MOST RECENT DIASTOLIC BLOOD PRESSURE < 80 MM HG: ICD-10-PCS | Mod: CPTII,S$GLB,, | Performed by: RADIOLOGY

## 2020-12-01 PROCEDURE — 99999 PR PBB SHADOW E&M-EST. PATIENT-LVL V: CPT | Mod: PBBFAC,,, | Performed by: INTERNAL MEDICINE

## 2020-12-01 PROCEDURE — 1159F MED LIST DOCD IN RCRD: CPT | Mod: S$GLB,,, | Performed by: INTERNAL MEDICINE

## 2020-12-01 PROCEDURE — 99999 PR PBB SHADOW E&M-EST. PATIENT-LVL V: ICD-10-PCS | Mod: PBBFAC,,, | Performed by: INTERNAL MEDICINE

## 2020-12-01 PROCEDURE — 3288F FALL RISK ASSESSMENT DOCD: CPT | Mod: CPTII,S$GLB,, | Performed by: INTERNAL MEDICINE

## 2020-12-01 PROCEDURE — 99205 PR OFFICE/OUTPT VISIT, NEW, LEVL V, 60-74 MIN: ICD-10-PCS | Mod: S$GLB,,, | Performed by: RADIOLOGY

## 2020-12-01 PROCEDURE — 3288F PR FALLS RISK ASSESSMENT DOCUMENTED: ICD-10-PCS | Mod: CPTII,S$GLB,, | Performed by: RADIOLOGY

## 2020-12-01 PROCEDURE — 99999 PR PBB SHADOW E&M-EST. PATIENT-LVL V: ICD-10-PCS | Mod: PBBFAC,,, | Performed by: RADIOLOGY

## 2020-12-01 PROCEDURE — 85025 COMPLETE CBC W/AUTO DIFF WBC: CPT

## 2020-12-01 PROCEDURE — 99999 PR PBB SHADOW E&M-EST. PATIENT-LVL V: CPT | Mod: PBBFAC,,, | Performed by: RADIOLOGY

## 2020-12-01 PROCEDURE — 3078F DIAST BP <80 MM HG: CPT | Mod: CPTII,S$GLB,, | Performed by: INTERNAL MEDICINE

## 2020-12-01 PROCEDURE — 1101F PR PT FALLS ASSESS DOC 0-1 FALLS W/OUT INJ PAST YR: ICD-10-PCS | Mod: CPTII,S$GLB,, | Performed by: RADIOLOGY

## 2020-12-01 PROCEDURE — 1101F PR PT FALLS ASSESS DOC 0-1 FALLS W/OUT INJ PAST YR: ICD-10-PCS | Mod: CPTII,S$GLB,, | Performed by: INTERNAL MEDICINE

## 2020-12-01 PROCEDURE — 36415 COLL VENOUS BLD VENIPUNCTURE: CPT

## 2020-12-01 PROCEDURE — 1125F PR PAIN SEVERITY QUANTIFIED, PAIN PRESENT: ICD-10-PCS | Mod: S$GLB,,, | Performed by: RADIOLOGY

## 2020-12-01 PROCEDURE — 99205 OFFICE O/P NEW HI 60 MIN: CPT | Mod: S$GLB,,, | Performed by: RADIOLOGY

## 2020-12-01 PROCEDURE — 80053 COMPREHEN METABOLIC PANEL: CPT

## 2020-12-01 PROCEDURE — 3075F SYST BP GE 130 - 139MM HG: CPT | Mod: CPTII,S$GLB,, | Performed by: RADIOLOGY

## 2020-12-01 PROCEDURE — 1125F AMNT PAIN NOTED PAIN PRSNT: CPT | Mod: S$GLB,,, | Performed by: RADIOLOGY

## 2020-12-01 PROCEDURE — 3288F FALL RISK ASSESSMENT DOCD: CPT | Mod: CPTII,S$GLB,, | Performed by: RADIOLOGY

## 2020-12-01 PROCEDURE — 3075F PR MOST RECENT SYSTOLIC BLOOD PRESS GE 130-139MM HG: ICD-10-PCS | Mod: CPTII,S$GLB,, | Performed by: INTERNAL MEDICINE

## 2020-12-01 PROCEDURE — 82728 ASSAY OF FERRITIN: CPT

## 2020-12-01 PROCEDURE — 3078F DIAST BP <80 MM HG: CPT | Mod: CPTII,S$GLB,, | Performed by: RADIOLOGY

## 2020-12-01 PROCEDURE — 99205 PR OFFICE/OUTPT VISIT, NEW, LEVL V, 60-74 MIN: ICD-10-PCS | Mod: S$GLB,,, | Performed by: INTERNAL MEDICINE

## 2020-12-01 RX ORDER — OXYCODONE AND ACETAMINOPHEN 5; 325 MG/1; MG/1
1 TABLET ORAL EVERY 8 HOURS PRN
Qty: 60 TABLET | Refills: 0 | Status: SHIPPED | OUTPATIENT
Start: 2020-12-01 | End: 2020-12-01 | Stop reason: SDUPTHER

## 2020-12-01 RX ORDER — OXYCODONE AND ACETAMINOPHEN 5; 325 MG/1; MG/1
1 TABLET ORAL EVERY 8 HOURS PRN
Qty: 60 TABLET | Refills: 0 | Status: ON HOLD | OUTPATIENT
Start: 2020-12-01 | End: 2021-01-25 | Stop reason: HOSPADM

## 2020-12-01 NOTE — LETTER
December 1, 2020      Alisha Hay PA-C  [address] [address]            Cancer Center - Radiation Oncology  [address] [address] [address]  Phone: [phone]  Fax: [phone]          Patient: Tiffanie Wise   MR Number: [id number]   YOB: 1940   Date of Visit: 12/1/2020       Dear Alisha Hay:    Thank you for referring Tiffanie Wise to me for evaluation. Attached you will find relevant portions of my assessment and plan of care.    If you have questions, please do not hesitate to call me. I look forward to following Tiffanie Wise along with you.    Sincerely,    Isidoro Arce III, MD    Enclosure  CC:  No Recipients    If you would like to receive this communication electronically, please contact [email or web address] or [phone] to request more information on [facility] [employer] access.    For providers and/or their staff who would like to refer a patient to [facility], please contact us through our one-stop-shop provider referral line, [facility] , at [phone].    If you feel you have received this communication in error or would no longer like to receive these types of communications, please e-mail [email or web address]

## 2020-12-01 NOTE — LETTER
December 1, 2020      Alisha Hay PA-C  [address] [facility]  [place] [address]           [facility] Hematology Oncology  [address] [facility] [address]  Phone: [phone]  Fax: [phone]          Patient: Tiffanie Wise   MR Number: [id number]   YOB: 1940   Date of Visit: 12/1/2020       Dear Alisha Hay:    Thank you for referring Tiffanie Wise to me for evaluation. Attached you will find relevant portions of my assessment and plan of care.    If you have questions, please do not hesitate to call me. I look forward to following Tiffanie Wise along with you.    Sincerely,    Jeaneth Osman MD    Enclosure  CC:  No Recipients    If you would like to receive this communication electronically, please contact [email or web address] or [phone] to request more information on [facility] access.    For providers and/or their staff who would like to refer a patient to [facility], please contact us through our one-stop-shop provider referral line, [facility], at [phone].    If you feel you have received this communication in error or would no longer like to receive these types of communications, please e-mail [email or web address]

## 2020-12-01 NOTE — PROGRESS NOTES
[facility]  RADIATION ONCOLOGY CONSULTATION    Name: Tiffanie Wise  : 1940      Patient Referred To Radiation Oncology By:  Dr. 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 female who presents for consultation for the above diagnosis.   She noted R otalgia worsening over past year. ENT PA noted R soft palate mass and firm 2cm R cervical mass.   CT neck 20 showed large enhancing mass in R oropharynx centered on tonsillar pillar crossing midline, with mass effect on airway 3.5 x 2.2 x 3.5cm, adenopathy R neck partially necrotic, 3.4cm, posterior triangle lymph nodes as well, 1cm, 0.6cm, R submandibular space node 1.1cm.   She will see Dr. Chen on 20.   She had US biopsy of R neck mass on 20. Presented to ED shortly after with oral bleeding.  Pathology is pending.  Today, she is still eating well but has R otalgia and neck pain. Some necrotic tumor pieces have come out of her mouth recently.   She denies odynophagia, dysphagia, trismus.    REVIEW OF SYSTEMS: (Positive findings bold, otherwise negative)   Constitutional: fever, fatigue, weight change  Eyes: blurred vision in the past 3 months, double vision   ENT: ear pain, new mouth lesions, jaw pain, difficulty swallowing, sore throat  Cardiovascular: chest pain on exertion, reflux, leg swelling  Respiratory: shortness of breath, dyspnea, cough, hemoptysis.   GI: abdominal pain, diarrhea, constipation, blood in stool, painful bowel movements  : painful or burning urination, blood in urine  Musculoskeletal: new bone or joint pains  Neurologic: headache, seizure, focal numbness or tingling, balance changes, speech changes  Lymph: new or enlarged lymph nodes  Psychiatric: depression, anxiety    PRIOR RADIATION HISTORY:  "none    PAST MEDICAL HISTORY:  Past Medical History:   Diagnosis Date    Anxiety     AP (angina pectoris) 7/18/2013    Arterial ischemic stroke, MCA (middle cerebral artery), left, acute 12/15/2017    Asthma     Class 1 obesity due to excess calories with serious comorbidity and body mass index (BMI) of 30.0 to 30.9 in adult 3/15/2019    Coronary artery disease 7/18/2013    Depression     Diastolic heart failure     GERD (gastroesophageal reflux disease) 7/18/2013    History of PTCA 7/18/2013    Hypertension 7/18/2013    Hypothyroidism     Mixed hyperlipidemia 7/18/2013    Osteoporosis     Pneumonia due to other staphylococcus     Precancerous changes of the vagina     Seizure 3/15/2019    Sleep apnea     stopped using CPAP 2015 - sores in nose, couldn't breathe, uses Breathe riht strips now.     Trouble in sleeping     Type 2 diabetes mellitus with ophthalmic manifestations     Urinary incontinence     pullups day, pads at night       PAST SURGICAL HISTORY:  Past Surgical History:   Procedure Laterality Date    BREAST SURGERY Left     benign cyst    CORONARY ANGIOPLASTY      CORONARY STENT PLACEMENT      x6-7 oer the yeqrs  last 12/17/14 BRIANDA to lcfx    EYE SURGERY Bilateral 2014    cataracts w/IOL   Dr Vance    HYSTERECTOMY  1970    vag hyst; ovaries intact    THYROID SURGERY      nodule benign, Dr Hankins    TUBAL LIGATION  1970       ALLERGIES:   Review of patient's allergies indicates:  No Known Allergies    MEDICATIONS:    Current Outpatient Medications:     amLODIPine (NORVASC) 10 MG tablet, TAKE 1 TABLET EVERY DAY, Disp: 90 tablet, Rfl: 1    aspirin (ECOTRIN) 81 MG EC tablet, Take 81 mg by mouth. Take 81 mg by mouth daily., Disp: , Rfl:     atorvastatin (LIPITOR) 80 MG tablet, TAKE 1 TABLET (80 MG TOTAL) BY MOUTH ONCE DAILY., Disp: 90 tablet, Rfl: 3    BD INSULIN SYRINGE ULT-FINE II 1/2 mL 31 x 5/16" Syrg, , Disp: , Rfl:     BLOOD SUGAR DIAGNOSTIC (TRUETEST TEST STRIPS " "MISC), by Misc.(Non-Drug; Combo Route) route. Pt is testing 3 times a day, Disp: , Rfl:     clopidogreL (PLAVIX) 75 mg tablet, TAKE 1 TABLET (75 MG TOTAL) BY MOUTH ONCE DAILY., Disp: 90 tablet, Rfl: 3    donepeziL (ARICEPT) 10 MG tablet, TAKE 1 TABLET (10 MG TOTAL) BY MOUTH ONCE DAILY., Disp: 90 tablet, Rfl: 3    EASY COMFORT LANCETS 30 gauge Misc, , Disp: , Rfl:     escitalopram oxalate (LEXAPRO) 20 MG tablet, TAKE 1 TABLET (20 MG TOTAL) BY MOUTH ONCE DAILY., Disp: 90 tablet, Rfl: 3    ferrous gluconate (FERGON) 240 (27 FE) MG tablet, Take 1 tablet (240 mg total) by mouth 2 (two) times daily with meals., Disp: 60 tablet, Rfl: 3    hydroCHLOROthiazide (HYDRODIURIL) 25 MG tablet, TAKE 1 TABLET (25 MG TOTAL) BY MOUTH ONCE DAILY., Disp: 90 tablet, Rfl: 3    insulin aspart protamine-insulin aspart (NOVOLOG MIX 70-30FLEXPEN U-100) 100 unit/mL (70-30) InPn pen, 40 bid, Disp: , Rfl: 0    ipratropium (ATROVENT) 0.03 % nasal spray, 2 sprays by Nasal route 3 (three) times daily as needed for Rhinitis., Disp: 30 mL, Rfl: 0    isosorbide mononitrate (IMDUR) 30 MG 24 hr tablet, TAKE 1 TABLET (30 MG TOTAL) BY MOUTH 2 (TWO) TIMES DAILY., Disp: 180 tablet, Rfl: 3    levothyroxine (SYNTHROID) 50 MCG tablet, TAKE 1 TABLET (50 MCG TOTAL) BY MOUTH ONCE DAILY., Disp: 90 tablet, Rfl: 3    losartan (COZAAR) 100 MG tablet, TAKE 1 TABLET EVERY DAY, Disp: 90 tablet, Rfl: 3    nitroGLYCERIN (NITROSTAT) 0.4 MG SL tablet, Place 1 tablet (0.4 mg total) under the tongue every 5 (five) minutes as needed., Disp: 25 tablet, Rfl: 6    oxyCODONE-acetaminophen (PERCOCET) 5-325 mg per tablet, Take 1 tablet by mouth every 12 (twelve) hours as needed for Pain., Disp: 8 tablet, Rfl: 0    pantoprazole (PROTONIX) 40 MG tablet, TAKE 1 TABLET (40 MG TOTAL) BY MOUTH ONCE DAILY., Disp: 90 tablet, Rfl: 3    pen needle, diabetic 31 gauge x 5/16" Ndle, USE TWICE DAILY AND PRN, Disp: , Rfl:     pregabalin (LYRICA) 100 MG capsule, Take 100 mg by mouth " every evening., Disp: , Rfl:     SITagliptin (JANUVIA) 100 MG Tab, Take 100 mg by mouth once daily., Disp: , Rfl:     vitamin D 1000 units Tab, Take 1,000 Units by mouth once daily., Disp: , Rfl:     SOCIAL HISTORY:  Social History     Socioeconomic History    Marital status:      Spouse name: Not on file    Number of children: Not on file    Years of education: Not on file    Highest education level: Not on file   Occupational History    Not on file   Social Needs    Financial resource strain: Not on file    Food insecurity     Worry: Not on file     Inability: Not on file    Transportation needs     Medical: Not on file     Non-medical: Not on file   Tobacco Use    Smoking status: Former Smoker     Packs/day: 1.00     Years: 38.00     Pack years: 38.00     Quit date: 2008     Years since quittin.4    Smokeless tobacco: Never Used   Substance and Sexual Activity    Alcohol use: No    Drug use: No    Sexual activity: Not Currently     Birth control/protection: None   Lifestyle    Physical activity     Days per week: Not on file     Minutes per session: Not on file    Stress: Not on file   Relationships    Social connections     Talks on phone: Not on file     Gets together: Not on file     Attends Yazidism service: Not on file     Active member of club or organization: Not on file     Attends meetings of clubs or organizations: Not on file     Relationship status: Not on file   Other Topics Concern    Not on file   Social History Narrative    ( 2nd marriage,  x 1). She has 4 children and 3 step children.. 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 She still drives. Does not have a Living Will or Advanced directive.     Lives in [place]    FAMILY HISTORY:  Family History   Problem Relation Age of Onset    Kidney disease Father     Kidney disease Brother     Heart disease Mother     Fibromyalgia Daughter      "Cancer Son         lung    Cirrhosis Sister     Alcohol abuse Sister     Diabetes Sister     Fibromyalgia Daughter     Diabetes Paternal Grandmother     Stroke Neg Hx     Mental illness Neg Hx     Mental retardation Neg Hx        PHYSICAL EXAMINATION:  Constitutional: well appearing, no acute distress, ECOG 2 - Ambulates, capable of self care only  Vitals:    /61   Pulse (!) 59   Temp 97 °F (36.1 °C)   Resp 18   Ht 4' 11" (1.499 m)   Wt 66.5 kg (146 lb 8 oz)   LMP  (LMP Unknown)   SpO2 96%   BMI 29.59 kg/m²   Eyes: sclera anicteric, EOMI, pupils equal, round and reactive to light  ENT: lesion involving R tonsillar pillar and soft palate, moist mucous membranes, fair dentition  Neck: palpable tender R cervical lymph nodes, trachea midline, neck supple  Lymphatic: no supraclavicular or axillary adenopathy  Cardiovascular: regular rate, no murmurs, no edema of the upper or lower extremities, radial pulse 2+  Respiratory: unlabored effort, clear to auscultation, no wheezes  Abdomen: soft, non-tender, no rigidity, no masses, no hepatomegaly  Neuro: Cranial nerves III-XII intact, speech not slurred, gait non-ataxic, no dysdiadochokinesia, strength 5/5 upper and lower extremities    IMAGING AND LABORATORY FINDINGS: As per HPI; images reviewed personally.    ASSESSMENT: 80 y.o. 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  Pending PET, this appears curable however I am unsure if she would be able to tolerate full definitive chemoradiation and require early treatment termination or multiple breaks.  I would recommend radiation for her regardless of curative or palliative intent given the morbidity of local progression.    Definitive doses are 70Gy/35fx to gross disease with 56.1Gy to elective neck volumes with consideration of concurrent chemotherapy per " medical oncology.   However, if she is not systemic therapy candidate I would consider other fractionation/dosing schemes such as 40Gy/15fx vs Quadshot 14Gy/4fx BID with potential for delivering this treatment up to three times.    Given her current state I would defer dental evaluation/extraction and PEG tube for the time being as she is still eating well PO and mandibular toxicities are late given her age and life expectancy.    We discussed the techniques, toxicities and indications of radiation and I answered the patient's questions to their apparent satisfaction.    PET is scheduled for 12/14, will hold final treatment recommendations pending those results and schedule CT simulation on 12/10 or 12/11 to expedite planning.    I spent approximately 90 minutes reviewing the available records and evaluating the patient, out of which over 50% of the time was spent face to face with the patient in counseling and coordinating this patient's care.    Isidoro Arce III, M.D.  Radiation Oncology  [facility] [address] [place] [address]  Ph: [phone]  [email or web address]

## 2020-12-02 ENCOUNTER — TELEPHONE (OUTPATIENT)
Dept: HEMATOLOGY/ONCOLOGY | Facility: CLINIC | Age: 80
End: 2020-12-02

## 2020-12-02 ENCOUNTER — PATIENT MESSAGE (OUTPATIENT)
Dept: PULMONOLOGY | Facility: CLINIC | Age: 80
End: 2020-12-02

## 2020-12-02 ENCOUNTER — DOCUMENTATION ONLY (OUTPATIENT)
Dept: HEMATOLOGY/ONCOLOGY | Facility: CLINIC | Age: 80
End: 2020-12-02

## 2020-12-02 NOTE — PROGRESS NOTES
Subjective:      DATE OF VISIT: 12/1/20     ?  Patient ID:?Tiffanie Wise is a 80 y.o. female.?? MR#: [id number]   ?   REFERRING PROVIDER: Alisha Hay PA-C  [address] [place] [address]     ? Primary Care Providers:  Evelio Parnell MD, MD (General)     CHIEF COMPLAINT:  Establish care with Medical Oncology????   ?   ONCOLOGIC DIAGNOSIS:  Concern for locally advanced oropharyngeal malignancy  ?   CURRENT TREATMENT:  TBD    PAST TREATMENT:  Biopsy  ?   ONCOLOGIC HISTORY:   I had the pleasure meeting Ms. Wise. She is a pleasant 79yo woman with multiple comorbidities including CVA with residual speech impairment,  hearing impairment, CAD, former tobacco user, type 2 diabetes on insulin complicated by neuropathy, CKD, arthritis and early dementia.      Over the last 1-2 years she has had progressive right ear pain and recent evaluation in November 2020 by ENT palpation right soft palate mass and right cervical adenopathy.    11/19/2020 CT neck notable for right oropharyngeal mass at tonsillar pillar crossing midline 3.5 cm with adenopathy including jugulardigastric metastatic lymph node 3.4 x 2.4 x 2 cm with mass effect on or pharyngeal airway.    11/25/2020 ultrasound-guided lymph node biopsy performed, pathology pending    She does note right ear pain, no other areas of pain, chest pain or shortness of breath.  She has had longstanding hearing impairment and this is improved by hearing aids but does not have currently.  She had recent episode of hemoptysis with resolution.  No current stridor, dysphagia/odynophagia.  Chronic neuropathy associated with diabetes.  She has early dementia.  Her daughter assisting  does not live with her but is her primary caregiver and lives close by.        Review of Systems    ?   A comprehensive 14-point review of systems was reviewed with patient and was negative other than as specified above.   ?   PAST MEDICAL HISTORY:   Past Medical History:    Diagnosis Date    Anxiety     AP (angina pectoris) 2013    Arterial ischemic stroke, MCA (middle cerebral artery), left, acute 12/15/2017    Asthma     Class 1 obesity due to excess calories with serious comorbidity and body mass index (BMI) of 30.0 to 30.9 in adult 3/15/2019    Coronary artery disease 2013    Depression     Diastolic heart failure     GERD (gastroesophageal reflux disease) 2013    History of PTCA 2013    Hypertension 2013    Hypothyroidism     Mixed hyperlipidemia 2013    Osteoporosis     Pneumonia due to other staphylococcus     Precancerous changes of the vagina     Seizure 3/15/2019    Sleep apnea     stopped using CPAP  - sores in nose, couldn't breathe, uses Breathe riht strips now.     Trouble in sleeping     Type 2 diabetes mellitus with ophthalmic manifestations     Urinary incontinence     pullups day, pads at night    ?     PAST SURGICAL HISTORY:   Past Surgical History:   Procedure Laterality Date    BREAST SURGERY Left     benign cyst    CORONARY ANGIOPLASTY      CORONARY STENT PLACEMENT      x6-7 oer the yeqrs  last 14 BRIANDA to lcfx    EYE SURGERY Bilateral     cataracts w/IOL   Dr Vance    HYSTERECTOMY  1970    vag hyst; ovaries intact    THYROID SURGERY      nodule benign, Dr Hankins    TUBAL LIGATION        ?   ALLERGIES:   Allergies as of 2020    (No Known Allergies)      ?   MEDICATIONS:?   No outpatient medications have been marked as taking for the 20 encounter (Office Visit) with Jeaneth Osman MD.      ?   SOCIAL HISTORY:?   Social History     Tobacco Use    Smoking status: Former Smoker     Packs/day: 1.00     Years: 38.00     Pack years: 38.00     Quit date: 2008     Years since quittin.4    Smokeless tobacco: Never Used   Substance Use Topics    Alcohol use: No      ?     ?   FAMILY HISTORY:   family history includes Alcohol abuse in her sister; Cancer in her  son; Cirrhosis in her sister; Diabetes in her paternal grandmother and sister; Fibromyalgia in her daughter and daughter; Heart disease in her mother; Kidney disease in her brother and father.   ?     Objective:      Physical Exam      ?   Vitals:    12/01/20 1244   BP: (!) 134/57   Pulse: 67   Temp: 97.4 °F (36.3 °C)      ?   ECOG:?2   General appearance: Generally well appearing, in no acute distress, hard of hearing, impaired speech, mild dementia  Head, eyes, ears, nose, and throat:  For right cervical adenopathy at least 2 cm, positive otalgia, oropharynx on examine  Lymph: No palpable cervical or supraclavicular lymphadenopathy.   Cardiovascular: Regular rate and rhythm, S1, S2, no audible murmurs.   Respiratory: Lungs clear to auscultation bilaterally.   Abdomen:  Non distended  Extremities: Warm, without edema.   Neurologic: Alert and oriented. Grossly normal strength, coordination, and gait.   Skin: No rashes, ecchymoses or petechial lesion.   ?     ?   Laboratory:  ?   Lab Visit on 12/01/2020   Component Date Value Ref Range Status    WBC 12/01/2020 12.26  3.90 - 12.70 K/uL Final    RBC 12/01/2020 5.22  4.00 - 5.40 M/uL Final    Hemoglobin 12/01/2020 13.2  12.0 - 16.0 g/dL Final    Hematocrit 12/01/2020 42.7  37.0 - 48.5 % Final    MCV 12/01/2020 82  82 - 98 fL Final    MCH 12/01/2020 25.3* 27.0 - 31.0 pg Final    MCHC 12/01/2020 30.9* 32.0 - 36.0 g/dL Final    RDW 12/01/2020 20.3* 11.5 - 14.5 % Final    Platelets 12/01/2020 281  150 - 350 K/uL Final    MPV 12/01/2020 10.8  9.2 - 12.9 fL Final    Immature Granulocytes 12/01/2020 0.4  0.0 - 0.5 % Final    Gran # (ANC) 12/01/2020 9.8* 1.8 - 7.7 K/uL Final    Immature Grans (Abs) 12/01/2020 0.05* 0.00 - 0.04 K/uL Final    Lymph # 12/01/2020 1.4  1.0 - 4.8 K/uL Final    Mono # 12/01/2020 0.9  0.3 - 1.0 K/uL Final    Eos # 12/01/2020 0.1  0.0 - 0.5 K/uL Final    Baso # 12/01/2020 0.06  0.00 - 0.20 K/uL Final    nRBC 12/01/2020 0  0 /100 WBC  Final    Gran % 12/01/2020 79.7* 38.0 - 73.0 % Final    Lymph % 12/01/2020 11.5* 18.0 - 48.0 % Final    Mono % 12/01/2020 7.0  4.0 - 15.0 % Final    Eosinophil % 12/01/2020 0.9  0.0 - 8.0 % Final    Basophil % 12/01/2020 0.5  0.0 - 1.9 % Final    Differential Method 12/01/2020 Automated   Final    Sodium 12/01/2020 140  136 - 145 mmol/L Final    Potassium 12/01/2020 3.7  3.5 - 5.1 mmol/L Final    Chloride 12/01/2020 99  95 - 110 mmol/L Final    CO2 12/01/2020 33* 23 - 29 mmol/L Final    Glucose 12/01/2020 115* 70 - 110 mg/dL Final    BUN 12/01/2020 16  8 - 23 mg/dL Final    Creatinine 12/01/2020 1.1  0.5 - 1.4 mg/dL Final    Calcium 12/01/2020 9.2  8.7 - 10.5 mg/dL Final    Total Protein 12/01/2020 7.2  6.0 - 8.4 g/dL Final    Albumin 12/01/2020 3.2* 3.5 - 5.2 g/dL Final    Total Bilirubin 12/01/2020 1.0  0.1 - 1.0 mg/dL Final    Alkaline Phosphatase 12/01/2020 109  55 - 135 U/L Final    AST 12/01/2020 21  10 - 40 U/L Final    ALT 12/01/2020 15  10 - 44 U/L Final    Anion Gap 12/01/2020 8  8 - 16 mmol/L Final    eGFR if African American 12/01/2020 55* >60 mL/min/1.73 m^2 Final    eGFR if non African American 12/01/2020 48* >60 mL/min/1.73 m^2 Final      ?     ?   ?   Imaging:  ?    Results for orders placed or performed during the hospital encounter of 11/19/20 (from the past 2160 hour(s))   CT Soft Tissue Neck With Contrast    Impression    Metastatic head neck cancer sequela with a large heterogeneous enhancing right oropharyngeal mass, centered upon the tonsillar pillar, crossing the midline anteriorly, 3.5 x 2.2 x 3.5 cm, with associated right neck metastatic lymphadenopathy with multiple enlarged necrotic lymph nodes including a dominant jugulodigastric metastatic lymph node, 3.4 x 2.4 x 2 cm.  There is fairly prominent mass effect upon the oropharyngeal airway.    All CT scans at this facility are performed  using dose modulation techniques as appropriate to performed exam including the  following:  automated exposure control; adjustment of mA and/or kV according to the patients size (this includes techniques or standardized protocols for targeted exams where dose is matched to indication/reason for exam: i.e. extremities or head);  iterative reconstruction technique.      Electronically signed by: Kurt Frye MD  Date:    11/20/2020  Time:    08:46     No results found for this or any previous visit (from the past 2160 hour(s)).  No results found for this or any previous visit (from the past 2160 hour(s)).      Pathology:    Pending     ?   Assessment/Plan:   Oropharyngeal mass  -     Ambulatory referral/consult to Hematology / Oncology  -     NM PET CT Limited; Future; Expected date: 12/01/2020  -     Cancel: MRI BRAIN W WO CONTRAST; Future; Expected date: 12/01/2020  -     Ambulatory referral/consult to Palliative Care; Future; Expected date: 12/01/2020    Iron deficiency anemia, unspecified iron deficiency anemia type  -     Ferritin; Future; Expected date: 12/01/2020  -     Iron and TIBC; Future; Expected date: 12/01/2020  -     CBC Auto Differential; Future; Expected date: 12/01/2020  -     Comprehensive Metabolic Panel; Future; Expected date: 12/01/2020    Malignant neoplasm of soft palate   -     NM PET CT Limited; Future; Expected date: 12/01/2020    Encounter for screening for malignant neoplasm of other sites  -     MRI BRAIN W WO CONTRAST; Future; Expected date: 12/01/2020    Right ear pain    Hemoptysis       1. Oropharyngeal mass    2. Iron deficiency anemia, unspecified iron deficiency anemia type    3. Malignant neoplasm of soft palate     4. Encounter for screening for malignant neoplasm of other sites    5. Right ear pain    6. Hemoptysis          Plan:     # oropharyngeal mass/cervical lymphadenopathy:  Pathology pending from 11/25/20 biopsy however I did discuss likely diagnosis of at least locally advanced oropharyngeal malignancy.  Given several comorbidities I discussed with  Ms. Wise and her daughter concern for tolerance of available therapies including chemo/radiation.  They did expressed understanding of importance of quality of life.  I discussed importance of following up on final pathology and completion of staging imaging including PET-CT and MRI brain (discussed with cardiology recording device okay to proceed with MRI).  Given locally advanced disease from which she is symptomatic and proximity to airway with recent episode hemoptysis (resolved) I do think she would likely benefit from at least palliative radiation.  She has consultation with Dr. Arce in Radiation Oncology later today.    # iron deficiency:  Repeat labs show resolution of anemia.  She has been continued on ferrous sulfate oral supplementation recommend continuation.    Advance Care Planning   Discussion of pain control, goals of care, ACP, referral to palliative care discussed with patient and daughter.           Follow-Up:   Patient Instructions   Labs today  Pet-ct  MRI brain  RV after above

## 2020-12-02 NOTE — NURSING
"Per communication from Dr. Osman I spoke with pt daughter over the phone this am regarding the ok from Dr. Cooper that pt could have MRI with her loop recorder. " Her loop monitor is MRI compatible.  The battery life has drained out so no worries about blanking the memory. " They have all appt dates and times and verified understanding. I will follow pt   Oncology Navigation   Intake  Initial Nurse Navigator Contact: 12/02/20  Date Worked: 12/02/20  Multiple appointments: Yes  Reason for Treatment Delay: Further work-up     Treatment  Current Status: Staging work-up       Medical Oncologist: jonas    Radiation Oncologist: johny    Procedures: MRI;PET scan  MRI Schedule Date: 12/14/20  PET Scan Schedule Date: 12/14/20          Radiation Oncologist: johny    Support Systems: Children  Concerns: pt daughter takes care of all communications for pt      Acuity      Follow Up  No follow-ups on file.     "

## 2020-12-02 NOTE — TELEPHONE ENCOUNTER
"Shilpa intern reached out to pt to discuss recent distress screening. Pt reported that she has been feeling "wonderful" and is unsure of why she scored so high on distress screening. Pt stated that she does not have any current needs. Shilpa intern provided pt with contact information so that she can reach out if she has any questions or concerns. Shilpa intern and supervisor to follow.  "

## 2020-12-04 ENCOUNTER — OFFICE VISIT (OUTPATIENT)
Dept: OTOLARYNGOLOGY | Facility: CLINIC | Age: 80
End: 2020-12-04
Payer: MEDICARE

## 2020-12-04 ENCOUNTER — PATIENT MESSAGE (OUTPATIENT)
Dept: OTOLARYNGOLOGY | Facility: CLINIC | Age: 80
End: 2020-12-04

## 2020-12-04 VITALS
BODY MASS INDEX: 30.1 KG/M2 | TEMPERATURE: 98 F | WEIGHT: 149.06 LBS | HEART RATE: 63 BPM | DIASTOLIC BLOOD PRESSURE: 54 MMHG | SYSTOLIC BLOOD PRESSURE: 133 MMHG

## 2020-12-04 DIAGNOSIS — C11.1 MALIGNANT NEOPLASM OF PHARYNGEAL TONSIL: Primary | ICD-10-CM

## 2020-12-04 DIAGNOSIS — H91.90 HEARING LOSS, UNSPECIFIED HEARING LOSS TYPE, UNSPECIFIED LATERALITY: ICD-10-CM

## 2020-12-04 PROCEDURE — 99999 PR PBB SHADOW E&M-EST. PATIENT-LVL III: CPT | Mod: PBBFAC,,, | Performed by: OTOLARYNGOLOGY

## 2020-12-04 PROCEDURE — 99215 PR OFFICE/OUTPT VISIT, EST, LEVL V, 40-54 MIN: ICD-10-PCS | Mod: S$GLB,,, | Performed by: OTOLARYNGOLOGY

## 2020-12-04 PROCEDURE — 1126F AMNT PAIN NOTED NONE PRSNT: CPT | Mod: S$GLB,,, | Performed by: OTOLARYNGOLOGY

## 2020-12-04 PROCEDURE — 99999 PR PBB SHADOW E&M-EST. PATIENT-LVL III: ICD-10-PCS | Mod: PBBFAC,,, | Performed by: OTOLARYNGOLOGY

## 2020-12-04 PROCEDURE — 1159F MED LIST DOCD IN RCRD: CPT | Mod: S$GLB,,, | Performed by: OTOLARYNGOLOGY

## 2020-12-04 PROCEDURE — 1159F PR MEDICATION LIST DOCUMENTED IN MEDICAL RECORD: ICD-10-PCS | Mod: S$GLB,,, | Performed by: OTOLARYNGOLOGY

## 2020-12-04 PROCEDURE — 3075F SYST BP GE 130 - 139MM HG: CPT | Mod: CPTII,S$GLB,, | Performed by: OTOLARYNGOLOGY

## 2020-12-04 PROCEDURE — 1126F PR PAIN SEVERITY QUANTIFIED, NO PAIN PRESENT: ICD-10-PCS | Mod: S$GLB,,, | Performed by: OTOLARYNGOLOGY

## 2020-12-04 PROCEDURE — 99215 OFFICE O/P EST HI 40 MIN: CPT | Mod: S$GLB,,, | Performed by: OTOLARYNGOLOGY

## 2020-12-04 PROCEDURE — 3078F PR MOST RECENT DIASTOLIC BLOOD PRESSURE < 80 MM HG: ICD-10-PCS | Mod: CPTII,S$GLB,, | Performed by: OTOLARYNGOLOGY

## 2020-12-04 PROCEDURE — 3078F DIAST BP <80 MM HG: CPT | Mod: CPTII,S$GLB,, | Performed by: OTOLARYNGOLOGY

## 2020-12-04 PROCEDURE — 3075F PR MOST RECENT SYSTOLIC BLOOD PRESS GE 130-139MM HG: ICD-10-PCS | Mod: CPTII,S$GLB,, | Performed by: OTOLARYNGOLOGY

## 2020-12-04 NOTE — PROGRESS NOTES
Head and Neck Surgery Consult    Seen in consultation from DEEPAK Bey    HPI: Tiffanie Wise is a 80 y.o. 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 She has seen heme/onc and rad/onc, and the current plan is for palliative RT due to her underlying medical co-morbidities precluding chemo or even curative treatment per the patient's daughter. She is eating and breathing well, maintaining her weight, but has significant referred otalgia. This has responded to percocet 1/2 pill daily. SHe is set to undergo her PET/CT later this month and is awaiting p16 studies to complete staging.    Past Medical History:   Diagnosis Date    Anxiety     AP (angina pectoris) 7/18/2013    Arterial ischemic stroke, MCA (middle cerebral artery), left, acute 12/15/2017    Asthma     Class 1 obesity due to excess calories with serious comorbidity and body mass index (BMI) of 30.0 to 30.9 in adult 3/15/2019    Coronary artery disease 7/18/2013    Depression     Diastolic heart failure     GERD (gastroesophageal reflux disease) 7/18/2013    History of PTCA 7/18/2013    Hypertension 7/18/2013    Hypothyroidism     Mixed hyperlipidemia 7/18/2013    Osteoporosis     Pneumonia due to other staphylococcus     Precancerous changes of the vagina     Seizure 3/15/2019    Sleep apnea     stopped using CPAP 2015 - sores in nose, couldn't breathe, uses Breathe riht strips now.     Trouble in sleeping     Type 2 diabetes mellitus with ophthalmic manifestations     Urinary incontinence     pullups day, pads at night       Past Surgical History:   Procedure Laterality Date    BREAST SURGERY Left     benign cyst    CORONARY ANGIOPLASTY      CORONARY STENT PLACEMENT      x6-7 oer the yeqrs  last 12/17/14 BRIANDA to lcfx    EYE SURGERY Bilateral 2014    cataracts w/IOL   Dr Vance    HYSTERECTOMY  1970    vag hyst; ovaries intact    THYROID SURGERY      nodule benign, Dr Hankins    TUBAL LIGATION   "1970         Current Outpatient Medications:     amLODIPine (NORVASC) 10 MG tablet, TAKE 1 TABLET EVERY DAY, Disp: 90 tablet, Rfl: 1    aspirin (ECOTRIN) 81 MG EC tablet, Take 81 mg by mouth. Take 81 mg by mouth daily., Disp: , Rfl:     atorvastatin (LIPITOR) 80 MG tablet, TAKE 1 TABLET (80 MG TOTAL) BY MOUTH ONCE DAILY., Disp: 90 tablet, Rfl: 3    BD INSULIN SYRINGE ULT-FINE II 1/2 mL 31 x 5/16" Syrg, , Disp: , Rfl:     BLOOD SUGAR DIAGNOSTIC (TRUETEST TEST STRIPS MISC), by Misc.(Non-Drug; Combo Route) route. Pt is testing 3 times a day, Disp: , Rfl:     clopidogreL (PLAVIX) 75 mg tablet, TAKE 1 TABLET (75 MG TOTAL) BY MOUTH ONCE DAILY., Disp: 90 tablet, Rfl: 3    donepeziL (ARICEPT) 10 MG tablet, TAKE 1 TABLET (10 MG TOTAL) BY MOUTH ONCE DAILY., Disp: 90 tablet, Rfl: 3    EASY COMFORT LANCETS 30 gauge Misc, , Disp: , Rfl:     escitalopram oxalate (LEXAPRO) 20 MG tablet, TAKE 1 TABLET (20 MG TOTAL) BY MOUTH ONCE DAILY., Disp: 90 tablet, Rfl: 3    ferrous gluconate (FERGON) 240 (27 FE) MG tablet, Take 1 tablet (240 mg total) by mouth 2 (two) times daily with meals., Disp: 60 tablet, Rfl: 3    hydroCHLOROthiazide (HYDRODIURIL) 25 MG tablet, TAKE 1 TABLET (25 MG TOTAL) BY MOUTH ONCE DAILY., Disp: 90 tablet, Rfl: 3    insulin aspart protamine-insulin aspart (NOVOLOG MIX 70-30FLEXPEN U-100) 100 unit/mL (70-30) InPn pen, 40 bid, Disp: , Rfl: 0    ipratropium (ATROVENT) 0.03 % nasal spray, 2 sprays by Nasal route 3 (three) times daily as needed for Rhinitis., Disp: 30 mL, Rfl: 0    isosorbide mononitrate (IMDUR) 30 MG 24 hr tablet, TAKE 1 TABLET (30 MG TOTAL) BY MOUTH 2 (TWO) TIMES DAILY., Disp: 180 tablet, Rfl: 3    levothyroxine (SYNTHROID) 50 MCG tablet, TAKE 1 TABLET (50 MCG TOTAL) BY MOUTH ONCE DAILY., Disp: 90 tablet, Rfl: 3    losartan (COZAAR) 100 MG tablet, TAKE 1 TABLET EVERY DAY, Disp: 90 tablet, Rfl: 3    nitroGLYCERIN (NITROSTAT) 0.4 MG SL tablet, Place 1 tablet (0.4 mg total) under the " "tongue every 5 (five) minutes as needed., Disp: 25 tablet, Rfl: 6    oxyCODONE-acetaminophen (PERCOCET) 5-325 mg per tablet, Take 1 tablet by mouth every 8 (eight) hours as needed for Pain., Disp: 60 tablet, Rfl: 0    pantoprazole (PROTONIX) 40 MG tablet, TAKE 1 TABLET (40 MG TOTAL) BY MOUTH ONCE DAILY., Disp: 90 tablet, Rfl: 3    pen needle, diabetic 31 gauge x 5/16" Ndle, USE TWICE DAILY AND PRN, Disp: , Rfl:     pregabalin (LYRICA) 100 MG capsule, Take 100 mg by mouth every evening., Disp: , Rfl:     SITagliptin (JANUVIA) 100 MG Tab, Take 100 mg by mouth once daily., Disp: , Rfl:     vitamin D 1000 units Tab, Take 1,000 Units by mouth once daily., Disp: , Rfl:     Review of patient's allergies indicates:  No Known Allergies    Family History   Problem Relation Age of Onset    Kidney disease Father     Kidney disease Brother     Heart disease Mother     Fibromyalgia Daughter     Cancer Son         lung    Cirrhosis Sister     Alcohol abuse Sister     Diabetes Sister     Fibromyalgia Daughter     Diabetes Paternal Grandmother     Stroke Neg Hx     Mental illness Neg Hx     Mental retardation Neg Hx        Social History     Socioeconomic History    Marital status:      Spouse name: Not on file    Number of children: Not on file    Years of education: Not on file    Highest education level: Not on file   Occupational History    Not on file   Social Needs    Financial resource strain: Not on file    Food insecurity     Worry: Not on file     Inability: Not on file    Transportation needs     Medical: Not on file     Non-medical: Not on file   Tobacco Use    Smoking status: Former Smoker     Packs/day: 1.00     Years: 38.00     Pack years: 38.00     Quit date: 2008     Years since quittin.4    Smokeless tobacco: Never Used   Substance and Sexual Activity    Alcohol use: No    Drug use: No    Sexual activity: Not Currently     Birth control/protection: None   Lifestyle "    Physical activity     Days per week: Not on file     Minutes per session: Not on file    Stress: Not on file   Relationships    Social connections     Talks on phone: Not on file     Gets together: Not on file     Attends Congregational service: Not on file     Active member of club or organization: Not on file     Attends meetings of clubs or organizations: Not on file     Relationship status: Not on file   Other Topics Concern    Not on file   Social History Narrative    ( 2nd marriage,  x 1). She has 4 children and 3 step children.. 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 She still drives. Does not have a Living Will or Advanced directive.       Review of Systems -  Constitutional: Denies having night sweats, constant fatigue, loss of appetite or recent substantial weight loss.  Eyes: Denies blurred vision or double vision.  Respiratory: Denies symptoms of shortness of breath, noisy breathing, hoarseness or chronic cough.  GI: Denies symptoms of heartburn, acid regurgitation, or the known presence of a hiatal hernia.  The remainder of a 10-point review of systems is negative    REVIEW OF RADIOLOGICAL FILMS AND RECORDS (PERSONALLY REVIEWED):  CT neck: Metastatic head neck cancer sequela with a large heterogeneous enhancing right oropharyngeal mass, centered upon the tonsillar pillar, crossing the midline anteriorly, 3.5 x 2.2 x 3.5 cm, with associated right neck metastatic lymphadenopathy with multiple enlarged necrotic lymph nodes including a dominant jugulodigastric metastatic lymph node, 3.4 x 2.4 x 2 cm.     REVIEW OF LABS (PERSONALLY REVIEWED)  Noncontributory    PHYSICAL EXAM:  Vitals - BP (!) 133/54   Pulse 63   Temp 97.9 °F (36.6 °C) (Temporal)   Wt 67.6 kg (149 lb 0.5 oz)   LMP  (LMP Unknown)   BMI 30.10 kg/m²   Constitutional -      General Appearance: well developed, well nourished, without obvious deformities      Communication: speaks with a normal voice without hoarseness  Head & Face -     Overall: no obvious scars, lesions or masses     Parotid and submandibular glands: no masses or tenderness except for a firm, fixed level II mass closely adherent to the tail of R parotid. This is TTP.     Facial strength: normal and equal bilaterally  Eyes -      EOM intact  Ear, Nose, Mouth & Throat -     Ears: both left and right external auditory canals and TM's are normal, no external deformities     Nasal exam: mucosa is pink, septum is midline, visible turbinates are normal on anterior rhinoscopy     Mastication: teeth appear fractured     Oral Cavity and oropharynx: mucosa, hard and soft palates, tongue, posterior pharyngeal wall, lips and gums are without lesions. Tonsils appear 3+ on R, firm and fixed to mandible. Tumor extends into soft palate and abuts glossotonsillar sulcus.   Respiratory:     Breathing unlabored, no stridor  Cardiovascular:     No JVD, capillary refill normal  Larynx: using the mirror for indirect laryngoscopy, the epiglottic, false cords, true cords, and pyriform sinuses are without lesions and the true vocal cords move normally  Neck: appears symmetric, and on palpation is without masses   Endocrine:     Thyroid: no asymmetry, thyromegaly, or thyroid nodules on palpation  Lymphatic:     No cervical lymphadenopathy  Cranial Nerves:      II: Pupillary reflexes normal     III, IV, VI: EOM normal     V: 1,2,3: normal sensation     VII: Normal strength in all divisions     VIII: Grossly hard of hearing, largely lip-reads     IX, X: Normal voice, palatal elevation and sensation     XI: Shoulder strength normal       XII: Tongue mobility normal  Psychiatric:     Appropriate affect    ASSESSMENT: SCC tonsil with neck karen metastasis    PLAN: We had a long discussion regarding her tonsil SCC. While the definite staging cannot yet be done as we are awaiting p16 studies (she would be T2N1 if p16 + but T2N2b if p16  -), her medical comorbidities are apparently severe enough to preclude curative treatment. Similarly, this tumor is technically surgically resectable, but this would involve a lengthy surgery and may require free flap reconstruction, which she may not tolerate. I agree with RT with or without chemotherapy, and palliation is certainly always an option. We discussed the potential need over her palliative course for tracheostomy, she does not need one at this time. I did advise her to see her dentist prior to commencing RT. I spent 60 mins with patient and her daughter, >50% of the time planning and coordinating care.       Jimmie Chen

## 2020-12-07 ENCOUNTER — PATIENT MESSAGE (OUTPATIENT)
Dept: PULMONOLOGY | Facility: CLINIC | Age: 80
End: 2020-12-07

## 2020-12-07 LAB
COMMENT: ABNORMAL
FINAL PATHOLOGIC DIAGNOSIS: ABNORMAL
GROSS: ABNORMAL
SUPPLEMENTAL DIAGNOSIS: ABNORMAL

## 2020-12-10 ENCOUNTER — DOCUMENTATION ONLY (OUTPATIENT)
Dept: HEMATOLOGY/ONCOLOGY | Facility: CLINIC | Age: 80
End: 2020-12-10

## 2020-12-10 NOTE — NURSING
Reviewing follow up appts and noted that follow up with Dr. Osman should be sooner than 12/23/20 to review PET and MRI.  I spoke with pt daughter Connie and together we rescheduled that appt to 12/16/20 to follow up with dr Osman at the [place] location .  They are good with the change in plan.  They have my direct number to call with any further concerns.

## 2020-12-11 ENCOUNTER — PATIENT MESSAGE (OUTPATIENT)
Dept: OTOLARYNGOLOGY | Facility: CLINIC | Age: 80
End: 2020-12-11

## 2020-12-11 ENCOUNTER — DOCUMENTATION ONLY (OUTPATIENT)
Dept: HEMATOLOGY/ONCOLOGY | Facility: CLINIC | Age: 80
End: 2020-12-11

## 2020-12-11 ENCOUNTER — PATIENT MESSAGE (OUTPATIENT)
Dept: HEMATOLOGY/ONCOLOGY | Facility: CLINIC | Age: 80
End: 2020-12-11

## 2020-12-11 NOTE — TELEPHONE ENCOUNTER
This sweet patient has sent me an email with questions regarding additional testing and treatment plan.  Her questions are beyond my scope.  Is there any way you or one of your staff could call or email her.  I've included all three of you on this email (Claude Baez and Dawson); not sure who should take the lead.  Many thanks!  Alisha

## 2020-12-11 NOTE — NURSING
Called pt's cell number, reviewed my role as nurse navigator. Connie, pt's daughter answered the phone. I relayed the information below per Dr. Osman's request, to pt dtr and she voiced understanding. States pt will see Dr. Arce Tuesday, 12/15 and have PET 12/16. She knows to call with any questions/concerns.   Oncology Navigation   Intake  Initial Nurse Navigator Contact: 12/02/20  Date Worked: 12/11/20  Multiple appointments: Yes  Reason for Treatment Delay: Further work-up     Treatment  Current Status: Staging work-up       Medical Oncologist: jonas    Radiation Oncologist: johny    Procedures: MRI;PET scan  MRI Schedule Date: 12/14/20  PET Scan Schedule Date: 12/14/20          Radiation Oncologist: johny    Support Systems: Children  Concerns: pt daughter takes care of all communications for pt      Acuity      Follow Up  No follow-ups on file.       MD Alisha Beach PA-C; Isidoro Arce III, MD; [facility] Cancer Navigation 49 minutes ago (2:03 PM)     Please let her know that we will discuss this further at our next visit after pet scan however insured the biopsy did show what we were most suspicious of squamous cell carcinoma p16 positive meaning typical cancer originating in the head and neck. Typically p16 positive cancers associated with hpv respond better to Chemotherapy and radiation which is good news.    Message text

## 2020-12-14 ENCOUNTER — HOSPITAL ENCOUNTER (OUTPATIENT)
Dept: RADIOLOGY | Facility: HOSPITAL | Age: 80
Discharge: HOME | End: 2020-12-14
Attending: INTERNAL MEDICINE
Payer: MEDICARE

## 2020-12-14 DIAGNOSIS — Z12.89 ENCOUNTER FOR SCREENING FOR MALIGNANT NEOPLASM OF OTHER SITES: ICD-10-CM

## 2020-12-14 PROCEDURE — 25500020 PHARM REV CODE 255: Performed by: INTERNAL MEDICINE

## 2020-12-14 PROCEDURE — A9585 GADOBUTROL INJECTION: HCPCS | Performed by: INTERNAL MEDICINE

## 2020-12-14 PROCEDURE — 70553 MRI BRAIN STEM W/O & W/DYE: CPT | Mod: TC

## 2020-12-14 RX ORDER — GADOBUTROL 604.72 MG/ML
10 INJECTION INTRAVENOUS
Status: COMPLETED | OUTPATIENT
Start: 2020-12-14 | End: 2020-12-14

## 2020-12-14 RX ADMIN — GADOBUTROL 6 ML: 604.72 INJECTION INTRAVENOUS at 02:12

## 2020-12-15 ENCOUNTER — OFFICE VISIT (OUTPATIENT)
Dept: RADIATION ONCOLOGY | Facility: CLINIC | Age: 80
End: 2020-12-15
Payer: MEDICARE

## 2020-12-15 ENCOUNTER — TELEPHONE (OUTPATIENT)
Dept: RADIOLOGY | Facility: HOSPITAL | Age: 80
End: 2020-12-15

## 2020-12-15 ENCOUNTER — HOSPITAL ENCOUNTER (OUTPATIENT)
Dept: RADIATION THERAPY | Facility: HOSPITAL | Age: 80
Discharge: HOME | End: 2020-12-15
Attending: INTERNAL MEDICINE
Payer: MEDICARE

## 2020-12-15 ENCOUNTER — HOSPITAL ENCOUNTER (OUTPATIENT)
Dept: RADIOLOGY | Facility: HOSPITAL | Age: 80
Discharge: HOME | End: 2020-12-15
Attending: INTERNAL MEDICINE
Payer: MEDICARE

## 2020-12-15 VITALS
RESPIRATION RATE: 18 BRPM | TEMPERATURE: 98 F | OXYGEN SATURATION: 97 % | BODY MASS INDEX: 30.16 KG/M2 | WEIGHT: 149.63 LBS | HEART RATE: 59 BPM | HEIGHT: 59 IN | DIASTOLIC BLOOD PRESSURE: 71 MMHG | SYSTOLIC BLOOD PRESSURE: 151 MMHG

## 2020-12-15 DIAGNOSIS — J39.2 OROPHARYNGEAL MASS: Primary | ICD-10-CM

## 2020-12-15 DIAGNOSIS — G89.3 CANCER RELATED PAIN: ICD-10-CM

## 2020-12-15 PROCEDURE — 77334 RADIATION TREATMENT AID(S): CPT | Mod: TC | Performed by: RADIOLOGY

## 2020-12-15 PROCEDURE — 77334 RADIATION TREATMENT AID(S): CPT | Mod: 26,,, | Performed by: RADIOLOGY

## 2020-12-15 PROCEDURE — 1159F PR MEDICATION LIST DOCUMENTED IN MEDICAL RECORD: ICD-10-PCS | Mod: S$GLB,,, | Performed by: RADIOLOGY

## 2020-12-15 PROCEDURE — 3077F SYST BP >= 140 MM HG: CPT | Mod: CPTII,S$GLB,, | Performed by: RADIOLOGY

## 2020-12-15 PROCEDURE — 99213 OFFICE O/P EST LOW 20 MIN: CPT | Mod: 25,S$GLB,, | Performed by: RADIOLOGY

## 2020-12-15 PROCEDURE — 99999 PR PBB SHADOW E&M-EST. PATIENT-LVL V: ICD-10-PCS | Mod: PBBFAC,,, | Performed by: RADIOLOGY

## 2020-12-15 PROCEDURE — 1125F PR PAIN SEVERITY QUANTIFIED, PAIN PRESENT: ICD-10-PCS | Mod: S$GLB,,, | Performed by: RADIOLOGY

## 2020-12-15 PROCEDURE — 1100F PR PT FALLS ASSESS DOC 2+ FALLS/FALL W/INJURY/YR: ICD-10-PCS | Mod: CPTII,S$GLB,, | Performed by: RADIOLOGY

## 2020-12-15 PROCEDURE — 77334 PR  RADN TREATMENT AID(S) COMPLX: ICD-10-PCS | Mod: 26,,, | Performed by: RADIOLOGY

## 2020-12-15 PROCEDURE — 1159F MED LIST DOCD IN RCRD: CPT | Mod: S$GLB,,, | Performed by: RADIOLOGY

## 2020-12-15 PROCEDURE — 77290 THER RAD SIMULAJ FIELD CPLX: CPT | Mod: 26,,, | Performed by: RADIOLOGY

## 2020-12-15 PROCEDURE — 3078F DIAST BP <80 MM HG: CPT | Mod: CPTII,S$GLB,, | Performed by: RADIOLOGY

## 2020-12-15 PROCEDURE — 77263 THER RADIOLOGY TX PLNG CPLX: CPT | Mod: ,,, | Performed by: RADIOLOGY

## 2020-12-15 PROCEDURE — 3288F FALL RISK ASSESSMENT DOCD: CPT | Mod: CPTII,S$GLB,, | Performed by: RADIOLOGY

## 2020-12-15 PROCEDURE — 77290 PR  SET RADN THERAPY FIELD COMPLEX: ICD-10-PCS | Mod: 26,,, | Performed by: RADIOLOGY

## 2020-12-15 PROCEDURE — 77290 THER RAD SIMULAJ FIELD CPLX: CPT | Mod: TC | Performed by: RADIOLOGY

## 2020-12-15 PROCEDURE — 99999 PR PBB SHADOW E&M-EST. PATIENT-LVL V: CPT | Mod: PBBFAC,,, | Performed by: RADIOLOGY

## 2020-12-15 PROCEDURE — 99213 PR OFFICE/OUTPT VISIT, EST, LEVL III, 20-29 MIN: ICD-10-PCS | Mod: 25,S$GLB,, | Performed by: RADIOLOGY

## 2020-12-15 PROCEDURE — 1125F AMNT PAIN NOTED PAIN PRSNT: CPT | Mod: S$GLB,,, | Performed by: RADIOLOGY

## 2020-12-15 PROCEDURE — 3077F PR MOST RECENT SYSTOLIC BLOOD PRESSURE >= 140 MM HG: ICD-10-PCS | Mod: CPTII,S$GLB,, | Performed by: RADIOLOGY

## 2020-12-15 PROCEDURE — 77263 PR  RADIATION THERAPY PLAN COMPLEX: ICD-10-PCS | Mod: ,,, | Performed by: RADIOLOGY

## 2020-12-15 PROCEDURE — 1100F PTFALLS ASSESS-DOCD GE2>/YR: CPT | Mod: CPTII,S$GLB,, | Performed by: RADIOLOGY

## 2020-12-15 PROCEDURE — 3078F PR MOST RECENT DIASTOLIC BLOOD PRESSURE < 80 MM HG: ICD-10-PCS | Mod: CPTII,S$GLB,, | Performed by: RADIOLOGY

## 2020-12-15 PROCEDURE — 77014 HC CT GUIDANCE RADIATION THERAPY FLDS PLACEMENT: CPT | Mod: TC | Performed by: RADIOLOGY

## 2020-12-15 PROCEDURE — 3288F PR FALLS RISK ASSESSMENT DOCUMENTED: ICD-10-PCS | Mod: CPTII,S$GLB,, | Performed by: RADIOLOGY

## 2020-12-15 NOTE — PROGRESS NOTES
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 female who presents for short interval f/u for the above diagnosis.   Pathology returned as SCC, p16 is pending.  MRI brain showed no intracranial metastatic disease.  PET scheduled for yesterday was moved to  because of some pre-authorization issue which is completely unacceptable.  Today, she is still eating well but has R otalgia and neck pain.   She denies odynophagia, dysphagia, trismus.  She was given oxycodone 5mg last week and is taking 0.5-1 pill about 1-2x day with good relief, she is hesitant to take them.    PHYSICAL EXAMINATION:  Constitutional: well appearing, no acute distress, ECOG 2 - Ambulates, capable of self care only  Vitals:    Temp 97.8 °F (36.6 °C)   Resp 18   Wt 67.9 kg (149 lb 9.6 oz)   LMP  (LMP Unknown)   SpO2 97%   BMI 30.22 kg/m²   Eyes: sclera anicteric, EOMI, pupils equal, round and reactive to light  ENT: lesion involving R tonsillar pillar and soft palate, moist mucous membranes, fair dentition  Neck: palpable tender R cervical lymph nodes, trachea midline, neck supple  Lymphatic: no supraclavicular or axillary adenopathy  Cardiovascular: regular rate, no murmurs, no edema of the upper or lower extremities, radial pulse 2+  Respiratory: unlabored effort, clear to auscultation, no wheezes  Abdomen: soft, non-tender, no rigidity, no masses, no hepatomegaly  Neuro: Cranial nerves III-XII intact, speech not slurred, gait non-ataxic, no dysdiadochokinesia, strength 5/5 upper and lower extremities    IMAGING AND LABORATORY FINDINGS: As per HPI; images reviewed personally.    ASSESSMENT: 80 y.o. 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 Wise now has confirmation of the SCC.  For some reason, her PET was moved because of an insurance issue  which is completely unacceptable and delaying critical decisions regarding her care.    Definitive doses are 70Gy/35fx to gross disease with 56.1Gy to elective neck volumes with consideration of concurrent chemotherapy per medical oncology. Another aggressive option would be 66Gy/30fx radiation alone with curative intent.  However, if she is not systemic therapy candidate I would consider other fractionation/dosing schemes such as 40-50Gy/15-20fx vs Quadshot 14Gy/4fx BID with potential for delivering this treatment up to three times.  I favor Quadshot approach which would give quick relief.    Given her current state I would defer dental evaluation/extraction and PEG tube for the time being as she is still eating well PO and mandibular toxicities are late given her age and life expectancy. Would give fluoride gel.    We discussed the techniques, toxicities and indications of radiation and I answered the patient's questions to their apparent satisfaction. Informed consent was obtained and we will perform CT simulation today.    I spent approximately 90 minutes reviewing the available records and evaluating the patient, out of which over 50% of the time was spent face to face with the patient in counseling and coordinating this patient's care.    Isidoro Arce III, M.D.  Radiation Oncology  [facility]  [id number] [place] [address] [place] [address]  Ph: [phone]  [email or web address]

## 2020-12-16 ENCOUNTER — DOCUMENTATION ONLY (OUTPATIENT)
Dept: HEMATOLOGY/ONCOLOGY | Facility: CLINIC | Age: 80
End: 2020-12-16

## 2020-12-16 ENCOUNTER — HOSPITAL ENCOUNTER (OUTPATIENT)
Dept: RADIOLOGY | Facility: HOSPITAL | Age: 80
Discharge: HOME | End: 2020-12-16
Attending: INTERNAL MEDICINE
Payer: MEDICARE

## 2020-12-16 DIAGNOSIS — J39.2 OROPHARYNGEAL MASS: ICD-10-CM

## 2020-12-16 DIAGNOSIS — C05.1 MALIGNANT NEOPLASM OF SOFT PALATE: ICD-10-CM

## 2020-12-16 PROCEDURE — 78815 PET IMAGE W/CT SKULL-THIGH: CPT | Mod: TC

## 2020-12-16 PROCEDURE — 78815 NM PET CT ROUTINE: ICD-10-PCS | Mod: 26,PI,, | Performed by: RADIOLOGY

## 2020-12-16 PROCEDURE — 78815 PET IMAGE W/CT SKULL-THIGH: CPT | Mod: 26,PI,, | Performed by: RADIOLOGY

## 2020-12-16 NOTE — NURSING
Pt having PET today , follow up appt with dr Mcdaniel rescheduled to sooner date (per dr Mcdaniel's request)  good with daughter changed to 12/18/20 in [place] with dr mcdaniel.  Pt has my direct number to call with any further needs.

## 2020-12-18 ENCOUNTER — OFFICE VISIT (OUTPATIENT)
Dept: HEMATOLOGY/ONCOLOGY | Facility: CLINIC | Age: 80
End: 2020-12-18
Payer: MEDICARE

## 2020-12-18 VITALS
WEIGHT: 152.31 LBS | OXYGEN SATURATION: 97 % | SYSTOLIC BLOOD PRESSURE: 153 MMHG | DIASTOLIC BLOOD PRESSURE: 94 MMHG | BODY MASS INDEX: 30.77 KG/M2 | HEART RATE: 57 BPM

## 2020-12-18 DIAGNOSIS — Z86.73 HISTORY OF CVA (CEREBROVASCULAR ACCIDENT): ICD-10-CM

## 2020-12-18 DIAGNOSIS — H91.90 HEARING LOSS, UNSPECIFIED HEARING LOSS TYPE, UNSPECIFIED LATERALITY: ICD-10-CM

## 2020-12-18 DIAGNOSIS — C11.1 MALIGNANT NEOPLASM OF PHARYNGEAL TONSIL: Primary | ICD-10-CM

## 2020-12-18 PROCEDURE — 1159F MED LIST DOCD IN RCRD: CPT | Mod: S$GLB,,, | Performed by: INTERNAL MEDICINE

## 2020-12-18 PROCEDURE — 99214 PR OFFICE/OUTPT VISIT, EST, LEVL IV, 30-39 MIN: ICD-10-PCS | Mod: S$GLB,,, | Performed by: INTERNAL MEDICINE

## 2020-12-18 PROCEDURE — 1125F AMNT PAIN NOTED PAIN PRSNT: CPT | Mod: S$GLB,,, | Performed by: INTERNAL MEDICINE

## 2020-12-18 PROCEDURE — 3080F DIAST BP >= 90 MM HG: CPT | Mod: CPTII,S$GLB,, | Performed by: INTERNAL MEDICINE

## 2020-12-18 PROCEDURE — 1125F PR PAIN SEVERITY QUANTIFIED, PAIN PRESENT: ICD-10-PCS | Mod: S$GLB,,, | Performed by: INTERNAL MEDICINE

## 2020-12-18 PROCEDURE — 3077F PR MOST RECENT SYSTOLIC BLOOD PRESSURE >= 140 MM HG: ICD-10-PCS | Mod: CPTII,S$GLB,, | Performed by: INTERNAL MEDICINE

## 2020-12-18 PROCEDURE — 1159F PR MEDICATION LIST DOCUMENTED IN MEDICAL RECORD: ICD-10-PCS | Mod: S$GLB,,, | Performed by: INTERNAL MEDICINE

## 2020-12-18 PROCEDURE — 3080F PR MOST RECENT DIASTOLIC BLOOD PRESSURE >= 90 MM HG: ICD-10-PCS | Mod: CPTII,S$GLB,, | Performed by: INTERNAL MEDICINE

## 2020-12-18 PROCEDURE — 99214 OFFICE O/P EST MOD 30 MIN: CPT | Mod: S$GLB,,, | Performed by: INTERNAL MEDICINE

## 2020-12-18 PROCEDURE — 3077F SYST BP >= 140 MM HG: CPT | Mod: CPTII,S$GLB,, | Performed by: INTERNAL MEDICINE

## 2020-12-18 PROCEDURE — 99999 PR PBB SHADOW E&M-EST. PATIENT-LVL III: ICD-10-PCS | Mod: PBBFAC,,, | Performed by: INTERNAL MEDICINE

## 2020-12-18 PROCEDURE — 99999 PR PBB SHADOW E&M-EST. PATIENT-LVL III: CPT | Mod: PBBFAC,,, | Performed by: INTERNAL MEDICINE

## 2020-12-18 NOTE — PROGRESS NOTES
Subjective:      DATE OF VISIT: 12/18/20     ?  Patient ID:?Tiffanie Wise is a 80 y.o. female.?? MR#: [id number]   ?   REFERRING PROVIDER: No referring provider defined for this encounter.     ? Primary Care Providers:  Evelio Parnell MD, MD (General)     CHIEF COMPLAINT:  Follow-up???   ?   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   ONCOLOGIC HISTORY:     Ms. Wise is a 81yo woman with multiple comorbidities including CVA with residual speech impairment,  hearing impairment, CAD, former tobacco user, type 2 diabetes on insulin complicated by neuropathy, CKD, arthritis and early dementia.      Over the last 1-2 years she has had progressive right ear pain and recent evaluation in November 2020 by ENT palpation right soft palate mass and right cervical adenopathy.    11/19/2020 CT neck notable for right oropharyngeal mass at tonsillar pillar crossing midline 3.5 cm with adenopathy including jugulardigastric metastatic lymph node 3.4 x 2.4 x 2 cm with mass effect on or pharyngeal airway.    11/25/2020 ultrasound-guided lymph node biopsy   Pathology:  Squamous cell carcinoma.  P 16 positive    12/16/2020 PET-CT hypermetabolic right oropharyngeal/tonsillar mass SUV max 11, 3.7 cm, enlarged 2.5 cm hypermetabolic jugular digastric lymph node SUV 7.4.  Additional smaller S FDG avid right submandibular and posterior cervical lymph nodes SUV max 4.3.  Solitary left cervical node SUV max 3.8.  No evidence of FDG avid distant disease.    12/14/2020 MRI brain no evidence of intracranial metastatic disease.  Chronic left parietal infarct.  Chronic right cerebellar infarct    INTERVAL EVENTS    Ms. Wise presents with her daughter.  She continues to be quite uncomfortable from pain associated with primary mass in right tonsillar region and adjacent adenopathy.  She did have emergency room visit with prescription of  narcotics however she has been quite resistant to using due to family history of fatal narcotic use earlier this year unfortunately.  She has consultation with palliative care 12/29/2020.  Severe hearing impairment is stable.      Review of Systems   Constitutional: Negative for appetite change and unexpected weight change.   HENT: Negative for mouth sores.    Eyes: Negative for visual disturbance.   Respiratory: Negative for cough and shortness of breath.    Cardiovascular: Negative for chest pain.   Gastrointestinal: Negative for abdominal pain and diarrhea.   Genitourinary: Negative for frequency.   Musculoskeletal: Negative for back pain.   Skin: Negative for rash.   Neurological: Negative for headaches.   Hematological: Positive for adenopathy.   Psychiatric/Behavioral: The patient is nervous/anxious.        ?   A comprehensive 14-point review of systems was reviewed with patient and was negative other than as specified above.   ?   PAST MEDICAL HISTORY:   Past Medical History:   Diagnosis Date    Anxiety     AP (angina pectoris) 7/18/2013    Arterial ischemic stroke, MCA (middle cerebral artery), left, acute 12/15/2017    Asthma     Class 1 obesity due to excess calories with serious comorbidity and body mass index (BMI) of 30.0 to 30.9 in adult 3/15/2019    Coronary artery disease 7/18/2013    Depression     Diastolic heart failure     GERD (gastroesophageal reflux disease) 7/18/2013    History of PTCA 7/18/2013    Hypertension 7/18/2013    Hypothyroidism     Malignant neoplasm of pharyngeal tonsil 12/4/2020    Mixed hyperlipidemia 7/18/2013    Osteoporosis     Pneumonia due to other staphylococcus     Precancerous changes of the vagina     Seizure 3/15/2019    Sleep apnea     stopped using CPAP 2015 - sores in nose, couldn't breathe, uses Breathe riht strips now.     Trouble in sleeping     Type 2 diabetes mellitus with ophthalmic manifestations     Urinary incontinence     pullups day,  pads at night    ?     PAST SURGICAL HISTORY:   Past Surgical History:   Procedure Laterality Date    BREAST SURGERY Left     benign cyst    CORONARY ANGIOPLASTY      CORONARY STENT PLACEMENT      x6-7 oer the yeqrs  last 14 BRIANDA to lcfx    EYE SURGERY Bilateral 2014    cataracts w/IOL   Dr Vance    HYSTERECTOMY  1970    vag hyst; ovaries intact    THYROID SURGERY      nodule benign, Dr Hankins    TUBAL LIGATION        ?   ALLERGIES:   Allergies as of 2020    (No Known Allergies)      ?   MEDICATIONS:?   No outpatient medications have been marked as taking for the 20 encounter (Office Visit) with Jeaneth Osman MD.      ?   SOCIAL HISTORY:?   Social History     Tobacco Use    Smoking status: Former Smoker     Packs/day: 1.00     Years: 38.00     Pack years: 38.00     Quit date: 2008     Years since quittin.4    Smokeless tobacco: Never Used   Substance Use Topics    Alcohol use: No      ?     ?   FAMILY HISTORY:   family history includes Alcohol abuse in her sister; Cancer in her son; Cirrhosis in her sister; Diabetes in her paternal grandmother and sister; Fibromyalgia in her daughter and daughter; Heart disease in her mother; Kidney disease in her brother and father.   ?     Objective:      Physical Exam      ?   Vitals:    20 1225   BP: (!) 153/94   Pulse: (!) 57      ?   ECOG:?2   General appearance:  In moderate discomfort, significant hearing loss, impaired speech, mild dementia  Head, eyes, ears, nose, and throat:  Prominent right cervical adenopathy,otalgia  Respiratory:  No increased work of breathing  Abdomen:  Non distended  Extremities: Warm, without edema.   Neurologic: Alert and oriented. Grossly normal strength, coordination, and gait slow but steady.   Skin: No rashes, ecchymoses or petechial lesion.   ?     ?   Laboratory:  ?   No visits with results within 1 Day(s) from this visit.   Latest known visit with results is:   Lab Visit on  12/01/2020   Component Date Value Ref Range Status    Ferritin 12/01/2020 31  20.0 - 300.0 ng/mL Final    Iron 12/01/2020 47  30 - 160 ug/dL Final    Transferrin 12/01/2020 268  200 - 375 mg/dL Final    TIBC 12/01/2020 397  250 - 450 ug/dL Final    Saturated Iron 12/01/2020 12* 20 - 50 % Final    WBC 12/01/2020 12.26  3.90 - 12.70 K/uL Final    RBC 12/01/2020 5.22  4.00 - 5.40 M/uL Final    Hemoglobin 12/01/2020 13.2  12.0 - 16.0 g/dL Final    Hematocrit 12/01/2020 42.7  37.0 - 48.5 % Final    MCV 12/01/2020 82  82 - 98 fL Final    MCH 12/01/2020 25.3* 27.0 - 31.0 pg Final    MCHC 12/01/2020 30.9* 32.0 - 36.0 g/dL Final    RDW 12/01/2020 20.3* 11.5 - 14.5 % Final    Platelets 12/01/2020 281  150 - 350 K/uL Final    MPV 12/01/2020 10.8  9.2 - 12.9 fL Final    Immature Granulocytes 12/01/2020 0.4  0.0 - 0.5 % Final    Gran # (ANC) 12/01/2020 9.8* 1.8 - 7.7 K/uL Final    Immature Grans (Abs) 12/01/2020 0.05* 0.00 - 0.04 K/uL Final    Lymph # 12/01/2020 1.4  1.0 - 4.8 K/uL Final    Mono # 12/01/2020 0.9  0.3 - 1.0 K/uL Final    Eos # 12/01/2020 0.1  0.0 - 0.5 K/uL Final    Baso # 12/01/2020 0.06  0.00 - 0.20 K/uL Final    nRBC 12/01/2020 0  0 /100 WBC Final    Gran % 12/01/2020 79.7* 38.0 - 73.0 % Final    Lymph % 12/01/2020 11.5* 18.0 - 48.0 % Final    Mono % 12/01/2020 7.0  4.0 - 15.0 % Final    Eosinophil % 12/01/2020 0.9  0.0 - 8.0 % Final    Basophil % 12/01/2020 0.5  0.0 - 1.9 % Final    Differential Method 12/01/2020 Automated   Final    Sodium 12/01/2020 140  136 - 145 mmol/L Final    Potassium 12/01/2020 3.7  3.5 - 5.1 mmol/L Final    Chloride 12/01/2020 99  95 - 110 mmol/L Final    CO2 12/01/2020 33* 23 - 29 mmol/L Final    Glucose 12/01/2020 115* 70 - 110 mg/dL Final    BUN 12/01/2020 16  8 - 23 mg/dL Final    Creatinine 12/01/2020 1.1  0.5 - 1.4 mg/dL Final    Calcium 12/01/2020 9.2  8.7 - 10.5 mg/dL Final    Total Protein 12/01/2020 7.2  6.0 - 8.4 g/dL Final    Albumin  12/01/2020 3.2* 3.5 - 5.2 g/dL Final    Total Bilirubin 12/01/2020 1.0  0.1 - 1.0 mg/dL Final    Alkaline Phosphatase 12/01/2020 109  55 - 135 U/L Final    AST 12/01/2020 21  10 - 40 U/L Final    ALT 12/01/2020 15  10 - 44 U/L Final    Anion Gap 12/01/2020 8  8 - 16 mmol/L Final    eGFR if African American 12/01/2020 55* >60 mL/min/1.73 m^2 Final    eGFR if non African American 12/01/2020 48* >60 mL/min/1.73 m^2 Final      ?     ?   ?   Imaging:  ?    Results for orders placed or performed during the hospital encounter of 11/19/20 (from the past 2160 hour(s))   CT Soft Tissue Neck With Contrast    Impression    Metastatic head neck cancer sequela with a large heterogeneous enhancing right oropharyngeal mass, centered upon the tonsillar pillar, crossing the midline anteriorly, 3.5 x 2.2 x 3.5 cm, with associated right neck metastatic lymphadenopathy with multiple enlarged necrotic lymph nodes including a dominant jugulodigastric metastatic lymph node, 3.4 x 2.4 x 2 cm.  There is fairly prominent mass effect upon the oropharyngeal airway.    All CT scans at this facility are performed  using dose modulation techniques as appropriate to performed exam including the following:  automated exposure control; adjustment of mA and/or kV according to the patients size (this includes techniques or standardized protocols for targeted exams where dose is matched to indication/reason for exam: i.e. extremities or head);  iterative reconstruction technique.      Electronically signed by: Kurt Frye MD  Date:    11/20/2020  Time:    08:46     Results for orders placed or performed during the hospital encounter of 12/14/20 (from the past 2160 hour(s))   MRI BRAIN W WO CONTRAST    Impression    No evidence of intracranial metastatic disease.    Atrophy.  Chronic left parietal infarct.  Chronic right cerebellar infarct.      Electronically signed by: Mariusz Fierro MD  Date:    12/14/2020  Time:    15:13     Results for  orders placed or performed during the hospital encounter of 12/16/20 (from the past 2160 hour(s))   NM PET CT Routine FDG    Impression    Hypermetabolic right oropharyngeal/tonsillar mass and regional lymphadenopathy consistent with with metastatic head/neck carcinoma.  No evidence of FDG avid distant disease.      Electronically signed by: MARY Rodrigues MD  Date:    12/16/2020  Time:    17:23         Pathology:    See above    ?   Assessment/Plan:   Malignant neoplasm of pharyngeal tonsil    History of CVA (cerebrovascular accident)    Hearing loss, unspecified hearing loss type, unspecified laterality       1. Malignant neoplasm of pharyngeal tonsil    2. History of CVA (cerebrovascular accident)    3. Hearing loss, unspecified hearing loss type, unspecified laterality          Plan:     # Stage II, cT2 N2 M0, p16+ right tonsilar is squamous cell carcinoma:  Today we reviewed in detail results of pathology confirming squamous cell carcinoma p16 positive and discussed this can be associated with increased responsiveness to radiation.  PET-CT with 3.7 cm FDG avid primary right tonsillar mass and local adenopathy as well as solitary contralateral cervical node.  MRI brain and PET without evidence of distant metastatic disease.  Unfortunately Ms. Wise has several significant comorbidities including CAD, CVA, severe hearing loss and dementia.  I did discuss standard of care curative intent therapy is concurrent chemoradiation.  We discussed the risks and benefits of concurrent chemotherapy including enhancing activity of radiation and potential long-term outcome improvement however chemotherapy would increase toxicity of her treatment.  Ms. Wise and her daughter do express interest in preserving quality of life and are most interested in minimizing toxicity while obtaining palliation of severe pain at primary tonsillar site.  With these goals of care in mind as well as her significant comorbidities I do not  feel that she is a candidate for chemotherapy and they are in agreement with proceeding with radiation therapy alone.  I have discussed her case at length with Dr. Arce in radiation oncology who plans to follow-up with them regarding radiation approach options. While she will not be pursuing concurrent chemotherapy due to toxicity concerns there will be concerns for her tolerance of radiation therapy alone and will arrange for supportive care visits with fluid/electrolyte support as needed.  I again encouraged her to follow-up with palliative care for pain management and further goals of care/against care planning.       # iron deficiency:  Repeat labs show resolution of anemia.  She has been continued on ferrous sulfate oral supplementation recommend continuation.    Advance Care Planning   Discussion of pain control, goals of care, ACP, referral to palliative care discussed with patient and daughter.     Planned 12/29/20      Follow-Up:   Patient Instructions   RV in 3 wks for supportive care visit while on RT, labs, please coordinate labs prior to radiation and visit after radiation, npok      25 minutes of total time spent on the encounter, which includes face to face time and non-face to face time preparing to see the patient (eg, review of tests), Obtaining and/or reviewing separately obtained history, Documenting clinical information in the electronic or other health record, Independently interpreting results (not separately reported) and communicating results to the patient/family/caregiver, or Care coordination (not separately reported).

## 2020-12-19 NOTE — PATIENT INSTRUCTIONS
RV in 3 wks for supportive care visit while on RT, labs, please coordinate labs prior to radiation and visit after radiation, npok

## 2020-12-22 DIAGNOSIS — G89.3 CANCER RELATED PAIN: ICD-10-CM

## 2020-12-22 DIAGNOSIS — J39.2 OROPHARYNGEAL MASS: Primary | ICD-10-CM

## 2020-12-22 RX ORDER — SODIUM FLUORIDE 5 MG/G
1 GEL, DENTIFRICE DENTAL NIGHTLY
Qty: 1 G | Refills: 3 | Status: SHIPPED | OUTPATIENT
Start: 2020-12-22 | End: 2021-06-10

## 2020-12-23 PROCEDURE — 77301 PR  INTEN MOD RADIOTHER PLAN W/DOSE VOL HIST: ICD-10-PCS | Mod: 26,,, | Performed by: RADIOLOGY

## 2020-12-23 PROCEDURE — 77301 RADIOTHERAPY DOSE PLAN IMRT: CPT | Mod: 26,,, | Performed by: RADIOLOGY

## 2020-12-23 PROCEDURE — 77301 RADIOTHERAPY DOSE PLAN IMRT: CPT | Mod: TC | Performed by: RADIOLOGY

## 2020-12-24 PROCEDURE — 77338 PR  MLC IMRT DESIGN & CONSTRUCTION PER IMRT PLAN: ICD-10-PCS | Mod: 26,,, | Performed by: RADIOLOGY

## 2020-12-24 PROCEDURE — 77338 DESIGN MLC DEVICE FOR IMRT: CPT | Mod: 26,,, | Performed by: RADIOLOGY

## 2020-12-24 PROCEDURE — 77300 PR RADIATION THERAPY,DOSIMETRY PLAN: ICD-10-PCS | Mod: 26,,, | Performed by: RADIOLOGY

## 2020-12-24 PROCEDURE — 77338 DESIGN MLC DEVICE FOR IMRT: CPT | Mod: TC | Performed by: RADIOLOGY

## 2020-12-24 PROCEDURE — 77300 RADIATION THERAPY DOSE PLAN: CPT | Mod: 26,,, | Performed by: RADIOLOGY

## 2020-12-24 PROCEDURE — 77300 RADIATION THERAPY DOSE PLAN: CPT | Mod: TC | Performed by: RADIOLOGY

## 2020-12-29 ENCOUNTER — INITIAL CONSULT (OUTPATIENT)
Dept: PALLIATIVE MEDICINE | Facility: CLINIC | Age: 80
End: 2020-12-29
Payer: MEDICARE

## 2020-12-29 ENCOUNTER — DOCUMENTATION ONLY (OUTPATIENT)
Dept: RADIATION ONCOLOGY | Facility: CLINIC | Age: 80
End: 2020-12-29

## 2020-12-29 VITALS
BODY MASS INDEX: 24.98 KG/M2 | TEMPERATURE: 98 F | OXYGEN SATURATION: 97 % | WEIGHT: 141 LBS | DIASTOLIC BLOOD PRESSURE: 66 MMHG | SYSTOLIC BLOOD PRESSURE: 143 MMHG | HEIGHT: 63 IN | RESPIRATION RATE: 16 BRPM | HEART RATE: 53 BPM

## 2020-12-29 DIAGNOSIS — R52 PAIN AFTER RADIATION THERAPY: Primary | ICD-10-CM

## 2020-12-29 DIAGNOSIS — Z71.89 ACP (ADVANCE CARE PLANNING): ICD-10-CM

## 2020-12-29 DIAGNOSIS — J39.2 OROPHARYNGEAL MASS: ICD-10-CM
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
## 2020-12-29 DIAGNOSIS — Z79.891 CHRONICALLY ON OPIATE THERAPY: ICD-10-CM

## 2020-12-29 PROCEDURE — 1159F PR MEDICATION LIST DOCUMENTED IN MEDICAL RECORD: ICD-10-PCS | Mod: S$GLB,,, | Performed by: FAMILY MEDICINE

## 2020-12-29 PROCEDURE — 3077F PR MOST RECENT SYSTOLIC BLOOD PRESSURE >= 140 MM HG: ICD-10-PCS | Mod: CPTII,S$GLB,, | Performed by: FAMILY MEDICINE

## 2020-12-29 PROCEDURE — 99215 OFFICE O/P EST HI 40 MIN: CPT | Mod: 25,S$GLB,, | Performed by: FAMILY MEDICINE

## 2020-12-29 PROCEDURE — 99999 PR PBB SHADOW E&M-EST. PATIENT-LVL V: ICD-10-PCS | Mod: PBBFAC,,, | Performed by: FAMILY MEDICINE

## 2020-12-29 PROCEDURE — 1159F MED LIST DOCD IN RCRD: CPT | Mod: S$GLB,,, | Performed by: FAMILY MEDICINE

## 2020-12-29 PROCEDURE — 3077F SYST BP >= 140 MM HG: CPT | Mod: CPTII,S$GLB,, | Performed by: FAMILY MEDICINE

## 2020-12-29 PROCEDURE — 3078F DIAST BP <80 MM HG: CPT | Mod: CPTII,S$GLB,, | Performed by: FAMILY MEDICINE

## 2020-12-29 PROCEDURE — 77014 PR  CT GUIDANCE PLACEMENT RAD THERAPY FIELDS: ICD-10-PCS | Mod: 26,,, | Performed by: RADIOLOGY

## 2020-12-29 PROCEDURE — 99999 PR PBB SHADOW E&M-EST. PATIENT-LVL V: CPT | Mod: PBBFAC,,, | Performed by: FAMILY MEDICINE

## 2020-12-29 PROCEDURE — 77014 PR  CT GUIDANCE PLACEMENT RAD THERAPY FIELDS: CPT | Mod: 26,,, | Performed by: RADIOLOGY

## 2020-12-29 PROCEDURE — 99497 ADVNCD CARE PLAN 30 MIN: CPT | Mod: S$GLB,,, | Performed by: FAMILY MEDICINE

## 2020-12-29 PROCEDURE — 77014 HC CT GUIDANCE RADIATION THERAPY FLDS PLACEMENT: CPT | Mod: TC | Performed by: RADIOLOGY

## 2020-12-29 PROCEDURE — 3078F PR MOST RECENT DIASTOLIC BLOOD PRESSURE < 80 MM HG: ICD-10-PCS | Mod: CPTII,S$GLB,, | Performed by: FAMILY MEDICINE

## 2020-12-29 PROCEDURE — 99215 PR OFFICE/OUTPT VISIT, EST, LEVL V, 40-54 MIN: ICD-10-PCS | Mod: 25,S$GLB,, | Performed by: FAMILY MEDICINE

## 2020-12-29 PROCEDURE — 77386 HC IMRT, COMPLEX: CPT | Performed by: RADIOLOGY

## 2020-12-29 PROCEDURE — 99497 PR ADVNCD CARE PLAN 30 MIN: ICD-10-PCS | Mod: S$GLB,,, | Performed by: FAMILY MEDICINE

## 2020-12-29 RX ORDER — OXYCODONE AND ACETAMINOPHEN 10; 325 MG/1; MG/1
1 TABLET ORAL EVERY 6 HOURS PRN
Qty: 90 TABLET | Refills: 0 | Status: ON HOLD | OUTPATIENT
Start: 2021-01-29 | End: 2021-01-25 | Stop reason: HOSPADM

## 2020-12-29 RX ORDER — CLONAZEPAM 0.5 MG/1
0.5 TABLET ORAL
Status: ON HOLD | COMMUNITY
Start: 2020-09-17 | End: 2021-03-11 | Stop reason: HOSPADM

## 2020-12-29 RX ORDER — FENTANYL 12.5 UG/1
1 PATCH TRANSDERMAL
Qty: 5 PATCH | Refills: 0 | Status: ON HOLD | OUTPATIENT
Start: 2020-12-29 | End: 2021-01-25 | Stop reason: HOSPADM

## 2020-12-29 RX ORDER — OXYCODONE AND ACETAMINOPHEN 10; 325 MG/1; MG/1
1 TABLET ORAL EVERY 6 HOURS PRN
Qty: 90 TABLET | Refills: 0 | Status: ON HOLD | OUTPATIENT
Start: 2021-02-28 | End: 2021-01-25 | Stop reason: HOSPADM

## 2020-12-29 RX ORDER — NALOXONE HYDROCHLORIDE 4 MG/.1ML
1 SPRAY NASAL ONCE
Qty: 2 EACH | Refills: 0 | Status: SHIPPED | OUTPATIENT
Start: 2020-12-29 | End: 2020-12-31

## 2020-12-29 RX ORDER — OXYCODONE AND ACETAMINOPHEN 10; 325 MG/1; MG/1
1 TABLET ORAL EVERY 6 HOURS PRN
Qty: 90 TABLET | Refills: 0 | Status: ON HOLD | OUTPATIENT
Start: 2020-12-29 | End: 2021-01-25 | Stop reason: HOSPADM

## 2020-12-29 NOTE — LETTER
December 30, 2020      Jeaneth Osman MD  [address]  [place] [address]           [address] [facility] [place] [address]  Phone: [phone]  Fax: [phone]          Patient: Tiffanie Wise   MR Number: [id number]   YOB: 1940   Date of Visit: 12/29/2020       Dear Dr. Jeaneth Osman:    Thank you for referring Tiffanie Wise to me for evaluation. Attached you will find relevant portions of my assessment and plan of care.    If you have questions, please do not hesitate to call me. I look forward to following Tiffanie Wise along with you.    Sincerely,    Dasha Lovett MD    Enclosure  CC:  No Recipients    If you would like to receive this communication electronically, please contact [email or web address] or [phone] to request more information on [employer] access.    For providers and/or their staff who would like to refer a patient to [facility], please contact us through our one-stop-shop provider referral line, [facility], at [phone].    If you feel you have received this communication in error or would no longer like to receive these types of communications, please e-mail [email or web address]

## 2020-12-29 NOTE — PLAN OF CARE
Day 1 of outpatient xrt to the head & neck. Head & neck handout, verbal instructions & Miaderm Cream were given to the patient. Skin care & side effects were reviewed. Contact info was provided. Patient verbalized understanding.

## 2020-12-29 NOTE — PATIENT INSTRUCTIONS
Our office number is:  [phone]    Start taking oxycodone 10/325 twice daily and every 6 hours as needed. If she is doing well with that dose, you can start the fentanyl patch.     Once you've started on the fentanyl patch, you will take oxycodone only as needed for breakthrough pain. Please keep a log of how often this is needed for me.     Pain medications are useful tools in treating your pain, but there are side effects and risks to be aware of. Constipation is a frequent side effect. Be sure to have a normal bowel movement at least every 2-3 days. You can take Miralax up to 3 times daily, Senna tablets, or use glycerin suppositories to help. If none of this is effective, please let me know.     Pain medication can increase sedation or sleepiness and should not be mixed with alcohol or other medication that can cause drowsiness. Because of this, pain medication can also increase the risk of falls. Please exercise caution while on these medications.

## 2020-12-29 NOTE — PROGRESS NOTES
Subjective:       Patient ID: Tiffanie Wise is a 80 y.o. female.    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 Her daughter (who is HCPOA), Connie Worley, accompanies her and contributes to the history. We discussed the role of palliative care in pain and symptom management, goals of care discussions, home based healthcare service coordination, and advanced care planning.     She is having pain to the throat, particularly with swallowing but is reluctant to take pain medication due to recent drug-abuse related death of her daughter (in April 2020). We discussed the role of pain medication in allowing patients to complete cancer treatments such as hers. She has recently started taking oxycodone 5/325 about twice daily without any notable side effects outside of constipation but pain has not been controlled. She has refused placement of feeding tube, but is open to the possibility of getting one if swallowing becomes too difficult. After our discussion, she is open to adjusting her pain medication regimen and including a long-acting formulation so she will not have to medicate as often.     Advance Care Planning    Code Status  In light of the patients advanced and life limiting illness,I engaged the the patient in a conversation about the patient's preferences for care  at the very end of life. The patient wishes to have a natural, peaceful death.  Along those lines, the patient does not wish to have CPR or other invasive treatments performed when her heart and/or breathing stops. I communicated to the patient that a LaPOST form was completed to reflect other EOL preferences of the patient such as no intubation, temporary artificial nutrition only.  I spent a total of 25 minutes engaging the patient in this advance care planning discussion.     Advance Care Planning    Power of   I initiated the  process of advance care planning today and explained the importance of this process to the patient.  I introduced the concept of advance directives to the patient, as well. Then the patient received detailed information about the importance of designating a Health Care Power of  (HCPOA). She was also instructed to communicate with this person about their wishes for future healthcare, should she become sick and lose decision-making capacity. The patient has not previously appointed a HCPOA. After our discussion, the patient has decided to complete a HCPOA and has appointed her daughter, NAME:  Connie Worley ; secondary is son Basim Worley.          M   does not have any pertinent problems on file.  Past Medical History:   Diagnosis Date    Anxiety     AP (angina pectoris) 7/18/2013    Arterial ischemic stroke, MCA (middle cerebral artery), left, acute 12/15/2017    Asthma     Class 1 obesity due to excess calories with serious comorbidity and body mass index (BMI) of 30.0 to 30.9 in adult 3/15/2019    Coronary artery disease 7/18/2013    Depression     Diastolic heart failure     GERD (gastroesophageal reflux disease) 7/18/2013    History of PTCA 7/18/2013    Hypertension 7/18/2013    Hypothyroidism     Malignant neoplasm of pharyngeal tonsil 12/4/2020    Mixed hyperlipidemia 7/18/2013    Osteoporosis     Pneumonia due to other staphylococcus     Precancerous changes of the vagina     Seizure 3/15/2019    Sleep apnea     stopped using CPAP 2015 - sores in nose, couldn't breathe, uses Breathe riht strips now.     Trouble in sleeping     Type 2 diabetes mellitus with ophthalmic manifestations     Urinary incontinence     pullups day, pads at night     Past Surgical History:   Procedure Laterality Date    BREAST SURGERY Left     benign cyst    CORONARY ANGIOPLASTY      CORONARY STENT PLACEMENT      x6-7 oer the yeqrs  last 12/17/14 BRIANDA to lcfx    EYE SURGERY Bilateral 2014    cataracts  w/IOL   Dr Vance    HYSTERECTOMY  1970    vag hyst; ovaries intact    THYROID SURGERY      nodule benign, Dr Hankins    TUBAL LIGATION  1970     Family History   Problem Relation Age of Onset    Kidney disease Father     Kidney disease Brother     Heart disease Mother     Fibromyalgia Daughter     Cancer Son         lung    Cirrhosis Sister     Alcohol abuse Sister     Diabetes Sister     Fibromyalgia Daughter     Diabetes Paternal Grandmother     Stroke Neg Hx     Mental illness Neg Hx     Mental retardation Neg Hx      Social History     Socioeconomic History    Marital status:      Spouse name: Not on file    Number of children: Not on file    Years of education: Not on file    Highest education level: Not on file   Occupational History    Not on file   Social Needs    Financial resource strain: Not on file    Food insecurity     Worry: Not on file     Inability: Not on file    Transportation needs     Medical: Not on file     Non-medical: Not on file   Tobacco Use    Smoking status: Former Smoker     Packs/day: 1.00     Years: 38.00     Pack years: 38.00     Quit date: 2008     Years since quittin.4    Smokeless tobacco: Never Used   Substance and Sexual Activity    Alcohol use: No    Drug use: No    Sexual activity: Not Currently     Birth control/protection: None   Lifestyle    Physical activity     Days per week: Not on file     Minutes per session: Not on file    Stress: Not on file   Relationships    Social connections     Talks on phone: Not on file     Gets together: Not on file     Attends Faith service: Not on file     Active member of club or organization: Not on file     Attends meetings of clubs or organizations: Not on file     Relationship status: Not on file   Other Topics Concern    Not on file   Social History Narrative    ( 2nd marriage,  x 1). She has 4 children and 3 step children.. 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 She still drives. Does not have a Living Will or Advanced directive.     Review of Systems   A comprehensive 14-point review of systems was reviewed with patient and was negative other than as specified above.     Objective:     Vitals:    12/29/20 1409   BP: (!) 143/66   Pulse: (!) 53   Resp: 16   Temp: 98.1 °F (36.7 °C)        Physical Exam  Vitals signs and nursing note reviewed.   Constitutional:       Appearance: She is well-developed.   HENT:      Head: Normocephalic and atraumatic.   Eyes:      Pupils: Pupils are equal, round, and reactive to light.   Neck:      Musculoskeletal: Normal range of motion and neck supple.   Cardiovascular:      Rate and Rhythm: Normal rate and regular rhythm.   Pulmonary:      Effort: Pulmonary effort is normal.      Breath sounds: Normal breath sounds.   Abdominal:      General: Bowel sounds are normal.      Palpations: Abdomen is soft.   Musculoskeletal: Normal range of motion.         General: No deformity.   Skin:     General: Skin is warm and dry.   Neurological:      Mental Status: She is alert and oriented to person, place, and time.   Psychiatric:         Behavior: Behavior normal.         Review of Symptoms    Symptom Assessment (ESAS 0-10 Scale)  Pain:  8  Dyspnea:  0  Anxiety:  3  Nausea:  0  Depression:  3  Anorexia:  2  Fatigue:  0  Insomnia:  0  Restlessness:  0  Agitation:  0               ECOG Performance Status Grade:  1 - Ambulates, capable of light work    Living Arrangements:  Lives alone    Psychosocial/Cultural: Strong support from daughter, Connie  Has recently lost an adult daughter to complications of drug addiction.    Advance Care Planning   Advance Directives:   LaPOST: Yes    Do Not Resuscitate Status: Yes    Medical Power of : Yes      Decision Making:  Patient answered questions         Assessment:       1. Pain after radiation therapy    2. Oropharyngeal mass    3. Chronically  on opiate therapy    4. ACP (advance care planning)        Plan:           Problem List Items Addressed This Visit     None      Visit Diagnoses     Pain after radiation therapy    -  Primary    Relevant Medications    fentaNYL (DURAGESIC) 12 mcg/hr PT72    oxyCODONE-acetaminophen (PERCOCET)  mg per tablet    oxyCODONE-acetaminophen (PERCOCET)  mg per tablet (Start on 1/29/2021)    oxyCODONE-acetaminophen (PERCOCET)  mg per tablet (Start on 2/28/2021)    Oropharyngeal mass        Relevant Medications    fentaNYL (DURAGESIC) 12 mcg/hr PT72    oxyCODONE-acetaminophen (PERCOCET)  mg per tablet    oxyCODONE-acetaminophen (PERCOCET)  mg per tablet (Start on 1/29/2021)    oxyCODONE-acetaminophen (PERCOCET)  mg per tablet (Start on 2/28/2021)    Chronically on opiate therapy        Relevant Medications    naloxone (NARCAN) 4 mg/actuation Spry    ACP (advance care planning)        Relevant Orders    Dnr (do not resuscitate)        Will increase oxycodone to 10/325. Have educated patient to use as needed. If needing 2 or more PRN doses per day, start fentanyl 12 mcg patch and continue oxycodone as needed only for breakthrough pain. Education provided on opiate use, safety, risks, and common side effects. Bowel regimen provided. Teach back by patient's daughter done to insure understanding.     Thank you for involving me in the care of this patient. Will continue to follow.     Complexity of decision making HIGH based on severity of patients illnesss, advanced age, chronic medical conditions. High risk medication use requires careful dosing and monitoring due to risk of serious side effects.     An additional 20 minutes were spent in ACP education, discussion, and document completion.

## 2020-12-30 ENCOUNTER — SOCIAL WORK (OUTPATIENT)
Dept: HEMATOLOGY/ONCOLOGY | Facility: CLINIC | Age: 80
End: 2020-12-30

## 2020-12-30 ENCOUNTER — DOCUMENTATION ONLY (OUTPATIENT)
Dept: RADIATION ONCOLOGY | Facility: CLINIC | Age: 80
End: 2020-12-30

## 2020-12-30 PROCEDURE — 77014 PR  CT GUIDANCE PLACEMENT RAD THERAPY FIELDS: ICD-10-PCS | Mod: 26,,, | Performed by: RADIOLOGY

## 2020-12-30 PROCEDURE — 77014 PR  CT GUIDANCE PLACEMENT RAD THERAPY FIELDS: CPT | Mod: 26,,, | Performed by: RADIOLOGY

## 2020-12-30 PROCEDURE — 77386 HC IMRT, COMPLEX: CPT | Performed by: RADIOLOGY

## 2020-12-30 PROCEDURE — 77014 HC CT GUIDANCE RADIATION THERAPY FLDS PLACEMENT: CPT | Mod: TC | Performed by: RADIOLOGY

## 2020-12-30 NOTE — PLAN OF CARE
Day 2 of outpatient xrt to the head & neck. Doing well, 2nd day of treatment. Gave Miaderm Cream yesterday. Will continue to monitor.

## 2020-12-31 PROCEDURE — 77014 PR  CT GUIDANCE PLACEMENT RAD THERAPY FIELDS: CPT | Mod: 26,,, | Performed by: RADIOLOGY

## 2020-12-31 PROCEDURE — 77014 PR  CT GUIDANCE PLACEMENT RAD THERAPY FIELDS: ICD-10-PCS | Mod: 26,,, | Performed by: RADIOLOGY

## 2020-12-31 PROCEDURE — 77386 HC IMRT, COMPLEX: CPT | Performed by: RADIOLOGY

## 2020-12-31 PROCEDURE — 77014 HC CT GUIDANCE RADIATION THERAPY FLDS PLACEMENT: CPT | Mod: TC | Performed by: RADIOLOGY

## 2021-01-01 ENCOUNTER — PATIENT MESSAGE (OUTPATIENT)
Dept: PULMONOLOGY | Facility: CLINIC | Age: 81
End: 2021-01-01
Payer: MEDICARE

## 2021-01-01 ENCOUNTER — TELEPHONE (OUTPATIENT)
Dept: PHYSICAL MEDICINE AND REHAB | Facility: CLINIC | Age: 81
End: 2021-01-01
Payer: MEDICARE

## 2021-01-01 ENCOUNTER — OFFICE VISIT (OUTPATIENT)
Dept: OTOLARYNGOLOGY | Facility: CLINIC | Age: 81
End: 2021-01-01
Payer: MEDICARE

## 2021-01-01 ENCOUNTER — OFFICE VISIT (OUTPATIENT)
Dept: PHYSICAL MEDICINE AND REHAB | Facility: CLINIC | Age: 81
End: 2021-01-01
Payer: MEDICARE

## 2021-01-01 ENCOUNTER — OFFICE VISIT (OUTPATIENT)
Dept: RADIATION ONCOLOGY | Facility: CLINIC | Age: 81
End: 2021-01-01
Payer: MEDICARE

## 2021-01-01 VITALS
RESPIRATION RATE: 18 BRPM | TEMPERATURE: 97 F | OXYGEN SATURATION: 96 % | BODY MASS INDEX: 28.78 KG/M2 | DIASTOLIC BLOOD PRESSURE: 71 MMHG | SYSTOLIC BLOOD PRESSURE: 135 MMHG | HEIGHT: 57 IN | WEIGHT: 133.38 LBS | HEART RATE: 70 BPM

## 2021-01-01 VITALS
WEIGHT: 142 LBS | BODY MASS INDEX: 30.63 KG/M2 | DIASTOLIC BLOOD PRESSURE: 65 MMHG | SYSTOLIC BLOOD PRESSURE: 158 MMHG | HEART RATE: 52 BPM | HEIGHT: 57 IN

## 2021-01-01 VITALS — BODY MASS INDEX: 30.77 KG/M2 | WEIGHT: 142.19 LBS

## 2021-01-01 DIAGNOSIS — C11.1 MALIGNANT NEOPLASM OF PHARYNGEAL TONSIL: ICD-10-CM

## 2021-01-01 DIAGNOSIS — C11.1 MALIGNANT NEOPLASM OF PHARYNGEAL TONSIL: Primary | ICD-10-CM

## 2021-01-01 DIAGNOSIS — M54.2 CERVICALGIA: Primary | ICD-10-CM

## 2021-01-01 DIAGNOSIS — R13.12 OROPHARYNGEAL DYSPHAGIA: ICD-10-CM

## 2021-01-01 DIAGNOSIS — R26.9 IMPAIRED GAIT: ICD-10-CM

## 2021-01-01 DIAGNOSIS — M79.18 DIFFUSE MYOFASCIAL PAIN SYNDROME: Primary | ICD-10-CM

## 2021-01-01 PROCEDURE — 99999 PR PBB SHADOW E&M-EST. PATIENT-LVL IV: CPT | Mod: PBBFAC,HCNC,, | Performed by: OTOLARYNGOLOGY

## 2021-01-01 PROCEDURE — 3075F PR MOST RECENT SYSTOLIC BLOOD PRESS GE 130-139MM HG: ICD-10-PCS | Mod: HCNC,CPTII,S$GLB, | Performed by: RADIOLOGY

## 2021-01-01 PROCEDURE — 1157F PR ADVANCE CARE PLAN OR EQUIV PRESENT IN MEDICAL RECORD: ICD-10-PCS | Mod: HCNC,CPTII,S$GLB, | Performed by: OTOLARYNGOLOGY

## 2021-01-01 PROCEDURE — 1159F PR MEDICATION LIST DOCUMENTED IN MEDICAL RECORD: ICD-10-PCS | Mod: HCNC,CPTII,S$GLB, | Performed by: RADIOLOGY

## 2021-01-01 PROCEDURE — 1157F ADVNC CARE PLAN IN RCRD: CPT | Mod: HCNC,CPTII,S$GLB, | Performed by: RADIOLOGY

## 2021-01-01 PROCEDURE — 3078F PR MOST RECENT DIASTOLIC BLOOD PRESSURE < 80 MM HG: ICD-10-PCS | Mod: HCNC,CPTII,S$GLB, | Performed by: RADIOLOGY

## 2021-01-01 PROCEDURE — 1157F ADVNC CARE PLAN IN RCRD: CPT | Mod: HCNC,CPTII,S$GLB, | Performed by: PHYSICAL MEDICINE & REHABILITATION

## 2021-01-01 PROCEDURE — 3075F SYST BP GE 130 - 139MM HG: CPT | Mod: HCNC,CPTII,S$GLB, | Performed by: RADIOLOGY

## 2021-01-01 PROCEDURE — 99499 RISK ADDL DX/OHS AUDIT: ICD-10-PCS | Mod: HCNC,S$GLB,, | Performed by: OTOLARYNGOLOGY

## 2021-01-01 PROCEDURE — 1126F AMNT PAIN NOTED NONE PRSNT: CPT | Mod: HCNC,CPTII,S$GLB, | Performed by: RADIOLOGY

## 2021-01-01 PROCEDURE — 99999 PR PBB SHADOW E&M-EST. PATIENT-LVL IV: ICD-10-PCS | Mod: PBBFAC,HCNC,, | Performed by: PHYSICAL MEDICINE & REHABILITATION

## 2021-01-01 PROCEDURE — 99203 PR OFFICE/OUTPT VISIT, NEW, LEVL III, 30-44 MIN: ICD-10-PCS | Mod: HCNC,S$GLB,, | Performed by: PHYSICAL MEDICINE & REHABILITATION

## 2021-01-01 PROCEDURE — 99999 PR PBB SHADOW E&M-EST. PATIENT-LVL IV: CPT | Mod: PBBFAC,HCNC,, | Performed by: PHYSICAL MEDICINE & REHABILITATION

## 2021-01-01 PROCEDURE — 31575 PR LARYNGOSCOPY, FLEXIBLE; DIAGNOSTIC: ICD-10-PCS | Mod: HCNC,S$GLB,, | Performed by: RADIOLOGY

## 2021-01-01 PROCEDURE — 99999 PR PBB SHADOW E&M-EST. PATIENT-LVL V: ICD-10-PCS | Mod: PBBFAC,HCNC,, | Performed by: RADIOLOGY

## 2021-01-01 PROCEDURE — 99214 OFFICE O/P EST MOD 30 MIN: CPT | Mod: HCNC,25,S$GLB, | Performed by: RADIOLOGY

## 2021-01-01 PROCEDURE — 3078F DIAST BP <80 MM HG: CPT | Mod: HCNC,CPTII,S$GLB, | Performed by: RADIOLOGY

## 2021-01-01 PROCEDURE — 99203 OFFICE O/P NEW LOW 30 MIN: CPT | Mod: HCNC,S$GLB,, | Performed by: PHYSICAL MEDICINE & REHABILITATION

## 2021-01-01 PROCEDURE — 1157F PR ADVANCE CARE PLAN OR EQUIV PRESENT IN MEDICAL RECORD: ICD-10-PCS | Mod: HCNC,CPTII,S$GLB, | Performed by: PHYSICAL MEDICINE & REHABILITATION

## 2021-01-01 PROCEDURE — 1157F ADVNC CARE PLAN IN RCRD: CPT | Mod: HCNC,CPTII,S$GLB, | Performed by: OTOLARYNGOLOGY

## 2021-01-01 PROCEDURE — 99215 OFFICE O/P EST HI 40 MIN: CPT | Mod: HCNC,S$GLB,, | Performed by: OTOLARYNGOLOGY

## 2021-01-01 PROCEDURE — 99215 PR OFFICE/OUTPT VISIT, EST, LEVL V, 40-54 MIN: ICD-10-PCS | Mod: HCNC,S$GLB,, | Performed by: OTOLARYNGOLOGY

## 2021-01-01 PROCEDURE — 1159F MED LIST DOCD IN RCRD: CPT | Mod: HCNC,CPTII,S$GLB, | Performed by: RADIOLOGY

## 2021-01-01 PROCEDURE — 99999 PR PBB SHADOW E&M-EST. PATIENT-LVL V: CPT | Mod: PBBFAC,HCNC,, | Performed by: RADIOLOGY

## 2021-01-01 PROCEDURE — 1126F PR PAIN SEVERITY QUANTIFIED, NO PAIN PRESENT: ICD-10-PCS | Mod: HCNC,CPTII,S$GLB, | Performed by: RADIOLOGY

## 2021-01-01 PROCEDURE — 99214 PR OFFICE/OUTPT VISIT, EST, LEVL IV, 30-39 MIN: ICD-10-PCS | Mod: HCNC,25,S$GLB, | Performed by: RADIOLOGY

## 2021-01-01 PROCEDURE — 31575 DIAGNOSTIC LARYNGOSCOPY: CPT | Mod: HCNC,S$GLB,, | Performed by: RADIOLOGY

## 2021-01-01 PROCEDURE — 99999 PR PBB SHADOW E&M-EST. PATIENT-LVL IV: ICD-10-PCS | Mod: PBBFAC,HCNC,, | Performed by: OTOLARYNGOLOGY

## 2021-01-01 PROCEDURE — 1157F PR ADVANCE CARE PLAN OR EQUIV PRESENT IN MEDICAL RECORD: ICD-10-PCS | Mod: HCNC,CPTII,S$GLB, | Performed by: RADIOLOGY

## 2021-01-01 PROCEDURE — 99499 UNLISTED E&M SERVICE: CPT | Mod: HCNC,S$GLB,, | Performed by: OTOLARYNGOLOGY

## 2021-01-04 ENCOUNTER — HOSPITAL ENCOUNTER (OUTPATIENT)
Dept: RADIATION THERAPY | Facility: HOSPITAL | Age: 81
Discharge: HOME | End: 2021-01-04
Attending: RADIOLOGY
Payer: MEDICARE

## 2021-01-04 PROCEDURE — 77386 HC IMRT, COMPLEX: CPT | Performed by: RADIOLOGY

## 2021-01-04 PROCEDURE — 77014 HC CT GUIDANCE RADIATION THERAPY FLDS PLACEMENT: CPT | Mod: TC | Performed by: RADIOLOGY

## 2021-01-04 PROCEDURE — 77014 PR  CT GUIDANCE PLACEMENT RAD THERAPY FIELDS: CPT | Mod: 26,,, | Performed by: RADIOLOGY

## 2021-01-04 PROCEDURE — 77014 PR  CT GUIDANCE PLACEMENT RAD THERAPY FIELDS: ICD-10-PCS | Mod: 26,,, | Performed by: RADIOLOGY

## 2021-01-05 PROCEDURE — 77386 HC IMRT, COMPLEX: CPT | Performed by: RADIOLOGY

## 2021-01-05 PROCEDURE — 77014 HC CT GUIDANCE RADIATION THERAPY FLDS PLACEMENT: CPT | Mod: TC | Performed by: RADIOLOGY

## 2021-01-05 PROCEDURE — 77014 PR  CT GUIDANCE PLACEMENT RAD THERAPY FIELDS: ICD-10-PCS | Mod: 26,,, | Performed by: RADIOLOGY

## 2021-01-05 PROCEDURE — 77014 PR  CT GUIDANCE PLACEMENT RAD THERAPY FIELDS: CPT | Mod: 26,,, | Performed by: RADIOLOGY

## 2021-01-06 ENCOUNTER — DOCUMENTATION ONLY (OUTPATIENT)
Dept: RADIATION ONCOLOGY | Facility: CLINIC | Age: 81
End: 2021-01-06

## 2021-01-06 PROCEDURE — 77014 HC CT GUIDANCE RADIATION THERAPY FLDS PLACEMENT: CPT | Mod: TC | Performed by: RADIOLOGY

## 2021-01-06 PROCEDURE — 77014 PR  CT GUIDANCE PLACEMENT RAD THERAPY FIELDS: CPT | Mod: 26,,, | Performed by: RADIOLOGY

## 2021-01-06 PROCEDURE — 77336 RADIATION PHYSICS CONSULT: CPT | Performed by: RADIOLOGY

## 2021-01-06 PROCEDURE — 77014 PR  CT GUIDANCE PLACEMENT RAD THERAPY FIELDS: ICD-10-PCS | Mod: 26,,, | Performed by: RADIOLOGY

## 2021-01-06 PROCEDURE — 77386 HC IMRT, COMPLEX: CPT | Performed by: RADIOLOGY

## 2021-01-07 PROCEDURE — 77014 PR  CT GUIDANCE PLACEMENT RAD THERAPY FIELDS: ICD-10-PCS | Mod: 26,,, | Performed by: RADIOLOGY

## 2021-01-07 PROCEDURE — 77014 PR  CT GUIDANCE PLACEMENT RAD THERAPY FIELDS: CPT | Mod: 26,,, | Performed by: RADIOLOGY

## 2021-01-07 PROCEDURE — 77014 HC CT GUIDANCE RADIATION THERAPY FLDS PLACEMENT: CPT | Mod: TC | Performed by: RADIOLOGY

## 2021-01-07 PROCEDURE — 77386 HC IMRT, COMPLEX: CPT | Performed by: RADIOLOGY

## 2021-01-08 ENCOUNTER — PATIENT MESSAGE (OUTPATIENT)
Dept: PALLIATIVE MEDICINE | Facility: CLINIC | Age: 81
End: 2021-01-08

## 2021-01-08 ENCOUNTER — PATIENT MESSAGE (OUTPATIENT)
Dept: HEMATOLOGY/ONCOLOGY | Facility: CLINIC | Age: 81
End: 2021-01-08

## 2021-01-08 ENCOUNTER — PATIENT MESSAGE (OUTPATIENT)
Dept: CARDIOLOGY | Facility: CLINIC | Age: 81
End: 2021-01-08

## 2021-01-08 ENCOUNTER — PATIENT MESSAGE (OUTPATIENT)
Dept: RADIATION ONCOLOGY | Facility: CLINIC | Age: 81
End: 2021-01-08

## 2021-01-08 PROCEDURE — 77014 HC CT GUIDANCE RADIATION THERAPY FLDS PLACEMENT: CPT | Mod: TC | Performed by: RADIOLOGY

## 2021-01-08 PROCEDURE — 77014 PR  CT GUIDANCE PLACEMENT RAD THERAPY FIELDS: ICD-10-PCS | Mod: 26,,, | Performed by: RADIOLOGY

## 2021-01-08 PROCEDURE — 77386 HC IMRT, COMPLEX: CPT | Performed by: RADIOLOGY

## 2021-01-08 PROCEDURE — 77014 PR  CT GUIDANCE PLACEMENT RAD THERAPY FIELDS: CPT | Mod: 26,,, | Performed by: RADIOLOGY

## 2021-01-11 ENCOUNTER — TELEPHONE (OUTPATIENT)
Dept: CARDIOLOGY | Facility: CLINIC | Age: 81
End: 2021-01-11

## 2021-01-11 DIAGNOSIS — G89.3 CANCER RELATED PAIN: Primary | ICD-10-CM

## 2021-01-11 DIAGNOSIS — C11.1 MALIGNANT NEOPLASM OF PHARYNGEAL TONSIL: ICD-10-CM

## 2021-01-11 DIAGNOSIS — J39.2 OROPHARYNGEAL MASS: ICD-10-CM

## 2021-01-11 DIAGNOSIS — C11.1 MALIGNANT NEOPLASM OF PHARYNGEAL TONSIL: Primary | ICD-10-CM

## 2021-01-11 DIAGNOSIS — G89.3 CANCER RELATED PAIN: ICD-10-CM

## 2021-01-11 PROCEDURE — 77386 HC IMRT, COMPLEX: CPT | Performed by: RADIOLOGY

## 2021-01-11 PROCEDURE — 77014 PR  CT GUIDANCE PLACEMENT RAD THERAPY FIELDS: CPT | Mod: 26,,, | Performed by: RADIOLOGY

## 2021-01-11 PROCEDURE — 77014 PR  CT GUIDANCE PLACEMENT RAD THERAPY FIELDS: ICD-10-PCS | Mod: 26,,, | Performed by: RADIOLOGY

## 2021-01-11 PROCEDURE — 77014 HC CT GUIDANCE RADIATION THERAPY FLDS PLACEMENT: CPT | Mod: TC | Performed by: RADIOLOGY

## 2021-01-11 RX ORDER — OXYCODONE HCL 5 MG/5 ML
5 SOLUTION, ORAL ORAL EVERY 4 HOURS PRN
Qty: 473 ML | Refills: 0 | Status: ON HOLD | OUTPATIENT
Start: 2021-01-11 | End: 2021-01-25 | Stop reason: SDUPTHER

## 2021-01-11 RX ORDER — OXYCODONE HCL 5 MG/5 ML
5 SOLUTION, ORAL ORAL EVERY 4 HOURS PRN
Qty: 473 ML | Refills: 0 | Status: SHIPPED | OUTPATIENT
Start: 2021-01-11 | End: 2021-01-11

## 2021-01-12 ENCOUNTER — IMMUNIZATION (OUTPATIENT)
Dept: PHARMACY | Facility: CLINIC | Age: 81
End: 2021-01-12
Payer: MEDICARE

## 2021-01-12 ENCOUNTER — LAB VISIT (OUTPATIENT)
Dept: LAB | Facility: HOSPITAL | Age: 81
End: 2021-01-12
Attending: INTERNAL MEDICINE
Payer: MEDICARE

## 2021-01-12 DIAGNOSIS — C11.1 MALIGNANT NEOPLASM OF PHARYNGEAL TONSIL: ICD-10-CM

## 2021-01-12 LAB
ALBUMIN SERPL BCP-MCNC: 3.1 G/DL (ref 3.5–5.2)
ALP SERPL-CCNC: 100 U/L (ref 55–135)
ALT SERPL W/O P-5'-P-CCNC: 8 U/L (ref 10–44)
ANION GAP SERPL CALC-SCNC: 10 MMOL/L (ref 8–16)
AST SERPL-CCNC: 18 U/L (ref 10–40)
BASOPHILS # BLD AUTO: 0.04 K/UL (ref 0–0.2)
BASOPHILS NFR BLD: 0.4 % (ref 0–1.9)
BILIRUB SERPL-MCNC: 0.5 MG/DL (ref 0.1–1)
BUN SERPL-MCNC: 21 MG/DL (ref 8–23)
CALCIUM SERPL-MCNC: 8.6 MG/DL (ref 8.7–10.5)
CHLORIDE SERPL-SCNC: 95 MMOL/L (ref 95–110)
CO2 SERPL-SCNC: 32 MMOL/L (ref 23–29)
CREAT SERPL-MCNC: 1.1 MG/DL (ref 0.5–1.4)
DIFFERENTIAL METHOD: ABNORMAL
EOSINOPHIL # BLD AUTO: 0.2 K/UL (ref 0–0.5)
EOSINOPHIL NFR BLD: 1.7 % (ref 0–8)
ERYTHROCYTE [DISTWIDTH] IN BLOOD BY AUTOMATED COUNT: 16.4 % (ref 11.5–14.5)
EST. GFR  (AFRICAN AMERICAN): 55 ML/MIN/1.73 M^2
EST. GFR  (NON AFRICAN AMERICAN): 48 ML/MIN/1.73 M^2
GLUCOSE SERPL-MCNC: 68 MG/DL (ref 70–110)
HCT VFR BLD AUTO: 37.7 % (ref 37–48.5)
HGB BLD-MCNC: 11.7 G/DL (ref 12–16)
IMM GRANULOCYTES # BLD AUTO: 0.03 K/UL (ref 0–0.04)
IMM GRANULOCYTES NFR BLD AUTO: 0.3 % (ref 0–0.5)
LYMPHOCYTES # BLD AUTO: 0.9 K/UL (ref 1–4.8)
LYMPHOCYTES NFR BLD: 9.8 % (ref 18–48)
MCH RBC QN AUTO: 26.6 PG (ref 27–31)
MCHC RBC AUTO-ENTMCNC: 31 G/DL (ref 32–36)
MCV RBC AUTO: 86 FL (ref 82–98)
MONOCYTES # BLD AUTO: 0.9 K/UL (ref 0.3–1)
MONOCYTES NFR BLD: 9 % (ref 4–15)
NEUTROPHILS # BLD AUTO: 7.6 K/UL (ref 1.8–7.7)
NEUTROPHILS NFR BLD: 78.8 % (ref 38–73)
NRBC BLD-RTO: 0 /100 WBC
PLATELET # BLD AUTO: 290 K/UL (ref 150–350)
PMV BLD AUTO: 11.4 FL (ref 9.2–12.9)
POTASSIUM SERPL-SCNC: 3.6 MMOL/L (ref 3.5–5.1)
PROT SERPL-MCNC: 6.8 G/DL (ref 6–8.4)
RBC # BLD AUTO: 4.4 M/UL (ref 4–5.4)
SODIUM SERPL-SCNC: 137 MMOL/L (ref 136–145)
WBC # BLD AUTO: 9.6 K/UL (ref 3.9–12.7)

## 2021-01-12 PROCEDURE — 77014 PR  CT GUIDANCE PLACEMENT RAD THERAPY FIELDS: CPT | Mod: 26,,, | Performed by: RADIOLOGY

## 2021-01-12 PROCEDURE — 77014 PR  CT GUIDANCE PLACEMENT RAD THERAPY FIELDS: ICD-10-PCS | Mod: 26,,, | Performed by: RADIOLOGY

## 2021-01-12 PROCEDURE — 80053 COMPREHEN METABOLIC PANEL: CPT

## 2021-01-12 PROCEDURE — 85025 COMPLETE CBC W/AUTO DIFF WBC: CPT

## 2021-01-12 PROCEDURE — 77014 HC CT GUIDANCE RADIATION THERAPY FLDS PLACEMENT: CPT | Mod: TC | Performed by: RADIOLOGY

## 2021-01-12 PROCEDURE — 36415 COLL VENOUS BLD VENIPUNCTURE: CPT

## 2021-01-12 PROCEDURE — 77386 HC IMRT, COMPLEX: CPT | Performed by: RADIOLOGY

## 2021-01-13 ENCOUNTER — PATIENT MESSAGE (OUTPATIENT)
Dept: SURGERY | Facility: CLINIC | Age: 81
End: 2021-01-13

## 2021-01-13 ENCOUNTER — OFFICE VISIT (OUTPATIENT)
Dept: HEMATOLOGY/ONCOLOGY | Facility: CLINIC | Age: 81
End: 2021-01-13
Payer: MEDICARE

## 2021-01-13 ENCOUNTER — DOCUMENTATION ONLY (OUTPATIENT)
Dept: RADIATION ONCOLOGY | Facility: CLINIC | Age: 81
End: 2021-01-13

## 2021-01-13 ENCOUNTER — INFUSION (OUTPATIENT)
Dept: INFUSION THERAPY | Facility: HOSPITAL | Age: 81
End: 2021-01-13
Attending: NURSE PRACTITIONER
Payer: MEDICARE

## 2021-01-13 VITALS
DIASTOLIC BLOOD PRESSURE: 45 MMHG | OXYGEN SATURATION: 98 % | SYSTOLIC BLOOD PRESSURE: 115 MMHG | HEART RATE: 52 BPM | TEMPERATURE: 98 F | RESPIRATION RATE: 18 BRPM

## 2021-01-13 VITALS
TEMPERATURE: 98 F | HEART RATE: 58 BPM | BODY MASS INDEX: 31.3 KG/M2 | DIASTOLIC BLOOD PRESSURE: 53 MMHG | WEIGHT: 145.06 LBS | RESPIRATION RATE: 17 BRPM | OXYGEN SATURATION: 98 % | HEIGHT: 57 IN | SYSTOLIC BLOOD PRESSURE: 110 MMHG

## 2021-01-13 DIAGNOSIS — E86.0 DEHYDRATION: ICD-10-CM

## 2021-01-13 DIAGNOSIS — D53.9 NUTRITIONAL ANEMIA, UNSPECIFIED: ICD-10-CM

## 2021-01-13 DIAGNOSIS — G89.3 CANCER RELATED PAIN: ICD-10-CM

## 2021-01-13 DIAGNOSIS — C11.1 MALIGNANT NEOPLASM OF PHARYNGEAL TONSIL: ICD-10-CM

## 2021-01-13 DIAGNOSIS — C11.1 MALIGNANT NEOPLASM OF PHARYNGEAL TONSIL: Primary | ICD-10-CM

## 2021-01-13 DIAGNOSIS — E86.0 DEHYDRATION: Primary | ICD-10-CM

## 2021-01-13 DIAGNOSIS — D50.9 IRON DEFICIENCY ANEMIA, UNSPECIFIED IRON DEFICIENCY ANEMIA TYPE: Primary | ICD-10-CM

## 2021-01-13 PROCEDURE — 1101F PT FALLS ASSESS-DOCD LE1/YR: CPT | Mod: CPTII,S$GLB,, | Performed by: NURSE PRACTITIONER

## 2021-01-13 PROCEDURE — 1159F PR MEDICATION LIST DOCUMENTED IN MEDICAL RECORD: ICD-10-PCS | Mod: S$GLB,,, | Performed by: NURSE PRACTITIONER

## 2021-01-13 PROCEDURE — 3074F PR MOST RECENT SYSTOLIC BLOOD PRESSURE < 130 MM HG: ICD-10-PCS | Mod: CPTII,S$GLB,, | Performed by: NURSE PRACTITIONER

## 2021-01-13 PROCEDURE — 77014 HC CT GUIDANCE RADIATION THERAPY FLDS PLACEMENT: CPT | Mod: TC | Performed by: RADIOLOGY

## 2021-01-13 PROCEDURE — 3078F DIAST BP <80 MM HG: CPT | Mod: CPTII,S$GLB,, | Performed by: NURSE PRACTITIONER

## 2021-01-13 PROCEDURE — 3288F PR FALLS RISK ASSESSMENT DOCUMENTED: ICD-10-PCS | Mod: CPTII,S$GLB,, | Performed by: NURSE PRACTITIONER

## 2021-01-13 PROCEDURE — 77014 PR  CT GUIDANCE PLACEMENT RAD THERAPY FIELDS: CPT | Mod: 26,,, | Performed by: RADIOLOGY

## 2021-01-13 PROCEDURE — 1125F PR PAIN SEVERITY QUANTIFIED, PAIN PRESENT: ICD-10-PCS | Mod: S$GLB,,, | Performed by: NURSE PRACTITIONER

## 2021-01-13 PROCEDURE — 77014 PR  CT GUIDANCE PLACEMENT RAD THERAPY FIELDS: ICD-10-PCS | Mod: 26,,, | Performed by: RADIOLOGY

## 2021-01-13 PROCEDURE — 77336 RADIATION PHYSICS CONSULT: CPT | Performed by: RADIOLOGY

## 2021-01-13 PROCEDURE — 77386 HC IMRT, COMPLEX: CPT | Performed by: RADIOLOGY

## 2021-01-13 PROCEDURE — 3078F PR MOST RECENT DIASTOLIC BLOOD PRESSURE < 80 MM HG: ICD-10-PCS | Mod: CPTII,S$GLB,, | Performed by: NURSE PRACTITIONER

## 2021-01-13 PROCEDURE — 1125F AMNT PAIN NOTED PAIN PRSNT: CPT | Mod: S$GLB,,, | Performed by: NURSE PRACTITIONER

## 2021-01-13 PROCEDURE — 1101F PR PT FALLS ASSESS DOC 0-1 FALLS W/OUT INJ PAST YR: ICD-10-PCS | Mod: CPTII,S$GLB,, | Performed by: NURSE PRACTITIONER

## 2021-01-13 PROCEDURE — 1159F MED LIST DOCD IN RCRD: CPT | Mod: S$GLB,,, | Performed by: NURSE PRACTITIONER

## 2021-01-13 PROCEDURE — 99214 OFFICE O/P EST MOD 30 MIN: CPT | Mod: 25,S$GLB,, | Performed by: NURSE PRACTITIONER

## 2021-01-13 PROCEDURE — 99999 PR PBB SHADOW E&M-EST. PATIENT-LVL V: ICD-10-PCS | Mod: PBBFAC,,, | Performed by: NURSE PRACTITIONER

## 2021-01-13 PROCEDURE — 99999 PR PBB SHADOW E&M-EST. PATIENT-LVL V: CPT | Mod: PBBFAC,,, | Performed by: NURSE PRACTITIONER

## 2021-01-13 PROCEDURE — 99214 PR OFFICE/OUTPT VISIT, EST, LEVL IV, 30-39 MIN: ICD-10-PCS | Mod: 25,S$GLB,, | Performed by: NURSE PRACTITIONER

## 2021-01-13 PROCEDURE — 96360 HYDRATION IV INFUSION INIT: CPT

## 2021-01-13 PROCEDURE — 3074F SYST BP LT 130 MM HG: CPT | Mod: CPTII,S$GLB,, | Performed by: NURSE PRACTITIONER

## 2021-01-13 PROCEDURE — 96361 HYDRATE IV INFUSION ADD-ON: CPT

## 2021-01-13 PROCEDURE — 3288F FALL RISK ASSESSMENT DOCD: CPT | Mod: CPTII,S$GLB,, | Performed by: NURSE PRACTITIONER

## 2021-01-13 PROCEDURE — 25000003 PHARM REV CODE 250: Performed by: NURSE PRACTITIONER

## 2021-01-13 RX ORDER — SODIUM CHLORIDE 0.9 % (FLUSH) 0.9 %
10 SYRINGE (ML) INJECTION
Status: CANCELLED | OUTPATIENT
Start: 2021-01-13

## 2021-01-13 RX ORDER — HEPARIN 100 UNIT/ML
500 SYRINGE INTRAVENOUS
Status: CANCELLED | OUTPATIENT
Start: 2021-01-13

## 2021-01-13 RX ORDER — SODIUM CHLORIDE 9 MG/ML
1000 INJECTION, SOLUTION INTRAVENOUS
Status: COMPLETED | OUTPATIENT
Start: 2021-01-13 | End: 2021-01-13

## 2021-01-13 RX ADMIN — SODIUM CHLORIDE 1000 ML: 0.9 INJECTION, SOLUTION INTRAVENOUS at 12:01

## 2021-01-14 PROCEDURE — 77014 PR  CT GUIDANCE PLACEMENT RAD THERAPY FIELDS: CPT | Mod: 26,,, | Performed by: RADIOLOGY

## 2021-01-14 PROCEDURE — 77386 HC IMRT, COMPLEX: CPT | Performed by: RADIOLOGY

## 2021-01-14 PROCEDURE — 77014 HC CT GUIDANCE RADIATION THERAPY FLDS PLACEMENT: CPT | Mod: TC | Performed by: RADIOLOGY

## 2021-01-14 PROCEDURE — 77014 PR  CT GUIDANCE PLACEMENT RAD THERAPY FIELDS: ICD-10-PCS | Mod: 26,,, | Performed by: RADIOLOGY

## 2021-01-15 ENCOUNTER — INFUSION (OUTPATIENT)
Dept: INFUSION THERAPY | Facility: HOSPITAL | Age: 81
End: 2021-01-15
Attending: NURSE PRACTITIONER
Payer: MEDICARE

## 2021-01-15 ENCOUNTER — TELEPHONE (OUTPATIENT)
Dept: HEMATOLOGY/ONCOLOGY | Facility: CLINIC | Age: 81
End: 2021-01-15

## 2021-01-15 VITALS
HEART RATE: 59 BPM | TEMPERATURE: 97 F | OXYGEN SATURATION: 96 % | SYSTOLIC BLOOD PRESSURE: 114 MMHG | DIASTOLIC BLOOD PRESSURE: 54 MMHG | RESPIRATION RATE: 16 BRPM

## 2021-01-15 DIAGNOSIS — E86.0 DEHYDRATION: Primary | ICD-10-CM

## 2021-01-15 DIAGNOSIS — I63.512 ARTERIAL ISCHEMIC STROKE, MCA (MIDDLE CEREBRAL ARTERY), LEFT, ACUTE: Chronic | ICD-10-CM

## 2021-01-15 DIAGNOSIS — C05.1 MALIGNANT NEOPLASM OF SOFT PALATE: Primary | ICD-10-CM

## 2021-01-15 DIAGNOSIS — R41.3 AMNESIA: ICD-10-CM

## 2021-01-15 DIAGNOSIS — Z01.818 PRE-OP TESTING: Primary | ICD-10-CM

## 2021-01-15 DIAGNOSIS — R91.1 PULMONARY NODULE, RIGHT: ICD-10-CM

## 2021-01-15 DIAGNOSIS — J39.2 OROPHARYNGEAL MASS: ICD-10-CM

## 2021-01-15 DIAGNOSIS — R56.9 SEIZURE: ICD-10-CM

## 2021-01-15 PROCEDURE — 96360 HYDRATION IV INFUSION INIT: CPT

## 2021-01-15 PROCEDURE — 96361 HYDRATE IV INFUSION ADD-ON: CPT

## 2021-01-15 PROCEDURE — 77386 HC IMRT, COMPLEX: CPT | Performed by: RADIOLOGY

## 2021-01-15 PROCEDURE — 25000003 PHARM REV CODE 250: Performed by: INTERNAL MEDICINE

## 2021-01-15 PROCEDURE — 77014 PR  CT GUIDANCE PLACEMENT RAD THERAPY FIELDS: CPT | Mod: 26,,, | Performed by: RADIOLOGY

## 2021-01-15 PROCEDURE — 77014 HC CT GUIDANCE RADIATION THERAPY FLDS PLACEMENT: CPT | Mod: TC | Performed by: RADIOLOGY

## 2021-01-15 PROCEDURE — 77014 PR  CT GUIDANCE PLACEMENT RAD THERAPY FIELDS: ICD-10-PCS | Mod: 26,,, | Performed by: RADIOLOGY

## 2021-01-15 RX ORDER — SODIUM CHLORIDE 0.9 % (FLUSH) 0.9 %
10 SYRINGE (ML) INJECTION
Status: CANCELLED | OUTPATIENT
Start: 2021-01-15

## 2021-01-15 RX ORDER — SODIUM CHLORIDE 9 MG/ML
1000 INJECTION, SOLUTION INTRAVENOUS
Status: COMPLETED | OUTPATIENT
Start: 2021-01-15 | End: 2021-01-15

## 2021-01-15 RX ORDER — HEPARIN 100 UNIT/ML
500 SYRINGE INTRAVENOUS
Status: CANCELLED | OUTPATIENT
Start: 2021-01-15

## 2021-01-15 RX ADMIN — SODIUM CHLORIDE 1000 ML: 0.9 INJECTION, SOLUTION INTRAVENOUS at 11:01

## 2021-01-16 ENCOUNTER — PATIENT MESSAGE (OUTPATIENT)
Dept: PALLIATIVE MEDICINE | Facility: CLINIC | Age: 81
End: 2021-01-16

## 2021-01-17 ENCOUNTER — HOSPITAL ENCOUNTER (INPATIENT)
Facility: HOSPITAL | Age: 81
LOS: 9 days | Discharge: HOME HEALTH | DRG: 193 | End: 2021-01-26
Attending: EMERGENCY MEDICINE | Admitting: INTERNAL MEDICINE
Payer: MEDICARE

## 2021-01-17 DIAGNOSIS — Z79.4 TYPE 2 DIABETES MELLITUS WITH HYPERGLYCEMIA, WITH LONG-TERM CURRENT USE OF INSULIN: ICD-10-CM

## 2021-01-17 DIAGNOSIS — J18.9 PNEUMONIA OF RIGHT LOWER LOBE DUE TO INFECTIOUS ORGANISM: ICD-10-CM

## 2021-01-17 DIAGNOSIS — G89.3 CANCER ASSOCIATED PAIN: ICD-10-CM

## 2021-01-17 DIAGNOSIS — Z71.89 ACP (ADVANCE CARE PLANNING): ICD-10-CM

## 2021-01-17 DIAGNOSIS — Z93.1 S/P PERCUTANEOUS ENDOSCOPIC GASTROSTOMY (PEG) TUBE PLACEMENT: ICD-10-CM

## 2021-01-17 DIAGNOSIS — I25.118 CORONARY ARTERY DISEASE WITH STABLE ANGINA PECTORIS, UNSPECIFIED VESSEL OR LESION TYPE, UNSPECIFIED WHETHER NATIVE OR TRANSPLANTED HEART: ICD-10-CM

## 2021-01-17 DIAGNOSIS — G89.3 CANCER RELATED PAIN: ICD-10-CM

## 2021-01-17 DIAGNOSIS — E86.0 DEHYDRATION: ICD-10-CM

## 2021-01-17 DIAGNOSIS — J96.01 ACUTE HYPOXEMIC RESPIRATORY FAILURE: ICD-10-CM

## 2021-01-17 DIAGNOSIS — R07.9 CHEST PAIN: ICD-10-CM

## 2021-01-17 DIAGNOSIS — R09.02 HYPOXIA: ICD-10-CM

## 2021-01-17 DIAGNOSIS — C11.1 MALIGNANT NEOPLASM OF PHARYNGEAL TONSIL: ICD-10-CM

## 2021-01-17 DIAGNOSIS — R09.02 HYPOXEMIA: Primary | ICD-10-CM

## 2021-01-17 DIAGNOSIS — R91.1 PULMONARY NODULE, RIGHT: ICD-10-CM

## 2021-01-17 DIAGNOSIS — E11.65 TYPE 2 DIABETES MELLITUS WITH HYPERGLYCEMIA, WITH LONG-TERM CURRENT USE OF INSULIN: ICD-10-CM

## 2021-01-17 DIAGNOSIS — R06.02 SOB (SHORTNESS OF BREATH): ICD-10-CM

## 2021-01-17 LAB
ALBUMIN SERPL BCP-MCNC: 2.6 G/DL (ref 3.5–5.2)
ALP SERPL-CCNC: 107 U/L (ref 55–135)
ALT SERPL W/O P-5'-P-CCNC: 15 U/L (ref 10–44)
ANION GAP SERPL CALC-SCNC: 9 MMOL/L (ref 8–16)
APTT BLDCRRT: 34.7 SEC (ref 21–32)
AST SERPL-CCNC: 36 U/L (ref 10–40)
BASOPHILS # BLD AUTO: 0.05 K/UL (ref 0–0.2)
BASOPHILS NFR BLD: 0.4 % (ref 0–1.9)
BILIRUB SERPL-MCNC: 1 MG/DL (ref 0.1–1)
BILIRUB UR QL STRIP: NEGATIVE
BNP SERPL-MCNC: 687 PG/ML (ref 0–99)
BUN SERPL-MCNC: 23 MG/DL (ref 8–23)
CALCIUM SERPL-MCNC: 8.4 MG/DL (ref 8.7–10.5)
CHLORIDE SERPL-SCNC: 101 MMOL/L (ref 95–110)
CLARITY UR: CLEAR
CO2 SERPL-SCNC: 29 MMOL/L (ref 23–29)
COLOR UR: YELLOW
CREAT SERPL-MCNC: 1.4 MG/DL (ref 0.5–1.4)
CTP QC/QA: YES
D DIMER PPP IA.FEU-MCNC: 2.22 MG/L FEU
DIFFERENTIAL METHOD: ABNORMAL
EOSINOPHIL # BLD AUTO: 0 K/UL (ref 0–0.5)
EOSINOPHIL NFR BLD: 0.3 % (ref 0–8)
ERYTHROCYTE [DISTWIDTH] IN BLOOD BY AUTOMATED COUNT: 16.6 % (ref 11.5–14.5)
EST. GFR  (AFRICAN AMERICAN): 41 ML/MIN/1.73 M^2
EST. GFR  (NON AFRICAN AMERICAN): 36 ML/MIN/1.73 M^2
GLUCOSE SERPL-MCNC: 98 MG/DL (ref 70–110)
GLUCOSE UR QL STRIP: NEGATIVE
HCT VFR BLD AUTO: 34.4 % (ref 37–48.5)
HGB BLD-MCNC: 10.4 G/DL (ref 12–16)
HGB UR QL STRIP: NEGATIVE
IMM GRANULOCYTES # BLD AUTO: 0.08 K/UL (ref 0–0.04)
IMM GRANULOCYTES NFR BLD AUTO: 0.6 % (ref 0–0.5)
INFLUENZA A, MOLECULAR: NEGATIVE
INFLUENZA B, MOLECULAR: NEGATIVE
INR PPP: 1.1 (ref 0.8–1.2)
KETONES UR QL STRIP: NEGATIVE
LACTATE SERPL-SCNC: 0.7 MMOL/L (ref 0.5–2.2)
LACTATE SERPL-SCNC: 1.3 MMOL/L (ref 0.5–2.2)
LEUKOCYTE ESTERASE UR QL STRIP: NEGATIVE
LIPASE SERPL-CCNC: 4 U/L (ref 4–60)
LYMPHOCYTES # BLD AUTO: 0.6 K/UL (ref 1–4.8)
LYMPHOCYTES NFR BLD: 4.5 % (ref 18–48)
MAGNESIUM SERPL-MCNC: 1.9 MG/DL (ref 1.6–2.6)
MCH RBC QN AUTO: 26.5 PG (ref 27–31)
MCHC RBC AUTO-ENTMCNC: 30.2 G/DL (ref 32–36)
MCV RBC AUTO: 88 FL (ref 82–98)
MONOCYTES # BLD AUTO: 1 K/UL (ref 0.3–1)
MONOCYTES NFR BLD: 6.9 % (ref 4–15)
NEUTROPHILS # BLD AUTO: 12.3 K/UL (ref 1.8–7.7)
NEUTROPHILS NFR BLD: 87.3 % (ref 38–73)
NITRITE UR QL STRIP: NEGATIVE
NRBC BLD-RTO: 0 /100 WBC
PH UR STRIP: 6 [PH] (ref 5–8)
PHOSPHATE SERPL-MCNC: 2.8 MG/DL (ref 2.7–4.5)
PLATELET # BLD AUTO: 264 K/UL (ref 150–350)
PMV BLD AUTO: 11.2 FL (ref 9.2–12.9)
POTASSIUM SERPL-SCNC: 3.5 MMOL/L (ref 3.5–5.1)
PROCALCITONIN SERPL IA-MCNC: 0.82 NG/ML
PROT SERPL-MCNC: 6.4 G/DL (ref 6–8.4)
PROT UR QL STRIP: NEGATIVE
PROTHROMBIN TIME: 12 SEC (ref 9–12.5)
RBC # BLD AUTO: 3.93 M/UL (ref 4–5.4)
SARS-COV-2 RDRP RESP QL NAA+PROBE: NEGATIVE
SODIUM SERPL-SCNC: 139 MMOL/L (ref 136–145)
SP GR UR STRIP: 1.02 (ref 1–1.03)
SPECIMEN SOURCE: NORMAL
TROPONIN I SERPL DL<=0.01 NG/ML-MCNC: 0.02 NG/ML (ref 0–0.03)
TROPONIN I SERPL DL<=0.01 NG/ML-MCNC: 0.02 NG/ML (ref 0–0.03)
URN SPEC COLLECT METH UR: NORMAL
UROBILINOGEN UR STRIP-ACNC: NEGATIVE EU/DL
WBC # BLD AUTO: 14.1 K/UL (ref 3.9–12.7)

## 2021-01-17 PROCEDURE — 93010 ELECTROCARDIOGRAM REPORT: CPT | Mod: ,,, | Performed by: INTERNAL MEDICINE

## 2021-01-17 PROCEDURE — 93010 EKG 12-LEAD: ICD-10-PCS | Mod: ,,, | Performed by: INTERNAL MEDICINE

## 2021-01-17 PROCEDURE — 84145 PROCALCITONIN (PCT): CPT

## 2021-01-17 PROCEDURE — S0028 INJECTION, FAMOTIDINE, 20 MG: HCPCS | Performed by: INTERNAL MEDICINE

## 2021-01-17 PROCEDURE — 25000003 PHARM REV CODE 250: Performed by: EMERGENCY MEDICINE

## 2021-01-17 PROCEDURE — 63600175 PHARM REV CODE 636 W HCPCS: Performed by: EMERGENCY MEDICINE

## 2021-01-17 PROCEDURE — 85025 COMPLETE CBC W/AUTO DIFF WBC: CPT

## 2021-01-17 PROCEDURE — 84484 ASSAY OF TROPONIN QUANT: CPT

## 2021-01-17 PROCEDURE — U0002 COVID-19 LAB TEST NON-CDC: HCPCS | Performed by: EMERGENCY MEDICINE

## 2021-01-17 PROCEDURE — 96365 THER/PROPH/DIAG IV INF INIT: CPT

## 2021-01-17 PROCEDURE — 99900035 HC TECH TIME PER 15 MIN (STAT)

## 2021-01-17 PROCEDURE — 83880 ASSAY OF NATRIURETIC PEPTIDE: CPT

## 2021-01-17 PROCEDURE — 80053 COMPREHEN METABOLIC PANEL: CPT

## 2021-01-17 PROCEDURE — 83735 ASSAY OF MAGNESIUM: CPT

## 2021-01-17 PROCEDURE — 11000001 HC ACUTE MED/SURG PRIVATE ROOM

## 2021-01-17 PROCEDURE — 84100 ASSAY OF PHOSPHORUS: CPT

## 2021-01-17 PROCEDURE — 96376 TX/PRO/DX INJ SAME DRUG ADON: CPT

## 2021-01-17 PROCEDURE — 83690 ASSAY OF LIPASE: CPT

## 2021-01-17 PROCEDURE — 87040 BLOOD CULTURE FOR BACTERIA: CPT

## 2021-01-17 PROCEDURE — 85730 THROMBOPLASTIN TIME PARTIAL: CPT

## 2021-01-17 PROCEDURE — 63600175 PHARM REV CODE 636 W HCPCS: Performed by: INTERNAL MEDICINE

## 2021-01-17 PROCEDURE — 85379 FIBRIN DEGRADATION QUANT: CPT

## 2021-01-17 PROCEDURE — 96361 HYDRATE IV INFUSION ADD-ON: CPT

## 2021-01-17 PROCEDURE — 87502 INFLUENZA DNA AMP PROBE: CPT

## 2021-01-17 PROCEDURE — 85610 PROTHROMBIN TIME: CPT

## 2021-01-17 PROCEDURE — 81003 URINALYSIS AUTO W/O SCOPE: CPT

## 2021-01-17 PROCEDURE — 96375 TX/PRO/DX INJ NEW DRUG ADDON: CPT

## 2021-01-17 PROCEDURE — 83605 ASSAY OF LACTIC ACID: CPT

## 2021-01-17 PROCEDURE — 94660 CPAP INITIATION&MGMT: CPT

## 2021-01-17 PROCEDURE — 27000190 HC CPAP FULL FACE MASK W/VALVE

## 2021-01-17 PROCEDURE — 25000003 PHARM REV CODE 250: Performed by: INTERNAL MEDICINE

## 2021-01-17 PROCEDURE — 99291 CRITICAL CARE FIRST HOUR: CPT | Mod: 25

## 2021-01-17 PROCEDURE — 93005 ELECTROCARDIOGRAM TRACING: CPT

## 2021-01-17 PROCEDURE — 83605 ASSAY OF LACTIC ACID: CPT | Mod: 91

## 2021-01-17 RX ORDER — SODIUM CHLORIDE 9 MG/ML
INJECTION, SOLUTION INTRAVENOUS
Status: DISCONTINUED | OUTPATIENT
Start: 2021-01-17 | End: 2021-01-17

## 2021-01-17 RX ORDER — ATORVASTATIN CALCIUM 40 MG/1
80 TABLET, FILM COATED ORAL DAILY
Status: DISCONTINUED | OUTPATIENT
Start: 2021-01-18 | End: 2021-01-17

## 2021-01-17 RX ORDER — GLUCAGON 1 MG
1 KIT INJECTION
Status: DISCONTINUED | OUTPATIENT
Start: 2021-01-17 | End: 2021-01-26 | Stop reason: HOSPADM

## 2021-01-17 RX ORDER — MORPHINE SULFATE 2 MG/ML
4 INJECTION, SOLUTION INTRAMUSCULAR; INTRAVENOUS
Status: COMPLETED | OUTPATIENT
Start: 2021-01-17 | End: 2021-01-17

## 2021-01-17 RX ORDER — CLONAZEPAM 0.5 MG/1
0.5 TABLET ORAL 2 TIMES DAILY PRN
Status: DISCONTINUED | OUTPATIENT
Start: 2021-01-17 | End: 2021-01-26 | Stop reason: HOSPADM

## 2021-01-17 RX ORDER — DEXTROSE, SODIUM CHLORIDE, SODIUM LACTATE, POTASSIUM CHLORIDE, AND CALCIUM CHLORIDE 5; .6; .31; .03; .02 G/100ML; G/100ML; G/100ML; G/100ML; G/100ML
INJECTION, SOLUTION INTRAVENOUS CONTINUOUS
Status: DISCONTINUED | OUTPATIENT
Start: 2021-01-17 | End: 2021-01-20

## 2021-01-17 RX ORDER — SODIUM CHLORIDE 0.9 % (FLUSH) 0.9 %
10 SYRINGE (ML) INJECTION
Status: DISCONTINUED | OUTPATIENT
Start: 2021-01-17 | End: 2021-01-26 | Stop reason: HOSPADM

## 2021-01-17 RX ORDER — IBUPROFEN 200 MG
24 TABLET ORAL
Status: DISCONTINUED | OUTPATIENT
Start: 2021-01-17 | End: 2021-01-26 | Stop reason: HOSPADM

## 2021-01-17 RX ORDER — LEVOTHYROXINE SODIUM 50 UG/1
50 TABLET ORAL
Status: DISCONTINUED | OUTPATIENT
Start: 2021-01-18 | End: 2021-01-26 | Stop reason: HOSPADM

## 2021-01-17 RX ORDER — ASPIRIN 81 MG/1
81 TABLET ORAL DAILY
Status: DISCONTINUED | OUTPATIENT
Start: 2021-01-18 | End: 2021-01-17

## 2021-01-17 RX ORDER — DONEPEZIL HYDROCHLORIDE 5 MG/1
10 TABLET, FILM COATED ORAL DAILY
Status: DISCONTINUED | OUTPATIENT
Start: 2021-01-18 | End: 2021-01-17

## 2021-01-17 RX ORDER — AMLODIPINE BESYLATE 5 MG/1
10 TABLET ORAL DAILY
Status: DISCONTINUED | OUTPATIENT
Start: 2021-01-18 | End: 2021-01-17

## 2021-01-17 RX ORDER — PREGABALIN 100 MG/1
100 CAPSULE ORAL NIGHTLY
Status: DISCONTINUED | OUTPATIENT
Start: 2021-01-17 | End: 2021-01-17

## 2021-01-17 RX ORDER — ENOXAPARIN SODIUM 100 MG/ML
40 INJECTION SUBCUTANEOUS EVERY 24 HOURS
Status: DISCONTINUED | OUTPATIENT
Start: 2021-01-17 | End: 2021-01-26 | Stop reason: HOSPADM

## 2021-01-17 RX ORDER — INSULIN ASPART 100 [IU]/ML
0-5 INJECTION, SOLUTION INTRAVENOUS; SUBCUTANEOUS
Status: DISCONTINUED | OUTPATIENT
Start: 2021-01-17 | End: 2021-01-26 | Stop reason: HOSPADM

## 2021-01-17 RX ORDER — ISOSORBIDE MONONITRATE 30 MG/1
30 TABLET, EXTENDED RELEASE ORAL 2 TIMES DAILY
Status: DISCONTINUED | OUTPATIENT
Start: 2021-01-17 | End: 2021-01-17

## 2021-01-17 RX ORDER — ESCITALOPRAM OXALATE 10 MG/1
20 TABLET ORAL DAILY
Status: DISCONTINUED | OUTPATIENT
Start: 2021-01-18 | End: 2021-01-17

## 2021-01-17 RX ORDER — LOSARTAN POTASSIUM 50 MG/1
100 TABLET ORAL DAILY
Status: DISCONTINUED | OUTPATIENT
Start: 2021-01-18 | End: 2021-01-17

## 2021-01-17 RX ORDER — MORPHINE SULFATE 2 MG/ML
2 INJECTION, SOLUTION INTRAMUSCULAR; INTRAVENOUS
Status: COMPLETED | OUTPATIENT
Start: 2021-01-17 | End: 2021-01-17

## 2021-01-17 RX ORDER — IBUPROFEN 200 MG
16 TABLET ORAL
Status: DISCONTINUED | OUTPATIENT
Start: 2021-01-17 | End: 2021-01-26 | Stop reason: HOSPADM

## 2021-01-17 RX ORDER — FAMOTIDINE 20 MG/50ML
20 INJECTION, SOLUTION INTRAVENOUS DAILY
Status: DISCONTINUED | OUTPATIENT
Start: 2021-01-17 | End: 2021-01-19

## 2021-01-17 RX ADMIN — PIPERACILLIN AND TAZOBACTAM 4.5 G: 4; .5 INJECTION, POWDER, LYOPHILIZED, FOR SOLUTION INTRAVENOUS; PARENTERAL at 01:01

## 2021-01-17 RX ADMIN — AZITHROMYCIN MONOHYDRATE 500 MG: 500 INJECTION, POWDER, LYOPHILIZED, FOR SOLUTION INTRAVENOUS at 10:01

## 2021-01-17 RX ADMIN — MORPHINE SULFATE 4 MG: 2 INJECTION, SOLUTION INTRAMUSCULAR; INTRAVENOUS at 01:01

## 2021-01-17 RX ADMIN — SODIUM CHLORIDE, SODIUM LACTATE, POTASSIUM CHLORIDE, AND CALCIUM CHLORIDE 850 ML: .6; .31; .03; .02 INJECTION, SOLUTION INTRAVENOUS at 12:01

## 2021-01-17 RX ADMIN — MORPHINE SULFATE 2 MG: 2 INJECTION, SOLUTION INTRAMUSCULAR; INTRAVENOUS at 06:01

## 2021-01-17 RX ADMIN — FAMOTIDINE 20 MG: 20 INJECTION, SOLUTION INTRAVENOUS at 09:01

## 2021-01-17 RX ADMIN — ENOXAPARIN SODIUM 40 MG: 100 INJECTION SUBCUTANEOUS at 10:01

## 2021-01-18 PROBLEM — N17.9 AKI (ACUTE KIDNEY INJURY): Status: ACTIVE | Noted: 2021-01-18

## 2021-01-18 PROBLEM — J18.9 PNEUMONIA OF RIGHT LOWER LOBE DUE TO INFECTIOUS ORGANISM: Status: ACTIVE | Noted: 2021-01-18

## 2021-01-18 PROBLEM — J96.01 ACUTE HYPOXEMIC RESPIRATORY FAILURE: Status: ACTIVE | Noted: 2021-01-17

## 2021-01-18 PROBLEM — J18.9 RIGHT LOWER LOBE PNEUMONIA: Status: ACTIVE | Noted: 2021-01-18

## 2021-01-18 LAB
ALBUMIN SERPL BCP-MCNC: 2.2 G/DL (ref 3.5–5.2)
ANION GAP SERPL CALC-SCNC: 9 MMOL/L (ref 8–16)
BASOPHILS # BLD AUTO: 0.05 K/UL (ref 0–0.2)
BASOPHILS NFR BLD: 0.5 % (ref 0–1.9)
BUN SERPL-MCNC: 17 MG/DL (ref 8–23)
CALCIUM SERPL-MCNC: 8.3 MG/DL (ref 8.7–10.5)
CHLORIDE SERPL-SCNC: 102 MMOL/L (ref 95–110)
CO2 SERPL-SCNC: 29 MMOL/L (ref 23–29)
CREAT SERPL-MCNC: 0.9 MG/DL (ref 0.5–1.4)
DIFFERENTIAL METHOD: ABNORMAL
EOSINOPHIL # BLD AUTO: 0.2 K/UL (ref 0–0.5)
EOSINOPHIL NFR BLD: 1.6 % (ref 0–8)
ERYTHROCYTE [DISTWIDTH] IN BLOOD BY AUTOMATED COUNT: 16.3 % (ref 11.5–14.5)
EST. GFR  (AFRICAN AMERICAN): >60 ML/MIN/1.73 M^2
EST. GFR  (NON AFRICAN AMERICAN): >60 ML/MIN/1.73 M^2
GLUCOSE SERPL-MCNC: 99 MG/DL (ref 70–110)
HCT VFR BLD AUTO: 33.2 % (ref 37–48.5)
HGB BLD-MCNC: 10.3 G/DL (ref 12–16)
IMM GRANULOCYTES # BLD AUTO: 0.06 K/UL (ref 0–0.04)
IMM GRANULOCYTES NFR BLD AUTO: 0.5 % (ref 0–0.5)
LYMPHOCYTES # BLD AUTO: 0.5 K/UL (ref 1–4.8)
LYMPHOCYTES NFR BLD: 4.3 % (ref 18–48)
MCH RBC QN AUTO: 26.7 PG (ref 27–31)
MCHC RBC AUTO-ENTMCNC: 31 G/DL (ref 32–36)
MCV RBC AUTO: 86 FL (ref 82–98)
MONOCYTES # BLD AUTO: 0.7 K/UL (ref 0.3–1)
MONOCYTES NFR BLD: 6.4 % (ref 4–15)
NEUTROPHILS # BLD AUTO: 9.5 K/UL (ref 1.8–7.7)
NEUTROPHILS NFR BLD: 86.7 % (ref 38–73)
NRBC BLD-RTO: 0 /100 WBC
PHOSPHATE SERPL-MCNC: 2.6 MG/DL (ref 2.7–4.5)
PLATELET # BLD AUTO: 247 K/UL (ref 150–350)
PMV BLD AUTO: 11.5 FL (ref 9.2–12.9)
POCT GLUCOSE: 106 MG/DL (ref 70–110)
POCT GLUCOSE: 200 MG/DL (ref 70–110)
POCT GLUCOSE: 226 MG/DL (ref 70–110)
POTASSIUM SERPL-SCNC: 3.7 MMOL/L (ref 3.5–5.1)
RBC # BLD AUTO: 3.86 M/UL (ref 4–5.4)
SODIUM SERPL-SCNC: 140 MMOL/L (ref 136–145)
WBC # BLD AUTO: 11 K/UL (ref 3.9–12.7)

## 2021-01-18 PROCEDURE — S0028 INJECTION, FAMOTIDINE, 20 MG: HCPCS | Performed by: INTERNAL MEDICINE

## 2021-01-18 PROCEDURE — 63600175 PHARM REV CODE 636 W HCPCS: Performed by: INTERNAL MEDICINE

## 2021-01-18 PROCEDURE — 63600175 PHARM REV CODE 636 W HCPCS: Performed by: EMERGENCY MEDICINE

## 2021-01-18 PROCEDURE — 25000003 PHARM REV CODE 250: Performed by: INTERNAL MEDICINE

## 2021-01-18 PROCEDURE — 21400001 HC TELEMETRY ROOM

## 2021-01-18 PROCEDURE — 11000001 HC ACUTE MED/SURG PRIVATE ROOM

## 2021-01-18 PROCEDURE — 80069 RENAL FUNCTION PANEL: CPT

## 2021-01-18 PROCEDURE — 99900035 HC TECH TIME PER 15 MIN (STAT)

## 2021-01-18 PROCEDURE — 85025 COMPLETE CBC W/AUTO DIFF WBC: CPT

## 2021-01-18 PROCEDURE — 25000003 PHARM REV CODE 250: Performed by: EMERGENCY MEDICINE

## 2021-01-18 PROCEDURE — 94660 CPAP INITIATION&MGMT: CPT

## 2021-01-18 PROCEDURE — 36415 COLL VENOUS BLD VENIPUNCTURE: CPT

## 2021-01-18 RX ORDER — CYANOCOBALAMIN 1000 UG/ML
1000 INJECTION, SOLUTION INTRAMUSCULAR; SUBCUTANEOUS ONCE
Status: COMPLETED | OUTPATIENT
Start: 2021-01-18 | End: 2021-01-18

## 2021-01-18 RX ORDER — MORPHINE SULFATE 2 MG/ML
2 INJECTION, SOLUTION INTRAMUSCULAR; INTRAVENOUS EVERY 4 HOURS PRN
Status: DISCONTINUED | OUTPATIENT
Start: 2021-01-18 | End: 2021-01-19

## 2021-01-18 RX ADMIN — LEVOTHYROXINE SODIUM 50 MCG: 50 TABLET ORAL at 06:01

## 2021-01-18 RX ADMIN — DEXTROSE, SODIUM CHLORIDE, SODIUM LACTATE, POTASSIUM CHLORIDE, AND CALCIUM CHLORIDE 75 ML/HR: 5; .6; .31; .03; .02 INJECTION, SOLUTION INTRAVENOUS at 03:01

## 2021-01-18 RX ADMIN — MORPHINE SULFATE 2 MG: 2 INJECTION, SOLUTION INTRAMUSCULAR; INTRAVENOUS at 05:01

## 2021-01-18 RX ADMIN — FAMOTIDINE 20 MG: 20 INJECTION, SOLUTION INTRAVENOUS at 09:01

## 2021-01-18 RX ADMIN — MORPHINE SULFATE 2 MG: 2 INJECTION, SOLUTION INTRAMUSCULAR; INTRAVENOUS at 10:01

## 2021-01-18 RX ADMIN — CEFTRIAXONE 1 G: 1 INJECTION, SOLUTION INTRAVENOUS at 06:01

## 2021-01-18 RX ADMIN — MORPHINE SULFATE 2 MG: 2 INJECTION, SOLUTION INTRAMUSCULAR; INTRAVENOUS at 06:01

## 2021-01-18 RX ADMIN — INSULIN ASPART 1 UNITS: 100 INJECTION, SOLUTION INTRAVENOUS; SUBCUTANEOUS at 10:01

## 2021-01-18 RX ADMIN — CYANOCOBALAMIN 1000 MCG: 1000 INJECTION, SOLUTION INTRAMUSCULAR; SUBCUTANEOUS at 05:01

## 2021-01-19 ENCOUNTER — PATIENT MESSAGE (OUTPATIENT)
Dept: RADIATION ONCOLOGY | Facility: CLINIC | Age: 81
End: 2021-01-19

## 2021-01-19 LAB
ALBUMIN SERPL BCP-MCNC: 2.5 G/DL (ref 3.5–5.2)
ANION GAP SERPL CALC-SCNC: 13 MMOL/L (ref 8–16)
BASOPHILS # BLD AUTO: 0.05 K/UL (ref 0–0.2)
BASOPHILS NFR BLD: 0.4 % (ref 0–1.9)
BUN SERPL-MCNC: 7 MG/DL (ref 8–23)
CALCIUM SERPL-MCNC: 8.8 MG/DL (ref 8.7–10.5)
CHLORIDE SERPL-SCNC: 97 MMOL/L (ref 95–110)
CO2 SERPL-SCNC: 31 MMOL/L (ref 23–29)
CREAT SERPL-MCNC: 0.7 MG/DL (ref 0.5–1.4)
DIFFERENTIAL METHOD: ABNORMAL
EOSINOPHIL # BLD AUTO: 0 K/UL (ref 0–0.5)
EOSINOPHIL NFR BLD: 0.2 % (ref 0–8)
ERYTHROCYTE [DISTWIDTH] IN BLOOD BY AUTOMATED COUNT: 16 % (ref 11.5–14.5)
EST. GFR  (AFRICAN AMERICAN): >60 ML/MIN/1.73 M^2
EST. GFR  (NON AFRICAN AMERICAN): >60 ML/MIN/1.73 M^2
GLUCOSE SERPL-MCNC: 224 MG/DL (ref 70–110)
HCT VFR BLD AUTO: 37.2 % (ref 37–48.5)
HGB BLD-MCNC: 11.7 G/DL (ref 12–16)
IMM GRANULOCYTES # BLD AUTO: 0.1 K/UL (ref 0–0.04)
IMM GRANULOCYTES NFR BLD AUTO: 0.7 % (ref 0–0.5)
LYMPHOCYTES # BLD AUTO: 0.5 K/UL (ref 1–4.8)
LYMPHOCYTES NFR BLD: 3.4 % (ref 18–48)
MCH RBC QN AUTO: 26.9 PG (ref 27–31)
MCHC RBC AUTO-ENTMCNC: 31.5 G/DL (ref 32–36)
MCV RBC AUTO: 86 FL (ref 82–98)
MONOCYTES # BLD AUTO: 0.7 K/UL (ref 0.3–1)
MONOCYTES NFR BLD: 5.1 % (ref 4–15)
NEUTROPHILS # BLD AUTO: 12.8 K/UL (ref 1.8–7.7)
NEUTROPHILS NFR BLD: 90.2 % (ref 38–73)
NRBC BLD-RTO: 0 /100 WBC
PHOSPHATE SERPL-MCNC: 2 MG/DL (ref 2.7–4.5)
PLATELET # BLD AUTO: 310 K/UL (ref 150–350)
PMV BLD AUTO: 11.1 FL (ref 9.2–12.9)
POCT GLUCOSE: 185 MG/DL (ref 70–110)
POCT GLUCOSE: 218 MG/DL (ref 70–110)
POCT GLUCOSE: 233 MG/DL (ref 70–110)
POCT GLUCOSE: 255 MG/DL (ref 70–110)
POTASSIUM SERPL-SCNC: 3.3 MMOL/L (ref 3.5–5.1)
RBC # BLD AUTO: 4.35 M/UL (ref 4–5.4)
SODIUM SERPL-SCNC: 141 MMOL/L (ref 136–145)
TROPONIN I SERPL DL<=0.01 NG/ML-MCNC: 0.01 NG/ML (ref 0–0.03)
WBC # BLD AUTO: 14.14 K/UL (ref 3.9–12.7)

## 2021-01-19 PROCEDURE — 25000003 PHARM REV CODE 250: Performed by: INTERNAL MEDICINE

## 2021-01-19 PROCEDURE — 77014 HC CT GUIDANCE RADIATION THERAPY FLDS PLACEMENT: CPT | Mod: TC | Performed by: RADIOLOGY

## 2021-01-19 PROCEDURE — 85025 COMPLETE CBC W/AUTO DIFF WBC: CPT

## 2021-01-19 PROCEDURE — 93010 EKG 12-LEAD: ICD-10-PCS | Mod: ,,, | Performed by: INTERNAL MEDICINE

## 2021-01-19 PROCEDURE — 21400001 HC TELEMETRY ROOM

## 2021-01-19 PROCEDURE — 99223 PR INITIAL HOSPITAL CARE,LEVL III: ICD-10-PCS | Mod: ,,, | Performed by: FAMILY MEDICINE

## 2021-01-19 PROCEDURE — 63600175 PHARM REV CODE 636 W HCPCS: Performed by: INTERNAL MEDICINE

## 2021-01-19 PROCEDURE — 93005 ELECTROCARDIOGRAM TRACING: CPT

## 2021-01-19 PROCEDURE — 99221 1ST HOSP IP/OBS SF/LOW 40: CPT | Mod: ,,, | Performed by: RADIOLOGY

## 2021-01-19 PROCEDURE — 99221 PR INITIAL HOSPITAL CARE,LEVL I: ICD-10-PCS | Mod: ,,, | Performed by: RADIOLOGY

## 2021-01-19 PROCEDURE — 84484 ASSAY OF TROPONIN QUANT: CPT

## 2021-01-19 PROCEDURE — 77386 HC IMRT, COMPLEX: CPT | Performed by: RADIOLOGY

## 2021-01-19 PROCEDURE — 63600175 PHARM REV CODE 636 W HCPCS: Performed by: EMERGENCY MEDICINE

## 2021-01-19 PROCEDURE — 25000003 PHARM REV CODE 250: Performed by: EMERGENCY MEDICINE

## 2021-01-19 PROCEDURE — 36415 COLL VENOUS BLD VENIPUNCTURE: CPT

## 2021-01-19 PROCEDURE — A9698 NON-RAD CONTRAST MATERIALNOC: HCPCS | Performed by: PHYSICIAN ASSISTANT

## 2021-01-19 PROCEDURE — 25500020 PHARM REV CODE 255: Performed by: PHYSICIAN ASSISTANT

## 2021-01-19 PROCEDURE — 97162 PT EVAL MOD COMPLEX 30 MIN: CPT

## 2021-01-19 PROCEDURE — 80069 RENAL FUNCTION PANEL: CPT

## 2021-01-19 PROCEDURE — 99900035 HC TECH TIME PER 15 MIN (STAT)

## 2021-01-19 PROCEDURE — 77014 PR  CT GUIDANCE PLACEMENT RAD THERAPY FIELDS: CPT | Mod: 26,,, | Performed by: RADIOLOGY

## 2021-01-19 PROCEDURE — 11000001 HC ACUTE MED/SURG PRIVATE ROOM

## 2021-01-19 PROCEDURE — 99223 1ST HOSP IP/OBS HIGH 75: CPT | Mod: ,,, | Performed by: FAMILY MEDICINE

## 2021-01-19 PROCEDURE — 97166 OT EVAL MOD COMPLEX 45 MIN: CPT

## 2021-01-19 PROCEDURE — 93010 ELECTROCARDIOGRAM REPORT: CPT | Mod: ,,, | Performed by: INTERNAL MEDICINE

## 2021-01-19 PROCEDURE — 77014 PR  CT GUIDANCE PLACEMENT RAD THERAPY FIELDS: ICD-10-PCS | Mod: 26,,, | Performed by: RADIOLOGY

## 2021-01-19 PROCEDURE — 97530 THERAPEUTIC ACTIVITIES: CPT

## 2021-01-19 PROCEDURE — 25000003 PHARM REV CODE 250: Performed by: FAMILY MEDICINE

## 2021-01-19 RX ORDER — BISACODYL 10 MG
10 SUPPOSITORY, RECTAL RECTAL DAILY PRN
Status: DISCONTINUED | OUTPATIENT
Start: 2021-01-19 | End: 2021-01-19

## 2021-01-19 RX ORDER — MORPHINE SULFATE 2 MG/ML
2 INJECTION, SOLUTION INTRAMUSCULAR; INTRAVENOUS EVERY 4 HOURS PRN
Status: DISCONTINUED | OUTPATIENT
Start: 2021-01-19 | End: 2021-01-20

## 2021-01-19 RX ORDER — NITROGLYCERIN 0.4 MG/1
0.4 TABLET SUBLINGUAL EVERY 5 MIN PRN
Status: DISCONTINUED | OUTPATIENT
Start: 2021-01-19 | End: 2021-01-26 | Stop reason: HOSPADM

## 2021-01-19 RX ORDER — OXYCODONE AND ACETAMINOPHEN 10; 325 MG/1; MG/1
1 TABLET ORAL EVERY 4 HOURS PRN
Status: DISCONTINUED | OUTPATIENT
Start: 2021-01-19 | End: 2021-01-20

## 2021-01-19 RX ORDER — FENTANYL 12.5 UG/1
1 PATCH TRANSDERMAL
Status: DISCONTINUED | OUTPATIENT
Start: 2021-01-19 | End: 2021-01-22

## 2021-01-19 RX ADMIN — CEFTRIAXONE 1 G: 1 INJECTION, SOLUTION INTRAVENOUS at 05:01

## 2021-01-19 RX ADMIN — FOLIC ACID: 5 INJECTION, SOLUTION INTRAMUSCULAR; INTRAVENOUS; SUBCUTANEOUS at 10:01

## 2021-01-19 RX ADMIN — LEVOTHYROXINE SODIUM 50 MCG: 50 TABLET ORAL at 05:01

## 2021-01-19 RX ADMIN — INSULIN ASPART 2 UNITS: 100 INJECTION, SOLUTION INTRAVENOUS; SUBCUTANEOUS at 11:01

## 2021-01-19 RX ADMIN — INSULIN ASPART 3 UNITS: 100 INJECTION, SOLUTION INTRAVENOUS; SUBCUTANEOUS at 04:01

## 2021-01-19 RX ADMIN — POTASSIUM PHOSPHATE, MONOBASIC AND POTASSIUM PHOSPHATE, DIBASIC 20 MMOL: 224; 236 INJECTION, SOLUTION, CONCENTRATE INTRAVENOUS at 10:01

## 2021-01-19 RX ADMIN — AZITHROMYCIN MONOHYDRATE 500 MG: 500 INJECTION, POWDER, LYOPHILIZED, FOR SOLUTION INTRAVENOUS at 11:01

## 2021-01-19 RX ADMIN — BARIUM SULFATE 176 G: 960 POWDER, FOR SUSPENSION ORAL at 05:01

## 2021-01-19 RX ADMIN — INSULIN ASPART 2 UNITS: 100 INJECTION, SOLUTION INTRAVENOUS; SUBCUTANEOUS at 05:01

## 2021-01-19 RX ADMIN — FENTANYL 1 PATCH: 12 PATCH TRANSDERMAL at 01:01

## 2021-01-20 ENCOUNTER — SOCIAL WORK (OUTPATIENT)
Dept: HEMATOLOGY/ONCOLOGY | Facility: CLINIC | Age: 81
End: 2021-01-20

## 2021-01-20 ENCOUNTER — HOSPITAL ENCOUNTER (OUTPATIENT)
Dept: RADIOLOGY | Facility: HOSPITAL | Age: 81
Discharge: HOME | End: 2021-01-20
Attending: RADIOLOGY
Payer: MEDICARE

## 2021-01-20 ENCOUNTER — TELEPHONE (OUTPATIENT)
Dept: RADIATION ONCOLOGY | Facility: CLINIC | Age: 81
End: 2021-01-20

## 2021-01-20 ENCOUNTER — DOCUMENTATION ONLY (OUTPATIENT)
Dept: RADIATION ONCOLOGY | Facility: CLINIC | Age: 81
End: 2021-01-20

## 2021-01-20 VITALS
DIASTOLIC BLOOD PRESSURE: 64 MMHG | RESPIRATION RATE: 16 BRPM | SYSTOLIC BLOOD PRESSURE: 162 MMHG | OXYGEN SATURATION: 100 % | HEART RATE: 78 BPM

## 2021-01-20 DIAGNOSIS — C11.1 MALIGNANT NEOPLASM OF PHARYNGEAL TONSIL: ICD-10-CM

## 2021-01-20 LAB
ALBUMIN SERPL BCP-MCNC: 2.1 G/DL (ref 3.5–5.2)
ANION GAP SERPL CALC-SCNC: 8 MMOL/L (ref 8–16)
ANION GAP SERPL CALC-SCNC: 9 MMOL/L (ref 8–16)
BASOPHILS # BLD AUTO: 0.03 K/UL (ref 0–0.2)
BASOPHILS NFR BLD: 0.2 % (ref 0–1.9)
BUN SERPL-MCNC: 5 MG/DL (ref 8–23)
BUN SERPL-MCNC: 6 MG/DL (ref 8–23)
CALCIUM SERPL-MCNC: 7.4 MG/DL (ref 8.7–10.5)
CALCIUM SERPL-MCNC: 8.2 MG/DL (ref 8.7–10.5)
CHLORIDE SERPL-SCNC: 99 MMOL/L (ref 95–110)
CHLORIDE SERPL-SCNC: 99 MMOL/L (ref 95–110)
CO2 SERPL-SCNC: 31 MMOL/L (ref 23–29)
CO2 SERPL-SCNC: 31 MMOL/L (ref 23–29)
CREAT SERPL-MCNC: 0.7 MG/DL (ref 0.5–1.4)
CREAT SERPL-MCNC: 0.7 MG/DL (ref 0.5–1.4)
DIFFERENTIAL METHOD: ABNORMAL
EOSINOPHIL # BLD AUTO: 0.1 K/UL (ref 0–0.5)
EOSINOPHIL NFR BLD: 0.3 % (ref 0–8)
ERYTHROCYTE [DISTWIDTH] IN BLOOD BY AUTOMATED COUNT: 16 % (ref 11.5–14.5)
EST. GFR  (AFRICAN AMERICAN): >60 ML/MIN/1.73 M^2
EST. GFR  (AFRICAN AMERICAN): >60 ML/MIN/1.73 M^2
EST. GFR  (NON AFRICAN AMERICAN): >60 ML/MIN/1.73 M^2
EST. GFR  (NON AFRICAN AMERICAN): >60 ML/MIN/1.73 M^2
GLUCOSE SERPL-MCNC: 215 MG/DL (ref 70–110)
GLUCOSE SERPL-MCNC: 233 MG/DL (ref 70–110)
HCT VFR BLD AUTO: 35.1 % (ref 37–48.5)
HGB BLD-MCNC: 11.1 G/DL (ref 12–16)
IMM GRANULOCYTES # BLD AUTO: 0.14 K/UL (ref 0–0.04)
IMM GRANULOCYTES NFR BLD AUTO: 0.9 % (ref 0–0.5)
LYMPHOCYTES # BLD AUTO: 0.4 K/UL (ref 1–4.8)
LYMPHOCYTES NFR BLD: 2.8 % (ref 18–48)
MCH RBC QN AUTO: 26.5 PG (ref 27–31)
MCHC RBC AUTO-ENTMCNC: 31.6 G/DL (ref 32–36)
MCV RBC AUTO: 84 FL (ref 82–98)
MONOCYTES # BLD AUTO: 1 K/UL (ref 0.3–1)
MONOCYTES NFR BLD: 6.6 % (ref 4–15)
NEUTROPHILS # BLD AUTO: 13.3 K/UL (ref 1.8–7.7)
NEUTROPHILS NFR BLD: 89.2 % (ref 38–73)
NRBC BLD-RTO: 0 /100 WBC
PHOSPHATE SERPL-MCNC: 2.6 MG/DL (ref 2.7–4.5)
PLATELET # BLD AUTO: 306 K/UL (ref 150–350)
PMV BLD AUTO: 10.9 FL (ref 9.2–12.9)
POCT GLUCOSE: 195 MG/DL (ref 70–110)
POCT GLUCOSE: 225 MG/DL (ref 70–110)
POCT GLUCOSE: 277 MG/DL (ref 70–110)
POTASSIUM SERPL-SCNC: 2.9 MMOL/L (ref 3.5–5.1)
POTASSIUM SERPL-SCNC: 3.8 MMOL/L (ref 3.5–5.1)
RBC # BLD AUTO: 4.19 M/UL (ref 4–5.4)
SODIUM SERPL-SCNC: 138 MMOL/L (ref 136–145)
SODIUM SERPL-SCNC: 139 MMOL/L (ref 136–145)
WBC # BLD AUTO: 14.95 K/UL (ref 3.9–12.7)

## 2021-01-20 PROCEDURE — 25500020 PHARM REV CODE 255: Performed by: RADIOLOGY

## 2021-01-20 PROCEDURE — 77386 HC IMRT, COMPLEX: CPT | Performed by: RADIOLOGY

## 2021-01-20 PROCEDURE — 27201423 OPTIME MED/SURG SUP & DEVICES STERILE SUPPLY: Performed by: RADIOLOGY

## 2021-01-20 PROCEDURE — 25000242 PHARM REV CODE 250 ALT 637 W/ HCPCS: Performed by: EMERGENCY MEDICINE

## 2021-01-20 PROCEDURE — C1769 GUIDE WIRE: HCPCS | Performed by: RADIOLOGY

## 2021-01-20 PROCEDURE — 63600175 PHARM REV CODE 636 W HCPCS: Performed by: INTERNAL MEDICINE

## 2021-01-20 PROCEDURE — 77014 HC CT GUIDANCE RADIATION THERAPY FLDS PLACEMENT: CPT | Mod: TC | Performed by: RADIOLOGY

## 2021-01-20 PROCEDURE — 97802 MEDICAL NUTRITION INDIV IN: CPT

## 2021-01-20 PROCEDURE — 85025 COMPLETE CBC W/AUTO DIFF WBC: CPT

## 2021-01-20 PROCEDURE — 36415 COLL VENOUS BLD VENIPUNCTURE: CPT

## 2021-01-20 PROCEDURE — 25000003 PHARM REV CODE 250: Performed by: EMERGENCY MEDICINE

## 2021-01-20 PROCEDURE — 99233 PR SUBSEQUENT HOSPITAL CARE,LEVL III: ICD-10-PCS | Mod: ,,, | Performed by: PHYSICIAN ASSISTANT

## 2021-01-20 PROCEDURE — 77014 PR  CT GUIDANCE PLACEMENT RAD THERAPY FIELDS: ICD-10-PCS | Mod: 26,,, | Performed by: RADIOLOGY

## 2021-01-20 PROCEDURE — 63600175 PHARM REV CODE 636 W HCPCS: Performed by: EMERGENCY MEDICINE

## 2021-01-20 PROCEDURE — 99233 SBSQ HOSP IP/OBS HIGH 50: CPT | Mod: ,,, | Performed by: PHYSICIAN ASSISTANT

## 2021-01-20 PROCEDURE — 77014 PR  CT GUIDANCE PLACEMENT RAD THERAPY FIELDS: CPT | Mod: 26,,, | Performed by: RADIOLOGY

## 2021-01-20 PROCEDURE — 80069 RENAL FUNCTION PANEL: CPT

## 2021-01-20 PROCEDURE — 80048 BASIC METABOLIC PNL TOTAL CA: CPT

## 2021-01-20 PROCEDURE — 99152 MOD SED SAME PHYS/QHP 5/>YRS: CPT | Performed by: RADIOLOGY

## 2021-01-20 PROCEDURE — 21400001 HC TELEMETRY ROOM

## 2021-01-20 PROCEDURE — 63600175 PHARM REV CODE 636 W HCPCS: Performed by: RADIOLOGY

## 2021-01-20 PROCEDURE — 77336 RADIATION PHYSICS CONSULT: CPT | Performed by: RADIOLOGY

## 2021-01-20 PROCEDURE — 49440 PLACE GASTROSTOMY TUBE PERC: CPT | Performed by: RADIOLOGY

## 2021-01-20 PROCEDURE — 63600175 PHARM REV CODE 636 W HCPCS: Performed by: NURSE PRACTITIONER

## 2021-01-20 RX ORDER — POTASSIUM CHLORIDE 7.45 MG/ML
10 INJECTION INTRAVENOUS
Status: COMPLETED | OUTPATIENT
Start: 2021-01-20 | End: 2021-01-20

## 2021-01-20 RX ORDER — ONDANSETRON 2 MG/ML
4 INJECTION INTRAMUSCULAR; INTRAVENOUS EVERY 6 HOURS PRN
Status: DISCONTINUED | OUTPATIENT
Start: 2021-01-20 | End: 2021-01-26 | Stop reason: HOSPADM

## 2021-01-20 RX ORDER — SODIUM CHLORIDE AND POTASSIUM CHLORIDE 150; 900 MG/100ML; MG/100ML
INJECTION, SOLUTION INTRAVENOUS CONTINUOUS
Status: DISCONTINUED | OUTPATIENT
Start: 2021-01-20 | End: 2021-01-24

## 2021-01-20 RX ORDER — OXYCODONE HCL 5 MG/5 ML
5 SOLUTION, ORAL ORAL EVERY 4 HOURS PRN
Status: DISCONTINUED | OUTPATIENT
Start: 2021-01-20 | End: 2021-01-21

## 2021-01-20 RX ORDER — MIDAZOLAM HYDROCHLORIDE 1 MG/ML
INJECTION INTRAMUSCULAR; INTRAVENOUS CODE/TRAUMA/SEDATION MEDICATION
Status: COMPLETED | OUTPATIENT
Start: 2021-01-20 | End: 2021-01-20

## 2021-01-20 RX ORDER — POTASSIUM CHLORIDE 7.45 MG/ML
10 INJECTION INTRAVENOUS
Status: DISCONTINUED | OUTPATIENT
Start: 2021-01-20 | End: 2021-01-20

## 2021-01-20 RX ORDER — FENTANYL CITRATE 50 UG/ML
INJECTION, SOLUTION INTRAMUSCULAR; INTRAVENOUS CODE/TRAUMA/SEDATION MEDICATION
Status: COMPLETED | OUTPATIENT
Start: 2021-01-20 | End: 2021-01-20

## 2021-01-20 RX ORDER — LOPERAMIDE HYDROCHLORIDE 2 MG/1
4 CAPSULE ORAL 4 TIMES DAILY PRN
Status: DISCONTINUED | OUTPATIENT
Start: 2021-01-20 | End: 2021-01-26 | Stop reason: HOSPADM

## 2021-01-20 RX ADMIN — OXYCODONE HYDROCHLORIDE 5 MG: 5 SOLUTION ORAL at 03:01

## 2021-01-20 RX ADMIN — POTASSIUM CHLORIDE 10 MEQ: 7.46 INJECTION, SOLUTION INTRAVENOUS at 12:01

## 2021-01-20 RX ADMIN — SODIUM CHLORIDE AND POTASSIUM CHLORIDE 75 ML/HR: 9; 1.49 INJECTION, SOLUTION INTRAVENOUS at 11:01

## 2021-01-20 RX ADMIN — INSULIN ASPART 3 UNITS: 100 INJECTION, SOLUTION INTRAVENOUS; SUBCUTANEOUS at 04:01

## 2021-01-20 RX ADMIN — FENTANYL CITRATE 25 MCG: 50 INJECTION, SOLUTION INTRAMUSCULAR; INTRAVENOUS at 10:01

## 2021-01-20 RX ADMIN — POTASSIUM CHLORIDE 10 MEQ: 7.46 INJECTION, SOLUTION INTRAVENOUS at 11:01

## 2021-01-20 RX ADMIN — OXYCODONE HYDROCHLORIDE 5 MG: 5 SOLUTION ORAL at 08:01

## 2021-01-20 RX ADMIN — INSULIN ASPART 3 UNITS: 100 INJECTION, SOLUTION INTRAVENOUS; SUBCUTANEOUS at 11:01

## 2021-01-20 RX ADMIN — MIDAZOLAM HYDROCHLORIDE 0.5 MG: 1 INJECTION INTRAMUSCULAR; INTRAVENOUS at 10:01

## 2021-01-20 RX ADMIN — POTASSIUM PHOSPHATE, MONOBASIC AND POTASSIUM PHOSPHATE, DIBASIC 20 MMOL: 224; 236 INJECTION, SOLUTION, CONCENTRATE INTRAVENOUS at 11:01

## 2021-01-20 RX ADMIN — POTASSIUM CHLORIDE 10 MEQ: 7.46 INJECTION, SOLUTION INTRAVENOUS at 01:01

## 2021-01-20 RX ADMIN — CEFTRIAXONE 1 G: 1 INJECTION, SOLUTION INTRAVENOUS at 06:01

## 2021-01-20 RX ADMIN — ONDANSETRON 4 MG: 2 INJECTION, SOLUTION INTRAMUSCULAR; INTRAVENOUS at 06:01

## 2021-01-20 RX ADMIN — ENOXAPARIN SODIUM 40 MG: 100 INJECTION SUBCUTANEOUS at 04:01

## 2021-01-20 RX ADMIN — IOHEXOL 20 ML: 240 INJECTION, SOLUTION INTRATHECAL; INTRAVASCULAR; INTRAVENOUS; ORAL at 11:01

## 2021-01-21 ENCOUNTER — PATIENT MESSAGE (OUTPATIENT)
Dept: PALLIATIVE MEDICINE | Facility: CLINIC | Age: 81
End: 2021-01-21

## 2021-01-21 PROBLEM — E87.6 HYPOKALEMIA: Status: RESOLVED | Noted: 2018-07-22 | Resolved: 2021-01-21

## 2021-01-21 PROBLEM — R13.12 OROPHARYNGEAL DYSPHAGIA: Status: ACTIVE | Noted: 2021-01-21

## 2021-01-21 LAB
ALBUMIN SERPL BCP-MCNC: 1.8 G/DL (ref 3.5–5.2)
ANION GAP SERPL CALC-SCNC: 8 MMOL/L (ref 8–16)
BASOPHILS # BLD AUTO: 0.03 K/UL (ref 0–0.2)
BASOPHILS NFR BLD: 0.2 % (ref 0–1.9)
BUN SERPL-MCNC: 7 MG/DL (ref 8–23)
CALCIUM SERPL-MCNC: 7.8 MG/DL (ref 8.7–10.5)
CHLORIDE SERPL-SCNC: 101 MMOL/L (ref 95–110)
CO2 SERPL-SCNC: 32 MMOL/L (ref 23–29)
CREAT SERPL-MCNC: 0.7 MG/DL (ref 0.5–1.4)
DIFFERENTIAL METHOD: ABNORMAL
EOSINOPHIL # BLD AUTO: 0.1 K/UL (ref 0–0.5)
EOSINOPHIL NFR BLD: 0.5 % (ref 0–8)
ERYTHROCYTE [DISTWIDTH] IN BLOOD BY AUTOMATED COUNT: 15.9 % (ref 11.5–14.5)
EST. GFR  (AFRICAN AMERICAN): >60 ML/MIN/1.73 M^2
EST. GFR  (NON AFRICAN AMERICAN): >60 ML/MIN/1.73 M^2
GLUCOSE SERPL-MCNC: 193 MG/DL (ref 70–110)
HCT VFR BLD AUTO: 34.3 % (ref 37–48.5)
HGB BLD-MCNC: 10.7 G/DL (ref 12–16)
IMM GRANULOCYTES # BLD AUTO: 0.08 K/UL (ref 0–0.04)
IMM GRANULOCYTES NFR BLD AUTO: 0.6 % (ref 0–0.5)
LYMPHOCYTES # BLD AUTO: 0.4 K/UL (ref 1–4.8)
LYMPHOCYTES NFR BLD: 2.8 % (ref 18–48)
MCH RBC QN AUTO: 26.7 PG (ref 27–31)
MCHC RBC AUTO-ENTMCNC: 31.2 G/DL (ref 32–36)
MCV RBC AUTO: 86 FL (ref 82–98)
MONOCYTES # BLD AUTO: 1.1 K/UL (ref 0.3–1)
MONOCYTES NFR BLD: 8.9 % (ref 4–15)
NEUTROPHILS # BLD AUTO: 11.2 K/UL (ref 1.8–7.7)
NEUTROPHILS NFR BLD: 87 % (ref 38–73)
NRBC BLD-RTO: 0 /100 WBC
PHOSPHATE SERPL-MCNC: 3 MG/DL (ref 2.7–4.5)
PLATELET # BLD AUTO: 269 K/UL (ref 150–350)
PMV BLD AUTO: 10.9 FL (ref 9.2–12.9)
POCT GLUCOSE: 179 MG/DL (ref 70–110)
POCT GLUCOSE: 196 MG/DL (ref 70–110)
POCT GLUCOSE: 206 MG/DL (ref 70–110)
POCT GLUCOSE: 270 MG/DL (ref 70–110)
POTASSIUM SERPL-SCNC: 4 MMOL/L (ref 3.5–5.1)
RBC # BLD AUTO: 4.01 M/UL (ref 4–5.4)
SODIUM SERPL-SCNC: 141 MMOL/L (ref 136–145)
WBC # BLD AUTO: 12.82 K/UL (ref 3.9–12.7)

## 2021-01-21 PROCEDURE — 77014 PR  CT GUIDANCE PLACEMENT RAD THERAPY FIELDS: ICD-10-PCS | Mod: 26,,, | Performed by: RADIOLOGY

## 2021-01-21 PROCEDURE — 99233 SBSQ HOSP IP/OBS HIGH 50: CPT | Mod: ,,, | Performed by: PHYSICIAN ASSISTANT

## 2021-01-21 PROCEDURE — 25000242 PHARM REV CODE 250 ALT 637 W/ HCPCS: Performed by: EMERGENCY MEDICINE

## 2021-01-21 PROCEDURE — 25000242 PHARM REV CODE 250 ALT 637 W/ HCPCS: Performed by: PHYSICIAN ASSISTANT

## 2021-01-21 PROCEDURE — 77014 PR  CT GUIDANCE PLACEMENT RAD THERAPY FIELDS: CPT | Mod: 26,,, | Performed by: RADIOLOGY

## 2021-01-21 PROCEDURE — 63600175 PHARM REV CODE 636 W HCPCS: Performed by: EMERGENCY MEDICINE

## 2021-01-21 PROCEDURE — 36415 COLL VENOUS BLD VENIPUNCTURE: CPT

## 2021-01-21 PROCEDURE — 80069 RENAL FUNCTION PANEL: CPT

## 2021-01-21 PROCEDURE — 99233 PR SUBSEQUENT HOSPITAL CARE,LEVL III: ICD-10-PCS | Mod: ,,, | Performed by: PHYSICIAN ASSISTANT

## 2021-01-21 PROCEDURE — 85025 COMPLETE CBC W/AUTO DIFF WBC: CPT

## 2021-01-21 PROCEDURE — 63600175 PHARM REV CODE 636 W HCPCS: Performed by: PHYSICIAN ASSISTANT

## 2021-01-21 PROCEDURE — 25000003 PHARM REV CODE 250: Performed by: INTERNAL MEDICINE

## 2021-01-21 PROCEDURE — 21400001 HC TELEMETRY ROOM

## 2021-01-21 PROCEDURE — 77014 HC CT GUIDANCE RADIATION THERAPY FLDS PLACEMENT: CPT | Mod: TC | Performed by: RADIOLOGY

## 2021-01-21 PROCEDURE — 63600175 PHARM REV CODE 636 W HCPCS: Performed by: INTERNAL MEDICINE

## 2021-01-21 PROCEDURE — 77386 HC IMRT, COMPLEX: CPT | Performed by: RADIOLOGY

## 2021-01-21 RX ORDER — OXYCODONE HCL 5 MG/5 ML
10 SOLUTION, ORAL ORAL EVERY 4 HOURS PRN
Status: DISCONTINUED | OUTPATIENT
Start: 2021-01-21 | End: 2021-01-26 | Stop reason: HOSPADM

## 2021-01-21 RX ORDER — HYDROMORPHONE HYDROCHLORIDE 1 MG/ML
0.5 INJECTION, SOLUTION INTRAMUSCULAR; INTRAVENOUS; SUBCUTANEOUS EVERY 6 HOURS PRN
Status: DISCONTINUED | OUTPATIENT
Start: 2021-01-21 | End: 2021-01-26 | Stop reason: HOSPADM

## 2021-01-21 RX ADMIN — ENOXAPARIN SODIUM 40 MG: 100 INJECTION SUBCUTANEOUS at 05:01

## 2021-01-21 RX ADMIN — OXYCODONE HYDROCHLORIDE 10 MG: 5 SOLUTION ORAL at 08:01

## 2021-01-21 RX ADMIN — OXYCODONE HYDROCHLORIDE 5 MG: 5 SOLUTION ORAL at 01:01

## 2021-01-21 RX ADMIN — INSULIN ASPART 2 UNITS: 100 INJECTION, SOLUTION INTRAVENOUS; SUBCUTANEOUS at 05:01

## 2021-01-21 RX ADMIN — HYDROMORPHONE HYDROCHLORIDE 0.5 MG: 1 INJECTION, SOLUTION INTRAMUSCULAR; INTRAVENOUS; SUBCUTANEOUS at 12:01

## 2021-01-21 RX ADMIN — OXYCODONE HYDROCHLORIDE 5 MG: 5 SOLUTION ORAL at 05:01

## 2021-01-21 RX ADMIN — OXYCODONE HYDROCHLORIDE 10 MG: 5 SOLUTION ORAL at 04:01

## 2021-01-21 RX ADMIN — CEFTRIAXONE 1 G: 1 INJECTION, SOLUTION INTRAVENOUS at 05:01

## 2021-01-21 RX ADMIN — LEVOTHYROXINE SODIUM 50 MCG: 50 TABLET ORAL at 05:01

## 2021-01-21 RX ADMIN — SODIUM CHLORIDE AND POTASSIUM CHLORIDE 75 ML/HR: 9; 1.49 INJECTION, SOLUTION INTRAVENOUS at 01:01

## 2021-01-21 RX ADMIN — SODIUM CHLORIDE AND POTASSIUM CHLORIDE 75 ML/HR: 9; 1.49 INJECTION, SOLUTION INTRAVENOUS at 06:01

## 2021-01-21 RX ADMIN — OXYCODONE HYDROCHLORIDE 10 MG: 5 SOLUTION ORAL at 10:01

## 2021-01-22 PROBLEM — K59.03 THERAPEUTIC OPIOID-INDUCED CONSTIPATION (OIC): Status: ACTIVE | Noted: 2021-01-22

## 2021-01-22 PROBLEM — T40.2X5A THERAPEUTIC OPIOID-INDUCED CONSTIPATION (OIC): Status: ACTIVE | Noted: 2021-01-22

## 2021-01-22 PROBLEM — L29.9 PRURITUS: Status: ACTIVE | Noted: 2021-01-22

## 2021-01-22 LAB
ALBUMIN SERPL BCP-MCNC: 1.6 G/DL (ref 3.5–5.2)
ANION GAP SERPL CALC-SCNC: 11 MMOL/L (ref 8–16)
BACTERIA BLD CULT: NORMAL
BACTERIA BLD CULT: NORMAL
BASOPHILS # BLD AUTO: 0.04 K/UL (ref 0–0.2)
BASOPHILS NFR BLD: 0.2 % (ref 0–1.9)
BUN SERPL-MCNC: 10 MG/DL (ref 8–23)
CALCIUM SERPL-MCNC: 7.6 MG/DL (ref 8.7–10.5)
CHLORIDE SERPL-SCNC: 104 MMOL/L (ref 95–110)
CO2 SERPL-SCNC: 26 MMOL/L (ref 23–29)
CREAT SERPL-MCNC: 0.7 MG/DL (ref 0.5–1.4)
DIFFERENTIAL METHOD: ABNORMAL
EOSINOPHIL # BLD AUTO: 0.1 K/UL (ref 0–0.5)
EOSINOPHIL NFR BLD: 0.6 % (ref 0–8)
ERYTHROCYTE [DISTWIDTH] IN BLOOD BY AUTOMATED COUNT: 15.8 % (ref 11.5–14.5)
EST. GFR  (AFRICAN AMERICAN): >60 ML/MIN/1.73 M^2
EST. GFR  (NON AFRICAN AMERICAN): >60 ML/MIN/1.73 M^2
GLUCOSE SERPL-MCNC: 244 MG/DL (ref 70–110)
HCT VFR BLD AUTO: 33.7 % (ref 37–48.5)
HGB BLD-MCNC: 10.7 G/DL (ref 12–16)
IMM GRANULOCYTES # BLD AUTO: 0.24 K/UL (ref 0–0.04)
IMM GRANULOCYTES NFR BLD AUTO: 1.4 % (ref 0–0.5)
LYMPHOCYTES # BLD AUTO: 0.5 K/UL (ref 1–4.8)
LYMPHOCYTES NFR BLD: 2.8 % (ref 18–48)
MCH RBC QN AUTO: 26.7 PG (ref 27–31)
MCHC RBC AUTO-ENTMCNC: 31.8 G/DL (ref 32–36)
MCV RBC AUTO: 84 FL (ref 82–98)
MONOCYTES # BLD AUTO: 1.3 K/UL (ref 0.3–1)
MONOCYTES NFR BLD: 8 % (ref 4–15)
NEUTROPHILS # BLD AUTO: 14.5 K/UL (ref 1.8–7.7)
NEUTROPHILS NFR BLD: 87 % (ref 38–73)
NRBC BLD-RTO: 0 /100 WBC
PHOSPHATE SERPL-MCNC: 2.4 MG/DL (ref 2.7–4.5)
PLATELET # BLD AUTO: 227 K/UL (ref 150–350)
PLATELET BLD QL SMEAR: ABNORMAL
PMV BLD AUTO: 11.5 FL (ref 9.2–12.9)
POCT GLUCOSE: 247 MG/DL (ref 70–110)
POCT GLUCOSE: 337 MG/DL (ref 70–110)
POTASSIUM SERPL-SCNC: 4.4 MMOL/L (ref 3.5–5.1)
RBC # BLD AUTO: 4.01 M/UL (ref 4–5.4)
SODIUM SERPL-SCNC: 141 MMOL/L (ref 136–145)
WBC # BLD AUTO: 16.67 K/UL (ref 3.9–12.7)

## 2021-01-22 PROCEDURE — 63600175 PHARM REV CODE 636 W HCPCS: Performed by: INTERNAL MEDICINE

## 2021-01-22 PROCEDURE — 63600175 PHARM REV CODE 636 W HCPCS: Performed by: NURSE PRACTITIONER

## 2021-01-22 PROCEDURE — 77014 HC CT GUIDANCE RADIATION THERAPY FLDS PLACEMENT: CPT | Mod: TC | Performed by: RADIOLOGY

## 2021-01-22 PROCEDURE — 25000242 PHARM REV CODE 250 ALT 637 W/ HCPCS: Performed by: PHYSICIAN ASSISTANT

## 2021-01-22 PROCEDURE — 99233 PR SUBSEQUENT HOSPITAL CARE,LEVL III: ICD-10-PCS | Mod: ,,, | Performed by: PHYSICIAN ASSISTANT

## 2021-01-22 PROCEDURE — 80069 RENAL FUNCTION PANEL: CPT

## 2021-01-22 PROCEDURE — 96372 THER/PROPH/DIAG INJ SC/IM: CPT

## 2021-01-22 PROCEDURE — 25000003 PHARM REV CODE 250: Performed by: PHYSICIAN ASSISTANT

## 2021-01-22 PROCEDURE — 63600175 PHARM REV CODE 636 W HCPCS: Performed by: PHYSICIAN ASSISTANT

## 2021-01-22 PROCEDURE — 77014 PR  CT GUIDANCE PLACEMENT RAD THERAPY FIELDS: ICD-10-PCS | Mod: 26,,, | Performed by: RADIOLOGY

## 2021-01-22 PROCEDURE — 85025 COMPLETE CBC W/AUTO DIFF WBC: CPT

## 2021-01-22 PROCEDURE — 25000003 PHARM REV CODE 250: Performed by: INTERNAL MEDICINE

## 2021-01-22 PROCEDURE — 77014 PR  CT GUIDANCE PLACEMENT RAD THERAPY FIELDS: CPT | Mod: 26,,, | Performed by: RADIOLOGY

## 2021-01-22 PROCEDURE — 36415 COLL VENOUS BLD VENIPUNCTURE: CPT

## 2021-01-22 PROCEDURE — 63600175 PHARM REV CODE 636 W HCPCS: Performed by: EMERGENCY MEDICINE

## 2021-01-22 PROCEDURE — 99233 SBSQ HOSP IP/OBS HIGH 50: CPT | Mod: ,,, | Performed by: PHYSICIAN ASSISTANT

## 2021-01-22 PROCEDURE — 25000003 PHARM REV CODE 250: Performed by: NURSE PRACTITIONER

## 2021-01-22 PROCEDURE — 77386 HC IMRT, COMPLEX: CPT | Performed by: RADIOLOGY

## 2021-01-22 PROCEDURE — 21400001 HC TELEMETRY ROOM

## 2021-01-22 RX ORDER — ATORVASTATIN CALCIUM 40 MG/1
80 TABLET, FILM COATED ORAL DAILY
Status: DISCONTINUED | OUTPATIENT
Start: 2021-01-23 | End: 2021-01-26 | Stop reason: HOSPADM

## 2021-01-22 RX ORDER — HYDROXYZINE HYDROCHLORIDE 25 MG/1
25 TABLET, FILM COATED ORAL 3 TIMES DAILY PRN
Status: DISCONTINUED | OUTPATIENT
Start: 2021-01-22 | End: 2021-01-26 | Stop reason: HOSPADM

## 2021-01-22 RX ORDER — AMLODIPINE BESYLATE 10 MG/1
10 TABLET ORAL DAILY
Status: DISCONTINUED | OUTPATIENT
Start: 2021-01-22 | End: 2021-01-26 | Stop reason: HOSPADM

## 2021-01-22 RX ORDER — SENNOSIDES 8.6 MG/1
8.6 TABLET ORAL NIGHTLY
Status: DISCONTINUED | OUTPATIENT
Start: 2021-01-22 | End: 2021-01-22

## 2021-01-22 RX ORDER — KETOROLAC TROMETHAMINE 30 MG/ML
15 INJECTION, SOLUTION INTRAMUSCULAR; INTRAVENOUS EVERY 6 HOURS
Status: COMPLETED | OUTPATIENT
Start: 2021-01-22 | End: 2021-01-22

## 2021-01-22 RX ORDER — POLYETHYLENE GLYCOL 3350 17 G/17G
17 POWDER, FOR SOLUTION ORAL DAILY
Status: DISCONTINUED | OUTPATIENT
Start: 2021-01-22 | End: 2021-01-22

## 2021-01-22 RX ORDER — FENTANYL 25 UG/1
1 PATCH TRANSDERMAL
Status: DISCONTINUED | OUTPATIENT
Start: 2021-01-22 | End: 2021-01-26 | Stop reason: HOSPADM

## 2021-01-22 RX ORDER — INSULIN ASPART 100 [IU]/ML
20 INJECTION, SUSPENSION SUBCUTANEOUS
Status: DISCONTINUED | OUTPATIENT
Start: 2021-01-22 | End: 2021-01-26 | Stop reason: HOSPADM

## 2021-01-22 RX ORDER — LOSARTAN POTASSIUM 50 MG/1
100 TABLET ORAL DAILY
Status: DISCONTINUED | OUTPATIENT
Start: 2021-01-23 | End: 2021-01-26 | Stop reason: HOSPADM

## 2021-01-22 RX ORDER — HYDRALAZINE HYDROCHLORIDE 20 MG/ML
10 INJECTION INTRAMUSCULAR; INTRAVENOUS EVERY 6 HOURS PRN
Status: DISCONTINUED | OUTPATIENT
Start: 2021-01-22 | End: 2021-01-26 | Stop reason: HOSPADM

## 2021-01-22 RX ORDER — ESCITALOPRAM OXALATE 10 MG/1
20 TABLET ORAL DAILY
Status: DISCONTINUED | OUTPATIENT
Start: 2021-01-23 | End: 2021-01-26 | Stop reason: HOSPADM

## 2021-01-22 RX ADMIN — POLYETHYLENE GLYCOL 3350 17 G: 17 POWDER, FOR SOLUTION ORAL at 02:01

## 2021-01-22 RX ADMIN — INSULIN ASPART 4 UNITS: 100 INJECTION, SOLUTION INTRAVENOUS; SUBCUTANEOUS at 02:01

## 2021-01-22 RX ADMIN — AMLODIPINE BESYLATE 10 MG: 10 TABLET ORAL at 04:01

## 2021-01-22 RX ADMIN — OXYCODONE HYDROCHLORIDE 10 MG: 5 SOLUTION ORAL at 04:01

## 2021-01-22 RX ADMIN — INSULIN ASPART 2 UNITS: 100 INJECTION, SOLUTION INTRAVENOUS; SUBCUTANEOUS at 11:01

## 2021-01-22 RX ADMIN — HYDROMORPHONE HYDROCHLORIDE 0.5 MG: 1 INJECTION, SOLUTION INTRAMUSCULAR; INTRAVENOUS; SUBCUTANEOUS at 10:01

## 2021-01-22 RX ADMIN — KETOROLAC TROMETHAMINE 15 MG: 30 INJECTION, SOLUTION INTRAMUSCULAR at 11:01

## 2021-01-22 RX ADMIN — SODIUM CHLORIDE AND POTASSIUM CHLORIDE 75 ML/HR: 9; 1.49 INJECTION, SOLUTION INTRAVENOUS at 09:01

## 2021-01-22 RX ADMIN — OXYCODONE HYDROCHLORIDE 10 MG: 5 SOLUTION ORAL at 12:01

## 2021-01-22 RX ADMIN — OXYCODONE HYDROCHLORIDE 10 MG: 5 SOLUTION ORAL at 01:01

## 2021-01-22 RX ADMIN — INSULIN ASPART 20 UNITS: 100 INJECTION, SUSPENSION SUBCUTANEOUS at 06:01

## 2021-01-22 RX ADMIN — LEVOTHYROXINE SODIUM 50 MCG: 50 TABLET ORAL at 05:01

## 2021-01-22 RX ADMIN — OXYCODONE HYDROCHLORIDE 10 MG: 5 SOLUTION ORAL at 09:01

## 2021-01-22 RX ADMIN — FENTANYL 1 PATCH: 25 PATCH, EXTENDED RELEASE TRANSDERMAL at 04:01

## 2021-01-22 RX ADMIN — OXYCODONE HYDROCHLORIDE 10 MG: 5 SOLUTION ORAL at 06:01

## 2021-01-22 RX ADMIN — HYDRALAZINE HYDROCHLORIDE 10 MG: 20 INJECTION, SOLUTION INTRAMUSCULAR; INTRAVENOUS at 04:01

## 2021-01-22 RX ADMIN — KETOROLAC TROMETHAMINE 15 MG: 30 INJECTION, SOLUTION INTRAMUSCULAR at 02:01

## 2021-01-22 RX ADMIN — CEFTRIAXONE 1 G: 1 INJECTION, SOLUTION INTRAVENOUS at 05:01

## 2021-01-22 RX ADMIN — HYDRALAZINE HYDROCHLORIDE 10 MG: 20 INJECTION, SOLUTION INTRAMUSCULAR; INTRAVENOUS at 12:01

## 2021-01-22 RX ADMIN — ENOXAPARIN SODIUM 40 MG: 100 INJECTION SUBCUTANEOUS at 04:01

## 2021-01-23 PROBLEM — Z93.1 S/P PERCUTANEOUS ENDOSCOPIC GASTROSTOMY (PEG) TUBE PLACEMENT: Status: ACTIVE | Noted: 2021-01-23

## 2021-01-23 LAB
ALBUMIN SERPL BCP-MCNC: 1.5 G/DL (ref 3.5–5.2)
ANION GAP SERPL CALC-SCNC: 8 MMOL/L (ref 8–16)
BASOPHILS # BLD AUTO: 0.08 K/UL (ref 0–0.2)
BASOPHILS NFR BLD: 0.5 % (ref 0–1.9)
BUN SERPL-MCNC: 11 MG/DL (ref 8–23)
CALCIUM SERPL-MCNC: 7.9 MG/DL (ref 8.7–10.5)
CHLORIDE SERPL-SCNC: 101 MMOL/L (ref 95–110)
CO2 SERPL-SCNC: 32 MMOL/L (ref 23–29)
CREAT SERPL-MCNC: 0.7 MG/DL (ref 0.5–1.4)
DIFFERENTIAL METHOD: ABNORMAL
EOSINOPHIL # BLD AUTO: 0.3 K/UL (ref 0–0.5)
EOSINOPHIL NFR BLD: 1.6 % (ref 0–8)
ERYTHROCYTE [DISTWIDTH] IN BLOOD BY AUTOMATED COUNT: 15.7 % (ref 11.5–14.5)
EST. GFR  (AFRICAN AMERICAN): >60 ML/MIN/1.73 M^2
EST. GFR  (NON AFRICAN AMERICAN): >60 ML/MIN/1.73 M^2
GLUCOSE SERPL-MCNC: 222 MG/DL (ref 70–110)
HCT VFR BLD AUTO: 32.7 % (ref 37–48.5)
HGB BLD-MCNC: 10.1 G/DL (ref 12–16)
IMM GRANULOCYTES # BLD AUTO: 0.15 K/UL (ref 0–0.04)
IMM GRANULOCYTES NFR BLD AUTO: 1 % (ref 0–0.5)
LYMPHOCYTES # BLD AUTO: 0.5 K/UL (ref 1–4.8)
LYMPHOCYTES NFR BLD: 2.9 % (ref 18–48)
MCH RBC QN AUTO: 26.5 PG (ref 27–31)
MCHC RBC AUTO-ENTMCNC: 30.9 G/DL (ref 32–36)
MCV RBC AUTO: 86 FL (ref 82–98)
MONOCYTES # BLD AUTO: 1 K/UL (ref 0.3–1)
MONOCYTES NFR BLD: 6.5 % (ref 4–15)
NEUTROPHILS # BLD AUTO: 13.5 K/UL (ref 1.8–7.7)
NEUTROPHILS NFR BLD: 87.5 % (ref 38–73)
NRBC BLD-RTO: 0 /100 WBC
PHOSPHATE SERPL-MCNC: 1.8 MG/DL (ref 2.7–4.5)
PLATELET # BLD AUTO: 276 K/UL (ref 150–350)
PMV BLD AUTO: 10.6 FL (ref 9.2–12.9)
POTASSIUM SERPL-SCNC: 4.3 MMOL/L (ref 3.5–5.1)
RBC # BLD AUTO: 3.81 M/UL (ref 4–5.4)
SODIUM SERPL-SCNC: 141 MMOL/L (ref 136–145)
WBC # BLD AUTO: 15.42 K/UL (ref 3.9–12.7)

## 2021-01-23 PROCEDURE — 36415 COLL VENOUS BLD VENIPUNCTURE: CPT

## 2021-01-23 PROCEDURE — 27000221 HC OXYGEN, UP TO 24 HOURS

## 2021-01-23 PROCEDURE — 25000242 PHARM REV CODE 250 ALT 637 W/ HCPCS: Performed by: PHYSICIAN ASSISTANT

## 2021-01-23 PROCEDURE — 63600175 PHARM REV CODE 636 W HCPCS: Performed by: EMERGENCY MEDICINE

## 2021-01-23 PROCEDURE — 80069 RENAL FUNCTION PANEL: CPT

## 2021-01-23 PROCEDURE — 25000003 PHARM REV CODE 250: Performed by: NURSE PRACTITIONER

## 2021-01-23 PROCEDURE — 63600175 PHARM REV CODE 636 W HCPCS: Performed by: NURSE PRACTITIONER

## 2021-01-23 PROCEDURE — 25000003 PHARM REV CODE 250: Performed by: INTERNAL MEDICINE

## 2021-01-23 PROCEDURE — 94640 AIRWAY INHALATION TREATMENT: CPT

## 2021-01-23 PROCEDURE — 21400001 HC TELEMETRY ROOM

## 2021-01-23 PROCEDURE — 63600175 PHARM REV CODE 636 W HCPCS: Performed by: INTERNAL MEDICINE

## 2021-01-23 PROCEDURE — 25000242 PHARM REV CODE 250 ALT 637 W/ HCPCS: Performed by: NURSE PRACTITIONER

## 2021-01-23 PROCEDURE — 85025 COMPLETE CBC W/AUTO DIFF WBC: CPT

## 2021-01-23 PROCEDURE — 25000242 PHARM REV CODE 250 ALT 637 W/ HCPCS: Performed by: INTERNAL MEDICINE

## 2021-01-23 PROCEDURE — 94761 N-INVAS EAR/PLS OXIMETRY MLT: CPT

## 2021-01-23 PROCEDURE — 99900035 HC TECH TIME PER 15 MIN (STAT)

## 2021-01-23 RX ORDER — CHOLESTYRAMINE 4 G/9G
2 POWDER, FOR SUSPENSION ORAL 2 TIMES DAILY
Status: DISCONTINUED | OUTPATIENT
Start: 2021-01-23 | End: 2021-01-23

## 2021-01-23 RX ORDER — CHOLESTYRAMINE 4 G/9G
2 POWDER, FOR SUSPENSION ORAL 2 TIMES DAILY
Status: DISCONTINUED | OUTPATIENT
Start: 2021-01-23 | End: 2021-01-26 | Stop reason: HOSPADM

## 2021-01-23 RX ORDER — BUDESONIDE 0.5 MG/2ML
0.5 INHALANT ORAL EVERY 12 HOURS
Status: DISCONTINUED | OUTPATIENT
Start: 2021-01-23 | End: 2021-01-26 | Stop reason: HOSPADM

## 2021-01-23 RX ORDER — NYSTATIN 100000 [USP'U]/ML
500000 SUSPENSION ORAL 4 TIMES DAILY
Status: DISCONTINUED | OUTPATIENT
Start: 2021-01-23 | End: 2021-01-26 | Stop reason: HOSPADM

## 2021-01-23 RX ORDER — SODIUM,POTASSIUM PHOSPHATES 280-250MG
2 POWDER IN PACKET (EA) ORAL ONCE
Status: COMPLETED | OUTPATIENT
Start: 2021-01-23 | End: 2021-01-23

## 2021-01-23 RX ORDER — HYDRALAZINE HYDROCHLORIDE 20 MG/ML
10 INJECTION INTRAMUSCULAR; INTRAVENOUS ONCE
Status: COMPLETED | OUTPATIENT
Start: 2021-01-23 | End: 2021-01-23

## 2021-01-23 RX ORDER — IPRATROPIUM BROMIDE AND ALBUTEROL SULFATE 2.5; .5 MG/3ML; MG/3ML
3 SOLUTION RESPIRATORY (INHALATION) EVERY 4 HOURS PRN
Status: DISCONTINUED | OUTPATIENT
Start: 2021-01-23 | End: 2021-01-26 | Stop reason: HOSPADM

## 2021-01-23 RX ORDER — IPRATROPIUM BROMIDE AND ALBUTEROL SULFATE 2.5; .5 MG/3ML; MG/3ML
3 SOLUTION RESPIRATORY (INHALATION) EVERY 6 HOURS
Status: DISCONTINUED | OUTPATIENT
Start: 2021-01-23 | End: 2021-01-26 | Stop reason: HOSPADM

## 2021-01-23 RX ADMIN — ESCITALOPRAM OXALATE 20 MG: 10 TABLET ORAL at 09:01

## 2021-01-23 RX ADMIN — NYSTATIN 500000 UNITS: 100000 SUSPENSION ORAL at 08:01

## 2021-01-23 RX ADMIN — SODIUM CHLORIDE AND POTASSIUM CHLORIDE: 9; 1.49 INJECTION, SOLUTION INTRAVENOUS at 03:01

## 2021-01-23 RX ADMIN — IPRATROPIUM BROMIDE AND ALBUTEROL SULFATE 3 ML: .5; 3 SOLUTION RESPIRATORY (INHALATION) at 12:01

## 2021-01-23 RX ADMIN — OXYCODONE HYDROCHLORIDE 10 MG: 5 SOLUTION ORAL at 09:01

## 2021-01-23 RX ADMIN — OXYCODONE HYDROCHLORIDE 10 MG: 5 SOLUTION ORAL at 02:01

## 2021-01-23 RX ADMIN — LEVOTHYROXINE SODIUM 50 MCG: 50 TABLET ORAL at 05:01

## 2021-01-23 RX ADMIN — INSULIN ASPART 4 UNITS: 100 INJECTION, SOLUTION INTRAVENOUS; SUBCUTANEOUS at 01:01

## 2021-01-23 RX ADMIN — NYSTATIN 500000 UNITS: 100000 SUSPENSION ORAL at 01:01

## 2021-01-23 RX ADMIN — HYDRALAZINE HYDROCHLORIDE 10 MG: 20 INJECTION, SOLUTION INTRAMUSCULAR; INTRAVENOUS at 06:01

## 2021-01-23 RX ADMIN — OXYCODONE HYDROCHLORIDE 10 MG: 5 SOLUTION ORAL at 03:01

## 2021-01-23 RX ADMIN — LOSARTAN POTASSIUM 100 MG: 50 TABLET, FILM COATED ORAL at 08:01

## 2021-01-23 RX ADMIN — INSULIN ASPART 20 UNITS: 100 INJECTION, SUSPENSION SUBCUTANEOUS at 09:01

## 2021-01-23 RX ADMIN — ATORVASTATIN CALCIUM 80 MG: 40 TABLET, FILM COATED ORAL at 09:01

## 2021-01-23 RX ADMIN — NYSTATIN 500000 UNITS: 100000 SUSPENSION ORAL at 05:01

## 2021-01-23 RX ADMIN — ENOXAPARIN SODIUM 40 MG: 100 INJECTION SUBCUTANEOUS at 05:01

## 2021-01-23 RX ADMIN — HYDRALAZINE HYDROCHLORIDE 10 MG: 20 INJECTION, SOLUTION INTRAMUSCULAR; INTRAVENOUS at 09:01

## 2021-01-23 RX ADMIN — BUDESONIDE 0.5 MG: 0.5 SUSPENSION RESPIRATORY (INHALATION) at 07:01

## 2021-01-23 RX ADMIN — CHOLESTYRAMINE 8 G: 4 POWDER, FOR SUSPENSION ORAL at 08:01

## 2021-01-23 RX ADMIN — POTASSIUM & SODIUM PHOSPHATES POWDER PACK 280-160-250 MG 2 PACKET: 280-160-250 PACK at 01:01

## 2021-01-23 RX ADMIN — INSULIN ASPART 2 UNITS: 100 INJECTION, SOLUTION INTRAVENOUS; SUBCUTANEOUS at 10:01

## 2021-01-23 RX ADMIN — IPRATROPIUM BROMIDE AND ALBUTEROL SULFATE 3 ML: .5; 3 SOLUTION RESPIRATORY (INHALATION) at 07:01

## 2021-01-23 RX ADMIN — CHOLESTYRAMINE 8 G: 4 POWDER, FOR SUSPENSION ORAL at 01:01

## 2021-01-23 RX ADMIN — INSULIN ASPART 20 UNITS: 100 INJECTION, SUSPENSION SUBCUTANEOUS at 05:01

## 2021-01-23 RX ADMIN — AMLODIPINE BESYLATE 10 MG: 10 TABLET ORAL at 08:01

## 2021-01-23 RX ADMIN — IPRATROPIUM BROMIDE AND ALBUTEROL SULFATE 3 ML: .5; 2.5 SOLUTION RESPIRATORY (INHALATION) at 09:01

## 2021-01-23 RX ADMIN — BUDESONIDE 0.5 MG: 0.5 SUSPENSION RESPIRATORY (INHALATION) at 12:01

## 2021-01-23 RX ADMIN — HYDRALAZINE HYDROCHLORIDE 10 MG: 20 INJECTION, SOLUTION INTRAMUSCULAR; INTRAVENOUS at 08:01

## 2021-01-23 RX ADMIN — INSULIN ASPART 4 UNITS: 100 INJECTION, SOLUTION INTRAVENOUS; SUBCUTANEOUS at 05:01

## 2021-01-23 RX ADMIN — SODIUM CHLORIDE AND POTASSIUM CHLORIDE: 9; 1.49 INJECTION, SOLUTION INTRAVENOUS at 01:01

## 2021-01-24 LAB
ALBUMIN SERPL BCP-MCNC: 1.6 G/DL (ref 3.5–5.2)
ANION GAP SERPL CALC-SCNC: 12 MMOL/L (ref 8–16)
BASOPHILS # BLD AUTO: 0.03 K/UL (ref 0–0.2)
BASOPHILS NFR BLD: 0.2 % (ref 0–1.9)
BUN SERPL-MCNC: 10 MG/DL (ref 8–23)
CALCIUM SERPL-MCNC: 7.9 MG/DL (ref 8.7–10.5)
CHLORIDE SERPL-SCNC: 103 MMOL/L (ref 95–110)
CO2 SERPL-SCNC: 26 MMOL/L (ref 23–29)
CREAT SERPL-MCNC: 0.6 MG/DL (ref 0.5–1.4)
DIFFERENTIAL METHOD: ABNORMAL
EOSINOPHIL # BLD AUTO: 0.1 K/UL (ref 0–0.5)
EOSINOPHIL NFR BLD: 0.7 % (ref 0–8)
ERYTHROCYTE [DISTWIDTH] IN BLOOD BY AUTOMATED COUNT: 15.9 % (ref 11.5–14.5)
EST. GFR  (AFRICAN AMERICAN): >60 ML/MIN/1.73 M^2
EST. GFR  (NON AFRICAN AMERICAN): >60 ML/MIN/1.73 M^2
GLUCOSE SERPL-MCNC: 165 MG/DL (ref 70–110)
HCT VFR BLD AUTO: 33.1 % (ref 37–48.5)
HGB BLD-MCNC: 10.2 G/DL (ref 12–16)
IMM GRANULOCYTES # BLD AUTO: 0.15 K/UL (ref 0–0.04)
IMM GRANULOCYTES NFR BLD AUTO: 1.1 % (ref 0–0.5)
LYMPHOCYTES # BLD AUTO: 0.4 K/UL (ref 1–4.8)
LYMPHOCYTES NFR BLD: 2.6 % (ref 18–48)
MCH RBC QN AUTO: 26.4 PG (ref 27–31)
MCHC RBC AUTO-ENTMCNC: 30.8 G/DL (ref 32–36)
MCV RBC AUTO: 86 FL (ref 82–98)
MONOCYTES # BLD AUTO: 0.7 K/UL (ref 0.3–1)
MONOCYTES NFR BLD: 5.2 % (ref 4–15)
NEUTROPHILS # BLD AUTO: 12.4 K/UL (ref 1.8–7.7)
NEUTROPHILS NFR BLD: 90.2 % (ref 38–73)
NRBC BLD-RTO: 0 /100 WBC
PHOSPHATE SERPL-MCNC: 2.2 MG/DL (ref 2.7–4.5)
PLATELET # BLD AUTO: 291 K/UL (ref 150–350)
PMV BLD AUTO: 10.9 FL (ref 9.2–12.9)
POTASSIUM SERPL-SCNC: 4.1 MMOL/L (ref 3.5–5.1)
RBC # BLD AUTO: 3.86 M/UL (ref 4–5.4)
SODIUM SERPL-SCNC: 141 MMOL/L (ref 136–145)
WBC # BLD AUTO: 13.78 K/UL (ref 3.9–12.7)

## 2021-01-24 PROCEDURE — 63600175 PHARM REV CODE 636 W HCPCS: Performed by: EMERGENCY MEDICINE

## 2021-01-24 PROCEDURE — 63600175 PHARM REV CODE 636 W HCPCS: Performed by: NURSE PRACTITIONER

## 2021-01-24 PROCEDURE — 94799 UNLISTED PULMONARY SVC/PX: CPT

## 2021-01-24 PROCEDURE — 25000242 PHARM REV CODE 250 ALT 637 W/ HCPCS: Performed by: PHYSICIAN ASSISTANT

## 2021-01-24 PROCEDURE — 94640 AIRWAY INHALATION TREATMENT: CPT

## 2021-01-24 PROCEDURE — 80069 RENAL FUNCTION PANEL: CPT

## 2021-01-24 PROCEDURE — 25000242 PHARM REV CODE 250 ALT 637 W/ HCPCS: Performed by: INTERNAL MEDICINE

## 2021-01-24 PROCEDURE — 94761 N-INVAS EAR/PLS OXIMETRY MLT: CPT

## 2021-01-24 PROCEDURE — 96372 THER/PROPH/DIAG INJ SC/IM: CPT

## 2021-01-24 PROCEDURE — 36415 COLL VENOUS BLD VENIPUNCTURE: CPT

## 2021-01-24 PROCEDURE — 85025 COMPLETE CBC W/AUTO DIFF WBC: CPT

## 2021-01-24 PROCEDURE — 63600175 PHARM REV CODE 636 W HCPCS: Performed by: INTERNAL MEDICINE

## 2021-01-24 PROCEDURE — 25000242 PHARM REV CODE 250 ALT 637 W/ HCPCS: Performed by: NURSE PRACTITIONER

## 2021-01-24 PROCEDURE — 25000003 PHARM REV CODE 250: Performed by: INTERNAL MEDICINE

## 2021-01-24 PROCEDURE — 25000003 PHARM REV CODE 250: Performed by: NURSE PRACTITIONER

## 2021-01-24 PROCEDURE — 21400001 HC TELEMETRY ROOM

## 2021-01-24 PROCEDURE — 27000221 HC OXYGEN, UP TO 24 HOURS

## 2021-01-24 PROCEDURE — 99900035 HC TECH TIME PER 15 MIN (STAT)

## 2021-01-24 RX ORDER — KETOROLAC TROMETHAMINE 30 MG/ML
15 INJECTION, SOLUTION INTRAMUSCULAR; INTRAVENOUS EVERY 6 HOURS PRN
Status: DISCONTINUED | OUTPATIENT
Start: 2021-01-24 | End: 2021-01-24

## 2021-01-24 RX ADMIN — NYSTATIN 500000 UNITS: 100000 SUSPENSION ORAL at 05:01

## 2021-01-24 RX ADMIN — CHOLESTYRAMINE 8 G: 4 POWDER, FOR SUSPENSION ORAL at 09:01

## 2021-01-24 RX ADMIN — AMLODIPINE BESYLATE 10 MG: 10 TABLET ORAL at 09:01

## 2021-01-24 RX ADMIN — SODIUM CHLORIDE AND POTASSIUM CHLORIDE: 9; 1.49 INJECTION, SOLUTION INTRAVENOUS at 02:01

## 2021-01-24 RX ADMIN — ENOXAPARIN SODIUM 40 MG: 100 INJECTION SUBCUTANEOUS at 05:01

## 2021-01-24 RX ADMIN — IPRATROPIUM BROMIDE AND ALBUTEROL SULFATE 3 ML: .5; 3 SOLUTION RESPIRATORY (INHALATION) at 07:01

## 2021-01-24 RX ADMIN — HYDRALAZINE HYDROCHLORIDE 10 MG: 20 INJECTION, SOLUTION INTRAMUSCULAR; INTRAVENOUS at 06:01

## 2021-01-24 RX ADMIN — LEVOTHYROXINE SODIUM 50 MCG: 50 TABLET ORAL at 06:01

## 2021-01-24 RX ADMIN — INSULIN ASPART 20 UNITS: 100 INJECTION, SUSPENSION SUBCUTANEOUS at 06:01

## 2021-01-24 RX ADMIN — KETOROLAC TROMETHAMINE 15 MG: 30 INJECTION, SOLUTION INTRAMUSCULAR at 12:01

## 2021-01-24 RX ADMIN — INSULIN ASPART 1 UNITS: 100 INJECTION, SOLUTION INTRAVENOUS; SUBCUTANEOUS at 06:01

## 2021-01-24 RX ADMIN — NYSTATIN 500000 UNITS: 100000 SUSPENSION ORAL at 09:01

## 2021-01-24 RX ADMIN — OXYCODONE HYDROCHLORIDE 10 MG: 5 SOLUTION ORAL at 04:01

## 2021-01-24 RX ADMIN — IPRATROPIUM BROMIDE AND ALBUTEROL SULFATE 3 ML: .5; 3 SOLUTION RESPIRATORY (INHALATION) at 12:01

## 2021-01-24 RX ADMIN — NYSTATIN 500000 UNITS: 100000 SUSPENSION ORAL at 03:01

## 2021-01-24 RX ADMIN — LOSARTAN POTASSIUM 100 MG: 50 TABLET, FILM COATED ORAL at 09:01

## 2021-01-24 RX ADMIN — BUDESONIDE 0.5 MG: 0.5 SUSPENSION RESPIRATORY (INHALATION) at 07:01

## 2021-01-24 RX ADMIN — IPRATROPIUM BROMIDE AND ALBUTEROL SULFATE 3 ML: .5; 3 SOLUTION RESPIRATORY (INHALATION) at 01:01

## 2021-01-24 RX ADMIN — ATORVASTATIN CALCIUM 80 MG: 40 TABLET, FILM COATED ORAL at 09:01

## 2021-01-24 RX ADMIN — ESCITALOPRAM OXALATE 20 MG: 10 TABLET ORAL at 09:01

## 2021-01-24 RX ADMIN — INSULIN ASPART 20 UNITS: 100 INJECTION, SUSPENSION SUBCUTANEOUS at 05:01

## 2021-01-25 LAB
ALBUMIN SERPL BCP-MCNC: 1.6 G/DL (ref 3.5–5.2)
ANION GAP SERPL CALC-SCNC: 9 MMOL/L (ref 8–16)
BASOPHILS # BLD AUTO: 0.06 K/UL (ref 0–0.2)
BASOPHILS NFR BLD: 0.5 % (ref 0–1.9)
BUN SERPL-MCNC: 8 MG/DL (ref 8–23)
CALCIUM SERPL-MCNC: 8.8 MG/DL (ref 8.7–10.5)
CHLORIDE SERPL-SCNC: 103 MMOL/L (ref 95–110)
CO2 SERPL-SCNC: 27 MMOL/L (ref 23–29)
CREAT SERPL-MCNC: 0.6 MG/DL (ref 0.5–1.4)
DIFFERENTIAL METHOD: ABNORMAL
EOSINOPHIL # BLD AUTO: 0.2 K/UL (ref 0–0.5)
EOSINOPHIL NFR BLD: 1.8 % (ref 0–8)
ERYTHROCYTE [DISTWIDTH] IN BLOOD BY AUTOMATED COUNT: 15.8 % (ref 11.5–14.5)
EST. GFR  (AFRICAN AMERICAN): >60 ML/MIN/1.73 M^2
EST. GFR  (NON AFRICAN AMERICAN): >60 ML/MIN/1.73 M^2
GLUCOSE SERPL-MCNC: 91 MG/DL (ref 70–110)
HCT VFR BLD AUTO: 33.3 % (ref 37–48.5)
HGB BLD-MCNC: 10.3 G/DL (ref 12–16)
IMM GRANULOCYTES # BLD AUTO: 0.11 K/UL (ref 0–0.04)
IMM GRANULOCYTES NFR BLD AUTO: 0.8 % (ref 0–0.5)
LYMPHOCYTES # BLD AUTO: 0.5 K/UL (ref 1–4.8)
LYMPHOCYTES NFR BLD: 3.5 % (ref 18–48)
MCH RBC QN AUTO: 26.4 PG (ref 27–31)
MCHC RBC AUTO-ENTMCNC: 30.9 G/DL (ref 32–36)
MCV RBC AUTO: 85 FL (ref 82–98)
MONOCYTES # BLD AUTO: 0.8 K/UL (ref 0.3–1)
MONOCYTES NFR BLD: 6.3 % (ref 4–15)
NEUTROPHILS # BLD AUTO: 11.4 K/UL (ref 1.8–7.7)
NEUTROPHILS NFR BLD: 87.1 % (ref 38–73)
NRBC BLD-RTO: 0 /100 WBC
PHOSPHATE SERPL-MCNC: 3.2 MG/DL (ref 2.7–4.5)
PLATELET # BLD AUTO: 310 K/UL (ref 150–350)
PMV BLD AUTO: 11.2 FL (ref 9.2–12.9)
POTASSIUM SERPL-SCNC: 3.9 MMOL/L (ref 3.5–5.1)
RBC # BLD AUTO: 3.9 M/UL (ref 4–5.4)
SODIUM SERPL-SCNC: 139 MMOL/L (ref 136–145)
WBC # BLD AUTO: 13.05 K/UL (ref 3.9–12.7)

## 2021-01-25 PROCEDURE — 99900037 HC PT THERAPY SCREENING (STAT): Performed by: PHYSICAL THERAPIST

## 2021-01-25 PROCEDURE — 97116 GAIT TRAINING THERAPY: CPT | Performed by: PHYSICAL THERAPIST

## 2021-01-25 PROCEDURE — 85025 COMPLETE CBC W/AUTO DIFF WBC: CPT

## 2021-01-25 PROCEDURE — 63600175 PHARM REV CODE 636 W HCPCS: Performed by: INTERNAL MEDICINE

## 2021-01-25 PROCEDURE — 99233 PR SUBSEQUENT HOSPITAL CARE,LEVL III: ICD-10-PCS | Mod: ,,, | Performed by: PHYSICIAN ASSISTANT

## 2021-01-25 PROCEDURE — 63600175 PHARM REV CODE 636 W HCPCS: Performed by: PHYSICIAN ASSISTANT

## 2021-01-25 PROCEDURE — 77014 HC CT GUIDANCE RADIATION THERAPY FLDS PLACEMENT: CPT | Mod: TC | Performed by: RADIOLOGY

## 2021-01-25 PROCEDURE — 63600175 PHARM REV CODE 636 W HCPCS: Performed by: NURSE PRACTITIONER

## 2021-01-25 PROCEDURE — 80069 RENAL FUNCTION PANEL: CPT

## 2021-01-25 PROCEDURE — 77014 PR  CT GUIDANCE PLACEMENT RAD THERAPY FIELDS: CPT | Mod: 26,,, | Performed by: RADIOLOGY

## 2021-01-25 PROCEDURE — 25000003 PHARM REV CODE 250: Performed by: PHYSICIAN ASSISTANT

## 2021-01-25 PROCEDURE — 99900035 HC TECH TIME PER 15 MIN (STAT)

## 2021-01-25 PROCEDURE — 99233 SBSQ HOSP IP/OBS HIGH 50: CPT | Mod: ,,, | Performed by: PHYSICIAN ASSISTANT

## 2021-01-25 PROCEDURE — 25000242 PHARM REV CODE 250 ALT 637 W/ HCPCS: Performed by: PHYSICIAN ASSISTANT

## 2021-01-25 PROCEDURE — 77014 PR  CT GUIDANCE PLACEMENT RAD THERAPY FIELDS: ICD-10-PCS | Mod: 26,,, | Performed by: RADIOLOGY

## 2021-01-25 PROCEDURE — 94640 AIRWAY INHALATION TREATMENT: CPT

## 2021-01-25 PROCEDURE — 25000003 PHARM REV CODE 250: Performed by: NURSE PRACTITIONER

## 2021-01-25 PROCEDURE — 94761 N-INVAS EAR/PLS OXIMETRY MLT: CPT

## 2021-01-25 PROCEDURE — 77386 HC IMRT, COMPLEX: CPT | Performed by: RADIOLOGY

## 2021-01-25 PROCEDURE — 97168 OT RE-EVAL EST PLAN CARE: CPT

## 2021-01-25 PROCEDURE — 94799 UNLISTED PULMONARY SVC/PX: CPT

## 2021-01-25 PROCEDURE — 21400001 HC TELEMETRY ROOM

## 2021-01-25 PROCEDURE — 97162 PT EVAL MOD COMPLEX 30 MIN: CPT | Performed by: PHYSICAL THERAPIST

## 2021-01-25 PROCEDURE — 36415 COLL VENOUS BLD VENIPUNCTURE: CPT

## 2021-01-25 PROCEDURE — 97530 THERAPEUTIC ACTIVITIES: CPT

## 2021-01-25 PROCEDURE — 27000221 HC OXYGEN, UP TO 24 HOURS

## 2021-01-25 PROCEDURE — 25000003 PHARM REV CODE 250: Performed by: INTERNAL MEDICINE

## 2021-01-25 PROCEDURE — 97166 OT EVAL MOD COMPLEX 45 MIN: CPT

## 2021-01-25 PROCEDURE — 25000242 PHARM REV CODE 250 ALT 637 W/ HCPCS: Performed by: NURSE PRACTITIONER

## 2021-01-25 PROCEDURE — 25000242 PHARM REV CODE 250 ALT 637 W/ HCPCS: Performed by: INTERNAL MEDICINE

## 2021-01-25 RX ORDER — IPRATROPIUM BROMIDE AND ALBUTEROL SULFATE 2.5; .5 MG/3ML; MG/3ML
3 SOLUTION RESPIRATORY (INHALATION) EVERY 6 HOURS
Qty: 90 ML | Refills: 0 | Status: SHIPPED | OUTPATIENT
Start: 2021-01-25 | End: 2021-02-03 | Stop reason: SDUPTHER

## 2021-01-25 RX ORDER — CHOLESTYRAMINE 4 G/9G
2 POWDER, FOR SUSPENSION ORAL 2 TIMES DAILY
Qty: 360 PACKET | Refills: 3 | Status: SHIPPED | OUTPATIENT
Start: 2021-01-25 | End: 2021-02-01 | Stop reason: SDUPTHER

## 2021-01-25 RX ORDER — OXYCODONE HCL 5 MG/5 ML
5 SOLUTION, ORAL ORAL EVERY 4 HOURS PRN
Qty: 210 ML | Refills: 0 | Status: SHIPPED | OUTPATIENT
Start: 2021-01-25 | End: 2021-02-01

## 2021-01-25 RX ORDER — BUDESONIDE 0.5 MG/2ML
0.5 INHALANT ORAL EVERY 12 HOURS
Qty: 120 ML | Refills: 0 | Status: SHIPPED | OUTPATIENT
Start: 2021-01-25 | End: 2021-05-10

## 2021-01-25 RX ORDER — FENTANYL 25 UG/1
1 PATCH TRANSDERMAL
Qty: 10 PATCH | Refills: 0 | Status: SHIPPED | OUTPATIENT
Start: 2021-01-25 | End: 2021-02-03

## 2021-01-25 RX ORDER — NYSTATIN 100000 [USP'U]/ML
500000 SUSPENSION ORAL 4 TIMES DAILY
Qty: 200 ML | Refills: 0 | Status: SHIPPED | OUTPATIENT
Start: 2021-01-25 | End: 2021-02-04

## 2021-01-25 RX ORDER — HYDROXYZINE HYDROCHLORIDE 25 MG/1
25 TABLET, FILM COATED ORAL 3 TIMES DAILY PRN
Qty: 90 TABLET | Refills: 0 | Status: SHIPPED | OUTPATIENT
Start: 2021-01-25 | End: 2021-02-11 | Stop reason: SDUPTHER

## 2021-01-25 RX ADMIN — IPRATROPIUM BROMIDE AND ALBUTEROL SULFATE 3 ML: .5; 3 SOLUTION RESPIRATORY (INHALATION) at 12:01

## 2021-01-25 RX ADMIN — HYDRALAZINE HYDROCHLORIDE 10 MG: 20 INJECTION, SOLUTION INTRAMUSCULAR; INTRAVENOUS at 07:01

## 2021-01-25 RX ADMIN — NYSTATIN 500000 UNITS: 100000 SUSPENSION ORAL at 05:01

## 2021-01-25 RX ADMIN — INSULIN ASPART 2 UNITS: 100 INJECTION, SOLUTION INTRAVENOUS; SUBCUTANEOUS at 11:01

## 2021-01-25 RX ADMIN — OXYCODONE HYDROCHLORIDE 10 MG: 5 SOLUTION ORAL at 08:01

## 2021-01-25 RX ADMIN — CHOLESTYRAMINE 8 G: 4 POWDER, FOR SUSPENSION ORAL at 09:01

## 2021-01-25 RX ADMIN — ATORVASTATIN CALCIUM 80 MG: 40 TABLET, FILM COATED ORAL at 08:01

## 2021-01-25 RX ADMIN — IPRATROPIUM BROMIDE AND ALBUTEROL SULFATE 3 ML: .5; 3 SOLUTION RESPIRATORY (INHALATION) at 08:01

## 2021-01-25 RX ADMIN — FENTANYL 1 PATCH: 25 PATCH, EXTENDED RELEASE TRANSDERMAL at 02:01

## 2021-01-25 RX ADMIN — LEVOTHYROXINE SODIUM 50 MCG: 50 TABLET ORAL at 05:01

## 2021-01-25 RX ADMIN — NYSTATIN 500000 UNITS: 100000 SUSPENSION ORAL at 01:01

## 2021-01-25 RX ADMIN — LOSARTAN POTASSIUM 100 MG: 50 TABLET, FILM COATED ORAL at 08:01

## 2021-01-25 RX ADMIN — ENOXAPARIN SODIUM 40 MG: 100 INJECTION SUBCUTANEOUS at 05:01

## 2021-01-25 RX ADMIN — BUDESONIDE 0.5 MG: 0.5 SUSPENSION RESPIRATORY (INHALATION) at 07:01

## 2021-01-25 RX ADMIN — ESCITALOPRAM OXALATE 20 MG: 10 TABLET ORAL at 08:01

## 2021-01-25 RX ADMIN — CHOLESTYRAMINE 8 G: 4 POWDER, FOR SUSPENSION ORAL at 08:01

## 2021-01-25 RX ADMIN — AMLODIPINE BESYLATE 10 MG: 10 TABLET ORAL at 08:01

## 2021-01-25 RX ADMIN — BUDESONIDE 0.5 MG: 0.5 SUSPENSION RESPIRATORY (INHALATION) at 08:01

## 2021-01-25 RX ADMIN — HYDROMORPHONE HYDROCHLORIDE 0.5 MG: 1 INJECTION, SOLUTION INTRAMUSCULAR; INTRAVENOUS; SUBCUTANEOUS at 10:01

## 2021-01-25 RX ADMIN — HYDROMORPHONE HYDROCHLORIDE 0.5 MG: 1 INJECTION, SOLUTION INTRAMUSCULAR; INTRAVENOUS; SUBCUTANEOUS at 03:01

## 2021-01-25 RX ADMIN — IPRATROPIUM BROMIDE AND ALBUTEROL SULFATE 3 ML: .5; 3 SOLUTION RESPIRATORY (INHALATION) at 07:01

## 2021-01-25 RX ADMIN — NYSTATIN 500000 UNITS: 100000 SUSPENSION ORAL at 08:01

## 2021-01-26 ENCOUNTER — PATIENT MESSAGE (OUTPATIENT)
Dept: RADIATION ONCOLOGY | Facility: CLINIC | Age: 81
End: 2021-01-26

## 2021-01-26 VITALS
OXYGEN SATURATION: 96 % | TEMPERATURE: 98 F | HEART RATE: 82 BPM | RESPIRATION RATE: 20 BRPM | WEIGHT: 154.75 LBS | BODY MASS INDEX: 33.38 KG/M2 | HEIGHT: 57 IN | SYSTOLIC BLOOD PRESSURE: 143 MMHG | DIASTOLIC BLOOD PRESSURE: 67 MMHG

## 2021-01-26 LAB
ALBUMIN SERPL BCP-MCNC: 1.7 G/DL (ref 3.5–5.2)
ANION GAP SERPL CALC-SCNC: 10 MMOL/L (ref 8–16)
BASOPHILS # BLD AUTO: 0.07 K/UL (ref 0–0.2)
BASOPHILS NFR BLD: 0.6 % (ref 0–1.9)
BUN SERPL-MCNC: 9 MG/DL (ref 8–23)
CALCIUM SERPL-MCNC: 8.5 MG/DL (ref 8.7–10.5)
CHLORIDE SERPL-SCNC: 97 MMOL/L (ref 95–110)
CO2 SERPL-SCNC: 28 MMOL/L (ref 23–29)
CREAT SERPL-MCNC: 0.7 MG/DL (ref 0.5–1.4)
DIFFERENTIAL METHOD: ABNORMAL
EOSINOPHIL # BLD AUTO: 0.2 K/UL (ref 0–0.5)
EOSINOPHIL NFR BLD: 1.8 % (ref 0–8)
ERYTHROCYTE [DISTWIDTH] IN BLOOD BY AUTOMATED COUNT: 15.6 % (ref 11.5–14.5)
EST. GFR  (AFRICAN AMERICAN): >60 ML/MIN/1.73 M^2
EST. GFR  (NON AFRICAN AMERICAN): >60 ML/MIN/1.73 M^2
GLUCOSE SERPL-MCNC: 178 MG/DL (ref 70–110)
HCT VFR BLD AUTO: 36.3 % (ref 37–48.5)
HGB BLD-MCNC: 10.9 G/DL (ref 12–16)
IMM GRANULOCYTES # BLD AUTO: 0.09 K/UL (ref 0–0.04)
IMM GRANULOCYTES NFR BLD AUTO: 0.7 % (ref 0–0.5)
LYMPHOCYTES # BLD AUTO: 0.5 K/UL (ref 1–4.8)
LYMPHOCYTES NFR BLD: 4 % (ref 18–48)
MCH RBC QN AUTO: 26.3 PG (ref 27–31)
MCHC RBC AUTO-ENTMCNC: 30 G/DL (ref 32–36)
MCV RBC AUTO: 88 FL (ref 82–98)
MONOCYTES # BLD AUTO: 1 K/UL (ref 0.3–1)
MONOCYTES NFR BLD: 7.9 % (ref 4–15)
NEUTROPHILS # BLD AUTO: 10.7 K/UL (ref 1.8–7.7)
NEUTROPHILS NFR BLD: 85 % (ref 38–73)
NRBC BLD-RTO: 0 /100 WBC
PHOSPHATE SERPL-MCNC: 4.8 MG/DL (ref 2.7–4.5)
PLATELET # BLD AUTO: 257 K/UL (ref 150–350)
PMV BLD AUTO: 12.1 FL (ref 9.2–12.9)
POTASSIUM SERPL-SCNC: 4 MMOL/L (ref 3.5–5.1)
RBC # BLD AUTO: 4.15 M/UL (ref 4–5.4)
SODIUM SERPL-SCNC: 135 MMOL/L (ref 136–145)
WBC # BLD AUTO: 12.63 K/UL (ref 3.9–12.7)

## 2021-01-26 PROCEDURE — 25000003 PHARM REV CODE 250: Performed by: NURSE PRACTITIONER

## 2021-01-26 PROCEDURE — 94761 N-INVAS EAR/PLS OXIMETRY MLT: CPT

## 2021-01-26 PROCEDURE — 85025 COMPLETE CBC W/AUTO DIFF WBC: CPT

## 2021-01-26 PROCEDURE — 94640 AIRWAY INHALATION TREATMENT: CPT

## 2021-01-26 PROCEDURE — 77386 HC IMRT, COMPLEX: CPT | Performed by: RADIOLOGY

## 2021-01-26 PROCEDURE — 80069 RENAL FUNCTION PANEL: CPT

## 2021-01-26 PROCEDURE — 63600175 PHARM REV CODE 636 W HCPCS: Performed by: PHYSICIAN ASSISTANT

## 2021-01-26 PROCEDURE — 27000221 HC OXYGEN, UP TO 24 HOURS

## 2021-01-26 PROCEDURE — 36415 COLL VENOUS BLD VENIPUNCTURE: CPT

## 2021-01-26 PROCEDURE — 25000242 PHARM REV CODE 250 ALT 637 W/ HCPCS: Performed by: PHYSICIAN ASSISTANT

## 2021-01-26 PROCEDURE — 63600175 PHARM REV CODE 636 W HCPCS: Performed by: NURSE PRACTITIONER

## 2021-01-26 PROCEDURE — 77014 HC CT GUIDANCE RADIATION THERAPY FLDS PLACEMENT: CPT | Mod: TC | Performed by: RADIOLOGY

## 2021-01-26 PROCEDURE — 25000003 PHARM REV CODE 250: Performed by: INTERNAL MEDICINE

## 2021-01-26 PROCEDURE — 77014 PR  CT GUIDANCE PLACEMENT RAD THERAPY FIELDS: CPT | Mod: 26,,, | Performed by: RADIOLOGY

## 2021-01-26 PROCEDURE — 25000242 PHARM REV CODE 250 ALT 637 W/ HCPCS: Performed by: NURSE PRACTITIONER

## 2021-01-26 PROCEDURE — 25000242 PHARM REV CODE 250 ALT 637 W/ HCPCS: Performed by: INTERNAL MEDICINE

## 2021-01-26 PROCEDURE — 77014 PR  CT GUIDANCE PLACEMENT RAD THERAPY FIELDS: ICD-10-PCS | Mod: 26,,, | Performed by: RADIOLOGY

## 2021-01-26 RX ADMIN — NYSTATIN 500000 UNITS: 100000 SUSPENSION ORAL at 12:01

## 2021-01-26 RX ADMIN — IPRATROPIUM BROMIDE AND ALBUTEROL SULFATE 3 ML: .5; 3 SOLUTION RESPIRATORY (INHALATION) at 12:01

## 2021-01-26 RX ADMIN — HYDROMORPHONE HYDROCHLORIDE 0.5 MG: 1 INJECTION, SOLUTION INTRAMUSCULAR; INTRAVENOUS; SUBCUTANEOUS at 10:01

## 2021-01-26 RX ADMIN — ATORVASTATIN CALCIUM 80 MG: 40 TABLET, FILM COATED ORAL at 08:01

## 2021-01-26 RX ADMIN — ESCITALOPRAM OXALATE 20 MG: 10 TABLET ORAL at 08:01

## 2021-01-26 RX ADMIN — NYSTATIN 500000 UNITS: 100000 SUSPENSION ORAL at 08:01

## 2021-01-26 RX ADMIN — OXYCODONE HYDROCHLORIDE 10 MG: 5 SOLUTION ORAL at 06:01

## 2021-01-26 RX ADMIN — AMLODIPINE BESYLATE 10 MG: 10 TABLET ORAL at 08:01

## 2021-01-26 RX ADMIN — IPRATROPIUM BROMIDE AND ALBUTEROL SULFATE 3 ML: .5; 3 SOLUTION RESPIRATORY (INHALATION) at 07:01

## 2021-01-26 RX ADMIN — HYDRALAZINE HYDROCHLORIDE 10 MG: 20 INJECTION, SOLUTION INTRAMUSCULAR; INTRAVENOUS at 12:01

## 2021-01-26 RX ADMIN — OXYCODONE HYDROCHLORIDE 10 MG: 5 SOLUTION ORAL at 01:01

## 2021-01-26 RX ADMIN — LOSARTAN POTASSIUM 100 MG: 50 TABLET, FILM COATED ORAL at 08:01

## 2021-01-26 RX ADMIN — CHOLESTYRAMINE 8 G: 4 POWDER, FOR SUSPENSION ORAL at 08:01

## 2021-01-26 RX ADMIN — LEVOTHYROXINE SODIUM 50 MCG: 50 TABLET ORAL at 05:01

## 2021-01-26 RX ADMIN — BUDESONIDE 0.5 MG: 0.5 SUSPENSION RESPIRATORY (INHALATION) at 07:01

## 2021-01-26 RX ADMIN — OXYCODONE HYDROCHLORIDE 10 MG: 5 SOLUTION ORAL at 12:01

## 2021-01-26 RX ADMIN — INSULIN ASPART 2 UNITS: 100 INJECTION, SOLUTION INTRAVENOUS; SUBCUTANEOUS at 12:01

## 2021-01-27 ENCOUNTER — SOCIAL WORK (OUTPATIENT)
Dept: HEMATOLOGY/ONCOLOGY | Facility: CLINIC | Age: 81
End: 2021-01-27

## 2021-01-27 ENCOUNTER — DOCUMENTATION ONLY (OUTPATIENT)
Dept: RADIATION ONCOLOGY | Facility: CLINIC | Age: 81
End: 2021-01-27

## 2021-01-27 DIAGNOSIS — K12.33 MUCOSITIS DUE TO RADIATION THERAPY: ICD-10-CM

## 2021-01-27 DIAGNOSIS — J39.2 OROPHARYNGEAL MASS: Primary | ICD-10-CM

## 2021-01-27 PROCEDURE — 77014 PR  CT GUIDANCE PLACEMENT RAD THERAPY FIELDS: CPT | Mod: 26,,, | Performed by: RADIOLOGY

## 2021-01-27 PROCEDURE — 77014 HC CT GUIDANCE RADIATION THERAPY FLDS PLACEMENT: CPT | Mod: TC | Performed by: RADIOLOGY

## 2021-01-27 PROCEDURE — 77014 PR  CT GUIDANCE PLACEMENT RAD THERAPY FIELDS: ICD-10-PCS | Mod: 26,,, | Performed by: RADIOLOGY

## 2021-01-27 PROCEDURE — 77386 HC IMRT, COMPLEX: CPT | Performed by: RADIOLOGY

## 2021-01-27 RX ORDER — BENZOCAINE 10 %
10 SUSPENSION, RECONSTITUTED, ORAL (ML) MUCOUS MEMBRANE 4 TIMES DAILY PRN
Qty: 210 ML | Refills: 2 | COMMUNITY
Start: 2021-01-27 | End: 2021-03-03

## 2021-01-28 ENCOUNTER — LAB VISIT (OUTPATIENT)
Dept: LAB | Facility: HOSPITAL | Age: 81
End: 2021-01-28
Attending: RADIOLOGY
Payer: MEDICARE

## 2021-01-28 DIAGNOSIS — D53.9 NUTRITIONAL ANEMIA, UNSPECIFIED: ICD-10-CM

## 2021-01-28 DIAGNOSIS — D50.9 IRON DEFICIENCY ANEMIA, UNSPECIFIED IRON DEFICIENCY ANEMIA TYPE: ICD-10-CM

## 2021-01-28 DIAGNOSIS — C11.1 MALIGNANT NEOPLASM OF PHARYNGEAL TONSIL: ICD-10-CM

## 2021-01-28 LAB
ALBUMIN SERPL BCP-MCNC: 2 G/DL (ref 3.5–5.2)
ALP SERPL-CCNC: 100 U/L (ref 55–135)
ALT SERPL W/O P-5'-P-CCNC: 25 U/L (ref 10–44)
ANION GAP SERPL CALC-SCNC: 9 MMOL/L (ref 8–16)
AST SERPL-CCNC: 32 U/L (ref 10–40)
BASOPHILS # BLD AUTO: 0.06 K/UL (ref 0–0.2)
BASOPHILS NFR BLD: 0.4 % (ref 0–1.9)
BILIRUB SERPL-MCNC: 0.4 MG/DL (ref 0.1–1)
BUN SERPL-MCNC: 18 MG/DL (ref 8–23)
CALCIUM SERPL-MCNC: 8.5 MG/DL (ref 8.7–10.5)
CHLORIDE SERPL-SCNC: 98 MMOL/L (ref 95–110)
CO2 SERPL-SCNC: 29 MMOL/L (ref 23–29)
CREAT SERPL-MCNC: 0.8 MG/DL (ref 0.5–1.4)
DIFFERENTIAL METHOD: ABNORMAL
EOSINOPHIL # BLD AUTO: 0.3 K/UL (ref 0–0.5)
EOSINOPHIL NFR BLD: 1.9 % (ref 0–8)
ERYTHROCYTE [DISTWIDTH] IN BLOOD BY AUTOMATED COUNT: 15.1 % (ref 11.5–14.5)
EST. GFR  (AFRICAN AMERICAN): >60 ML/MIN/1.73 M^2
EST. GFR  (NON AFRICAN AMERICAN): >60 ML/MIN/1.73 M^2
FERRITIN SERPL-MCNC: 258 NG/ML (ref 20–300)
FOLATE SERPL-MCNC: 9.4 NG/ML (ref 4–24)
GLUCOSE SERPL-MCNC: 130 MG/DL (ref 70–110)
HCT VFR BLD AUTO: 31.5 % (ref 37–48.5)
HGB BLD-MCNC: 9.8 G/DL (ref 12–16)
IMM GRANULOCYTES # BLD AUTO: 0.1 K/UL (ref 0–0.04)
IMM GRANULOCYTES NFR BLD AUTO: 0.6 % (ref 0–0.5)
IRON SERPL-MCNC: 23 UG/DL (ref 30–160)
LYMPHOCYTES # BLD AUTO: 0.5 K/UL (ref 1–4.8)
LYMPHOCYTES NFR BLD: 3 % (ref 18–48)
MCH RBC QN AUTO: 26.5 PG (ref 27–31)
MCHC RBC AUTO-ENTMCNC: 31.1 G/DL (ref 32–36)
MCV RBC AUTO: 85 FL (ref 82–98)
MONOCYTES # BLD AUTO: 1.1 K/UL (ref 0.3–1)
MONOCYTES NFR BLD: 6.8 % (ref 4–15)
NEUTROPHILS # BLD AUTO: 13.5 K/UL (ref 1.8–7.7)
NEUTROPHILS NFR BLD: 87.3 % (ref 38–73)
NRBC BLD-RTO: 0 /100 WBC
PLATELET # BLD AUTO: 393 K/UL (ref 150–350)
PLATELET BLD QL SMEAR: ABNORMAL
PMV BLD AUTO: 11.8 FL (ref 9.2–12.9)
POTASSIUM SERPL-SCNC: 3.6 MMOL/L (ref 3.5–5.1)
PROT SERPL-MCNC: 6 G/DL (ref 6–8.4)
RBC # BLD AUTO: 3.7 M/UL (ref 4–5.4)
SATURATED IRON: 9 % (ref 20–50)
SODIUM SERPL-SCNC: 136 MMOL/L (ref 136–145)
TOTAL IRON BINDING CAPACITY: 243 UG/DL (ref 250–450)
TRANSFERRIN SERPL-MCNC: 164 MG/DL (ref 200–375)
VIT B12 SERPL-MCNC: 814 PG/ML (ref 210–950)
WBC # BLD AUTO: 15.43 K/UL (ref 3.9–12.7)

## 2021-01-28 PROCEDURE — 77386 HC IMRT, COMPLEX: CPT | Performed by: RADIOLOGY

## 2021-01-28 PROCEDURE — 77336 RADIATION PHYSICS CONSULT: CPT | Performed by: RADIOLOGY

## 2021-01-28 PROCEDURE — 82746 ASSAY OF FOLIC ACID SERUM: CPT

## 2021-01-28 PROCEDURE — 77014 HC CT GUIDANCE RADIATION THERAPY FLDS PLACEMENT: CPT | Mod: TC | Performed by: RADIOLOGY

## 2021-01-28 PROCEDURE — 83540 ASSAY OF IRON: CPT

## 2021-01-28 PROCEDURE — 77014 PR  CT GUIDANCE PLACEMENT RAD THERAPY FIELDS: ICD-10-PCS | Mod: 26,,, | Performed by: RADIOLOGY

## 2021-01-28 PROCEDURE — 80053 COMPREHEN METABOLIC PANEL: CPT

## 2021-01-28 PROCEDURE — 85025 COMPLETE CBC W/AUTO DIFF WBC: CPT

## 2021-01-28 PROCEDURE — 82728 ASSAY OF FERRITIN: CPT

## 2021-01-28 PROCEDURE — 36415 COLL VENOUS BLD VENIPUNCTURE: CPT

## 2021-01-28 PROCEDURE — 82607 VITAMIN B-12: CPT

## 2021-01-28 PROCEDURE — 77014 PR  CT GUIDANCE PLACEMENT RAD THERAPY FIELDS: CPT | Mod: 26,,, | Performed by: RADIOLOGY

## 2021-01-29 ENCOUNTER — OFFICE VISIT (OUTPATIENT)
Dept: HEMATOLOGY/ONCOLOGY | Facility: CLINIC | Age: 81
End: 2021-01-29
Payer: MEDICARE

## 2021-01-29 VITALS
HEIGHT: 57 IN | OXYGEN SATURATION: 96 % | SYSTOLIC BLOOD PRESSURE: 128 MMHG | BODY MASS INDEX: 33.38 KG/M2 | DIASTOLIC BLOOD PRESSURE: 50 MMHG | HEART RATE: 69 BPM | WEIGHT: 154.75 LBS | TEMPERATURE: 99 F

## 2021-01-29 DIAGNOSIS — E86.0 DEHYDRATION: ICD-10-CM

## 2021-01-29 DIAGNOSIS — R13.10 ODYNOPHAGIA: ICD-10-CM

## 2021-01-29 DIAGNOSIS — D72.829 LEUKOCYTOSIS, UNSPECIFIED TYPE: ICD-10-CM

## 2021-01-29 DIAGNOSIS — B37.0 THRUSH: ICD-10-CM

## 2021-01-29 DIAGNOSIS — D63.0 ANEMIA IN NEOPLASTIC DISEASE: ICD-10-CM

## 2021-01-29 DIAGNOSIS — C11.1 MALIGNANT NEOPLASM OF PHARYNGEAL TONSIL: Primary | ICD-10-CM

## 2021-01-29 PROCEDURE — 1126F PR PAIN SEVERITY QUANTIFIED, NO PAIN PRESENT: ICD-10-PCS | Mod: S$GLB,,, | Performed by: INTERNAL MEDICINE

## 2021-01-29 PROCEDURE — 1157F ADVNC CARE PLAN IN RCRD: CPT | Mod: S$GLB,,, | Performed by: INTERNAL MEDICINE

## 2021-01-29 PROCEDURE — 1159F PR MEDICATION LIST DOCUMENTED IN MEDICAL RECORD: ICD-10-PCS | Mod: S$GLB,,, | Performed by: INTERNAL MEDICINE

## 2021-01-29 PROCEDURE — 3078F DIAST BP <80 MM HG: CPT | Mod: CPTII,S$GLB,, | Performed by: INTERNAL MEDICINE

## 2021-01-29 PROCEDURE — 99999 PR PBB SHADOW E&M-EST. PATIENT-LVL V: CPT | Mod: PBBFAC,,, | Performed by: INTERNAL MEDICINE

## 2021-01-29 PROCEDURE — 99214 PR OFFICE/OUTPT VISIT, EST, LEVL IV, 30-39 MIN: ICD-10-PCS | Mod: S$GLB,,, | Performed by: INTERNAL MEDICINE

## 2021-01-29 PROCEDURE — 99999 PR PBB SHADOW E&M-EST. PATIENT-LVL V: ICD-10-PCS | Mod: PBBFAC,,, | Performed by: INTERNAL MEDICINE

## 2021-01-29 PROCEDURE — 1159F MED LIST DOCD IN RCRD: CPT | Mod: S$GLB,,, | Performed by: INTERNAL MEDICINE

## 2021-01-29 PROCEDURE — 77014 HC CT GUIDANCE RADIATION THERAPY FLDS PLACEMENT: CPT | Mod: TC | Performed by: RADIOLOGY

## 2021-01-29 PROCEDURE — 77014 PR  CT GUIDANCE PLACEMENT RAD THERAPY FIELDS: CPT | Mod: 26,,, | Performed by: RADIOLOGY

## 2021-01-29 PROCEDURE — 77014 PR  CT GUIDANCE PLACEMENT RAD THERAPY FIELDS: ICD-10-PCS | Mod: 26,,, | Performed by: RADIOLOGY

## 2021-01-29 PROCEDURE — 77386 HC IMRT, COMPLEX: CPT | Performed by: RADIOLOGY

## 2021-01-29 PROCEDURE — 3074F PR MOST RECENT SYSTOLIC BLOOD PRESSURE < 130 MM HG: ICD-10-PCS | Mod: CPTII,S$GLB,, | Performed by: INTERNAL MEDICINE

## 2021-01-29 PROCEDURE — 3074F SYST BP LT 130 MM HG: CPT | Mod: CPTII,S$GLB,, | Performed by: INTERNAL MEDICINE

## 2021-01-29 PROCEDURE — 3078F PR MOST RECENT DIASTOLIC BLOOD PRESSURE < 80 MM HG: ICD-10-PCS | Mod: CPTII,S$GLB,, | Performed by: INTERNAL MEDICINE

## 2021-01-29 PROCEDURE — 1126F AMNT PAIN NOTED NONE PRSNT: CPT | Mod: S$GLB,,, | Performed by: INTERNAL MEDICINE

## 2021-01-29 PROCEDURE — 1157F PR ADVANCE CARE PLAN OR EQUIV PRESENT IN MEDICAL RECORD: ICD-10-PCS | Mod: S$GLB,,, | Performed by: INTERNAL MEDICINE

## 2021-01-29 PROCEDURE — 99214 OFFICE O/P EST MOD 30 MIN: CPT | Mod: S$GLB,,, | Performed by: INTERNAL MEDICINE

## 2021-02-01 ENCOUNTER — TELEPHONE (OUTPATIENT)
Dept: PALLIATIVE MEDICINE | Facility: HOSPITAL | Age: 81
End: 2021-02-01

## 2021-02-01 ENCOUNTER — TELEPHONE (OUTPATIENT)
Dept: HEMATOLOGY/ONCOLOGY | Facility: HOSPITAL | Age: 81
End: 2021-02-01

## 2021-02-01 ENCOUNTER — HOSPITAL ENCOUNTER (OUTPATIENT)
Dept: RADIATION THERAPY | Facility: HOSPITAL | Age: 81
Discharge: HOME | End: 2021-02-01
Attending: RADIOLOGY
Payer: MEDICARE

## 2021-02-01 DIAGNOSIS — R19.7 DIARRHEA, UNSPECIFIED TYPE: Primary | ICD-10-CM

## 2021-02-01 DIAGNOSIS — R19.7 DIARRHEA, UNSPECIFIED TYPE: ICD-10-CM

## 2021-02-01 DIAGNOSIS — B37.31 VAGINAL CANDIDIASIS: Primary | ICD-10-CM

## 2021-02-01 PROCEDURE — 77014 PR  CT GUIDANCE PLACEMENT RAD THERAPY FIELDS: ICD-10-PCS | Mod: 26,,, | Performed by: RADIOLOGY

## 2021-02-01 PROCEDURE — 77014 PR  CT GUIDANCE PLACEMENT RAD THERAPY FIELDS: CPT | Mod: 26,,, | Performed by: RADIOLOGY

## 2021-02-01 PROCEDURE — 77014 HC CT GUIDANCE RADIATION THERAPY FLDS PLACEMENT: CPT | Mod: TC | Performed by: RADIOLOGY

## 2021-02-01 PROCEDURE — 77386 HC IMRT, COMPLEX: CPT | Performed by: RADIOLOGY

## 2021-02-01 RX ORDER — FLUCONAZOLE 150 MG/1
150 TABLET ORAL
Qty: 3 TABLET | Refills: 0 | Status: SHIPPED | OUTPATIENT
Start: 2021-02-01 | End: 2021-02-10

## 2021-02-01 RX ORDER — DIPHENOXYLATE HYDROCHLORIDE AND ATROPINE SULFATE 2.5; .025 MG/1; MG/1
1 TABLET ORAL 4 TIMES DAILY PRN
Qty: 20 TABLET | Refills: 2 | Status: SHIPPED | OUTPATIENT
Start: 2021-02-01 | End: 2021-02-11

## 2021-02-01 RX ORDER — CHOLESTYRAMINE 4 G/9G
4 POWDER, FOR SUSPENSION ORAL
Qty: 240 PACKET | Refills: 3 | Status: SHIPPED | OUTPATIENT
Start: 2021-02-01 | End: 2021-02-11

## 2021-02-02 PROCEDURE — 77014 HC CT GUIDANCE RADIATION THERAPY FLDS PLACEMENT: CPT | Mod: TC | Performed by: RADIOLOGY

## 2021-02-02 PROCEDURE — 77014 PR  CT GUIDANCE PLACEMENT RAD THERAPY FIELDS: CPT | Mod: 26,,, | Performed by: RADIOLOGY

## 2021-02-02 PROCEDURE — 77386 HC IMRT, COMPLEX: CPT | Performed by: RADIOLOGY

## 2021-02-02 PROCEDURE — 77014 PR  CT GUIDANCE PLACEMENT RAD THERAPY FIELDS: ICD-10-PCS | Mod: 26,,, | Performed by: RADIOLOGY

## 2021-02-03 ENCOUNTER — OFFICE VISIT (OUTPATIENT)
Dept: PALLIATIVE MEDICINE | Facility: CLINIC | Age: 81
End: 2021-02-03
Payer: MEDICARE

## 2021-02-03 ENCOUNTER — DOCUMENTATION ONLY (OUTPATIENT)
Dept: PALLIATIVE MEDICINE | Facility: CLINIC | Age: 81
End: 2021-02-03

## 2021-02-03 ENCOUNTER — DOCUMENTATION ONLY (OUTPATIENT)
Dept: RADIATION ONCOLOGY | Facility: CLINIC | Age: 81
End: 2021-02-03

## 2021-02-03 DIAGNOSIS — K12.30 MUCOSITIS: Primary | ICD-10-CM

## 2021-02-03 DIAGNOSIS — L21.9 SEBORRHEA: ICD-10-CM

## 2021-02-03 DIAGNOSIS — J40 BRONCHITIS: ICD-10-CM

## 2021-02-03 PROCEDURE — 77014 PR  CT GUIDANCE PLACEMENT RAD THERAPY FIELDS: ICD-10-PCS | Mod: 26,,, | Performed by: RADIOLOGY

## 2021-02-03 PROCEDURE — 77386 HC IMRT, COMPLEX: CPT | Performed by: RADIOLOGY

## 2021-02-03 PROCEDURE — 1159F PR MEDICATION LIST DOCUMENTED IN MEDICAL RECORD: ICD-10-PCS | Mod: 95,,, | Performed by: FAMILY MEDICINE

## 2021-02-03 PROCEDURE — 1157F ADVNC CARE PLAN IN RCRD: CPT | Mod: 95,,, | Performed by: FAMILY MEDICINE

## 2021-02-03 PROCEDURE — 77014 HC CT GUIDANCE RADIATION THERAPY FLDS PLACEMENT: CPT | Mod: TC | Performed by: RADIOLOGY

## 2021-02-03 PROCEDURE — 77014 PR  CT GUIDANCE PLACEMENT RAD THERAPY FIELDS: CPT | Mod: 26,,, | Performed by: RADIOLOGY

## 2021-02-03 PROCEDURE — 99215 OFFICE O/P EST HI 40 MIN: CPT | Mod: 95,,, | Performed by: FAMILY MEDICINE

## 2021-02-03 PROCEDURE — 1157F PR ADVANCE CARE PLAN OR EQUIV PRESENT IN MEDICAL RECORD: ICD-10-PCS | Mod: 95,,, | Performed by: FAMILY MEDICINE

## 2021-02-03 PROCEDURE — 1159F MED LIST DOCD IN RCRD: CPT | Mod: 95,,, | Performed by: FAMILY MEDICINE

## 2021-02-03 PROCEDURE — 99215 PR OFFICE/OUTPT VISIT, EST, LEVL V, 40-54 MIN: ICD-10-PCS | Mod: 95,,, | Performed by: FAMILY MEDICINE

## 2021-02-03 RX ORDER — OXYCODONE HCL 5 MG/5 ML
15 SOLUTION, ORAL ORAL EVERY 4 HOURS PRN
Qty: 473 ML | Refills: 0 | Status: SHIPPED | OUTPATIENT
Start: 2021-02-03 | End: 2021-02-10 | Stop reason: SDUPTHER

## 2021-02-03 RX ORDER — KETOCONAZOLE 20 MG/ML
SHAMPOO, SUSPENSION TOPICAL
Qty: 120 ML | Refills: 0 | Status: SHIPPED | OUTPATIENT
Start: 2021-02-04 | End: 2021-04-08

## 2021-02-03 RX ORDER — IPRATROPIUM BROMIDE AND ALBUTEROL SULFATE 2.5; .5 MG/3ML; MG/3ML
3 SOLUTION RESPIRATORY (INHALATION) EVERY 6 HOURS
Qty: 180 ML | Refills: 0 | Status: SHIPPED | OUTPATIENT
Start: 2021-02-03 | End: 2022-01-01

## 2021-02-04 ENCOUNTER — EXTERNAL HOME HEALTH (OUTPATIENT)
Dept: HOME HEALTH SERVICES | Facility: HOSPITAL | Age: 81
End: 2021-02-04
Payer: MEDICARE

## 2021-02-04 PROCEDURE — 77014 PR  CT GUIDANCE PLACEMENT RAD THERAPY FIELDS: ICD-10-PCS | Mod: 26,,, | Performed by: RADIOLOGY

## 2021-02-04 PROCEDURE — 77014 PR  CT GUIDANCE PLACEMENT RAD THERAPY FIELDS: CPT | Mod: 26,,, | Performed by: RADIOLOGY

## 2021-02-04 PROCEDURE — 77386 HC IMRT, COMPLEX: CPT | Performed by: RADIOLOGY

## 2021-02-04 PROCEDURE — 77014 HC CT GUIDANCE RADIATION THERAPY FLDS PLACEMENT: CPT | Mod: TC | Performed by: RADIOLOGY

## 2021-02-04 PROCEDURE — 77336 RADIATION PHYSICS CONSULT: CPT | Performed by: RADIOLOGY

## 2021-02-05 PROCEDURE — 77014 HC CT GUIDANCE RADIATION THERAPY FLDS PLACEMENT: CPT | Mod: TC | Performed by: RADIOLOGY

## 2021-02-05 PROCEDURE — 77014 PR  CT GUIDANCE PLACEMENT RAD THERAPY FIELDS: CPT | Mod: 26,,, | Performed by: RADIOLOGY

## 2021-02-05 PROCEDURE — 77386 HC IMRT, COMPLEX: CPT | Performed by: RADIOLOGY

## 2021-02-05 PROCEDURE — 77014 PR  CT GUIDANCE PLACEMENT RAD THERAPY FIELDS: ICD-10-PCS | Mod: 26,,, | Performed by: RADIOLOGY

## 2021-02-08 PROCEDURE — 77386 HC IMRT, COMPLEX: CPT | Performed by: RADIOLOGY

## 2021-02-08 PROCEDURE — 77014 PR  CT GUIDANCE PLACEMENT RAD THERAPY FIELDS: ICD-10-PCS | Mod: 26,,, | Performed by: RADIOLOGY

## 2021-02-08 PROCEDURE — 77014 PR  CT GUIDANCE PLACEMENT RAD THERAPY FIELDS: CPT | Mod: 26,,, | Performed by: RADIOLOGY

## 2021-02-08 PROCEDURE — 77014 HC CT GUIDANCE RADIATION THERAPY FLDS PLACEMENT: CPT | Mod: TC | Performed by: RADIOLOGY

## 2021-02-09 ENCOUNTER — PATIENT MESSAGE (OUTPATIENT)
Dept: PALLIATIVE MEDICINE | Facility: CLINIC | Age: 81
End: 2021-02-09

## 2021-02-09 PROCEDURE — 77386 HC IMRT, COMPLEX: CPT | Performed by: RADIOLOGY

## 2021-02-09 PROCEDURE — 77014 PR  CT GUIDANCE PLACEMENT RAD THERAPY FIELDS: ICD-10-PCS | Mod: 26,,, | Performed by: RADIOLOGY

## 2021-02-09 PROCEDURE — 77014 HC CT GUIDANCE RADIATION THERAPY FLDS PLACEMENT: CPT | Mod: TC | Performed by: RADIOLOGY

## 2021-02-09 PROCEDURE — 77014 PR  CT GUIDANCE PLACEMENT RAD THERAPY FIELDS: CPT | Mod: 26,,, | Performed by: RADIOLOGY

## 2021-02-10 ENCOUNTER — DOCUMENTATION ONLY (OUTPATIENT)
Dept: RADIATION ONCOLOGY | Facility: CLINIC | Age: 81
End: 2021-02-10

## 2021-02-10 ENCOUNTER — LAB VISIT (OUTPATIENT)
Dept: LAB | Facility: HOSPITAL | Age: 81
End: 2021-02-10
Attending: INTERNAL MEDICINE
Payer: MEDICARE

## 2021-02-10 DIAGNOSIS — C11.1 MALIGNANT NEOPLASM OF PHARYNGEAL TONSIL: ICD-10-CM

## 2021-02-10 DIAGNOSIS — K12.30 MUCOSITIS: ICD-10-CM

## 2021-02-10 DIAGNOSIS — G89.3 CANCER RELATED PAIN: ICD-10-CM

## 2021-02-10 DIAGNOSIS — K12.33 MUCOSITIS DUE TO RADIATION THERAPY: ICD-10-CM

## 2021-02-10 DIAGNOSIS — J39.2 OROPHARYNGEAL MASS: Primary | ICD-10-CM

## 2021-02-10 LAB
ALBUMIN SERPL BCP-MCNC: 2.4 G/DL (ref 3.5–5.2)
ALP SERPL-CCNC: 97 U/L (ref 55–135)
ALT SERPL W/O P-5'-P-CCNC: 14 U/L (ref 10–44)
ANION GAP SERPL CALC-SCNC: 9 MMOL/L (ref 8–16)
AST SERPL-CCNC: 15 U/L (ref 10–40)
BASOPHILS # BLD AUTO: 0.07 K/UL (ref 0–0.2)
BASOPHILS NFR BLD: 0.7 % (ref 0–1.9)
BILIRUB SERPL-MCNC: 0.6 MG/DL (ref 0.1–1)
BUN SERPL-MCNC: 20 MG/DL (ref 8–23)
CALCIUM SERPL-MCNC: 8.7 MG/DL (ref 8.7–10.5)
CHLORIDE SERPL-SCNC: 90 MMOL/L (ref 95–110)
CO2 SERPL-SCNC: 34 MMOL/L (ref 23–29)
CREAT SERPL-MCNC: 0.8 MG/DL (ref 0.5–1.4)
DIFFERENTIAL METHOD: ABNORMAL
EOSINOPHIL # BLD AUTO: 0.3 K/UL (ref 0–0.5)
EOSINOPHIL NFR BLD: 2.9 % (ref 0–8)
ERYTHROCYTE [DISTWIDTH] IN BLOOD BY AUTOMATED COUNT: 15.5 % (ref 11.5–14.5)
EST. GFR  (AFRICAN AMERICAN): >60 ML/MIN/1.73 M^2
EST. GFR  (NON AFRICAN AMERICAN): >60 ML/MIN/1.73 M^2
GLUCOSE SERPL-MCNC: 122 MG/DL (ref 70–110)
HCT VFR BLD AUTO: 33.1 % (ref 37–48.5)
HGB BLD-MCNC: 10.1 G/DL (ref 12–16)
IMM GRANULOCYTES # BLD AUTO: 0.05 K/UL (ref 0–0.04)
IMM GRANULOCYTES NFR BLD AUTO: 0.5 % (ref 0–0.5)
LYMPHOCYTES # BLD AUTO: 0.4 K/UL (ref 1–4.8)
LYMPHOCYTES NFR BLD: 3.6 % (ref 18–48)
MCH RBC QN AUTO: 26 PG (ref 27–31)
MCHC RBC AUTO-ENTMCNC: 30.5 G/DL (ref 32–36)
MCV RBC AUTO: 85 FL (ref 82–98)
MONOCYTES # BLD AUTO: 1.1 K/UL (ref 0.3–1)
MONOCYTES NFR BLD: 9.9 % (ref 4–15)
NEUTROPHILS # BLD AUTO: 8.8 K/UL (ref 1.8–7.7)
NEUTROPHILS NFR BLD: 82.4 % (ref 38–73)
NRBC BLD-RTO: 0 /100 WBC
PLATELET # BLD AUTO: 370 K/UL (ref 150–350)
PMV BLD AUTO: 10.4 FL (ref 9.2–12.9)
POTASSIUM SERPL-SCNC: 4.2 MMOL/L (ref 3.5–5.1)
PROT SERPL-MCNC: 7.1 G/DL (ref 6–8.4)
RBC # BLD AUTO: 3.89 M/UL (ref 4–5.4)
SODIUM SERPL-SCNC: 133 MMOL/L (ref 136–145)
WBC # BLD AUTO: 10.66 K/UL (ref 3.9–12.7)

## 2021-02-10 PROCEDURE — 85025 COMPLETE CBC W/AUTO DIFF WBC: CPT

## 2021-02-10 PROCEDURE — 77014 PR  CT GUIDANCE PLACEMENT RAD THERAPY FIELDS: CPT | Mod: 26,,, | Performed by: RADIOLOGY

## 2021-02-10 PROCEDURE — 77014 HC CT GUIDANCE RADIATION THERAPY FLDS PLACEMENT: CPT | Mod: TC | Performed by: RADIOLOGY

## 2021-02-10 PROCEDURE — 36415 COLL VENOUS BLD VENIPUNCTURE: CPT

## 2021-02-10 PROCEDURE — 80053 COMPREHEN METABOLIC PANEL: CPT

## 2021-02-10 PROCEDURE — 77386 HC IMRT, COMPLEX: CPT | Performed by: RADIOLOGY

## 2021-02-10 PROCEDURE — 77014 PR  CT GUIDANCE PLACEMENT RAD THERAPY FIELDS: ICD-10-PCS | Mod: 26,,, | Performed by: RADIOLOGY

## 2021-02-10 RX ORDER — OXYCODONE HCL 5 MG/5 ML
15 SOLUTION, ORAL ORAL EVERY 4 HOURS PRN
Qty: 473 ML | Refills: 0 | Status: SHIPPED | OUTPATIENT
Start: 2021-02-10 | End: 2021-02-22

## 2021-02-11 ENCOUNTER — PATIENT MESSAGE (OUTPATIENT)
Dept: PALLIATIVE MEDICINE | Facility: CLINIC | Age: 81
End: 2021-02-11

## 2021-02-11 ENCOUNTER — OFFICE VISIT (OUTPATIENT)
Dept: PALLIATIVE MEDICINE | Facility: CLINIC | Age: 81
End: 2021-02-11
Payer: MEDICARE

## 2021-02-11 DIAGNOSIS — L29.9 PRURITUS: ICD-10-CM

## 2021-02-11 DIAGNOSIS — F41.9 ANXIETY AND DEPRESSION: ICD-10-CM

## 2021-02-11 DIAGNOSIS — F32.A ANXIETY AND DEPRESSION: ICD-10-CM

## 2021-02-11 DIAGNOSIS — G89.3 CANCER ASSOCIATED PAIN: Primary | ICD-10-CM

## 2021-02-11 DIAGNOSIS — R91.1 PULMONARY NODULE, RIGHT: ICD-10-CM

## 2021-02-11 DIAGNOSIS — C11.1 MALIGNANT NEOPLASM OF PHARYNGEAL TONSIL: ICD-10-CM

## 2021-02-11 PROCEDURE — 99215 PR OFFICE/OUTPT VISIT, EST, LEVL V, 40-54 MIN: ICD-10-PCS | Mod: 95,,, | Performed by: FAMILY MEDICINE

## 2021-02-11 PROCEDURE — 1157F PR ADVANCE CARE PLAN OR EQUIV PRESENT IN MEDICAL RECORD: ICD-10-PCS | Mod: 95,,, | Performed by: FAMILY MEDICINE

## 2021-02-11 PROCEDURE — 77336 RADIATION PHYSICS CONSULT: CPT | Performed by: RADIOLOGY

## 2021-02-11 PROCEDURE — 1157F ADVNC CARE PLAN IN RCRD: CPT | Mod: 95,,, | Performed by: FAMILY MEDICINE

## 2021-02-11 PROCEDURE — 1159F MED LIST DOCD IN RCRD: CPT | Mod: 95,,, | Performed by: FAMILY MEDICINE

## 2021-02-11 PROCEDURE — 99215 OFFICE O/P EST HI 40 MIN: CPT | Mod: 95,,, | Performed by: FAMILY MEDICINE

## 2021-02-11 PROCEDURE — 1159F PR MEDICATION LIST DOCUMENTED IN MEDICAL RECORD: ICD-10-PCS | Mod: 95,,, | Performed by: FAMILY MEDICINE

## 2021-02-11 RX ORDER — OLANZAPINE 10 MG/1
10 TABLET ORAL NIGHTLY
Qty: 30 TABLET | Refills: 2 | Status: SHIPPED | OUTPATIENT
Start: 2021-02-11 | End: 2021-03-03

## 2021-02-11 RX ORDER — FENTANYL 50 UG/1
1 PATCH TRANSDERMAL
Qty: 10 PATCH | Refills: 0 | Status: SHIPPED | OUTPATIENT
Start: 2021-02-11 | End: 2021-02-25 | Stop reason: SDUPTHER

## 2021-02-11 RX ORDER — HYDROXYZINE HYDROCHLORIDE 25 MG/1
25 TABLET, FILM COATED ORAL 3 TIMES DAILY PRN
Qty: 90 TABLET | Refills: 1 | Status: SHIPPED | OUTPATIENT
Start: 2021-02-11 | End: 2021-06-10

## 2021-02-15 ENCOUNTER — HOSPITAL ENCOUNTER (EMERGENCY)
Facility: HOSPITAL | Age: 81
Discharge: HOME | End: 2021-02-16
Attending: FAMILY MEDICINE
Payer: MEDICARE

## 2021-02-15 DIAGNOSIS — K13.79 MOUTH PAIN: Primary | ICD-10-CM

## 2021-02-15 DIAGNOSIS — R10.9 ABDOMINAL PAIN: ICD-10-CM

## 2021-02-15 LAB
ALBUMIN SERPL BCP-MCNC: 2.4 G/DL (ref 3.5–5.2)
ALP SERPL-CCNC: 92 U/L (ref 55–135)
ALT SERPL W/O P-5'-P-CCNC: 19 U/L (ref 10–44)
ANION GAP SERPL CALC-SCNC: 13 MMOL/L (ref 8–16)
AST SERPL-CCNC: 26 U/L (ref 10–40)
BACTERIA #/AREA URNS HPF: ABNORMAL /HPF
BASOPHILS # BLD AUTO: 0.06 K/UL (ref 0–0.2)
BASOPHILS NFR BLD: 0.5 % (ref 0–1.9)
BILIRUB SERPL-MCNC: 0.5 MG/DL (ref 0.1–1)
BILIRUB UR QL STRIP: NEGATIVE
BUN SERPL-MCNC: 17 MG/DL (ref 8–23)
CALCIUM SERPL-MCNC: 9.1 MG/DL (ref 8.7–10.5)
CHLORIDE SERPL-SCNC: 90 MMOL/L (ref 95–110)
CLARITY UR: CLEAR
CO2 SERPL-SCNC: 30 MMOL/L (ref 23–29)
COLOR UR: YELLOW
CREAT SERPL-MCNC: 0.8 MG/DL (ref 0.5–1.4)
DIFFERENTIAL METHOD: ABNORMAL
EOSINOPHIL # BLD AUTO: 0.2 K/UL (ref 0–0.5)
EOSINOPHIL NFR BLD: 2.1 % (ref 0–8)
ERYTHROCYTE [DISTWIDTH] IN BLOOD BY AUTOMATED COUNT: 15.1 % (ref 11.5–14.5)
EST. GFR  (AFRICAN AMERICAN): >60 ML/MIN/1.73 M^2
EST. GFR  (NON AFRICAN AMERICAN): >60 ML/MIN/1.73 M^2
GLUCOSE SERPL-MCNC: 163 MG/DL (ref 70–110)
GLUCOSE UR QL STRIP: NEGATIVE
HCT VFR BLD AUTO: 31.7 % (ref 37–48.5)
HGB BLD-MCNC: 9.6 G/DL (ref 12–16)
HGB UR QL STRIP: NEGATIVE
HYALINE CASTS #/AREA URNS LPF: 0 /LPF
IMM GRANULOCYTES # BLD AUTO: 0.05 K/UL (ref 0–0.04)
IMM GRANULOCYTES NFR BLD AUTO: 0.4 % (ref 0–0.5)
KETONES UR QL STRIP: NEGATIVE
LEUKOCYTE ESTERASE UR QL STRIP: ABNORMAL
LYMPHOCYTES # BLD AUTO: 0.6 K/UL (ref 1–4.8)
LYMPHOCYTES NFR BLD: 5.2 % (ref 18–48)
MCH RBC QN AUTO: 25.7 PG (ref 27–31)
MCHC RBC AUTO-ENTMCNC: 30.3 G/DL (ref 32–36)
MCV RBC AUTO: 85 FL (ref 82–98)
MICROSCOPIC COMMENT: ABNORMAL
MONOCYTES # BLD AUTO: 1.1 K/UL (ref 0.3–1)
MONOCYTES NFR BLD: 9.8 % (ref 4–15)
NEUTROPHILS # BLD AUTO: 9.2 K/UL (ref 1.8–7.7)
NEUTROPHILS NFR BLD: 82 % (ref 38–73)
NITRITE UR QL STRIP: NEGATIVE
NRBC BLD-RTO: 0 /100 WBC
PH UR STRIP: 7 [PH] (ref 5–8)
PLATELET # BLD AUTO: 360 K/UL (ref 150–350)
PMV BLD AUTO: 10.1 FL (ref 9.2–12.9)
POTASSIUM SERPL-SCNC: 4.7 MMOL/L (ref 3.5–5.1)
PROT SERPL-MCNC: 7.1 G/DL (ref 6–8.4)
PROT UR QL STRIP: ABNORMAL
RBC # BLD AUTO: 3.74 M/UL (ref 4–5.4)
RBC #/AREA URNS HPF: 5 /HPF (ref 0–4)
SODIUM SERPL-SCNC: 133 MMOL/L (ref 136–145)
SP GR UR STRIP: 1.02 (ref 1–1.03)
SQUAMOUS #/AREA URNS HPF: 3 /HPF
URN SPEC COLLECT METH UR: ABNORMAL
UROBILINOGEN UR STRIP-ACNC: NEGATIVE EU/DL
WBC # BLD AUTO: 11.24 K/UL (ref 3.9–12.7)
WBC #/AREA URNS HPF: 9 /HPF (ref 0–5)

## 2021-02-15 PROCEDURE — 80053 COMPREHEN METABOLIC PANEL: CPT

## 2021-02-15 PROCEDURE — 36415 COLL VENOUS BLD VENIPUNCTURE: CPT

## 2021-02-15 PROCEDURE — 85025 COMPLETE CBC W/AUTO DIFF WBC: CPT

## 2021-02-15 PROCEDURE — 25000003 PHARM REV CODE 250: Performed by: FAMILY MEDICINE

## 2021-02-15 PROCEDURE — 81000 URINALYSIS NONAUTO W/SCOPE: CPT

## 2021-02-15 PROCEDURE — 96374 THER/PROPH/DIAG INJ IV PUSH: CPT

## 2021-02-15 PROCEDURE — 63600175 PHARM REV CODE 636 W HCPCS: Performed by: FAMILY MEDICINE

## 2021-02-15 PROCEDURE — 99284 EMERGENCY DEPT VISIT MOD MDM: CPT | Mod: 25

## 2021-02-15 PROCEDURE — 96361 HYDRATE IV INFUSION ADD-ON: CPT

## 2021-02-15 RX ORDER — HALOPERIDOL 5 MG/1
5 TABLET ORAL
Status: COMPLETED | OUTPATIENT
Start: 2021-02-15 | End: 2021-02-15

## 2021-02-15 RX ORDER — KETOROLAC TROMETHAMINE 30 MG/ML
15 INJECTION, SOLUTION INTRAMUSCULAR; INTRAVENOUS
Status: COMPLETED | OUTPATIENT
Start: 2021-02-15 | End: 2021-02-15

## 2021-02-15 RX ADMIN — KETOROLAC TROMETHAMINE 15 MG: 30 INJECTION, SOLUTION INTRAMUSCULAR at 06:02

## 2021-02-15 RX ADMIN — HALOPERIDOL 5 MG: 5 TABLET ORAL at 10:02

## 2021-02-15 RX ADMIN — SODIUM CHLORIDE 500 ML: 0.9 INJECTION, SOLUTION INTRAVENOUS at 05:02

## 2021-02-16 ENCOUNTER — TELEPHONE (OUTPATIENT)
Dept: PALLIATIVE MEDICINE | Facility: HOSPITAL | Age: 81
End: 2021-02-16

## 2021-02-16 VITALS
BODY MASS INDEX: 33.49 KG/M2 | HEART RATE: 66 BPM | SYSTOLIC BLOOD PRESSURE: 137 MMHG | TEMPERATURE: 99 F | DIASTOLIC BLOOD PRESSURE: 89 MMHG | RESPIRATION RATE: 14 BRPM | OXYGEN SATURATION: 100 % | HEIGHT: 57 IN

## 2021-02-17 ENCOUNTER — PATIENT MESSAGE (OUTPATIENT)
Dept: CARDIOLOGY | Facility: CLINIC | Age: 81
End: 2021-02-17

## 2021-02-19 ENCOUNTER — TELEPHONE (OUTPATIENT)
Dept: INTERNAL MEDICINE | Facility: CLINIC | Age: 81
End: 2021-02-19

## 2021-02-22 ENCOUNTER — PATIENT MESSAGE (OUTPATIENT)
Dept: RADIATION ONCOLOGY | Facility: CLINIC | Age: 81
End: 2021-02-22

## 2021-02-22 ENCOUNTER — PATIENT MESSAGE (OUTPATIENT)
Dept: PALLIATIVE MEDICINE | Facility: CLINIC | Age: 81
End: 2021-02-22

## 2021-02-22 DIAGNOSIS — K12.30 MUCOSITIS: ICD-10-CM

## 2021-02-22 DIAGNOSIS — G89.3 CANCER ASSOCIATED PAIN: Primary | ICD-10-CM

## 2021-02-22 DIAGNOSIS — G89.3 CANCER RELATED PAIN: ICD-10-CM

## 2021-02-22 DIAGNOSIS — K12.33 MUCOSITIS DUE TO RADIATION THERAPY: ICD-10-CM

## 2021-02-22 DIAGNOSIS — J39.2 OROPHARYNGEAL MASS: Primary | ICD-10-CM

## 2021-02-22 RX ORDER — OXYCODONE HCL 20 MG/ML
20 CONCENTRATE, ORAL ORAL EVERY 4 HOURS PRN
Qty: 30 ML | Refills: 0 | Status: SHIPPED | OUTPATIENT
Start: 2021-02-22 | End: 2021-05-10

## 2021-02-23 ENCOUNTER — TELEPHONE (OUTPATIENT)
Dept: PALLIATIVE MEDICINE | Facility: HOSPITAL | Age: 81
End: 2021-02-23

## 2021-02-23 DIAGNOSIS — C11.1 MALIGNANT NEOPLASM OF PHARYNGEAL TONSIL: Primary | ICD-10-CM

## 2021-02-24 ENCOUNTER — TELEPHONE (OUTPATIENT)
Dept: PALLIATIVE MEDICINE | Facility: CLINIC | Age: 81
End: 2021-02-24

## 2021-02-24 ENCOUNTER — DOCUMENT SCAN (OUTPATIENT)
Dept: HOME HEALTH SERVICES | Facility: HOSPITAL | Age: 81
End: 2021-02-24
Payer: MEDICARE

## 2021-02-25 ENCOUNTER — OFFICE VISIT (OUTPATIENT)
Dept: RADIATION ONCOLOGY | Facility: CLINIC | Age: 81
End: 2021-02-25
Payer: MEDICARE

## 2021-02-25 VITALS
BODY MASS INDEX: 28.76 KG/M2 | OXYGEN SATURATION: 95 % | TEMPERATURE: 98 F | RESPIRATION RATE: 17 BRPM | WEIGHT: 133.31 LBS | HEIGHT: 57 IN | DIASTOLIC BLOOD PRESSURE: 75 MMHG | SYSTOLIC BLOOD PRESSURE: 165 MMHG | HEART RATE: 76 BPM

## 2021-02-25 DIAGNOSIS — J39.2 OROPHARYNGEAL MASS: ICD-10-CM

## 2021-02-25 DIAGNOSIS — G89.3 CANCER ASSOCIATED PAIN: ICD-10-CM

## 2021-02-25 DIAGNOSIS — C11.1 MALIGNANT NEOPLASM OF PHARYNGEAL TONSIL: Primary | ICD-10-CM

## 2021-02-25 DIAGNOSIS — K12.33 MUCOSITIS DUE TO RADIATION THERAPY: ICD-10-CM

## 2021-02-25 DIAGNOSIS — G89.3 CANCER RELATED PAIN: ICD-10-CM

## 2021-02-25 DIAGNOSIS — C80.1 CANCER: Primary | ICD-10-CM

## 2021-02-25 PROCEDURE — 1159F PR MEDICATION LIST DOCUMENTED IN MEDICAL RECORD: ICD-10-PCS | Mod: S$GLB,,, | Performed by: RADIOLOGY

## 2021-02-25 PROCEDURE — 99999 PR PBB SHADOW E&M-EST. PATIENT-LVL V: ICD-10-PCS | Mod: PBBFAC,,, | Performed by: RADIOLOGY

## 2021-02-25 PROCEDURE — 3078F DIAST BP <80 MM HG: CPT | Mod: CPTII,S$GLB,, | Performed by: RADIOLOGY

## 2021-02-25 PROCEDURE — 3078F PR MOST RECENT DIASTOLIC BLOOD PRESSURE < 80 MM HG: ICD-10-PCS | Mod: CPTII,S$GLB,, | Performed by: RADIOLOGY

## 2021-02-25 PROCEDURE — 1100F PR PT FALLS ASSESS DOC 2+ FALLS/FALL W/INJURY/YR: ICD-10-PCS | Mod: CPTII,S$GLB,, | Performed by: RADIOLOGY

## 2021-02-25 PROCEDURE — 1159F MED LIST DOCD IN RCRD: CPT | Mod: S$GLB,,, | Performed by: RADIOLOGY

## 2021-02-25 PROCEDURE — 99213 PR OFFICE/OUTPT VISIT, EST, LEVL III, 20-29 MIN: ICD-10-PCS | Mod: S$GLB,,, | Performed by: RADIOLOGY

## 2021-02-25 PROCEDURE — 1157F PR ADVANCE CARE PLAN OR EQUIV PRESENT IN MEDICAL RECORD: ICD-10-PCS | Mod: S$GLB,,, | Performed by: RADIOLOGY

## 2021-02-25 PROCEDURE — 3288F FALL RISK ASSESSMENT DOCD: CPT | Mod: CPTII,S$GLB,, | Performed by: RADIOLOGY

## 2021-02-25 PROCEDURE — 3077F PR MOST RECENT SYSTOLIC BLOOD PRESSURE >= 140 MM HG: ICD-10-PCS | Mod: CPTII,S$GLB,, | Performed by: RADIOLOGY

## 2021-02-25 PROCEDURE — 1125F AMNT PAIN NOTED PAIN PRSNT: CPT | Mod: S$GLB,,, | Performed by: RADIOLOGY

## 2021-02-25 PROCEDURE — 3077F SYST BP >= 140 MM HG: CPT | Mod: CPTII,S$GLB,, | Performed by: RADIOLOGY

## 2021-02-25 PROCEDURE — 1125F PR PAIN SEVERITY QUANTIFIED, PAIN PRESENT: ICD-10-PCS | Mod: S$GLB,,, | Performed by: RADIOLOGY

## 2021-02-25 PROCEDURE — 99213 OFFICE O/P EST LOW 20 MIN: CPT | Mod: S$GLB,,, | Performed by: RADIOLOGY

## 2021-02-25 PROCEDURE — 1157F ADVNC CARE PLAN IN RCRD: CPT | Mod: S$GLB,,, | Performed by: RADIOLOGY

## 2021-02-25 PROCEDURE — 3288F PR FALLS RISK ASSESSMENT DOCUMENTED: ICD-10-PCS | Mod: CPTII,S$GLB,, | Performed by: RADIOLOGY

## 2021-02-25 PROCEDURE — 99999 PR PBB SHADOW E&M-EST. PATIENT-LVL V: CPT | Mod: PBBFAC,,, | Performed by: RADIOLOGY

## 2021-02-25 PROCEDURE — 1100F PTFALLS ASSESS-DOCD GE2>/YR: CPT | Mod: CPTII,S$GLB,, | Performed by: RADIOLOGY

## 2021-02-25 RX ORDER — LIDOCAINE HYDROCHLORIDE 20 MG/ML
JELLY TOPICAL
Qty: 30 ML | Refills: 3 | Status: SHIPPED | OUTPATIENT
Start: 2021-02-25 | End: 2021-08-16 | Stop reason: SDUPTHER

## 2021-02-25 RX ORDER — FENTANYL 50 UG/1
1 PATCH TRANSDERMAL
Qty: 10 PATCH | Refills: 0 | Status: ON HOLD | OUTPATIENT
Start: 2021-02-25 | End: 2021-03-11 | Stop reason: SDUPTHER

## 2021-03-03 ENCOUNTER — HOSPITAL ENCOUNTER (INPATIENT)
Facility: HOSPITAL | Age: 81
LOS: 7 days | Discharge: NURSING, REHAB OR LONG-TERM CARE | DRG: 177 | End: 2021-03-11
Attending: EMERGENCY MEDICINE | Admitting: FAMILY MEDICINE
Payer: MEDICARE

## 2021-03-03 ENCOUNTER — TELEPHONE (OUTPATIENT)
Dept: PALLIATIVE MEDICINE | Facility: CLINIC | Age: 81
End: 2021-03-03

## 2021-03-03 ENCOUNTER — TELEPHONE (OUTPATIENT)
Dept: FAMILY MEDICINE | Facility: CLINIC | Age: 81
End: 2021-03-03

## 2021-03-03 DIAGNOSIS — G89.3 CANCER ASSOCIATED PAIN: ICD-10-CM

## 2021-03-03 DIAGNOSIS — R13.10 ODYNOPHAGIA: ICD-10-CM

## 2021-03-03 DIAGNOSIS — R13.12 OROPHARYNGEAL DYSPHAGIA: ICD-10-CM

## 2021-03-03 DIAGNOSIS — J69.0 ASPIRATION PNEUMONIA OF BOTH LUNGS: ICD-10-CM

## 2021-03-03 DIAGNOSIS — E87.1 HYPONATREMIA: ICD-10-CM

## 2021-03-03 DIAGNOSIS — J69.0 ASPIRATION PNEUMONIA OF BOTH LUNGS DUE TO GASTRIC SECRETIONS, UNSPECIFIED PART OF LUNG: ICD-10-CM

## 2021-03-03 DIAGNOSIS — K21.9 GASTROESOPHAGEAL REFLUX DISEASE WITHOUT ESOPHAGITIS: Chronic | ICD-10-CM

## 2021-03-03 DIAGNOSIS — Z51.5 ENCOUNTER FOR PALLIATIVE CARE: ICD-10-CM

## 2021-03-03 DIAGNOSIS — C11.1 MALIGNANT NEOPLASM OF PHARYNGEAL TONSIL: ICD-10-CM

## 2021-03-03 DIAGNOSIS — J18.9 PNEUMONIA OF BOTH LUNGS DUE TO INFECTIOUS ORGANISM, UNSPECIFIED PART OF LUNG: Primary | ICD-10-CM

## 2021-03-03 LAB
ALBUMIN SERPL BCP-MCNC: 2.4 G/DL (ref 3.5–5.2)
ALP SERPL-CCNC: 109 U/L (ref 55–135)
ALT SERPL W/O P-5'-P-CCNC: 9 U/L (ref 10–44)
ANION GAP SERPL CALC-SCNC: 11 MMOL/L (ref 8–16)
AST SERPL-CCNC: 11 U/L (ref 10–40)
BASOPHILS # BLD AUTO: 0.07 K/UL (ref 0–0.2)
BASOPHILS NFR BLD: 0.3 % (ref 0–1.9)
BILIRUB SERPL-MCNC: 0.5 MG/DL (ref 0.1–1)
BNP SERPL-MCNC: 209 PG/ML (ref 0–99)
BUN SERPL-MCNC: 28 MG/DL (ref 8–23)
CALCIUM SERPL-MCNC: 9.3 MG/DL (ref 8.7–10.5)
CHLORIDE SERPL-SCNC: 88 MMOL/L (ref 95–110)
CK SERPL-CCNC: 18 U/L (ref 20–180)
CO2 SERPL-SCNC: 30 MMOL/L (ref 23–29)
CREAT SERPL-MCNC: 1 MG/DL (ref 0.5–1.4)
CRP SERPL-MCNC: 226.7 MG/L (ref 0–8.2)
CTP QC/QA: YES
CTP QC/QA: YES
DIFFERENTIAL METHOD: ABNORMAL
EOSINOPHIL # BLD AUTO: 0 K/UL (ref 0–0.5)
EOSINOPHIL NFR BLD: 0 % (ref 0–8)
ERYTHROCYTE [DISTWIDTH] IN BLOOD BY AUTOMATED COUNT: 14.9 % (ref 11.5–14.5)
EST. GFR  (AFRICAN AMERICAN): >60 ML/MIN/1.73 M^2
EST. GFR  (NON AFRICAN AMERICAN): 53 ML/MIN/1.73 M^2
GLUCOSE SERPL-MCNC: 280 MG/DL (ref 70–110)
HCT VFR BLD AUTO: 34.3 % (ref 37–48.5)
HGB BLD-MCNC: 10.6 G/DL (ref 12–16)
IMM GRANULOCYTES # BLD AUTO: 0.16 K/UL (ref 0–0.04)
IMM GRANULOCYTES NFR BLD AUTO: 0.7 % (ref 0–0.5)
LACTATE SERPL-SCNC: 2 MMOL/L (ref 0.5–2.2)
LDH SERPL L TO P-CCNC: 217 U/L (ref 110–260)
LYMPHOCYTES # BLD AUTO: 0.2 K/UL (ref 1–4.8)
LYMPHOCYTES NFR BLD: 0.8 % (ref 18–48)
MCH RBC QN AUTO: 25.6 PG (ref 27–31)
MCHC RBC AUTO-ENTMCNC: 30.9 G/DL (ref 32–36)
MCV RBC AUTO: 83 FL (ref 82–98)
MONOCYTES # BLD AUTO: 1.1 K/UL (ref 0.3–1)
MONOCYTES NFR BLD: 4.8 % (ref 4–15)
NEUTROPHILS # BLD AUTO: 21.2 K/UL (ref 1.8–7.7)
NEUTROPHILS NFR BLD: 93.4 % (ref 38–73)
NRBC BLD-RTO: 0 /100 WBC
PLATELET # BLD AUTO: 355 K/UL (ref 150–350)
PMV BLD AUTO: 11.2 FL (ref 9.2–12.9)
POC MOLECULAR INFLUENZA A AGN: NEGATIVE
POC MOLECULAR INFLUENZA B AGN: NEGATIVE
POCT GLUCOSE: 286 MG/DL (ref 70–110)
POTASSIUM SERPL-SCNC: 4.4 MMOL/L (ref 3.5–5.1)
PROCALCITONIN SERPL IA-MCNC: 8.06 NG/ML
PROT SERPL-MCNC: 6.8 G/DL (ref 6–8.4)
RBC # BLD AUTO: 4.14 M/UL (ref 4–5.4)
SARS-COV-2 RDRP RESP QL NAA+PROBE: NEGATIVE
SODIUM SERPL-SCNC: 129 MMOL/L (ref 136–145)
TROPONIN I SERPL DL<=0.01 NG/ML-MCNC: 0.02 NG/ML (ref 0–0.03)
WBC # BLD AUTO: 22.74 K/UL (ref 3.9–12.7)

## 2021-03-03 PROCEDURE — 25000003 PHARM REV CODE 250: Performed by: HOSPITALIST

## 2021-03-03 PROCEDURE — 83615 LACTATE (LD) (LDH) ENZYME: CPT | Performed by: EMERGENCY MEDICINE

## 2021-03-03 PROCEDURE — 99900035 HC TECH TIME PER 15 MIN (STAT)

## 2021-03-03 PROCEDURE — 96372 THER/PROPH/DIAG INJ SC/IM: CPT | Mod: 59

## 2021-03-03 PROCEDURE — 36415 COLL VENOUS BLD VENIPUNCTURE: CPT | Performed by: HOSPITALIST

## 2021-03-03 PROCEDURE — 93005 ELECTROCARDIOGRAM TRACING: CPT

## 2021-03-03 PROCEDURE — 82550 ASSAY OF CK (CPK): CPT | Performed by: EMERGENCY MEDICINE

## 2021-03-03 PROCEDURE — 25000242 PHARM REV CODE 250 ALT 637 W/ HCPCS: Performed by: FAMILY MEDICINE

## 2021-03-03 PROCEDURE — 25000003 PHARM REV CODE 250: Performed by: EMERGENCY MEDICINE

## 2021-03-03 PROCEDURE — G0378 HOSPITAL OBSERVATION PER HR: HCPCS

## 2021-03-03 PROCEDURE — 84484 ASSAY OF TROPONIN QUANT: CPT | Performed by: EMERGENCY MEDICINE

## 2021-03-03 PROCEDURE — 36415 COLL VENOUS BLD VENIPUNCTURE: CPT | Performed by: EMERGENCY MEDICINE

## 2021-03-03 PROCEDURE — 96374 THER/PROPH/DIAG INJ IV PUSH: CPT

## 2021-03-03 PROCEDURE — 25000242 PHARM REV CODE 250 ALT 637 W/ HCPCS: Performed by: HOSPITALIST

## 2021-03-03 PROCEDURE — 83036 HEMOGLOBIN GLYCOSYLATED A1C: CPT | Performed by: HOSPITALIST

## 2021-03-03 PROCEDURE — 63600175 PHARM REV CODE 636 W HCPCS: Performed by: HOSPITALIST

## 2021-03-03 PROCEDURE — 96375 TX/PRO/DX INJ NEW DRUG ADDON: CPT

## 2021-03-03 PROCEDURE — 82728 ASSAY OF FERRITIN: CPT | Performed by: EMERGENCY MEDICINE

## 2021-03-03 PROCEDURE — 86140 C-REACTIVE PROTEIN: CPT | Performed by: EMERGENCY MEDICINE

## 2021-03-03 PROCEDURE — 99223 PR INITIAL HOSPITAL CARE,LEVL III: ICD-10-PCS | Mod: ,,, | Performed by: PHYSICIAN ASSISTANT

## 2021-03-03 PROCEDURE — 83605 ASSAY OF LACTIC ACID: CPT | Performed by: EMERGENCY MEDICINE

## 2021-03-03 PROCEDURE — 25000003 PHARM REV CODE 250: Performed by: PHYSICIAN ASSISTANT

## 2021-03-03 PROCEDURE — 93010 ELECTROCARDIOGRAM REPORT: CPT | Mod: ,,, | Performed by: INTERNAL MEDICINE

## 2021-03-03 PROCEDURE — 94761 N-INVAS EAR/PLS OXIMETRY MLT: CPT

## 2021-03-03 PROCEDURE — 99223 1ST HOSP IP/OBS HIGH 75: CPT | Mod: ,,, | Performed by: PHYSICIAN ASSISTANT

## 2021-03-03 PROCEDURE — 96361 HYDRATE IV INFUSION ADD-ON: CPT

## 2021-03-03 PROCEDURE — 99291 CRITICAL CARE FIRST HOUR: CPT | Mod: 25

## 2021-03-03 PROCEDURE — 85025 COMPLETE CBC W/AUTO DIFF WBC: CPT | Performed by: EMERGENCY MEDICINE

## 2021-03-03 PROCEDURE — 87040 BLOOD CULTURE FOR BACTERIA: CPT | Performed by: EMERGENCY MEDICINE

## 2021-03-03 PROCEDURE — 63600175 PHARM REV CODE 636 W HCPCS: Performed by: PHYSICIAN ASSISTANT

## 2021-03-03 PROCEDURE — 80053 COMPREHEN METABOLIC PANEL: CPT | Performed by: EMERGENCY MEDICINE

## 2021-03-03 PROCEDURE — 93010 EKG 12-LEAD: ICD-10-PCS | Mod: ,,, | Performed by: INTERNAL MEDICINE

## 2021-03-03 PROCEDURE — 96376 TX/PRO/DX INJ SAME DRUG ADON: CPT

## 2021-03-03 PROCEDURE — 63600175 PHARM REV CODE 636 W HCPCS: Performed by: EMERGENCY MEDICINE

## 2021-03-03 PROCEDURE — 94640 AIRWAY INHALATION TREATMENT: CPT

## 2021-03-03 PROCEDURE — U0002 COVID-19 LAB TEST NON-CDC: HCPCS | Performed by: EMERGENCY MEDICINE

## 2021-03-03 PROCEDURE — 83880 ASSAY OF NATRIURETIC PEPTIDE: CPT | Performed by: EMERGENCY MEDICINE

## 2021-03-03 PROCEDURE — 84145 PROCALCITONIN (PCT): CPT | Performed by: EMERGENCY MEDICINE

## 2021-03-03 RX ORDER — CLOPIDOGREL BISULFATE 75 MG/1
75 TABLET ORAL DAILY
Status: DISCONTINUED | OUTPATIENT
Start: 2021-03-03 | End: 2021-03-05

## 2021-03-03 RX ORDER — ASPIRIN 81 MG/1
81 TABLET ORAL DAILY
Status: DISCONTINUED | OUTPATIENT
Start: 2021-03-03 | End: 2021-03-05

## 2021-03-03 RX ORDER — BUDESONIDE 0.5 MG/2ML
0.5 INHALANT ORAL EVERY 12 HOURS
Status: DISCONTINUED | OUTPATIENT
Start: 2021-03-03 | End: 2021-03-11 | Stop reason: HOSPADM

## 2021-03-03 RX ORDER — ATORVASTATIN CALCIUM 40 MG/1
80 TABLET, FILM COATED ORAL DAILY
Status: DISCONTINUED | OUTPATIENT
Start: 2021-03-03 | End: 2021-03-11 | Stop reason: HOSPADM

## 2021-03-03 RX ORDER — FENTANYL 50 UG/1
1 PATCH TRANSDERMAL
Status: DISCONTINUED | OUTPATIENT
Start: 2021-03-04 | End: 2021-03-11 | Stop reason: HOSPADM

## 2021-03-03 RX ORDER — LEVOTHYROXINE SODIUM 50 UG/1
50 TABLET ORAL
Status: DISCONTINUED | OUTPATIENT
Start: 2021-03-03 | End: 2021-03-11 | Stop reason: HOSPADM

## 2021-03-03 RX ORDER — GLUCAGON 1 MG
1 KIT INJECTION
Status: DISCONTINUED | OUTPATIENT
Start: 2021-03-03 | End: 2021-03-11 | Stop reason: HOSPADM

## 2021-03-03 RX ORDER — FENTANYL 50 UG/1
1 PATCH TRANSDERMAL
Status: DISCONTINUED | OUTPATIENT
Start: 2021-03-03 | End: 2021-03-03

## 2021-03-03 RX ORDER — CLONAZEPAM 0.5 MG/1
0.5 TABLET ORAL NIGHTLY
Status: DISCONTINUED | OUTPATIENT
Start: 2021-03-03 | End: 2021-03-11 | Stop reason: HOSPADM

## 2021-03-03 RX ORDER — IPRATROPIUM BROMIDE AND ALBUTEROL SULFATE 2.5; .5 MG/3ML; MG/3ML
3 SOLUTION RESPIRATORY (INHALATION) EVERY 6 HOURS
Status: DISCONTINUED | OUTPATIENT
Start: 2021-03-03 | End: 2021-03-03

## 2021-03-03 RX ORDER — IPRATROPIUM BROMIDE AND ALBUTEROL SULFATE 2.5; .5 MG/3ML; MG/3ML
3 SOLUTION RESPIRATORY (INHALATION) EVERY 6 HOURS
Status: DISCONTINUED | OUTPATIENT
Start: 2021-03-03 | End: 2021-03-11 | Stop reason: HOSPADM

## 2021-03-03 RX ORDER — OXYCODONE HCL 5 MG/5 ML
20 SOLUTION, ORAL ORAL EVERY 4 HOURS PRN
Status: DISCONTINUED | OUTPATIENT
Start: 2021-03-03 | End: 2021-03-03

## 2021-03-03 RX ORDER — ESCITALOPRAM OXALATE 10 MG/1
20 TABLET ORAL DAILY
Status: DISCONTINUED | OUTPATIENT
Start: 2021-03-03 | End: 2021-03-03

## 2021-03-03 RX ORDER — HYDROCHLOROTHIAZIDE 25 MG/1
25 TABLET ORAL DAILY
Status: DISCONTINUED | OUTPATIENT
Start: 2021-03-03 | End: 2021-03-11 | Stop reason: HOSPADM

## 2021-03-03 RX ORDER — LOSARTAN POTASSIUM 50 MG/1
100 TABLET ORAL DAILY
Status: DISCONTINUED | OUTPATIENT
Start: 2021-03-03 | End: 2021-03-11 | Stop reason: HOSPADM

## 2021-03-03 RX ORDER — OXYCODONE HYDROCHLORIDE 5 MG/1
20 TABLET ORAL EVERY 4 HOURS PRN
Status: DISCONTINUED | OUTPATIENT
Start: 2021-03-03 | End: 2021-03-11 | Stop reason: HOSPADM

## 2021-03-03 RX ORDER — INSULIN ASPART 100 [IU]/ML
1-10 INJECTION, SOLUTION INTRAVENOUS; SUBCUTANEOUS EVERY 6 HOURS PRN
Status: DISCONTINUED | OUTPATIENT
Start: 2021-03-03 | End: 2021-03-06

## 2021-03-03 RX ORDER — HYDROMORPHONE HYDROCHLORIDE 1 MG/ML
0.5 INJECTION, SOLUTION INTRAMUSCULAR; INTRAVENOUS; SUBCUTANEOUS EVERY 6 HOURS PRN
Status: DISCONTINUED | OUTPATIENT
Start: 2021-03-03 | End: 2021-03-05

## 2021-03-03 RX ORDER — DONEPEZIL HYDROCHLORIDE 5 MG/1
10 TABLET, FILM COATED ORAL DAILY
Status: DISCONTINUED | OUTPATIENT
Start: 2021-03-03 | End: 2021-03-03

## 2021-03-03 RX ORDER — SODIUM CHLORIDE 9 MG/ML
INJECTION, SOLUTION INTRAVENOUS
Status: COMPLETED | OUTPATIENT
Start: 2021-03-03 | End: 2021-03-03

## 2021-03-03 RX ADMIN — AMPICILLIN SODIUM AND SULBACTAM SODIUM 3 G: 2; 1 INJECTION, POWDER, FOR SOLUTION INTRAMUSCULAR; INTRAVENOUS at 03:03

## 2021-03-03 RX ADMIN — CLOPIDOGREL 75 MG: 75 TABLET, FILM COATED ORAL at 03:03

## 2021-03-03 RX ADMIN — LEVOTHYROXINE SODIUM 50 MCG: 50 TABLET ORAL at 03:03

## 2021-03-03 RX ADMIN — BUDESONIDE 0.5 MG: 0.5 SUSPENSION RESPIRATORY (INHALATION) at 07:03

## 2021-03-03 RX ADMIN — SODIUM CHLORIDE: 0.9 INJECTION, SOLUTION INTRAVENOUS at 01:03

## 2021-03-03 RX ADMIN — SODIUM CHLORIDE 500 ML: 0.9 INJECTION, SOLUTION INTRAVENOUS at 11:03

## 2021-03-03 RX ADMIN — ASPIRIN 81 MG: 81 TABLET, COATED ORAL at 03:03

## 2021-03-03 RX ADMIN — ATORVASTATIN CALCIUM 80 MG: 40 TABLET, FILM COATED ORAL at 03:03

## 2021-03-03 RX ADMIN — OXYCODONE HYDROCHLORIDE 20 MG: 5 TABLET ORAL at 09:03

## 2021-03-03 RX ADMIN — IPRATROPIUM BROMIDE AND ALBUTEROL SULFATE 3 ML: .5; 3 SOLUTION RESPIRATORY (INHALATION) at 07:03

## 2021-03-03 RX ADMIN — LOSARTAN POTASSIUM 100 MG: 50 TABLET, FILM COATED ORAL at 03:03

## 2021-03-03 RX ADMIN — HYDROMORPHONE HYDROCHLORIDE 0.5 MG: 1 INJECTION, SOLUTION INTRAMUSCULAR; INTRAVENOUS; SUBCUTANEOUS at 10:03

## 2021-03-03 RX ADMIN — AMPICILLIN SODIUM AND SULBACTAM SODIUM 3 G: 2; 1 INJECTION, POWDER, FOR SOLUTION INTRAMUSCULAR; INTRAVENOUS at 11:03

## 2021-03-03 RX ADMIN — CLONAZEPAM 0.5 MG: 0.5 TABLET ORAL at 10:03

## 2021-03-03 RX ADMIN — LIDOCAINE HYDROCHLORIDE 15 ML: 20 SOLUTION ORAL; TOPICAL at 03:03

## 2021-03-03 RX ADMIN — CEFTRIAXONE 1 G: 1 INJECTION, SOLUTION INTRAVENOUS at 01:03

## 2021-03-03 RX ADMIN — OXYCODONE HYDROCHLORIDE 20 MG: 5 SOLUTION ORAL at 04:03

## 2021-03-03 RX ADMIN — INSULIN ASPART 3 UNITS: 100 INJECTION, SOLUTION INTRAVENOUS; SUBCUTANEOUS at 04:03

## 2021-03-03 RX ADMIN — HYDROCHLOROTHIAZIDE 25 MG: 25 TABLET ORAL at 03:03

## 2021-03-04 PROBLEM — Z86.73 HISTORY OF STROKE: Status: ACTIVE | Noted: 2021-03-04

## 2021-03-04 PROBLEM — J69.0 ASPIRATION PNEUMONIA OF BOTH LUNGS: Status: ACTIVE | Noted: 2021-03-03

## 2021-03-04 LAB
ALBUMIN SERPL BCP-MCNC: 2 G/DL (ref 3.5–5.2)
ALP SERPL-CCNC: 116 U/L (ref 55–135)
ALT SERPL W/O P-5'-P-CCNC: 9 U/L (ref 10–44)
ANION GAP SERPL CALC-SCNC: 6 MMOL/L (ref 8–16)
AST SERPL-CCNC: 12 U/L (ref 10–40)
BASOPHILS # BLD AUTO: 0.06 K/UL (ref 0–0.2)
BASOPHILS NFR BLD: 0.3 % (ref 0–1.9)
BILIRUB SERPL-MCNC: 0.4 MG/DL (ref 0.1–1)
BUN SERPL-MCNC: 21 MG/DL (ref 8–23)
CALCIUM SERPL-MCNC: 8.1 MG/DL (ref 8.7–10.5)
CHLORIDE SERPL-SCNC: 95 MMOL/L (ref 95–110)
CO2 SERPL-SCNC: 31 MMOL/L (ref 23–29)
CREAT SERPL-MCNC: 0.8 MG/DL (ref 0.5–1.4)
DIFFERENTIAL METHOD: ABNORMAL
EOSINOPHIL # BLD AUTO: 0.3 K/UL (ref 0–0.5)
EOSINOPHIL NFR BLD: 1.6 % (ref 0–8)
ERYTHROCYTE [DISTWIDTH] IN BLOOD BY AUTOMATED COUNT: 15 % (ref 11.5–14.5)
EST. GFR  (AFRICAN AMERICAN): >60 ML/MIN/1.73 M^2
EST. GFR  (NON AFRICAN AMERICAN): >60 ML/MIN/1.73 M^2
ESTIMATED AVG GLUCOSE: 157 MG/DL (ref 68–131)
FERRITIN SERPL-MCNC: 117 NG/ML (ref 20–300)
GLUCOSE SERPL-MCNC: 288 MG/DL (ref 70–110)
HBA1C MFR BLD: 7.1 % (ref 4–5.6)
HCT VFR BLD AUTO: 32.5 % (ref 37–48.5)
HGB BLD-MCNC: 9.7 G/DL (ref 12–16)
IMM GRANULOCYTES # BLD AUTO: 0.13 K/UL (ref 0–0.04)
IMM GRANULOCYTES NFR BLD AUTO: 0.7 % (ref 0–0.5)
LYMPHOCYTES # BLD AUTO: 0.3 K/UL (ref 1–4.8)
LYMPHOCYTES NFR BLD: 1.7 % (ref 18–48)
MCH RBC QN AUTO: 25.6 PG (ref 27–31)
MCHC RBC AUTO-ENTMCNC: 29.8 G/DL (ref 32–36)
MCV RBC AUTO: 86 FL (ref 82–98)
MONOCYTES # BLD AUTO: 1.1 K/UL (ref 0.3–1)
MONOCYTES NFR BLD: 5.9 % (ref 4–15)
NEUTROPHILS # BLD AUTO: 16.1 K/UL (ref 1.8–7.7)
NEUTROPHILS NFR BLD: 89.8 % (ref 38–73)
NRBC BLD-RTO: 0 /100 WBC
PLATELET # BLD AUTO: 317 K/UL (ref 150–350)
PMV BLD AUTO: 11.4 FL (ref 9.2–12.9)
POCT GLUCOSE: 259 MG/DL (ref 70–110)
POCT GLUCOSE: 275 MG/DL (ref 70–110)
POCT GLUCOSE: 320 MG/DL (ref 70–110)
POCT GLUCOSE: 333 MG/DL (ref 70–110)
POTASSIUM SERPL-SCNC: 4.1 MMOL/L (ref 3.5–5.1)
PROT SERPL-MCNC: 5.9 G/DL (ref 6–8.4)
RBC # BLD AUTO: 3.79 M/UL (ref 4–5.4)
SODIUM SERPL-SCNC: 132 MMOL/L (ref 136–145)
WBC # BLD AUTO: 17.92 K/UL (ref 3.9–12.7)

## 2021-03-04 PROCEDURE — 21400001 HC TELEMETRY ROOM

## 2021-03-04 PROCEDURE — 25000003 PHARM REV CODE 250: Performed by: HOSPITALIST

## 2021-03-04 PROCEDURE — 99233 SBSQ HOSP IP/OBS HIGH 50: CPT | Mod: ,,, | Performed by: PHYSICIAN ASSISTANT

## 2021-03-04 PROCEDURE — 25000242 PHARM REV CODE 250 ALT 637 W/ HCPCS: Performed by: FAMILY MEDICINE

## 2021-03-04 PROCEDURE — 36415 COLL VENOUS BLD VENIPUNCTURE: CPT | Performed by: HOSPITALIST

## 2021-03-04 PROCEDURE — 27000221 HC OXYGEN, UP TO 24 HOURS

## 2021-03-04 PROCEDURE — 25000003 PHARM REV CODE 250: Performed by: FAMILY MEDICINE

## 2021-03-04 PROCEDURE — 99233 PR SUBSEQUENT HOSPITAL CARE,LEVL III: ICD-10-PCS | Mod: ,,, | Performed by: PHYSICIAN ASSISTANT

## 2021-03-04 PROCEDURE — 63600175 PHARM REV CODE 636 W HCPCS: Performed by: FAMILY MEDICINE

## 2021-03-04 PROCEDURE — 63600175 PHARM REV CODE 636 W HCPCS: Performed by: HOSPITALIST

## 2021-03-04 PROCEDURE — 96376 TX/PRO/DX INJ SAME DRUG ADON: CPT

## 2021-03-04 PROCEDURE — 94640 AIRWAY INHALATION TREATMENT: CPT

## 2021-03-04 PROCEDURE — 99900035 HC TECH TIME PER 15 MIN (STAT)

## 2021-03-04 PROCEDURE — 85025 COMPLETE CBC W/AUTO DIFF WBC: CPT | Performed by: HOSPITALIST

## 2021-03-04 PROCEDURE — 92610 EVALUATE SWALLOWING FUNCTION: CPT

## 2021-03-04 PROCEDURE — 80053 COMPREHEN METABOLIC PANEL: CPT | Performed by: HOSPITALIST

## 2021-03-04 PROCEDURE — 25000003 PHARM REV CODE 250: Performed by: PHYSICIAN ASSISTANT

## 2021-03-04 PROCEDURE — 25000242 PHARM REV CODE 250 ALT 637 W/ HCPCS: Performed by: HOSPITALIST

## 2021-03-04 PROCEDURE — 96372 THER/PROPH/DIAG INJ SC/IM: CPT

## 2021-03-04 PROCEDURE — 94761 N-INVAS EAR/PLS OXIMETRY MLT: CPT

## 2021-03-04 PROCEDURE — 25500020 PHARM REV CODE 255: Performed by: FAMILY MEDICINE

## 2021-03-04 RX ADMIN — INSULIN ASPART 8 UNITS: 100 INJECTION, SOLUTION INTRAVENOUS; SUBCUTANEOUS at 11:03

## 2021-03-04 RX ADMIN — INSULIN ASPART 8 UNITS: 100 INJECTION, SOLUTION INTRAVENOUS; SUBCUTANEOUS at 04:03

## 2021-03-04 RX ADMIN — LEVOTHYROXINE SODIUM 50 MCG: 50 TABLET ORAL at 06:03

## 2021-03-04 RX ADMIN — IOHEXOL 75 ML: 350 INJECTION, SOLUTION INTRAVENOUS at 05:03

## 2021-03-04 RX ADMIN — HYDROCHLOROTHIAZIDE 25 MG: 25 TABLET ORAL at 09:03

## 2021-03-04 RX ADMIN — ASPIRIN 81 MG: 81 TABLET, COATED ORAL at 09:03

## 2021-03-04 RX ADMIN — CLOPIDOGREL 75 MG: 75 TABLET, FILM COATED ORAL at 09:03

## 2021-03-04 RX ADMIN — BUDESONIDE 0.5 MG: 0.5 SUSPENSION RESPIRATORY (INHALATION) at 07:03

## 2021-03-04 RX ADMIN — IPRATROPIUM BROMIDE AND ALBUTEROL SULFATE 3 ML: .5; 3 SOLUTION RESPIRATORY (INHALATION) at 07:03

## 2021-03-04 RX ADMIN — FENTANYL 1 PATCH: 50 PATCH, EXTENDED RELEASE TRANSDERMAL at 09:03

## 2021-03-04 RX ADMIN — LOSARTAN POTASSIUM 100 MG: 50 TABLET, FILM COATED ORAL at 09:03

## 2021-03-04 RX ADMIN — LIDOCAINE HYDROCHLORIDE 15 ML: 20 SOLUTION ORAL; TOPICAL at 10:03

## 2021-03-04 RX ADMIN — OXYCODONE HYDROCHLORIDE 20 MG: 5 TABLET ORAL at 04:03

## 2021-03-04 RX ADMIN — IPRATROPIUM BROMIDE AND ALBUTEROL SULFATE 3 ML: .5; 3 SOLUTION RESPIRATORY (INHALATION) at 01:03

## 2021-03-04 RX ADMIN — INSULIN ASPART 6 UNITS: 100 INJECTION, SOLUTION INTRAVENOUS; SUBCUTANEOUS at 08:03

## 2021-03-04 RX ADMIN — AMPICILLIN SODIUM AND SULBACTAM SODIUM 3 G: 2; 1 INJECTION, POWDER, FOR SOLUTION INTRAMUSCULAR; INTRAVENOUS at 08:03

## 2021-03-04 RX ADMIN — AMPICILLIN SODIUM AND SULBACTAM SODIUM 3 G: 2; 1 INJECTION, POWDER, FOR SOLUTION INTRAMUSCULAR; INTRAVENOUS at 02:03

## 2021-03-04 RX ADMIN — OXYCODONE HYDROCHLORIDE 20 MG: 5 TABLET ORAL at 02:03

## 2021-03-04 RX ADMIN — BUDESONIDE 0.5 MG: 0.5 SUSPENSION RESPIRATORY (INHALATION) at 08:03

## 2021-03-04 RX ADMIN — INSULIN ASPART 6 UNITS: 100 INJECTION, SOLUTION INTRAVENOUS; SUBCUTANEOUS at 06:03

## 2021-03-04 RX ADMIN — OXYCODONE HYDROCHLORIDE 20 MG: 5 TABLET ORAL at 08:03

## 2021-03-04 RX ADMIN — ATORVASTATIN CALCIUM 80 MG: 40 TABLET, FILM COATED ORAL at 09:03

## 2021-03-04 RX ADMIN — IPRATROPIUM BROMIDE AND ALBUTEROL SULFATE 3 ML: .5; 3 SOLUTION RESPIRATORY (INHALATION) at 08:03

## 2021-03-05 ENCOUNTER — TELEPHONE (OUTPATIENT)
Dept: HEMATOLOGY/ONCOLOGY | Facility: CLINIC | Age: 81
End: 2021-03-05

## 2021-03-05 LAB
ALBUMIN SERPL BCP-MCNC: 2 G/DL (ref 3.5–5.2)
ALP SERPL-CCNC: 197 U/L (ref 55–135)
ALT SERPL W/O P-5'-P-CCNC: 20 U/L (ref 10–44)
ANION GAP SERPL CALC-SCNC: 7 MMOL/L (ref 8–16)
AST SERPL-CCNC: 28 U/L (ref 10–40)
BASOPHILS # BLD AUTO: 0.04 K/UL (ref 0–0.2)
BASOPHILS NFR BLD: 0.2 % (ref 0–1.9)
BILIRUB SERPL-MCNC: 0.4 MG/DL (ref 0.1–1)
BUN SERPL-MCNC: 13 MG/DL (ref 8–23)
CALCIUM SERPL-MCNC: 8.4 MG/DL (ref 8.7–10.5)
CHLORIDE SERPL-SCNC: 97 MMOL/L (ref 95–110)
CO2 SERPL-SCNC: 31 MMOL/L (ref 23–29)
CREAT SERPL-MCNC: 0.7 MG/DL (ref 0.5–1.4)
DIFFERENTIAL METHOD: ABNORMAL
EOSINOPHIL # BLD AUTO: 0.2 K/UL (ref 0–0.5)
EOSINOPHIL NFR BLD: 1 % (ref 0–8)
ERYTHROCYTE [DISTWIDTH] IN BLOOD BY AUTOMATED COUNT: 15.1 % (ref 11.5–14.5)
EST. GFR  (AFRICAN AMERICAN): >60 ML/MIN/1.73 M^2
EST. GFR  (NON AFRICAN AMERICAN): >60 ML/MIN/1.73 M^2
GLUCOSE SERPL-MCNC: 226 MG/DL (ref 70–110)
HCT VFR BLD AUTO: 32.5 % (ref 37–48.5)
HGB BLD-MCNC: 9.9 G/DL (ref 12–16)
IMM GRANULOCYTES # BLD AUTO: 0.08 K/UL (ref 0–0.04)
IMM GRANULOCYTES NFR BLD AUTO: 0.5 % (ref 0–0.5)
LYMPHOCYTES # BLD AUTO: 0.4 K/UL (ref 1–4.8)
LYMPHOCYTES NFR BLD: 2.4 % (ref 18–48)
MCH RBC QN AUTO: 25.6 PG (ref 27–31)
MCHC RBC AUTO-ENTMCNC: 30.5 G/DL (ref 32–36)
MCV RBC AUTO: 84 FL (ref 82–98)
MONOCYTES # BLD AUTO: 1.2 K/UL (ref 0.3–1)
MONOCYTES NFR BLD: 7.3 % (ref 4–15)
NEUTROPHILS # BLD AUTO: 14.3 K/UL (ref 1.8–7.7)
NEUTROPHILS NFR BLD: 88.6 % (ref 38–73)
NRBC BLD-RTO: 0 /100 WBC
PLATELET # BLD AUTO: 318 K/UL (ref 150–350)
PMV BLD AUTO: 11.6 FL (ref 9.2–12.9)
POCT GLUCOSE: 211 MG/DL (ref 70–110)
POCT GLUCOSE: 212 MG/DL (ref 70–110)
POCT GLUCOSE: 290 MG/DL (ref 70–110)
POTASSIUM SERPL-SCNC: 3.6 MMOL/L (ref 3.5–5.1)
PROCALCITONIN SERPL IA-MCNC: 2.04 NG/ML
PROT SERPL-MCNC: 6.4 G/DL (ref 6–8.4)
RBC # BLD AUTO: 3.87 M/UL (ref 4–5.4)
SODIUM SERPL-SCNC: 135 MMOL/L (ref 136–145)
WBC # BLD AUTO: 16.15 K/UL (ref 3.9–12.7)

## 2021-03-05 PROCEDURE — 63600175 PHARM REV CODE 636 W HCPCS: Performed by: HOSPITALIST

## 2021-03-05 PROCEDURE — 99223 1ST HOSP IP/OBS HIGH 75: CPT | Mod: ,,, | Performed by: INTERNAL MEDICINE

## 2021-03-05 PROCEDURE — 25000003 PHARM REV CODE 250: Performed by: PHYSICIAN ASSISTANT

## 2021-03-05 PROCEDURE — 25000003 PHARM REV CODE 250: Performed by: FAMILY MEDICINE

## 2021-03-05 PROCEDURE — 63600175 PHARM REV CODE 636 W HCPCS: Performed by: FAMILY MEDICINE

## 2021-03-05 PROCEDURE — 21400001 HC TELEMETRY ROOM

## 2021-03-05 PROCEDURE — 94640 AIRWAY INHALATION TREATMENT: CPT

## 2021-03-05 PROCEDURE — S0109 METHADONE ORAL 5MG: HCPCS | Performed by: PHYSICIAN ASSISTANT

## 2021-03-05 PROCEDURE — 94761 N-INVAS EAR/PLS OXIMETRY MLT: CPT

## 2021-03-05 PROCEDURE — 85025 COMPLETE CBC W/AUTO DIFF WBC: CPT | Performed by: NURSE PRACTITIONER

## 2021-03-05 PROCEDURE — 25000242 PHARM REV CODE 250 ALT 637 W/ HCPCS: Performed by: FAMILY MEDICINE

## 2021-03-05 PROCEDURE — 92507 TX SP LANG VOICE COMM INDIV: CPT

## 2021-03-05 PROCEDURE — 99233 PR SUBSEQUENT HOSPITAL CARE,LEVL III: ICD-10-PCS | Mod: ,,, | Performed by: PHYSICIAN ASSISTANT

## 2021-03-05 PROCEDURE — 36415 COLL VENOUS BLD VENIPUNCTURE: CPT | Performed by: NURSE PRACTITIONER

## 2021-03-05 PROCEDURE — 63600175 PHARM REV CODE 636 W HCPCS: Performed by: PHYSICIAN ASSISTANT

## 2021-03-05 PROCEDURE — 80053 COMPREHEN METABOLIC PANEL: CPT | Performed by: HOSPITALIST

## 2021-03-05 PROCEDURE — 99900035 HC TECH TIME PER 15 MIN (STAT)

## 2021-03-05 PROCEDURE — 25000003 PHARM REV CODE 250: Performed by: HOSPITALIST

## 2021-03-05 PROCEDURE — 25000242 PHARM REV CODE 250 ALT 637 W/ HCPCS: Performed by: HOSPITALIST

## 2021-03-05 PROCEDURE — 99223 PR INITIAL HOSPITAL CARE,LEVL III: ICD-10-PCS | Mod: ,,, | Performed by: INTERNAL MEDICINE

## 2021-03-05 PROCEDURE — 84145 PROCALCITONIN (PCT): CPT | Performed by: NURSE PRACTITIONER

## 2021-03-05 PROCEDURE — 27000221 HC OXYGEN, UP TO 24 HOURS

## 2021-03-05 PROCEDURE — 99233 SBSQ HOSP IP/OBS HIGH 50: CPT | Mod: ,,, | Performed by: PHYSICIAN ASSISTANT

## 2021-03-05 RX ORDER — PREGABALIN 75 MG/1
75 CAPSULE ORAL NIGHTLY
Status: DISCONTINUED | OUTPATIENT
Start: 2021-03-05 | End: 2021-03-11 | Stop reason: HOSPADM

## 2021-03-05 RX ORDER — METHADONE HYDROCHLORIDE 5 MG/5ML
2.5 SOLUTION ORAL NIGHTLY
Status: DISCONTINUED | OUTPATIENT
Start: 2021-03-05 | End: 2021-03-09

## 2021-03-05 RX ORDER — HYDROMORPHONE HYDROCHLORIDE 2 MG/ML
0.5 INJECTION, SOLUTION INTRAMUSCULAR; INTRAVENOUS; SUBCUTANEOUS EVERY 6 HOURS PRN
Status: DISCONTINUED | OUTPATIENT
Start: 2021-03-05 | End: 2021-03-11 | Stop reason: HOSPADM

## 2021-03-05 RX ADMIN — BUDESONIDE 0.5 MG: 0.5 SUSPENSION RESPIRATORY (INHALATION) at 07:03

## 2021-03-05 RX ADMIN — METHADONE HYDROCHLORIDE 2.5 MG: 5 SOLUTION ORAL at 08:03

## 2021-03-05 RX ADMIN — HYDROMORPHONE HYDROCHLORIDE 0.5 MG: 2 INJECTION INTRAMUSCULAR; INTRAVENOUS; SUBCUTANEOUS at 06:03

## 2021-03-05 RX ADMIN — INSULIN ASPART 4 UNITS: 100 INJECTION, SOLUTION INTRAVENOUS; SUBCUTANEOUS at 06:03

## 2021-03-05 RX ADMIN — AMPICILLIN SODIUM AND SULBACTAM SODIUM 3 G: 2; 1 INJECTION, POWDER, FOR SOLUTION INTRAMUSCULAR; INTRAVENOUS at 02:03

## 2021-03-05 RX ADMIN — CLONAZEPAM 0.5 MG: 0.5 TABLET ORAL at 08:03

## 2021-03-05 RX ADMIN — PREGABALIN 75 MG: 75 CAPSULE ORAL at 08:03

## 2021-03-05 RX ADMIN — HYDROMORPHONE HYDROCHLORIDE 0.5 MG: 2 INJECTION INTRAMUSCULAR; INTRAVENOUS; SUBCUTANEOUS at 07:03

## 2021-03-05 RX ADMIN — HYDROMORPHONE HYDROCHLORIDE 0.5 MG: 2 INJECTION INTRAMUSCULAR; INTRAVENOUS; SUBCUTANEOUS at 01:03

## 2021-03-05 RX ADMIN — AMPICILLIN SODIUM AND SULBACTAM SODIUM 3 G: 2; 1 INJECTION, POWDER, FOR SOLUTION INTRAMUSCULAR; INTRAVENOUS at 08:03

## 2021-03-05 RX ADMIN — IPRATROPIUM BROMIDE AND ALBUTEROL SULFATE 3 ML: .5; 3 SOLUTION RESPIRATORY (INHALATION) at 07:03

## 2021-03-05 RX ADMIN — IPRATROPIUM BROMIDE AND ALBUTEROL SULFATE 3 ML: .5; 3 SOLUTION RESPIRATORY (INHALATION) at 12:03

## 2021-03-05 RX ADMIN — LEVOTHYROXINE SODIUM 50 MCG: 50 TABLET ORAL at 06:03

## 2021-03-05 RX ADMIN — HYDROMORPHONE HYDROCHLORIDE 0.5 MG: 1 INJECTION, SOLUTION INTRAMUSCULAR; INTRAVENOUS; SUBCUTANEOUS at 12:03

## 2021-03-05 RX ADMIN — INSULIN ASPART 6 UNITS: 100 INJECTION, SOLUTION INTRAVENOUS; SUBCUTANEOUS at 02:03

## 2021-03-05 RX ADMIN — OXYCODONE HYDROCHLORIDE 20 MG: 5 TABLET ORAL at 02:03

## 2021-03-05 RX ADMIN — AMPICILLIN SODIUM AND SULBACTAM SODIUM 3 G: 2; 1 INJECTION, POWDER, FOR SOLUTION INTRAMUSCULAR; INTRAVENOUS at 07:03

## 2021-03-05 RX ADMIN — IPRATROPIUM BROMIDE AND ALBUTEROL SULFATE 3 ML: .5; 3 SOLUTION RESPIRATORY (INHALATION) at 01:03

## 2021-03-05 RX ADMIN — INSULIN ASPART 4 UNITS: 100 INJECTION, SOLUTION INTRAVENOUS; SUBCUTANEOUS at 11:03

## 2021-03-06 LAB
ALBUMIN SERPL BCP-MCNC: 1.8 G/DL (ref 3.5–5.2)
ALP SERPL-CCNC: 225 U/L (ref 55–135)
ALT SERPL W/O P-5'-P-CCNC: 23 U/L (ref 10–44)
ANION GAP SERPL CALC-SCNC: 9 MMOL/L (ref 8–16)
AST SERPL-CCNC: 27 U/L (ref 10–40)
BASOPHILS # BLD AUTO: 0.06 K/UL (ref 0–0.2)
BASOPHILS NFR BLD: 0.4 % (ref 0–1.9)
BILIRUB SERPL-MCNC: 0.5 MG/DL (ref 0.1–1)
BUN SERPL-MCNC: 9 MG/DL (ref 8–23)
CALCIUM SERPL-MCNC: 8.4 MG/DL (ref 8.7–10.5)
CHLORIDE SERPL-SCNC: 99 MMOL/L (ref 95–110)
CO2 SERPL-SCNC: 29 MMOL/L (ref 23–29)
CREAT SERPL-MCNC: 0.7 MG/DL (ref 0.5–1.4)
DIFFERENTIAL METHOD: ABNORMAL
EOSINOPHIL # BLD AUTO: 0.2 K/UL (ref 0–0.5)
EOSINOPHIL NFR BLD: 1.1 % (ref 0–8)
ERYTHROCYTE [DISTWIDTH] IN BLOOD BY AUTOMATED COUNT: 15.1 % (ref 11.5–14.5)
EST. GFR  (AFRICAN AMERICAN): >60 ML/MIN/1.73 M^2
EST. GFR  (NON AFRICAN AMERICAN): >60 ML/MIN/1.73 M^2
GLUCOSE SERPL-MCNC: 245 MG/DL (ref 70–110)
HCT VFR BLD AUTO: 34.2 % (ref 37–48.5)
HGB BLD-MCNC: 10.4 G/DL (ref 12–16)
IMM GRANULOCYTES # BLD AUTO: 0.12 K/UL (ref 0–0.04)
IMM GRANULOCYTES NFR BLD AUTO: 0.8 % (ref 0–0.5)
LYMPHOCYTES # BLD AUTO: 0.4 K/UL (ref 1–4.8)
LYMPHOCYTES NFR BLD: 2.4 % (ref 18–48)
MCH RBC QN AUTO: 25.4 PG (ref 27–31)
MCHC RBC AUTO-ENTMCNC: 30.4 G/DL (ref 32–36)
MCV RBC AUTO: 83 FL (ref 82–98)
MONOCYTES # BLD AUTO: 0.9 K/UL (ref 0.3–1)
MONOCYTES NFR BLD: 6.1 % (ref 4–15)
NEUTROPHILS # BLD AUTO: 13.3 K/UL (ref 1.8–7.7)
NEUTROPHILS NFR BLD: 89.2 % (ref 38–73)
NRBC BLD-RTO: 0 /100 WBC
PLATELET # BLD AUTO: 350 K/UL (ref 150–350)
PMV BLD AUTO: 11.5 FL (ref 9.2–12.9)
POCT GLUCOSE: 251 MG/DL (ref 70–110)
POCT GLUCOSE: 260 MG/DL (ref 70–110)
POCT GLUCOSE: 304 MG/DL (ref 70–110)
POCT GLUCOSE: 324 MG/DL (ref 70–110)
POTASSIUM SERPL-SCNC: 3.5 MMOL/L (ref 3.5–5.1)
PROT SERPL-MCNC: 6.4 G/DL (ref 6–8.4)
RBC # BLD AUTO: 4.1 M/UL (ref 4–5.4)
SODIUM SERPL-SCNC: 137 MMOL/L (ref 136–145)
WBC # BLD AUTO: 14.88 K/UL (ref 3.9–12.7)

## 2021-03-06 PROCEDURE — 25000003 PHARM REV CODE 250: Performed by: HOSPITALIST

## 2021-03-06 PROCEDURE — 63600175 PHARM REV CODE 636 W HCPCS: Performed by: FAMILY MEDICINE

## 2021-03-06 PROCEDURE — 94640 AIRWAY INHALATION TREATMENT: CPT

## 2021-03-06 PROCEDURE — 96372 THER/PROPH/DIAG INJ SC/IM: CPT

## 2021-03-06 PROCEDURE — 25000003 PHARM REV CODE 250: Performed by: PHYSICIAN ASSISTANT

## 2021-03-06 PROCEDURE — 92526 ORAL FUNCTION THERAPY: CPT

## 2021-03-06 PROCEDURE — 27000221 HC OXYGEN, UP TO 24 HOURS

## 2021-03-06 PROCEDURE — S0109 METHADONE ORAL 5MG: HCPCS | Performed by: PHYSICIAN ASSISTANT

## 2021-03-06 PROCEDURE — 21400001 HC TELEMETRY ROOM

## 2021-03-06 PROCEDURE — 25000003 PHARM REV CODE 250: Performed by: FAMILY MEDICINE

## 2021-03-06 PROCEDURE — 25000242 PHARM REV CODE 250 ALT 637 W/ HCPCS: Performed by: HOSPITALIST

## 2021-03-06 PROCEDURE — 25000242 PHARM REV CODE 250 ALT 637 W/ HCPCS: Performed by: FAMILY MEDICINE

## 2021-03-06 PROCEDURE — 87205 SMEAR GRAM STAIN: CPT | Performed by: HOSPITALIST

## 2021-03-06 PROCEDURE — 85025 COMPLETE CBC W/AUTO DIFF WBC: CPT | Performed by: NURSE PRACTITIONER

## 2021-03-06 PROCEDURE — 63600175 PHARM REV CODE 636 W HCPCS: Performed by: HOSPITALIST

## 2021-03-06 PROCEDURE — 87106 FUNGI IDENTIFICATION YEAST: CPT | Performed by: HOSPITALIST

## 2021-03-06 PROCEDURE — 36415 COLL VENOUS BLD VENIPUNCTURE: CPT | Performed by: HOSPITALIST

## 2021-03-06 PROCEDURE — 87070 CULTURE OTHR SPECIMN AEROBIC: CPT | Performed by: HOSPITALIST

## 2021-03-06 PROCEDURE — 94761 N-INVAS EAR/PLS OXIMETRY MLT: CPT

## 2021-03-06 PROCEDURE — 80053 COMPREHEN METABOLIC PANEL: CPT | Performed by: HOSPITALIST

## 2021-03-06 RX ORDER — INSULIN ASPART 100 [IU]/ML
1-10 INJECTION, SOLUTION INTRAVENOUS; SUBCUTANEOUS EVERY 6 HOURS PRN
Status: DISCONTINUED | OUTPATIENT
Start: 2021-03-06 | End: 2021-03-11 | Stop reason: HOSPADM

## 2021-03-06 RX ADMIN — HYDROMORPHONE HYDROCHLORIDE 0.5 MG: 2 INJECTION INTRAMUSCULAR; INTRAVENOUS; SUBCUTANEOUS at 02:03

## 2021-03-06 RX ADMIN — CLONAZEPAM 0.5 MG: 0.5 TABLET ORAL at 09:03

## 2021-03-06 RX ADMIN — HYDROCHLOROTHIAZIDE 25 MG: 25 TABLET ORAL at 08:03

## 2021-03-06 RX ADMIN — OXYCODONE HYDROCHLORIDE 20 MG: 5 TABLET ORAL at 07:03

## 2021-03-06 RX ADMIN — BUDESONIDE 0.5 MG: 0.5 SUSPENSION RESPIRATORY (INHALATION) at 07:03

## 2021-03-06 RX ADMIN — ATORVASTATIN CALCIUM 80 MG: 40 TABLET, FILM COATED ORAL at 08:03

## 2021-03-06 RX ADMIN — AMPICILLIN SODIUM AND SULBACTAM SODIUM 3 G: 2; 1 INJECTION, POWDER, FOR SOLUTION INTRAMUSCULAR; INTRAVENOUS at 02:03

## 2021-03-06 RX ADMIN — INSULIN ASPART 6 UNITS: 100 INJECTION, SOLUTION INTRAVENOUS; SUBCUTANEOUS at 04:03

## 2021-03-06 RX ADMIN — OXYCODONE HYDROCHLORIDE 20 MG: 5 TABLET ORAL at 02:03

## 2021-03-06 RX ADMIN — AMPICILLIN SODIUM AND SULBACTAM SODIUM 3 G: 2; 1 INJECTION, POWDER, FOR SOLUTION INTRAMUSCULAR; INTRAVENOUS at 07:03

## 2021-03-06 RX ADMIN — INSULIN ASPART 6 UNITS: 100 INJECTION, SOLUTION INTRAVENOUS; SUBCUTANEOUS at 05:03

## 2021-03-06 RX ADMIN — PREGABALIN 75 MG: 75 CAPSULE ORAL at 09:03

## 2021-03-06 RX ADMIN — IPRATROPIUM BROMIDE AND ALBUTEROL SULFATE 3 ML: .5; 3 SOLUTION RESPIRATORY (INHALATION) at 01:03

## 2021-03-06 RX ADMIN — IPRATROPIUM BROMIDE AND ALBUTEROL SULFATE 3 ML: .5; 3 SOLUTION RESPIRATORY (INHALATION) at 07:03

## 2021-03-06 RX ADMIN — METHADONE HYDROCHLORIDE 2.5 MG: 5 SOLUTION ORAL at 09:03

## 2021-03-06 RX ADMIN — IPRATROPIUM BROMIDE AND ALBUTEROL SULFATE 3 ML: .5; 3 SOLUTION RESPIRATORY (INHALATION) at 08:03

## 2021-03-06 RX ADMIN — AMPICILLIN SODIUM AND SULBACTAM SODIUM 3 G: 2; 1 INJECTION, POWDER, FOR SOLUTION INTRAMUSCULAR; INTRAVENOUS at 01:03

## 2021-03-06 RX ADMIN — LOSARTAN POTASSIUM 100 MG: 50 TABLET, FILM COATED ORAL at 08:03

## 2021-03-06 RX ADMIN — INSULIN ASPART 2 UNITS: 100 INJECTION, SOLUTION INTRAVENOUS; SUBCUTANEOUS at 11:03

## 2021-03-06 RX ADMIN — BUDESONIDE 0.5 MG: 0.5 SUSPENSION RESPIRATORY (INHALATION) at 08:03

## 2021-03-06 RX ADMIN — INSULIN ASPART 4 UNITS: 100 INJECTION, SOLUTION INTRAVENOUS; SUBCUTANEOUS at 12:03

## 2021-03-06 RX ADMIN — OXYCODONE HYDROCHLORIDE 20 MG: 5 TABLET ORAL at 08:03

## 2021-03-06 RX ADMIN — LEVOTHYROXINE SODIUM 50 MCG: 50 TABLET ORAL at 05:03

## 2021-03-06 RX ADMIN — INSULIN ASPART 8 UNITS: 100 INJECTION, SOLUTION INTRAVENOUS; SUBCUTANEOUS at 11:03

## 2021-03-06 RX ADMIN — AMPICILLIN SODIUM AND SULBACTAM SODIUM 3 G: 2; 1 INJECTION, POWDER, FOR SOLUTION INTRAMUSCULAR; INTRAVENOUS at 08:03

## 2021-03-07 LAB
ALBUMIN SERPL BCP-MCNC: 1.7 G/DL (ref 3.5–5.2)
ALP SERPL-CCNC: 198 U/L (ref 55–135)
ALT SERPL W/O P-5'-P-CCNC: 22 U/L (ref 10–44)
ANION GAP SERPL CALC-SCNC: 9 MMOL/L (ref 8–16)
AST SERPL-CCNC: 21 U/L (ref 10–40)
BASOPHILS # BLD AUTO: 0.05 K/UL (ref 0–0.2)
BASOPHILS NFR BLD: 0.4 % (ref 0–1.9)
BILIRUB SERPL-MCNC: 0.5 MG/DL (ref 0.1–1)
BUN SERPL-MCNC: 9 MG/DL (ref 8–23)
CALCIUM SERPL-MCNC: 8.3 MG/DL (ref 8.7–10.5)
CHLORIDE SERPL-SCNC: 97 MMOL/L (ref 95–110)
CO2 SERPL-SCNC: 31 MMOL/L (ref 23–29)
CREAT SERPL-MCNC: 0.7 MG/DL (ref 0.5–1.4)
DIFFERENTIAL METHOD: ABNORMAL
EOSINOPHIL # BLD AUTO: 0.4 K/UL (ref 0–0.5)
EOSINOPHIL NFR BLD: 3.3 % (ref 0–8)
ERYTHROCYTE [DISTWIDTH] IN BLOOD BY AUTOMATED COUNT: 15.1 % (ref 11.5–14.5)
EST. GFR  (AFRICAN AMERICAN): >60 ML/MIN/1.73 M^2
EST. GFR  (NON AFRICAN AMERICAN): >60 ML/MIN/1.73 M^2
GLUCOSE SERPL-MCNC: 236 MG/DL (ref 70–110)
HCT VFR BLD AUTO: 33.7 % (ref 37–48.5)
HGB BLD-MCNC: 10.1 G/DL (ref 12–16)
IMM GRANULOCYTES # BLD AUTO: 0.07 K/UL (ref 0–0.04)
IMM GRANULOCYTES NFR BLD AUTO: 0.5 % (ref 0–0.5)
LYMPHOCYTES # BLD AUTO: 0.4 K/UL (ref 1–4.8)
LYMPHOCYTES NFR BLD: 3.1 % (ref 18–48)
MCH RBC QN AUTO: 25 PG (ref 27–31)
MCHC RBC AUTO-ENTMCNC: 30 G/DL (ref 32–36)
MCV RBC AUTO: 83 FL (ref 82–98)
MONOCYTES # BLD AUTO: 0.9 K/UL (ref 0.3–1)
MONOCYTES NFR BLD: 7 % (ref 4–15)
NEUTROPHILS # BLD AUTO: 10.9 K/UL (ref 1.8–7.7)
NEUTROPHILS NFR BLD: 85.7 % (ref 38–73)
NRBC BLD-RTO: 0 /100 WBC
PLATELET # BLD AUTO: 361 K/UL (ref 150–350)
PMV BLD AUTO: 11.3 FL (ref 9.2–12.9)
POCT GLUCOSE: 233 MG/DL (ref 70–110)
POCT GLUCOSE: 239 MG/DL (ref 70–110)
POCT GLUCOSE: 261 MG/DL (ref 70–110)
POCT GLUCOSE: 267 MG/DL (ref 70–110)
POCT GLUCOSE: 295 MG/DL (ref 70–110)
POTASSIUM SERPL-SCNC: 3.4 MMOL/L (ref 3.5–5.1)
PROT SERPL-MCNC: 5.9 G/DL (ref 6–8.4)
RBC # BLD AUTO: 4.04 M/UL (ref 4–5.4)
SODIUM SERPL-SCNC: 137 MMOL/L (ref 136–145)
WBC # BLD AUTO: 12.75 K/UL (ref 3.9–12.7)

## 2021-03-07 PROCEDURE — 80053 COMPREHEN METABOLIC PANEL: CPT | Performed by: HOSPITALIST

## 2021-03-07 PROCEDURE — 96372 THER/PROPH/DIAG INJ SC/IM: CPT

## 2021-03-07 PROCEDURE — 25000003 PHARM REV CODE 250: Performed by: FAMILY MEDICINE

## 2021-03-07 PROCEDURE — 63600175 PHARM REV CODE 636 W HCPCS: Performed by: FAMILY MEDICINE

## 2021-03-07 PROCEDURE — 25000242 PHARM REV CODE 250 ALT 637 W/ HCPCS: Performed by: HOSPITALIST

## 2021-03-07 PROCEDURE — 63600175 PHARM REV CODE 636 W HCPCS: Performed by: INTERNAL MEDICINE

## 2021-03-07 PROCEDURE — 85025 COMPLETE CBC W/AUTO DIFF WBC: CPT | Performed by: NURSE PRACTITIONER

## 2021-03-07 PROCEDURE — 36415 COLL VENOUS BLD VENIPUNCTURE: CPT | Performed by: HOSPITALIST

## 2021-03-07 PROCEDURE — 21400001 HC TELEMETRY ROOM

## 2021-03-07 PROCEDURE — S0109 METHADONE ORAL 5MG: HCPCS | Performed by: PHYSICIAN ASSISTANT

## 2021-03-07 PROCEDURE — 25000003 PHARM REV CODE 250: Performed by: PHYSICIAN ASSISTANT

## 2021-03-07 PROCEDURE — 27000221 HC OXYGEN, UP TO 24 HOURS

## 2021-03-07 PROCEDURE — 94640 AIRWAY INHALATION TREATMENT: CPT

## 2021-03-07 PROCEDURE — 25000003 PHARM REV CODE 250: Performed by: HOSPITALIST

## 2021-03-07 PROCEDURE — 99900035 HC TECH TIME PER 15 MIN (STAT)

## 2021-03-07 PROCEDURE — 63600175 PHARM REV CODE 636 W HCPCS: Performed by: HOSPITALIST

## 2021-03-07 PROCEDURE — 94761 N-INVAS EAR/PLS OXIMETRY MLT: CPT

## 2021-03-07 PROCEDURE — 25000242 PHARM REV CODE 250 ALT 637 W/ HCPCS: Performed by: FAMILY MEDICINE

## 2021-03-07 RX ORDER — AMLODIPINE BESYLATE 10 MG/1
10 TABLET ORAL DAILY
Status: DISCONTINUED | OUTPATIENT
Start: 2021-03-07 | End: 2021-03-11 | Stop reason: HOSPADM

## 2021-03-07 RX ADMIN — INSULIN ASPART 4 UNITS: 100 INJECTION, SOLUTION INTRAVENOUS; SUBCUTANEOUS at 11:03

## 2021-03-07 RX ADMIN — AMPICILLIN SODIUM AND SULBACTAM SODIUM 3 G: 2; 1 INJECTION, POWDER, FOR SOLUTION INTRAMUSCULAR; INTRAVENOUS at 01:03

## 2021-03-07 RX ADMIN — OXYCODONE HYDROCHLORIDE 20 MG: 5 TABLET ORAL at 12:03

## 2021-03-07 RX ADMIN — HYDROMORPHONE HYDROCHLORIDE 0.5 MG: 2 INJECTION INTRAMUSCULAR; INTRAVENOUS; SUBCUTANEOUS at 08:03

## 2021-03-07 RX ADMIN — IPRATROPIUM BROMIDE AND ALBUTEROL SULFATE 3 ML: .5; 3 SOLUTION RESPIRATORY (INHALATION) at 12:03

## 2021-03-07 RX ADMIN — OXYCODONE HYDROCHLORIDE 20 MG: 5 TABLET ORAL at 10:03

## 2021-03-07 RX ADMIN — FENTANYL 1 PATCH: 50 PATCH, EXTENDED RELEASE TRANSDERMAL at 01:03

## 2021-03-07 RX ADMIN — AMLODIPINE BESYLATE 10 MG: 10 TABLET ORAL at 11:03

## 2021-03-07 RX ADMIN — IPRATROPIUM BROMIDE AND ALBUTEROL SULFATE 3 ML: .5; 3 SOLUTION RESPIRATORY (INHALATION) at 07:03

## 2021-03-07 RX ADMIN — INSULIN ASPART 4 UNITS: 100 INJECTION, SOLUTION INTRAVENOUS; SUBCUTANEOUS at 05:03

## 2021-03-07 RX ADMIN — METHADONE HYDROCHLORIDE 2.5 MG: 5 SOLUTION ORAL at 10:03

## 2021-03-07 RX ADMIN — PREGABALIN 75 MG: 75 CAPSULE ORAL at 10:03

## 2021-03-07 RX ADMIN — BUDESONIDE 0.5 MG: 0.5 SUSPENSION RESPIRATORY (INHALATION) at 07:03

## 2021-03-07 RX ADMIN — ATORVASTATIN CALCIUM 80 MG: 40 TABLET, FILM COATED ORAL at 09:03

## 2021-03-07 RX ADMIN — LOSARTAN POTASSIUM 100 MG: 50 TABLET, FILM COATED ORAL at 09:03

## 2021-03-07 RX ADMIN — INSULIN ASPART 6 UNITS: 100 INJECTION, SOLUTION INTRAVENOUS; SUBCUTANEOUS at 05:03

## 2021-03-07 RX ADMIN — INSULIN ASPART 3 UNITS: 100 INJECTION, SOLUTION INTRAVENOUS; SUBCUTANEOUS at 10:03

## 2021-03-07 RX ADMIN — OXYCODONE HYDROCHLORIDE 20 MG: 5 TABLET ORAL at 05:03

## 2021-03-07 RX ADMIN — HYDROCHLOROTHIAZIDE 25 MG: 25 TABLET ORAL at 09:03

## 2021-03-07 RX ADMIN — OXYCODONE HYDROCHLORIDE 20 MG: 5 TABLET ORAL at 03:03

## 2021-03-07 RX ADMIN — HYDROMORPHONE HYDROCHLORIDE 0.5 MG: 2 INJECTION INTRAMUSCULAR; INTRAVENOUS; SUBCUTANEOUS at 09:03

## 2021-03-07 RX ADMIN — CLONAZEPAM 0.5 MG: 0.5 TABLET ORAL at 10:03

## 2021-03-07 RX ADMIN — AMPICILLIN SODIUM AND SULBACTAM SODIUM 3 G: 2; 1 INJECTION, POWDER, FOR SOLUTION INTRAMUSCULAR; INTRAVENOUS at 09:03

## 2021-03-07 RX ADMIN — AMPICILLIN SODIUM AND SULBACTAM SODIUM 3 G: 2; 1 INJECTION, POWDER, FOR SOLUTION INTRAMUSCULAR; INTRAVENOUS at 10:03

## 2021-03-08 LAB
ALBUMIN SERPL BCP-MCNC: 1.6 G/DL (ref 3.5–5.2)
ALP SERPL-CCNC: 212 U/L (ref 55–135)
ALT SERPL W/O P-5'-P-CCNC: 24 U/L (ref 10–44)
ANION GAP SERPL CALC-SCNC: 8 MMOL/L (ref 8–16)
AST SERPL-CCNC: 24 U/L (ref 10–40)
BACTERIA BLD CULT: NORMAL
BACTERIA BLD CULT: NORMAL
BACTERIA SPEC AEROBE CULT: ABNORMAL
BACTERIA SPEC AEROBE CULT: ABNORMAL
BASOPHILS # BLD AUTO: 0.04 K/UL (ref 0–0.2)
BASOPHILS NFR BLD: 0.4 % (ref 0–1.9)
BILIRUB SERPL-MCNC: 0.4 MG/DL (ref 0.1–1)
BUN SERPL-MCNC: 12 MG/DL (ref 8–23)
CALCIUM SERPL-MCNC: 8.3 MG/DL (ref 8.7–10.5)
CHLORIDE SERPL-SCNC: 95 MMOL/L (ref 95–110)
CO2 SERPL-SCNC: 33 MMOL/L (ref 23–29)
CREAT SERPL-MCNC: 0.7 MG/DL (ref 0.5–1.4)
DIFFERENTIAL METHOD: ABNORMAL
EOSINOPHIL # BLD AUTO: 0.6 K/UL (ref 0–0.5)
EOSINOPHIL NFR BLD: 5.2 % (ref 0–8)
ERYTHROCYTE [DISTWIDTH] IN BLOOD BY AUTOMATED COUNT: 15.1 % (ref 11.5–14.5)
EST. GFR  (AFRICAN AMERICAN): >60 ML/MIN/1.73 M^2
EST. GFR  (NON AFRICAN AMERICAN): >60 ML/MIN/1.73 M^2
GLUCOSE SERPL-MCNC: 322 MG/DL (ref 70–110)
GRAM STN SPEC: ABNORMAL
HCT VFR BLD AUTO: 33.3 % (ref 37–48.5)
HGB BLD-MCNC: 10.1 G/DL (ref 12–16)
IMM GRANULOCYTES # BLD AUTO: 0.09 K/UL (ref 0–0.04)
IMM GRANULOCYTES NFR BLD AUTO: 0.8 % (ref 0–0.5)
INR PPP: 1 (ref 0.8–1.2)
LYMPHOCYTES # BLD AUTO: 0.5 K/UL (ref 1–4.8)
LYMPHOCYTES NFR BLD: 4.2 % (ref 18–48)
MCH RBC QN AUTO: 25.2 PG (ref 27–31)
MCHC RBC AUTO-ENTMCNC: 30.3 G/DL (ref 32–36)
MCV RBC AUTO: 83 FL (ref 82–98)
MONOCYTES # BLD AUTO: 1 K/UL (ref 0.3–1)
MONOCYTES NFR BLD: 8.9 % (ref 4–15)
NEUTROPHILS # BLD AUTO: 8.6 K/UL (ref 1.8–7.7)
NEUTROPHILS NFR BLD: 80.5 % (ref 38–73)
NRBC BLD-RTO: 0 /100 WBC
PLATELET # BLD AUTO: 372 K/UL (ref 150–350)
PMV BLD AUTO: 11.2 FL (ref 9.2–12.9)
POCT GLUCOSE: 143 MG/DL (ref 70–110)
POCT GLUCOSE: 181 MG/DL (ref 70–110)
POCT GLUCOSE: 311 MG/DL (ref 70–110)
POCT GLUCOSE: 342 MG/DL (ref 70–110)
POTASSIUM SERPL-SCNC: 3.5 MMOL/L (ref 3.5–5.1)
PROT SERPL-MCNC: 5.8 G/DL (ref 6–8.4)
PROTHROMBIN TIME: 11.2 SEC (ref 9–12.5)
RBC # BLD AUTO: 4.01 M/UL (ref 4–5.4)
SODIUM SERPL-SCNC: 136 MMOL/L (ref 136–145)
WBC # BLD AUTO: 10.68 K/UL (ref 3.9–12.7)

## 2021-03-08 PROCEDURE — 94640 AIRWAY INHALATION TREATMENT: CPT

## 2021-03-08 PROCEDURE — 80053 COMPREHEN METABOLIC PANEL: CPT | Performed by: HOSPITALIST

## 2021-03-08 PROCEDURE — S0109 METHADONE ORAL 5MG: HCPCS | Performed by: PHYSICIAN ASSISTANT

## 2021-03-08 PROCEDURE — 36415 COLL VENOUS BLD VENIPUNCTURE: CPT | Performed by: NURSE PRACTITIONER

## 2021-03-08 PROCEDURE — 96372 THER/PROPH/DIAG INJ SC/IM: CPT

## 2021-03-08 PROCEDURE — 99900035 HC TECH TIME PER 15 MIN (STAT)

## 2021-03-08 PROCEDURE — 21400001 HC TELEMETRY ROOM

## 2021-03-08 PROCEDURE — 25000003 PHARM REV CODE 250: Performed by: FAMILY MEDICINE

## 2021-03-08 PROCEDURE — 25000003 PHARM REV CODE 250: Performed by: NURSE PRACTITIONER

## 2021-03-08 PROCEDURE — C9399 UNCLASSIFIED DRUGS OR BIOLOG: HCPCS | Performed by: NURSE PRACTITIONER

## 2021-03-08 PROCEDURE — 25000242 PHARM REV CODE 250 ALT 637 W/ HCPCS: Performed by: HOSPITALIST

## 2021-03-08 PROCEDURE — 63600175 PHARM REV CODE 636 W HCPCS: Performed by: FAMILY MEDICINE

## 2021-03-08 PROCEDURE — 25000003 PHARM REV CODE 250: Performed by: PHYSICIAN ASSISTANT

## 2021-03-08 PROCEDURE — 63600175 PHARM REV CODE 636 W HCPCS: Performed by: HOSPITALIST

## 2021-03-08 PROCEDURE — 99233 SBSQ HOSP IP/OBS HIGH 50: CPT | Mod: ,,, | Performed by: PHYSICIAN ASSISTANT

## 2021-03-08 PROCEDURE — 94760 N-INVAS EAR/PLS OXIMETRY 1: CPT

## 2021-03-08 PROCEDURE — 94761 N-INVAS EAR/PLS OXIMETRY MLT: CPT

## 2021-03-08 PROCEDURE — 85610 PROTHROMBIN TIME: CPT | Performed by: NURSE PRACTITIONER

## 2021-03-08 PROCEDURE — 99231 PR SUBSEQUENT HOSPITAL CARE,LEVL I: ICD-10-PCS | Mod: ,,, | Performed by: INTERNAL MEDICINE

## 2021-03-08 PROCEDURE — 25000003 PHARM REV CODE 250: Performed by: HOSPITALIST

## 2021-03-08 PROCEDURE — 99233 PR SUBSEQUENT HOSPITAL CARE,LEVL III: ICD-10-PCS | Mod: ,,, | Performed by: PHYSICIAN ASSISTANT

## 2021-03-08 PROCEDURE — 25000242 PHARM REV CODE 250 ALT 637 W/ HCPCS: Performed by: FAMILY MEDICINE

## 2021-03-08 PROCEDURE — 27000221 HC OXYGEN, UP TO 24 HOURS

## 2021-03-08 PROCEDURE — 99231 SBSQ HOSP IP/OBS SF/LOW 25: CPT | Mod: ,,, | Performed by: INTERNAL MEDICINE

## 2021-03-08 PROCEDURE — 85025 COMPLETE CBC W/AUTO DIFF WBC: CPT | Performed by: NURSE PRACTITIONER

## 2021-03-08 RX ADMIN — OXYCODONE HYDROCHLORIDE 20 MG: 5 TABLET ORAL at 05:03

## 2021-03-08 RX ADMIN — INSULIN ASPART 2 UNITS: 100 INJECTION, SOLUTION INTRAVENOUS; SUBCUTANEOUS at 05:03

## 2021-03-08 RX ADMIN — METHADONE HYDROCHLORIDE 2.5 MG: 5 SOLUTION ORAL at 11:03

## 2021-03-08 RX ADMIN — IPRATROPIUM BROMIDE AND ALBUTEROL SULFATE 3 ML: .5; 3 SOLUTION RESPIRATORY (INHALATION) at 07:03

## 2021-03-08 RX ADMIN — LEVOTHYROXINE SODIUM 50 MCG: 50 TABLET ORAL at 05:03

## 2021-03-08 RX ADMIN — LIDOCAINE HYDROCHLORIDE 15 ML: 20 SOLUTION ORAL; TOPICAL at 05:03

## 2021-03-08 RX ADMIN — HYDROMORPHONE HYDROCHLORIDE 0.5 MG: 2 INJECTION INTRAMUSCULAR; INTRAVENOUS; SUBCUTANEOUS at 11:03

## 2021-03-08 RX ADMIN — IPRATROPIUM BROMIDE AND ALBUTEROL SULFATE 3 ML: .5; 3 SOLUTION RESPIRATORY (INHALATION) at 08:03

## 2021-03-08 RX ADMIN — OXYCODONE HYDROCHLORIDE 20 MG: 5 TABLET ORAL at 09:03

## 2021-03-08 RX ADMIN — INSULIN ASPART 8 UNITS: 100 INJECTION, SOLUTION INTRAVENOUS; SUBCUTANEOUS at 11:03

## 2021-03-08 RX ADMIN — LOSARTAN POTASSIUM 100 MG: 50 TABLET, FILM COATED ORAL at 09:03

## 2021-03-08 RX ADMIN — INSULIN ASPART 8 UNITS: 100 INJECTION, SOLUTION INTRAVENOUS; SUBCUTANEOUS at 05:03

## 2021-03-08 RX ADMIN — CLONAZEPAM 0.5 MG: 0.5 TABLET ORAL at 09:03

## 2021-03-08 RX ADMIN — BUDESONIDE 0.5 MG: 0.5 SUSPENSION RESPIRATORY (INHALATION) at 07:03

## 2021-03-08 RX ADMIN — IPRATROPIUM BROMIDE AND ALBUTEROL SULFATE 3 ML: .5; 3 SOLUTION RESPIRATORY (INHALATION) at 12:03

## 2021-03-08 RX ADMIN — ATORVASTATIN CALCIUM 80 MG: 40 TABLET, FILM COATED ORAL at 09:03

## 2021-03-08 RX ADMIN — HYDROMORPHONE HYDROCHLORIDE 0.5 MG: 2 INJECTION INTRAMUSCULAR; INTRAVENOUS; SUBCUTANEOUS at 05:03

## 2021-03-08 RX ADMIN — BUDESONIDE 0.5 MG: 0.5 SUSPENSION RESPIRATORY (INHALATION) at 08:03

## 2021-03-08 RX ADMIN — AMPICILLIN SODIUM AND SULBACTAM SODIUM 3 G: 2; 1 INJECTION, POWDER, FOR SOLUTION INTRAMUSCULAR; INTRAVENOUS at 09:03

## 2021-03-08 RX ADMIN — PREGABALIN 75 MG: 75 CAPSULE ORAL at 09:03

## 2021-03-08 RX ADMIN — AMLODIPINE BESYLATE 10 MG: 10 TABLET ORAL at 09:03

## 2021-03-08 RX ADMIN — IPRATROPIUM BROMIDE AND ALBUTEROL SULFATE 3 ML: .5; 3 SOLUTION RESPIRATORY (INHALATION) at 02:03

## 2021-03-08 RX ADMIN — INSULIN DETEMIR 10 UNITS: 100 INJECTION, SOLUTION SUBCUTANEOUS at 09:03

## 2021-03-08 RX ADMIN — HYDROCHLOROTHIAZIDE 25 MG: 25 TABLET ORAL at 09:03

## 2021-03-08 RX ADMIN — AMPICILLIN SODIUM AND SULBACTAM SODIUM 3 G: 2; 1 INJECTION, POWDER, FOR SOLUTION INTRAMUSCULAR; INTRAVENOUS at 01:03

## 2021-03-09 ENCOUNTER — TELEPHONE (OUTPATIENT)
Dept: HEMATOLOGY/ONCOLOGY | Facility: CLINIC | Age: 81
End: 2021-03-09

## 2021-03-09 PROBLEM — K94.23 PEG TUBE MALFUNCTION: Status: ACTIVE | Noted: 2021-03-09

## 2021-03-09 LAB
ALBUMIN SERPL BCP-MCNC: 1.7 G/DL (ref 3.5–5.2)
ALP SERPL-CCNC: 212 U/L (ref 55–135)
ALT SERPL W/O P-5'-P-CCNC: 21 U/L (ref 10–44)
ANION GAP SERPL CALC-SCNC: 10 MMOL/L (ref 8–16)
AST SERPL-CCNC: 28 U/L (ref 10–40)
BASOPHILS # BLD AUTO: 0.05 K/UL (ref 0–0.2)
BASOPHILS NFR BLD: 0.5 % (ref 0–1.9)
BILIRUB SERPL-MCNC: 0.4 MG/DL (ref 0.1–1)
BUN SERPL-MCNC: 12 MG/DL (ref 8–23)
CALCIUM SERPL-MCNC: 8.6 MG/DL (ref 8.7–10.5)
CHLORIDE SERPL-SCNC: 92 MMOL/L (ref 95–110)
CO2 SERPL-SCNC: 35 MMOL/L (ref 23–29)
CREAT SERPL-MCNC: 0.7 MG/DL (ref 0.5–1.4)
DIFFERENTIAL METHOD: ABNORMAL
EOSINOPHIL # BLD AUTO: 0.5 K/UL (ref 0–0.5)
EOSINOPHIL NFR BLD: 5.3 % (ref 0–8)
ERYTHROCYTE [DISTWIDTH] IN BLOOD BY AUTOMATED COUNT: 15 % (ref 11.5–14.5)
EST. GFR  (AFRICAN AMERICAN): >60 ML/MIN/1.73 M^2
EST. GFR  (NON AFRICAN AMERICAN): >60 ML/MIN/1.73 M^2
GLUCOSE SERPL-MCNC: 202 MG/DL (ref 70–110)
HCT VFR BLD AUTO: 34.3 % (ref 37–48.5)
HGB BLD-MCNC: 10.3 G/DL (ref 12–16)
IMM GRANULOCYTES # BLD AUTO: 0.05 K/UL (ref 0–0.04)
IMM GRANULOCYTES NFR BLD AUTO: 0.5 % (ref 0–0.5)
LYMPHOCYTES # BLD AUTO: 0.6 K/UL (ref 1–4.8)
LYMPHOCYTES NFR BLD: 5.9 % (ref 18–48)
MCH RBC QN AUTO: 25 PG (ref 27–31)
MCHC RBC AUTO-ENTMCNC: 30 G/DL (ref 32–36)
MCV RBC AUTO: 83 FL (ref 82–98)
MONOCYTES # BLD AUTO: 0.8 K/UL (ref 0.3–1)
MONOCYTES NFR BLD: 7.3 % (ref 4–15)
NEUTROPHILS # BLD AUTO: 8.3 K/UL (ref 1.8–7.7)
NEUTROPHILS NFR BLD: 80.5 % (ref 38–73)
NRBC BLD-RTO: 0 /100 WBC
PLATELET # BLD AUTO: 398 K/UL (ref 150–350)
PMV BLD AUTO: 10.9 FL (ref 9.2–12.9)
POCT GLUCOSE: 163 MG/DL (ref 70–110)
POCT GLUCOSE: 219 MG/DL (ref 70–110)
POCT GLUCOSE: 236 MG/DL (ref 70–110)
POTASSIUM SERPL-SCNC: 3.5 MMOL/L (ref 3.5–5.1)
PROT SERPL-MCNC: 6.2 G/DL (ref 6–8.4)
RBC # BLD AUTO: 4.12 M/UL (ref 4–5.4)
SODIUM SERPL-SCNC: 137 MMOL/L (ref 136–145)
WBC # BLD AUTO: 10.28 K/UL (ref 3.9–12.7)

## 2021-03-09 PROCEDURE — 99900035 HC TECH TIME PER 15 MIN (STAT)

## 2021-03-09 PROCEDURE — 63600175 PHARM REV CODE 636 W HCPCS: Performed by: HOSPITALIST

## 2021-03-09 PROCEDURE — 99233 SBSQ HOSP IP/OBS HIGH 50: CPT | Mod: ,,, | Performed by: FAMILY MEDICINE

## 2021-03-09 PROCEDURE — 92526 ORAL FUNCTION THERAPY: CPT

## 2021-03-09 PROCEDURE — 99233 PR SUBSEQUENT HOSPITAL CARE,LEVL III: ICD-10-PCS | Mod: ,,, | Performed by: FAMILY MEDICINE

## 2021-03-09 PROCEDURE — 94760 N-INVAS EAR/PLS OXIMETRY 1: CPT

## 2021-03-09 PROCEDURE — S0109 METHADONE ORAL 5MG: HCPCS | Performed by: FAMILY MEDICINE

## 2021-03-09 PROCEDURE — 25000242 PHARM REV CODE 250 ALT 637 W/ HCPCS: Performed by: HOSPITALIST

## 2021-03-09 PROCEDURE — 97530 THERAPEUTIC ACTIVITIES: CPT | Performed by: PHYSICAL THERAPIST

## 2021-03-09 PROCEDURE — 97162 PT EVAL MOD COMPLEX 30 MIN: CPT

## 2021-03-09 PROCEDURE — 63600175 PHARM REV CODE 636 W HCPCS: Performed by: RADIOLOGY

## 2021-03-09 PROCEDURE — 63600175 PHARM REV CODE 636 W HCPCS: Performed by: NURSE PRACTITIONER

## 2021-03-09 PROCEDURE — 80053 COMPREHEN METABOLIC PANEL: CPT | Performed by: HOSPITALIST

## 2021-03-09 PROCEDURE — 25000003 PHARM REV CODE 250: Performed by: FAMILY MEDICINE

## 2021-03-09 PROCEDURE — 85025 COMPLETE CBC W/AUTO DIFF WBC: CPT | Performed by: NURSE PRACTITIONER

## 2021-03-09 PROCEDURE — 21400001 HC TELEMETRY ROOM

## 2021-03-09 PROCEDURE — 25000003 PHARM REV CODE 250: Performed by: HOSPITALIST

## 2021-03-09 PROCEDURE — 36415 COLL VENOUS BLD VENIPUNCTURE: CPT | Performed by: HOSPITALIST

## 2021-03-09 PROCEDURE — 96372 THER/PROPH/DIAG INJ SC/IM: CPT

## 2021-03-09 PROCEDURE — 27000221 HC OXYGEN, UP TO 24 HOURS

## 2021-03-09 PROCEDURE — 25000003 PHARM REV CODE 250: Performed by: PHYSICIAN ASSISTANT

## 2021-03-09 PROCEDURE — 25000242 PHARM REV CODE 250 ALT 637 W/ HCPCS: Performed by: FAMILY MEDICINE

## 2021-03-09 PROCEDURE — 94640 AIRWAY INHALATION TREATMENT: CPT

## 2021-03-09 PROCEDURE — 97166 OT EVAL MOD COMPLEX 45 MIN: CPT | Performed by: PHYSICAL THERAPIST

## 2021-03-09 PROCEDURE — 97116 GAIT TRAINING THERAPY: CPT

## 2021-03-09 RX ORDER — METHADONE HYDROCHLORIDE 5 MG/5ML
2.5 SOLUTION ORAL EVERY 12 HOURS
Status: DISCONTINUED | OUTPATIENT
Start: 2021-03-09 | End: 2021-03-11 | Stop reason: HOSPADM

## 2021-03-09 RX ORDER — MIDAZOLAM HYDROCHLORIDE 1 MG/ML
INJECTION INTRAMUSCULAR; INTRAVENOUS CODE/TRAUMA/SEDATION MEDICATION
Status: COMPLETED | OUTPATIENT
Start: 2021-03-09 | End: 2021-03-09

## 2021-03-09 RX ORDER — GLUCAGON 1 MG
KIT INJECTION CODE/TRAUMA/SEDATION MEDICATION
Status: COMPLETED | OUTPATIENT
Start: 2021-03-09 | End: 2021-03-09

## 2021-03-09 RX ORDER — HYDROMORPHONE HYDROCHLORIDE 1 MG/ML
0.5 INJECTION, SOLUTION INTRAMUSCULAR; INTRAVENOUS; SUBCUTANEOUS ONCE
Status: COMPLETED | OUTPATIENT
Start: 2021-03-09 | End: 2021-03-09

## 2021-03-09 RX ORDER — FENTANYL CITRATE 50 UG/ML
INJECTION, SOLUTION INTRAMUSCULAR; INTRAVENOUS CODE/TRAUMA/SEDATION MEDICATION
Status: COMPLETED | OUTPATIENT
Start: 2021-03-09 | End: 2021-03-09

## 2021-03-09 RX ADMIN — OXYCODONE HYDROCHLORIDE 20 MG: 5 TABLET ORAL at 05:03

## 2021-03-09 RX ADMIN — GLUCAGON HYDROCHLORIDE 0.5 MG: KIT at 12:03

## 2021-03-09 RX ADMIN — OXYCODONE HYDROCHLORIDE 20 MG: 5 TABLET ORAL at 10:03

## 2021-03-09 RX ADMIN — HYDROMORPHONE HYDROCHLORIDE 0.5 MG: 2 INJECTION INTRAMUSCULAR; INTRAVENOUS; SUBCUTANEOUS at 06:03

## 2021-03-09 RX ADMIN — HYDROMORPHONE HYDROCHLORIDE 0.5 MG: 1 INJECTION, SOLUTION INTRAMUSCULAR; INTRAVENOUS; SUBCUTANEOUS at 10:03

## 2021-03-09 RX ADMIN — METHADONE HYDROCHLORIDE 2.5 MG: 5 SOLUTION ORAL at 10:03

## 2021-03-09 RX ADMIN — HYDROMORPHONE HYDROCHLORIDE 0.5 MG: 2 INJECTION INTRAMUSCULAR; INTRAVENOUS; SUBCUTANEOUS at 03:03

## 2021-03-09 RX ADMIN — HYDROMORPHONE HYDROCHLORIDE 0.5 MG: 2 INJECTION INTRAMUSCULAR; INTRAVENOUS; SUBCUTANEOUS at 09:03

## 2021-03-09 RX ADMIN — IPRATROPIUM BROMIDE AND ALBUTEROL SULFATE 3 ML: .5; 3 SOLUTION RESPIRATORY (INHALATION) at 07:03

## 2021-03-09 RX ADMIN — INSULIN DETEMIR 10 UNITS: 100 INJECTION, SOLUTION SUBCUTANEOUS at 09:03

## 2021-03-09 RX ADMIN — MIDAZOLAM HYDROCHLORIDE 1 MG: 1 INJECTION, SOLUTION INTRAMUSCULAR; INTRAVENOUS at 12:03

## 2021-03-09 RX ADMIN — CLONAZEPAM 0.5 MG: 0.5 TABLET ORAL at 10:03

## 2021-03-09 RX ADMIN — FENTANYL CITRATE 50 MCG: 50 INJECTION INTRAMUSCULAR; INTRAVENOUS at 12:03

## 2021-03-09 RX ADMIN — BUDESONIDE 0.5 MG: 0.5 SUSPENSION RESPIRATORY (INHALATION) at 07:03

## 2021-03-09 RX ADMIN — PREGABALIN 75 MG: 75 CAPSULE ORAL at 10:03

## 2021-03-10 ENCOUNTER — TELEPHONE (OUTPATIENT)
Dept: HEMATOLOGY/ONCOLOGY | Facility: CLINIC | Age: 81
End: 2021-03-10

## 2021-03-10 PROBLEM — J18.9 PNEUMONIA OF BOTH LUNGS DUE TO INFECTIOUS ORGANISM: Status: RESOLVED | Noted: 2021-01-18 | Resolved: 2021-03-10

## 2021-03-10 PROBLEM — K94.23 PEG TUBE MALFUNCTION: Status: RESOLVED | Noted: 2021-03-09 | Resolved: 2021-03-10

## 2021-03-10 LAB
ALBUMIN SERPL BCP-MCNC: 1.7 G/DL (ref 3.5–5.2)
ALP SERPL-CCNC: 179 U/L (ref 55–135)
ALT SERPL W/O P-5'-P-CCNC: 16 U/L (ref 10–44)
ANION GAP SERPL CALC-SCNC: 11 MMOL/L (ref 8–16)
AST SERPL-CCNC: 18 U/L (ref 10–40)
BASOPHILS # BLD AUTO: 0.04 K/UL (ref 0–0.2)
BASOPHILS NFR BLD: 0.3 % (ref 0–1.9)
BILIRUB SERPL-MCNC: 0.4 MG/DL (ref 0.1–1)
BUN SERPL-MCNC: 10 MG/DL (ref 8–23)
CALCIUM SERPL-MCNC: 8.7 MG/DL (ref 8.7–10.5)
CHLORIDE SERPL-SCNC: 93 MMOL/L (ref 95–110)
CO2 SERPL-SCNC: 32 MMOL/L (ref 23–29)
CREAT SERPL-MCNC: 0.6 MG/DL (ref 0.5–1.4)
DIFFERENTIAL METHOD: ABNORMAL
EOSINOPHIL # BLD AUTO: 0.4 K/UL (ref 0–0.5)
EOSINOPHIL NFR BLD: 2.6 % (ref 0–8)
ERYTHROCYTE [DISTWIDTH] IN BLOOD BY AUTOMATED COUNT: 15.1 % (ref 11.5–14.5)
EST. GFR  (AFRICAN AMERICAN): >60 ML/MIN/1.73 M^2
EST. GFR  (NON AFRICAN AMERICAN): >60 ML/MIN/1.73 M^2
GLUCOSE SERPL-MCNC: 133 MG/DL (ref 70–110)
HCT VFR BLD AUTO: 34.8 % (ref 37–48.5)
HGB BLD-MCNC: 10.5 G/DL (ref 12–16)
IMM GRANULOCYTES # BLD AUTO: 0.07 K/UL (ref 0–0.04)
IMM GRANULOCYTES NFR BLD AUTO: 0.5 % (ref 0–0.5)
LYMPHOCYTES # BLD AUTO: 0.4 K/UL (ref 1–4.8)
LYMPHOCYTES NFR BLD: 2.9 % (ref 18–48)
MCH RBC QN AUTO: 24.9 PG (ref 27–31)
MCHC RBC AUTO-ENTMCNC: 30.2 G/DL (ref 32–36)
MCV RBC AUTO: 83 FL (ref 82–98)
MONOCYTES # BLD AUTO: 0.6 K/UL (ref 0.3–1)
MONOCYTES NFR BLD: 4.2 % (ref 4–15)
NEUTROPHILS # BLD AUTO: 13.5 K/UL (ref 1.8–7.7)
NEUTROPHILS NFR BLD: 89.5 % (ref 38–73)
NRBC BLD-RTO: 0 /100 WBC
PLATELET # BLD AUTO: 455 K/UL (ref 150–350)
PMV BLD AUTO: 11 FL (ref 9.2–12.9)
POCT GLUCOSE: 143 MG/DL (ref 70–110)
POCT GLUCOSE: 152 MG/DL (ref 70–110)
POCT GLUCOSE: 161 MG/DL (ref 70–110)
POCT GLUCOSE: 213 MG/DL (ref 70–110)
POCT GLUCOSE: 221 MG/DL (ref 70–110)
POTASSIUM SERPL-SCNC: 3.5 MMOL/L (ref 3.5–5.1)
PROT SERPL-MCNC: 6.4 G/DL (ref 6–8.4)
RBC # BLD AUTO: 4.21 M/UL (ref 4–5.4)
SODIUM SERPL-SCNC: 136 MMOL/L (ref 136–145)
WBC # BLD AUTO: 15.02 K/UL (ref 3.9–12.7)

## 2021-03-10 PROCEDURE — 25000242 PHARM REV CODE 250 ALT 637 W/ HCPCS: Performed by: FAMILY MEDICINE

## 2021-03-10 PROCEDURE — 99233 PR SUBSEQUENT HOSPITAL CARE,LEVL III: ICD-10-PCS | Mod: ,,, | Performed by: PHYSICIAN ASSISTANT

## 2021-03-10 PROCEDURE — 80053 COMPREHEN METABOLIC PANEL: CPT | Performed by: HOSPITALIST

## 2021-03-10 PROCEDURE — 25500020 PHARM REV CODE 255: Performed by: FAMILY MEDICINE

## 2021-03-10 PROCEDURE — 97530 THERAPEUTIC ACTIVITIES: CPT

## 2021-03-10 PROCEDURE — S0109 METHADONE ORAL 5MG: HCPCS | Performed by: FAMILY MEDICINE

## 2021-03-10 PROCEDURE — 63600175 PHARM REV CODE 636 W HCPCS: Performed by: HOSPITALIST

## 2021-03-10 PROCEDURE — 94761 N-INVAS EAR/PLS OXIMETRY MLT: CPT

## 2021-03-10 PROCEDURE — 99900035 HC TECH TIME PER 15 MIN (STAT)

## 2021-03-10 PROCEDURE — 25000242 PHARM REV CODE 250 ALT 637 W/ HCPCS: Performed by: HOSPITALIST

## 2021-03-10 PROCEDURE — 25000003 PHARM REV CODE 250: Performed by: FAMILY MEDICINE

## 2021-03-10 PROCEDURE — 21400001 HC TELEMETRY ROOM

## 2021-03-10 PROCEDURE — 25000003 PHARM REV CODE 250: Performed by: HOSPITALIST

## 2021-03-10 PROCEDURE — 99233 SBSQ HOSP IP/OBS HIGH 50: CPT | Mod: ,,, | Performed by: PHYSICIAN ASSISTANT

## 2021-03-10 PROCEDURE — 36415 COLL VENOUS BLD VENIPUNCTURE: CPT | Performed by: HOSPITALIST

## 2021-03-10 PROCEDURE — 85025 COMPLETE CBC W/AUTO DIFF WBC: CPT | Performed by: NURSE PRACTITIONER

## 2021-03-10 PROCEDURE — 25000003 PHARM REV CODE 250: Performed by: NURSE PRACTITIONER

## 2021-03-10 PROCEDURE — 94640 AIRWAY INHALATION TREATMENT: CPT

## 2021-03-10 PROCEDURE — 97530 THERAPEUTIC ACTIVITIES: CPT | Performed by: PHYSICAL THERAPIST

## 2021-03-10 PROCEDURE — 25000003 PHARM REV CODE 250: Performed by: PHYSICIAN ASSISTANT

## 2021-03-10 PROCEDURE — 27000221 HC OXYGEN, UP TO 24 HOURS

## 2021-03-10 PROCEDURE — 97116 GAIT TRAINING THERAPY: CPT

## 2021-03-10 RX ORDER — NAPROXEN SODIUM 220 MG/1
81 TABLET, FILM COATED ORAL DAILY
Status: DISCONTINUED | OUTPATIENT
Start: 2021-03-10 | End: 2021-03-11 | Stop reason: HOSPADM

## 2021-03-10 RX ORDER — CLOPIDOGREL BISULFATE 75 MG/1
75 TABLET ORAL DAILY
Status: DISCONTINUED | OUTPATIENT
Start: 2021-03-10 | End: 2021-03-11 | Stop reason: HOSPADM

## 2021-03-10 RX ADMIN — METHADONE HYDROCHLORIDE 2.5 MG: 5 SOLUTION ORAL at 10:03

## 2021-03-10 RX ADMIN — OXYCODONE HYDROCHLORIDE 20 MG: 5 TABLET ORAL at 09:03

## 2021-03-10 RX ADMIN — INSULIN ASPART 2 UNITS: 100 INJECTION, SOLUTION INTRAVENOUS; SUBCUTANEOUS at 12:03

## 2021-03-10 RX ADMIN — INSULIN ASPART 4 UNITS: 100 INJECTION, SOLUTION INTRAVENOUS; SUBCUTANEOUS at 06:03

## 2021-03-10 RX ADMIN — HYDROCHLOROTHIAZIDE 25 MG: 25 TABLET ORAL at 09:03

## 2021-03-10 RX ADMIN — BUDESONIDE 0.5 MG: 0.5 SUSPENSION RESPIRATORY (INHALATION) at 08:03

## 2021-03-10 RX ADMIN — INSULIN ASPART 2 UNITS: 100 INJECTION, SOLUTION INTRAVENOUS; SUBCUTANEOUS at 11:03

## 2021-03-10 RX ADMIN — CLOPIDOGREL 75 MG: 75 TABLET, FILM COATED ORAL at 12:03

## 2021-03-10 RX ADMIN — CLONAZEPAM 0.5 MG: 0.5 TABLET ORAL at 08:03

## 2021-03-10 RX ADMIN — LOSARTAN POTASSIUM 100 MG: 50 TABLET, FILM COATED ORAL at 09:03

## 2021-03-10 RX ADMIN — METHADONE HYDROCHLORIDE 2.5 MG: 5 SOLUTION ORAL at 08:03

## 2021-03-10 RX ADMIN — FENTANYL 1 PATCH: 50 PATCH, EXTENDED RELEASE TRANSDERMAL at 10:03

## 2021-03-10 RX ADMIN — IPRATROPIUM BROMIDE AND ALBUTEROL SULFATE 3 ML: .5; 3 SOLUTION RESPIRATORY (INHALATION) at 01:03

## 2021-03-10 RX ADMIN — ASPIRIN 81 MG: 81 TABLET, CHEWABLE ORAL at 12:03

## 2021-03-10 RX ADMIN — INSULIN DETEMIR 10 UNITS: 100 INJECTION, SOLUTION SUBCUTANEOUS at 09:03

## 2021-03-10 RX ADMIN — IPRATROPIUM BROMIDE AND ALBUTEROL SULFATE 3 ML: .5; 3 SOLUTION RESPIRATORY (INHALATION) at 08:03

## 2021-03-10 RX ADMIN — LEVOTHYROXINE SODIUM 50 MCG: 50 TABLET ORAL at 06:03

## 2021-03-10 RX ADMIN — PREGABALIN 75 MG: 75 CAPSULE ORAL at 08:03

## 2021-03-10 RX ADMIN — AMLODIPINE BESYLATE 10 MG: 10 TABLET ORAL at 09:03

## 2021-03-10 RX ADMIN — ATORVASTATIN CALCIUM 80 MG: 40 TABLET, FILM COATED ORAL at 09:03

## 2021-03-10 RX ADMIN — OXYCODONE HYDROCHLORIDE 20 MG: 5 TABLET ORAL at 03:03

## 2021-03-10 RX ADMIN — HYDROMORPHONE HYDROCHLORIDE 0.5 MG: 2 INJECTION INTRAMUSCULAR; INTRAVENOUS; SUBCUTANEOUS at 11:03

## 2021-03-10 RX ADMIN — OXYCODONE HYDROCHLORIDE 20 MG: 5 TABLET ORAL at 06:03

## 2021-03-10 RX ADMIN — IOHEXOL 20 ML: 300 INJECTION, SOLUTION INTRAVENOUS at 03:03

## 2021-03-11 VITALS
WEIGHT: 139.75 LBS | RESPIRATION RATE: 18 BRPM | DIASTOLIC BLOOD PRESSURE: 65 MMHG | HEART RATE: 69 BPM | TEMPERATURE: 98 F | BODY MASS INDEX: 30.15 KG/M2 | SYSTOLIC BLOOD PRESSURE: 139 MMHG | HEIGHT: 57 IN | OXYGEN SATURATION: 96 %

## 2021-03-11 LAB
ALBUMIN SERPL BCP-MCNC: 1.7 G/DL (ref 3.5–5.2)
ALP SERPL-CCNC: 167 U/L (ref 55–135)
ALT SERPL W/O P-5'-P-CCNC: 15 U/L (ref 10–44)
ANION GAP SERPL CALC-SCNC: 9 MMOL/L (ref 8–16)
AST SERPL-CCNC: 21 U/L (ref 10–40)
BASOPHILS # BLD AUTO: 0.05 K/UL (ref 0–0.2)
BASOPHILS NFR BLD: 0.4 % (ref 0–1.9)
BILIRUB SERPL-MCNC: 0.3 MG/DL (ref 0.1–1)
BUN SERPL-MCNC: 28 MG/DL (ref 8–23)
CALCIUM SERPL-MCNC: 8.9 MG/DL (ref 8.7–10.5)
CHLORIDE SERPL-SCNC: 89 MMOL/L (ref 95–110)
CO2 SERPL-SCNC: 34 MMOL/L (ref 23–29)
CREAT SERPL-MCNC: 1.5 MG/DL (ref 0.5–1.4)
DIFFERENTIAL METHOD: ABNORMAL
EOSINOPHIL # BLD AUTO: 0.4 K/UL (ref 0–0.5)
EOSINOPHIL NFR BLD: 2.9 % (ref 0–8)
ERYTHROCYTE [DISTWIDTH] IN BLOOD BY AUTOMATED COUNT: 15.1 % (ref 11.5–14.5)
EST. GFR  (AFRICAN AMERICAN): 38 ML/MIN/1.73 M^2
EST. GFR  (NON AFRICAN AMERICAN): 33 ML/MIN/1.73 M^2
GLUCOSE SERPL-MCNC: 270 MG/DL (ref 70–110)
HCT VFR BLD AUTO: 33.6 % (ref 37–48.5)
HGB BLD-MCNC: 10.1 G/DL (ref 12–16)
IMM GRANULOCYTES # BLD AUTO: 0.06 K/UL (ref 0–0.04)
IMM GRANULOCYTES NFR BLD AUTO: 0.5 % (ref 0–0.5)
LYMPHOCYTES # BLD AUTO: 0.5 K/UL (ref 1–4.8)
LYMPHOCYTES NFR BLD: 3.8 % (ref 18–48)
MCH RBC QN AUTO: 24.8 PG (ref 27–31)
MCHC RBC AUTO-ENTMCNC: 30.1 G/DL (ref 32–36)
MCV RBC AUTO: 83 FL (ref 82–98)
MONOCYTES # BLD AUTO: 0.7 K/UL (ref 0.3–1)
MONOCYTES NFR BLD: 5.5 % (ref 4–15)
NEUTROPHILS # BLD AUTO: 11.6 K/UL (ref 1.8–7.7)
NEUTROPHILS NFR BLD: 86.9 % (ref 38–73)
NRBC BLD-RTO: 0 /100 WBC
PLATELET # BLD AUTO: 457 K/UL (ref 150–350)
PMV BLD AUTO: 10.5 FL (ref 9.2–12.9)
POCT GLUCOSE: 230 MG/DL (ref 70–110)
POTASSIUM SERPL-SCNC: 3.5 MMOL/L (ref 3.5–5.1)
PROT SERPL-MCNC: 6 G/DL (ref 6–8.4)
RBC # BLD AUTO: 4.07 M/UL (ref 4–5.4)
SODIUM SERPL-SCNC: 132 MMOL/L (ref 136–145)
WBC # BLD AUTO: 13.3 K/UL (ref 3.9–12.7)

## 2021-03-11 PROCEDURE — 80053 COMPREHEN METABOLIC PANEL: CPT | Performed by: HOSPITALIST

## 2021-03-11 PROCEDURE — 25000003 PHARM REV CODE 250: Performed by: NURSE PRACTITIONER

## 2021-03-11 PROCEDURE — 94761 N-INVAS EAR/PLS OXIMETRY MLT: CPT

## 2021-03-11 PROCEDURE — 25000003 PHARM REV CODE 250: Performed by: FAMILY MEDICINE

## 2021-03-11 PROCEDURE — 94640 AIRWAY INHALATION TREATMENT: CPT

## 2021-03-11 PROCEDURE — S0109 METHADONE ORAL 5MG: HCPCS | Performed by: FAMILY MEDICINE

## 2021-03-11 PROCEDURE — 85025 COMPLETE CBC W/AUTO DIFF WBC: CPT | Performed by: NURSE PRACTITIONER

## 2021-03-11 PROCEDURE — 27000221 HC OXYGEN, UP TO 24 HOURS

## 2021-03-11 PROCEDURE — 25000003 PHARM REV CODE 250: Performed by: HOSPITALIST

## 2021-03-11 PROCEDURE — 25000003 PHARM REV CODE 250: Performed by: PHYSICIAN ASSISTANT

## 2021-03-11 PROCEDURE — 36415 COLL VENOUS BLD VENIPUNCTURE: CPT | Performed by: HOSPITALIST

## 2021-03-11 PROCEDURE — 25000242 PHARM REV CODE 250 ALT 637 W/ HCPCS: Performed by: FAMILY MEDICINE

## 2021-03-11 PROCEDURE — 25000242 PHARM REV CODE 250 ALT 637 W/ HCPCS: Performed by: HOSPITALIST

## 2021-03-11 RX ORDER — METHADONE HYDROCHLORIDE 5 MG/5ML
2.5 SOLUTION ORAL EVERY 12 HOURS
Qty: 35 ML | Refills: 0 | Status: SHIPPED | OUTPATIENT
Start: 2021-03-11 | End: 2021-05-10

## 2021-03-11 RX ORDER — CLOPIDOGREL BISULFATE 75 MG/1
75 TABLET ORAL DAILY
Qty: 30 TABLET
Start: 2021-03-11 | End: 2021-08-27 | Stop reason: SDUPTHER

## 2021-03-11 RX ORDER — PREGABALIN 75 MG/1
75 CAPSULE ORAL NIGHTLY
Qty: 7 CAPSULE | Refills: 0 | Status: SHIPPED | OUTPATIENT
Start: 2021-03-11 | End: 2022-01-01 | Stop reason: SDUPTHER

## 2021-03-11 RX ORDER — CLONAZEPAM 0.5 MG/1
0.5 TABLET ORAL NIGHTLY
Qty: 30 TABLET | Refills: 0 | Status: SHIPPED | OUTPATIENT
Start: 2021-03-11

## 2021-03-11 RX ORDER — LOSARTAN POTASSIUM 100 MG/1
100 TABLET ORAL DAILY
Qty: 90 TABLET
Start: 2021-03-11 | End: 2021-01-01

## 2021-03-11 RX ORDER — FENTANYL 50 UG/1
1 PATCH TRANSDERMAL
Qty: 5 PATCH | Refills: 0 | Status: SHIPPED | OUTPATIENT
Start: 2021-03-11 | End: 2021-04-06

## 2021-03-11 RX ORDER — OXYCODONE HYDROCHLORIDE 20 MG/1
20 TABLET ORAL EVERY 4 HOURS PRN
Qty: 35 TABLET | Refills: 0 | Status: SHIPPED | OUTPATIENT
Start: 2021-03-11 | End: 2021-05-10

## 2021-03-11 RX ORDER — LEVOTHYROXINE SODIUM 50 UG/1
50 TABLET ORAL
Qty: 30 TABLET
Start: 2021-03-11 | End: 2022-01-01

## 2021-03-11 RX ORDER — NAPROXEN SODIUM 220 MG/1
81 TABLET, FILM COATED ORAL DAILY
Refills: 0
Start: 2021-03-11 | End: 2022-01-01

## 2021-03-11 RX ORDER — HYDROCHLOROTHIAZIDE 25 MG/1
25 TABLET ORAL DAILY
Qty: 30 TABLET
Start: 2021-03-11 | End: 2021-01-01

## 2021-03-11 RX ORDER — AMLODIPINE BESYLATE 10 MG/1
10 TABLET ORAL DAILY
Qty: 30 TABLET
Start: 2021-03-11 | End: 2021-08-26 | Stop reason: SDUPTHER

## 2021-03-11 RX ADMIN — METHADONE HYDROCHLORIDE 2.5 MG: 5 SOLUTION ORAL at 09:03

## 2021-03-11 RX ADMIN — LOSARTAN POTASSIUM 100 MG: 50 TABLET, FILM COATED ORAL at 09:03

## 2021-03-11 RX ADMIN — OXYCODONE HYDROCHLORIDE 20 MG: 5 TABLET ORAL at 05:03

## 2021-03-11 RX ADMIN — ATORVASTATIN CALCIUM 80 MG: 40 TABLET, FILM COATED ORAL at 09:03

## 2021-03-11 RX ADMIN — BUDESONIDE 0.5 MG: 0.5 SUSPENSION RESPIRATORY (INHALATION) at 07:03

## 2021-03-11 RX ADMIN — CLOPIDOGREL 75 MG: 75 TABLET, FILM COATED ORAL at 09:03

## 2021-03-11 RX ADMIN — SODIUM CHLORIDE 500 ML: 0.9 INJECTION, SOLUTION INTRAVENOUS at 09:03

## 2021-03-11 RX ADMIN — ASPIRIN 81 MG: 81 TABLET, CHEWABLE ORAL at 09:03

## 2021-03-11 RX ADMIN — IPRATROPIUM BROMIDE AND ALBUTEROL SULFATE 3 ML: .5; 3 SOLUTION RESPIRATORY (INHALATION) at 07:03

## 2021-03-11 RX ADMIN — HYDROCHLOROTHIAZIDE 25 MG: 25 TABLET ORAL at 09:03

## 2021-03-11 RX ADMIN — AMLODIPINE BESYLATE 10 MG: 10 TABLET ORAL at 09:03

## 2021-03-11 RX ADMIN — INSULIN DETEMIR 10 UNITS: 100 INJECTION, SOLUTION SUBCUTANEOUS at 09:03

## 2021-03-11 RX ADMIN — INSULIN ASPART 4 UNITS: 100 INJECTION, SOLUTION INTRAVENOUS; SUBCUTANEOUS at 05:03

## 2021-03-11 RX ADMIN — LEVOTHYROXINE SODIUM 50 MCG: 50 TABLET ORAL at 05:03

## 2021-03-11 RX ADMIN — OXYCODONE HYDROCHLORIDE 20 MG: 5 TABLET ORAL at 09:03

## 2021-03-15 ENCOUNTER — TELEPHONE (OUTPATIENT)
Dept: HEMATOLOGY/ONCOLOGY | Facility: CLINIC | Age: 81
End: 2021-03-15

## 2021-03-24 ENCOUNTER — TELEPHONE (OUTPATIENT)
Dept: CARDIOLOGY | Facility: CLINIC | Age: 81
End: 2021-03-24

## 2021-03-25 ENCOUNTER — TELEPHONE (OUTPATIENT)
Dept: CARDIOLOGY | Facility: CLINIC | Age: 81
End: 2021-03-25

## 2021-03-25 ENCOUNTER — TELEPHONE (OUTPATIENT)
Dept: PULMONOLOGY | Facility: CLINIC | Age: 81
End: 2021-03-25

## 2021-03-28 ENCOUNTER — TELEPHONE (OUTPATIENT)
Dept: CARDIOLOGY | Facility: CLINIC | Age: 81
End: 2021-03-28

## 2021-03-31 DIAGNOSIS — C11.1 MALIGNANT NEOPLASM OF PHARYNGEAL TONSIL: Primary | ICD-10-CM

## 2021-03-31 DIAGNOSIS — R53.1 GENERALIZED WEAKNESS: ICD-10-CM

## 2021-03-31 DIAGNOSIS — R13.12 OROPHARYNGEAL DYSPHAGIA: ICD-10-CM

## 2021-04-01 PROCEDURE — G0180 MD CERTIFICATION HHA PATIENT: HCPCS | Mod: ,,, | Performed by: FAMILY MEDICINE

## 2021-04-01 PROCEDURE — G0180 PR HOME HEALTH MD CERTIFICATION: ICD-10-PCS | Mod: ,,, | Performed by: FAMILY MEDICINE

## 2021-04-05 ENCOUNTER — TELEPHONE (OUTPATIENT)
Dept: PULMONOLOGY | Facility: CLINIC | Age: 81
End: 2021-04-05

## 2021-04-05 ENCOUNTER — CLINICAL SUPPORT (OUTPATIENT)
Dept: PULMONOLOGY | Facility: CLINIC | Age: 81
End: 2021-04-05
Payer: MEDICARE

## 2021-04-05 ENCOUNTER — HOSPITAL ENCOUNTER (OUTPATIENT)
Dept: RADIOLOGY | Facility: HOSPITAL | Age: 81
Discharge: HOME | End: 2021-04-05
Attending: INTERNAL MEDICINE
Payer: MEDICARE

## 2021-04-05 ENCOUNTER — OFFICE VISIT (OUTPATIENT)
Dept: PULMONOLOGY | Facility: CLINIC | Age: 81
End: 2021-04-05
Payer: MEDICARE

## 2021-04-05 VITALS
BODY MASS INDEX: 27.31 KG/M2 | HEART RATE: 93 BPM | OXYGEN SATURATION: 93 % | HEIGHT: 57 IN | RESPIRATION RATE: 18 BRPM | SYSTOLIC BLOOD PRESSURE: 100 MMHG | DIASTOLIC BLOOD PRESSURE: 50 MMHG | WEIGHT: 126.56 LBS

## 2021-04-05 VITALS — WEIGHT: 126.56 LBS | HEIGHT: 57 IN | BODY MASS INDEX: 27.31 KG/M2

## 2021-04-05 DIAGNOSIS — R06.09 DYSPNEA ON EXERTION: ICD-10-CM

## 2021-04-05 DIAGNOSIS — G47.33 OSA ON CPAP: ICD-10-CM

## 2021-04-05 DIAGNOSIS — Z93.1 S/P PERCUTANEOUS ENDOSCOPIC GASTROSTOMY (PEG) TUBE PLACEMENT: ICD-10-CM

## 2021-04-05 DIAGNOSIS — J96.01 ACUTE RESPIRATORY FAILURE WITH HYPOXIA: ICD-10-CM

## 2021-04-05 DIAGNOSIS — Z87.01 HISTORY OF ASPIRATION PNEUMONIA: ICD-10-CM

## 2021-04-05 DIAGNOSIS — R91.1 PULMONARY NODULE, RIGHT: ICD-10-CM

## 2021-04-05 DIAGNOSIS — G47.33 OSA ON CPAP: Primary | ICD-10-CM

## 2021-04-05 DIAGNOSIS — E66.3 OVERWEIGHT (BMI 25.0-29.9): ICD-10-CM

## 2021-04-05 DIAGNOSIS — Z79.4 TYPE 2 DIABETES MELLITUS WITH HYPERGLYCEMIA, WITH LONG-TERM CURRENT USE OF INSULIN: Chronic | ICD-10-CM

## 2021-04-05 DIAGNOSIS — C11.1 MALIGNANT NEOPLASM OF PHARYNGEAL TONSIL: ICD-10-CM

## 2021-04-05 DIAGNOSIS — E11.65 TYPE 2 DIABETES MELLITUS WITH HYPERGLYCEMIA, WITH LONG-TERM CURRENT USE OF INSULIN: Chronic | ICD-10-CM

## 2021-04-05 DIAGNOSIS — J69.0 ASPIRATION PNEUMONIA OF BOTH LUNGS DUE TO GASTRIC SECRETIONS, UNSPECIFIED PART OF LUNG: ICD-10-CM

## 2021-04-05 DIAGNOSIS — I63.512 ARTERIAL ISCHEMIC STROKE, MCA (MIDDLE CEREBRAL ARTERY), LEFT, ACUTE: Chronic | ICD-10-CM

## 2021-04-05 PROCEDURE — 3078F PR MOST RECENT DIASTOLIC BLOOD PRESSURE < 80 MM HG: ICD-10-PCS | Mod: CPTII,S$GLB,, | Performed by: NURSE PRACTITIONER

## 2021-04-05 PROCEDURE — 1101F PR PT FALLS ASSESS DOC 0-1 FALLS W/OUT INJ PAST YR: ICD-10-PCS | Mod: CPTII,S$GLB,, | Performed by: NURSE PRACTITIONER

## 2021-04-05 PROCEDURE — 3074F PR MOST RECENT SYSTOLIC BLOOD PRESSURE < 130 MM HG: ICD-10-PCS | Mod: CPTII,S$GLB,, | Performed by: NURSE PRACTITIONER

## 2021-04-05 PROCEDURE — 3074F SYST BP LT 130 MM HG: CPT | Mod: CPTII,S$GLB,, | Performed by: NURSE PRACTITIONER

## 2021-04-05 PROCEDURE — 99214 OFFICE O/P EST MOD 30 MIN: CPT | Mod: 25,S$GLB,, | Performed by: NURSE PRACTITIONER

## 2021-04-05 PROCEDURE — 99999 PR PBB SHADOW E&M-EST. PATIENT-LVL V: ICD-10-PCS | Mod: PBBFAC,,, | Performed by: NURSE PRACTITIONER

## 2021-04-05 PROCEDURE — 3288F FALL RISK ASSESSMENT DOCD: CPT | Mod: CPTII,S$GLB,, | Performed by: NURSE PRACTITIONER

## 2021-04-05 PROCEDURE — 99214 PR OFFICE/OUTPT VISIT, EST, LEVL IV, 30-39 MIN: ICD-10-PCS | Mod: 25,S$GLB,, | Performed by: NURSE PRACTITIONER

## 2021-04-05 PROCEDURE — 94762 PR NONINVASV OXYGEN SATUR, CONT: ICD-10-PCS | Mod: 59,S$GLB,, | Performed by: INTERNAL MEDICINE

## 2021-04-05 PROCEDURE — 94762 N-INVAS EAR/PLS OXIMTRY CONT: CPT | Mod: 59,S$GLB,, | Performed by: INTERNAL MEDICINE

## 2021-04-05 PROCEDURE — 1157F ADVNC CARE PLAN IN RCRD: CPT | Mod: S$GLB,,, | Performed by: NURSE PRACTITIONER

## 2021-04-05 PROCEDURE — 94618 PULMONARY STRESS TESTING: ICD-10-PCS | Mod: S$GLB,,, | Performed by: INTERNAL MEDICINE

## 2021-04-05 PROCEDURE — 1101F PT FALLS ASSESS-DOCD LE1/YR: CPT | Mod: CPTII,S$GLB,, | Performed by: NURSE PRACTITIONER

## 2021-04-05 PROCEDURE — 94618 PULMONARY STRESS TESTING: CPT | Mod: S$GLB,,, | Performed by: INTERNAL MEDICINE

## 2021-04-05 PROCEDURE — 3051F HG A1C>EQUAL 7.0%<8.0%: CPT | Mod: CPTII,S$GLB,, | Performed by: NURSE PRACTITIONER

## 2021-04-05 PROCEDURE — 99999 PR PBB SHADOW E&M-EST. PATIENT-LVL I: CPT | Mod: PBBFAC,,,

## 2021-04-05 PROCEDURE — 3288F PR FALLS RISK ASSESSMENT DOCUMENTED: ICD-10-PCS | Mod: CPTII,S$GLB,, | Performed by: NURSE PRACTITIONER

## 2021-04-05 PROCEDURE — 71046 XR CHEST PA AND LATERAL: ICD-10-PCS | Mod: 26,,, | Performed by: RADIOLOGY

## 2021-04-05 PROCEDURE — 71046 X-RAY EXAM CHEST 2 VIEWS: CPT | Mod: TC

## 2021-04-05 PROCEDURE — 1157F PR ADVANCE CARE PLAN OR EQUIV PRESENT IN MEDICAL RECORD: ICD-10-PCS | Mod: S$GLB,,, | Performed by: NURSE PRACTITIONER

## 2021-04-05 PROCEDURE — 71046 X-RAY EXAM CHEST 2 VIEWS: CPT | Mod: 26,,, | Performed by: RADIOLOGY

## 2021-04-05 PROCEDURE — 1159F MED LIST DOCD IN RCRD: CPT | Mod: S$GLB,,, | Performed by: NURSE PRACTITIONER

## 2021-04-05 PROCEDURE — 99999 PR PBB SHADOW E&M-EST. PATIENT-LVL V: CPT | Mod: PBBFAC,,, | Performed by: NURSE PRACTITIONER

## 2021-04-05 PROCEDURE — 1159F PR MEDICATION LIST DOCUMENTED IN MEDICAL RECORD: ICD-10-PCS | Mod: S$GLB,,, | Performed by: NURSE PRACTITIONER

## 2021-04-05 PROCEDURE — 3078F DIAST BP <80 MM HG: CPT | Mod: CPTII,S$GLB,, | Performed by: NURSE PRACTITIONER

## 2021-04-05 PROCEDURE — 99999 PR PBB SHADOW E&M-EST. PATIENT-LVL I: ICD-10-PCS | Mod: PBBFAC,,,

## 2021-04-05 PROCEDURE — 3051F PR MOST RECENT HEMOGLOBIN A1C LEVEL 7.0 - < 8.0%: ICD-10-PCS | Mod: CPTII,S$GLB,, | Performed by: NURSE PRACTITIONER

## 2021-04-06 ENCOUNTER — OFFICE VISIT (OUTPATIENT)
Dept: PALLIATIVE MEDICINE | Facility: CLINIC | Age: 81
End: 2021-04-06
Payer: MEDICARE

## 2021-04-06 ENCOUNTER — HOSPITAL ENCOUNTER (OUTPATIENT)
Dept: RADIOLOGY | Facility: HOSPITAL | Age: 81
Discharge: HOME | End: 2021-04-06
Attending: RADIOLOGY
Payer: MEDICARE

## 2021-04-06 ENCOUNTER — OFFICE VISIT (OUTPATIENT)
Dept: RADIATION ONCOLOGY | Facility: CLINIC | Age: 81
End: 2021-04-06
Payer: MEDICARE

## 2021-04-06 VITALS
HEART RATE: 55 BPM | OXYGEN SATURATION: 95 % | HEIGHT: 57 IN | TEMPERATURE: 97 F | BODY MASS INDEX: 27.4 KG/M2 | SYSTOLIC BLOOD PRESSURE: 132 MMHG | DIASTOLIC BLOOD PRESSURE: 72 MMHG | RESPIRATION RATE: 16 BRPM | WEIGHT: 127 LBS

## 2021-04-06 VITALS
DIASTOLIC BLOOD PRESSURE: 69 MMHG | WEIGHT: 126.88 LBS | RESPIRATION RATE: 17 BRPM | SYSTOLIC BLOOD PRESSURE: 169 MMHG | TEMPERATURE: 98 F | OXYGEN SATURATION: 95 % | BODY MASS INDEX: 27.37 KG/M2 | HEART RATE: 67 BPM | HEIGHT: 57 IN

## 2021-04-06 DIAGNOSIS — Z71.89 ACP (ADVANCE CARE PLANNING): ICD-10-CM

## 2021-04-06 DIAGNOSIS — C11.1 MALIGNANT NEOPLASM OF PHARYNGEAL TONSIL: ICD-10-CM

## 2021-04-06 DIAGNOSIS — K12.33 MUCOSITIS DUE TO RADIATION THERAPY: ICD-10-CM

## 2021-04-06 DIAGNOSIS — C11.1 MALIGNANT NEOPLASM OF PHARYNGEAL TONSIL: Primary | ICD-10-CM

## 2021-04-06 DIAGNOSIS — G89.3 CANCER ASSOCIATED PAIN: Primary | ICD-10-CM

## 2021-04-06 DIAGNOSIS — J39.2 OROPHARYNGEAL MASS: ICD-10-CM

## 2021-04-06 DIAGNOSIS — G89.3 CANCER RELATED PAIN: ICD-10-CM

## 2021-04-06 PROCEDURE — 99999 PR PBB SHADOW E&M-EST. PATIENT-LVL V: CPT | Mod: PBBFAC,,, | Performed by: RADIOLOGY

## 2021-04-06 PROCEDURE — 99999 PR PBB SHADOW E&M-EST. PATIENT-LVL IV: CPT | Mod: PBBFAC,,, | Performed by: FAMILY MEDICINE

## 2021-04-06 PROCEDURE — 99214 OFFICE O/P EST MOD 30 MIN: CPT | Mod: S$GLB,,, | Performed by: RADIOLOGY

## 2021-04-06 PROCEDURE — 3288F PR FALLS RISK ASSESSMENT DOCUMENTED: ICD-10-PCS | Mod: CPTII,S$GLB,, | Performed by: RADIOLOGY

## 2021-04-06 PROCEDURE — 70491 CT SOFT TISSUE NECK W/DYE: CPT | Mod: TC

## 2021-04-06 PROCEDURE — 1157F PR ADVANCE CARE PLAN OR EQUIV PRESENT IN MEDICAL RECORD: ICD-10-PCS | Mod: S$GLB,,, | Performed by: FAMILY MEDICINE

## 2021-04-06 PROCEDURE — 1126F AMNT PAIN NOTED NONE PRSNT: CPT | Mod: S$GLB,,, | Performed by: FAMILY MEDICINE

## 2021-04-06 PROCEDURE — 1101F PT FALLS ASSESS-DOCD LE1/YR: CPT | Mod: CPTII,S$GLB,, | Performed by: FAMILY MEDICINE

## 2021-04-06 PROCEDURE — 99215 OFFICE O/P EST HI 40 MIN: CPT | Mod: S$GLB,,, | Performed by: FAMILY MEDICINE

## 2021-04-06 PROCEDURE — 1159F MED LIST DOCD IN RCRD: CPT | Mod: S$GLB,,, | Performed by: FAMILY MEDICINE

## 2021-04-06 PROCEDURE — 25500020 PHARM REV CODE 255: Performed by: RADIOLOGY

## 2021-04-06 PROCEDURE — 1159F MED LIST DOCD IN RCRD: CPT | Mod: S$GLB,,, | Performed by: RADIOLOGY

## 2021-04-06 PROCEDURE — 3288F PR FALLS RISK ASSESSMENT DOCUMENTED: ICD-10-PCS | Mod: CPTII,S$GLB,, | Performed by: FAMILY MEDICINE

## 2021-04-06 PROCEDURE — 3078F DIAST BP <80 MM HG: CPT | Mod: CPTII,S$GLB,, | Performed by: RADIOLOGY

## 2021-04-06 PROCEDURE — 3074F PR MOST RECENT SYSTOLIC BLOOD PRESSURE < 130 MM HG: ICD-10-PCS | Mod: CPTII,S$GLB,, | Performed by: RADIOLOGY

## 2021-04-06 PROCEDURE — 99215 PR OFFICE/OUTPT VISIT, EST, LEVL V, 40-54 MIN: ICD-10-PCS | Mod: S$GLB,,, | Performed by: FAMILY MEDICINE

## 2021-04-06 PROCEDURE — 3288F FALL RISK ASSESSMENT DOCD: CPT | Mod: CPTII,S$GLB,, | Performed by: FAMILY MEDICINE

## 2021-04-06 PROCEDURE — 1101F PT FALLS ASSESS-DOCD LE1/YR: CPT | Mod: CPTII,S$GLB,, | Performed by: RADIOLOGY

## 2021-04-06 PROCEDURE — 1159F PR MEDICATION LIST DOCUMENTED IN MEDICAL RECORD: ICD-10-PCS | Mod: S$GLB,,, | Performed by: FAMILY MEDICINE

## 2021-04-06 PROCEDURE — 1157F PR ADVANCE CARE PLAN OR EQUIV PRESENT IN MEDICAL RECORD: ICD-10-PCS | Mod: S$GLB,,, | Performed by: RADIOLOGY

## 2021-04-06 PROCEDURE — 1157F ADVNC CARE PLAN IN RCRD: CPT | Mod: S$GLB,,, | Performed by: RADIOLOGY

## 2021-04-06 PROCEDURE — 99999 PR PBB SHADOW E&M-EST. PATIENT-LVL IV: ICD-10-PCS | Mod: PBBFAC,,, | Performed by: FAMILY MEDICINE

## 2021-04-06 PROCEDURE — 1126F AMNT PAIN NOTED NONE PRSNT: CPT | Mod: S$GLB,,, | Performed by: RADIOLOGY

## 2021-04-06 PROCEDURE — 99214 PR OFFICE/OUTPT VISIT, EST, LEVL IV, 30-39 MIN: ICD-10-PCS | Mod: S$GLB,,, | Performed by: RADIOLOGY

## 2021-04-06 PROCEDURE — 1157F ADVNC CARE PLAN IN RCRD: CPT | Mod: S$GLB,,, | Performed by: FAMILY MEDICINE

## 2021-04-06 PROCEDURE — 3078F PR MOST RECENT DIASTOLIC BLOOD PRESSURE < 80 MM HG: ICD-10-PCS | Mod: CPTII,S$GLB,, | Performed by: RADIOLOGY

## 2021-04-06 PROCEDURE — 1101F PR PT FALLS ASSESS DOC 0-1 FALLS W/OUT INJ PAST YR: ICD-10-PCS | Mod: CPTII,S$GLB,, | Performed by: FAMILY MEDICINE

## 2021-04-06 PROCEDURE — 3288F FALL RISK ASSESSMENT DOCD: CPT | Mod: CPTII,S$GLB,, | Performed by: RADIOLOGY

## 2021-04-06 PROCEDURE — 99999 PR PBB SHADOW E&M-EST. PATIENT-LVL V: ICD-10-PCS | Mod: PBBFAC,,, | Performed by: RADIOLOGY

## 2021-04-06 PROCEDURE — 3074F SYST BP LT 130 MM HG: CPT | Mod: CPTII,S$GLB,, | Performed by: RADIOLOGY

## 2021-04-06 PROCEDURE — 1101F PR PT FALLS ASSESS DOC 0-1 FALLS W/OUT INJ PAST YR: ICD-10-PCS | Mod: CPTII,S$GLB,, | Performed by: RADIOLOGY

## 2021-04-06 PROCEDURE — 1126F PR PAIN SEVERITY QUANTIFIED, NO PAIN PRESENT: ICD-10-PCS | Mod: S$GLB,,, | Performed by: RADIOLOGY

## 2021-04-06 PROCEDURE — 1126F PR PAIN SEVERITY QUANTIFIED, NO PAIN PRESENT: ICD-10-PCS | Mod: S$GLB,,, | Performed by: FAMILY MEDICINE

## 2021-04-06 PROCEDURE — 1159F PR MEDICATION LIST DOCUMENTED IN MEDICAL RECORD: ICD-10-PCS | Mod: S$GLB,,, | Performed by: RADIOLOGY

## 2021-04-06 RX ORDER — FENTANYL 25 UG/1
1 PATCH TRANSDERMAL
Qty: 5 PATCH | Refills: 0 | Status: SHIPPED | OUTPATIENT
Start: 2021-04-06 | End: 2021-04-21 | Stop reason: SDUPTHER

## 2021-04-06 RX ADMIN — IOHEXOL 75 ML: 350 INJECTION, SOLUTION INTRAVENOUS at 01:04

## 2021-04-09 ENCOUNTER — TELEPHONE (OUTPATIENT)
Dept: PULMONOLOGY | Facility: CLINIC | Age: 81
End: 2021-04-09

## 2021-04-09 PROBLEM — J69.0 ASPIRATION PNEUMONIA OF BOTH LUNGS: Status: RESOLVED | Noted: 2021-03-03 | Resolved: 2021-04-09

## 2021-04-09 PROBLEM — J96.01 ACUTE HYPOXEMIC RESPIRATORY FAILURE: Status: RESOLVED | Noted: 2021-01-17 | Resolved: 2021-04-09

## 2021-04-09 PROBLEM — L29.9 PRURITUS: Status: RESOLVED | Noted: 2021-01-22 | Resolved: 2021-04-09

## 2021-04-09 PROBLEM — Z95.1 S/P CABG (CORONARY ARTERY BYPASS GRAFT): Status: RESOLVED | Noted: 2020-01-31 | Resolved: 2021-04-09

## 2021-04-12 DIAGNOSIS — K12.30 MUCOSITIS: ICD-10-CM

## 2021-04-13 ENCOUNTER — DOCUMENT SCAN (OUTPATIENT)
Dept: HOME HEALTH SERVICES | Facility: HOSPITAL | Age: 81
End: 2021-04-13
Payer: MEDICARE

## 2021-04-13 ENCOUNTER — HOSPITAL ENCOUNTER (EMERGENCY)
Facility: HOSPITAL | Age: 81
Discharge: HOME | End: 2021-04-13
Attending: FAMILY MEDICINE
Payer: MEDICARE

## 2021-04-13 VITALS
OXYGEN SATURATION: 95 % | HEART RATE: 64 BPM | DIASTOLIC BLOOD PRESSURE: 65 MMHG | SYSTOLIC BLOOD PRESSURE: 149 MMHG | RESPIRATION RATE: 16 BRPM | TEMPERATURE: 98 F | BODY MASS INDEX: 27.88 KG/M2 | WEIGHT: 128.88 LBS

## 2021-04-13 DIAGNOSIS — R10.9 ABDOMINAL PAIN, UNSPECIFIED ABDOMINAL LOCATION: Primary | ICD-10-CM

## 2021-04-13 LAB
ALBUMIN SERPL BCP-MCNC: 3.6 G/DL (ref 3.5–5.2)
ALP SERPL-CCNC: 109 U/L (ref 55–135)
ALT SERPL W/O P-5'-P-CCNC: 14 U/L (ref 10–44)
ANION GAP SERPL CALC-SCNC: 13 MMOL/L (ref 8–16)
AST SERPL-CCNC: 23 U/L (ref 10–40)
BACTERIA #/AREA URNS HPF: ABNORMAL /HPF
BASOPHILS # BLD AUTO: 0.1 K/UL (ref 0–0.2)
BASOPHILS NFR BLD: 0.9 % (ref 0–1.9)
BILIRUB SERPL-MCNC: 0.9 MG/DL (ref 0.1–1)
BILIRUB UR QL STRIP: NEGATIVE
BUN SERPL-MCNC: 26 MG/DL (ref 8–23)
CALCIUM SERPL-MCNC: 9.5 MG/DL (ref 8.7–10.5)
CHLORIDE SERPL-SCNC: 93 MMOL/L (ref 95–110)
CLARITY UR: CLEAR
CO2 SERPL-SCNC: 28 MMOL/L (ref 23–29)
COLOR UR: YELLOW
CREAT SERPL-MCNC: 0.8 MG/DL (ref 0.5–1.4)
DIFFERENTIAL METHOD: ABNORMAL
EOSINOPHIL # BLD AUTO: 0.4 K/UL (ref 0–0.5)
EOSINOPHIL NFR BLD: 3.4 % (ref 0–8)
ERYTHROCYTE [DISTWIDTH] IN BLOOD BY AUTOMATED COUNT: 16.1 % (ref 11.5–14.5)
EST. GFR  (AFRICAN AMERICAN): >60 ML/MIN/1.73 M^2
EST. GFR  (NON AFRICAN AMERICAN): >60 ML/MIN/1.73 M^2
EXTRA BLUE TOP RAINBOW: NORMAL
EXTRA GOLD TOP RAINBOW: NORMAL
EXTRA RED TOP RAINBOW: NORMAL
GLUCOSE SERPL-MCNC: 112 MG/DL (ref 70–110)
GLUCOSE UR QL STRIP: NEGATIVE
HCT VFR BLD AUTO: 40.7 % (ref 37–48.5)
HGB BLD-MCNC: 13 G/DL (ref 12–16)
HGB UR QL STRIP: NEGATIVE
IMM GRANULOCYTES # BLD AUTO: 0.05 K/UL (ref 0–0.04)
IMM GRANULOCYTES NFR BLD AUTO: 0.4 % (ref 0–0.5)
KETONES UR QL STRIP: NEGATIVE
LEUKOCYTE ESTERASE UR QL STRIP: ABNORMAL
LIPASE SERPL-CCNC: 103 U/L (ref 4–60)
LYMPHOCYTES # BLD AUTO: 1.2 K/UL (ref 1–4.8)
LYMPHOCYTES NFR BLD: 10.8 % (ref 18–48)
MCH RBC QN AUTO: 25.2 PG (ref 27–31)
MCHC RBC AUTO-ENTMCNC: 31.9 G/DL (ref 32–36)
MCV RBC AUTO: 79 FL (ref 82–98)
MICROSCOPIC COMMENT: ABNORMAL
MONOCYTES # BLD AUTO: 1 K/UL (ref 0.3–1)
MONOCYTES NFR BLD: 8.6 % (ref 4–15)
NEUTROPHILS # BLD AUTO: 8.6 K/UL (ref 1.8–7.7)
NEUTROPHILS NFR BLD: 75.9 % (ref 38–73)
NITRITE UR QL STRIP: NEGATIVE
NRBC BLD-RTO: 0 /100 WBC
PH UR STRIP: 7 [PH] (ref 5–8)
PLATELET # BLD AUTO: 245 K/UL (ref 150–450)
PMV BLD AUTO: 11.2 FL (ref 9.2–12.9)
POTASSIUM SERPL-SCNC: 3.7 MMOL/L (ref 3.5–5.1)
PROT SERPL-MCNC: 8.1 G/DL (ref 6–8.4)
PROT UR QL STRIP: NEGATIVE
RBC # BLD AUTO: 5.15 M/UL (ref 4–5.4)
SODIUM SERPL-SCNC: 134 MMOL/L (ref 136–145)
SP GR UR STRIP: <=1.005 (ref 1–1.03)
URN SPEC COLLECT METH UR: ABNORMAL
UROBILINOGEN UR STRIP-ACNC: NEGATIVE EU/DL
WBC # BLD AUTO: 11.35 K/UL (ref 3.9–12.7)
WBC #/AREA URNS HPF: 8 /HPF (ref 0–5)

## 2021-04-13 PROCEDURE — 99284 EMERGENCY DEPT VISIT MOD MDM: CPT | Mod: 25

## 2021-04-13 PROCEDURE — 81000 URINALYSIS NONAUTO W/SCOPE: CPT | Performed by: FAMILY MEDICINE

## 2021-04-13 PROCEDURE — 83690 ASSAY OF LIPASE: CPT | Performed by: FAMILY MEDICINE

## 2021-04-13 PROCEDURE — 85025 COMPLETE CBC W/AUTO DIFF WBC: CPT | Performed by: FAMILY MEDICINE

## 2021-04-13 PROCEDURE — 80053 COMPREHEN METABOLIC PANEL: CPT | Performed by: FAMILY MEDICINE

## 2021-04-14 ENCOUNTER — TELEPHONE (OUTPATIENT)
Dept: HEMATOLOGY/ONCOLOGY | Facility: CLINIC | Age: 81
End: 2021-04-14

## 2021-04-14 ENCOUNTER — PATIENT MESSAGE (OUTPATIENT)
Dept: PALLIATIVE MEDICINE | Facility: CLINIC | Age: 81
End: 2021-04-14

## 2021-04-15 ENCOUNTER — OFFICE VISIT (OUTPATIENT)
Dept: GASTROENTEROLOGY | Facility: CLINIC | Age: 81
End: 2021-04-15
Payer: MEDICARE

## 2021-04-15 VITALS
DIASTOLIC BLOOD PRESSURE: 56 MMHG | BODY MASS INDEX: 27.44 KG/M2 | HEIGHT: 57 IN | SYSTOLIC BLOOD PRESSURE: 130 MMHG | WEIGHT: 127.19 LBS | OXYGEN SATURATION: 95 % | HEART RATE: 72 BPM

## 2021-04-15 DIAGNOSIS — R10.13 EPIGASTRIC PAIN: Primary | ICD-10-CM

## 2021-04-15 DIAGNOSIS — R13.10 DYSPHAGIA, UNSPECIFIED TYPE: ICD-10-CM

## 2021-04-15 PROCEDURE — 99999 PR PBB SHADOW E&M-EST. PATIENT-LVL V: CPT | Mod: PBBFAC,,, | Performed by: NURSE PRACTITIONER

## 2021-04-15 PROCEDURE — 1159F MED LIST DOCD IN RCRD: CPT | Mod: S$GLB,,, | Performed by: NURSE PRACTITIONER

## 2021-04-15 PROCEDURE — 1100F PR PT FALLS ASSESS DOC 2+ FALLS/FALL W/INJURY/YR: ICD-10-PCS | Mod: CPTII,S$GLB,, | Performed by: NURSE PRACTITIONER

## 2021-04-15 PROCEDURE — 3288F FALL RISK ASSESSMENT DOCD: CPT | Mod: CPTII,S$GLB,, | Performed by: NURSE PRACTITIONER

## 2021-04-15 PROCEDURE — 1157F PR ADVANCE CARE PLAN OR EQUIV PRESENT IN MEDICAL RECORD: ICD-10-PCS | Mod: S$GLB,,, | Performed by: NURSE PRACTITIONER

## 2021-04-15 PROCEDURE — 99214 PR OFFICE/OUTPT VISIT, EST, LEVL IV, 30-39 MIN: ICD-10-PCS | Mod: S$GLB,,, | Performed by: NURSE PRACTITIONER

## 2021-04-15 PROCEDURE — 99999 PR PBB SHADOW E&M-EST. PATIENT-LVL V: ICD-10-PCS | Mod: PBBFAC,,, | Performed by: NURSE PRACTITIONER

## 2021-04-15 PROCEDURE — 1157F ADVNC CARE PLAN IN RCRD: CPT | Mod: S$GLB,,, | Performed by: NURSE PRACTITIONER

## 2021-04-15 PROCEDURE — 1159F PR MEDICATION LIST DOCUMENTED IN MEDICAL RECORD: ICD-10-PCS | Mod: S$GLB,,, | Performed by: NURSE PRACTITIONER

## 2021-04-15 PROCEDURE — 1100F PTFALLS ASSESS-DOCD GE2>/YR: CPT | Mod: CPTII,S$GLB,, | Performed by: NURSE PRACTITIONER

## 2021-04-15 PROCEDURE — 3288F PR FALLS RISK ASSESSMENT DOCUMENTED: ICD-10-PCS | Mod: CPTII,S$GLB,, | Performed by: NURSE PRACTITIONER

## 2021-04-15 PROCEDURE — 1125F AMNT PAIN NOTED PAIN PRSNT: CPT | Mod: S$GLB,,, | Performed by: NURSE PRACTITIONER

## 2021-04-15 PROCEDURE — 99214 OFFICE O/P EST MOD 30 MIN: CPT | Mod: S$GLB,,, | Performed by: NURSE PRACTITIONER

## 2021-04-15 PROCEDURE — 1125F PR PAIN SEVERITY QUANTIFIED, PAIN PRESENT: ICD-10-PCS | Mod: S$GLB,,, | Performed by: NURSE PRACTITIONER

## 2021-04-15 RX ORDER — SUCRALFATE 1 G/1
1 TABLET ORAL 4 TIMES DAILY
Qty: 120 TABLET | Refills: 0 | Status: SHIPPED | OUTPATIENT
Start: 2021-04-15 | End: 2021-05-10

## 2021-04-15 RX ORDER — OMEPRAZOLE 40 MG/1
40 CAPSULE, DELAYED RELEASE ORAL DAILY
Qty: 30 CAPSULE | Refills: 11 | Status: SHIPPED | OUTPATIENT
Start: 2021-04-15 | End: 2021-06-10

## 2021-04-15 RX ORDER — PANTOPRAZOLE SODIUM 40 MG/1
40 TABLET, DELAYED RELEASE ORAL DAILY
COMMUNITY
End: 2021-04-15

## 2021-04-15 RX ORDER — DICYCLOMINE HYDROCHLORIDE 20 MG/1
20 TABLET ORAL 3 TIMES DAILY PRN
Qty: 90 TABLET | Refills: 0 | Status: SHIPPED | OUTPATIENT
Start: 2021-04-15 | End: 2021-04-27 | Stop reason: SDUPTHER

## 2021-04-15 RX ORDER — DICYCLOMINE HYDROCHLORIDE 10 MG/1
10 CAPSULE ORAL
Qty: 60 CAPSULE | Refills: 1 | Status: SHIPPED | OUTPATIENT
Start: 2021-04-15 | End: 2021-04-15

## 2021-04-21 DIAGNOSIS — G89.3 CANCER ASSOCIATED PAIN: ICD-10-CM

## 2021-04-22 RX ORDER — FENTANYL 25 UG/1
1 PATCH TRANSDERMAL
Qty: 5 PATCH | Refills: 0 | Status: SHIPPED | OUTPATIENT
Start: 2021-04-22 | End: 2021-05-10 | Stop reason: SDUPTHER

## 2021-04-27 ENCOUNTER — HOSPITAL ENCOUNTER (OUTPATIENT)
Dept: CARDIOLOGY | Facility: HOSPITAL | Age: 81
Discharge: HOME | End: 2021-04-27
Attending: INTERNAL MEDICINE
Payer: MEDICARE

## 2021-04-27 VITALS
DIASTOLIC BLOOD PRESSURE: 56 MMHG | HEART RATE: 65 BPM | HEIGHT: 57 IN | WEIGHT: 127 LBS | BODY MASS INDEX: 27.4 KG/M2 | SYSTOLIC BLOOD PRESSURE: 130 MMHG

## 2021-04-27 DIAGNOSIS — I70.0 AORTIC ARCH ATHEROSCLEROSIS: ICD-10-CM

## 2021-04-27 DIAGNOSIS — I15.2 HYPERTENSION ASSOCIATED WITH DIABETES: ICD-10-CM

## 2021-04-27 DIAGNOSIS — E66.09 CLASS 1 OBESITY DUE TO EXCESS CALORIES WITH SERIOUS COMORBIDITY AND BODY MASS INDEX (BMI) OF 30.0 TO 30.9 IN ADULT: ICD-10-CM

## 2021-04-27 DIAGNOSIS — I25.118 CORONARY ARTERY DISEASE WITH STABLE ANGINA PECTORIS, UNSPECIFIED VESSEL OR LESION TYPE, UNSPECIFIED WHETHER NATIVE OR TRANSPLANTED HEART: ICD-10-CM

## 2021-04-27 DIAGNOSIS — Z79.4 TYPE 2 DIABETES MELLITUS WITH HYPERGLYCEMIA, WITH LONG-TERM CURRENT USE OF INSULIN: ICD-10-CM

## 2021-04-27 DIAGNOSIS — Z98.61 HISTORY OF PTCA: ICD-10-CM

## 2021-04-27 DIAGNOSIS — I63.512 ARTERIAL ISCHEMIC STROKE, MCA (MIDDLE CEREBRAL ARTERY), LEFT, ACUTE: ICD-10-CM

## 2021-04-27 DIAGNOSIS — I20.9 AP (ANGINA PECTORIS): ICD-10-CM

## 2021-04-27 DIAGNOSIS — G47.33 OSA ON CPAP: ICD-10-CM

## 2021-04-27 DIAGNOSIS — E11.65 TYPE 2 DIABETES MELLITUS WITH HYPERGLYCEMIA, WITH LONG-TERM CURRENT USE OF INSULIN: ICD-10-CM

## 2021-04-27 DIAGNOSIS — I51.89 LEFT VENTRICULAR DIASTOLIC DYSFUNCTION WITH PRESERVED SYSTOLIC FUNCTION: ICD-10-CM

## 2021-04-27 DIAGNOSIS — E11.59 HYPERTENSION ASSOCIATED WITH DIABETES: ICD-10-CM

## 2021-04-27 DIAGNOSIS — I63.512 ARTERIAL ISCHEMIC STROKE, MCA (MIDDLE CEREBRAL ARTERY), LEFT, ACUTE: Chronic | ICD-10-CM

## 2021-04-27 DIAGNOSIS — E78.5 HYPERLIPIDEMIA ASSOCIATED WITH TYPE 2 DIABETES MELLITUS: ICD-10-CM

## 2021-04-27 DIAGNOSIS — R00.1 BRADYCARDIA: ICD-10-CM

## 2021-04-27 DIAGNOSIS — I25.10 CAD, MULTIPLE VESSEL: ICD-10-CM

## 2021-04-27 DIAGNOSIS — E11.69 HYPERLIPIDEMIA ASSOCIATED WITH TYPE 2 DIABETES MELLITUS: ICD-10-CM

## 2021-04-27 DIAGNOSIS — Z95.1 S/P CABG (CORONARY ARTERY BYPASS GRAFT): ICD-10-CM

## 2021-04-27 LAB
ASCENDING AORTA: 2.47 CM
AV INDEX (PROSTH): 0.59
AV MEAN GRADIENT: 8 MMHG
AV PEAK GRADIENT: 14 MMHG
AV VALVE AREA: 1.65 CM2
AV VELOCITY RATIO: 0.63
BSA FOR ECHO PROCEDURE: 1.52 M2
CV ECHO LV RWT: 0.7 CM
DOP CALC AO PEAK VEL: 1.85 M/S
DOP CALC AO VTI: 39.7 CM
DOP CALC LVOT AREA: 2.8 CM2
DOP CALC LVOT DIAMETER: 1.89 CM
DOP CALC LVOT PEAK VEL: 1.16 M/S
DOP CALC LVOT STROKE VOLUME: 65.48 CM3
DOP CALC RVOT PEAK VEL: 0.52 M/S
DOP CALC RVOT VTI: 9.14 CM
DOP CALCLVOT PEAK VEL VTI: 23.35 CM
E WAVE DECELERATION TIME: 293.03 MSEC
E/A RATIO: 0.69
E/E' RATIO: 14.22 M/S
ECHO LV POSTERIOR WALL: 1.05 CM (ref 0.6–1.1)
EJECTION FRACTION: 65 %
FRACTIONAL SHORTENING: 29 % (ref 28–44)
INTERVENTRICULAR SEPTUM: 0.98 CM (ref 0.6–1.1)
IVRT: 119.89 MSEC
LA MAJOR: 4.54 CM
LA MINOR: 4.08 CM
LA WIDTH: 2.8 CM
LEFT ATRIUM SIZE: 2.9 CM
LEFT ATRIUM VOLUME INDEX: 20 ML/M2
LEFT ATRIUM VOLUME: 29.66 CM3
LEFT INTERNAL DIMENSION IN SYSTOLE: 2.13 CM (ref 2.1–4)
LEFT VENTRICLE DIASTOLIC VOLUME INDEX: 23.72 ML/M2
LEFT VENTRICLE DIASTOLIC VOLUME: 35.11 ML
LEFT VENTRICLE MASS INDEX: 57 G/M2
LEFT VENTRICLE SYSTOLIC VOLUME INDEX: 10 ML/M2
LEFT VENTRICLE SYSTOLIC VOLUME: 14.86 ML
LEFT VENTRICULAR INTERNAL DIMENSION IN DIASTOLE: 3 CM (ref 3.5–6)
LEFT VENTRICULAR MASS: 84.02 G
LV LATERAL E/E' RATIO: 10.67 M/S
LV SEPTAL E/E' RATIO: 21.33 M/S
MV PEAK A VEL: 0.93 M/S
MV PEAK E VEL: 0.64 M/S
MV STENOSIS PRESSURE HALF TIME: 84.98 MS
MV VALVE AREA P 1/2 METHOD: 2.59 CM2
PISA TR MAX VEL: 1.47 M/S
PULM VEIN S/D RATIO: 1.93
PV MEAN GRADIENT: 1 MMHG
PV PEAK D VEL: 0.55 M/S
PV PEAK GRADIENT: 1 MMHG
PV PEAK S VEL: 1.06 M/S
PV PEAK VELOCITY: 0.75 CM/S
RA MAJOR: 3.37 CM
RA PRESSURE: 3 MMHG
RA WIDTH: 2.84 CM
RIGHT VENTRICULAR END-DIASTOLIC DIMENSION: 2.54 CM
SINUS: 2.01 CM
STJ: 1.69 CM
TDI LATERAL: 0.06 M/S
TDI SEPTAL: 0.03 M/S
TDI: 0.05 M/S
TR MAX PG: 9 MMHG
TV REST PULMONARY ARTERY PRESSURE: 12 MMHG

## 2021-04-27 PROCEDURE — 93306 TTE W/DOPPLER COMPLETE: CPT | Mod: 26,,, | Performed by: INTERNAL MEDICINE

## 2021-04-27 PROCEDURE — 93306 TTE W/DOPPLER COMPLETE: CPT

## 2021-04-27 PROCEDURE — 93306 ECHO (CUPID ONLY): ICD-10-PCS | Mod: 26,,, | Performed by: INTERNAL MEDICINE

## 2021-04-27 RX ORDER — DICYCLOMINE HYDROCHLORIDE 20 MG/1
20 TABLET ORAL 3 TIMES DAILY PRN
Qty: 90 TABLET | Refills: 0 | Status: SHIPPED | OUTPATIENT
Start: 2021-04-27 | End: 2021-06-10

## 2021-04-29 ENCOUNTER — PATIENT MESSAGE (OUTPATIENT)
Dept: GASTROENTEROLOGY | Facility: CLINIC | Age: 81
End: 2021-04-29

## 2021-04-29 ENCOUNTER — EXTERNAL HOME HEALTH (OUTPATIENT)
Dept: HOME HEALTH SERVICES | Facility: HOSPITAL | Age: 81
End: 2021-04-29
Payer: MEDICARE

## 2021-04-29 ENCOUNTER — PATIENT MESSAGE (OUTPATIENT)
Dept: PALLIATIVE MEDICINE | Facility: CLINIC | Age: 81
End: 2021-04-29

## 2021-04-30 ENCOUNTER — TELEPHONE (OUTPATIENT)
Dept: PALLIATIVE MEDICINE | Facility: CLINIC | Age: 81
End: 2021-04-30

## 2021-05-04 ENCOUNTER — PATIENT MESSAGE (OUTPATIENT)
Dept: RADIATION ONCOLOGY | Facility: CLINIC | Age: 81
End: 2021-05-04

## 2021-05-04 ENCOUNTER — PATIENT MESSAGE (OUTPATIENT)
Dept: PALLIATIVE MEDICINE | Facility: CLINIC | Age: 81
End: 2021-05-04

## 2021-05-04 ENCOUNTER — TELEPHONE (OUTPATIENT)
Dept: RADIOLOGY | Facility: HOSPITAL | Age: 81
End: 2021-05-04

## 2021-05-05 DIAGNOSIS — R13.12 OROPHARYNGEAL DYSPHAGIA: ICD-10-CM

## 2021-05-05 DIAGNOSIS — C11.1 MALIGNANT NEOPLASM OF PHARYNGEAL TONSIL: Primary | ICD-10-CM

## 2021-05-05 DIAGNOSIS — T17.800S ASPIRATION INTO LOWER RESPIRATORY TRACT, SEQUELA: ICD-10-CM

## 2021-05-06 ENCOUNTER — HOSPITAL ENCOUNTER (OUTPATIENT)
Dept: RADIOLOGY | Facility: HOSPITAL | Age: 81
Discharge: HOME | End: 2021-05-06
Attending: RADIOLOGY
Payer: MEDICARE

## 2021-05-06 DIAGNOSIS — C11.1 MALIGNANT NEOPLASM OF PHARYNGEAL TONSIL: ICD-10-CM

## 2021-05-06 PROCEDURE — 78815 PET IMAGE W/CT SKULL-THIGH: CPT | Mod: TC

## 2021-05-06 PROCEDURE — 78815 NM PET CT ROUTINE: ICD-10-PCS | Mod: 26,PS,, | Performed by: RADIOLOGY

## 2021-05-06 PROCEDURE — 78815 PET IMAGE W/CT SKULL-THIGH: CPT | Mod: 26,PS,, | Performed by: RADIOLOGY

## 2021-05-10 ENCOUNTER — OFFICE VISIT (OUTPATIENT)
Dept: HEMATOLOGY/ONCOLOGY | Facility: CLINIC | Age: 81
End: 2021-05-10
Payer: MEDICARE

## 2021-05-10 ENCOUNTER — TELEPHONE (OUTPATIENT)
Dept: HEMATOLOGY/ONCOLOGY | Facility: CLINIC | Age: 81
End: 2021-05-10

## 2021-05-10 ENCOUNTER — PATIENT MESSAGE (OUTPATIENT)
Dept: PALLIATIVE MEDICINE | Facility: CLINIC | Age: 81
End: 2021-05-10

## 2021-05-10 ENCOUNTER — OFFICE VISIT (OUTPATIENT)
Dept: RADIATION ONCOLOGY | Facility: CLINIC | Age: 81
End: 2021-05-10
Payer: MEDICARE

## 2021-05-10 VITALS
OXYGEN SATURATION: 96 % | WEIGHT: 126.38 LBS | HEART RATE: 76 BPM | RESPIRATION RATE: 18 BRPM | TEMPERATURE: 97 F | SYSTOLIC BLOOD PRESSURE: 137 MMHG | BODY MASS INDEX: 27.26 KG/M2 | DIASTOLIC BLOOD PRESSURE: 67 MMHG | HEIGHT: 57 IN

## 2021-05-10 VITALS
WEIGHT: 126.13 LBS | BODY MASS INDEX: 27.29 KG/M2 | DIASTOLIC BLOOD PRESSURE: 53 MMHG | TEMPERATURE: 98 F | OXYGEN SATURATION: 97 % | HEART RATE: 64 BPM | SYSTOLIC BLOOD PRESSURE: 91 MMHG

## 2021-05-10 DIAGNOSIS — K12.33 MUCOSITIS DUE TO RADIATION THERAPY: ICD-10-CM

## 2021-05-10 DIAGNOSIS — D50.8 IRON DEFICIENCY ANEMIA SECONDARY TO INADEQUATE DIETARY IRON INTAKE: ICD-10-CM

## 2021-05-10 DIAGNOSIS — C11.1 MALIGNANT NEOPLASM OF PHARYNGEAL TONSIL: Primary | ICD-10-CM

## 2021-05-10 DIAGNOSIS — R71.8 MICROCYTOSIS: ICD-10-CM

## 2021-05-10 DIAGNOSIS — R13.12 OROPHARYNGEAL DYSPHAGIA: ICD-10-CM

## 2021-05-10 DIAGNOSIS — D63.0 ANEMIA IN NEOPLASTIC DISEASE: Primary | ICD-10-CM

## 2021-05-10 DIAGNOSIS — C11.1 MALIGNANT NEOPLASM OF PHARYNGEAL TONSIL: ICD-10-CM

## 2021-05-10 DIAGNOSIS — G89.3 CANCER ASSOCIATED PAIN: ICD-10-CM

## 2021-05-10 DIAGNOSIS — Z91.89 AT HIGH RISK FOR ASPIRATION: ICD-10-CM

## 2021-05-10 PROCEDURE — 1126F AMNT PAIN NOTED NONE PRSNT: CPT | Mod: S$GLB,,, | Performed by: RADIOLOGY

## 2021-05-10 PROCEDURE — 1101F PT FALLS ASSESS-DOCD LE1/YR: CPT | Mod: CPTII,S$GLB,, | Performed by: RADIOLOGY

## 2021-05-10 PROCEDURE — 1159F MED LIST DOCD IN RCRD: CPT | Mod: S$GLB,,, | Performed by: INTERNAL MEDICINE

## 2021-05-10 PROCEDURE — 3288F FALL RISK ASSESSMENT DOCD: CPT | Mod: CPTII,S$GLB,, | Performed by: RADIOLOGY

## 2021-05-10 PROCEDURE — 1100F PTFALLS ASSESS-DOCD GE2>/YR: CPT | Mod: CPTII,S$GLB,, | Performed by: INTERNAL MEDICINE

## 2021-05-10 PROCEDURE — 99214 PR OFFICE/OUTPT VISIT, EST, LEVL IV, 30-39 MIN: ICD-10-PCS | Mod: S$GLB,,, | Performed by: RADIOLOGY

## 2021-05-10 PROCEDURE — 1126F AMNT PAIN NOTED NONE PRSNT: CPT | Mod: S$GLB,,, | Performed by: INTERNAL MEDICINE

## 2021-05-10 PROCEDURE — 1157F PR ADVANCE CARE PLAN OR EQUIV PRESENT IN MEDICAL RECORD: ICD-10-PCS | Mod: S$GLB,,, | Performed by: RADIOLOGY

## 2021-05-10 PROCEDURE — 1100F PR PT FALLS ASSESS DOC 2+ FALLS/FALL W/INJURY/YR: ICD-10-PCS | Mod: CPTII,S$GLB,, | Performed by: INTERNAL MEDICINE

## 2021-05-10 PROCEDURE — 1126F PR PAIN SEVERITY QUANTIFIED, NO PAIN PRESENT: ICD-10-PCS | Mod: S$GLB,,, | Performed by: RADIOLOGY

## 2021-05-10 PROCEDURE — 1159F PR MEDICATION LIST DOCUMENTED IN MEDICAL RECORD: ICD-10-PCS | Mod: S$GLB,,, | Performed by: INTERNAL MEDICINE

## 2021-05-10 PROCEDURE — 1101F PR PT FALLS ASSESS DOC 0-1 FALLS W/OUT INJ PAST YR: ICD-10-PCS | Mod: CPTII,S$GLB,, | Performed by: RADIOLOGY

## 2021-05-10 PROCEDURE — 3288F PR FALLS RISK ASSESSMENT DOCUMENTED: ICD-10-PCS | Mod: CPTII,S$GLB,, | Performed by: INTERNAL MEDICINE

## 2021-05-10 PROCEDURE — 99999 PR PBB SHADOW E&M-EST. PATIENT-LVL III: CPT | Mod: PBBFAC,,, | Performed by: INTERNAL MEDICINE

## 2021-05-10 PROCEDURE — 99214 PR OFFICE/OUTPT VISIT, EST, LEVL IV, 30-39 MIN: ICD-10-PCS | Mod: S$GLB,,, | Performed by: INTERNAL MEDICINE

## 2021-05-10 PROCEDURE — 1157F ADVNC CARE PLAN IN RCRD: CPT | Mod: S$GLB,,, | Performed by: RADIOLOGY

## 2021-05-10 PROCEDURE — 99999 PR PBB SHADOW E&M-EST. PATIENT-LVL V: CPT | Mod: PBBFAC,,, | Performed by: RADIOLOGY

## 2021-05-10 PROCEDURE — 99999 PR PBB SHADOW E&M-EST. PATIENT-LVL V: ICD-10-PCS | Mod: PBBFAC,,, | Performed by: RADIOLOGY

## 2021-05-10 PROCEDURE — 1157F ADVNC CARE PLAN IN RCRD: CPT | Mod: S$GLB,,, | Performed by: INTERNAL MEDICINE

## 2021-05-10 PROCEDURE — 1157F PR ADVANCE CARE PLAN OR EQUIV PRESENT IN MEDICAL RECORD: ICD-10-PCS | Mod: S$GLB,,, | Performed by: INTERNAL MEDICINE

## 2021-05-10 PROCEDURE — 99999 PR PBB SHADOW E&M-EST. PATIENT-LVL III: ICD-10-PCS | Mod: PBBFAC,,, | Performed by: INTERNAL MEDICINE

## 2021-05-10 PROCEDURE — 3288F FALL RISK ASSESSMENT DOCD: CPT | Mod: CPTII,S$GLB,, | Performed by: INTERNAL MEDICINE

## 2021-05-10 PROCEDURE — 3288F PR FALLS RISK ASSESSMENT DOCUMENTED: ICD-10-PCS | Mod: CPTII,S$GLB,, | Performed by: RADIOLOGY

## 2021-05-10 PROCEDURE — 99499 RISK ADDL DX/OHS AUDIT: ICD-10-PCS | Mod: S$GLB,,, | Performed by: INTERNAL MEDICINE

## 2021-05-10 PROCEDURE — 1159F PR MEDICATION LIST DOCUMENTED IN MEDICAL RECORD: ICD-10-PCS | Mod: S$GLB,,, | Performed by: RADIOLOGY

## 2021-05-10 PROCEDURE — 99499 UNLISTED E&M SERVICE: CPT | Mod: S$GLB,,, | Performed by: INTERNAL MEDICINE

## 2021-05-10 PROCEDURE — 99214 OFFICE O/P EST MOD 30 MIN: CPT | Mod: S$GLB,,, | Performed by: INTERNAL MEDICINE

## 2021-05-10 PROCEDURE — 99214 OFFICE O/P EST MOD 30 MIN: CPT | Mod: S$GLB,,, | Performed by: RADIOLOGY

## 2021-05-10 PROCEDURE — 1159F MED LIST DOCD IN RCRD: CPT | Mod: S$GLB,,, | Performed by: RADIOLOGY

## 2021-05-10 PROCEDURE — 1126F PR PAIN SEVERITY QUANTIFIED, NO PAIN PRESENT: ICD-10-PCS | Mod: S$GLB,,, | Performed by: INTERNAL MEDICINE

## 2021-05-11 RX ORDER — FENTANYL 25 UG/1
1 PATCH TRANSDERMAL
Qty: 5 PATCH | Refills: 0 | Status: SHIPPED | OUTPATIENT
Start: 2021-05-11 | End: 2021-06-10

## 2021-05-12 ENCOUNTER — HOSPITAL ENCOUNTER (OUTPATIENT)
Dept: RADIOLOGY | Facility: HOSPITAL | Age: 81
Discharge: HOME | End: 2021-05-12
Attending: RADIOLOGY
Payer: MEDICARE

## 2021-05-12 DIAGNOSIS — C11.1 MALIGNANT NEOPLASM OF PHARYNGEAL TONSIL: ICD-10-CM

## 2021-05-12 DIAGNOSIS — R13.12 OROPHARYNGEAL DYSPHAGIA: ICD-10-CM

## 2021-05-12 DIAGNOSIS — G89.3 CANCER ASSOCIATED PAIN: ICD-10-CM

## 2021-05-12 DIAGNOSIS — T17.800S ASPIRATION INTO LOWER RESPIRATORY TRACT, SEQUELA: ICD-10-CM

## 2021-05-12 PROCEDURE — 25500020 PHARM REV CODE 255: Performed by: RADIOLOGY

## 2021-05-12 PROCEDURE — A9698 NON-RAD CONTRAST MATERIALNOC: HCPCS | Performed by: RADIOLOGY

## 2021-05-12 PROCEDURE — 92611 MOTION FLUOROSCOPY/SWALLOW: CPT

## 2021-05-12 PROCEDURE — 74230 X-RAY XM SWLNG FUNCJ C+: CPT | Mod: TC

## 2021-05-12 RX ORDER — FENTANYL 25 UG/1
1 PATCH TRANSDERMAL
Qty: 5 PATCH | Refills: 0 | Status: SHIPPED | OUTPATIENT
Start: 2021-05-12 | End: 2021-06-10

## 2021-05-12 RX ADMIN — BARIUM SULFATE 50 ML: 0.81 POWDER, FOR SUSPENSION ORAL at 11:05

## 2021-05-13 ENCOUNTER — OFFICE VISIT (OUTPATIENT)
Dept: CARDIOLOGY | Facility: CLINIC | Age: 81
End: 2021-05-13
Payer: MEDICARE

## 2021-05-13 VITALS
HEIGHT: 57 IN | SYSTOLIC BLOOD PRESSURE: 130 MMHG | OXYGEN SATURATION: 98 % | DIASTOLIC BLOOD PRESSURE: 60 MMHG | BODY MASS INDEX: 27.06 KG/M2 | HEART RATE: 62 BPM | WEIGHT: 125.44 LBS

## 2021-05-13 DIAGNOSIS — G89.3 CANCER ASSOCIATED PAIN: ICD-10-CM

## 2021-05-13 DIAGNOSIS — I63.512 ARTERIAL ISCHEMIC STROKE, MCA (MIDDLE CEREBRAL ARTERY), LEFT, ACUTE: Chronic | ICD-10-CM

## 2021-05-13 DIAGNOSIS — Z95.1 S/P CABG (CORONARY ARTERY BYPASS GRAFT): ICD-10-CM

## 2021-05-13 DIAGNOSIS — E11.65 TYPE 2 DIABETES MELLITUS WITH HYPERGLYCEMIA, WITH LONG-TERM CURRENT USE OF INSULIN: ICD-10-CM

## 2021-05-13 DIAGNOSIS — Z86.73 HISTORY OF STROKE: ICD-10-CM

## 2021-05-13 DIAGNOSIS — I15.2 HYPERTENSION ASSOCIATED WITH DIABETES: Chronic | ICD-10-CM

## 2021-05-13 DIAGNOSIS — Z93.1 S/P PERCUTANEOUS ENDOSCOPIC GASTROSTOMY (PEG) TUBE PLACEMENT: ICD-10-CM

## 2021-05-13 DIAGNOSIS — I35.1 NONRHEUMATIC AORTIC VALVE INSUFFICIENCY: ICD-10-CM

## 2021-05-13 DIAGNOSIS — E66.3 OVERWEIGHT (BMI 25.0-29.9): ICD-10-CM

## 2021-05-13 DIAGNOSIS — I70.0 AORTIC ARCH ATHEROSCLEROSIS: ICD-10-CM

## 2021-05-13 DIAGNOSIS — I51.89 DIASTOLIC DYSFUNCTION: ICD-10-CM

## 2021-05-13 DIAGNOSIS — E11.69 HYPERLIPIDEMIA ASSOCIATED WITH TYPE 2 DIABETES MELLITUS: Chronic | ICD-10-CM

## 2021-05-13 DIAGNOSIS — I20.9 AP (ANGINA PECTORIS): Primary | Chronic | ICD-10-CM

## 2021-05-13 DIAGNOSIS — E11.59 HYPERTENSION ASSOCIATED WITH DIABETES: Chronic | ICD-10-CM

## 2021-05-13 DIAGNOSIS — C11.1 MALIGNANT NEOPLASM OF PHARYNGEAL TONSIL: ICD-10-CM

## 2021-05-13 DIAGNOSIS — I25.118 CORONARY ARTERY DISEASE WITH STABLE ANGINA PECTORIS, UNSPECIFIED VESSEL OR LESION TYPE, UNSPECIFIED WHETHER NATIVE OR TRANSPLANTED HEART: Chronic | ICD-10-CM

## 2021-05-13 DIAGNOSIS — I51.89 LEFT VENTRICULAR DIASTOLIC DYSFUNCTION WITH PRESERVED SYSTOLIC FUNCTION: Chronic | ICD-10-CM

## 2021-05-13 DIAGNOSIS — Z79.4 TYPE 2 DIABETES MELLITUS WITH HYPERGLYCEMIA, WITH LONG-TERM CURRENT USE OF INSULIN: ICD-10-CM

## 2021-05-13 DIAGNOSIS — Z98.61 HISTORY OF PTCA: ICD-10-CM

## 2021-05-13 DIAGNOSIS — G47.33 OSA ON CPAP: ICD-10-CM

## 2021-05-13 DIAGNOSIS — I25.10 CAD, MULTIPLE VESSEL: ICD-10-CM

## 2021-05-13 DIAGNOSIS — E78.5 HYPERLIPIDEMIA ASSOCIATED WITH TYPE 2 DIABETES MELLITUS: Chronic | ICD-10-CM

## 2021-05-13 PROBLEM — D50.8 IRON DEFICIENCY ANEMIA SECONDARY TO INADEQUATE DIETARY IRON INTAKE: Status: ACTIVE | Noted: 2021-05-13

## 2021-05-13 PROCEDURE — 1157F PR ADVANCE CARE PLAN OR EQUIV PRESENT IN MEDICAL RECORD: ICD-10-PCS | Mod: S$GLB,,, | Performed by: INTERNAL MEDICINE

## 2021-05-13 PROCEDURE — 1126F AMNT PAIN NOTED NONE PRSNT: CPT | Mod: S$GLB,,, | Performed by: INTERNAL MEDICINE

## 2021-05-13 PROCEDURE — 3051F PR MOST RECENT HEMOGLOBIN A1C LEVEL 7.0 - < 8.0%: ICD-10-PCS | Mod: CPTII,S$GLB,, | Performed by: INTERNAL MEDICINE

## 2021-05-13 PROCEDURE — 99499 RISK ADDL DX/OHS AUDIT: ICD-10-PCS | Mod: S$GLB,,, | Performed by: INTERNAL MEDICINE

## 2021-05-13 PROCEDURE — 3051F HG A1C>EQUAL 7.0%<8.0%: CPT | Mod: CPTII,S$GLB,, | Performed by: INTERNAL MEDICINE

## 2021-05-13 PROCEDURE — 99499 UNLISTED E&M SERVICE: CPT | Mod: S$GLB,,, | Performed by: INTERNAL MEDICINE

## 2021-05-13 PROCEDURE — 99214 OFFICE O/P EST MOD 30 MIN: CPT | Mod: S$GLB,,, | Performed by: INTERNAL MEDICINE

## 2021-05-13 PROCEDURE — 99999 PR PBB SHADOW E&M-EST. PATIENT-LVL III: CPT | Mod: PBBFAC,,, | Performed by: INTERNAL MEDICINE

## 2021-05-13 PROCEDURE — 1159F MED LIST DOCD IN RCRD: CPT | Mod: S$GLB,,, | Performed by: INTERNAL MEDICINE

## 2021-05-13 PROCEDURE — 99999 PR PBB SHADOW E&M-EST. PATIENT-LVL III: ICD-10-PCS | Mod: PBBFAC,,, | Performed by: INTERNAL MEDICINE

## 2021-05-13 PROCEDURE — 1126F PR PAIN SEVERITY QUANTIFIED, NO PAIN PRESENT: ICD-10-PCS | Mod: S$GLB,,, | Performed by: INTERNAL MEDICINE

## 2021-05-13 PROCEDURE — 1157F ADVNC CARE PLAN IN RCRD: CPT | Mod: S$GLB,,, | Performed by: INTERNAL MEDICINE

## 2021-05-13 PROCEDURE — 1159F PR MEDICATION LIST DOCUMENTED IN MEDICAL RECORD: ICD-10-PCS | Mod: S$GLB,,, | Performed by: INTERNAL MEDICINE

## 2021-05-13 PROCEDURE — 99214 PR OFFICE/OUTPT VISIT, EST, LEVL IV, 30-39 MIN: ICD-10-PCS | Mod: S$GLB,,, | Performed by: INTERNAL MEDICINE

## 2021-05-13 RX ORDER — HEPARIN 100 UNIT/ML
500 SYRINGE INTRAVENOUS
Status: CANCELLED | OUTPATIENT
Start: 2021-05-13

## 2021-05-13 RX ORDER — METHYLPREDNISOLONE SOD SUCC 125 MG
125 VIAL (EA) INJECTION ONCE AS NEEDED
Status: CANCELLED | OUTPATIENT
Start: 2021-05-13

## 2021-05-13 RX ORDER — ATORVASTATIN CALCIUM 20 MG/1
20 TABLET, FILM COATED ORAL NIGHTLY
Qty: 90 TABLET | Refills: 3 | Status: SHIPPED | OUTPATIENT
Start: 2021-05-13 | End: 2021-01-01

## 2021-05-13 RX ORDER — DIPHENHYDRAMINE HYDROCHLORIDE 50 MG/ML
50 INJECTION INTRAMUSCULAR; INTRAVENOUS ONCE AS NEEDED
Status: CANCELLED | OUTPATIENT
Start: 2021-05-13

## 2021-05-13 RX ORDER — SODIUM CHLORIDE 0.9 % (FLUSH) 0.9 %
10 SYRINGE (ML) INJECTION
Status: CANCELLED | OUTPATIENT
Start: 2021-05-13

## 2021-05-13 RX ORDER — EPINEPHRINE 0.3 MG/.3ML
0.3 INJECTION SUBCUTANEOUS ONCE AS NEEDED
Status: CANCELLED | OUTPATIENT
Start: 2021-05-13

## 2021-05-14 ENCOUNTER — TELEPHONE (OUTPATIENT)
Dept: HEMATOLOGY/ONCOLOGY | Facility: CLINIC | Age: 81
End: 2021-05-14

## 2021-05-17 DIAGNOSIS — C11.1 MALIGNANT NEOPLASM OF PHARYNGEAL TONSIL: ICD-10-CM

## 2021-05-17 DIAGNOSIS — R13.10 DYSPHAGIA, UNSPECIFIED TYPE: Primary | ICD-10-CM

## 2021-05-20 ENCOUNTER — HOSPITAL ENCOUNTER (EMERGENCY)
Facility: HOSPITAL | Age: 81
Discharge: HOME | End: 2021-05-20
Attending: EMERGENCY MEDICINE
Payer: MEDICARE

## 2021-05-20 VITALS
OXYGEN SATURATION: 97 % | DIASTOLIC BLOOD PRESSURE: 61 MMHG | RESPIRATION RATE: 18 BRPM | HEART RATE: 61 BPM | TEMPERATURE: 98 F | BODY MASS INDEX: 27.6 KG/M2 | SYSTOLIC BLOOD PRESSURE: 131 MMHG | WEIGHT: 127.56 LBS

## 2021-05-20 DIAGNOSIS — K94.23 PEG TUBE MALFUNCTION: Primary | ICD-10-CM

## 2021-05-20 DIAGNOSIS — B37.2 CUTANEOUS CANDIDIASIS: ICD-10-CM

## 2021-05-20 PROCEDURE — 99283 EMERGENCY DEPT VISIT LOW MDM: CPT | Mod: 25

## 2021-05-20 RX ORDER — NYSTATIN 100000 U/G
CREAM TOPICAL 2 TIMES DAILY
Qty: 15 G | Refills: 0 | Status: SHIPPED | OUTPATIENT
Start: 2021-05-20 | End: 2021-06-10

## 2021-05-27 ENCOUNTER — INFUSION (OUTPATIENT)
Dept: INFUSION THERAPY | Facility: HOSPITAL | Age: 81
End: 2021-05-27
Attending: INTERNAL MEDICINE
Payer: MEDICARE

## 2021-05-27 VITALS
SYSTOLIC BLOOD PRESSURE: 135 MMHG | DIASTOLIC BLOOD PRESSURE: 66 MMHG | RESPIRATION RATE: 16 BRPM | OXYGEN SATURATION: 98 % | HEART RATE: 68 BPM | TEMPERATURE: 98 F

## 2021-05-27 DIAGNOSIS — D50.8 IRON DEFICIENCY ANEMIA SECONDARY TO INADEQUATE DIETARY IRON INTAKE: Primary | ICD-10-CM

## 2021-05-27 PROCEDURE — 25000003 PHARM REV CODE 250: Performed by: INTERNAL MEDICINE

## 2021-05-27 PROCEDURE — 63600175 PHARM REV CODE 636 W HCPCS: Mod: JG | Performed by: INTERNAL MEDICINE

## 2021-05-27 PROCEDURE — 96365 THER/PROPH/DIAG IV INF INIT: CPT

## 2021-05-27 RX ORDER — SODIUM CHLORIDE 0.9 % (FLUSH) 0.9 %
10 SYRINGE (ML) INJECTION
Status: CANCELLED | OUTPATIENT
Start: 2021-05-27

## 2021-05-27 RX ORDER — METHYLPREDNISOLONE SOD SUCC 125 MG
125 VIAL (EA) INJECTION ONCE AS NEEDED
Status: CANCELLED | OUTPATIENT
Start: 2021-05-27

## 2021-05-27 RX ORDER — HEPARIN 100 UNIT/ML
500 SYRINGE INTRAVENOUS
Status: CANCELLED | OUTPATIENT
Start: 2021-05-27

## 2021-05-27 RX ORDER — DIPHENHYDRAMINE HYDROCHLORIDE 50 MG/ML
50 INJECTION INTRAMUSCULAR; INTRAVENOUS ONCE AS NEEDED
Status: CANCELLED | OUTPATIENT
Start: 2021-05-27

## 2021-05-27 RX ORDER — EPINEPHRINE 0.3 MG/.3ML
0.3 INJECTION SUBCUTANEOUS ONCE AS NEEDED
Status: CANCELLED | OUTPATIENT
Start: 2021-05-27

## 2021-05-27 RX ADMIN — SODIUM CHLORIDE: 0.9 INJECTION, SOLUTION INTRAVENOUS at 10:05

## 2021-05-27 RX ADMIN — FERUMOXYTOL 510 MG: 510 INJECTION INTRAVENOUS at 10:05

## 2021-05-31 DIAGNOSIS — D50.8 IRON DEFICIENCY ANEMIA SECONDARY TO INADEQUATE DIETARY IRON INTAKE: Primary | ICD-10-CM

## 2021-05-31 PROCEDURE — G0179 MD RECERTIFICATION HHA PT: HCPCS | Mod: ,,, | Performed by: FAMILY MEDICINE

## 2021-05-31 PROCEDURE — G0179 PR HOME HEALTH MD RECERTIFICATION: ICD-10-PCS | Mod: ,,, | Performed by: FAMILY MEDICINE

## 2021-06-10 ENCOUNTER — DOCUMENT SCAN (OUTPATIENT)
Dept: HOME HEALTH SERVICES | Facility: HOSPITAL | Age: 81
End: 2021-06-10
Payer: MEDICARE

## 2021-06-10 ENCOUNTER — OFFICE VISIT (OUTPATIENT)
Dept: FAMILY MEDICINE | Facility: CLINIC | Age: 81
End: 2021-06-10
Attending: FAMILY MEDICINE
Payer: MEDICARE

## 2021-06-10 ENCOUNTER — HOSPITAL ENCOUNTER (OUTPATIENT)
Dept: RADIOLOGY | Facility: HOSPITAL | Age: 81
Discharge: HOME | End: 2021-06-10
Attending: FAMILY MEDICINE
Payer: MEDICARE

## 2021-06-10 VITALS
DIASTOLIC BLOOD PRESSURE: 62 MMHG | WEIGHT: 127.75 LBS | TEMPERATURE: 98 F | OXYGEN SATURATION: 96 % | SYSTOLIC BLOOD PRESSURE: 118 MMHG | BODY MASS INDEX: 27.56 KG/M2 | HEIGHT: 57 IN | HEART RATE: 59 BPM

## 2021-06-10 DIAGNOSIS — E11.40 TYPE 2 DIABETES MELLITUS WITH DIABETIC NEUROPATHY, WITH LONG-TERM CURRENT USE OF INSULIN: Primary | ICD-10-CM

## 2021-06-10 DIAGNOSIS — C11.1: ICD-10-CM

## 2021-06-10 DIAGNOSIS — M54.9 DORSALGIA, UNSPECIFIED: ICD-10-CM

## 2021-06-10 DIAGNOSIS — G47.33 OSA (OBSTRUCTIVE SLEEP APNEA): ICD-10-CM

## 2021-06-10 DIAGNOSIS — Z79.4 TYPE 2 DIABETES MELLITUS WITH DIABETIC NEUROPATHY, WITH LONG-TERM CURRENT USE OF INSULIN: Primary | ICD-10-CM

## 2021-06-10 DIAGNOSIS — Z86.73 HISTORY OF CVA IN ADULTHOOD: ICD-10-CM

## 2021-06-10 DIAGNOSIS — M81.0 OSTEOPOROSIS, UNSPECIFIED OSTEOPOROSIS TYPE, UNSPECIFIED PATHOLOGICAL FRACTURE PRESENCE: ICD-10-CM

## 2021-06-10 DIAGNOSIS — R41.89 COGNITIVE IMPAIRMENT: ICD-10-CM

## 2021-06-10 DIAGNOSIS — I25.10 CORONARY ARTERY DISEASE, ANGINA PRESENCE UNSPECIFIED, UNSPECIFIED VESSEL OR LESION TYPE, UNSPECIFIED WHETHER NATIVE OR TRANSPLANTED HEART: ICD-10-CM

## 2021-06-10 PROBLEM — Z93.1 S/P PERCUTANEOUS ENDOSCOPIC GASTROSTOMY (PEG) TUBE PLACEMENT: Status: RESOLVED | Noted: 2021-01-23 | Resolved: 2021-06-10

## 2021-06-10 PROCEDURE — 99215 OFFICE O/P EST HI 40 MIN: CPT | Mod: S$GLB,,, | Performed by: FAMILY MEDICINE

## 2021-06-10 PROCEDURE — 1126F PR PAIN SEVERITY QUANTIFIED, NO PAIN PRESENT: ICD-10-PCS | Mod: S$GLB,,, | Performed by: FAMILY MEDICINE

## 2021-06-10 PROCEDURE — 3051F HG A1C>EQUAL 7.0%<8.0%: CPT | Mod: CPTII,S$GLB,, | Performed by: FAMILY MEDICINE

## 2021-06-10 PROCEDURE — 99499 UNLISTED E&M SERVICE: CPT | Mod: S$GLB,,, | Performed by: FAMILY MEDICINE

## 2021-06-10 PROCEDURE — 1159F MED LIST DOCD IN RCRD: CPT | Mod: S$GLB,,, | Performed by: FAMILY MEDICINE

## 2021-06-10 PROCEDURE — 1157F ADVNC CARE PLAN IN RCRD: CPT | Mod: S$GLB,,, | Performed by: FAMILY MEDICINE

## 2021-06-10 PROCEDURE — 99999 PR PBB SHADOW E&M-EST. PATIENT-LVL V: ICD-10-PCS | Mod: PBBFAC,,, | Performed by: FAMILY MEDICINE

## 2021-06-10 PROCEDURE — 82043 UR ALBUMIN QUANTITATIVE: CPT | Performed by: FAMILY MEDICINE

## 2021-06-10 PROCEDURE — 99215 PR OFFICE/OUTPT VISIT, EST, LEVL V, 40-54 MIN: ICD-10-PCS | Mod: S$GLB,,, | Performed by: FAMILY MEDICINE

## 2021-06-10 PROCEDURE — 82570 ASSAY OF URINE CREATININE: CPT | Performed by: FAMILY MEDICINE

## 2021-06-10 PROCEDURE — 1157F PR ADVANCE CARE PLAN OR EQUIV PRESENT IN MEDICAL RECORD: ICD-10-PCS | Mod: S$GLB,,, | Performed by: FAMILY MEDICINE

## 2021-06-10 PROCEDURE — 1159F PR MEDICATION LIST DOCUMENTED IN MEDICAL RECORD: ICD-10-PCS | Mod: S$GLB,,, | Performed by: FAMILY MEDICINE

## 2021-06-10 PROCEDURE — 99999 PR PBB SHADOW E&M-EST. PATIENT-LVL V: CPT | Mod: PBBFAC,,, | Performed by: FAMILY MEDICINE

## 2021-06-10 PROCEDURE — 3051F PR MOST RECENT HEMOGLOBIN A1C LEVEL 7.0 - < 8.0%: ICD-10-PCS | Mod: CPTII,S$GLB,, | Performed by: FAMILY MEDICINE

## 2021-06-10 PROCEDURE — 1126F AMNT PAIN NOTED NONE PRSNT: CPT | Mod: S$GLB,,, | Performed by: FAMILY MEDICINE

## 2021-06-10 PROCEDURE — 99499 RISK ADDL DX/OHS AUDIT: ICD-10-PCS | Mod: S$GLB,,, | Performed by: FAMILY MEDICINE

## 2021-06-10 RX ORDER — INSULIN ASPART 100 [IU]/ML
INJECTION, SOLUTION INTRAVENOUS; SUBCUTANEOUS
Status: ON HOLD | COMMUNITY
Start: 2021-02-14 | End: 2022-01-01 | Stop reason: HOSPADM

## 2021-06-11 LAB
ALBUMIN/CREAT UR: 46.2 UG/MG (ref 0–30)
CREAT UR-MCNC: 104 MG/DL (ref 15–325)
MICROALBUMIN UR DL<=1MG/L-MCNC: 48 UG/ML

## 2021-06-14 ENCOUNTER — LAB VISIT (OUTPATIENT)
Dept: LAB | Facility: HOSPITAL | Age: 81
End: 2021-06-14
Attending: FAMILY MEDICINE
Payer: MEDICARE

## 2021-06-14 DIAGNOSIS — E11.40 TYPE 2 DIABETES MELLITUS WITH DIABETIC NEUROPATHY, WITH LONG-TERM CURRENT USE OF INSULIN: ICD-10-CM

## 2021-06-14 DIAGNOSIS — Z79.4 TYPE 2 DIABETES MELLITUS WITH DIABETIC NEUROPATHY, WITH LONG-TERM CURRENT USE OF INSULIN: ICD-10-CM

## 2021-06-14 LAB
ALBUMIN SERPL BCP-MCNC: 3.5 G/DL (ref 3.5–5.2)
ALP SERPL-CCNC: 96 U/L (ref 55–135)
ALT SERPL W/O P-5'-P-CCNC: 19 U/L (ref 10–44)
ANION GAP SERPL CALC-SCNC: 12 MMOL/L (ref 8–16)
AST SERPL-CCNC: 21 U/L (ref 10–40)
BASOPHILS # BLD AUTO: 0.06 K/UL (ref 0–0.2)
BASOPHILS NFR BLD: 0.7 % (ref 0–1.9)
BILIRUB SERPL-MCNC: 0.6 MG/DL (ref 0.1–1)
BUN SERPL-MCNC: 17 MG/DL (ref 8–23)
CALCIUM SERPL-MCNC: 9.9 MG/DL (ref 8.7–10.5)
CHLORIDE SERPL-SCNC: 99 MMOL/L (ref 95–110)
CHOLEST SERPL-MCNC: 166 MG/DL (ref 120–199)
CHOLEST/HDLC SERPL: 2.6 {RATIO} (ref 2–5)
CO2 SERPL-SCNC: 28 MMOL/L (ref 23–29)
CREAT SERPL-MCNC: 0.8 MG/DL (ref 0.5–1.4)
DIFFERENTIAL METHOD: ABNORMAL
EOSINOPHIL # BLD AUTO: 0.1 K/UL (ref 0–0.5)
EOSINOPHIL NFR BLD: 1.6 % (ref 0–8)
ERYTHROCYTE [DISTWIDTH] IN BLOOD BY AUTOMATED COUNT: 18.9 % (ref 11.5–14.5)
EST. GFR  (AFRICAN AMERICAN): >60 ML/MIN/1.73 M^2
EST. GFR  (NON AFRICAN AMERICAN): >60 ML/MIN/1.73 M^2
ESTIMATED AVG GLUCOSE: 154 MG/DL (ref 68–131)
GLUCOSE SERPL-MCNC: 158 MG/DL (ref 70–110)
HBA1C MFR BLD: 7 % (ref 4–5.6)
HCT VFR BLD AUTO: 40.9 % (ref 37–48.5)
HDLC SERPL-MCNC: 65 MG/DL (ref 40–75)
HDLC SERPL: 39.2 % (ref 20–50)
HGB BLD-MCNC: 12.9 G/DL (ref 12–16)
IMM GRANULOCYTES # BLD AUTO: 0.02 K/UL (ref 0–0.04)
IMM GRANULOCYTES NFR BLD AUTO: 0.2 % (ref 0–0.5)
LDLC SERPL CALC-MCNC: 80 MG/DL (ref 63–159)
LYMPHOCYTES # BLD AUTO: 0.8 K/UL (ref 1–4.8)
LYMPHOCYTES NFR BLD: 9.4 % (ref 18–48)
MCH RBC QN AUTO: 26.9 PG (ref 27–31)
MCHC RBC AUTO-ENTMCNC: 31.5 G/DL (ref 32–36)
MCV RBC AUTO: 85 FL (ref 82–98)
MONOCYTES # BLD AUTO: 0.6 K/UL (ref 0.3–1)
MONOCYTES NFR BLD: 6.5 % (ref 4–15)
NEUTROPHILS # BLD AUTO: 7.3 K/UL (ref 1.8–7.7)
NEUTROPHILS NFR BLD: 81.6 % (ref 38–73)
NONHDLC SERPL-MCNC: 101 MG/DL
NRBC BLD-RTO: 0 /100 WBC
PLATELET # BLD AUTO: 269 K/UL (ref 150–450)
PMV BLD AUTO: 12.1 FL (ref 9.2–12.9)
POTASSIUM SERPL-SCNC: 4.2 MMOL/L (ref 3.5–5.1)
PROT SERPL-MCNC: 7.2 G/DL (ref 6–8.4)
RBC # BLD AUTO: 4.8 M/UL (ref 4–5.4)
SODIUM SERPL-SCNC: 139 MMOL/L (ref 136–145)
TRIGL SERPL-MCNC: 105 MG/DL (ref 30–150)
WBC # BLD AUTO: 8.93 K/UL (ref 3.9–12.7)

## 2021-06-14 PROCEDURE — 85025 COMPLETE CBC W/AUTO DIFF WBC: CPT | Performed by: FAMILY MEDICINE

## 2021-06-14 PROCEDURE — 83036 HEMOGLOBIN GLYCOSYLATED A1C: CPT | Performed by: FAMILY MEDICINE

## 2021-06-14 PROCEDURE — 80053 COMPREHEN METABOLIC PANEL: CPT | Performed by: FAMILY MEDICINE

## 2021-06-14 PROCEDURE — 80061 LIPID PANEL: CPT | Performed by: FAMILY MEDICINE

## 2021-06-14 PROCEDURE — 36415 COLL VENOUS BLD VENIPUNCTURE: CPT | Mod: PO | Performed by: FAMILY MEDICINE

## 2021-07-01 ENCOUNTER — DOCUMENT SCAN (OUTPATIENT)
Dept: HOME HEALTH SERVICES | Facility: HOSPITAL | Age: 81
End: 2021-07-01
Payer: MEDICARE

## 2021-07-02 ENCOUNTER — TELEPHONE (OUTPATIENT)
Dept: PULMONOLOGY | Facility: CLINIC | Age: 81
End: 2021-07-02

## 2021-07-06 ENCOUNTER — OFFICE VISIT (OUTPATIENT)
Dept: PULMONOLOGY | Facility: CLINIC | Age: 81
End: 2021-07-06
Payer: MEDICARE

## 2021-07-06 VITALS
DIASTOLIC BLOOD PRESSURE: 40 MMHG | HEIGHT: 57 IN | SYSTOLIC BLOOD PRESSURE: 120 MMHG | RESPIRATION RATE: 16 BRPM | BODY MASS INDEX: 28.33 KG/M2 | HEART RATE: 53 BPM | WEIGHT: 131.31 LBS | OXYGEN SATURATION: 97 %

## 2021-07-06 DIAGNOSIS — G47.33 OSA ON CPAP: ICD-10-CM

## 2021-07-06 DIAGNOSIS — Z87.01 HISTORY OF ASPIRATION PNEUMONIA: Primary | ICD-10-CM

## 2021-07-06 DIAGNOSIS — R91.1 PULMONARY NODULE, RIGHT: ICD-10-CM

## 2021-07-06 PROCEDURE — 1159F MED LIST DOCD IN RCRD: CPT | Mod: S$GLB,,, | Performed by: NURSE PRACTITIONER

## 2021-07-06 PROCEDURE — 1159F PR MEDICATION LIST DOCUMENTED IN MEDICAL RECORD: ICD-10-PCS | Mod: S$GLB,,, | Performed by: NURSE PRACTITIONER

## 2021-07-06 PROCEDURE — 1101F PR PT FALLS ASSESS DOC 0-1 FALLS W/OUT INJ PAST YR: ICD-10-PCS | Mod: CPTII,S$GLB,, | Performed by: NURSE PRACTITIONER

## 2021-07-06 PROCEDURE — 99999 PR PBB SHADOW E&M-EST. PATIENT-LVL V: CPT | Mod: PBBFAC,,, | Performed by: NURSE PRACTITIONER

## 2021-07-06 PROCEDURE — 99999 PR PBB SHADOW E&M-EST. PATIENT-LVL V: ICD-10-PCS | Mod: PBBFAC,,, | Performed by: NURSE PRACTITIONER

## 2021-07-06 PROCEDURE — 99215 PR OFFICE/OUTPT VISIT, EST, LEVL V, 40-54 MIN: ICD-10-PCS | Mod: S$GLB,,, | Performed by: NURSE PRACTITIONER

## 2021-07-06 PROCEDURE — 3288F PR FALLS RISK ASSESSMENT DOCUMENTED: ICD-10-PCS | Mod: CPTII,S$GLB,, | Performed by: NURSE PRACTITIONER

## 2021-07-06 PROCEDURE — 1157F ADVNC CARE PLAN IN RCRD: CPT | Mod: S$GLB,,, | Performed by: NURSE PRACTITIONER

## 2021-07-06 PROCEDURE — 1101F PT FALLS ASSESS-DOCD LE1/YR: CPT | Mod: CPTII,S$GLB,, | Performed by: NURSE PRACTITIONER

## 2021-07-06 PROCEDURE — 99215 OFFICE O/P EST HI 40 MIN: CPT | Mod: S$GLB,,, | Performed by: NURSE PRACTITIONER

## 2021-07-06 PROCEDURE — 1157F PR ADVANCE CARE PLAN OR EQUIV PRESENT IN MEDICAL RECORD: ICD-10-PCS | Mod: S$GLB,,, | Performed by: NURSE PRACTITIONER

## 2021-07-06 PROCEDURE — 3288F FALL RISK ASSESSMENT DOCD: CPT | Mod: CPTII,S$GLB,, | Performed by: NURSE PRACTITIONER

## 2021-07-14 ENCOUNTER — HOSPITAL ENCOUNTER (OUTPATIENT)
Dept: RADIOLOGY | Facility: HOSPITAL | Age: 81
Discharge: HOME | End: 2021-07-14
Attending: NURSE PRACTITIONER
Payer: MEDICARE

## 2021-07-14 DIAGNOSIS — Z87.01 HISTORY OF ASPIRATION PNEUMONIA: ICD-10-CM

## 2021-07-14 PROCEDURE — 71046 XR CHEST PA AND LATERAL: ICD-10-PCS | Mod: 26,,, | Performed by: RADIOLOGY

## 2021-07-14 PROCEDURE — 71046 X-RAY EXAM CHEST 2 VIEWS: CPT | Mod: 26,,, | Performed by: RADIOLOGY

## 2021-07-14 PROCEDURE — 71046 X-RAY EXAM CHEST 2 VIEWS: CPT | Mod: TC,PO

## 2021-07-15 ENCOUNTER — PATIENT MESSAGE (OUTPATIENT)
Dept: HEMATOLOGY/ONCOLOGY | Facility: CLINIC | Age: 81
End: 2021-07-15

## 2021-07-20 ENCOUNTER — EXTERNAL HOME HEALTH (OUTPATIENT)
Dept: HOME HEALTH SERVICES | Facility: HOSPITAL | Age: 81
End: 2021-07-20
Payer: MEDICARE

## 2021-08-05 ENCOUNTER — LAB VISIT (OUTPATIENT)
Dept: LAB | Facility: HOSPITAL | Age: 81
End: 2021-08-05
Attending: INTERNAL MEDICINE
Payer: MEDICARE

## 2021-08-05 ENCOUNTER — TELEPHONE (OUTPATIENT)
Dept: HEMATOLOGY/ONCOLOGY | Facility: CLINIC | Age: 81
End: 2021-08-05

## 2021-08-05 DIAGNOSIS — D50.8 IRON DEFICIENCY ANEMIA SECONDARY TO INADEQUATE DIETARY IRON INTAKE: ICD-10-CM

## 2021-08-05 LAB
BASOPHILS # BLD AUTO: 0.03 K/UL (ref 0–0.2)
BASOPHILS NFR BLD: 0.7 % (ref 0–1.9)
DIFFERENTIAL METHOD: ABNORMAL
EOSINOPHIL # BLD AUTO: 0.1 K/UL (ref 0–0.5)
EOSINOPHIL NFR BLD: 2.5 % (ref 0–8)
ERYTHROCYTE [DISTWIDTH] IN BLOOD BY AUTOMATED COUNT: 15 % (ref 11.5–14.5)
FERRITIN SERPL-MCNC: 277 NG/ML (ref 20–300)
HCT VFR BLD AUTO: 40.6 % (ref 37–48.5)
HGB BLD-MCNC: 13.6 G/DL (ref 12–16)
IMM GRANULOCYTES # BLD AUTO: 0 K/UL (ref 0–0.04)
IMM GRANULOCYTES NFR BLD AUTO: 0 % (ref 0–0.5)
IRON SERPL-MCNC: 82 UG/DL (ref 30–160)
LYMPHOCYTES # BLD AUTO: 0.8 K/UL (ref 1–4.8)
LYMPHOCYTES NFR BLD: 17.8 % (ref 18–48)
MCH RBC QN AUTO: 28.7 PG (ref 27–31)
MCHC RBC AUTO-ENTMCNC: 33.5 G/DL (ref 32–36)
MCV RBC AUTO: 86 FL (ref 82–98)
MONOCYTES # BLD AUTO: 0.4 K/UL (ref 0.3–1)
MONOCYTES NFR BLD: 10 % (ref 4–15)
NEUTROPHILS # BLD AUTO: 3 K/UL (ref 1.8–7.7)
NEUTROPHILS NFR BLD: 69 % (ref 38–73)
NRBC BLD-RTO: 0 /100 WBC
PLATELET # BLD AUTO: 281 K/UL (ref 150–450)
PMV BLD AUTO: 10.9 FL (ref 9.2–12.9)
RBC # BLD AUTO: 4.74 M/UL (ref 4–5.4)
SATURATED IRON: 28 % (ref 20–50)
TOTAL IRON BINDING CAPACITY: 292 UG/DL (ref 250–450)
TRANSFERRIN SERPL-MCNC: 197 MG/DL (ref 200–375)
WBC # BLD AUTO: 4.38 K/UL (ref 3.9–12.7)

## 2021-08-05 PROCEDURE — 85025 COMPLETE CBC W/AUTO DIFF WBC: CPT | Performed by: INTERNAL MEDICINE

## 2021-08-05 PROCEDURE — 83540 ASSAY OF IRON: CPT | Performed by: INTERNAL MEDICINE

## 2021-08-05 PROCEDURE — 36415 COLL VENOUS BLD VENIPUNCTURE: CPT | Mod: PO | Performed by: INTERNAL MEDICINE

## 2021-08-05 PROCEDURE — 82728 ASSAY OF FERRITIN: CPT | Performed by: INTERNAL MEDICINE

## 2021-08-06 ENCOUNTER — PATIENT MESSAGE (OUTPATIENT)
Dept: HEMATOLOGY/ONCOLOGY | Facility: CLINIC | Age: 81
End: 2021-08-06

## 2021-08-06 ENCOUNTER — TELEPHONE (OUTPATIENT)
Dept: HEMATOLOGY/ONCOLOGY | Facility: CLINIC | Age: 81
End: 2021-08-06

## 2021-08-12 ENCOUNTER — TELEPHONE (OUTPATIENT)
Dept: RADIOLOGY | Facility: HOSPITAL | Age: 81
End: 2021-08-12

## 2021-08-13 ENCOUNTER — HOSPITAL ENCOUNTER (OUTPATIENT)
Dept: RADIOLOGY | Facility: HOSPITAL | Age: 81
Discharge: HOME | End: 2021-08-13
Attending: RADIOLOGY
Payer: MEDICARE

## 2021-08-13 DIAGNOSIS — C11.1 MALIGNANT NEOPLASM OF PHARYNGEAL TONSIL: ICD-10-CM

## 2021-08-13 PROCEDURE — 78815 NM PET CT ROUTINE: ICD-10-PCS | Mod: 26,PS,, | Performed by: RADIOLOGY

## 2021-08-13 PROCEDURE — 78815 PET IMAGE W/CT SKULL-THIGH: CPT | Mod: TC,PS

## 2021-08-13 PROCEDURE — 78815 PET IMAGE W/CT SKULL-THIGH: CPT | Mod: 26,PS,, | Performed by: RADIOLOGY

## 2021-08-16 ENCOUNTER — OFFICE VISIT (OUTPATIENT)
Dept: RADIATION ONCOLOGY | Facility: CLINIC | Age: 81
End: 2021-08-16
Payer: MEDICARE

## 2021-08-16 VITALS
HEART RATE: 61 BPM | SYSTOLIC BLOOD PRESSURE: 147 MMHG | BODY MASS INDEX: 28.83 KG/M2 | TEMPERATURE: 98 F | HEIGHT: 57 IN | RESPIRATION RATE: 18 BRPM | DIASTOLIC BLOOD PRESSURE: 66 MMHG | OXYGEN SATURATION: 96 % | WEIGHT: 133.63 LBS

## 2021-08-16 DIAGNOSIS — G89.3 CANCER RELATED PAIN: ICD-10-CM

## 2021-08-16 DIAGNOSIS — C11.1 MALIGNANT NEOPLASM OF PHARYNGEAL TONSIL: Primary | ICD-10-CM

## 2021-08-16 DIAGNOSIS — R13.12 OROPHARYNGEAL DYSPHAGIA: ICD-10-CM

## 2021-08-16 DIAGNOSIS — K12.33 MUCOSITIS DUE TO RADIATION THERAPY: ICD-10-CM

## 2021-08-16 PROCEDURE — 3288F FALL RISK ASSESSMENT DOCD: CPT | Mod: CPTII,S$GLB,, | Performed by: RADIOLOGY

## 2021-08-16 PROCEDURE — 99214 PR OFFICE/OUTPT VISIT, EST, LEVL IV, 30-39 MIN: ICD-10-PCS | Mod: S$GLB,,, | Performed by: RADIOLOGY

## 2021-08-16 PROCEDURE — 1157F PR ADVANCE CARE PLAN OR EQUIV PRESENT IN MEDICAL RECORD: ICD-10-PCS | Mod: CPTII,S$GLB,, | Performed by: RADIOLOGY

## 2021-08-16 PROCEDURE — 1157F ADVNC CARE PLAN IN RCRD: CPT | Mod: CPTII,S$GLB,, | Performed by: RADIOLOGY

## 2021-08-16 PROCEDURE — 3077F PR MOST RECENT SYSTOLIC BLOOD PRESSURE >= 140 MM HG: ICD-10-PCS | Mod: CPTII,S$GLB,, | Performed by: RADIOLOGY

## 2021-08-16 PROCEDURE — 1126F PR PAIN SEVERITY QUANTIFIED, NO PAIN PRESENT: ICD-10-PCS | Mod: CPTII,S$GLB,, | Performed by: RADIOLOGY

## 2021-08-16 PROCEDURE — 1159F MED LIST DOCD IN RCRD: CPT | Mod: CPTII,S$GLB,, | Performed by: RADIOLOGY

## 2021-08-16 PROCEDURE — 3288F PR FALLS RISK ASSESSMENT DOCUMENTED: ICD-10-PCS | Mod: CPTII,S$GLB,, | Performed by: RADIOLOGY

## 2021-08-16 PROCEDURE — 1101F PT FALLS ASSESS-DOCD LE1/YR: CPT | Mod: CPTII,S$GLB,, | Performed by: RADIOLOGY

## 2021-08-16 PROCEDURE — 99999 PR PBB SHADOW E&M-EST. PATIENT-LVL IV: CPT | Mod: PBBFAC,,, | Performed by: RADIOLOGY

## 2021-08-16 PROCEDURE — 1159F PR MEDICATION LIST DOCUMENTED IN MEDICAL RECORD: ICD-10-PCS | Mod: CPTII,S$GLB,, | Performed by: RADIOLOGY

## 2021-08-16 PROCEDURE — 3077F SYST BP >= 140 MM HG: CPT | Mod: CPTII,S$GLB,, | Performed by: RADIOLOGY

## 2021-08-16 PROCEDURE — 3078F DIAST BP <80 MM HG: CPT | Mod: CPTII,S$GLB,, | Performed by: RADIOLOGY

## 2021-08-16 PROCEDURE — 1101F PR PT FALLS ASSESS DOC 0-1 FALLS W/OUT INJ PAST YR: ICD-10-PCS | Mod: CPTII,S$GLB,, | Performed by: RADIOLOGY

## 2021-08-16 PROCEDURE — 99999 PR PBB SHADOW E&M-EST. PATIENT-LVL IV: ICD-10-PCS | Mod: PBBFAC,,, | Performed by: RADIOLOGY

## 2021-08-16 PROCEDURE — 1126F AMNT PAIN NOTED NONE PRSNT: CPT | Mod: CPTII,S$GLB,, | Performed by: RADIOLOGY

## 2021-08-16 PROCEDURE — 3078F PR MOST RECENT DIASTOLIC BLOOD PRESSURE < 80 MM HG: ICD-10-PCS | Mod: CPTII,S$GLB,, | Performed by: RADIOLOGY

## 2021-08-16 PROCEDURE — 99214 OFFICE O/P EST MOD 30 MIN: CPT | Mod: S$GLB,,, | Performed by: RADIOLOGY

## 2021-08-16 RX ORDER — LIDOCAINE HYDROCHLORIDE 20 MG/ML
JELLY TOPICAL
Qty: 30 ML | Refills: 3 | Status: SHIPPED | OUTPATIENT
Start: 2021-08-16 | End: 2022-01-01

## 2021-08-23 ENCOUNTER — PATIENT MESSAGE (OUTPATIENT)
Dept: PHARMACY | Facility: CLINIC | Age: 81
End: 2021-08-23

## 2021-08-26 DIAGNOSIS — F41.9 ANXIETY: ICD-10-CM

## 2021-08-26 RX ORDER — AMLODIPINE BESYLATE 10 MG/1
10 TABLET ORAL DAILY
Qty: 90 TABLET | Refills: 0 | Status: SHIPPED | OUTPATIENT
Start: 2021-08-26 | End: 2021-01-01

## 2021-08-26 RX ORDER — ESCITALOPRAM OXALATE 20 MG/1
20 TABLET ORAL DAILY
Qty: 90 TABLET | Refills: 3 | Status: SHIPPED | OUTPATIENT
Start: 2021-08-26 | End: 2021-01-01

## 2021-08-27 RX ORDER — CLOPIDOGREL BISULFATE 75 MG/1
75 TABLET ORAL DAILY
Qty: 30 TABLET | Refills: 12
Start: 2021-08-27 | End: 2021-01-01

## 2021-10-06 NOTE — ASSESSMENT & PLAN NOTE
Ms. Wise is a 76yo f who presents with a L MCA syndrome, s/p tpa. Evidence of distal branch off MCA occlusion not amenable to thrombectomy.   Unclear etiology at this time but picture thus far suspicious for embolic stroke; ESUS/Cryptogenic Embolism. Echo with LAE.     -Antithrombotics for secondary stroke prevention: Hemorrhage stable, ASA 81mg  -Statins for secondary stroke prevention and hyperlipidemia, if present: Atorvastatin- 80 mg daily (on at home; LDL 71)  -Aggressive risk factor modification: Hypertension, Diabetes, High Cholesterol, Diet, Exercise  -Rehab Efforts: Physical Therapy, Occupational Therapy, Speech and Language Pathology, PM&R  -Diagnostics: None  -VTE Prophylaxis: SQH; Mechanical prophylaxis: Place SCDs due to apparent symptomatic hemorrhagic conversion    Advancement Flap (Double) Text: The defect edges were debeveled with a #15 scalpel blade.  Given the location of the defect and the proximity to free margins a double advancement flap was deemed most appropriate.  Using a sterile surgical marker, the appropriate advancement flaps were drawn incorporating the defect and placing the expected incisions within the relaxed skin tension lines where possible.    The area thus outlined was incised deep to adipose tissue with a #15 scalpel blade.  The skin margins were undermined to an appropriate distance in all directions utilizing iris scissors.

## 2021-12-12 PROBLEM — Z91.81 HISTORY OF FALL: Status: ACTIVE | Noted: 2021-01-01

## 2022-01-01 ENCOUNTER — DOCUMENT SCAN (OUTPATIENT)
Dept: HOME HEALTH SERVICES | Facility: HOSPITAL | Age: 82
End: 2022-01-01
Payer: MEDICARE

## 2022-01-01 ENCOUNTER — OFFICE VISIT (OUTPATIENT)
Dept: SURGERY | Facility: CLINIC | Age: 82
End: 2022-01-01
Payer: MEDICARE

## 2022-01-01 ENCOUNTER — PATIENT MESSAGE (OUTPATIENT)
Dept: RADIATION ONCOLOGY | Facility: CLINIC | Age: 82
End: 2022-01-01
Payer: MEDICARE

## 2022-01-01 ENCOUNTER — INFUSION (OUTPATIENT)
Dept: INFUSION THERAPY | Facility: HOSPITAL | Age: 82
End: 2022-01-01
Attending: INTERNAL MEDICINE
Payer: MEDICARE

## 2022-01-01 ENCOUNTER — HOSPITAL ENCOUNTER (OUTPATIENT)
Dept: RADIOLOGY | Facility: HOSPITAL | Age: 82
Discharge: HOME | End: 2022-03-09
Attending: NURSE PRACTITIONER
Payer: MEDICARE

## 2022-01-01 ENCOUNTER — PATIENT MESSAGE (OUTPATIENT)
Dept: PALLIATIVE MEDICINE | Facility: CLINIC | Age: 82
End: 2022-01-01
Payer: MEDICARE

## 2022-01-01 ENCOUNTER — PATIENT MESSAGE (OUTPATIENT)
Dept: ADMINISTRATIVE | Facility: OTHER | Age: 82
End: 2022-01-01
Payer: MEDICARE

## 2022-01-01 ENCOUNTER — PATIENT OUTREACH (OUTPATIENT)
Dept: ADMINISTRATIVE | Facility: OTHER | Age: 82
End: 2022-01-01
Payer: MEDICARE

## 2022-01-01 ENCOUNTER — OFFICE VISIT (OUTPATIENT)
Dept: ORTHOPEDICS | Facility: CLINIC | Age: 82
End: 2022-01-01
Payer: MEDICARE

## 2022-01-01 ENCOUNTER — HOSPITAL ENCOUNTER (EMERGENCY)
Facility: HOSPITAL | Age: 82
Discharge: HOME | End: 2022-02-11
Attending: EMERGENCY MEDICINE
Payer: MEDICARE

## 2022-01-01 ENCOUNTER — TELEPHONE (OUTPATIENT)
Dept: HEMATOLOGY/ONCOLOGY | Facility: CLINIC | Age: 82
End: 2022-01-01

## 2022-01-01 ENCOUNTER — OUTPATIENT CASE MANAGEMENT (OUTPATIENT)
Dept: ADMINISTRATIVE | Facility: OTHER | Age: 82
End: 2022-01-01
Payer: MEDICARE

## 2022-01-01 ENCOUNTER — OFFICE VISIT (OUTPATIENT)
Dept: PULMONOLOGY | Facility: CLINIC | Age: 82
End: 2022-01-01
Payer: MEDICARE

## 2022-01-01 ENCOUNTER — OFFICE VISIT (OUTPATIENT)
Dept: PALLIATIVE MEDICINE | Facility: CLINIC | Age: 82
End: 2022-01-01
Payer: MEDICARE

## 2022-01-01 ENCOUNTER — OFFICE VISIT (OUTPATIENT)
Dept: RADIATION ONCOLOGY | Facility: CLINIC | Age: 82
End: 2022-01-01
Payer: MEDICARE

## 2022-01-01 ENCOUNTER — TELEPHONE (OUTPATIENT)
Dept: HEMATOLOGY/ONCOLOGY | Facility: CLINIC | Age: 82
End: 2022-01-01
Payer: MEDICARE

## 2022-01-01 ENCOUNTER — PATIENT MESSAGE (OUTPATIENT)
Dept: FAMILY MEDICINE | Facility: CLINIC | Age: 82
End: 2022-01-01
Payer: MEDICARE

## 2022-01-01 ENCOUNTER — OFFICE VISIT (OUTPATIENT)
Dept: HEMATOLOGY/ONCOLOGY | Facility: CLINIC | Age: 82
End: 2022-01-01
Payer: MEDICARE

## 2022-01-01 ENCOUNTER — DOCUMENTATION ONLY (OUTPATIENT)
Dept: HEMATOLOGY/ONCOLOGY | Facility: CLINIC | Age: 82
End: 2022-01-01

## 2022-01-01 ENCOUNTER — TUMOR BOARD CONFERENCE (OUTPATIENT)
Dept: OTOLARYNGOLOGY | Facility: HOSPITAL | Age: 82
End: 2022-01-01
Payer: MEDICARE

## 2022-01-01 ENCOUNTER — TELEPHONE (OUTPATIENT)
Dept: FAMILY MEDICINE | Facility: CLINIC | Age: 82
End: 2022-01-01
Payer: MEDICARE

## 2022-01-01 ENCOUNTER — EXTERNAL HOME HEALTH (OUTPATIENT)
Dept: HOME HEALTH SERVICES | Facility: HOSPITAL | Age: 82
End: 2022-01-01
Payer: MEDICARE

## 2022-01-01 ENCOUNTER — DOCUMENTATION ONLY (OUTPATIENT)
Dept: RADIATION ONCOLOGY | Facility: CLINIC | Age: 82
End: 2022-01-01
Payer: MEDICARE

## 2022-01-01 ENCOUNTER — HOSPITAL ENCOUNTER (OUTPATIENT)
Dept: RADIOLOGY | Facility: HOSPITAL | Age: 82
Discharge: HOME | End: 2022-03-23
Attending: ORTHOPAEDIC SURGERY
Payer: MEDICARE

## 2022-01-01 ENCOUNTER — PATIENT MESSAGE (OUTPATIENT)
Dept: HEMATOLOGY/ONCOLOGY | Facility: CLINIC | Age: 82
End: 2022-01-01
Payer: MEDICARE

## 2022-01-01 ENCOUNTER — CLINICAL SUPPORT (OUTPATIENT)
Dept: HEMATOLOGY/ONCOLOGY | Facility: CLINIC | Age: 82
End: 2022-01-01
Payer: MEDICARE

## 2022-01-01 ENCOUNTER — PATIENT MESSAGE (OUTPATIENT)
Dept: PALLIATIVE MEDICINE | Facility: CLINIC | Age: 82
End: 2022-01-01

## 2022-01-01 ENCOUNTER — PATIENT MESSAGE (OUTPATIENT)
Dept: CARDIOLOGY | Facility: CLINIC | Age: 82
End: 2022-01-01
Payer: MEDICARE

## 2022-01-01 ENCOUNTER — HOSPITAL ENCOUNTER (OUTPATIENT)
Facility: HOSPITAL | Age: 82
Discharge: HOME | End: 2022-07-09
Attending: EMERGENCY MEDICINE | Admitting: FAMILY MEDICINE
Payer: MEDICARE

## 2022-01-01 ENCOUNTER — HOSPITAL ENCOUNTER (OUTPATIENT)
Dept: RADIOLOGY | Facility: HOSPITAL | Age: 82
Discharge: HOME | End: 2022-03-24
Attending: RADIOLOGY
Payer: MEDICARE

## 2022-01-01 ENCOUNTER — LAB VISIT (OUTPATIENT)
Dept: LAB | Facility: HOSPITAL | Age: 82
End: 2022-01-01
Attending: INTERNAL MEDICINE
Payer: MEDICARE

## 2022-01-01 ENCOUNTER — HOSPITAL ENCOUNTER (INPATIENT)
Facility: HOSPITAL | Age: 82
LOS: 7 days | Discharge: HOME HEALTH | DRG: 871 | End: 2022-02-28
Attending: EMERGENCY MEDICINE | Admitting: INTERNAL MEDICINE
Payer: MEDICARE

## 2022-01-01 ENCOUNTER — HOSPITAL ENCOUNTER (OUTPATIENT)
Dept: RADIOLOGY | Facility: HOSPITAL | Age: 82
Discharge: HOME | End: 2022-01-12
Attending: NURSE PRACTITIONER
Payer: MEDICARE

## 2022-01-01 ENCOUNTER — HOSPITAL ENCOUNTER (OUTPATIENT)
Dept: RADIATION THERAPY | Facility: HOSPITAL | Age: 82
Discharge: HOME | End: 2022-06-03
Attending: INTERNAL MEDICINE
Payer: MEDICARE

## 2022-01-01 ENCOUNTER — HOSPITAL ENCOUNTER (EMERGENCY)
Facility: HOSPITAL | Age: 82
Discharge: HOME | End: 2022-07-18
Attending: EMERGENCY MEDICINE
Payer: MEDICARE

## 2022-01-01 ENCOUNTER — PATIENT OUTREACH (OUTPATIENT)
Dept: ADMINISTRATIVE | Facility: CLINIC | Age: 82
End: 2022-01-01
Payer: MEDICARE

## 2022-01-01 ENCOUNTER — ANESTHESIA (OUTPATIENT)
Dept: SURGERY | Facility: HOSPITAL | Age: 82
DRG: 871 | End: 2022-01-01
Payer: MEDICARE

## 2022-01-01 ENCOUNTER — HOSPITAL ENCOUNTER (OUTPATIENT)
Dept: RADIOLOGY | Facility: HOSPITAL | Age: 82
Discharge: HOME | End: 2022-07-25
Attending: NURSE PRACTITIONER
Payer: MEDICARE

## 2022-01-01 ENCOUNTER — OFFICE VISIT (OUTPATIENT)
Dept: FAMILY MEDICINE | Facility: CLINIC | Age: 82
End: 2022-01-01
Attending: FAMILY MEDICINE
Payer: MEDICARE

## 2022-01-01 ENCOUNTER — HOSPITAL ENCOUNTER (INPATIENT)
Facility: HOSPITAL | Age: 82
LOS: 1 days | Discharge: HOME | DRG: 603 | End: 2022-02-18
Attending: EMERGENCY MEDICINE | Admitting: INTERNAL MEDICINE
Payer: MEDICARE

## 2022-01-01 ENCOUNTER — TELEPHONE (OUTPATIENT)
Dept: PALLIATIVE MEDICINE | Facility: CLINIC | Age: 82
End: 2022-01-01
Payer: MEDICARE

## 2022-01-01 ENCOUNTER — ANESTHESIA EVENT (OUTPATIENT)
Dept: SURGERY | Facility: HOSPITAL | Age: 82
DRG: 871 | End: 2022-01-01
Payer: MEDICARE

## 2022-01-01 ENCOUNTER — OFFICE VISIT (OUTPATIENT)
Dept: CARDIOLOGY | Facility: CLINIC | Age: 82
End: 2022-01-01
Payer: MEDICARE

## 2022-01-01 ENCOUNTER — TELEPHONE (OUTPATIENT)
Dept: RADIATION ONCOLOGY | Facility: CLINIC | Age: 82
End: 2022-01-01
Payer: MEDICARE

## 2022-01-01 ENCOUNTER — OFFICE VISIT (OUTPATIENT)
Dept: OTOLARYNGOLOGY | Facility: CLINIC | Age: 82
End: 2022-01-01
Payer: MEDICARE

## 2022-01-01 VITALS
HEIGHT: 57 IN | SYSTOLIC BLOOD PRESSURE: 152 MMHG | BODY MASS INDEX: 30.07 KG/M2 | OXYGEN SATURATION: 96 % | DIASTOLIC BLOOD PRESSURE: 66 MMHG | TEMPERATURE: 97 F | HEART RATE: 66 BPM | WEIGHT: 139.38 LBS

## 2022-01-01 VITALS
BODY MASS INDEX: 26.31 KG/M2 | TEMPERATURE: 97 F | SYSTOLIC BLOOD PRESSURE: 133 MMHG | DIASTOLIC BLOOD PRESSURE: 50 MMHG | HEART RATE: 67 BPM | HEART RATE: 76 BPM | SYSTOLIC BLOOD PRESSURE: 132 MMHG | OXYGEN SATURATION: 98 % | HEIGHT: 60 IN | WEIGHT: 134.69 LBS | DIASTOLIC BLOOD PRESSURE: 60 MMHG | TEMPERATURE: 98 F | BODY MASS INDEX: 26.15 KG/M2 | WEIGHT: 133.19 LBS

## 2022-01-01 VITALS
RESPIRATION RATE: 18 BRPM | HEART RATE: 55 BPM | BODY MASS INDEX: 27.87 KG/M2 | WEIGHT: 140 LBS | OXYGEN SATURATION: 94 % | TEMPERATURE: 98 F | HEIGHT: 57 IN | BODY MASS INDEX: 28.27 KG/M2 | SYSTOLIC BLOOD PRESSURE: 126 MMHG | HEART RATE: 58 BPM | WEIGHT: 129.19 LBS | OXYGEN SATURATION: 96 % | DIASTOLIC BLOOD PRESSURE: 60 MMHG | DIASTOLIC BLOOD PRESSURE: 58 MMHG | TEMPERATURE: 98 F | RESPIRATION RATE: 17 BRPM | SYSTOLIC BLOOD PRESSURE: 125 MMHG

## 2022-01-01 VITALS
HEART RATE: 53 BPM | HEIGHT: 57 IN | OXYGEN SATURATION: 99 % | WEIGHT: 138 LBS | TEMPERATURE: 98 F | SYSTOLIC BLOOD PRESSURE: 134 MMHG | DIASTOLIC BLOOD PRESSURE: 80 MMHG | BODY MASS INDEX: 29.77 KG/M2 | RESPIRATION RATE: 16 BRPM

## 2022-01-01 VITALS
OXYGEN SATURATION: 94 % | RESPIRATION RATE: 18 BRPM | BODY MASS INDEX: 34.71 KG/M2 | DIASTOLIC BLOOD PRESSURE: 68 MMHG | WEIGHT: 176.81 LBS | HEIGHT: 60 IN | TEMPERATURE: 98 F | HEART RATE: 58 BPM | SYSTOLIC BLOOD PRESSURE: 162 MMHG

## 2022-01-01 VITALS
DIASTOLIC BLOOD PRESSURE: 51 MMHG | RESPIRATION RATE: 16 BRPM | HEART RATE: 60 BPM | BODY MASS INDEX: 30.15 KG/M2 | HEIGHT: 57 IN | RESPIRATION RATE: 18 BRPM | WEIGHT: 141 LBS | SYSTOLIC BLOOD PRESSURE: 130 MMHG | TEMPERATURE: 97 F | HEIGHT: 57 IN | SYSTOLIC BLOOD PRESSURE: 151 MMHG | HEART RATE: 56 BPM | WEIGHT: 139.75 LBS | OXYGEN SATURATION: 94 % | BODY MASS INDEX: 30.42 KG/M2 | DIASTOLIC BLOOD PRESSURE: 54 MMHG | TEMPERATURE: 98 F

## 2022-01-01 VITALS
HEIGHT: 57 IN | HEART RATE: 68 BPM | TEMPERATURE: 98 F | BODY MASS INDEX: 30.8 KG/M2 | OXYGEN SATURATION: 97 % | SYSTOLIC BLOOD PRESSURE: 140 MMHG | DIASTOLIC BLOOD PRESSURE: 64 MMHG | WEIGHT: 142.75 LBS

## 2022-01-01 VITALS
WEIGHT: 140.19 LBS | HEART RATE: 52 BPM | BODY MASS INDEX: 30.24 KG/M2 | OXYGEN SATURATION: 98 % | TEMPERATURE: 98 F | SYSTOLIC BLOOD PRESSURE: 149 MMHG | HEIGHT: 57 IN | DIASTOLIC BLOOD PRESSURE: 68 MMHG | RESPIRATION RATE: 20 BRPM

## 2022-01-01 VITALS
OXYGEN SATURATION: 96 % | HEIGHT: 60 IN | DIASTOLIC BLOOD PRESSURE: 52 MMHG | SYSTOLIC BLOOD PRESSURE: 122 MMHG | WEIGHT: 134 LBS | BODY MASS INDEX: 27.92 KG/M2 | HEART RATE: 74 BPM | HEIGHT: 60 IN | WEIGHT: 142.19 LBS | BODY MASS INDEX: 26.31 KG/M2

## 2022-01-01 VITALS
BODY MASS INDEX: 27.96 KG/M2 | SYSTOLIC BLOOD PRESSURE: 127 MMHG | HEART RATE: 62 BPM | WEIGHT: 129.19 LBS | RESPIRATION RATE: 16 BRPM | OXYGEN SATURATION: 96 % | TEMPERATURE: 98 F | DIASTOLIC BLOOD PRESSURE: 64 MMHG

## 2022-01-01 VITALS
HEART RATE: 54 BPM | HEIGHT: 57 IN | WEIGHT: 133.38 LBS | TEMPERATURE: 97 F | SYSTOLIC BLOOD PRESSURE: 173 MMHG | DIASTOLIC BLOOD PRESSURE: 55 MMHG | BODY MASS INDEX: 28.78 KG/M2 | RESPIRATION RATE: 18 BRPM

## 2022-01-01 VITALS
HEIGHT: 57 IN | SYSTOLIC BLOOD PRESSURE: 139 MMHG | TEMPERATURE: 97 F | RESPIRATION RATE: 18 BRPM | OXYGEN SATURATION: 95 % | WEIGHT: 139.38 LBS | DIASTOLIC BLOOD PRESSURE: 65 MMHG | HEART RATE: 51 BPM | BODY MASS INDEX: 30.07 KG/M2

## 2022-01-01 VITALS
HEIGHT: 59 IN | HEART RATE: 59 BPM | RESPIRATION RATE: 16 BRPM | OXYGEN SATURATION: 95 % | BODY MASS INDEX: 28.22 KG/M2 | SYSTOLIC BLOOD PRESSURE: 106 MMHG | WEIGHT: 140 LBS | DIASTOLIC BLOOD PRESSURE: 74 MMHG

## 2022-01-01 VITALS
OXYGEN SATURATION: 97 % | SYSTOLIC BLOOD PRESSURE: 161 MMHG | HEART RATE: 71 BPM | WEIGHT: 142.06 LBS | DIASTOLIC BLOOD PRESSURE: 87 MMHG | BODY MASS INDEX: 30.65 KG/M2 | HEIGHT: 57 IN | RESPIRATION RATE: 18 BRPM | TEMPERATURE: 99 F

## 2022-01-01 VITALS
TEMPERATURE: 98 F | WEIGHT: 141.13 LBS | HEART RATE: 57 BPM | SYSTOLIC BLOOD PRESSURE: 114 MMHG | DIASTOLIC BLOOD PRESSURE: 55 MMHG | BODY MASS INDEX: 30.53 KG/M2

## 2022-01-01 VITALS
HEART RATE: 73 BPM | BODY MASS INDEX: 26.11 KG/M2 | HEIGHT: 60 IN | SYSTOLIC BLOOD PRESSURE: 130 MMHG | DIASTOLIC BLOOD PRESSURE: 60 MMHG | OXYGEN SATURATION: 98 % | WEIGHT: 133 LBS

## 2022-01-01 VITALS
OXYGEN SATURATION: 94 % | SYSTOLIC BLOOD PRESSURE: 147 MMHG | WEIGHT: 143.94 LBS | TEMPERATURE: 98 F | DIASTOLIC BLOOD PRESSURE: 65 MMHG | RESPIRATION RATE: 20 BRPM | HEIGHT: 57 IN | HEART RATE: 59 BPM | BODY MASS INDEX: 31.05 KG/M2

## 2022-01-01 VITALS
BODY MASS INDEX: 30.2 KG/M2 | TEMPERATURE: 98 F | DIASTOLIC BLOOD PRESSURE: 52 MMHG | WEIGHT: 140 LBS | HEART RATE: 60 BPM | HEIGHT: 57 IN | SYSTOLIC BLOOD PRESSURE: 95 MMHG

## 2022-01-01 VITALS
OXYGEN SATURATION: 94 % | HEART RATE: 58 BPM | SYSTOLIC BLOOD PRESSURE: 118 MMHG | HEIGHT: 57 IN | DIASTOLIC BLOOD PRESSURE: 70 MMHG | BODY MASS INDEX: 31.3 KG/M2 | RESPIRATION RATE: 17 BRPM | WEIGHT: 145.06 LBS

## 2022-01-01 VITALS
RESPIRATION RATE: 18 BRPM | BODY MASS INDEX: 30.2 KG/M2 | DIASTOLIC BLOOD PRESSURE: 58 MMHG | HEIGHT: 57 IN | WEIGHT: 140 LBS | SYSTOLIC BLOOD PRESSURE: 131 MMHG | TEMPERATURE: 97 F | OXYGEN SATURATION: 95 % | HEART RATE: 56 BPM

## 2022-01-01 DIAGNOSIS — K59.03 THERAPEUTIC OPIOID-INDUCED CONSTIPATION (OIC): ICD-10-CM

## 2022-01-01 DIAGNOSIS — R73.9 HYPERGLYCEMIA: Primary | ICD-10-CM

## 2022-01-01 DIAGNOSIS — I15.2 HYPERTENSION ASSOCIATED WITH DIABETES: Primary | ICD-10-CM

## 2022-01-01 DIAGNOSIS — R91.1 PULMONARY NODULE, RIGHT: ICD-10-CM
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
## 2022-01-01 DIAGNOSIS — R03.1 BLOOD PRESSURE LOWER THAN PRIOR MEASUREMENT: ICD-10-CM

## 2022-01-01 DIAGNOSIS — I20.9 AP (ANGINA PECTORIS): Chronic | ICD-10-CM

## 2022-01-01 DIAGNOSIS — I63.512 ARTERIAL ISCHEMIC STROKE, MCA (MIDDLE CEREBRAL ARTERY), LEFT, ACUTE: Chronic | ICD-10-CM

## 2022-01-01 DIAGNOSIS — I51.89 LEFT VENTRICULAR DIASTOLIC DYSFUNCTION WITH PRESERVED SYSTOLIC FUNCTION: Chronic | ICD-10-CM

## 2022-01-01 DIAGNOSIS — J69.0 ASPIRATION PNEUMONIA OF BOTH LUNGS DUE TO GASTRIC SECRETIONS, UNSPECIFIED PART OF LUNG: ICD-10-CM

## 2022-01-01 DIAGNOSIS — I51.89 DIASTOLIC DYSFUNCTION: ICD-10-CM

## 2022-01-01 DIAGNOSIS — Z79.4 TYPE 2 DIABETES MELLITUS WITH HYPERGLYCEMIA, WITH LONG-TERM CURRENT USE OF INSULIN: Chronic | ICD-10-CM

## 2022-01-01 DIAGNOSIS — Z91.81 AT RISK FOR FALLS DUE TO MEDICATION: ICD-10-CM

## 2022-01-01 DIAGNOSIS — R09.02 HYPOXIA: ICD-10-CM

## 2022-01-01 DIAGNOSIS — S22.32XA CLOSED FRACTURE OF ONE RIB OF LEFT SIDE, INITIAL ENCOUNTER: Primary | ICD-10-CM

## 2022-01-01 DIAGNOSIS — E87.6 HYPOKALEMIA: ICD-10-CM

## 2022-01-01 DIAGNOSIS — R29.6 FALLS FREQUENTLY: ICD-10-CM

## 2022-01-01 DIAGNOSIS — I25.10 CAD, MULTIPLE VESSEL: ICD-10-CM

## 2022-01-01 DIAGNOSIS — Z79.4 TYPE 2 DIABETES MELLITUS WITH HYPERGLYCEMIA, WITH LONG-TERM CURRENT USE OF INSULIN: ICD-10-CM

## 2022-01-01 DIAGNOSIS — M79.641 RIGHT HAND PAIN: Primary | ICD-10-CM

## 2022-01-01 DIAGNOSIS — R65.20 SEVERE SEPSIS: ICD-10-CM

## 2022-01-01 DIAGNOSIS — I15.2 HYPERTENSION ASSOCIATED WITH DIABETES: Chronic | ICD-10-CM

## 2022-01-01 DIAGNOSIS — D63.0 ANEMIA IN NEOPLASTIC DISEASE: ICD-10-CM

## 2022-01-01 DIAGNOSIS — A41.9 SEPSIS: ICD-10-CM

## 2022-01-01 DIAGNOSIS — D84.821 IMMUNODEFICIENCY DUE TO CHEMOTHERAPY: ICD-10-CM

## 2022-01-01 DIAGNOSIS — I50.32 CHRONIC DIASTOLIC HEART FAILURE: ICD-10-CM

## 2022-01-01 DIAGNOSIS — G89.3 CANCER RELATED PAIN: ICD-10-CM

## 2022-01-01 DIAGNOSIS — J69.0 ASPIRATION PNEUMONIA DUE TO GASTRIC SECRETIONS, UNSPECIFIED LATERALITY, UNSPECIFIED PART OF LUNG: ICD-10-CM

## 2022-01-01 DIAGNOSIS — B37.2 CANDIDAL INTERTRIGO: ICD-10-CM

## 2022-01-01 DIAGNOSIS — G47.33 OSA ON CPAP: Primary | ICD-10-CM

## 2022-01-01 DIAGNOSIS — E78.5 HYPERLIPIDEMIA ASSOCIATED WITH TYPE 2 DIABETES MELLITUS: Chronic | ICD-10-CM

## 2022-01-01 DIAGNOSIS — L02.413 ABSCESS OF RIGHT ARM: ICD-10-CM

## 2022-01-01 DIAGNOSIS — C11.1 MALIGNANT NEOPLASM OF PHARYNGEAL TONSIL: Primary | ICD-10-CM

## 2022-01-01 DIAGNOSIS — S22.49XD CLOSED FRACTURE OF MULTIPLE RIBS WITH ROUTINE HEALING, UNSPECIFIED LATERALITY, SUBSEQUENT ENCOUNTER: ICD-10-CM

## 2022-01-01 DIAGNOSIS — C11.1 MALIGNANT NEOPLASM OF PHARYNGEAL TONSIL: ICD-10-CM

## 2022-01-01 DIAGNOSIS — R94.6 ABNORMAL RESULTS OF THYROID FUNCTION STUDIES: ICD-10-CM

## 2022-01-01 DIAGNOSIS — Z95.1 S/P CABG (CORONARY ARTERY BYPASS GRAFT): ICD-10-CM

## 2022-01-01 DIAGNOSIS — R06.02 SOB (SHORTNESS OF BREATH): ICD-10-CM

## 2022-01-01 DIAGNOSIS — G89.3 CANCER RELATED PAIN: Primary | ICD-10-CM

## 2022-01-01 DIAGNOSIS — L02.91 ABSCESS: ICD-10-CM

## 2022-01-01 DIAGNOSIS — G47.33 OSA (OBSTRUCTIVE SLEEP APNEA): ICD-10-CM

## 2022-01-01 DIAGNOSIS — Z87.01 HISTORY OF ASPIRATION PNEUMONIA: ICD-10-CM

## 2022-01-01 DIAGNOSIS — I25.118 CORONARY ARTERY DISEASE WITH STABLE ANGINA PECTORIS, UNSPECIFIED VESSEL OR LESION TYPE, UNSPECIFIED WHETHER NATIVE OR TRANSPLANTED HEART: Primary | Chronic | ICD-10-CM

## 2022-01-01 DIAGNOSIS — Z86.73 HISTORY OF STROKE: ICD-10-CM

## 2022-01-01 DIAGNOSIS — L02.413 ABSCESS OF ARM, RIGHT: ICD-10-CM

## 2022-01-01 DIAGNOSIS — L02.419 CELLULITIS AND ABSCESS OF UPPER EXTREMITY: ICD-10-CM

## 2022-01-01 DIAGNOSIS — I50.33 DIASTOLIC DYSFUNCTION WITH ACUTE ON CHRONIC HEART FAILURE: ICD-10-CM

## 2022-01-01 DIAGNOSIS — Z93.1 GASTROSTOMY STATUS: ICD-10-CM

## 2022-01-01 DIAGNOSIS — L03.113 CELLULITIS OF RIGHT UPPER EXTREMITY: Primary | ICD-10-CM

## 2022-01-01 DIAGNOSIS — F41.9 ANXIETY AND DEPRESSION: ICD-10-CM

## 2022-01-01 DIAGNOSIS — H93.8X1 EAR CANAL MASS, RIGHT: ICD-10-CM

## 2022-01-01 DIAGNOSIS — G89.3 CANCER ASSOCIATED PAIN: Primary | ICD-10-CM

## 2022-01-01 DIAGNOSIS — Z51.5 ENCOUNTER FOR PALLIATIVE CARE: ICD-10-CM

## 2022-01-01 DIAGNOSIS — M79.641 RIGHT HAND PAIN: ICD-10-CM

## 2022-01-01 DIAGNOSIS — E11.65 TYPE 2 DIABETES MELLITUS WITH HYPERGLYCEMIA, WITH LONG-TERM CURRENT USE OF INSULIN: Chronic | ICD-10-CM

## 2022-01-01 DIAGNOSIS — R56.9 SEIZURE: ICD-10-CM

## 2022-01-01 DIAGNOSIS — F02.80 DEMENTIA IN OTHER DISEASES CLASSIFIED ELSEWHERE WITHOUT BEHAVIORAL DISTURBANCE: ICD-10-CM

## 2022-01-01 DIAGNOSIS — F32.A ANXIETY AND DEPRESSION: ICD-10-CM
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
## 2022-01-01 DIAGNOSIS — I35.0 NONRHEUMATIC AORTIC VALVE STENOSIS: ICD-10-CM

## 2022-01-01 DIAGNOSIS — R94.6 ABNORMAL RESULTS OF THYROID FUNCTION STUDIES: Primary | ICD-10-CM

## 2022-01-01 DIAGNOSIS — Z79.899 IMMUNODEFICIENCY DUE TO CHEMOTHERAPY: ICD-10-CM

## 2022-01-01 DIAGNOSIS — E11.65 TYPE 2 DIABETES MELLITUS WITH HYPERGLYCEMIA, WITH LONG-TERM CURRENT USE OF INSULIN: ICD-10-CM

## 2022-01-01 DIAGNOSIS — T40.2X5A THERAPEUTIC OPIOID-INDUCED CONSTIPATION (OIC): ICD-10-CM

## 2022-01-01 DIAGNOSIS — R94.31 ABNORMAL ECG: ICD-10-CM

## 2022-01-01 DIAGNOSIS — Z51.5 ENCOUNTER FOR PALLIATIVE CARE: Primary | ICD-10-CM

## 2022-01-01 DIAGNOSIS — I27.20 PULMONARY HYPERTENSION: ICD-10-CM

## 2022-01-01 DIAGNOSIS — E11.59 HYPERTENSION ASSOCIATED WITH DIABETES: Primary | ICD-10-CM

## 2022-01-01 DIAGNOSIS — I70.0 AORTIC ARCH ATHEROSCLEROSIS: ICD-10-CM

## 2022-01-01 DIAGNOSIS — A41.9 SEPSIS, DUE TO UNSPECIFIED ORGANISM, UNSPECIFIED WHETHER ACUTE ORGAN DYSFUNCTION PRESENT: ICD-10-CM

## 2022-01-01 DIAGNOSIS — Z98.61 HISTORY OF PTCA: ICD-10-CM

## 2022-01-01 DIAGNOSIS — I50.9 CHF (CONGESTIVE HEART FAILURE): ICD-10-CM

## 2022-01-01 DIAGNOSIS — I27.20 PULMONARY HYPERTENSION: Primary | ICD-10-CM

## 2022-01-01 DIAGNOSIS — I50.32 CHRONIC DIASTOLIC HEART FAILURE: Primary | ICD-10-CM

## 2022-01-01 DIAGNOSIS — D84.822 IMMUNODEFICIENCY SECONDARY TO RADIATION THERAPY: ICD-10-CM

## 2022-01-01 DIAGNOSIS — I25.9 CHRONIC ISCHEMIC HEART DISEASE: Chronic | ICD-10-CM

## 2022-01-01 DIAGNOSIS — L03.119 CELLULITIS, UPPER ARM: ICD-10-CM

## 2022-01-01 DIAGNOSIS — M79.601 PAIN OF RIGHT UPPER EXTREMITY: Primary | ICD-10-CM

## 2022-01-01 DIAGNOSIS — E66.09 CLASS 1 OBESITY DUE TO EXCESS CALORIES WITH SERIOUS COMORBIDITY AND BODY MASS INDEX (BMI) OF 30.0 TO 30.9 IN ADULT: ICD-10-CM

## 2022-01-01 DIAGNOSIS — I25.118 CORONARY ARTERY DISEASE WITH STABLE ANGINA PECTORIS, UNSPECIFIED VESSEL OR LESION TYPE, UNSPECIFIED WHETHER NATIVE OR TRANSPLANTED HEART: Chronic | ICD-10-CM

## 2022-01-01 DIAGNOSIS — R07.9 CHEST PAIN: ICD-10-CM

## 2022-01-01 DIAGNOSIS — R13.10 DYSPHAGIA, UNSPECIFIED TYPE: ICD-10-CM

## 2022-01-01 DIAGNOSIS — I50.9 ACUTE CONGESTIVE HEART FAILURE, UNSPECIFIED HEART FAILURE TYPE: ICD-10-CM

## 2022-01-01 DIAGNOSIS — L03.119 CELLULITIS AND ABSCESS OF UPPER EXTREMITY: ICD-10-CM

## 2022-01-01 DIAGNOSIS — J96.01 ACUTE HYPOXEMIC RESPIRATORY FAILURE: Chronic | ICD-10-CM

## 2022-01-01 DIAGNOSIS — H92.11 OTORRHEA OF RIGHT EAR: ICD-10-CM

## 2022-01-01 DIAGNOSIS — J18.9 PNEUMONIA DUE TO INFECTIOUS ORGANISM, UNSPECIFIED LATERALITY, UNSPECIFIED PART OF LUNG: ICD-10-CM

## 2022-01-01 DIAGNOSIS — G89.3 CANCER ASSOCIATED PAIN: ICD-10-CM

## 2022-01-01 DIAGNOSIS — R06.02 SOB (SHORTNESS OF BREATH): Primary | ICD-10-CM

## 2022-01-01 DIAGNOSIS — T45.1X5A IMMUNODEFICIENCY DUE TO CHEMOTHERAPY: ICD-10-CM

## 2022-01-01 DIAGNOSIS — A41.9 SEVERE SEPSIS: ICD-10-CM

## 2022-01-01 DIAGNOSIS — M79.641 PAIN OF RIGHT HAND: Primary | ICD-10-CM

## 2022-01-01 DIAGNOSIS — I35.1 NONRHEUMATIC AORTIC VALVE INSUFFICIENCY: ICD-10-CM

## 2022-01-01 DIAGNOSIS — N17.9 ACUTE RENAL FAILURE, UNSPECIFIED ACUTE RENAL FAILURE TYPE: ICD-10-CM

## 2022-01-01 DIAGNOSIS — J69.0 ASPIRATION PNEUMONIA DUE TO GASTRIC SECRETIONS, UNSPECIFIED LATERALITY, UNSPECIFIED PART OF LUNG: Primary | ICD-10-CM

## 2022-01-01 DIAGNOSIS — M79.641 PAIN OF RIGHT HAND: ICD-10-CM

## 2022-01-01 DIAGNOSIS — E11.69 HYPERLIPIDEMIA ASSOCIATED WITH TYPE 2 DIABETES MELLITUS: Chronic | ICD-10-CM

## 2022-01-01 DIAGNOSIS — L02.413 ABSCESS OF ARM, RIGHT: Primary | ICD-10-CM

## 2022-01-01 DIAGNOSIS — E11.42 DIABETIC POLYNEUROPATHY ASSOCIATED WITH TYPE 2 DIABETES MELLITUS: ICD-10-CM

## 2022-01-01 DIAGNOSIS — E11.59 HYPERTENSION ASSOCIATED WITH DIABETES: Chronic | ICD-10-CM

## 2022-01-01 DIAGNOSIS — E86.0 DEHYDRATION: Primary | ICD-10-CM

## 2022-01-01 DIAGNOSIS — H60.501 ACUTE OTITIS EXTERNA OF RIGHT EAR, UNSPECIFIED TYPE: ICD-10-CM

## 2022-01-01 DIAGNOSIS — N17.9 AKI (ACUTE KIDNEY INJURY): ICD-10-CM

## 2022-01-01 LAB
ALBUMIN SERPL BCP-MCNC: 2.2 G/DL (ref 3.5–5.2)
ALBUMIN SERPL BCP-MCNC: 2.3 G/DL (ref 3.5–5.2)
ALBUMIN SERPL BCP-MCNC: 2.9 G/DL (ref 3.5–5.2)
ALBUMIN SERPL BCP-MCNC: 3 G/DL (ref 3.5–5.2)
ALBUMIN SERPL BCP-MCNC: 3 G/DL (ref 3.5–5.2)
ALBUMIN SERPL BCP-MCNC: 3.2 G/DL (ref 3.5–5.2)
ALBUMIN SERPL BCP-MCNC: 3.3 G/DL (ref 3.5–5.2)
ALBUMIN SERPL BCP-MCNC: 3.4 G/DL (ref 3.5–5.2)
ALBUMIN SERPL BCP-MCNC: 3.4 G/DL (ref 3.5–5.2)
ALP SERPL-CCNC: 101 U/L (ref 55–135)
ALP SERPL-CCNC: 101 U/L (ref 55–135)
ALP SERPL-CCNC: 103 U/L (ref 55–135)
ALP SERPL-CCNC: 103 U/L (ref 55–135)
ALP SERPL-CCNC: 110 U/L (ref 55–135)
ALP SERPL-CCNC: 115 U/L (ref 55–135)
ALP SERPL-CCNC: 126 U/L (ref 55–135)
ALP SERPL-CCNC: 140 U/L (ref 55–135)
ALT SERPL W/O P-5'-P-CCNC: 14 U/L (ref 10–44)
ALT SERPL W/O P-5'-P-CCNC: 14 U/L (ref 10–44)
ALT SERPL W/O P-5'-P-CCNC: 16 U/L (ref 10–44)
ALT SERPL W/O P-5'-P-CCNC: 6 U/L (ref 10–44)
ALT SERPL W/O P-5'-P-CCNC: 7 U/L (ref 10–44)
ALT SERPL W/O P-5'-P-CCNC: 8 U/L (ref 10–44)
ANION GAP SERPL CALC-SCNC: 10 MMOL/L (ref 8–16)
ANION GAP SERPL CALC-SCNC: 11 MMOL/L (ref 8–16)
ANION GAP SERPL CALC-SCNC: 11 MMOL/L (ref 8–16)
ANION GAP SERPL CALC-SCNC: 12 MMOL/L (ref 8–16)
ANION GAP SERPL CALC-SCNC: 13 MMOL/L (ref 8–16)
ANION GAP SERPL CALC-SCNC: 14 MMOL/L (ref 8–16)
ANION GAP SERPL CALC-SCNC: 14 MMOL/L (ref 8–16)
ANION GAP SERPL CALC-SCNC: 8 MMOL/L (ref 8–16)
ANION GAP SERPL CALC-SCNC: 8 MMOL/L (ref 8–16)
ANION GAP SERPL CALC-SCNC: 9 MMOL/L (ref 8–16)
AORTIC ROOT ANNULUS: 2.63 CM
ASCENDING AORTA: 2.84 CM
AST SERPL-CCNC: 13 U/L (ref 10–40)
AST SERPL-CCNC: 13 U/L (ref 10–40)
AST SERPL-CCNC: 14 U/L (ref 10–40)
AST SERPL-CCNC: 14 U/L (ref 10–40)
AST SERPL-CCNC: 15 U/L (ref 10–40)
AST SERPL-CCNC: 17 U/L (ref 10–40)
AST SERPL-CCNC: 18 U/L (ref 10–40)
AST SERPL-CCNC: 19 U/L (ref 10–40)
AV INDEX (PROSTH): 0.74
AV MEAN GRADIENT: 6 MMHG
AV PEAK GRADIENT: 10 MMHG
AV VALVE AREA: 1.83 CM2
AV VELOCITY RATIO: 0.61
BACTERIA #/AREA URNS HPF: ABNORMAL /HPF
BACTERIA #/AREA URNS HPF: NORMAL /HPF
BACTERIA BLD CULT: NORMAL
BACTERIA FLD AEROBE CULT: ABNORMAL
BACTERIA SPEC AEROBE CULT: ABNORMAL
BACTERIA UR CULT: NORMAL
BASOPHILS # BLD AUTO: 0.04 K/UL (ref 0–0.2)
BASOPHILS # BLD AUTO: 0.05 K/UL (ref 0–0.2)
BASOPHILS # BLD AUTO: 0.06 K/UL (ref 0–0.2)
BASOPHILS # BLD AUTO: 0.07 K/UL (ref 0–0.2)
BASOPHILS # BLD AUTO: 0.08 K/UL (ref 0–0.2)
BASOPHILS NFR BLD: 0.2 % (ref 0–1.9)
BASOPHILS NFR BLD: 0.2 % (ref 0–1.9)
BASOPHILS NFR BLD: 0.3 % (ref 0–1.9)
BASOPHILS NFR BLD: 0.4 % (ref 0–1.9)
BASOPHILS NFR BLD: 0.5 % (ref 0–1.9)
BASOPHILS NFR BLD: 0.5 % (ref 0–1.9)
BASOPHILS NFR BLD: 0.6 % (ref 0–1.9)
BASOPHILS NFR BLD: 0.9 % (ref 0–1.9)
BILIRUB SERPL-MCNC: 0.4 MG/DL (ref 0.1–1)
BILIRUB SERPL-MCNC: 0.5 MG/DL (ref 0.1–1)
BILIRUB SERPL-MCNC: 0.6 MG/DL (ref 0.1–1)
BILIRUB SERPL-MCNC: 0.7 MG/DL (ref 0.1–1)
BILIRUB SERPL-MCNC: 0.7 MG/DL (ref 0.1–1)
BILIRUB SERPL-MCNC: 1.7 MG/DL (ref 0.1–1)
BILIRUB UR QL STRIP: NEGATIVE
BNP SERPL-MCNC: 120 PG/ML (ref 0–99)
BNP SERPL-MCNC: 188 PG/ML (ref 0–99)
BSA FOR ECHO PROCEDURE: 1.72 M2
BUN SERPL-MCNC: 10 MG/DL (ref 8–23)
BUN SERPL-MCNC: 11 MG/DL (ref 8–23)
BUN SERPL-MCNC: 12 MG/DL (ref 8–23)
BUN SERPL-MCNC: 13 MG/DL (ref 8–23)
BUN SERPL-MCNC: 14 MG/DL (ref 8–23)
BUN SERPL-MCNC: 14 MG/DL (ref 8–23)
BUN SERPL-MCNC: 16 MG/DL (ref 8–23)
BUN SERPL-MCNC: 16 MG/DL (ref 8–23)
BUN SERPL-MCNC: 17 MG/DL (ref 8–23)
BUN SERPL-MCNC: 17 MG/DL (ref 8–23)
BUN SERPL-MCNC: 20 MG/DL (ref 8–23)
BUN SERPL-MCNC: 20 MG/DL (ref 8–23)
BUN SERPL-MCNC: 22 MG/DL (ref 8–23)
BUN SERPL-MCNC: 23 MG/DL (ref 8–23)
BUN SERPL-MCNC: 23 MG/DL (ref 8–23)
CALCIUM SERPL-MCNC: 10.2 MG/DL (ref 8.7–10.5)
CALCIUM SERPL-MCNC: 8.2 MG/DL (ref 8.7–10.5)
CALCIUM SERPL-MCNC: 8.2 MG/DL (ref 8.7–10.5)
CALCIUM SERPL-MCNC: 8.3 MG/DL (ref 8.7–10.5)
CALCIUM SERPL-MCNC: 8.3 MG/DL (ref 8.7–10.5)
CALCIUM SERPL-MCNC: 8.4 MG/DL (ref 8.7–10.5)
CALCIUM SERPL-MCNC: 8.6 MG/DL (ref 8.7–10.5)
CALCIUM SERPL-MCNC: 8.6 MG/DL (ref 8.7–10.5)
CALCIUM SERPL-MCNC: 8.7 MG/DL (ref 8.7–10.5)
CALCIUM SERPL-MCNC: 9.2 MG/DL (ref 8.7–10.5)
CALCIUM SERPL-MCNC: 9.3 MG/DL (ref 8.7–10.5)
CALCIUM SERPL-MCNC: 9.3 MG/DL (ref 8.7–10.5)
CALCIUM SERPL-MCNC: 9.5 MG/DL (ref 8.7–10.5)
CALCIUM SERPL-MCNC: 9.6 MG/DL (ref 8.7–10.5)
CALCIUM SERPL-MCNC: 9.8 MG/DL (ref 8.7–10.5)
CHLORIDE SERPL-SCNC: 100 MMOL/L (ref 95–110)
CHLORIDE SERPL-SCNC: 100 MMOL/L (ref 95–110)
CHLORIDE SERPL-SCNC: 101 MMOL/L (ref 95–110)
CHLORIDE SERPL-SCNC: 104 MMOL/L (ref 95–110)
CHLORIDE SERPL-SCNC: 92 MMOL/L (ref 95–110)
CHLORIDE SERPL-SCNC: 94 MMOL/L (ref 95–110)
CHLORIDE SERPL-SCNC: 98 MMOL/L (ref 95–110)
CHLORIDE SERPL-SCNC: 98 MMOL/L (ref 95–110)
CHLORIDE SERPL-SCNC: 99 MMOL/L (ref 95–110)
CHOLEST SERPL-MCNC: 113 MG/DL (ref 120–199)
CHOLEST/HDLC SERPL: 2.5 {RATIO} (ref 2–5)
CK SERPL-CCNC: <7 U/L (ref 20–180)
CLARITY UR: CLEAR
CO2 SERPL-SCNC: 20 MMOL/L (ref 23–29)
CO2 SERPL-SCNC: 23 MMOL/L (ref 23–29)
CO2 SERPL-SCNC: 25 MMOL/L (ref 23–29)
CO2 SERPL-SCNC: 26 MMOL/L (ref 23–29)
CO2 SERPL-SCNC: 27 MMOL/L (ref 23–29)
CO2 SERPL-SCNC: 27 MMOL/L (ref 23–29)
CO2 SERPL-SCNC: 28 MMOL/L (ref 23–29)
CO2 SERPL-SCNC: 29 MMOL/L (ref 23–29)
CO2 SERPL-SCNC: 30 MMOL/L (ref 23–29)
CO2 SERPL-SCNC: 31 MMOL/L (ref 23–29)
COLOR UR: COLORLESS
COLOR UR: YELLOW
COLOR UR: YELLOW
CREAT SERPL-MCNC: 0.7 MG/DL (ref 0.5–1.4)
CREAT SERPL-MCNC: 0.8 MG/DL (ref 0.5–1.4)
CREAT SERPL-MCNC: 0.9 MG/DL (ref 0.5–1.4)
CREAT SERPL-MCNC: 1.1 MG/DL (ref 0.5–1.4)
CREAT SERPL-MCNC: 1.2 MG/DL (ref 0.5–1.4)
CREAT SERPL-MCNC: 1.2 MG/DL (ref 0.5–1.4)
CREAT SERPL-MCNC: 1.5 MG/DL (ref 0.5–1.4)
CREAT SERPL-MCNC: 1.6 MG/DL (ref 0.5–1.4)
CREAT SERPL-MCNC: 1.7 MG/DL (ref 0.5–1.4)
CREAT SERPL-MCNC: 2 MG/DL (ref 0.5–1.4)
CREAT SERPL-MCNC: 2.1 MG/DL (ref 0.5–1.4)
CREAT UR-MCNC: 71.6 MG/DL (ref 15–325)
CREAT UR-MCNC: 71.6 MG/DL (ref 15–325)
CRP SERPL-MCNC: 53.1 MG/L (ref 0–8.2)
CTP QC/QA: YES
CV ECHO LV RWT: 0.63 CM
DIFFERENTIAL METHOD: ABNORMAL
DOP CALC AO PEAK VEL: 1.59 M/S
DOP CALC AO VTI: 41.1 CM
DOP CALC LVOT AREA: 2.5 CM2
DOP CALC LVOT DIAMETER: 1.77 CM
DOP CALC LVOT PEAK VEL: 0.97 M/S
DOP CALC LVOT STROKE VOLUME: 75.26 CM3
DOP CALC RVOT PEAK VEL: 0.59 M/S
DOP CALC RVOT VTI: 16.4 CM
DOP CALCLVOT PEAK VEL VTI: 30.6 CM
E WAVE DECELERATION TIME: 278.18 MSEC
E/A RATIO: 0.88
E/E' RATIO: 9.33 M/S
ECHO EF ESTIMATED: 65 %
ECHO LV POSTERIOR WALL: 1.11 CM (ref 0.6–1.1)
EJECTION FRACTION: 60 %
EOSINOPHIL # BLD AUTO: 0 K/UL (ref 0–0.5)
EOSINOPHIL # BLD AUTO: 0 K/UL (ref 0–0.5)
EOSINOPHIL # BLD AUTO: 0.1 K/UL (ref 0–0.5)
EOSINOPHIL # BLD AUTO: 0.2 K/UL (ref 0–0.5)
EOSINOPHIL # BLD AUTO: 0.3 K/UL (ref 0–0.5)
EOSINOPHIL NFR BLD: 0 % (ref 0–8)
EOSINOPHIL NFR BLD: 0.1 % (ref 0–8)
EOSINOPHIL NFR BLD: 0.2 % (ref 0–8)
EOSINOPHIL NFR BLD: 0.5 % (ref 0–8)
EOSINOPHIL NFR BLD: 1 % (ref 0–8)
EOSINOPHIL NFR BLD: 1 % (ref 0–8)
EOSINOPHIL NFR BLD: 1.2 % (ref 0–8)
EOSINOPHIL NFR BLD: 1.7 % (ref 0–8)
EOSINOPHIL NFR BLD: 1.8 % (ref 0–8)
EOSINOPHIL NFR BLD: 2 % (ref 0–8)
EOSINOPHIL NFR BLD: 2.2 % (ref 0–8)
EOSINOPHIL NFR BLD: 2.3 % (ref 0–8)
EOSINOPHIL NFR BLD: 2.9 % (ref 0–8)
ERYTHROCYTE [DISTWIDTH] IN BLOOD BY AUTOMATED COUNT: 12.7 % (ref 11.5–14.5)
ERYTHROCYTE [DISTWIDTH] IN BLOOD BY AUTOMATED COUNT: 12.8 % (ref 11.5–14.5)
ERYTHROCYTE [DISTWIDTH] IN BLOOD BY AUTOMATED COUNT: 12.8 % (ref 11.5–14.5)
ERYTHROCYTE [DISTWIDTH] IN BLOOD BY AUTOMATED COUNT: 12.9 % (ref 11.5–14.5)
ERYTHROCYTE [DISTWIDTH] IN BLOOD BY AUTOMATED COUNT: 12.9 % (ref 11.5–14.5)
ERYTHROCYTE [DISTWIDTH] IN BLOOD BY AUTOMATED COUNT: 13 % (ref 11.5–14.5)
ERYTHROCYTE [DISTWIDTH] IN BLOOD BY AUTOMATED COUNT: 13.1 % (ref 11.5–14.5)
ERYTHROCYTE [DISTWIDTH] IN BLOOD BY AUTOMATED COUNT: 13.2 % (ref 11.5–14.5)
ERYTHROCYTE [DISTWIDTH] IN BLOOD BY AUTOMATED COUNT: 13.3 % (ref 11.5–14.5)
ERYTHROCYTE [DISTWIDTH] IN BLOOD BY AUTOMATED COUNT: 13.4 % (ref 11.5–14.5)
ERYTHROCYTE [DISTWIDTH] IN BLOOD BY AUTOMATED COUNT: 14.1 % (ref 11.5–14.5)
ERYTHROCYTE [SEDIMENTATION RATE] IN BLOOD BY WESTERGREN METHOD: 67 MM/HR (ref 0–20)
EST. GFR  (AFRICAN AMERICAN): 25 ML/MIN/1.73 M^2
EST. GFR  (AFRICAN AMERICAN): 26 ML/MIN/1.73 M^2
EST. GFR  (AFRICAN AMERICAN): 32 ML/MIN/1.73 M^2
EST. GFR  (AFRICAN AMERICAN): 35 ML/MIN/1.73 M^2
EST. GFR  (AFRICAN AMERICAN): 37 ML/MIN/1.73 M^2
EST. GFR  (AFRICAN AMERICAN): 49 ML/MIN/1.73 M^2
EST. GFR  (AFRICAN AMERICAN): 49 ML/MIN/1.73 M^2
EST. GFR  (AFRICAN AMERICAN): 54 ML/MIN/1.73 M^2
EST. GFR  (AFRICAN AMERICAN): >60 ML/MIN/1.73 M^2
EST. GFR  (NON AFRICAN AMERICAN): 22 ML/MIN/1.73 M^2
EST. GFR  (NON AFRICAN AMERICAN): 23 ML/MIN/1.73 M^2
EST. GFR  (NON AFRICAN AMERICAN): 28 ML/MIN/1.73 M^2
EST. GFR  (NON AFRICAN AMERICAN): 30 ML/MIN/1.73 M^2
EST. GFR  (NON AFRICAN AMERICAN): 32 ML/MIN/1.73 M^2
EST. GFR  (NON AFRICAN AMERICAN): 42 ML/MIN/1.73 M^2
EST. GFR  (NON AFRICAN AMERICAN): 42 ML/MIN/1.73 M^2
EST. GFR  (NON AFRICAN AMERICAN): 47 ML/MIN/1.73 M^2
EST. GFR  (NON AFRICAN AMERICAN): >60 ML/MIN/1.73 M^2
ESTIMATED AVG GLUCOSE: 160 MG/DL (ref 68–131)
FINAL PATHOLOGIC DIAGNOSIS: NORMAL
FRACTIONAL SHORTENING: 35 % (ref 28–44)
GLUCOSE FLD-MCNC: NORMAL MG/DL
GLUCOSE SERPL-MCNC: 100 MG/DL (ref 70–110)
GLUCOSE SERPL-MCNC: 106 MG/DL (ref 70–110)
GLUCOSE SERPL-MCNC: 120 MG/DL (ref 70–110)
GLUCOSE SERPL-MCNC: 130 MG/DL (ref 70–110)
GLUCOSE SERPL-MCNC: 133 MG/DL (ref 70–110)
GLUCOSE SERPL-MCNC: 135 MG/DL (ref 70–110)
GLUCOSE SERPL-MCNC: 160 MG/DL (ref 70–110)
GLUCOSE SERPL-MCNC: 180 MG/DL (ref 70–110)
GLUCOSE SERPL-MCNC: 197 MG/DL (ref 70–110)
GLUCOSE SERPL-MCNC: 210 MG/DL (ref 70–110)
GLUCOSE SERPL-MCNC: 227 MG/DL (ref 70–110)
GLUCOSE SERPL-MCNC: 234 MG/DL (ref 70–110)
GLUCOSE SERPL-MCNC: 235 MG/DL (ref 70–110)
GLUCOSE SERPL-MCNC: 254 MG/DL (ref 70–110)
GLUCOSE SERPL-MCNC: 304 MG/DL (ref 70–110)
GLUCOSE UR QL STRIP: ABNORMAL
GLUCOSE UR QL STRIP: NEGATIVE
GLUCOSE UR QL STRIP: NEGATIVE
GRAM STN SPEC: ABNORMAL
GRAM STN SPEC: ABNORMAL
HBA1C MFR BLD: 7.2 % (ref 4–5.6)
HCT VFR BLD AUTO: 29.8 % (ref 37–48.5)
HCT VFR BLD AUTO: 31.2 % (ref 37–48.5)
HCT VFR BLD AUTO: 32.5 % (ref 37–48.5)
HCT VFR BLD AUTO: 33.3 % (ref 37–48.5)
HCT VFR BLD AUTO: 34.8 % (ref 37–48.5)
HCT VFR BLD AUTO: 36.3 % (ref 37–48.5)
HCT VFR BLD AUTO: 37.4 % (ref 37–48.5)
HCT VFR BLD AUTO: 37.6 % (ref 37–48.5)
HCT VFR BLD AUTO: 37.7 % (ref 37–48.5)
HCT VFR BLD AUTO: 38.2 % (ref 37–48.5)
HCT VFR BLD AUTO: 38.9 % (ref 37–48.5)
HCT VFR BLD AUTO: 39.8 % (ref 37–48.5)
HCT VFR BLD AUTO: 40.3 % (ref 37–48.5)
HCT VFR BLD AUTO: 40.6 % (ref 37–48.5)
HCT VFR BLD AUTO: 40.9 % (ref 37–48.5)
HDLC SERPL-MCNC: 45 MG/DL (ref 40–75)
HDLC SERPL: 39.8 % (ref 20–50)
HGB BLD-MCNC: 10 G/DL (ref 12–16)
HGB BLD-MCNC: 10.1 G/DL (ref 12–16)
HGB BLD-MCNC: 10.6 G/DL (ref 12–16)
HGB BLD-MCNC: 10.8 G/DL (ref 12–16)
HGB BLD-MCNC: 11.1 G/DL (ref 12–16)
HGB BLD-MCNC: 11.9 G/DL (ref 12–16)
HGB BLD-MCNC: 11.9 G/DL (ref 12–16)
HGB BLD-MCNC: 12.3 G/DL (ref 12–16)
HGB BLD-MCNC: 12.4 G/DL (ref 12–16)
HGB BLD-MCNC: 12.4 G/DL (ref 12–16)
HGB BLD-MCNC: 12.5 G/DL (ref 12–16)
HGB BLD-MCNC: 12.6 G/DL (ref 12–16)
HGB BLD-MCNC: 12.8 G/DL (ref 12–16)
HGB BLD-MCNC: 12.9 G/DL (ref 12–16)
HGB BLD-MCNC: 13.5 G/DL (ref 12–16)
HGB UR QL STRIP: ABNORMAL
HGB UR QL STRIP: NEGATIVE
HGB UR QL STRIP: NEGATIVE
HYALINE CASTS #/AREA URNS LPF: 0 /LPF
HYALINE CASTS #/AREA URNS LPF: 0 /LPF
IMM GRANULOCYTES # BLD AUTO: 0.02 K/UL (ref 0–0.04)
IMM GRANULOCYTES # BLD AUTO: 0.04 K/UL (ref 0–0.04)
IMM GRANULOCYTES # BLD AUTO: 0.05 K/UL (ref 0–0.04)
IMM GRANULOCYTES # BLD AUTO: 0.07 K/UL (ref 0–0.04)
IMM GRANULOCYTES # BLD AUTO: 0.09 K/UL (ref 0–0.04)
IMM GRANULOCYTES # BLD AUTO: 0.09 K/UL (ref 0–0.04)
IMM GRANULOCYTES # BLD AUTO: 0.13 K/UL (ref 0–0.04)
IMM GRANULOCYTES # BLD AUTO: 0.14 K/UL (ref 0–0.04)
IMM GRANULOCYTES # BLD AUTO: 0.2 K/UL (ref 0–0.04)
IMM GRANULOCYTES NFR BLD AUTO: 0.3 % (ref 0–0.5)
IMM GRANULOCYTES NFR BLD AUTO: 0.4 % (ref 0–0.5)
IMM GRANULOCYTES NFR BLD AUTO: 0.5 % (ref 0–0.5)
IMM GRANULOCYTES NFR BLD AUTO: 0.6 % (ref 0–0.5)
IMM GRANULOCYTES NFR BLD AUTO: 0.7 % (ref 0–0.5)
IMM GRANULOCYTES NFR BLD AUTO: 0.7 % (ref 0–0.5)
INTERVENTRICULAR SEPTUM: 1.32 CM (ref 0.6–1.1)
IVC DIAMETER: 1.65 CM
IVRT: 65.65 MSEC
KETONES UR QL STRIP: NEGATIVE
LA MAJOR: 4.16 CM
LA MINOR: 2.66 CM
LA WIDTH: 2.97 CM
LACTATE SERPL-SCNC: 0.9 MMOL/L (ref 0.5–2.2)
LACTATE SERPL-SCNC: 1.8 MMOL/L (ref 0.5–2.2)
LDH SERPL L TO P-CCNC: 312 U/L (ref 110–260)
LDLC SERPL CALC-MCNC: 53.6 MG/DL (ref 63–159)
LEFT ATRIUM SIZE: 2.48 CM
LEFT ATRIUM VOLUME INDEX: 12.2 ML/M2
LEFT ATRIUM VOLUME: 20.32 CM3
LEFT INTERNAL DIMENSION IN SYSTOLE: 2.29 CM (ref 2.1–4)
LEFT VENTRICLE DIASTOLIC VOLUME INDEX: 30.45 ML/M2
LEFT VENTRICLE DIASTOLIC VOLUME: 50.85 ML
LEFT VENTRICLE MASS INDEX: 83 G/M2
LEFT VENTRICLE SYSTOLIC VOLUME INDEX: 10.7 ML/M2
LEFT VENTRICLE SYSTOLIC VOLUME: 17.83 ML
LEFT VENTRICULAR INTERNAL DIMENSION IN DIASTOLE: 3.5 CM (ref 3.5–6)
LEFT VENTRICULAR MASS: 138.42 G
LEUKOCYTE ESTERASE UR QL STRIP: ABNORMAL
LEUKOCYTE ESTERASE UR QL STRIP: ABNORMAL
LEUKOCYTE ESTERASE UR QL STRIP: NEGATIVE
LV LATERAL E/E' RATIO: 7.64 M/S
LV SEPTAL E/E' RATIO: 12 M/S
LVOT MG: 2.41 MMHG
LVOT MV: 0.74 CM/S
LYMPHOCYTES # BLD AUTO: 0.4 K/UL (ref 1–4.8)
LYMPHOCYTES # BLD AUTO: 0.5 K/UL (ref 1–4.8)
LYMPHOCYTES # BLD AUTO: 0.6 K/UL (ref 1–4.8)
LYMPHOCYTES # BLD AUTO: 0.7 K/UL (ref 1–4.8)
LYMPHOCYTES # BLD AUTO: 0.8 K/UL (ref 1–4.8)
LYMPHOCYTES # BLD AUTO: 0.9 K/UL (ref 1–4.8)
LYMPHOCYTES # BLD AUTO: 0.9 K/UL (ref 1–4.8)
LYMPHOCYTES # BLD AUTO: 1 K/UL (ref 1–4.8)
LYMPHOCYTES # BLD AUTO: 1.1 K/UL (ref 1–4.8)
LYMPHOCYTES NFR BLD: 1.4 % (ref 18–48)
LYMPHOCYTES NFR BLD: 13.4 % (ref 18–48)
LYMPHOCYTES NFR BLD: 2.3 % (ref 18–48)
LYMPHOCYTES NFR BLD: 2.4 % (ref 18–48)
LYMPHOCYTES NFR BLD: 3.2 % (ref 18–48)
LYMPHOCYTES NFR BLD: 3.2 % (ref 18–48)
LYMPHOCYTES NFR BLD: 4.4 % (ref 18–48)
LYMPHOCYTES NFR BLD: 4.9 % (ref 18–48)
LYMPHOCYTES NFR BLD: 5.6 % (ref 18–48)
LYMPHOCYTES NFR BLD: 5.8 % (ref 18–48)
LYMPHOCYTES NFR BLD: 5.9 % (ref 18–48)
LYMPHOCYTES NFR BLD: 7.4 % (ref 18–48)
LYMPHOCYTES NFR BLD: 8.3 % (ref 18–48)
LYMPHOCYTES NFR BLD: 8.6 % (ref 18–48)
LYMPHOCYTES NFR BLD: 9.2 % (ref 18–48)
Lab: NORMAL
MAGNESIUM SERPL-MCNC: 1.5 MG/DL (ref 1.6–2.6)
MAGNESIUM SERPL-MCNC: 1.7 MG/DL (ref 1.6–2.6)
MAGNESIUM SERPL-MCNC: 1.9 MG/DL (ref 1.6–2.6)
MAGNESIUM SERPL-MCNC: 1.9 MG/DL (ref 1.6–2.6)
MCH RBC QN AUTO: 27.1 PG (ref 27–31)
MCH RBC QN AUTO: 27.4 PG (ref 27–31)
MCH RBC QN AUTO: 28.2 PG (ref 27–31)
MCH RBC QN AUTO: 28.3 PG (ref 27–31)
MCH RBC QN AUTO: 28.4 PG (ref 27–31)
MCH RBC QN AUTO: 28.5 PG (ref 27–31)
MCH RBC QN AUTO: 28.6 PG (ref 27–31)
MCH RBC QN AUTO: 28.7 PG (ref 27–31)
MCH RBC QN AUTO: 28.8 PG (ref 27–31)
MCH RBC QN AUTO: 29 PG (ref 27–31)
MCH RBC QN AUTO: 29.1 PG (ref 27–31)
MCH RBC QN AUTO: 29.2 PG (ref 27–31)
MCH RBC QN AUTO: 29.2 PG (ref 27–31)
MCH RBC QN AUTO: 29.3 PG (ref 27–31)
MCH RBC QN AUTO: 29.5 PG (ref 27–31)
MCHC RBC AUTO-ENTMCNC: 30.6 G/DL (ref 32–36)
MCHC RBC AUTO-ENTMCNC: 31.3 G/DL (ref 32–36)
MCHC RBC AUTO-ENTMCNC: 31.5 G/DL (ref 32–36)
MCHC RBC AUTO-ENTMCNC: 31.9 G/DL (ref 32–36)
MCHC RBC AUTO-ENTMCNC: 32.2 G/DL (ref 32–36)
MCHC RBC AUTO-ENTMCNC: 32.4 G/DL (ref 32–36)
MCHC RBC AUTO-ENTMCNC: 32.4 G/DL (ref 32–36)
MCHC RBC AUTO-ENTMCNC: 32.5 G/DL (ref 32–36)
MCHC RBC AUTO-ENTMCNC: 32.6 G/DL (ref 32–36)
MCHC RBC AUTO-ENTMCNC: 32.7 G/DL (ref 32–36)
MCHC RBC AUTO-ENTMCNC: 32.8 G/DL (ref 32–36)
MCHC RBC AUTO-ENTMCNC: 33.2 G/DL (ref 32–36)
MCHC RBC AUTO-ENTMCNC: 33.2 G/DL (ref 32–36)
MCHC RBC AUTO-ENTMCNC: 33.3 G/DL (ref 32–36)
MCHC RBC AUTO-ENTMCNC: 33.6 G/DL (ref 32–36)
MCV RBC AUTO: 84 FL (ref 82–98)
MCV RBC AUTO: 85 FL (ref 82–98)
MCV RBC AUTO: 86 FL (ref 82–98)
MCV RBC AUTO: 87 FL (ref 82–98)
MCV RBC AUTO: 88 FL (ref 82–98)
MCV RBC AUTO: 89 FL (ref 82–98)
MCV RBC AUTO: 90 FL (ref 82–98)
MCV RBC AUTO: 91 FL (ref 82–98)
MCV RBC AUTO: 91 FL (ref 82–98)
MCV RBC AUTO: 92 FL (ref 82–98)
MICROSCOPIC COMMENT: ABNORMAL
MICROSCOPIC COMMENT: NORMAL
MICROSCOPIC COMMENT: NORMAL
MONOCYTES # BLD AUTO: 0.4 K/UL (ref 0.3–1)
MONOCYTES # BLD AUTO: 0.7 K/UL (ref 0.3–1)
MONOCYTES # BLD AUTO: 0.8 K/UL (ref 0.3–1)
MONOCYTES # BLD AUTO: 0.9 K/UL (ref 0.3–1)
MONOCYTES # BLD AUTO: 1 K/UL (ref 0.3–1)
MONOCYTES # BLD AUTO: 1.3 K/UL (ref 0.3–1)
MONOCYTES NFR BLD: 10.3 % (ref 4–15)
MONOCYTES NFR BLD: 2.5 % (ref 4–15)
MONOCYTES NFR BLD: 2.9 % (ref 4–15)
MONOCYTES NFR BLD: 3.4 % (ref 4–15)
MONOCYTES NFR BLD: 4.1 % (ref 4–15)
MONOCYTES NFR BLD: 4.8 % (ref 4–15)
MONOCYTES NFR BLD: 5.6 % (ref 4–15)
MONOCYTES NFR BLD: 6.2 % (ref 4–15)
MONOCYTES NFR BLD: 6.2 % (ref 4–15)
MONOCYTES NFR BLD: 6.4 % (ref 4–15)
MONOCYTES NFR BLD: 6.6 % (ref 4–15)
MONOCYTES NFR BLD: 6.9 % (ref 4–15)
MONOCYTES NFR BLD: 7.9 % (ref 4–15)
MONOCYTES NFR BLD: 8.1 % (ref 4–15)
MONOCYTES NFR BLD: 8.7 % (ref 4–15)
MV PEAK A VEL: 0.95 M/S
MV PEAK E VEL: 0.84 M/S
MV STENOSIS PRESSURE HALF TIME: 80.67 MS
MV VALVE AREA P 1/2 METHOD: 2.73 CM2
NEUTROPHILS # BLD AUTO: 11.5 K/UL (ref 1.8–7.7)
NEUTROPHILS # BLD AUTO: 11.7 K/UL (ref 1.8–7.7)
NEUTROPHILS # BLD AUTO: 11.8 K/UL (ref 1.8–7.7)
NEUTROPHILS # BLD AUTO: 15.8 K/UL (ref 1.8–7.7)
NEUTROPHILS # BLD AUTO: 15.9 K/UL (ref 1.8–7.7)
NEUTROPHILS # BLD AUTO: 17.9 K/UL (ref 1.8–7.7)
NEUTROPHILS # BLD AUTO: 20.3 K/UL (ref 1.8–7.7)
NEUTROPHILS # BLD AUTO: 25.1 K/UL (ref 1.8–7.7)
NEUTROPHILS # BLD AUTO: 27.5 K/UL (ref 1.8–7.7)
NEUTROPHILS # BLD AUTO: 5.9 K/UL (ref 1.8–7.7)
NEUTROPHILS # BLD AUTO: 7.2 K/UL (ref 1.8–7.7)
NEUTROPHILS # BLD AUTO: 8.2 K/UL (ref 1.8–7.7)
NEUTROPHILS # BLD AUTO: 8.9 K/UL (ref 1.8–7.7)
NEUTROPHILS # BLD AUTO: 9 K/UL (ref 1.8–7.7)
NEUTROPHILS # BLD AUTO: 9 K/UL (ref 1.8–7.7)
NEUTROPHILS NFR BLD: 74.1 % (ref 38–73)
NEUTROPHILS NFR BLD: 79 % (ref 38–73)
NEUTROPHILS NFR BLD: 79.4 % (ref 38–73)
NEUTROPHILS NFR BLD: 82.1 % (ref 38–73)
NEUTROPHILS NFR BLD: 82.7 % (ref 38–73)
NEUTROPHILS NFR BLD: 84.5 % (ref 38–73)
NEUTROPHILS NFR BLD: 84.8 % (ref 38–73)
NEUTROPHILS NFR BLD: 85.2 % (ref 38–73)
NEUTROPHILS NFR BLD: 88.5 % (ref 38–73)
NEUTROPHILS NFR BLD: 89.2 % (ref 38–73)
NEUTROPHILS NFR BLD: 90.3 % (ref 38–73)
NEUTROPHILS NFR BLD: 90.8 % (ref 38–73)
NEUTROPHILS NFR BLD: 92.1 % (ref 38–73)
NEUTROPHILS NFR BLD: 94.3 % (ref 38–73)
NEUTROPHILS NFR BLD: 94.7 % (ref 38–73)
NITRITE UR QL STRIP: NEGATIVE
NONHDLC SERPL-MCNC: 68 MG/DL
NRBC BLD-RTO: 0 /100 WBC
PH UR STRIP: 6 [PH] (ref 5–8)
PH UR STRIP: 7 [PH] (ref 5–8)
PH UR STRIP: 7 [PH] (ref 5–8)
PHOSPHATE SERPL-MCNC: 3.6 MG/DL (ref 2.7–4.5)
PISA TR MAX VEL: 3.08 M/S
PLATELET # BLD AUTO: 244 K/UL (ref 150–450)
PLATELET # BLD AUTO: 254 K/UL (ref 150–450)
PLATELET # BLD AUTO: 254 K/UL (ref 150–450)
PLATELET # BLD AUTO: 266 K/UL (ref 150–450)
PLATELET # BLD AUTO: 281 K/UL (ref 150–450)
PLATELET # BLD AUTO: 282 K/UL (ref 150–450)
PLATELET # BLD AUTO: 284 K/UL (ref 150–450)
PLATELET # BLD AUTO: 311 K/UL (ref 150–450)
PLATELET # BLD AUTO: 322 K/UL (ref 150–450)
PLATELET # BLD AUTO: 324 K/UL (ref 150–450)
PLATELET # BLD AUTO: 328 K/UL (ref 150–450)
PLATELET # BLD AUTO: 332 K/UL (ref 150–450)
PLATELET # BLD AUTO: 333 K/UL (ref 150–450)
PLATELET # BLD AUTO: 334 K/UL (ref 150–450)
PLATELET # BLD AUTO: 345 K/UL (ref 150–450)
PLATELET BLD QL SMEAR: ABNORMAL
PLATELET BLD QL SMEAR: ABNORMAL
PMV BLD AUTO: 10.1 FL (ref 9.2–12.9)
PMV BLD AUTO: 10.4 FL (ref 9.2–12.9)
PMV BLD AUTO: 10.4 FL (ref 9.2–12.9)
PMV BLD AUTO: 10.5 FL (ref 9.2–12.9)
PMV BLD AUTO: 10.5 FL (ref 9.2–12.9)
PMV BLD AUTO: 10.6 FL (ref 9.2–12.9)
PMV BLD AUTO: 10.7 FL (ref 9.2–12.9)
PMV BLD AUTO: 10.9 FL (ref 9.2–12.9)
PMV BLD AUTO: 11 FL (ref 9.2–12.9)
PMV BLD AUTO: 11.1 FL (ref 9.2–12.9)
PMV BLD AUTO: 11.2 FL (ref 9.2–12.9)
PMV BLD AUTO: 11.3 FL (ref 9.2–12.9)
PMV BLD AUTO: 11.7 FL (ref 9.2–12.9)
POC MOLECULAR INFLUENZA A AGN: NEGATIVE
POC MOLECULAR INFLUENZA B AGN: NEGATIVE
POCT GLUCOSE: 115 MG/DL (ref 70–110)
POCT GLUCOSE: 117 MG/DL (ref 70–110)
POCT GLUCOSE: 135 MG/DL (ref 70–110)
POCT GLUCOSE: 137 MG/DL (ref 70–110)
POCT GLUCOSE: 139 MG/DL (ref 70–110)
POCT GLUCOSE: 142 MG/DL (ref 70–110)
POCT GLUCOSE: 142 MG/DL (ref 70–110)
POCT GLUCOSE: 148 MG/DL (ref 70–110)
POCT GLUCOSE: 149 MG/DL (ref 70–110)
POCT GLUCOSE: 149 MG/DL (ref 70–110)
POCT GLUCOSE: 152 MG/DL (ref 70–110)
POCT GLUCOSE: 161 MG/DL (ref 70–110)
POCT GLUCOSE: 163 MG/DL (ref 70–110)
POCT GLUCOSE: 171 MG/DL (ref 70–110)
POCT GLUCOSE: 176 MG/DL (ref 70–110)
POCT GLUCOSE: 176 MG/DL (ref 70–110)
POCT GLUCOSE: 179 MG/DL (ref 70–110)
POCT GLUCOSE: 183 MG/DL (ref 70–110)
POCT GLUCOSE: 185 MG/DL (ref 70–110)
POCT GLUCOSE: 190 MG/DL (ref 70–110)
POCT GLUCOSE: 191 MG/DL (ref 70–110)
POCT GLUCOSE: 202 MG/DL (ref 70–110)
POCT GLUCOSE: 215 MG/DL (ref 70–110)
POCT GLUCOSE: 219 MG/DL (ref 70–110)
POCT GLUCOSE: 228 MG/DL (ref 70–110)
POCT GLUCOSE: 235 MG/DL (ref 70–110)
POCT GLUCOSE: 237 MG/DL (ref 70–110)
POCT GLUCOSE: 238 MG/DL (ref 70–110)
POCT GLUCOSE: 241 MG/DL (ref 70–110)
POCT GLUCOSE: 241 MG/DL (ref 70–110)
POCT GLUCOSE: 246 MG/DL (ref 70–110)
POCT GLUCOSE: 259 MG/DL (ref 70–110)
POCT GLUCOSE: 261 MG/DL (ref 70–110)
POCT GLUCOSE: 262 MG/DL (ref 70–110)
POCT GLUCOSE: 263 MG/DL (ref 70–110)
POCT GLUCOSE: 283 MG/DL (ref 70–110)
POCT GLUCOSE: 284 MG/DL (ref 70–110)
POCT GLUCOSE: 80 MG/DL (ref 70–110)
POCT GLUCOSE: 94 MG/DL (ref 70–110)
POTASSIUM SERPL-SCNC: 3.1 MMOL/L (ref 3.5–5.1)
POTASSIUM SERPL-SCNC: 3.3 MMOL/L (ref 3.5–5.1)
POTASSIUM SERPL-SCNC: 3.5 MMOL/L (ref 3.5–5.1)
POTASSIUM SERPL-SCNC: 3.5 MMOL/L (ref 3.5–5.1)
POTASSIUM SERPL-SCNC: 3.7 MMOL/L (ref 3.5–5.1)
POTASSIUM SERPL-SCNC: 3.9 MMOL/L (ref 3.5–5.1)
POTASSIUM SERPL-SCNC: 4 MMOL/L (ref 3.5–5.1)
POTASSIUM SERPL-SCNC: 4.1 MMOL/L (ref 3.5–5.1)
POTASSIUM SERPL-SCNC: 4.1 MMOL/L (ref 3.5–5.1)
POTASSIUM SERPL-SCNC: 4.2 MMOL/L (ref 3.5–5.1)
POTASSIUM SERPL-SCNC: 4.4 MMOL/L (ref 3.5–5.1)
POTASSIUM SERPL-SCNC: 4.6 MMOL/L (ref 3.5–5.1)
POTASSIUM SERPL-SCNC: 5.1 MMOL/L (ref 3.5–5.1)
POTASSIUM SERPL-SCNC: 5.8 MMOL/L (ref 3.5–5.1)
PROCALCITONIN SERPL IA-MCNC: 0.86 NG/ML
PROT FLD-MCNC: NORMAL G/DL
PROT SERPL-MCNC: 5.8 G/DL (ref 6–8.4)
PROT SERPL-MCNC: 6.3 G/DL (ref 6–8.4)
PROT SERPL-MCNC: 6.6 G/DL (ref 6–8.4)
PROT SERPL-MCNC: 6.9 G/DL (ref 6–8.4)
PROT SERPL-MCNC: 7 G/DL (ref 6–8.4)
PROT SERPL-MCNC: 7.2 G/DL (ref 6–8.4)
PROT SERPL-MCNC: 7.5 G/DL (ref 6–8.4)
PROT SERPL-MCNC: 7.7 G/DL (ref 6–8.4)
PROT UR QL STRIP: ABNORMAL
PV MEAN GRADIENT: 0.82 MMHG
RA MAJOR: 4.04 CM
RA PRESSURE: 3 MMHG
RA WIDTH: 2.57 CM
RBC # BLD AUTO: 3.43 M/UL (ref 4–5.4)
RBC # BLD AUTO: 3.55 M/UL (ref 4–5.4)
RBC # BLD AUTO: 3.68 M/UL (ref 4–5.4)
RBC # BLD AUTO: 3.68 M/UL (ref 4–5.4)
RBC # BLD AUTO: 3.87 M/UL (ref 4–5.4)
RBC # BLD AUTO: 4.04 M/UL (ref 4–5.4)
RBC # BLD AUTO: 4.21 M/UL (ref 4–5.4)
RBC # BLD AUTO: 4.24 M/UL (ref 4–5.4)
RBC # BLD AUTO: 4.24 M/UL (ref 4–5.4)
RBC # BLD AUTO: 4.37 M/UL (ref 4–5.4)
RBC # BLD AUTO: 4.43 M/UL (ref 4–5.4)
RBC # BLD AUTO: 4.57 M/UL (ref 4–5.4)
RBC # BLD AUTO: 4.57 M/UL (ref 4–5.4)
RBC # BLD AUTO: 4.64 M/UL (ref 4–5.4)
RBC # BLD AUTO: 4.68 M/UL (ref 4–5.4)
RBC #/AREA URNS HPF: 1 /HPF (ref 0–4)
RBC #/AREA URNS HPF: 2 /HPF (ref 0–4)
SARS-COV-2 RDRP RESP QL NAA+PROBE: NEGATIVE
SINUS: 2.78 CM
SODIUM SERPL-SCNC: 135 MMOL/L (ref 136–145)
SODIUM SERPL-SCNC: 136 MMOL/L (ref 136–145)
SODIUM SERPL-SCNC: 137 MMOL/L (ref 136–145)
SODIUM SERPL-SCNC: 137 MMOL/L (ref 136–145)
SODIUM SERPL-SCNC: 138 MMOL/L (ref 136–145)
SODIUM SERPL-SCNC: 139 MMOL/L (ref 136–145)
SODIUM SERPL-SCNC: 139 MMOL/L (ref 136–145)
SODIUM SERPL-SCNC: 140 MMOL/L (ref 136–145)
SODIUM UR-SCNC: 37 MMOL/L (ref 20–250)
SODIUM UR-SCNC: 37 MMOL/L (ref 20–250)
SP GR UR STRIP: 1 (ref 1–1.03)
SP GR UR STRIP: 1.01 (ref 1–1.03)
SP GR UR STRIP: 1.02 (ref 1–1.03)
SQUAMOUS #/AREA URNS HPF: 3 /HPF
SQUAMOUS #/AREA URNS HPF: 3 /HPF
STJ: 2.81 CM
T4 FREE SERPL-MCNC: 0.97 NG/DL (ref 0.71–1.51)
T4 FREE SERPL-MCNC: 1.05 NG/DL (ref 0.71–1.51)
TDI LATERAL: 0.11 M/S
TDI SEPTAL: 0.07 M/S
TDI: 0.09 M/S
TR MAX PG: 38 MMHG
TRICUSPID ANNULAR PLANE SYSTOLIC EXCURSION: 1.53 CM
TRIGL SERPL-MCNC: 72 MG/DL (ref 30–150)
TROPONIN I SERPL DL<=0.01 NG/ML-MCNC: <0.006 NG/ML (ref 0–0.03)
TSH SERPL DL<=0.005 MIU/L-ACNC: 1.12 UIU/ML (ref 0.4–4)
TSH SERPL DL<=0.005 MIU/L-ACNC: 4.97 UIU/ML (ref 0.4–4)
TSH SERPL DL<=0.005 MIU/L-ACNC: 5.99 UIU/ML (ref 0.4–4)
TV REST PULMONARY ARTERY PRESSURE: 41 MMHG
URN SPEC COLLECT METH UR: ABNORMAL
UROBILINOGEN UR STRIP-ACNC: NEGATIVE EU/DL
VANCOMYCIN SERPL-MCNC: 13.3 UG/ML
VANCOMYCIN SERPL-MCNC: 21.6 UG/ML
WBC # BLD AUTO: 10.42 K/UL (ref 3.9–12.7)
WBC # BLD AUTO: 10.51 K/UL (ref 3.9–12.7)
WBC # BLD AUTO: 10.69 K/UL (ref 3.9–12.7)
WBC # BLD AUTO: 10.99 K/UL (ref 3.9–12.7)
WBC # BLD AUTO: 13.09 K/UL (ref 3.9–12.7)
WBC # BLD AUTO: 13.54 K/UL (ref 3.9–12.7)
WBC # BLD AUTO: 13.81 K/UL (ref 3.9–12.7)
WBC # BLD AUTO: 17.43 K/UL (ref 3.9–12.7)
WBC # BLD AUTO: 17.89 K/UL (ref 3.9–12.7)
WBC # BLD AUTO: 20.23 K/UL (ref 3.9–12.7)
WBC # BLD AUTO: 22.05 K/UL (ref 3.9–12.7)
WBC # BLD AUTO: 26.52 K/UL (ref 3.9–12.7)
WBC # BLD AUTO: 29.21 K/UL (ref 3.9–12.7)
WBC # BLD AUTO: 7.96 K/UL (ref 3.9–12.7)
WBC # BLD AUTO: 9.09 K/UL (ref 3.9–12.7)
WBC #/AREA URNS HPF: 1 /HPF (ref 0–5)
WBC #/AREA URNS HPF: 1 /HPF (ref 0–5)
WBC #/AREA URNS HPF: 11 /HPF (ref 0–5)

## 2022-01-01 PROCEDURE — 99215 PR OFFICE/OUTPT VISIT, EST, LEVL V, 40-54 MIN: ICD-10-PCS | Mod: S$GLB,,, | Performed by: INTERNAL MEDICINE

## 2022-01-01 PROCEDURE — 25000003 PHARM REV CODE 250: Mod: HCNC | Performed by: NURSE PRACTITIONER

## 2022-01-01 PROCEDURE — 1157F ADVNC CARE PLAN IN RCRD: CPT | Mod: CPTII,S$GLB,, | Performed by: ORTHOPAEDIC SURGERY

## 2022-01-01 PROCEDURE — 1159F MED LIST DOCD IN RCRD: CPT | Mod: CPTII,S$GLB,, | Performed by: INTERNAL MEDICINE

## 2022-01-01 PROCEDURE — 94660 CPAP INITIATION&MGMT: CPT | Mod: HCNC

## 2022-01-01 PROCEDURE — 63600175 PHARM REV CODE 636 W HCPCS: Mod: HCNC | Performed by: EMERGENCY MEDICINE

## 2022-01-01 PROCEDURE — 1111F DSCHRG MED/CURRENT MED MERGE: CPT | Mod: CPTII,S$GLB,, | Performed by: INTERNAL MEDICINE

## 2022-01-01 PROCEDURE — 96360 HYDRATION IV INFUSION INIT: CPT

## 2022-01-01 PROCEDURE — 99233 PR SUBSEQUENT HOSPITAL CARE,LEVL III: ICD-10-PCS | Mod: 57,HCNC,, | Performed by: SURGERY

## 2022-01-01 PROCEDURE — 94799 UNLISTED PULMONARY SVC/PX: CPT | Mod: HCNC

## 2022-01-01 PROCEDURE — 99900035 HC TECH TIME PER 15 MIN (STAT): Mod: HCNC

## 2022-01-01 PROCEDURE — 99214 OFFICE O/P EST MOD 30 MIN: CPT | Mod: 25,S$GLB,, | Performed by: OTOLARYNGOLOGY

## 2022-01-01 PROCEDURE — 1101F PT FALLS ASSESS-DOCD LE1/YR: CPT | Mod: CPTII,S$GLB,, | Performed by: INTERNAL MEDICINE

## 2022-01-01 PROCEDURE — 80053 COMPREHEN METABOLIC PANEL: CPT | Mod: HCNC | Performed by: NURSE PRACTITIONER

## 2022-01-01 PROCEDURE — 1125F AMNT PAIN NOTED PAIN PRSNT: CPT | Mod: HCNC,CPTII,S$GLB, | Performed by: FAMILY MEDICINE

## 2022-01-01 PROCEDURE — 87070 CULTURE OTHR SPECIMN AEROBIC: CPT | Mod: HCNC | Performed by: INTERNAL MEDICINE

## 2022-01-01 PROCEDURE — 77014 HC CT GUIDANCE RADIATION THERAPY FLDS PLACEMENT: CPT | Mod: TC | Performed by: RADIOLOGY

## 2022-01-01 PROCEDURE — 3077F PR MOST RECENT SYSTOLIC BLOOD PRESSURE >= 140 MM HG: ICD-10-PCS | Mod: HCNC,CPTII,S$GLB, | Performed by: FAMILY MEDICINE

## 2022-01-01 PROCEDURE — 87070 CULTURE OTHR SPECIMN AEROBIC: CPT | Mod: HCNC | Performed by: SURGERY

## 2022-01-01 PROCEDURE — 87086 URINE CULTURE/COLONY COUNT: CPT | Mod: HCNC | Performed by: NURSE PRACTITIONER

## 2022-01-01 PROCEDURE — 80053 COMPREHEN METABOLIC PANEL: CPT | Mod: HCNC | Performed by: EMERGENCY MEDICINE

## 2022-01-01 PROCEDURE — 27000221 HC OXYGEN, UP TO 24 HOURS: Mod: HCNC

## 2022-01-01 PROCEDURE — 3078F DIAST BP <80 MM HG: CPT | Mod: HCNC,CPTII,S$GLB, | Performed by: NURSE PRACTITIONER

## 2022-01-01 PROCEDURE — 1159F PR MEDICATION LIST DOCUMENTED IN MEDICAL RECORD: ICD-10-PCS | Mod: CPTII,S$GLB,, | Performed by: RADIOLOGY

## 2022-01-01 PROCEDURE — 99497 ADVNCD CARE PLAN 30 MIN: CPT | Mod: S$GLB,,, | Performed by: PHYSICIAN ASSISTANT

## 2022-01-01 PROCEDURE — 1111F DSCHRG MED/CURRENT MED MERGE: CPT | Mod: CPTII,S$GLB,, | Performed by: ORTHOPAEDIC SURGERY

## 2022-01-01 PROCEDURE — 99233 PR SUBSEQUENT HOSPITAL CARE,LEVL III: ICD-10-PCS | Mod: NSCH,HCNC,, | Performed by: INTERNAL MEDICINE

## 2022-01-01 PROCEDURE — 25000003 PHARM REV CODE 250: Mod: HCNC | Performed by: SURGERY

## 2022-01-01 PROCEDURE — 1159F PR MEDICATION LIST DOCUMENTED IN MEDICAL RECORD: ICD-10-PCS | Mod: CPTII,S$GLB,, | Performed by: INTERNAL MEDICINE

## 2022-01-01 PROCEDURE — 1111F PR DISCHARGE MEDS RECONCILED W/ CURRENT OUTPATIENT MED LIST: ICD-10-PCS | Mod: CPTII,S$GLB,, | Performed by: INTERNAL MEDICINE

## 2022-01-01 PROCEDURE — 1125F PR PAIN SEVERITY QUANTIFIED, PAIN PRESENT: ICD-10-PCS | Mod: CPTII,S$GLB,, | Performed by: OTOLARYNGOLOGY

## 2022-01-01 PROCEDURE — 1157F PR ADVANCE CARE PLAN OR EQUIV PRESENT IN MEDICAL RECORD: ICD-10-PCS | Mod: CPTII,S$GLB,, | Performed by: INTERNAL MEDICINE

## 2022-01-01 PROCEDURE — 99214 PR OFFICE/OUTPT VISIT, EST, LEVL IV, 30-39 MIN: ICD-10-PCS | Mod: HCNC,S$GLB,, | Performed by: NURSE PRACTITIONER

## 2022-01-01 PROCEDURE — 27000207 HC ISOLATION: Mod: HCNC

## 2022-01-01 PROCEDURE — 99499 UNLISTED E&M SERVICE: CPT | Mod: S$GLB,,, | Performed by: INTERNAL MEDICINE

## 2022-01-01 PROCEDURE — 3078F DIAST BP <80 MM HG: CPT | Mod: CPTII,S$GLB,, | Performed by: INTERNAL MEDICINE

## 2022-01-01 PROCEDURE — 3075F PR MOST RECENT SYSTOLIC BLOOD PRESS GE 130-139MM HG: ICD-10-PCS | Mod: CPTII,S$GLB,, | Performed by: PHYSICIAN ASSISTANT

## 2022-01-01 PROCEDURE — 83880 ASSAY OF NATRIURETIC PEPTIDE: CPT | Performed by: EMERGENCY MEDICINE

## 2022-01-01 PROCEDURE — 36415 COLL VENOUS BLD VENIPUNCTURE: CPT | Mod: HCNC | Performed by: INTERNAL MEDICINE

## 2022-01-01 PROCEDURE — 99999 PR PBB SHADOW E&M-EST. PATIENT-LVL II: ICD-10-PCS | Mod: PBBFAC,,, | Performed by: OTOLARYNGOLOGY

## 2022-01-01 PROCEDURE — 85025 COMPLETE CBC W/AUTO DIFF WBC: CPT | Mod: HCNC | Performed by: NURSE PRACTITIONER

## 2022-01-01 PROCEDURE — 85651 RBC SED RATE NONAUTOMATED: CPT | Mod: HCNC | Performed by: NURSE PRACTITIONER

## 2022-01-01 PROCEDURE — G0378 HOSPITAL OBSERVATION PER HR: HCPCS | Mod: HCNC

## 2022-01-01 PROCEDURE — 99499 RISK ADDL DX/OHS AUDIT: ICD-10-PCS | Mod: S$GLB,,, | Performed by: INTERNAL MEDICINE

## 2022-01-01 PROCEDURE — 25000242 PHARM REV CODE 250 ALT 637 W/ HCPCS: Mod: HCNC | Performed by: FAMILY MEDICINE

## 2022-01-01 PROCEDURE — 99215 OFFICE O/P EST HI 40 MIN: CPT | Mod: 95,,, | Performed by: PHYSICIAN ASSISTANT

## 2022-01-01 PROCEDURE — 27000221 HC OXYGEN, UP TO 24 HOURS

## 2022-01-01 PROCEDURE — 3288F PR FALLS RISK ASSESSMENT DOCUMENTED: ICD-10-PCS | Mod: HCNC,CPTII,S$GLB, | Performed by: NURSE PRACTITIONER

## 2022-01-01 PROCEDURE — 36415 COLL VENOUS BLD VENIPUNCTURE: CPT | Performed by: NURSE PRACTITIONER

## 2022-01-01 PROCEDURE — 3288F PR FALLS RISK ASSESSMENT DOCUMENTED: ICD-10-PCS | Mod: CPTII,S$GLB,, | Performed by: INTERNAL MEDICINE

## 2022-01-01 PROCEDURE — 1125F PR PAIN SEVERITY QUANTIFIED, PAIN PRESENT: ICD-10-PCS | Mod: CPTII,S$GLB,, | Performed by: RADIOLOGY

## 2022-01-01 PROCEDURE — 25000003 PHARM REV CODE 250: Performed by: INTERNAL MEDICINE

## 2022-01-01 PROCEDURE — 99999 PR PBB SHADOW E&M-EST. PATIENT-LVL II: CPT | Mod: PBBFAC,,, | Performed by: OTOLARYNGOLOGY

## 2022-01-01 PROCEDURE — 1123F PR ADV CARE PLAN DISCUSSED, PLAN OR SURROGATE DOCUMENTED: ICD-10-PCS | Mod: CPTII,95,, | Performed by: PHYSICIAN ASSISTANT

## 2022-01-01 PROCEDURE — 94761 N-INVAS EAR/PLS OXIMETRY MLT: CPT | Mod: HCNC

## 2022-01-01 PROCEDURE — 3288F FALL RISK ASSESSMENT DOCD: CPT | Mod: HCNC,CPTII,S$GLB, | Performed by: FAMILY MEDICINE

## 2022-01-01 PROCEDURE — U0002 COVID-19 LAB TEST NON-CDC: HCPCS | Mod: HCNC | Performed by: EMERGENCY MEDICINE

## 2022-01-01 PROCEDURE — 99214 OFFICE O/P EST MOD 30 MIN: CPT | Mod: S$GLB,,, | Performed by: RADIOLOGY

## 2022-01-01 PROCEDURE — 82570 ASSAY OF URINE CREATININE: CPT | Mod: HCNC | Performed by: SURGERY

## 2022-01-01 PROCEDURE — 25000003 PHARM REV CODE 250: Mod: HCNC | Performed by: INTERNAL MEDICINE

## 2022-01-01 PROCEDURE — 36000704 HC OR TIME LEV I 1ST 15 MIN: Mod: HCNC | Performed by: SURGERY

## 2022-01-01 PROCEDURE — 77338 PR  MLC IMRT DESIGN & CONSTRUCTION PER IMRT PLAN: ICD-10-PCS | Mod: 26,,, | Performed by: RADIOLOGY

## 2022-01-01 PROCEDURE — 99223 1ST HOSP IP/OBS HIGH 75: CPT | Mod: NSCH,HCNC,, | Performed by: INTERNAL MEDICINE

## 2022-01-01 PROCEDURE — 84300 ASSAY OF URINE SODIUM: CPT | Mod: HCNC | Performed by: SURGERY

## 2022-01-01 PROCEDURE — 99999 PR PBB SHADOW E&M-EST. PATIENT-LVL V: ICD-10-PCS | Mod: PBBFAC,,, | Performed by: PHYSICIAN ASSISTANT

## 2022-01-01 PROCEDURE — 1100F PTFALLS ASSESS-DOCD GE2>/YR: CPT | Mod: CPTII,S$GLB,, | Performed by: INTERNAL MEDICINE

## 2022-01-01 PROCEDURE — 77338 DESIGN MLC DEVICE FOR IMRT: CPT | Mod: 26,,, | Performed by: RADIOLOGY

## 2022-01-01 PROCEDURE — 86140 C-REACTIVE PROTEIN: CPT | Mod: HCNC | Performed by: NURSE PRACTITIONER

## 2022-01-01 PROCEDURE — 1123F PR ADV CARE PLAN DISCUSSED, PLAN OR SURROGATE DOCUMENTED: ICD-10-PCS | Mod: CPTII,S$GLB,, | Performed by: PHYSICIAN ASSISTANT

## 2022-01-01 PROCEDURE — 1101F PR PT FALLS ASSESS DOC 0-1 FALLS W/OUT INJ PAST YR: ICD-10-PCS | Mod: CPTII,S$GLB,, | Performed by: ORTHOPAEDIC SURGERY

## 2022-01-01 PROCEDURE — 99999 PR PBB SHADOW E&M-EST. PATIENT-LVL V: ICD-10-PCS | Mod: PBBFAC,,, | Performed by: NURSE PRACTITIONER

## 2022-01-01 PROCEDURE — 80069 RENAL FUNCTION PANEL: CPT | Mod: HCNC | Performed by: INTERNAL MEDICINE

## 2022-01-01 PROCEDURE — 1159F MED LIST DOCD IN RCRD: CPT | Mod: CPTII,95,, | Performed by: PHYSICIAN ASSISTANT

## 2022-01-01 PROCEDURE — 83735 ASSAY OF MAGNESIUM: CPT | Mod: HCNC | Performed by: NURSE PRACTITIONER

## 2022-01-01 PROCEDURE — 1160F RVW MEDS BY RX/DR IN RCRD: CPT | Mod: CPTII,95,, | Performed by: PHYSICIAN ASSISTANT

## 2022-01-01 PROCEDURE — 97530 THERAPEUTIC ACTIVITIES: CPT | Mod: HCNC,CQ

## 2022-01-01 PROCEDURE — 1159F PR MEDICATION LIST DOCUMENTED IN MEDICAL RECORD: ICD-10-PCS | Mod: CPTII,S$GLB,, | Performed by: ORTHOPAEDIC SURGERY

## 2022-01-01 PROCEDURE — 1101F PR PT FALLS ASSESS DOC 0-1 FALLS W/OUT INJ PAST YR: ICD-10-PCS | Mod: CPTII,S$GLB,, | Performed by: PHYSICIAN ASSISTANT

## 2022-01-01 PROCEDURE — 87186 SC STD MICRODIL/AGAR DIL: CPT | Mod: HCNC | Performed by: INTERNAL MEDICINE

## 2022-01-01 PROCEDURE — 25000003 PHARM REV CODE 250: Mod: HCNC | Performed by: EMERGENCY MEDICINE

## 2022-01-01 PROCEDURE — 25000003 PHARM REV CODE 250: Mod: HCNC | Performed by: STUDENT IN AN ORGANIZED HEALTH CARE EDUCATION/TRAINING PROGRAM

## 2022-01-01 PROCEDURE — 1157F ADVNC CARE PLAN IN RCRD: CPT | Mod: HCNC,CPTII,S$GLB, | Performed by: FAMILY MEDICINE

## 2022-01-01 PROCEDURE — 77014 PR  CT GUIDANCE PLACEMENT RAD THERAPY FIELDS: CPT | Mod: 26,,, | Performed by: RADIOLOGY

## 2022-01-01 PROCEDURE — 99285 EMERGENCY DEPT VISIT HI MDM: CPT | Mod: 25,HCNC

## 2022-01-01 PROCEDURE — 1160F PR REVIEW ALL MEDS BY PRESCRIBER/CLIN PHARMACIST DOCUMENTED: ICD-10-PCS | Mod: CPTII,S$GLB,, | Performed by: NURSE PRACTITIONER

## 2022-01-01 PROCEDURE — 80053 COMPREHEN METABOLIC PANEL: CPT | Performed by: EMERGENCY MEDICINE

## 2022-01-01 PROCEDURE — 99499 RISK ADDL DX/OHS AUDIT: ICD-10-PCS | Mod: S$GLB,,, | Performed by: ORTHOPAEDIC SURGERY

## 2022-01-01 PROCEDURE — 84484 ASSAY OF TROPONIN QUANT: CPT | Performed by: EMERGENCY MEDICINE

## 2022-01-01 PROCEDURE — 99214 PR OFFICE/OUTPT VISIT, EST, LEVL IV, 30-39 MIN: ICD-10-PCS | Mod: S$GLB,,, | Performed by: RADIOLOGY

## 2022-01-01 PROCEDURE — 99900035 HC TECH TIME PER 15 MIN (STAT)

## 2022-01-01 PROCEDURE — 99232 SBSQ HOSP IP/OBS MODERATE 35: CPT | Mod: HCNC,,, | Performed by: INTERNAL MEDICINE

## 2022-01-01 PROCEDURE — 99285 EMERGENCY DEPT VISIT HI MDM: CPT | Mod: 25

## 2022-01-01 PROCEDURE — 1100F PR PT FALLS ASSESS DOC 2+ FALLS/FALL W/INJURY/YR: ICD-10-PCS | Mod: CPTII,S$GLB,, | Performed by: OTOLARYNGOLOGY

## 2022-01-01 PROCEDURE — 3078F DIAST BP <80 MM HG: CPT | Mod: CPTII,S$GLB,, | Performed by: NURSE PRACTITIONER

## 2022-01-01 PROCEDURE — 84439 ASSAY OF FREE THYROXINE: CPT | Performed by: INTERNAL MEDICINE

## 2022-01-01 PROCEDURE — 63600175 PHARM REV CODE 636 W HCPCS: Mod: HCNC | Performed by: NURSE PRACTITIONER

## 2022-01-01 PROCEDURE — G0378 HOSPITAL OBSERVATION PER HR: HCPCS

## 2022-01-01 PROCEDURE — 99999 PR PBB SHADOW E&M-EST. PATIENT-LVL V: CPT | Mod: PBBFAC,HCNC,, | Performed by: NURSE PRACTITIONER

## 2022-01-01 PROCEDURE — 3075F SYST BP GE 130 - 139MM HG: CPT | Mod: CPTII,S$GLB,, | Performed by: SURGERY

## 2022-01-01 PROCEDURE — 99214 PR OFFICE/OUTPT VISIT, EST, LEVL IV, 30-39 MIN: ICD-10-PCS | Mod: S$GLB,,, | Performed by: INTERNAL MEDICINE

## 2022-01-01 PROCEDURE — 94640 AIRWAY INHALATION TREATMENT: CPT | Mod: HCNC

## 2022-01-01 PROCEDURE — 99999 PR PBB SHADOW E&M-EST. PATIENT-LVL IV: ICD-10-PCS | Mod: PBBFAC,,, | Performed by: ORTHOPAEDIC SURGERY

## 2022-01-01 PROCEDURE — 3288F FALL RISK ASSESSMENT DOCD: CPT | Mod: CPTII,S$GLB,, | Performed by: INTERNAL MEDICINE

## 2022-01-01 PROCEDURE — 1126F AMNT PAIN NOTED NONE PRSNT: CPT | Mod: CPTII,S$GLB,, | Performed by: SURGERY

## 2022-01-01 PROCEDURE — 3078F DIAST BP <80 MM HG: CPT | Mod: CPTII,S$GLB,, | Performed by: SURGERY

## 2022-01-01 PROCEDURE — 1126F PR PAIN SEVERITY QUANTIFIED, NO PAIN PRESENT: ICD-10-PCS | Mod: CPTII,S$GLB,, | Performed by: INTERNAL MEDICINE

## 2022-01-01 PROCEDURE — 92611 MOTION FLUOROSCOPY/SWALLOW: CPT | Mod: HCNC

## 2022-01-01 PROCEDURE — 1160F RVW MEDS BY RX/DR IN RCRD: CPT | Mod: CPTII,S$GLB,, | Performed by: NURSE PRACTITIONER

## 2022-01-01 PROCEDURE — 3288F FALL RISK ASSESSMENT DOCD: CPT | Mod: CPTII,S$GLB,, | Performed by: PHYSICIAN ASSISTANT

## 2022-01-01 PROCEDURE — 1159F PR MEDICATION LIST DOCUMENTED IN MEDICAL RECORD: ICD-10-PCS | Mod: CPTII,S$GLB,, | Performed by: PHYSICIAN ASSISTANT

## 2022-01-01 PROCEDURE — 77334 RADIATION TREATMENT AID(S): CPT | Mod: 26,,, | Performed by: RADIOLOGY

## 2022-01-01 PROCEDURE — 63600175 PHARM REV CODE 636 W HCPCS: Mod: HCNC | Performed by: INTERNAL MEDICINE

## 2022-01-01 PROCEDURE — 82945 GLUCOSE OTHER FLUID: CPT | Mod: HCNC | Performed by: INTERNAL MEDICINE

## 2022-01-01 PROCEDURE — 99215 PR OFFICE/OUTPT VISIT, EST, LEVL V, 40-54 MIN: ICD-10-PCS | Mod: 95,,, | Performed by: PHYSICIAN ASSISTANT

## 2022-01-01 PROCEDURE — 99223 1ST HOSP IP/OBS HIGH 75: CPT | Mod: HCNC,,, | Performed by: INTERNAL MEDICINE

## 2022-01-01 PROCEDURE — 99999 PR PBB SHADOW E&M-EST. PATIENT-LVL V: ICD-10-PCS | Mod: PBBFAC,,, | Performed by: INTERNAL MEDICINE

## 2022-01-01 PROCEDURE — 99214 OFFICE O/P EST MOD 30 MIN: CPT | Mod: S$GLB,,, | Performed by: INTERNAL MEDICINE

## 2022-01-01 PROCEDURE — 80061 LIPID PANEL: CPT | Mod: HCNC | Performed by: NURSE PRACTITIONER

## 2022-01-01 PROCEDURE — 1125F AMNT PAIN NOTED PAIN PRSNT: CPT | Mod: CPTII,S$GLB,, | Performed by: INTERNAL MEDICINE

## 2022-01-01 PROCEDURE — 96376 TX/PRO/DX INJ SAME DRUG ADON: CPT

## 2022-01-01 PROCEDURE — 1160F PR REVIEW ALL MEDS BY PRESCRIBER/CLIN PHARMACIST DOCUMENTED: ICD-10-PCS | Mod: CPTII,S$GLB,, | Performed by: FAMILY MEDICINE

## 2022-01-01 PROCEDURE — 80048 BASIC METABOLIC PNL TOTAL CA: CPT | Mod: HCNC | Performed by: NURSE PRACTITIONER

## 2022-01-01 PROCEDURE — 37000008 HC ANESTHESIA 1ST 15 MINUTES: Mod: HCNC | Performed by: SURGERY

## 2022-01-01 PROCEDURE — 99214 PR OFFICE/OUTPT VISIT, EST, LEVL IV, 30-39 MIN: ICD-10-PCS | Mod: S$GLB,,, | Performed by: NURSE PRACTITIONER

## 2022-01-01 PROCEDURE — 1157F PR ADVANCE CARE PLAN OR EQUIV PRESENT IN MEDICAL RECORD: ICD-10-PCS | Mod: CPTII,S$GLB,, | Performed by: OTOLARYNGOLOGY

## 2022-01-01 PROCEDURE — 63600175 PHARM REV CODE 636 W HCPCS: Performed by: NURSE PRACTITIONER

## 2022-01-01 PROCEDURE — 63600175 PHARM REV CODE 636 W HCPCS: Mod: HCNC | Performed by: SURGERY

## 2022-01-01 PROCEDURE — 1100F PR PT FALLS ASSESS DOC 2+ FALLS/FALL W/INJURY/YR: ICD-10-PCS | Mod: CPTII,S$GLB,, | Performed by: INTERNAL MEDICINE

## 2022-01-01 PROCEDURE — 1159F MED LIST DOCD IN RCRD: CPT | Mod: CPTII,S$GLB,, | Performed by: RADIOLOGY

## 2022-01-01 PROCEDURE — 1125F AMNT PAIN NOTED PAIN PRSNT: CPT | Mod: CPTII,S$GLB,, | Performed by: RADIOLOGY

## 2022-01-01 PROCEDURE — 25000242 PHARM REV CODE 250 ALT 637 W/ HCPCS: Mod: HCNC | Performed by: NURSE PRACTITIONER

## 2022-01-01 PROCEDURE — 1160F RVW MEDS BY RX/DR IN RCRD: CPT | Mod: HCNC,CPTII,S$GLB, | Performed by: FAMILY MEDICINE

## 2022-01-01 PROCEDURE — 88307 TISSUE EXAM BY PATHOLOGIST: CPT | Performed by: PATHOLOGY

## 2022-01-01 PROCEDURE — A4216 STERILE WATER/SALINE, 10 ML: HCPCS | Mod: HCNC | Performed by: NURSE PRACTITIONER

## 2022-01-01 PROCEDURE — 78815 PET IMAGE W/CT SKULL-THIGH: CPT | Mod: 26,PS,, | Performed by: RADIOLOGY

## 2022-01-01 PROCEDURE — 87186 SC STD MICRODIL/AGAR DIL: CPT | Mod: HCNC | Performed by: SURGERY

## 2022-01-01 PROCEDURE — 63600175 PHARM REV CODE 636 W HCPCS: Mod: JG | Performed by: INTERNAL MEDICINE

## 2022-01-01 PROCEDURE — 77014 PR  CT GUIDANCE PLACEMENT RAD THERAPY FIELDS: ICD-10-PCS | Mod: 26,,, | Performed by: RADIOLOGY

## 2022-01-01 PROCEDURE — 25000003 PHARM REV CODE 250: Performed by: NURSE PRACTITIONER

## 2022-01-01 PROCEDURE — 1159F PR MEDICATION LIST DOCUMENTED IN MEDICAL RECORD: ICD-10-PCS | Mod: HCNC,CPTII,S$GLB, | Performed by: FAMILY MEDICINE

## 2022-01-01 PROCEDURE — 87040 BLOOD CULTURE FOR BACTERIA: CPT | Mod: HCNC | Performed by: NURSE PRACTITIONER

## 2022-01-01 PROCEDURE — 3075F SYST BP GE 130 - 139MM HG: CPT | Mod: CPTII,S$GLB,, | Performed by: PHYSICIAN ASSISTANT

## 2022-01-01 PROCEDURE — 83605 ASSAY OF LACTIC ACID: CPT | Mod: HCNC | Performed by: INTERNAL MEDICINE

## 2022-01-01 PROCEDURE — 77301 RADIOTHERAPY DOSE PLAN IMRT: CPT | Mod: 26,,, | Performed by: RADIOLOGY

## 2022-01-01 PROCEDURE — 10061 PR DRAIN SKIN ABSCESS COMPLIC: ICD-10-PCS | Mod: HCNC,,, | Performed by: SURGERY

## 2022-01-01 PROCEDURE — 1157F ADVNC CARE PLAN IN RCRD: CPT | Mod: CPTII,S$GLB,, | Performed by: SURGERY

## 2022-01-01 PROCEDURE — 88307 PR  SURG PATH,LEVEL V: ICD-10-PCS | Mod: 26,,, | Performed by: PATHOLOGY

## 2022-01-01 PROCEDURE — 83615 LACTATE (LD) (LDH) ENZYME: CPT | Mod: HCNC | Performed by: NURSE PRACTITIONER

## 2022-01-01 PROCEDURE — 77470 PR  SPECIAL RADIATION TREATMENT: ICD-10-PCS | Mod: 26,59,, | Performed by: RADIOLOGY

## 2022-01-01 PROCEDURE — 3078F DIAST BP <80 MM HG: CPT | Mod: CPTII,S$GLB,, | Performed by: FAMILY MEDICINE

## 2022-01-01 PROCEDURE — 1157F ADVNC CARE PLAN IN RCRD: CPT | Mod: CPTII,S$GLB,, | Performed by: RADIOLOGY

## 2022-01-01 PROCEDURE — 99214 PR OFFICE/OUTPT VISIT, EST, LEVL IV, 30-39 MIN: ICD-10-PCS | Mod: 25,S$GLB,, | Performed by: RADIOLOGY

## 2022-01-01 PROCEDURE — 1160F PR REVIEW ALL MEDS BY PRESCRIBER/CLIN PHARMACIST DOCUMENTED: ICD-10-PCS | Mod: CPTII,S$GLB,, | Performed by: PHYSICIAN ASSISTANT

## 2022-01-01 PROCEDURE — 99999 PR PBB SHADOW E&M-EST. PATIENT-LVL IV: CPT | Mod: PBBFAC,,, | Performed by: SURGERY

## 2022-01-01 PROCEDURE — 36415 COLL VENOUS BLD VENIPUNCTURE: CPT | Performed by: INTERNAL MEDICINE

## 2022-01-01 PROCEDURE — 77334 RADIATION TREATMENT AID(S): CPT | Mod: TC | Performed by: RADIOLOGY

## 2022-01-01 PROCEDURE — 71046 X-RAY EXAM CHEST 2 VIEWS: CPT | Mod: TC

## 2022-01-01 PROCEDURE — 71046 X-RAY EXAM CHEST 2 VIEWS: CPT | Mod: 26,,, | Performed by: RADIOLOGY

## 2022-01-01 PROCEDURE — 36415 COLL VENOUS BLD VENIPUNCTURE: CPT | Mod: HCNC | Performed by: NURSE PRACTITIONER

## 2022-01-01 PROCEDURE — 21400001 HC TELEMETRY ROOM: Mod: HCNC

## 2022-01-01 PROCEDURE — 1157F PR ADVANCE CARE PLAN OR EQUIV PRESENT IN MEDICAL RECORD: ICD-10-PCS | Mod: HCNC,CPTII,S$GLB, | Performed by: NURSE PRACTITIONER

## 2022-01-01 PROCEDURE — 99232 PR SUBSEQUENT HOSPITAL CARE,LEVL II: ICD-10-PCS | Mod: HCNC,,, | Performed by: INTERNAL MEDICINE

## 2022-01-01 PROCEDURE — 3074F PR MOST RECENT SYSTOLIC BLOOD PRESSURE < 130 MM HG: ICD-10-PCS | Mod: CPTII,S$GLB,, | Performed by: INTERNAL MEDICINE

## 2022-01-01 PROCEDURE — 99999 PR PBB SHADOW E&M-EST. PATIENT-LVL V: CPT | Mod: PBBFAC,,, | Performed by: INTERNAL MEDICINE

## 2022-01-01 PROCEDURE — 1160F RVW MEDS BY RX/DR IN RCRD: CPT | Mod: CPTII,S$GLB,, | Performed by: FAMILY MEDICINE

## 2022-01-01 PROCEDURE — 3077F SYST BP >= 140 MM HG: CPT | Mod: CPTII,S$GLB,, | Performed by: INTERNAL MEDICINE

## 2022-01-01 PROCEDURE — 1160F PR REVIEW ALL MEDS BY PRESCRIBER/CLIN PHARMACIST DOCUMENTED: ICD-10-PCS | Mod: CPTII,95,, | Performed by: PHYSICIAN ASSISTANT

## 2022-01-01 PROCEDURE — 1125F PR PAIN SEVERITY QUANTIFIED, PAIN PRESENT: ICD-10-PCS | Mod: CPTII,S$GLB,, | Performed by: PHYSICIAN ASSISTANT

## 2022-01-01 PROCEDURE — 1160F PR REVIEW ALL MEDS BY PRESCRIBER/CLIN PHARMACIST DOCUMENTED: ICD-10-PCS | Mod: HCNC,CPTII,S$GLB, | Performed by: FAMILY MEDICINE

## 2022-01-01 PROCEDURE — 1111F PR DISCHARGE MEDS RECONCILED W/ CURRENT OUTPATIENT MED LIST: ICD-10-PCS | Mod: CPTII,S$GLB,, | Performed by: NURSE PRACTITIONER

## 2022-01-01 PROCEDURE — 1160F RVW MEDS BY RX/DR IN RCRD: CPT | Mod: HCNC,CPTII,S$GLB, | Performed by: NURSE PRACTITIONER

## 2022-01-01 PROCEDURE — 77334 PR  RADN TREATMENT AID(S) COMPLX: ICD-10-PCS | Mod: 26,,, | Performed by: RADIOLOGY

## 2022-01-01 PROCEDURE — 1159F PR MEDICATION LIST DOCUMENTED IN MEDICAL RECORD: ICD-10-PCS | Mod: CPTII,95,, | Performed by: PHYSICIAN ASSISTANT

## 2022-01-01 PROCEDURE — 96374 THER/PROPH/DIAG INJ IV PUSH: CPT

## 2022-01-01 PROCEDURE — 99999 PR PBB SHADOW E&M-EST. PATIENT-LVL IV: CPT | Mod: PBBFAC,,, | Performed by: ORTHOPAEDIC SURGERY

## 2022-01-01 PROCEDURE — 1159F MED LIST DOCD IN RCRD: CPT | Mod: CPTII,S$GLB,, | Performed by: PHYSICIAN ASSISTANT

## 2022-01-01 PROCEDURE — 87040 BLOOD CULTURE FOR BACTERIA: CPT | Mod: HCNC | Performed by: EMERGENCY MEDICINE

## 2022-01-01 PROCEDURE — 99999 PR PBB SHADOW E&M-EST. PATIENT-LVL V: CPT | Mod: PBBFAC,,, | Performed by: NURSE PRACTITIONER

## 2022-01-01 PROCEDURE — 1157F PR ADVANCE CARE PLAN OR EQUIV PRESENT IN MEDICAL RECORD: ICD-10-PCS | Mod: CPTII,S$GLB,, | Performed by: NURSE PRACTITIONER

## 2022-01-01 PROCEDURE — 80048 BASIC METABOLIC PNL TOTAL CA: CPT | Performed by: NURSE PRACTITIONER

## 2022-01-01 PROCEDURE — 77301 RADIOTHERAPY DOSE PLAN IMRT: CPT | Mod: TC | Performed by: RADIOLOGY

## 2022-01-01 PROCEDURE — 1101F PR PT FALLS ASSESS DOC 0-1 FALLS W/OUT INJ PAST YR: ICD-10-PCS | Mod: HCNC,CPTII,S$GLB, | Performed by: FAMILY MEDICINE

## 2022-01-01 PROCEDURE — 27000190 HC CPAP FULL FACE MASK W/VALVE: Mod: HCNC

## 2022-01-01 PROCEDURE — 84484 ASSAY OF TROPONIN QUANT: CPT | Mod: 91 | Performed by: NURSE PRACTITIONER

## 2022-01-01 PROCEDURE — 3288F PR FALLS RISK ASSESSMENT DOCUMENTED: ICD-10-PCS | Mod: CPTII,S$GLB,, | Performed by: RADIOLOGY

## 2022-01-01 PROCEDURE — 78815 PET IMAGE W/CT SKULL-THIGH: CPT | Mod: TC

## 2022-01-01 PROCEDURE — 3288F FALL RISK ASSESSMENT DOCD: CPT | Mod: CPTII,S$GLB,, | Performed by: RADIOLOGY

## 2022-01-01 PROCEDURE — 99499 RISK ADDL DX/OHS AUDIT: ICD-10-PCS | Mod: S$GLB,,, | Performed by: PHYSICIAN ASSISTANT

## 2022-01-01 PROCEDURE — 1111F DSCHRG MED/CURRENT MED MERGE: CPT | Mod: CPTII,S$GLB,, | Performed by: NURSE PRACTITIONER

## 2022-01-01 PROCEDURE — 3078F PR MOST RECENT DIASTOLIC BLOOD PRESSURE < 80 MM HG: ICD-10-PCS | Mod: CPTII,S$GLB,, | Performed by: NURSE PRACTITIONER

## 2022-01-01 PROCEDURE — 99215 PR OFFICE/OUTPT VISIT, EST, LEVL V, 40-54 MIN: ICD-10-PCS | Mod: S$GLB,,, | Performed by: RADIOLOGY

## 2022-01-01 PROCEDURE — 93010 ELECTROCARDIOGRAM REPORT: CPT | Mod: ,,, | Performed by: INTERNAL MEDICINE

## 2022-01-01 PROCEDURE — 83036 HEMOGLOBIN GLYCOSYLATED A1C: CPT | Mod: HCNC | Performed by: NURSE PRACTITIONER

## 2022-01-01 PROCEDURE — 3051F HG A1C>EQUAL 7.0%<8.0%: CPT | Mod: CPTII,S$GLB,, | Performed by: NURSE PRACTITIONER

## 2022-01-01 PROCEDURE — 99215 PR OFFICE/OUTPT VISIT, EST, LEVL V, 40-54 MIN: ICD-10-PCS | Mod: HCNC,S$GLB,, | Performed by: FAMILY MEDICINE

## 2022-01-01 PROCEDURE — 3078F DIAST BP <80 MM HG: CPT | Mod: HCNC,CPTII,S$GLB, | Performed by: FAMILY MEDICINE

## 2022-01-01 PROCEDURE — 1125F AMNT PAIN NOTED PAIN PRSNT: CPT | Mod: CPTII,S$GLB,, | Performed by: PHYSICIAN ASSISTANT

## 2022-01-01 PROCEDURE — 1101F PR PT FALLS ASSESS DOC 0-1 FALLS W/OUT INJ PAST YR: ICD-10-PCS | Mod: CPTII,S$GLB,, | Performed by: NURSE PRACTITIONER

## 2022-01-01 PROCEDURE — 1125F PR PAIN SEVERITY QUANTIFIED, PAIN PRESENT: ICD-10-PCS | Mod: CPTII,S$GLB,, | Performed by: ORTHOPAEDIC SURGERY

## 2022-01-01 PROCEDURE — 1101F PR PT FALLS ASSESS DOC 0-1 FALLS W/OUT INJ PAST YR: ICD-10-PCS | Mod: CPTII,S$GLB,, | Performed by: INTERNAL MEDICINE

## 2022-01-01 PROCEDURE — 83605 ASSAY OF LACTIC ACID: CPT | Mod: HCNC | Performed by: EMERGENCY MEDICINE

## 2022-01-01 PROCEDURE — 85025 COMPLETE CBC W/AUTO DIFF WBC: CPT | Mod: 91,HCNC | Performed by: INTERNAL MEDICINE

## 2022-01-01 PROCEDURE — 3288F FALL RISK ASSESSMENT DOCD: CPT | Mod: CPTII,S$GLB,, | Performed by: OTOLARYNGOLOGY

## 2022-01-01 PROCEDURE — 1159F PR MEDICATION LIST DOCUMENTED IN MEDICAL RECORD: ICD-10-PCS | Mod: HCNC,CPTII,S$GLB, | Performed by: NURSE PRACTITIONER

## 2022-01-01 PROCEDURE — 3077F SYST BP >= 140 MM HG: CPT | Mod: CPTII,S$GLB,, | Performed by: RADIOLOGY

## 2022-01-01 PROCEDURE — 3051F PR MOST RECENT HEMOGLOBIN A1C LEVEL 7.0 - < 8.0%: ICD-10-PCS | Mod: CPTII,S$GLB,, | Performed by: NURSE PRACTITIONER

## 2022-01-01 PROCEDURE — 3078F PR MOST RECENT DIASTOLIC BLOOD PRESSURE < 80 MM HG: ICD-10-PCS | Mod: CPTII,S$GLB,, | Performed by: SURGERY

## 2022-01-01 PROCEDURE — 78815 NM PET CT ROUTINE: ICD-10-PCS | Mod: 26,PS,, | Performed by: RADIOLOGY

## 2022-01-01 PROCEDURE — 87502 INFLUENZA DNA AMP PROBE: CPT

## 2022-01-01 PROCEDURE — 84157 ASSAY OF PROTEIN OTHER: CPT | Mod: HCNC | Performed by: INTERNAL MEDICINE

## 2022-01-01 PROCEDURE — 3077F PR MOST RECENT SYSTOLIC BLOOD PRESSURE >= 140 MM HG: ICD-10-PCS | Mod: CPTII,S$GLB,, | Performed by: RADIOLOGY

## 2022-01-01 PROCEDURE — 82550 ASSAY OF CK (CPK): CPT | Performed by: EMERGENCY MEDICINE

## 2022-01-01 PROCEDURE — 73130 X-RAY EXAM OF HAND: CPT | Mod: TC,RT

## 2022-01-01 PROCEDURE — 3079F PR MOST RECENT DIASTOLIC BLOOD PRESSURE 80-89 MM HG: ICD-10-PCS | Mod: CPTII,S$GLB,, | Performed by: PHYSICIAN ASSISTANT

## 2022-01-01 PROCEDURE — C9399 UNCLASSIFIED DRUGS OR BIOLOG: HCPCS | Mod: HCNC | Performed by: NURSE PRACTITIONER

## 2022-01-01 PROCEDURE — 3074F SYST BP LT 130 MM HG: CPT | Mod: CPTII,S$GLB,, | Performed by: OTOLARYNGOLOGY

## 2022-01-01 PROCEDURE — 1111F PR DISCHARGE MEDS RECONCILED W/ CURRENT OUTPATIENT MED LIST: ICD-10-PCS | Mod: CPTII,S$GLB,, | Performed by: ORTHOPAEDIC SURGERY

## 2022-01-01 PROCEDURE — 99215 OFFICE O/P EST HI 40 MIN: CPT | Mod: S$GLB,,, | Performed by: PHYSICIAN ASSISTANT

## 2022-01-01 PROCEDURE — 99214 OFFICE O/P EST MOD 30 MIN: CPT | Mod: 25,S$GLB,, | Performed by: RADIOLOGY

## 2022-01-01 PROCEDURE — 99999 PR PBB SHADOW E&M-EST. PATIENT-LVL IV: ICD-10-PCS | Mod: PBBFAC,,, | Performed by: RADIOLOGY

## 2022-01-01 PROCEDURE — 85025 COMPLETE CBC W/AUTO DIFF WBC: CPT | Performed by: NURSE PRACTITIONER

## 2022-01-01 PROCEDURE — 99233 SBSQ HOSP IP/OBS HIGH 50: CPT | Mod: NSCH,HCNC,, | Performed by: INTERNAL MEDICINE

## 2022-01-01 PROCEDURE — 1125F AMNT PAIN NOTED PAIN PRSNT: CPT | Mod: CPTII,S$GLB,, | Performed by: ORTHOPAEDIC SURGERY

## 2022-01-01 PROCEDURE — 1101F PT FALLS ASSESS-DOCD LE1/YR: CPT | Mod: CPTII,S$GLB,, | Performed by: NURSE PRACTITIONER

## 2022-01-01 PROCEDURE — 1159F PR MEDICATION LIST DOCUMENTED IN MEDICAL RECORD: ICD-10-PCS | Mod: CPTII,S$GLB,, | Performed by: SURGERY

## 2022-01-01 PROCEDURE — 92610 EVALUATE SWALLOWING FUNCTION: CPT

## 2022-01-01 PROCEDURE — 84443 ASSAY THYROID STIM HORMONE: CPT | Performed by: INTERNAL MEDICINE

## 2022-01-01 PROCEDURE — 71000033 HC RECOVERY, INTIAL HOUR: Mod: HCNC | Performed by: SURGERY

## 2022-01-01 PROCEDURE — 3075F PR MOST RECENT SYSTOLIC BLOOD PRESS GE 130-139MM HG: ICD-10-PCS | Mod: CPTII,S$GLB,, | Performed by: NURSE PRACTITIONER

## 2022-01-01 PROCEDURE — 99499 UNLISTED E&M SERVICE: CPT | Mod: 95,,, | Performed by: PHYSICIAN ASSISTANT

## 2022-01-01 PROCEDURE — 1100F PTFALLS ASSESS-DOCD GE2>/YR: CPT | Mod: CPTII,S$GLB,, | Performed by: OTOLARYNGOLOGY

## 2022-01-01 PROCEDURE — 87077 CULTURE AEROBIC IDENTIFY: CPT | Mod: HCNC | Performed by: SURGERY

## 2022-01-01 PROCEDURE — 3288F PR FALLS RISK ASSESSMENT DOCUMENTED: ICD-10-PCS | Mod: CPTII,S$GLB,, | Performed by: PHYSICIAN ASSISTANT

## 2022-01-01 PROCEDURE — 3288F FALL RISK ASSESSMENT DOCD: CPT | Mod: CPTII,S$GLB,, | Performed by: ORTHOPAEDIC SURGERY

## 2022-01-01 PROCEDURE — 3078F PR MOST RECENT DIASTOLIC BLOOD PRESSURE < 80 MM HG: ICD-10-PCS | Mod: CPTII,S$GLB,, | Performed by: RADIOLOGY

## 2022-01-01 PROCEDURE — 81000 URINALYSIS NONAUTO W/SCOPE: CPT | Mod: HCNC | Performed by: EMERGENCY MEDICINE

## 2022-01-01 PROCEDURE — 99214 OFFICE O/P EST MOD 30 MIN: CPT | Mod: S$GLB,,, | Performed by: NURSE PRACTITIONER

## 2022-01-01 PROCEDURE — 3078F PR MOST RECENT DIASTOLIC BLOOD PRESSURE < 80 MM HG: ICD-10-PCS | Mod: CPTII,S$GLB,, | Performed by: FAMILY MEDICINE

## 2022-01-01 PROCEDURE — 25000003 PHARM REV CODE 250: Mod: HCNC | Performed by: NURSE ANESTHETIST, CERTIFIED REGISTERED

## 2022-01-01 PROCEDURE — 1101F PT FALLS ASSESS-DOCD LE1/YR: CPT | Mod: CPTII,S$GLB,, | Performed by: PHYSICIAN ASSISTANT

## 2022-01-01 PROCEDURE — 1160F RVW MEDS BY RX/DR IN RCRD: CPT | Mod: CPTII,S$GLB,, | Performed by: PHYSICIAN ASSISTANT

## 2022-01-01 PROCEDURE — 99499 UNLISTED E&M SERVICE: CPT | Mod: S$GLB,,, | Performed by: PHYSICIAN ASSISTANT

## 2022-01-01 PROCEDURE — 69105 PR BIOPSY OF EXT AUDITORY CANAL: ICD-10-PCS | Mod: RT,S$GLB,, | Performed by: OTOLARYNGOLOGY

## 2022-01-01 PROCEDURE — 92611 MOTION FLUOROSCOPY/SWALLOW: CPT

## 2022-01-01 PROCEDURE — 77336 RADIATION PHYSICS CONSULT: CPT | Performed by: RADIOLOGY

## 2022-01-01 PROCEDURE — 1101F PT FALLS ASSESS-DOCD LE1/YR: CPT | Mod: CPTII,S$GLB,, | Performed by: ORTHOPAEDIC SURGERY

## 2022-01-01 PROCEDURE — 3074F PR MOST RECENT SYSTOLIC BLOOD PRESSURE < 130 MM HG: ICD-10-PCS | Mod: CPTII,S$GLB,, | Performed by: OTOLARYNGOLOGY

## 2022-01-01 PROCEDURE — 1157F ADVNC CARE PLAN IN RCRD: CPT | Mod: CPTII,S$GLB,, | Performed by: INTERNAL MEDICINE

## 2022-01-01 PROCEDURE — 99215 OFFICE O/P EST HI 40 MIN: CPT | Mod: HCNC,S$GLB,, | Performed by: FAMILY MEDICINE

## 2022-01-01 PROCEDURE — 1160F PR REVIEW ALL MEDS BY PRESCRIBER/CLIN PHARMACIST DOCUMENTED: ICD-10-PCS | Mod: CPTII,S$GLB,, | Performed by: INTERNAL MEDICINE

## 2022-01-01 PROCEDURE — 99999 PR PBB SHADOW E&M-EST. PATIENT-LVL IV: ICD-10-PCS | Mod: PBBFAC,,, | Performed by: PHYSICIAN ASSISTANT

## 2022-01-01 PROCEDURE — 1126F AMNT PAIN NOTED NONE PRSNT: CPT | Mod: CPTII,S$GLB,, | Performed by: INTERNAL MEDICINE

## 2022-01-01 PROCEDURE — 1125F AMNT PAIN NOTED PAIN PRSNT: CPT | Mod: CPTII,S$GLB,, | Performed by: OTOLARYNGOLOGY

## 2022-01-01 PROCEDURE — 71046 XR CHEST PA AND LATERAL: ICD-10-PCS | Mod: 26,HCNC,, | Performed by: RADIOLOGY

## 2022-01-01 PROCEDURE — 25500020 PHARM REV CODE 255: Mod: HCNC | Performed by: INTERNAL MEDICINE

## 2022-01-01 PROCEDURE — 99203 PR OFFICE/OUTPT VISIT, NEW, LEVL III, 30-44 MIN: ICD-10-PCS | Mod: S$GLB,,, | Performed by: ORTHOPAEDIC SURGERY

## 2022-01-01 PROCEDURE — 3078F PR MOST RECENT DIASTOLIC BLOOD PRESSURE < 80 MM HG: ICD-10-PCS | Mod: CPTII,S$GLB,, | Performed by: INTERNAL MEDICINE

## 2022-01-01 PROCEDURE — 1160F RVW MEDS BY RX/DR IN RCRD: CPT | Mod: CPTII,S$GLB,, | Performed by: INTERNAL MEDICINE

## 2022-01-01 PROCEDURE — 80053 COMPREHEN METABOLIC PANEL: CPT | Performed by: INTERNAL MEDICINE

## 2022-01-01 PROCEDURE — 11000001 HC ACUTE MED/SURG PRIVATE ROOM: Mod: HCNC

## 2022-01-01 PROCEDURE — S0109 METHADONE ORAL 5MG: HCPCS | Performed by: NURSE PRACTITIONER

## 2022-01-01 PROCEDURE — 3075F SYST BP GE 130 - 139MM HG: CPT | Mod: CPTII,S$GLB,, | Performed by: FAMILY MEDICINE

## 2022-01-01 PROCEDURE — 77301 PR  INTEN MOD RADIOTHER PLAN W/DOSE VOL HIST: ICD-10-PCS | Mod: 26,,, | Performed by: RADIOLOGY

## 2022-01-01 PROCEDURE — 1111F PR DISCHARGE MEDS RECONCILED W/ CURRENT OUTPATIENT MED LIST: ICD-10-PCS | Mod: CPTII,S$GLB,, | Performed by: RADIOLOGY

## 2022-01-01 PROCEDURE — 81000 URINALYSIS NONAUTO W/SCOPE: CPT | Performed by: EMERGENCY MEDICINE

## 2022-01-01 PROCEDURE — 99497 PR ADVNCD CARE PLAN 30 MIN: ICD-10-PCS | Mod: S$GLB,,, | Performed by: PHYSICIAN ASSISTANT

## 2022-01-01 PROCEDURE — 71046 X-RAY EXAM CHEST 2 VIEWS: CPT | Mod: 26,HCNC,, | Performed by: RADIOLOGY

## 2022-01-01 PROCEDURE — 99215 PR OFFICE/OUTPT VISIT, EST, LEVL V, 40-54 MIN: ICD-10-PCS | Mod: S$GLB,,, | Performed by: PHYSICIAN ASSISTANT

## 2022-01-01 PROCEDURE — 85025 COMPLETE CBC W/AUTO DIFF WBC: CPT | Performed by: INTERNAL MEDICINE

## 2022-01-01 PROCEDURE — 77300 RADIATION THERAPY DOSE PLAN: CPT | Mod: TC | Performed by: RADIOLOGY

## 2022-01-01 PROCEDURE — 99222 PR INITIAL HOSPITAL CARE,LEVL II: ICD-10-PCS | Mod: HCNC,,, | Performed by: INTERNAL MEDICINE

## 2022-01-01 PROCEDURE — 99999 PR PBB SHADOW E&M-EST. PATIENT-LVL IV: CPT | Mod: PBBFAC,,, | Performed by: RADIOLOGY

## 2022-01-01 PROCEDURE — 99499 UNLISTED E&M SERVICE: CPT | Mod: S$GLB,,, | Performed by: NURSE PRACTITIONER

## 2022-01-01 PROCEDURE — 81000 URINALYSIS NONAUTO W/SCOPE: CPT | Mod: HCNC | Performed by: NURSE PRACTITIONER

## 2022-01-01 PROCEDURE — 36000705 HC OR TIME LEV I EA ADD 15 MIN: Mod: HCNC | Performed by: SURGERY

## 2022-01-01 PROCEDURE — 1159F MED LIST DOCD IN RCRD: CPT | Mod: CPTII,S$GLB,, | Performed by: SURGERY

## 2022-01-01 PROCEDURE — 94761 N-INVAS EAR/PLS OXIMETRY MLT: CPT

## 2022-01-01 PROCEDURE — 3051F HG A1C>EQUAL 7.0%<8.0%: CPT | Mod: CPTII,S$GLB,, | Performed by: INTERNAL MEDICINE

## 2022-01-01 PROCEDURE — 3074F SYST BP LT 130 MM HG: CPT | Mod: CPTII,S$GLB,, | Performed by: INTERNAL MEDICINE

## 2022-01-01 PROCEDURE — 1111F DSCHRG MED/CURRENT MED MERGE: CPT | Mod: CPTII,S$GLB,, | Performed by: RADIOLOGY

## 2022-01-01 PROCEDURE — 3288F FALL RISK ASSESSMENT DOCD: CPT | Mod: CPTII,S$GLB,, | Performed by: NURSE PRACTITIONER

## 2022-01-01 PROCEDURE — 71046 X-RAY EXAM CHEST 2 VIEWS: CPT | Mod: TC,HCNC

## 2022-01-01 PROCEDURE — 99284 EMERGENCY DEPT VISIT MOD MDM: CPT | Mod: 25,HCNC

## 2022-01-01 PROCEDURE — 73130 XR HAND COMPLETE 3 VIEW RIGHT: ICD-10-PCS | Mod: 26,RT,, | Performed by: RADIOLOGY

## 2022-01-01 PROCEDURE — 1160F PR REVIEW ALL MEDS BY PRESCRIBER/CLIN PHARMACIST DOCUMENTED: ICD-10-PCS | Mod: CPTII,S$GLB,, | Performed by: ORTHOPAEDIC SURGERY

## 2022-01-01 PROCEDURE — 77263 PR  RADIATION THERAPY PLAN COMPLEX: ICD-10-PCS | Mod: ,,, | Performed by: RADIOLOGY

## 2022-01-01 PROCEDURE — 99999 PR PBB SHADOW E&M-EST. PATIENT-LVL III: CPT | Mod: PBBFAC,,, | Performed by: INTERNAL MEDICINE

## 2022-01-01 PROCEDURE — 99499 RISK ADDL DX/OHS AUDIT: ICD-10-PCS | Mod: 95,,, | Performed by: PHYSICIAN ASSISTANT

## 2022-01-01 PROCEDURE — 1101F PR PT FALLS ASSESS DOC 0-1 FALLS W/OUT INJ PAST YR: ICD-10-PCS | Mod: CPTII,S$GLB,, | Performed by: RADIOLOGY

## 2022-01-01 PROCEDURE — 1123F ACP DISCUSS/DSCN MKR DOCD: CPT | Mod: CPTII,95,, | Performed by: PHYSICIAN ASSISTANT

## 2022-01-01 PROCEDURE — 85025 COMPLETE CBC W/AUTO DIFF WBC: CPT | Mod: HCNC | Performed by: EMERGENCY MEDICINE

## 2022-01-01 PROCEDURE — 77300 RADIATION THERAPY DOSE PLAN: CPT | Mod: 26,,, | Performed by: RADIOLOGY

## 2022-01-01 PROCEDURE — 99291 CRITICAL CARE FIRST HOUR: CPT | Mod: 25,HCNC

## 2022-01-01 PROCEDURE — 3075F PR MOST RECENT SYSTOLIC BLOOD PRESS GE 130-139MM HG: ICD-10-PCS | Mod: CPTII,S$GLB,, | Performed by: FAMILY MEDICINE

## 2022-01-01 PROCEDURE — 96372 THER/PROPH/DIAG INJ SC/IM: CPT | Mod: 59 | Performed by: NURSE PRACTITIONER

## 2022-01-01 PROCEDURE — 96361 HYDRATE IV INFUSION ADD-ON: CPT

## 2022-01-01 PROCEDURE — 99215 OFFICE O/P EST HI 40 MIN: CPT | Mod: S$GLB,,, | Performed by: INTERNAL MEDICINE

## 2022-01-01 PROCEDURE — 99999 PR PBB SHADOW E&M-EST. PATIENT-LVL IV: CPT | Mod: PBBFAC,,, | Performed by: INTERNAL MEDICINE

## 2022-01-01 PROCEDURE — 3078F PR MOST RECENT DIASTOLIC BLOOD PRESSURE < 80 MM HG: ICD-10-PCS | Mod: HCNC,CPTII,S$GLB, | Performed by: FAMILY MEDICINE

## 2022-01-01 PROCEDURE — 25000003 PHARM REV CODE 250: Performed by: EMERGENCY MEDICINE

## 2022-01-01 PROCEDURE — 1126F AMNT PAIN NOTED NONE PRSNT: CPT | Mod: CPTII,S$GLB,, | Performed by: FAMILY MEDICINE

## 2022-01-01 PROCEDURE — 96413 CHEMO IV INFUSION 1 HR: CPT

## 2022-01-01 PROCEDURE — 3075F PR MOST RECENT SYSTOLIC BLOOD PRESS GE 130-139MM HG: ICD-10-PCS | Mod: CPTII,S$GLB,, | Performed by: SURGERY

## 2022-01-01 PROCEDURE — 99203 OFFICE O/P NEW LOW 30 MIN: CPT | Mod: S$GLB,,, | Performed by: ORTHOPAEDIC SURGERY

## 2022-01-01 PROCEDURE — 77386 HC IMRT, COMPLEX: CPT | Performed by: RADIOLOGY

## 2022-01-01 PROCEDURE — 99213 OFFICE O/P EST LOW 20 MIN: CPT | Mod: S$GLB,,, | Performed by: FAMILY MEDICINE

## 2022-01-01 PROCEDURE — 84484 ASSAY OF TROPONIN QUANT: CPT | Mod: HCNC | Performed by: EMERGENCY MEDICINE

## 2022-01-01 PROCEDURE — 83880 ASSAY OF NATRIURETIC PEPTIDE: CPT | Mod: HCNC | Performed by: EMERGENCY MEDICINE

## 2022-01-01 PROCEDURE — 88307 TISSUE EXAM BY PATHOLOGIST: CPT | Mod: 26,,, | Performed by: PATHOLOGY

## 2022-01-01 PROCEDURE — 37000009 HC ANESTHESIA EA ADD 15 MINS: Mod: HCNC | Performed by: SURGERY

## 2022-01-01 PROCEDURE — 99499 RISK ADDL DX/OHS AUDIT: ICD-10-PCS | Mod: S$GLB,,, | Performed by: NURSE PRACTITIONER

## 2022-01-01 PROCEDURE — 77370 RADIATION PHYSICS CONSULT: CPT | Performed by: RADIOLOGY

## 2022-01-01 PROCEDURE — 99999 PR PBB SHADOW E&M-EST. PATIENT-LVL III: CPT | Mod: PBBFAC,HCNC,, | Performed by: FAMILY MEDICINE

## 2022-01-01 PROCEDURE — 80202 ASSAY OF VANCOMYCIN: CPT | Mod: HCNC | Performed by: INTERNAL MEDICINE

## 2022-01-01 PROCEDURE — 1157F PR ADVANCE CARE PLAN OR EQUIV PRESENT IN MEDICAL RECORD: ICD-10-PCS | Mod: CPTII,S$GLB,, | Performed by: ORTHOPAEDIC SURGERY

## 2022-01-01 PROCEDURE — 1159F MED LIST DOCD IN RCRD: CPT | Mod: CPTII,S$GLB,, | Performed by: FAMILY MEDICINE

## 2022-01-01 PROCEDURE — 96372 THER/PROPH/DIAG INJ SC/IM: CPT | Mod: HCNC | Performed by: NURSE PRACTITIONER

## 2022-01-01 PROCEDURE — 1157F ADVNC CARE PLAN IN RCRD: CPT | Mod: CPTII,S$GLB,, | Performed by: OTOLARYNGOLOGY

## 2022-01-01 PROCEDURE — 1125F PR PAIN SEVERITY QUANTIFIED, PAIN PRESENT: ICD-10-PCS | Mod: HCNC,CPTII,S$GLB, | Performed by: FAMILY MEDICINE

## 2022-01-01 PROCEDURE — 1157F PR ADVANCE CARE PLAN OR EQUIV PRESENT IN MEDICAL RECORD: ICD-10-PCS | Mod: CPTII,S$GLB,, | Performed by: FAMILY MEDICINE

## 2022-01-01 PROCEDURE — 1157F PR ADVANCE CARE PLAN OR EQUIV PRESENT IN MEDICAL RECORD: ICD-10-PCS | Mod: HCNC,CPTII,S$GLB, | Performed by: FAMILY MEDICINE

## 2022-01-01 PROCEDURE — 87077 CULTURE AEROBIC IDENTIFY: CPT | Mod: HCNC | Performed by: INTERNAL MEDICINE

## 2022-01-01 PROCEDURE — 99024 POSTOP FOLLOW-UP VISIT: CPT | Mod: S$GLB,,, | Performed by: SURGERY

## 2022-01-01 PROCEDURE — 1101F PT FALLS ASSESS-DOCD LE1/YR: CPT | Mod: CPTII,S$GLB,, | Performed by: RADIOLOGY

## 2022-01-01 PROCEDURE — 3288F PR FALLS RISK ASSESSMENT DOCUMENTED: ICD-10-PCS | Mod: CPTII,S$GLB,, | Performed by: ORTHOPAEDIC SURGERY

## 2022-01-01 PROCEDURE — 1125F PR PAIN SEVERITY QUANTIFIED, PAIN PRESENT: ICD-10-PCS | Mod: CPTII,S$GLB,, | Performed by: INTERNAL MEDICINE

## 2022-01-01 PROCEDURE — 1126F PR PAIN SEVERITY QUANTIFIED, NO PAIN PRESENT: ICD-10-PCS | Mod: CPTII,S$GLB,, | Performed by: FAMILY MEDICINE

## 2022-01-01 PROCEDURE — 3077F PR MOST RECENT SYSTOLIC BLOOD PRESSURE >= 140 MM HG: ICD-10-PCS | Mod: CPTII,S$GLB,, | Performed by: INTERNAL MEDICINE

## 2022-01-01 PROCEDURE — 71046 XR CHEST PA AND LATERAL: ICD-10-PCS | Mod: 26,,, | Performed by: RADIOLOGY

## 2022-01-01 PROCEDURE — 93010 EKG 12-LEAD: ICD-10-PCS | Mod: ,,, | Performed by: INTERNAL MEDICINE

## 2022-01-01 PROCEDURE — 77470 SPECIAL RADIATION TREATMENT: CPT | Mod: 59,TC | Performed by: RADIOLOGY

## 2022-01-01 PROCEDURE — 99499 UNLISTED E&M SERVICE: CPT | Mod: S$GLB,,, | Performed by: ORTHOPAEDIC SURGERY

## 2022-01-01 PROCEDURE — 99223 PR INITIAL HOSPITAL CARE,LEVL III: ICD-10-PCS | Mod: HCNC,,, | Performed by: SURGERY

## 2022-01-01 PROCEDURE — 1101F PT FALLS ASSESS-DOCD LE1/YR: CPT | Mod: CPTII,S$GLB,, | Performed by: FAMILY MEDICINE

## 2022-01-01 PROCEDURE — U0002 COVID-19 LAB TEST NON-CDC: HCPCS | Performed by: EMERGENCY MEDICINE

## 2022-01-01 PROCEDURE — 3078F PR MOST RECENT DIASTOLIC BLOOD PRESSURE < 80 MM HG: ICD-10-PCS | Mod: CPTII,S$GLB,, | Performed by: PHYSICIAN ASSISTANT

## 2022-01-01 PROCEDURE — 3078F PR MOST RECENT DIASTOLIC BLOOD PRESSURE < 80 MM HG: ICD-10-PCS | Mod: CPTII,S$GLB,, | Performed by: OTOLARYNGOLOGY

## 2022-01-01 PROCEDURE — 99999 PR PBB SHADOW E&M-EST. PATIENT-LVL IV: ICD-10-PCS | Mod: PBBFAC,,, | Performed by: SURGERY

## 2022-01-01 PROCEDURE — 93005 ELECTROCARDIOGRAM TRACING: CPT

## 2022-01-01 PROCEDURE — 1123F ACP DISCUSS/DSCN MKR DOCD: CPT | Mod: CPTII,S$GLB,, | Performed by: PHYSICIAN ASSISTANT

## 2022-01-01 PROCEDURE — 3074F PR MOST RECENT SYSTOLIC BLOOD PRESSURE < 130 MM HG: ICD-10-PCS | Mod: HCNC,CPTII,S$GLB, | Performed by: NURSE PRACTITIONER

## 2022-01-01 PROCEDURE — 84132 ASSAY OF SERUM POTASSIUM: CPT | Mod: HCNC | Performed by: NURSE PRACTITIONER

## 2022-01-01 PROCEDURE — 3078F PR MOST RECENT DIASTOLIC BLOOD PRESSURE < 80 MM HG: ICD-10-PCS | Mod: HCNC,CPTII,S$GLB, | Performed by: NURSE PRACTITIONER

## 2022-01-01 PROCEDURE — 80048 BASIC METABOLIC PNL TOTAL CA: CPT | Mod: HCNC | Performed by: INTERNAL MEDICINE

## 2022-01-01 PROCEDURE — 1126F PR PAIN SEVERITY QUANTIFIED, NO PAIN PRESENT: ICD-10-PCS | Mod: CPTII,S$GLB,, | Performed by: SURGERY

## 2022-01-01 PROCEDURE — 1157F PR ADVANCE CARE PLAN OR EQUIV PRESENT IN MEDICAL RECORD: ICD-10-PCS | Mod: CPTII,S$GLB,, | Performed by: SURGERY

## 2022-01-01 PROCEDURE — 99999 PR PBB SHADOW E&M-EST. PATIENT-LVL V: CPT | Mod: PBBFAC,,, | Performed by: FAMILY MEDICINE

## 2022-01-01 PROCEDURE — 99999 PR PBB SHADOW E&M-EST. PATIENT-LVL IV: ICD-10-PCS | Mod: PBBFAC,,, | Performed by: INTERNAL MEDICINE

## 2022-01-01 PROCEDURE — 99214 PR OFFICE/OUTPT VISIT, EST, LEVL IV, 30-39 MIN: ICD-10-PCS | Mod: 25,S$GLB,, | Performed by: OTOLARYNGOLOGY

## 2022-01-01 PROCEDURE — 3079F DIAST BP 80-89 MM HG: CPT | Mod: CPTII,S$GLB,, | Performed by: PHYSICIAN ASSISTANT

## 2022-01-01 PROCEDURE — 92526 ORAL FUNCTION THERAPY: CPT | Mod: HCNC

## 2022-01-01 PROCEDURE — 96375 TX/PRO/DX INJ NEW DRUG ADDON: CPT

## 2022-01-01 PROCEDURE — 1160F PR REVIEW ALL MEDS BY PRESCRIBER/CLIN PHARMACIST DOCUMENTED: ICD-10-PCS | Mod: CPTII,S$GLB,, | Performed by: SURGERY

## 2022-01-01 PROCEDURE — 10061 I&D ABSCESS COMP/MULTIPLE: CPT | Mod: HCNC,,, | Performed by: SURGERY

## 2022-01-01 PROCEDURE — 99213 PR OFFICE/OUTPT VISIT, EST, LEVL III, 20-29 MIN: ICD-10-PCS | Mod: S$GLB,,, | Performed by: FAMILY MEDICINE

## 2022-01-01 PROCEDURE — 97162 PT EVAL MOD COMPLEX 30 MIN: CPT | Mod: HCNC

## 2022-01-01 PROCEDURE — 1160F RVW MEDS BY RX/DR IN RCRD: CPT | Mod: CPTII,S$GLB,, | Performed by: SURGERY

## 2022-01-01 PROCEDURE — 85025 COMPLETE CBC W/AUTO DIFF WBC: CPT | Performed by: EMERGENCY MEDICINE

## 2022-01-01 PROCEDURE — 99999 PR PBB SHADOW E&M-EST. PATIENT-LVL III: ICD-10-PCS | Mod: PBBFAC,,, | Performed by: INTERNAL MEDICINE

## 2022-01-01 PROCEDURE — 3074F SYST BP LT 130 MM HG: CPT | Mod: HCNC,CPTII,S$GLB, | Performed by: NURSE PRACTITIONER

## 2022-01-01 PROCEDURE — 3288F FALL RISK ASSESSMENT DOCD: CPT | Mod: HCNC,CPTII,S$GLB, | Performed by: NURSE PRACTITIONER

## 2022-01-01 PROCEDURE — A9698 NON-RAD CONTRAST MATERIALNOC: HCPCS | Mod: HCNC | Performed by: INTERNAL MEDICINE

## 2022-01-01 PROCEDURE — 84145 PROCALCITONIN (PCT): CPT | Mod: HCNC | Performed by: INTERNAL MEDICINE

## 2022-01-01 PROCEDURE — 3288F FALL RISK ASSESSMENT DOCD: CPT | Mod: CPTII,S$GLB,, | Performed by: FAMILY MEDICINE

## 2022-01-01 PROCEDURE — 3077F SYST BP >= 140 MM HG: CPT | Mod: HCNC,CPTII,S$GLB, | Performed by: FAMILY MEDICINE

## 2022-01-01 PROCEDURE — 84443 ASSAY THYROID STIM HORMONE: CPT | Mod: HCNC | Performed by: NURSE PRACTITIONER

## 2022-01-01 PROCEDURE — 3078F DIAST BP <80 MM HG: CPT | Mod: CPTII,S$GLB,, | Performed by: PHYSICIAN ASSISTANT

## 2022-01-01 PROCEDURE — 1101F PR PT FALLS ASSESS DOC 0-1 FALLS W/OUT INJ PAST YR: ICD-10-PCS | Mod: CPTII,S$GLB,, | Performed by: FAMILY MEDICINE

## 2022-01-01 PROCEDURE — 3078F DIAST BP <80 MM HG: CPT | Mod: CPTII,S$GLB,, | Performed by: RADIOLOGY

## 2022-01-01 PROCEDURE — 3288F PR FALLS RISK ASSESSMENT DOCUMENTED: ICD-10-PCS | Mod: HCNC,CPTII,S$GLB, | Performed by: FAMILY MEDICINE

## 2022-01-01 PROCEDURE — 99999 PR PBB SHADOW E&M-EST. PATIENT-LVL V: ICD-10-PCS | Mod: PBBFAC,,, | Performed by: FAMILY MEDICINE

## 2022-01-01 PROCEDURE — 99999 PR PBB SHADOW E&M-EST. PATIENT-LVL III: ICD-10-PCS | Mod: PBBFAC,HCNC,, | Performed by: FAMILY MEDICINE

## 2022-01-01 PROCEDURE — 99214 OFFICE O/P EST MOD 30 MIN: CPT | Mod: HCNC,S$GLB,, | Performed by: NURSE PRACTITIONER

## 2022-01-01 PROCEDURE — 1157F ADVNC CARE PLAN IN RCRD: CPT | Mod: HCNC,CPTII,S$GLB, | Performed by: NURSE PRACTITIONER

## 2022-01-01 PROCEDURE — 1160F RVW MEDS BY RX/DR IN RCRD: CPT | Mod: CPTII,S$GLB,, | Performed by: ORTHOPAEDIC SURGERY

## 2022-01-01 PROCEDURE — 3075F SYST BP GE 130 - 139MM HG: CPT | Mod: CPTII,S$GLB,, | Performed by: NURSE PRACTITIONER

## 2022-01-01 PROCEDURE — 1159F PR MEDICATION LIST DOCUMENTED IN MEDICAL RECORD: ICD-10-PCS | Mod: CPTII,S$GLB,, | Performed by: FAMILY MEDICINE

## 2022-01-01 PROCEDURE — 99999 PR PBB SHADOW E&M-EST. PATIENT-LVL V: CPT | Mod: PBBFAC,,, | Performed by: PHYSICIAN ASSISTANT

## 2022-01-01 PROCEDURE — 97530 THERAPEUTIC ACTIVITIES: CPT | Mod: HCNC

## 2022-01-01 PROCEDURE — 1101F PT FALLS ASSESS-DOCD LE1/YR: CPT | Mod: HCNC,CPTII,S$GLB, | Performed by: FAMILY MEDICINE

## 2022-01-01 PROCEDURE — 99222 1ST HOSP IP/OBS MODERATE 55: CPT | Mod: HCNC,,, | Performed by: INTERNAL MEDICINE

## 2022-01-01 PROCEDURE — 1159F MED LIST DOCD IN RCRD: CPT | Mod: HCNC,CPTII,S$GLB, | Performed by: FAMILY MEDICINE

## 2022-01-01 PROCEDURE — 69105 BIOPSY OF EXTERNAL EAR CANAL: CPT | Mod: RT,S$GLB,, | Performed by: OTOLARYNGOLOGY

## 2022-01-01 PROCEDURE — 99291 CRITICAL CARE FIRST HOUR: CPT | Mod: 25,CS

## 2022-01-01 PROCEDURE — 3288F PR FALLS RISK ASSESSMENT DOCUMENTED: ICD-10-PCS | Mod: CPTII,S$GLB,, | Performed by: OTOLARYNGOLOGY

## 2022-01-01 PROCEDURE — 99999 PR PBB SHADOW E&M-EST. PATIENT-LVL V: ICD-10-PCS | Mod: PBBFAC,HCNC,, | Performed by: NURSE PRACTITIONER

## 2022-01-01 PROCEDURE — 3078F DIAST BP <80 MM HG: CPT | Mod: CPTII,S$GLB,, | Performed by: OTOLARYNGOLOGY

## 2022-01-01 PROCEDURE — 1157F ADVNC CARE PLAN IN RCRD: CPT | Mod: CPTII,S$GLB,, | Performed by: FAMILY MEDICINE

## 2022-01-01 PROCEDURE — 1157F PR ADVANCE CARE PLAN OR EQUIV PRESENT IN MEDICAL RECORD: ICD-10-PCS | Mod: CPTII,S$GLB,, | Performed by: RADIOLOGY

## 2022-01-01 PROCEDURE — 63600175 PHARM REV CODE 636 W HCPCS: Mod: HCNC | Performed by: NURSE ANESTHETIST, CERTIFIED REGISTERED

## 2022-01-01 PROCEDURE — 97116 GAIT TRAINING THERAPY: CPT | Mod: HCNC,CQ

## 2022-01-01 PROCEDURE — 77300 PR RADIATION THERAPY,DOSIMETRY PLAN: ICD-10-PCS | Mod: 26,,, | Performed by: RADIOLOGY

## 2022-01-01 PROCEDURE — 3288F PR FALLS RISK ASSESSMENT DOCUMENTED: ICD-10-PCS | Mod: CPTII,S$GLB,, | Performed by: FAMILY MEDICINE

## 2022-01-01 PROCEDURE — 99223 PR INITIAL HOSPITAL CARE,LEVL III: ICD-10-PCS | Mod: HCNC,,, | Performed by: INTERNAL MEDICINE

## 2022-01-01 PROCEDURE — 1101F PT FALLS ASSESS-DOCD LE1/YR: CPT | Mod: HCNC,CPTII,S$GLB, | Performed by: NURSE PRACTITIONER

## 2022-01-01 PROCEDURE — 82962 GLUCOSE BLOOD TEST: CPT | Mod: HCNC

## 2022-01-01 PROCEDURE — 77338 DESIGN MLC DEVICE FOR IMRT: CPT | Mod: TC | Performed by: RADIOLOGY

## 2022-01-01 PROCEDURE — 1159F MED LIST DOCD IN RCRD: CPT | Mod: CPTII,S$GLB,, | Performed by: NURSE PRACTITIONER

## 2022-01-01 PROCEDURE — 99233 SBSQ HOSP IP/OBS HIGH 50: CPT | Mod: 57,HCNC,, | Performed by: SURGERY

## 2022-01-01 PROCEDURE — 99999 PR PBB SHADOW E&M-EST. PATIENT-LVL IV: CPT | Mod: PBBFAC,,, | Performed by: PHYSICIAN ASSISTANT

## 2022-01-01 PROCEDURE — 77470 SPECIAL RADIATION TREATMENT: CPT | Mod: 26,59,, | Performed by: RADIOLOGY

## 2022-01-01 PROCEDURE — 3051F PR MOST RECENT HEMOGLOBIN A1C LEVEL 7.0 - < 8.0%: ICD-10-PCS | Mod: CPTII,S$GLB,, | Performed by: INTERNAL MEDICINE

## 2022-01-01 PROCEDURE — 96372 THER/PROPH/DIAG INJ SC/IM: CPT | Performed by: NURSE PRACTITIONER

## 2022-01-01 PROCEDURE — 77263 THER RADIOLOGY TX PLNG CPLX: CPT | Mod: ,,, | Performed by: RADIOLOGY

## 2022-01-01 PROCEDURE — 99024 PR POST-OP FOLLOW-UP VISIT: ICD-10-PCS | Mod: S$GLB,,, | Performed by: SURGERY

## 2022-01-01 PROCEDURE — 1101F PR PT FALLS ASSESS DOC 0-1 FALLS W/OUT INJ PAST YR: ICD-10-PCS | Mod: HCNC,CPTII,S$GLB, | Performed by: NURSE PRACTITIONER

## 2022-01-01 PROCEDURE — 99499 UNLISTED E&M SERVICE: CPT | Mod: S$GLB,,, | Performed by: RADIOLOGY

## 2022-01-01 PROCEDURE — 1159F PR MEDICATION LIST DOCUMENTED IN MEDICAL RECORD: ICD-10-PCS | Mod: CPTII,S$GLB,, | Performed by: NURSE PRACTITIONER

## 2022-01-01 PROCEDURE — 3288F PR FALLS RISK ASSESSMENT DOCUMENTED: ICD-10-PCS | Mod: CPTII,S$GLB,, | Performed by: NURSE PRACTITIONER

## 2022-01-01 PROCEDURE — 1160F PR REVIEW ALL MEDS BY PRESCRIBER/CLIN PHARMACIST DOCUMENTED: ICD-10-PCS | Mod: HCNC,CPTII,S$GLB, | Performed by: NURSE PRACTITIONER

## 2022-01-01 PROCEDURE — 1157F ADVNC CARE PLAN IN RCRD: CPT | Mod: CPTII,S$GLB,, | Performed by: NURSE PRACTITIONER

## 2022-01-01 PROCEDURE — 99215 OFFICE O/P EST HI 40 MIN: CPT | Mod: S$GLB,,, | Performed by: RADIOLOGY

## 2022-01-01 PROCEDURE — 1159F MED LIST DOCD IN RCRD: CPT | Mod: CPTII,S$GLB,, | Performed by: ORTHOPAEDIC SURGERY

## 2022-01-01 PROCEDURE — 63600175 PHARM REV CODE 636 W HCPCS: Performed by: EMERGENCY MEDICINE

## 2022-01-01 PROCEDURE — 96365 THER/PROPH/DIAG IV INF INIT: CPT

## 2022-01-01 PROCEDURE — 1159F MED LIST DOCD IN RCRD: CPT | Mod: HCNC,CPTII,S$GLB, | Performed by: NURSE PRACTITIONER

## 2022-01-01 PROCEDURE — 99499 RISK ADDL DX/OHS AUDIT: ICD-10-PCS | Mod: S$GLB,,, | Performed by: RADIOLOGY

## 2022-01-01 PROCEDURE — 99223 1ST HOSP IP/OBS HIGH 75: CPT | Mod: HCNC,,, | Performed by: SURGERY

## 2022-01-01 PROCEDURE — 73130 X-RAY EXAM OF HAND: CPT | Mod: 26,RT,, | Performed by: RADIOLOGY

## 2022-01-01 PROCEDURE — 85025 COMPLETE CBC W/AUTO DIFF WBC: CPT | Mod: HCNC | Performed by: INTERNAL MEDICINE

## 2022-01-01 PROCEDURE — 99223 PR INITIAL HOSPITAL CARE,LEVL III: ICD-10-PCS | Mod: NSCH,HCNC,, | Performed by: INTERNAL MEDICINE

## 2022-01-01 PROCEDURE — 87205 SMEAR GRAM STAIN: CPT | Mod: HCNC | Performed by: INTERNAL MEDICINE

## 2022-01-01 RX ORDER — DONEPEZIL HYDROCHLORIDE 5 MG/1
10 TABLET, FILM COATED ORAL DAILY
Status: DISCONTINUED | OUTPATIENT
Start: 2022-01-01 | End: 2022-01-01 | Stop reason: HOSPADM

## 2022-01-01 RX ORDER — DIPHENHYDRAMINE HYDROCHLORIDE 50 MG/ML
50 INJECTION INTRAMUSCULAR; INTRAVENOUS ONCE AS NEEDED
Status: CANCELLED | OUTPATIENT
Start: 2022-01-01

## 2022-01-01 RX ORDER — HEPARIN 100 UNIT/ML
500 SYRINGE INTRAVENOUS
Status: CANCELLED | OUTPATIENT
Start: 2022-01-01

## 2022-01-01 RX ORDER — MAGNESIUM SULFATE HEPTAHYDRATE 40 MG/ML
2 INJECTION, SOLUTION INTRAVENOUS ONCE
Status: COMPLETED | OUTPATIENT
Start: 2022-01-01 | End: 2022-01-01

## 2022-01-01 RX ORDER — TRAMADOL HYDROCHLORIDE 50 MG/1
50 TABLET ORAL ONCE
Status: COMPLETED | OUTPATIENT
Start: 2022-01-01 | End: 2022-01-01

## 2022-01-01 RX ORDER — ONDANSETRON 8 MG/1
8 TABLET, ORALLY DISINTEGRATING ORAL EVERY 8 HOURS PRN
Status: DISCONTINUED | OUTPATIENT
Start: 2022-01-01 | End: 2022-01-01

## 2022-01-01 RX ORDER — GLUCAGON 1 MG
1 KIT INJECTION
Status: DISCONTINUED | OUTPATIENT
Start: 2022-01-01 | End: 2022-01-01 | Stop reason: HOSPADM

## 2022-01-01 RX ORDER — BLOOD-GLUCOSE SENSOR
EACH MISCELLANEOUS
COMMUNITY
Start: 2021-08-02 | End: 2022-01-01

## 2022-01-01 RX ORDER — ASCORBIC ACID 500 MG
500 TABLET ORAL NIGHTLY
Status: DISCONTINUED | OUTPATIENT
Start: 2022-01-01 | End: 2022-01-01 | Stop reason: HOSPADM

## 2022-01-01 RX ORDER — SODIUM CHLORIDE 0.9 % (FLUSH) 0.9 %
10 SYRINGE (ML) INJECTION
Status: DISCONTINUED | OUTPATIENT
Start: 2022-01-01 | End: 2022-01-01 | Stop reason: HOSPADM

## 2022-01-01 RX ORDER — LEVOTHYROXINE SODIUM 50 UG/1
1 TABLET ORAL DAILY
COMMUNITY
Start: 2022-01-01

## 2022-01-01 RX ORDER — LOSARTAN POTASSIUM 50 MG/1
100 TABLET ORAL DAILY
Status: DISCONTINUED | OUTPATIENT
Start: 2022-01-01 | End: 2022-01-01 | Stop reason: HOSPADM

## 2022-01-01 RX ORDER — HYDROCODONE BITARTRATE AND ACETAMINOPHEN 5; 325 MG/1; MG/1
1 TABLET ORAL EVERY 6 HOURS PRN
Qty: 20 TABLET | Refills: 0 | Status: SHIPPED | OUTPATIENT
Start: 2022-01-01 | End: 2022-01-01 | Stop reason: SDUPTHER

## 2022-01-01 RX ORDER — HEPARIN 100 UNIT/ML
500 SYRINGE INTRAVENOUS
Status: DISCONTINUED | OUTPATIENT
Start: 2022-01-01 | End: 2022-01-01 | Stop reason: HOSPADM

## 2022-01-01 RX ORDER — HYDROMORPHONE HYDROCHLORIDE 2 MG/ML
0.2 INJECTION, SOLUTION INTRAMUSCULAR; INTRAVENOUS; SUBCUTANEOUS EVERY 5 MIN PRN
Status: DISCONTINUED | OUTPATIENT
Start: 2022-01-01 | End: 2022-01-01

## 2022-01-01 RX ORDER — BUPIVACAINE HYDROCHLORIDE 2.5 MG/ML
INJECTION, SOLUTION EPIDURAL; INFILTRATION; INTRACAUDAL
Status: DISCONTINUED | OUTPATIENT
Start: 2022-01-01 | End: 2022-01-01 | Stop reason: HOSPADM

## 2022-01-01 RX ORDER — LANOLIN ALCOHOL/MO/W.PET/CERES
800 CREAM (GRAM) TOPICAL
Status: DISCONTINUED | OUTPATIENT
Start: 2022-01-01 | End: 2022-01-01 | Stop reason: HOSPADM

## 2022-01-01 RX ORDER — POTASSIUM CHLORIDE 20 MEQ/1
20 TABLET, EXTENDED RELEASE ORAL 2 TIMES DAILY
Qty: 14 TABLET | Refills: 0 | Status: SHIPPED | OUTPATIENT
Start: 2022-01-01 | End: 2022-01-01

## 2022-01-01 RX ORDER — INSULIN ASPART 100 [IU]/ML
1-10 INJECTION, SOLUTION INTRAVENOUS; SUBCUTANEOUS
Status: DISCONTINUED | OUTPATIENT
Start: 2022-01-01 | End: 2022-01-01 | Stop reason: HOSPADM

## 2022-01-01 RX ORDER — LEVOFLOXACIN 5 MG/ML
750 INJECTION, SOLUTION INTRAVENOUS
Status: DISCONTINUED | OUTPATIENT
Start: 2022-01-01 | End: 2022-01-01

## 2022-01-01 RX ORDER — INSULIN ASPART 100 [IU]/ML
18 INJECTION, SUSPENSION SUBCUTANEOUS DAILY
COMMUNITY

## 2022-01-01 RX ORDER — CLOPIDOGREL BISULFATE 75 MG/1
75 TABLET ORAL DAILY
Status: DISCONTINUED | OUTPATIENT
Start: 2022-01-01 | End: 2022-01-01 | Stop reason: HOSPADM

## 2022-01-01 RX ORDER — ATORVASTATIN CALCIUM 40 MG/1
40 TABLET, FILM COATED ORAL NIGHTLY
Status: DISCONTINUED | OUTPATIENT
Start: 2022-01-01 | End: 2022-01-01 | Stop reason: HOSPADM

## 2022-01-01 RX ORDER — HYDROXYZINE HYDROCHLORIDE 25 MG/1
25 TABLET, FILM COATED ORAL 3 TIMES DAILY PRN
Status: DISCONTINUED | OUTPATIENT
Start: 2022-01-01 | End: 2022-01-01

## 2022-01-01 RX ORDER — NAPROXEN SODIUM 220 MG/1
81 TABLET, FILM COATED ORAL DAILY
Status: DISCONTINUED | OUTPATIENT
Start: 2022-01-01 | End: 2022-01-01 | Stop reason: HOSPADM

## 2022-01-01 RX ORDER — FUROSEMIDE 10 MG/ML
40 INJECTION INTRAMUSCULAR; INTRAVENOUS DAILY
Status: DISCONTINUED | OUTPATIENT
Start: 2022-01-01 | End: 2022-01-01

## 2022-01-01 RX ORDER — HYDROCODONE BITARTRATE AND ACETAMINOPHEN 5; 325 MG/1; MG/1
1 TABLET ORAL EVERY 4 HOURS PRN
Status: DISCONTINUED | OUTPATIENT
Start: 2022-01-01 | End: 2022-01-01

## 2022-01-01 RX ORDER — HYDROCODONE BITARTRATE AND ACETAMINOPHEN 5; 325 MG/1; MG/1
1 TABLET ORAL EVERY 6 HOURS PRN
Qty: 90 TABLET | Refills: 0 | Status: SHIPPED | OUTPATIENT
Start: 2022-01-01 | End: 2022-01-01 | Stop reason: SDUPTHER

## 2022-01-01 RX ORDER — NITROGLYCERIN 0.4 MG/1
0.4 TABLET SUBLINGUAL EVERY 5 MIN PRN
Status: DISCONTINUED | OUTPATIENT
Start: 2022-01-01 | End: 2022-01-01 | Stop reason: HOSPADM

## 2022-01-01 RX ORDER — SULFAMETHOXAZOLE AND TRIMETHOPRIM 800; 160 MG/1; MG/1
1 TABLET ORAL
Status: COMPLETED | OUTPATIENT
Start: 2022-01-01 | End: 2022-01-01

## 2022-01-01 RX ORDER — AMOXICILLIN AND CLAVULANATE POTASSIUM 875; 125 MG/1; MG/1
1 TABLET, FILM COATED ORAL EVERY 12 HOURS
Qty: 10 TABLET | Refills: 0 | Status: SHIPPED | OUTPATIENT
Start: 2022-01-01 | End: 2022-01-01

## 2022-01-01 RX ORDER — HYDROCODONE BITARTRATE AND ACETAMINOPHEN 5; 325 MG/1; MG/1
1 TABLET ORAL
Status: COMPLETED | OUTPATIENT
Start: 2022-01-01 | End: 2022-01-01

## 2022-01-01 RX ORDER — AMLODIPINE BESYLATE 10 MG/1
10 TABLET ORAL DAILY
Qty: 90 TABLET | Refills: 4
Start: 2022-01-01

## 2022-01-01 RX ORDER — ISOSORBIDE MONONITRATE 30 MG/1
30 TABLET, EXTENDED RELEASE ORAL 2 TIMES DAILY
Status: DISCONTINUED | OUTPATIENT
Start: 2022-01-01 | End: 2022-01-01 | Stop reason: HOSPADM

## 2022-01-01 RX ORDER — DONEPEZIL HYDROCHLORIDE 5 MG/1
10 TABLET, FILM COATED ORAL DAILY
Status: DISCONTINUED | OUTPATIENT
Start: 2022-01-01 | End: 2022-01-01

## 2022-01-01 RX ORDER — PREGABALIN 75 MG/1
150 CAPSULE ORAL NIGHTLY
Status: DISCONTINUED | OUTPATIENT
Start: 2022-01-01 | End: 2022-01-01 | Stop reason: HOSPADM

## 2022-01-01 RX ORDER — POTASSIUM CHLORIDE 20 MEQ/1
20 TABLET, EXTENDED RELEASE ORAL DAILY
Qty: 14 TABLET | Refills: 0 | Status: SHIPPED | OUTPATIENT
Start: 2022-01-01

## 2022-01-01 RX ORDER — HYDROMORPHONE HYDROCHLORIDE 1 MG/ML
0.5 INJECTION, SOLUTION INTRAMUSCULAR; INTRAVENOUS; SUBCUTANEOUS EVERY 6 HOURS PRN
Status: DISCONTINUED | OUTPATIENT
Start: 2022-01-01 | End: 2022-01-01 | Stop reason: HOSPADM

## 2022-01-01 RX ORDER — BLOOD SUGAR DIAGNOSTIC
STRIP MISCELLANEOUS
COMMUNITY
Start: 2022-01-01 | End: 2022-01-01

## 2022-01-01 RX ORDER — AMOXICILLIN 250 MG
1 CAPSULE ORAL 2 TIMES DAILY PRN
Status: DISCONTINUED | OUTPATIENT
Start: 2022-01-01 | End: 2022-01-01 | Stop reason: HOSPADM

## 2022-01-01 RX ORDER — ACETAMINOPHEN 325 MG/1
650 TABLET ORAL EVERY 8 HOURS PRN
Status: DISCONTINUED | OUTPATIENT
Start: 2022-01-01 | End: 2022-01-01 | Stop reason: HOSPADM

## 2022-01-01 RX ORDER — LEVOTHYROXINE SODIUM 50 UG/1
50 TABLET ORAL DAILY
Status: DISCONTINUED | OUTPATIENT
Start: 2022-01-01 | End: 2022-01-01 | Stop reason: HOSPADM

## 2022-01-01 RX ORDER — ACETAMINOPHEN 325 MG/1
650 TABLET ORAL EVERY 6 HOURS PRN
Status: DISCONTINUED | OUTPATIENT
Start: 2022-01-01 | End: 2022-01-01 | Stop reason: HOSPADM

## 2022-01-01 RX ORDER — MORPHINE SULFATE 15 MG/1
7.5 TABLET ORAL EVERY 4 HOURS PRN
Qty: 5 TABLET | Refills: 0 | Status: ON HOLD | OUTPATIENT
Start: 2022-01-01 | End: 2022-01-01 | Stop reason: HOSPADM

## 2022-01-01 RX ORDER — PREGABALIN 75 MG/1
75 CAPSULE ORAL 2 TIMES DAILY
Status: DISCONTINUED | OUTPATIENT
Start: 2022-01-01 | End: 2022-01-01 | Stop reason: HOSPADM

## 2022-01-01 RX ORDER — ESCITALOPRAM OXALATE 10 MG/1
20 TABLET ORAL DAILY
Status: DISCONTINUED | OUTPATIENT
Start: 2022-01-01 | End: 2022-01-01 | Stop reason: HOSPADM

## 2022-01-01 RX ORDER — PANTOPRAZOLE SODIUM 40 MG/1
TABLET, DELAYED RELEASE ORAL
Qty: 90 TABLET | Refills: 0 | Status: SHIPPED | OUTPATIENT
Start: 2022-01-01

## 2022-01-01 RX ORDER — HYDROCODONE BITARTRATE AND ACETAMINOPHEN 5; 325 MG/1; MG/1
1 TABLET ORAL EVERY 6 HOURS PRN
Status: DISCONTINUED | OUTPATIENT
Start: 2022-01-01 | End: 2022-01-01

## 2022-01-01 RX ORDER — DOXYCYCLINE HYCLATE 100 MG
100 TABLET ORAL EVERY 12 HOURS
Status: DISCONTINUED | OUTPATIENT
Start: 2022-01-01 | End: 2022-01-01 | Stop reason: HOSPADM

## 2022-01-01 RX ORDER — AMLODIPINE BESYLATE 10 MG/1
10 TABLET ORAL DAILY
Status: DISCONTINUED | OUTPATIENT
Start: 2022-01-01 | End: 2022-01-01 | Stop reason: HOSPADM

## 2022-01-01 RX ORDER — LEVOTHYROXINE SODIUM 50 UG/1
50 TABLET ORAL
Status: DISCONTINUED | OUTPATIENT
Start: 2022-01-01 | End: 2022-01-01

## 2022-01-01 RX ORDER — IPRATROPIUM BROMIDE AND ALBUTEROL SULFATE 2.5; .5 MG/3ML; MG/3ML
3 SOLUTION RESPIRATORY (INHALATION)
Qty: 90 ML | Refills: 0 | Status: SHIPPED | OUTPATIENT
Start: 2022-01-01 | End: 2022-01-01

## 2022-01-01 RX ORDER — ENOXAPARIN SODIUM 100 MG/ML
40 INJECTION SUBCUTANEOUS EVERY 24 HOURS
Status: DISCONTINUED | OUTPATIENT
Start: 2022-01-01 | End: 2022-01-01 | Stop reason: HOSPADM

## 2022-01-01 RX ORDER — SODIUM CHLORIDE 0.9 % (FLUSH) 0.9 %
10 SYRINGE (ML) INJECTION
Status: CANCELLED | OUTPATIENT
Start: 2022-01-01

## 2022-01-01 RX ORDER — POTASSIUM CHLORIDE 20 MEQ/1
40 TABLET, EXTENDED RELEASE ORAL ONCE
Status: COMPLETED | OUTPATIENT
Start: 2022-01-01 | End: 2022-01-01

## 2022-01-01 RX ORDER — BLOOD GLUCOSE CONTROL HIGH,LOW
EACH MISCELLANEOUS
COMMUNITY
Start: 2021-08-02 | End: 2022-01-01

## 2022-01-01 RX ORDER — PROPOFOL 10 MG/ML
VIAL (ML) INTRAVENOUS
Status: DISCONTINUED | OUTPATIENT
Start: 2022-01-01 | End: 2022-01-01

## 2022-01-01 RX ORDER — NALOXONE HCL 0.4 MG/ML
0.02 VIAL (ML) INJECTION
Status: DISCONTINUED | OUTPATIENT
Start: 2022-01-01 | End: 2022-01-01 | Stop reason: HOSPADM

## 2022-01-01 RX ORDER — EPINEPHRINE 0.3 MG/.3ML
0.3 INJECTION SUBCUTANEOUS ONCE AS NEEDED
Status: CANCELLED | OUTPATIENT
Start: 2022-01-01

## 2022-01-01 RX ORDER — SODIUM CHLORIDE, SODIUM LACTATE, POTASSIUM CHLORIDE, CALCIUM CHLORIDE 600; 310; 30; 20 MG/100ML; MG/100ML; MG/100ML; MG/100ML
INJECTION, SOLUTION INTRAVENOUS CONTINUOUS
Status: DISCONTINUED | OUTPATIENT
Start: 2022-01-01 | End: 2022-01-01

## 2022-01-01 RX ORDER — ONDANSETRON 2 MG/ML
4 INJECTION INTRAMUSCULAR; INTRAVENOUS EVERY 8 HOURS PRN
Status: DISCONTINUED | OUTPATIENT
Start: 2022-01-01 | End: 2022-01-01 | Stop reason: HOSPADM

## 2022-01-01 RX ORDER — PANTOPRAZOLE SODIUM 40 MG/1
40 TABLET, DELAYED RELEASE ORAL DAILY
Status: DISCONTINUED | OUTPATIENT
Start: 2022-01-01 | End: 2022-01-01 | Stop reason: HOSPADM

## 2022-01-01 RX ORDER — NITROGLYCERIN 0.4 MG/1
TABLET SUBLINGUAL
COMMUNITY
Start: 2022-01-01 | End: 2022-01-01

## 2022-01-01 RX ORDER — ONDANSETRON 2 MG/ML
4 INJECTION INTRAMUSCULAR; INTRAVENOUS DAILY PRN
Status: DISCONTINUED | OUTPATIENT
Start: 2022-01-01 | End: 2022-01-01

## 2022-01-01 RX ORDER — HYDROCODONE BITARTRATE AND ACETAMINOPHEN 5; 325 MG/1; MG/1
1 TABLET ORAL EVERY 6 HOURS PRN
Qty: 120 TABLET | Refills: 0 | Status: SHIPPED | OUTPATIENT
Start: 2022-01-01 | End: 2022-01-01 | Stop reason: SDUPTHER

## 2022-01-01 RX ORDER — SODIUM CHLORIDE 9 MG/ML
1000 INJECTION, SOLUTION INTRAVENOUS
Status: COMPLETED | OUTPATIENT
Start: 2022-01-01 | End: 2022-01-01

## 2022-01-01 RX ORDER — OXYCODONE AND ACETAMINOPHEN 5; 325 MG/1; MG/1
1 TABLET ORAL
Status: DISCONTINUED | OUTPATIENT
Start: 2022-01-01 | End: 2022-01-01

## 2022-01-01 RX ORDER — ENOXAPARIN SODIUM 100 MG/ML
30 INJECTION SUBCUTANEOUS EVERY 24 HOURS
Status: DISCONTINUED | OUTPATIENT
Start: 2022-01-01 | End: 2022-01-01

## 2022-01-01 RX ORDER — SODIUM CHLORIDE 0.9 % (FLUSH) 0.9 %
10 SYRINGE (ML) INJECTION EVERY 8 HOURS
Status: DISCONTINUED | OUTPATIENT
Start: 2022-01-01 | End: 2022-01-01 | Stop reason: HOSPADM

## 2022-01-01 RX ORDER — DIPHENHYDRAMINE HCL 25 MG
25 CAPSULE ORAL EVERY 6 HOURS PRN
Status: DISCONTINUED | OUTPATIENT
Start: 2022-01-01 | End: 2022-01-01 | Stop reason: HOSPADM

## 2022-01-01 RX ORDER — AMOXICILLIN AND CLAVULANATE POTASSIUM 875; 125 MG/1; MG/1
1 TABLET, FILM COATED ORAL EVERY 12 HOURS
Status: DISCONTINUED | OUTPATIENT
Start: 2022-01-01 | End: 2022-01-01 | Stop reason: HOSPADM

## 2022-01-01 RX ORDER — HYDROMORPHONE HYDROCHLORIDE 2 MG/ML
0.5 INJECTION, SOLUTION INTRAMUSCULAR; INTRAVENOUS; SUBCUTANEOUS EVERY 4 HOURS PRN
Status: DISCONTINUED | OUTPATIENT
Start: 2022-01-01 | End: 2022-01-01

## 2022-01-01 RX ORDER — LEVOTHYROXINE SODIUM 50 UG/1
50 TABLET ORAL
Status: DISCONTINUED | OUTPATIENT
Start: 2022-01-01 | End: 2022-01-01 | Stop reason: HOSPADM

## 2022-01-01 RX ORDER — SIMETHICONE 80 MG
1 TABLET,CHEWABLE ORAL 4 TIMES DAILY PRN
Status: DISCONTINUED | OUTPATIENT
Start: 2022-01-01 | End: 2022-01-01 | Stop reason: HOSPADM

## 2022-01-01 RX ORDER — HYDROCHLOROTHIAZIDE 25 MG/1
25 TABLET ORAL DAILY
Qty: 90 TABLET | Refills: 4
Start: 2022-01-01 | End: 2022-01-01 | Stop reason: SDUPTHER

## 2022-01-01 RX ORDER — AMLODIPINE BESYLATE 5 MG/1
10 TABLET ORAL DAILY
Status: DISCONTINUED | OUTPATIENT
Start: 2022-01-01 | End: 2022-01-01

## 2022-01-01 RX ORDER — CHOLECALCIFEROL (VITAMIN D3) 25 MCG
1000 TABLET ORAL DAILY
Status: DISCONTINUED | OUTPATIENT
Start: 2022-01-01 | End: 2022-01-01 | Stop reason: HOSPADM

## 2022-01-01 RX ORDER — IBUPROFEN 200 MG
24 TABLET ORAL
Status: DISCONTINUED | OUTPATIENT
Start: 2022-01-01 | End: 2022-01-01 | Stop reason: HOSPADM

## 2022-01-01 RX ORDER — NALOXONE HYDROCHLORIDE 1 MG/ML
0.02 INJECTION INTRAMUSCULAR; INTRAVENOUS; SUBCUTANEOUS
Status: DISCONTINUED | OUTPATIENT
Start: 2022-01-01 | End: 2022-01-01 | Stop reason: HOSPADM

## 2022-01-01 RX ORDER — IPRATROPIUM BROMIDE AND ALBUTEROL SULFATE 2.5; .5 MG/3ML; MG/3ML
3 SOLUTION RESPIRATORY (INHALATION) EVERY 6 HOURS PRN
Status: DISCONTINUED | OUTPATIENT
Start: 2022-01-01 | End: 2022-01-01 | Stop reason: HOSPADM

## 2022-01-01 RX ORDER — SODIUM FLUORIDE 5 MG/G
1 GEL, DENTIFRICE DENTAL NIGHTLY
Qty: 56 G | Refills: 3 | Status: SHIPPED | OUTPATIENT
Start: 2022-01-01 | End: 2023-07-26

## 2022-01-01 RX ORDER — ESCITALOPRAM OXALATE 5 MG/1
20 TABLET ORAL DAILY
Status: DISCONTINUED | OUTPATIENT
Start: 2022-01-01 | End: 2022-01-01 | Stop reason: HOSPADM

## 2022-01-01 RX ORDER — METHADONE HYDROCHLORIDE 5 MG/1
2.5 TABLET ORAL NIGHTLY
Qty: 30 TABLET | Refills: 0 | Status: SHIPPED | OUTPATIENT
Start: 2022-01-01

## 2022-01-01 RX ORDER — ISOSORBIDE MONONITRATE 30 MG/1
30 TABLET, EXTENDED RELEASE ORAL DAILY
Status: DISCONTINUED | OUTPATIENT
Start: 2022-01-01 | End: 2022-01-01 | Stop reason: HOSPADM

## 2022-01-01 RX ORDER — CLINDAMYCIN HYDROCHLORIDE 300 MG/1
300 CAPSULE ORAL 3 TIMES DAILY
Qty: 21 CAPSULE | Refills: 0 | Status: ON HOLD | OUTPATIENT
Start: 2022-01-01 | End: 2022-01-01 | Stop reason: HOSPADM

## 2022-01-01 RX ORDER — IBUPROFEN 200 MG
16 TABLET ORAL
Status: DISCONTINUED | OUTPATIENT
Start: 2022-01-01 | End: 2022-01-01 | Stop reason: HOSPADM

## 2022-01-01 RX ORDER — LIDOCAINE HYDROCHLORIDE 10 MG/ML
INJECTION, SOLUTION EPIDURAL; INFILTRATION; INTRACAUDAL; PERINEURAL
Status: DISCONTINUED | OUTPATIENT
Start: 2022-01-01 | End: 2022-01-01

## 2022-01-01 RX ORDER — METRONIDAZOLE 500 MG/1
500 TABLET ORAL EVERY 8 HOURS
Status: DISCONTINUED | OUTPATIENT
Start: 2022-01-01 | End: 2022-01-01

## 2022-01-01 RX ORDER — MORPHINE SULFATE 4 MG/ML
4 INJECTION, SOLUTION INTRAMUSCULAR; INTRAVENOUS
Status: DISPENSED | OUTPATIENT
Start: 2022-01-01 | End: 2022-01-01

## 2022-01-01 RX ORDER — BLOOD SUGAR DIAGNOSTIC
STRIP MISCELLANEOUS
COMMUNITY
Start: 2021-01-01 | End: 2022-01-01

## 2022-01-01 RX ORDER — PREGABALIN 75 MG/1
75 CAPSULE ORAL 2 TIMES DAILY
COMMUNITY
End: 2022-01-01

## 2022-01-01 RX ORDER — ACETAMINOPHEN AND CODEINE PHOSPHATE 300; 30 MG/1; MG/1
1 TABLET ORAL
COMMUNITY
Start: 2021-08-23 | End: 2022-01-01

## 2022-01-01 RX ORDER — GUAIFENESIN 600 MG/1
600 TABLET, EXTENDED RELEASE ORAL 2 TIMES DAILY
Status: DISCONTINUED | OUTPATIENT
Start: 2022-01-01 | End: 2022-01-01 | Stop reason: HOSPADM

## 2022-01-01 RX ORDER — HYDROCODONE BITARTRATE AND ACETAMINOPHEN 5; 325 MG/1; MG/1
1 TABLET ORAL EVERY 6 HOURS PRN
Qty: 12 TABLET | Refills: 0 | Status: SHIPPED | OUTPATIENT
Start: 2022-01-01 | End: 2022-01-01

## 2022-01-01 RX ORDER — SITAGLIPTIN 100 MG/1
100 TABLET, FILM COATED ORAL DAILY
Status: ON HOLD | COMMUNITY
Start: 2021-01-01 | End: 2022-01-01 | Stop reason: HOSPADM

## 2022-01-01 RX ORDER — SODIUM CHLORIDE 9 MG/ML
INJECTION, SOLUTION INTRAVENOUS
Status: DISCONTINUED | OUTPATIENT
Start: 2022-01-01 | End: 2022-01-01 | Stop reason: HOSPADM

## 2022-01-01 RX ORDER — INSULIN ASPART 100 [IU]/ML
INJECTION, SOLUTION INTRAVENOUS; SUBCUTANEOUS
COMMUNITY
Start: 2022-01-01 | End: 2022-01-01

## 2022-01-01 RX ORDER — HYDRALAZINE HYDROCHLORIDE 20 MG/ML
10 INJECTION INTRAMUSCULAR; INTRAVENOUS EVERY 6 HOURS PRN
Status: DISCONTINUED | OUTPATIENT
Start: 2022-01-01 | End: 2022-01-01 | Stop reason: HOSPADM

## 2022-01-01 RX ORDER — VANCOMYCIN HCL IN 5 % DEXTROSE 1G/250ML
15 PLASTIC BAG, INJECTION (ML) INTRAVENOUS ONCE
Status: COMPLETED | OUTPATIENT
Start: 2022-01-01 | End: 2022-01-01

## 2022-01-01 RX ORDER — POTASSIUM CHLORIDE 20 MEQ/1
60 TABLET, EXTENDED RELEASE ORAL ONCE
Status: COMPLETED | OUTPATIENT
Start: 2022-01-01 | End: 2022-01-01

## 2022-01-01 RX ORDER — LANOLIN ALCOHOL/MO/W.PET/CERES
400 CREAM (GRAM) TOPICAL DAILY
Status: DISCONTINUED | OUTPATIENT
Start: 2022-01-01 | End: 2022-01-01 | Stop reason: HOSPADM

## 2022-01-01 RX ORDER — INSULIN ASPART 100 [IU]/ML
0-5 INJECTION, SOLUTION INTRAVENOUS; SUBCUTANEOUS EVERY 6 HOURS PRN
Status: DISCONTINUED | OUTPATIENT
Start: 2022-01-01 | End: 2022-01-01 | Stop reason: HOSPADM

## 2022-01-01 RX ORDER — CEFAZOLIN SODIUM 2 G/50ML
2 SOLUTION INTRAVENOUS
Status: DISCONTINUED | OUTPATIENT
Start: 2022-01-01 | End: 2022-01-01

## 2022-01-01 RX ORDER — TALC
6 POWDER (GRAM) TOPICAL NIGHTLY PRN
Status: DISCONTINUED | OUTPATIENT
Start: 2022-01-01 | End: 2022-01-01 | Stop reason: HOSPADM

## 2022-01-01 RX ORDER — FUROSEMIDE 10 MG/ML
40 INJECTION INTRAMUSCULAR; INTRAVENOUS DAILY
Status: DISCONTINUED | OUTPATIENT
Start: 2022-01-01 | End: 2022-01-01 | Stop reason: HOSPADM

## 2022-01-01 RX ORDER — METHADONE HYDROCHLORIDE 5 MG/5ML
2.5 SOLUTION ORAL NIGHTLY
Status: DISCONTINUED | OUTPATIENT
Start: 2022-01-01 | End: 2022-01-01 | Stop reason: HOSPADM

## 2022-01-01 RX ORDER — SULFAMETHOXAZOLE AND TRIMETHOPRIM 800; 160 MG/1; MG/1
1 TABLET ORAL 2 TIMES DAILY
Qty: 14 TABLET | Refills: 0 | Status: SHIPPED | OUTPATIENT
Start: 2022-01-01 | End: 2022-01-01

## 2022-01-01 RX ORDER — NAPROXEN SODIUM 220 MG/1
81 TABLET, FILM COATED ORAL DAILY
Status: DISCONTINUED | OUTPATIENT
Start: 2022-01-01 | End: 2022-01-01

## 2022-01-01 RX ORDER — CEFEPIME HYDROCHLORIDE 1 G/50ML
1 INJECTION, SOLUTION INTRAVENOUS
Status: DISCONTINUED | OUTPATIENT
Start: 2022-01-01 | End: 2022-01-01

## 2022-01-01 RX ORDER — MAG HYDROX/ALUMINUM HYD/SIMETH 200-200-20
30 SUSPENSION, ORAL (FINAL DOSE FORM) ORAL 4 TIMES DAILY PRN
Status: DISCONTINUED | OUTPATIENT
Start: 2022-01-01 | End: 2022-01-01 | Stop reason: HOSPADM

## 2022-01-01 RX ORDER — IPRATROPIUM BROMIDE AND ALBUTEROL SULFATE 2.5; .5 MG/3ML; MG/3ML
3 SOLUTION RESPIRATORY (INHALATION) EVERY 4 HOURS PRN
Status: DISCONTINUED | OUTPATIENT
Start: 2022-01-01 | End: 2022-01-01 | Stop reason: HOSPADM

## 2022-01-01 RX ORDER — IPRATROPIUM BROMIDE AND ALBUTEROL SULFATE 2.5; .5 MG/3ML; MG/3ML
3 SOLUTION RESPIRATORY (INHALATION)
Status: DISCONTINUED | OUTPATIENT
Start: 2022-01-01 | End: 2022-01-01 | Stop reason: HOSPADM

## 2022-01-01 RX ORDER — ONDANSETRON 2 MG/ML
4 INJECTION INTRAMUSCULAR; INTRAVENOUS EVERY 6 HOURS PRN
Status: DISCONTINUED | OUTPATIENT
Start: 2022-01-01 | End: 2022-01-01 | Stop reason: HOSPADM

## 2022-01-01 RX ORDER — LEVOFLOXACIN 5 MG/ML
750 INJECTION, SOLUTION INTRAVENOUS
Status: DISCONTINUED | OUTPATIENT
Start: 2022-01-01 | End: 2022-01-01 | Stop reason: DRUGHIGH

## 2022-01-01 RX ORDER — MORPHINE SULFATE 4 MG/ML
1 INJECTION, SOLUTION INTRAMUSCULAR; INTRAVENOUS EVERY 6 HOURS PRN
Status: DISCONTINUED | OUTPATIENT
Start: 2022-01-01 | End: 2022-01-01

## 2022-01-01 RX ORDER — LINEZOLID 600 MG/1
600 TABLET, FILM COATED ORAL EVERY 12 HOURS
Status: DISCONTINUED | OUTPATIENT
Start: 2022-01-01 | End: 2022-01-01

## 2022-01-01 RX ORDER — CLONAZEPAM 0.5 MG/1
0.5 TABLET ORAL NIGHTLY
Status: DISCONTINUED | OUTPATIENT
Start: 2022-01-01 | End: 2022-01-01

## 2022-01-01 RX ORDER — ACETAMINOPHEN 325 MG/1
650 TABLET ORAL EVERY 4 HOURS PRN
Status: DISCONTINUED | OUTPATIENT
Start: 2022-01-01 | End: 2022-01-01 | Stop reason: HOSPADM

## 2022-01-01 RX ORDER — HYDROCODONE BITARTRATE AND ACETAMINOPHEN 5; 325 MG/1; MG/1
1 TABLET ORAL EVERY 6 HOURS PRN
Status: DISCONTINUED | OUTPATIENT
Start: 2022-01-01 | End: 2022-01-01 | Stop reason: HOSPADM

## 2022-01-01 RX ORDER — HYDROMORPHONE HYDROCHLORIDE 1 MG/ML
1 INJECTION, SOLUTION INTRAMUSCULAR; INTRAVENOUS; SUBCUTANEOUS
Status: COMPLETED | OUTPATIENT
Start: 2022-01-01 | End: 2022-01-01

## 2022-01-01 RX ORDER — SODIUM CHLORIDE 0.9 % (FLUSH) 0.9 %
10 SYRINGE (ML) INJECTION EVERY 8 HOURS PRN
Status: DISCONTINUED | OUTPATIENT
Start: 2022-01-01 | End: 2022-01-01 | Stop reason: HOSPADM

## 2022-01-01 RX ORDER — DOXYCYCLINE HYCLATE 100 MG
100 TABLET ORAL EVERY 12 HOURS
Qty: 14 TABLET | Refills: 0 | Status: SHIPPED | OUTPATIENT
Start: 2022-01-01 | End: 2022-01-01

## 2022-01-01 RX ORDER — ZINC SULFATE 50(220)MG
220 CAPSULE ORAL NIGHTLY
Status: DISCONTINUED | OUTPATIENT
Start: 2022-01-01 | End: 2022-01-01 | Stop reason: HOSPADM

## 2022-01-01 RX ORDER — CLOTRIMAZOLE AND BETAMETHASONE DIPROPIONATE 10; .64 MG/G; MG/G
CREAM TOPICAL 2 TIMES DAILY
Qty: 45 G | Refills: 0 | Status: SHIPPED | OUTPATIENT
Start: 2022-01-01 | End: 2022-01-01

## 2022-01-01 RX ORDER — PEN NEEDLE, DIABETIC 32GX 5/32"
NEEDLE, DISPOSABLE MISCELLANEOUS
COMMUNITY
Start: 2022-01-01 | End: 2022-01-01

## 2022-01-01 RX ORDER — HYDROCODONE BITARTRATE AND ACETAMINOPHEN 5; 325 MG/1; MG/1
1 TABLET ORAL EVERY 6 HOURS PRN
Status: DISCONTINUED | OUTPATIENT
Start: 2022-01-01 | End: 2022-01-01 | Stop reason: SDUPTHER

## 2022-01-01 RX ORDER — CLONAZEPAM 0.5 MG/1
0.5 TABLET ORAL NIGHTLY
Status: DISCONTINUED | OUTPATIENT
Start: 2022-01-01 | End: 2022-01-01 | Stop reason: HOSPADM

## 2022-01-01 RX ORDER — CHLORHEXIDINE GLUCONATE ORAL RINSE 1.2 MG/ML
10 SOLUTION DENTAL 2 TIMES DAILY
Status: DISPENSED | OUTPATIENT
Start: 2022-01-01 | End: 2022-01-01

## 2022-01-01 RX ORDER — AMOXICILLIN 500 MG/1
CAPSULE ORAL
COMMUNITY
Start: 2021-08-23 | End: 2022-01-01

## 2022-01-01 RX ORDER — DIPHENHYDRAMINE HCL 50 MG
50 CAPSULE ORAL
Status: COMPLETED | OUTPATIENT
Start: 2022-01-01 | End: 2022-01-01

## 2022-01-01 RX ORDER — PREGABALIN 75 MG/1
150 CAPSULE ORAL NIGHTLY
COMMUNITY
Start: 2022-01-01

## 2022-01-01 RX ORDER — PREGABALIN 25 MG/1
150 CAPSULE ORAL NIGHTLY
Status: DISCONTINUED | OUTPATIENT
Start: 2022-01-01 | End: 2022-01-01 | Stop reason: HOSPADM

## 2022-01-01 RX ORDER — HYDROCHLOROTHIAZIDE 25 MG/1
25 TABLET ORAL DAILY
Qty: 90 TABLET | Refills: 4 | Status: SHIPPED | OUTPATIENT
Start: 2022-01-01

## 2022-01-01 RX ORDER — PROCHLORPERAZINE EDISYLATE 5 MG/ML
5 INJECTION INTRAMUSCULAR; INTRAVENOUS EVERY 6 HOURS PRN
Status: DISCONTINUED | OUTPATIENT
Start: 2022-01-01 | End: 2022-01-01 | Stop reason: HOSPADM

## 2022-01-01 RX ORDER — FUROSEMIDE 10 MG/ML
60 INJECTION INTRAMUSCULAR; INTRAVENOUS
Status: COMPLETED | OUTPATIENT
Start: 2022-01-01 | End: 2022-01-01

## 2022-01-01 RX ORDER — FUROSEMIDE 10 MG/ML
20 INJECTION INTRAMUSCULAR; INTRAVENOUS
Status: COMPLETED | OUTPATIENT
Start: 2022-01-01 | End: 2022-01-01

## 2022-01-01 RX ADMIN — HYDROMORPHONE HYDROCHLORIDE 0.5 MG: 2 INJECTION INTRAMUSCULAR; INTRAVENOUS; SUBCUTANEOUS at 01:02

## 2022-01-01 RX ADMIN — THERA TABS 1 TABLET: TAB at 09:02

## 2022-01-01 RX ADMIN — DOXYCYCLINE HYCLATE 100 MG: 100 TABLET, COATED ORAL at 09:02

## 2022-01-01 RX ADMIN — LINEZOLID 600 MG: 600 TABLET, FILM COATED ORAL at 09:02

## 2022-01-01 RX ADMIN — IPRATROPIUM BROMIDE AND ALBUTEROL SULFATE 3 ML: 2.5; .5 SOLUTION RESPIRATORY (INHALATION) at 08:02

## 2022-01-01 RX ADMIN — IPRATROPIUM BROMIDE AND ALBUTEROL SULFATE 3 ML: 2.5; .5 SOLUTION RESPIRATORY (INHALATION) at 07:02

## 2022-01-01 RX ADMIN — HYDROCODONE BITARTRATE AND ACETAMINOPHEN 1 TABLET: 5; 325 TABLET ORAL at 07:02

## 2022-01-01 RX ADMIN — DIPHENHYDRAMINE HYDROCHLORIDE 50 MG: 50 CAPSULE ORAL at 06:02

## 2022-01-01 RX ADMIN — BARIUM SULFATE 10 ML: 0.81 POWDER, FOR SUSPENSION ORAL at 01:02

## 2022-01-01 RX ADMIN — INSULIN ASPART 2 UNITS: 100 INJECTION, SOLUTION INTRAVENOUS; SUBCUTANEOUS at 12:02

## 2022-01-01 RX ADMIN — GUAIFENESIN 600 MG: 600 TABLET, EXTENDED RELEASE ORAL at 09:02

## 2022-01-01 RX ADMIN — CLOPIDOGREL 75 MG: 75 TABLET, FILM COATED ORAL at 12:07

## 2022-01-01 RX ADMIN — HYDROCODONE BITARTRATE AND ACETAMINOPHEN 1 TABLET: 5; 325 TABLET ORAL at 12:02

## 2022-01-01 RX ADMIN — ATORVASTATIN CALCIUM 40 MG: 40 TABLET, FILM COATED ORAL at 09:02

## 2022-01-01 RX ADMIN — IPRATROPIUM BROMIDE AND ALBUTEROL SULFATE 3 ML: 2.5; .5 SOLUTION RESPIRATORY (INHALATION) at 01:02

## 2022-01-01 RX ADMIN — AMOXICILLIN AND CLAVULANATE POTASSIUM 1 TABLET: 875; 125 TABLET, FILM COATED ORAL at 09:02

## 2022-01-01 RX ADMIN — ACETAMINOPHEN 325MG 650 MG: 325 TABLET ORAL at 01:02

## 2022-01-01 RX ADMIN — CLONAZEPAM 0.5 MG: 0.5 TABLET ORAL at 09:02

## 2022-01-01 RX ADMIN — ZINC SULFATE 220 MG (50 MG) CAPSULE 220 MG: CAPSULE at 08:02

## 2022-01-01 RX ADMIN — Medication 6 MG: at 09:02

## 2022-01-01 RX ADMIN — CHLORHEXIDINE GLUCONATE 0.12% ORAL RINSE 10 ML: 1.2 LIQUID ORAL at 08:02

## 2022-01-01 RX ADMIN — DONEPEZIL HYDROCHLORIDE 10 MG: 5 TABLET, FILM COATED ORAL at 09:02

## 2022-01-01 RX ADMIN — PANTOPRAZOLE SODIUM 40 MG: 40 TABLET, DELAYED RELEASE ORAL at 08:02

## 2022-01-01 RX ADMIN — LINEZOLID 600 MG: 600 TABLET, FILM COATED ORAL at 08:02

## 2022-01-01 RX ADMIN — LEVOTHYROXINE SODIUM 50 MCG: 50 TABLET ORAL at 05:02

## 2022-01-01 RX ADMIN — ISOSORBIDE MONONITRATE 30 MG: 30 TABLET, EXTENDED RELEASE ORAL at 08:02

## 2022-01-01 RX ADMIN — LOSARTAN POTASSIUM 100 MG: 50 TABLET, FILM COATED ORAL at 10:02

## 2022-01-01 RX ADMIN — LEVOTHYROXINE SODIUM 50 MCG: 50 TABLET ORAL at 06:02

## 2022-01-01 RX ADMIN — INSULIN ASPART 2 UNITS: 100 INJECTION, SOLUTION INTRAVENOUS; SUBCUTANEOUS at 05:02

## 2022-01-01 RX ADMIN — HYDROMORPHONE HYDROCHLORIDE 0.5 MG: 2 INJECTION INTRAMUSCULAR; INTRAVENOUS; SUBCUTANEOUS at 03:02

## 2022-01-01 RX ADMIN — PREGABALIN 75 MG: 75 CAPSULE ORAL at 08:02

## 2022-01-01 RX ADMIN — PREGABALIN 150 MG: 25 CAPSULE ORAL at 09:02

## 2022-01-01 RX ADMIN — HYDROCODONE BITARTRATE AND ACETAMINOPHEN 1 TABLET: 5; 325 TABLET ORAL at 06:07

## 2022-01-01 RX ADMIN — PANTOPRAZOLE SODIUM 40 MG: 40 TABLET, DELAYED RELEASE ORAL at 09:02

## 2022-01-01 RX ADMIN — HYDROCODONE BITARTRATE AND ACETAMINOPHEN 1 TABLET: 5; 325 TABLET ORAL at 08:02

## 2022-01-01 RX ADMIN — INSULIN ASPART 3 UNITS: 100 INJECTION, SOLUTION INTRAVENOUS; SUBCUTANEOUS at 06:02

## 2022-01-01 RX ADMIN — VANCOMYCIN HYDROCHLORIDE 750 MG: 750 INJECTION, POWDER, LYOPHILIZED, FOR SOLUTION INTRAVENOUS at 01:02

## 2022-01-01 RX ADMIN — CHLORHEXIDINE GLUCONATE 0.12% ORAL RINSE 10 ML: 1.2 LIQUID ORAL at 10:02

## 2022-01-01 RX ADMIN — CLONAZEPAM 0.5 MG: 0.5 TABLET ORAL at 08:02

## 2022-01-01 RX ADMIN — AMLODIPINE BESYLATE 10 MG: 10 TABLET ORAL at 09:02

## 2022-01-01 RX ADMIN — INSULIN ASPART 1 UNITS: 100 INJECTION, SOLUTION INTRAVENOUS; SUBCUTANEOUS at 09:02

## 2022-01-01 RX ADMIN — INSULIN ASPART 2 UNITS: 100 INJECTION, SOLUTION INTRAVENOUS; SUBCUTANEOUS at 06:02

## 2022-01-01 RX ADMIN — Medication 10 ML: at 09:02

## 2022-01-01 RX ADMIN — PROPOFOL 50 MG: 10 INJECTION, EMULSION INTRAVENOUS at 07:02

## 2022-01-01 RX ADMIN — ASPIRIN 81 MG CHEWABLE TABLET 81 MG: 81 TABLET CHEWABLE at 12:07

## 2022-01-01 RX ADMIN — SODIUM CHLORIDE, SODIUM LACTATE, POTASSIUM CHLORIDE, AND CALCIUM CHLORIDE: .6; .31; .03; .02 INJECTION, SOLUTION INTRAVENOUS at 06:02

## 2022-01-01 RX ADMIN — ISOSORBIDE MONONITRATE 30 MG: 30 TABLET, EXTENDED RELEASE ORAL at 10:02

## 2022-01-01 RX ADMIN — AMLODIPINE BESYLATE 10 MG: 10 TABLET ORAL at 10:02

## 2022-01-01 RX ADMIN — GUAIFENESIN 600 MG: 600 TABLET, EXTENDED RELEASE ORAL at 10:02

## 2022-01-01 RX ADMIN — DONEPEZIL HYDROCHLORIDE 10 MG: 5 TABLET, FILM COATED ORAL at 10:02

## 2022-01-01 RX ADMIN — IPRATROPIUM BROMIDE AND ALBUTEROL SULFATE 3 ML: 2.5; .5 SOLUTION RESPIRATORY (INHALATION) at 02:02

## 2022-01-01 RX ADMIN — ATORVASTATIN CALCIUM 40 MG: 40 TABLET, FILM COATED ORAL at 08:02

## 2022-01-01 RX ADMIN — ENOXAPARIN SODIUM 40 MG: 100 INJECTION SUBCUTANEOUS at 04:07

## 2022-01-01 RX ADMIN — ENOXAPARIN SODIUM 30 MG: 30 INJECTION, SOLUTION INTRAVENOUS; SUBCUTANEOUS at 08:02

## 2022-01-01 RX ADMIN — SODIUM CHLORIDE, SODIUM LACTATE, POTASSIUM CHLORIDE, AND CALCIUM CHLORIDE 1000 ML: .6; .31; .03; .02 INJECTION, SOLUTION INTRAVENOUS at 04:02

## 2022-01-01 RX ADMIN — Medication 10 ML: at 03:02

## 2022-01-01 RX ADMIN — LOSARTAN POTASSIUM 100 MG: 50 TABLET, FILM COATED ORAL at 08:02

## 2022-01-01 RX ADMIN — OXACILLIN SODIUM 12 G: 10 INJECTION, POWDER, FOR SOLUTION INTRAVENOUS at 03:02

## 2022-01-01 RX ADMIN — INSULIN ASPART 3 UNITS: 100 INJECTION, SOLUTION INTRAVENOUS; SUBCUTANEOUS at 11:02

## 2022-01-01 RX ADMIN — ISOSORBIDE MONONITRATE 30 MG: 30 TABLET, EXTENDED RELEASE ORAL at 09:02

## 2022-01-01 RX ADMIN — INSULIN DETEMIR 18 UNITS: 100 INJECTION, SOLUTION SUBCUTANEOUS at 10:02

## 2022-01-01 RX ADMIN — DOXYCYCLINE HYCLATE 100 MG: 100 TABLET, COATED ORAL at 12:02

## 2022-01-01 RX ADMIN — DONEPEZIL HYDROCHLORIDE 10 MG: 5 TABLET, FILM COATED ORAL at 08:02

## 2022-01-01 RX ADMIN — Medication 1000 UNITS: at 10:02

## 2022-01-01 RX ADMIN — POTASSIUM CHLORIDE 40 MEQ: 1500 TABLET, EXTENDED RELEASE ORAL at 12:07

## 2022-01-01 RX ADMIN — LOSARTAN POTASSIUM 100 MG: 50 TABLET, FILM COATED ORAL at 12:07

## 2022-01-01 RX ADMIN — OXYCODONE HYDROCHLORIDE AND ACETAMINOPHEN 500 MG: 500 TABLET ORAL at 09:02

## 2022-01-01 RX ADMIN — SODIUM CHLORIDE 400 MG: 9 INJECTION, SOLUTION INTRAVENOUS at 01:04

## 2022-01-01 RX ADMIN — OXYCODONE HYDROCHLORIDE AND ACETAMINOPHEN 500 MG: 500 TABLET ORAL at 08:02

## 2022-01-01 RX ADMIN — HYDROMORPHONE HYDROCHLORIDE 1 MG: 1 INJECTION, SOLUTION INTRAMUSCULAR; INTRAVENOUS; SUBCUTANEOUS at 04:02

## 2022-01-01 RX ADMIN — ACETAMINOPHEN 325MG 650 MG: 325 TABLET ORAL at 03:02

## 2022-01-01 RX ADMIN — SODIUM CHLORIDE 3 G: 9 INJECTION, SOLUTION INTRAVENOUS at 03:02

## 2022-01-01 RX ADMIN — ESCITALOPRAM OXALATE 20 MG: 10 TABLET ORAL at 09:02

## 2022-01-01 RX ADMIN — SODIUM ZIRCONIUM CYCLOSILICATE 10 G: 5 POWDER, FOR SUSPENSION ORAL at 11:02

## 2022-01-01 RX ADMIN — INSULIN DETEMIR 14 UNITS: 100 INJECTION, SOLUTION SUBCUTANEOUS at 09:02

## 2022-01-01 RX ADMIN — MAGNESIUM SULFATE HEPTAHYDRATE 2 G: 2 INJECTION, SOLUTION INTRAVENOUS at 12:02

## 2022-01-01 RX ADMIN — HYDRALAZINE HYDROCHLORIDE 10 MG: 20 INJECTION, SOLUTION INTRAMUSCULAR; INTRAVENOUS at 02:02

## 2022-01-01 RX ADMIN — GUAIFENESIN 600 MG: 600 TABLET, EXTENDED RELEASE ORAL at 08:02

## 2022-01-01 RX ADMIN — HYDROCODONE BITARTRATE AND ACETAMINOPHEN 1 TABLET: 5; 325 TABLET ORAL at 01:02

## 2022-01-01 RX ADMIN — PANTOPRAZOLE SODIUM 40 MG: 40 TABLET, DELAYED RELEASE ORAL at 12:07

## 2022-01-01 RX ADMIN — SODIUM CHLORIDE 3 G: 9 INJECTION, SOLUTION INTRAVENOUS at 06:02

## 2022-01-01 RX ADMIN — SODIUM CHLORIDE: 0.9 INJECTION, SOLUTION INTRAVENOUS at 12:02

## 2022-01-01 RX ADMIN — Medication 10 ML: at 05:02

## 2022-01-01 RX ADMIN — FUROSEMIDE 40 MG: 10 INJECTION, SOLUTION INTRAMUSCULAR; INTRAVENOUS at 10:07

## 2022-01-01 RX ADMIN — HYDROMORPHONE HYDROCHLORIDE 0.5 MG: 2 INJECTION INTRAMUSCULAR; INTRAVENOUS; SUBCUTANEOUS at 02:02

## 2022-01-01 RX ADMIN — AMLODIPINE BESYLATE 10 MG: 10 TABLET ORAL at 08:02

## 2022-01-01 RX ADMIN — LINEZOLID 600 MG: 600 TABLET, FILM COATED ORAL at 10:02

## 2022-01-01 RX ADMIN — ACETAMINOPHEN 325MG 650 MG: 325 TABLET ORAL at 02:02

## 2022-01-01 RX ADMIN — OXACILLIN SODIUM 12 G: 10 INJECTION, POWDER, FOR SOLUTION INTRAVENOUS at 04:02

## 2022-01-01 RX ADMIN — INSULIN DETEMIR 20 UNITS: 100 INJECTION, SOLUTION SUBCUTANEOUS at 09:02

## 2022-01-01 RX ADMIN — DONEPEZIL HYDROCHLORIDE 10 MG: 5 TABLET, FILM COATED ORAL at 02:07

## 2022-01-01 RX ADMIN — TRAMADOL HYDROCHLORIDE 50 MG: 50 TABLET, COATED ORAL at 12:02

## 2022-01-01 RX ADMIN — SODIUM CHLORIDE: 9 INJECTION, SOLUTION INTRAVENOUS at 01:04

## 2022-01-01 RX ADMIN — ASPIRIN 81 MG CHEWABLE TABLET 81 MG: 81 TABLET CHEWABLE at 02:07

## 2022-01-01 RX ADMIN — CHLORHEXIDINE GLUCONATE 0.12% ORAL RINSE 10 ML: 1.2 LIQUID ORAL at 09:02

## 2022-01-01 RX ADMIN — SODIUM CHLORIDE 400 MG: 9 INJECTION, SOLUTION INTRAVENOUS at 08:06

## 2022-01-01 RX ADMIN — LOSARTAN POTASSIUM 100 MG: 50 TABLET, FILM COATED ORAL at 09:02

## 2022-01-01 RX ADMIN — INSULIN ASPART 2 UNITS: 100 INJECTION, SOLUTION INTRAVENOUS; SUBCUTANEOUS at 11:02

## 2022-01-01 RX ADMIN — PREGABALIN 150 MG: 25 CAPSULE ORAL at 08:02

## 2022-01-01 RX ADMIN — MAGNESIUM OXIDE TAB 400 MG (241.3 MG ELEMENTAL MG) 400 MG: 400 (241.3 MG) TAB at 09:02

## 2022-01-01 RX ADMIN — DONEPEZIL HYDROCHLORIDE 10 MG: 5 TABLET, FILM COATED ORAL at 12:07

## 2022-01-01 RX ADMIN — ESCITALOPRAM OXALATE 20 MG: 10 TABLET ORAL at 04:02

## 2022-01-01 RX ADMIN — SODIUM CHLORIDE, SODIUM LACTATE, POTASSIUM CHLORIDE, AND CALCIUM CHLORIDE: .6; .31; .03; .02 INJECTION, SOLUTION INTRAVENOUS at 03:02

## 2022-01-01 RX ADMIN — SODIUM CHLORIDE: 9 INJECTION, SOLUTION INTRAVENOUS at 08:06

## 2022-01-01 RX ADMIN — FUROSEMIDE 20 MG: 10 INJECTION, SOLUTION INTRAMUSCULAR; INTRAVENOUS at 04:02

## 2022-01-01 RX ADMIN — SODIUM CHLORIDE 3 G: 9 INJECTION, SOLUTION INTRAVENOUS at 11:02

## 2022-01-01 RX ADMIN — HYDROXYZINE HYDROCHLORIDE 25 MG: 25 TABLET ORAL at 07:02

## 2022-01-01 RX ADMIN — CLOPIDOGREL 75 MG: 75 TABLET, FILM COATED ORAL at 09:02

## 2022-01-01 RX ADMIN — HYDROCODONE BITARTRATE AND ACETAMINOPHEN 1 TABLET: 5; 325 TABLET ORAL at 03:02

## 2022-01-01 RX ADMIN — METHADONE HYDROCHLORIDE 2.5 MG: 5 SOLUTION ORAL at 10:07

## 2022-01-01 RX ADMIN — HYDROMORPHONE HYDROCHLORIDE 0.5 MG: 1 INJECTION, SOLUTION INTRAMUSCULAR; INTRAVENOUS; SUBCUTANEOUS at 05:02

## 2022-01-01 RX ADMIN — POTASSIUM CHLORIDE 60 MEQ: 1500 TABLET, EXTENDED RELEASE ORAL at 09:02

## 2022-01-01 RX ADMIN — ESCITALOPRAM OXALATE 20 MG: 10 TABLET ORAL at 08:02

## 2022-01-01 RX ADMIN — ESCITALOPRAM OXALATE 20 MG: 10 TABLET ORAL at 10:02

## 2022-01-01 RX ADMIN — ATORVASTATIN CALCIUM 40 MG: 40 TABLET, FILM COATED ORAL at 04:02

## 2022-01-01 RX ADMIN — CLOPIDOGREL 75 MG: 75 TABLET, FILM COATED ORAL at 04:02

## 2022-01-01 RX ADMIN — SODIUM CHLORIDE 3 G: 9 INJECTION, SOLUTION INTRAVENOUS at 09:02

## 2022-01-01 RX ADMIN — VANCOMYCIN HYDROCHLORIDE 1250 MG: 1.25 INJECTION, POWDER, LYOPHILIZED, FOR SOLUTION INTRAVENOUS at 05:02

## 2022-01-01 RX ADMIN — MAGNESIUM OXIDE TAB 400 MG (241.3 MG ELEMENTAL MG) 400 MG: 400 (241.3 MG) TAB at 10:02

## 2022-01-01 RX ADMIN — Medication 1000 UNITS: at 09:02

## 2022-01-01 RX ADMIN — CLOPIDOGREL 75 MG: 75 TABLET, FILM COATED ORAL at 10:02

## 2022-01-01 RX ADMIN — INSULIN ASPART 1 UNITS: 100 INJECTION, SOLUTION INTRAVENOUS; SUBCUTANEOUS at 11:02

## 2022-01-01 RX ADMIN — PREGABALIN 150 MG: 75 CAPSULE ORAL at 10:07

## 2022-01-01 RX ADMIN — THERA TABS 1 TABLET: TAB at 12:02

## 2022-01-01 RX ADMIN — INSULIN ASPART 3 UNITS: 100 INJECTION, SOLUTION INTRAVENOUS; SUBCUTANEOUS at 12:02

## 2022-01-01 RX ADMIN — ENOXAPARIN SODIUM 40 MG: 100 INJECTION SUBCUTANEOUS at 06:07

## 2022-01-01 RX ADMIN — AMLODIPINE BESYLATE 10 MG: 10 TABLET ORAL at 12:07

## 2022-01-01 RX ADMIN — AMOXICILLIN AND CLAVULANATE POTASSIUM 1 TABLET: 875; 125 TABLET, FILM COATED ORAL at 12:02

## 2022-01-01 RX ADMIN — HYDROMORPHONE HYDROCHLORIDE 0.5 MG: 2 INJECTION INTRAMUSCULAR; INTRAVENOUS; SUBCUTANEOUS at 09:02

## 2022-01-01 RX ADMIN — ENOXAPARIN SODIUM 40 MG: 40 INJECTION SUBCUTANEOUS at 06:02

## 2022-01-01 RX ADMIN — ZINC SULFATE 220 MG (50 MG) CAPSULE 220 MG: CAPSULE at 09:02

## 2022-01-01 RX ADMIN — LOSARTAN POTASSIUM 100 MG: 50 TABLET, FILM COATED ORAL at 02:07

## 2022-01-01 RX ADMIN — THERA TABS 1 TABLET: TAB at 08:02

## 2022-01-01 RX ADMIN — ACETAMINOPHEN 325MG 650 MG: 325 TABLET ORAL at 12:02

## 2022-01-01 RX ADMIN — MAGNESIUM SULFATE 2 G: 2 INJECTION INTRAVENOUS at 12:02

## 2022-01-01 RX ADMIN — ISOSORBIDE MONONITRATE 30 MG: 30 TABLET, EXTENDED RELEASE ORAL at 12:07

## 2022-01-01 RX ADMIN — VANCOMYCIN HYDROCHLORIDE 1500 MG: 1.5 INJECTION, POWDER, LYOPHILIZED, FOR SOLUTION INTRAVENOUS at 01:02

## 2022-01-01 RX ADMIN — FUROSEMIDE 60 MG: 10 INJECTION, SOLUTION INTRAMUSCULAR; INTRAVENOUS at 12:07

## 2022-01-01 RX ADMIN — VANCOMYCIN HYDROCHLORIDE 1000 MG: 1 INJECTION, POWDER, LYOPHILIZED, FOR SOLUTION INTRAVENOUS at 09:02

## 2022-01-01 RX ADMIN — ACETAMINOPHEN 325MG 650 MG: 325 TABLET ORAL at 10:02

## 2022-01-01 RX ADMIN — LIDOCAINE HYDROCHLORIDE 50 MG: 10 INJECTION, SOLUTION EPIDURAL; INFILTRATION; INTRACAUDAL; PERINEURAL at 07:02

## 2022-01-01 RX ADMIN — INSULIN DETEMIR 18 UNITS: 100 INJECTION, SOLUTION SUBCUTANEOUS at 09:02

## 2022-01-01 RX ADMIN — Medication 1000 UNITS: at 08:02

## 2022-01-01 RX ADMIN — MAGNESIUM OXIDE TAB 400 MG (241.3 MG ELEMENTAL MG) 400 MG: 400 (241.3 MG) TAB at 12:02

## 2022-01-01 RX ADMIN — LOSARTAN POTASSIUM 100 MG: 50 TABLET, FILM COATED ORAL at 04:02

## 2022-01-01 RX ADMIN — SODIUM CHLORIDE: 0.9 INJECTION, SOLUTION INTRAVENOUS at 03:02

## 2022-01-01 RX ADMIN — SODIUM CHLORIDE 1000 ML: 0.9 INJECTION, SOLUTION INTRAVENOUS at 08:07

## 2022-01-01 RX ADMIN — MAGNESIUM OXIDE TAB 400 MG (241.3 MG ELEMENTAL MG) 400 MG: 400 (241.3 MG) TAB at 08:02

## 2022-01-01 RX ADMIN — PANTOPRAZOLE SODIUM 40 MG: 40 TABLET, DELAYED RELEASE ORAL at 10:02

## 2022-01-01 RX ADMIN — SODIUM CHLORIDE 3 G: 9 INJECTION, SOLUTION INTRAVENOUS at 12:02

## 2022-01-01 RX ADMIN — HYDROCODONE BITARTRATE AND ACETAMINOPHEN 1 TABLET: 5; 325 TABLET ORAL at 06:02

## 2022-01-01 RX ADMIN — SODIUM CHLORIDE 3 G: 9 INJECTION, SOLUTION INTRAVENOUS at 04:02

## 2022-01-01 RX ADMIN — ENOXAPARIN SODIUM 40 MG: 40 INJECTION SUBCUTANEOUS at 05:02

## 2022-01-01 RX ADMIN — THERA TABS 1 TABLET: TAB at 10:02

## 2022-01-01 RX ADMIN — ESCITALOPRAM 20 MG: 5 TABLET, FILM COATED ORAL at 12:07

## 2022-01-01 RX ADMIN — ACETAMINOPHEN 325MG 650 MG: 325 TABLET ORAL at 05:02

## 2022-01-01 RX ADMIN — SODIUM CHLORIDE, SODIUM LACTATE, POTASSIUM CHLORIDE, AND CALCIUM CHLORIDE: .6; .31; .03; .02 INJECTION, SOLUTION INTRAVENOUS at 11:02

## 2022-01-01 RX ADMIN — LEVOTHYROXINE SODIUM 50 MCG: 50 TABLET ORAL at 12:07

## 2022-01-01 RX ADMIN — ACETAMINOPHEN 325MG 650 MG: 325 TABLET ORAL at 07:02

## 2022-01-01 RX ADMIN — HYDROMORPHONE HYDROCHLORIDE 0.5 MG: 1 INJECTION, SOLUTION INTRAMUSCULAR; INTRAVENOUS; SUBCUTANEOUS at 10:02

## 2022-01-01 RX ADMIN — ACETAMINOPHEN 325MG 650 MG: 325 TABLET ORAL at 09:02

## 2022-01-01 RX ADMIN — OXACILLIN SODIUM 12 G: 10 INJECTION, POWDER, FOR SOLUTION INTRAVENOUS at 02:02

## 2022-01-01 RX ADMIN — Medication 10 ML: at 02:02

## 2022-01-01 RX ADMIN — PREGABALIN 75 MG: 75 CAPSULE ORAL at 09:02

## 2022-01-01 RX ADMIN — ACETAMINOPHEN 650 MG: 325 TABLET ORAL at 09:07

## 2022-01-01 RX ADMIN — ACETAMINOPHEN 325MG 650 MG: 325 TABLET ORAL at 08:02

## 2022-01-01 RX ADMIN — CEFEPIME HYDROCHLORIDE 1 G: 1 INJECTION, SOLUTION INTRAVENOUS at 04:02

## 2022-01-01 RX ADMIN — INSULIN ASPART 1 UNITS: 100 INJECTION, SOLUTION INTRAVENOUS; SUBCUTANEOUS at 08:02

## 2022-01-01 RX ADMIN — HYDROCODONE BITARTRATE AND ACETAMINOPHEN 1 TABLET: 5; 325 TABLET ORAL at 03:07

## 2022-01-01 RX ADMIN — GLYCOPYRROLATE 0.4 MG: 0.2 INJECTION, SOLUTION INTRAMUSCULAR; INTRAVENOUS at 07:02

## 2022-01-01 RX ADMIN — SULFAMETHOXAZOLE AND TRIMETHOPRIM 1 TABLET: 800; 160 TABLET ORAL at 03:02

## 2022-01-12 PROBLEM — E66.09 CLASS 1 OBESITY DUE TO EXCESS CALORIES WITH SERIOUS COMORBIDITY AND BODY MASS INDEX (BMI) OF 30.0 TO 30.9 IN ADULT: Status: ACTIVE | Noted: 2019-03-15

## 2022-01-12 NOTE — PROGRESS NOTES
Patient ID: Tiffanie Wise is a 81 y.o. female.    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    Anxiety and depression    Pulmonary nodule, right - stable    Left ventricular diastolic dysfunction with preserved systolic function    Long-term insulin use in type 2 diabetes    CAD with remote CABG x 1V (LIMA-LAD) in 6/2016 + PCI of RCA and LCx    Hypertension associated with diabetes    Unspecified vitamin D deficiency    Amnesia    History of arterial ischemic stroke, left MCA (middle cerebral artery) 12/2017    Aortic arch atherosclerosis    Arthritis of hand    Microcytic anemia    Seizure    Overweight (BMI 25.0-29.9)    History of PTCA    Malignant neoplasm of pharyngeal tonsil    Dehydration    Encounter for palliative care    Cancer associated pain    Severe Oropharyngeal dysphagia sec to XRT for Tonsilar Ca    Therapeutic opioid-induced constipation (OIC)    History of stroke    CAD, multiple vessel    S/P CABG (coronary artery bypass graft)    Nonrheumatic aortic valve insufficiency    Diastolic dysfunction    Iron deficiency anemia secondary to inadequate dietary iron intake    History of fall    Class 1 obesity due to excess calories with serious comorbidity and body mass index (BMI) of 30.0 to 30.9 in adult     Problem list has been reviewed.      Chief Complaint: Sleep Apnea and Pulmonary Nodules       HPI:   Tiffanie Wise 81 y.o. female presents with Connie Chinchillaarre, daughter, power of , and primary care giver here for 6 month follow up on obstructive sleep apnea on CPAP for history of aspiration pneumonia with repeat chest xray.     Patient is former patient of Dr. Estrella, she was hoping to see Dr. Pinto at this visit, visit had to  be rescheduled, plan to arrange follow up with Dr. Pinto at next follow up.      Patient presents stable.    No pulmonary symptoms. There is no cough, no wheezing, no shortness of breath at rest (shortness of breath with prolonged exertion), no sputum production, no chest pain.    History of Hospital d/c on 1/17/2021 with home oxygen nasal cannula 2 L after treatment of aspiration pneumonia.     Patient is not using home O2 at night blended into CPAP, daughter states no need to retest with overnight, she is not going to wear O2.     On APAP  of  6 - 20 CMWP.  Ordered to be on 2 liters home oxygen blended in.     4/5/2021 overnight oximetry abnormal indicating need to continue 2 L oxygen blended into auto CPAP night.  Daughter reports, orders placed, patient refused oxygen.     Patient denies snoring, headaches, excessive daytime sleepiness.    Cpap set up date  6/4/2015 care     Assisted patient register with [facility] related to recall confirmation code is: [id number]    8/16/2014 PSG The diagnostic polysomnography revealed a severe obstructive sleep apnea / hypopnea syndrome (A + H Index = 38.9 events/ hr asleep; 35.6 arousals / hr asleep), with a for the study, with a mean SpO2 value of 92.7 %, moderate; minimum oxygen saturation during sleep of 84.0 %, waking baseline SpO2 = 96 %. Sporadic, moderately loud snoring was noted.    Compliance Summary  Auto CPAP 6-20 cm   Compliance Summary  12/10/2021 - 1/8/2022 (30 days)  Days with Device Usage 29 days  Days without Device Usage 1 day  Percent Days with Device Usage 96.7%  Cumulative Usage 9 days 19 hrs. 56 mins. 52 secs.  Maximum Usage (1 Day) 12 hrs. 30 mins. 49 secs.  Average Usage (All Days) 7 hrs. 51 mins. 53 secs.  Average Usage (Days Used) 8 hrs. 8 mins. 10 secs.  Minimum Usage (1 Day) 53 mins. 5 secs.  Percent of Days with Usage >= 4 Hours 93.3%  Percent of Days with Usage < 4 Hours 6.7% 0  Date Range  Total Blower Time 10 days 4 hrs. 38  mins. 17 secs.  Average AHI 4.7  Auto-CPAP Summary ([facility])  Auto-CPAP Mean Pressure 7.3 cmH2O  Auto-CPAP Peak Average Pressure 9.7 cmH2O  Device Pressure <= 90% of Time 9.8 cmH2O  Average Time in Large Leak Per Day 1 hrs. 4 mins. 33 secs.    [place]ota Sleepiness Scale     EPWORTH SLEEPINESS SCALE 1/12/2022 7/6/2021 4/5/2021 11/4/2020 9/18/2019 3/19/2019 9/19/2018   Sitting and reading 0 0 0 0 0 0 0   Watching TV 0 1 0 0 0 0 0   Sitting, inactive in a public place (e.g. a theatre or a meeting) 0 0 0 0 0 0 0   As a passenger in a car for an hour without a break 1 2 0 0 1 0 0   Lying down to rest in the afternoon when circumstances permit 1 3 3 3 3 3 1   Sitting and talking to someone 0 0 0 0 0 0 0   Sitting quietly after a lunch without alcohol 0 1 2 0 0 0 0   In a car, while stopped for a few minutes in traffic 0 0 0 0 0 0 0   Total score 2 7 5 3 4 3 1         Immunization:    []        Pneumococcal Polysaccharide  Vaccine (23 Valent) (IM)  []        Pneumococcal Conjugate Vaccine (13 Valent) (IM)  [x]        Influenza - High Dose (65+) (PF) (IM)    She reports intermittent SHERMAN of recent onset    Dyspnea Characteristics:   Exertional Dyspnea   Dyspnea Duration:  Sub acute  Dyspnea Severity:  LUIS 7  Dyspnea Timing:  Exertional only  Dyspnea Contributing Factors:  Tobacco Abuse   Dyspnea Associated Symptoms:   Cough and  Sputum Production       Modified Luis Dyspnea Scale      0  Nothing at all    0.5  Very, very slight (just noticeable)    1  Very slight    2  Slight    3  Moderate    4 Somewhat severe    5 Severe      6    7  Very severe    8    9   Very, very severe (almost maximal)  10  Maximal    Her current respiratory therapy regimen is Pulmicort nebs twice daily. Duo nebs if needed. She is NOT adherent with her regimen.     Daughter reports only thing she is doing is CPAP nightly without O2, patient refused oxygen when ordered in April 2021    A full  review of systems, past , family  and social  histories was performed except as mentioned in the note above, these are non contributory to the main issues discussed today.     Previous Report Reviewed: office notes     The following portions of the patient's history were reviewed and updated as appropriate: She  has a past medical history of Anxiety, AP (angina pectoris) (7/18/2013), Arterial ischemic stroke, MCA (middle cerebral artery), left, acute (12/15/2017), Asthma, CAD, multiple vessel (5/13/2021), Class 1 obesity due to excess calories with serious comorbidity and body mass index (BMI) of 30.0 to 30.9 in adult (3/15/2019), Coronary artery disease (7/18/2013), Depression, Diastolic heart failure, GERD (gastroesophageal reflux disease) (7/18/2013), History of PTCA (7/18/2013), Hypertension (7/18/2013), Hypothyroidism, Malignant neoplasm of pharyngeal tonsil (12/4/2020), Mixed hyperlipidemia (7/18/2013), Osteoporosis, Pneumonia due to other staphylococcus, Precancerous changes of the vagina, S/P CABG (coronary artery bypass graft) (5/13/2021), Seizure (3/15/2019), Sleep apnea, Trouble in sleeping, Type 2 diabetes mellitus with ophthalmic manifestations, and Urinary incontinence.  She  has a past surgical history that includes Coronary stent placement; Thyroid surgery; Coronary angioplasty; Breast surgery (Left); Hysterectomy (1970); Eye surgery (Bilateral, 2014); Tubal ligation (1970); and Fluoroscopy (N/A, 1/20/2021).  Her family history includes Alcohol abuse in her sister; Cancer in her son; Cirrhosis in her sister; Diabetes in her paternal grandmother and sister; Fibromyalgia in her daughter and daughter; Heart disease in her mother; Kidney disease in her brother and father.  She  reports that she quit smoking about 13 years ago. Her smoking use included cigarettes. She started smoking about 60 years ago. She has a 46.00 pack-year smoking history. She has never used smokeless tobacco. She reports that she does not drink alcohol and does not use  drugs.  She has a current medication list which includes the following prescription(s): accu-chek mathew control soln, accu-chek mathew plus test strp, acetaminophen-codeine 300-30mg, albuterol-ipratropium, amlodipine, amoxicillin, aspirin, atorvastatin, bd insulin syringe ult-fine ii, blood sugar diagnostic, dexcom g6 sensor, clonazepam, clopidogrel, donepezil, easy comfort lancets, escitalopram oxalate, hydrochlorothiazide, insulin aspart protamine-insulin aspart, insulin aspart u-100, isosorbide mononitrate, januvia, levothyroxine, lidocaine hcl 2%, losartan, nitroglycerin, OPW TEST CLAIM - DO NOT FILL, pantoprazole, pen needle, diabetic, vitamin d, and pregabalin.  She has No Known Allergies..    Review of Systems   Constitutional: Negative for fever, chills, weight loss, weight gain, activity change, appetite change, fatigue and night sweats.   HENT: Negative for postnasal drip, rhinorrhea, sinus pressure, voice change and congestion.    Eyes: Negative for redness and itching.   Respiratory: Negative for snoring, cough, sputum production, chest tightness, shortness of breath, wheezing, orthopnea, asthma nighttime symptoms, dyspnea on extertion, use of rescue inhaler and somnolence.    Cardiovascular: Negative.  Negative for chest pain, palpitations and leg swelling.   Genitourinary: Negative for difficulty urinating and hematuria.   Endocrine: Negative for cold intolerance and heat intolerance.    Musculoskeletal: Negative for arthralgias, gait problem, joint swelling and myalgias.   Skin: Negative.    Gastrointestinal: Negative for nausea, vomiting, abdominal pain and acid reflux.   Neurological: Negative for dizziness, weakness, light-headedness and headaches.   Hematological: Negative for adenopathy. No excessive bruising.   All other systems reviewed and are negative.       Objective:     Vitals:    01/12/22 1538   BP: 118/70   Pulse: (!) 58   Resp: 17   SpO2: (!) 94%   Weight: 65.8 kg (145 lb 1 oz)   Height:  "4' 9" (1.448 m)     body mass index is 31.39 kg/m².    Physical Exam  Vitals and nursing note reviewed.   Constitutional:       General: She is not in acute distress.     Appearance: She is well-developed. She is not ill-appearing or toxic-appearing.   HENT:      Head: Normocephalic.      Right Ear: External ear normal.      Left Ear: External ear normal.      Nose: Nose normal.      Mouth/Throat:      Pharynx: No oropharyngeal exudate.   Eyes:      Conjunctiva/sclera: Conjunctivae normal.   Cardiovascular:      Rate and Rhythm: Normal rate and regular rhythm.      Heart sounds: Normal heart sounds.   Pulmonary:      Effort: Pulmonary effort is normal.      Breath sounds: Normal breath sounds. No stridor.   Abdominal:      Palpations: Abdomen is soft.   Musculoskeletal:         General: Normal range of motion.      Cervical back: Normal range of motion and neck supple.   Lymphadenopathy:      Cervical: No cervical adenopathy.   Skin:     General: Skin is warm and dry.   Neurological:      Mental Status: She is alert and oriented to person, place, and time.   Psychiatric:         Behavior: Behavior normal. Behavior is cooperative.         Thought Content: Thought content normal.         Judgment: Judgment normal.         Personal Diagnostic Review    X-Ray Chest PA And Lateral  Narrative: EXAMINATION:  XR CHEST PA AND LATERAL    CLINICAL HISTORY:  Personal history of pneumonia (recurrent)    TECHNIQUE:  PA and lateral views of the chest were performed.    COMPARISON:  Chest radiographs dating back to April 5, 2021    FINDINGS:  Lungs are clear without focal consolidation, edema, or mass.  There is no pneumothorax or pleural effusion.  Cardiomediastinal silhouette is within normal limits.  Atherosclerotic calcifications present within the thoracic aorta.  Loop recorder device position within the anterior chest wall.  Median sternotomy wires are unchanged in alignment.  No acute osseous abnormality.  Impression: As " above.    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  Noncontrast CT imaging was performed for attenuation correction, diagnosis, and anatomical fusion with PET    COMPARISON:  05/06/2021    FINDINGS:  Head/neck: There is normal physiologic uptake noted within the visualized brain parenchyma. There is some persistent very low level uptake in the right side of the neck in a level 2 location which demonstrates a SUV max of 3 as compared to 2.8 on the prior exam.  This is not considered a significant change.  It is possible that some of this uptake in this region could be vascular/muscular in nature.    Chest: All of the previously noted parenchymal opacities within the right lung have resolved in the interval and demonstrate no residual FDG uptake.  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  There is a stable right paratracheal lymph node that demonstrates a short axis measurement approximately 8 mm and demonstrates no FDG uptake there is evidence for prior median sternotomy.    Abdomen/Pelvis: Normal physiologic uptake noted within the liver, spleen, urinary tract, and bowel.The adrenals are unremarkable in appearance.  No adenopathy noted.  There is prominent diverticulosis of the sigmoid colon.  No diverticulitis.    Skeletal: No FDG avid osseus lesions identified.  Impression: 1. Persistent low level uptake associated with a level 2 lymph node on the right demonstrating SUV max of 3.0 as compared to 2.8.  This is not considered a significant change.  Part of this activity may even be  physiologic.  2. Interval resolution of the previously noted right lung opacities in FDG uptake within the right lung.  3. Remaining findings as discussed above.    Electronically signed by: Travis Fritz DO  Date:    08/13/2021  Time:    11:38      4/5/2021 overnight oximetry on room air on auto CPAP 6-20 cm, abnormal     4/5/2021 Overnight Oxygen Saturation Study on room air on auto CPAP 6-20 cm.     INTERPRETATION:   Conditions of Test: Noted above in report     Interpretation of results of overnight oxygen saturation study.   This was a technically  adequate study. Overnight oxygen   saturation study is abnormal with O2 saturation less than 88%.   Time with SpO2<89: 0:07:20, 1.1% of the study period. Lowest   oxygen saturation recorded was 85%.   Clinical correlation suggested.   Vick Meraz MD     Medicare Criteria Comments:   Overnight Oximetry test results suggest the patient does fall   under Medicare Group 1 Criteria and would be eligible for oxygen   prescription.    (Arterial oxygen saturation at or below 88% for at least 5   minutes taken during sleep on stable outpatient)    4/5/2021 6mwd No desaturations requiring supplemental oxygen at rest or exertion.    Phase Oxygen Assessment Supplemental O2 Heart   Rate Blood Pressure Luis Dyspnea Scale Rating   Resting 95 % Room Air 61 bpm 139/61 0   Exercise             Minute             1 93 % Room Air 67 bpm       2 92 % Room Air 68 bpm       3 93 % Room Air 68 bpm       4 93 % Room Air 70 bpm       5 92 % Room Air 72 bpm       6  93 % Room Air 74 bpm 128/55 0   Recovery             Minute             1 95 % Room Air 67 bpm       2 97 % Room Air 60 bpm       3 97 % Room Air 61 bpm       4 97 % Room Air 61 bpm 122/54 0      Six Minute Walk Summary  6MWT Status: completed without stopping  Patient Reported: No complaints             Interpretation:  Did the patient stop or pause?: No  Total Time Walked (Calculated): 360 seconds  Final Partial Lap Distance  (feet): 150 feet  Total Distance Meters (Calculated): 228.6 meters  Predicted Distance Meters (Calculated): 378.68 meters  Percentage of Predicted (Calculated): 60.37  Peak VO2 (Calculated): 10.84  Mets: 3.1  Has The Patient Had a Previous Six Minute Walk Test?: No     Previous 6MWT Results  Has The Patient Had a Previous Six Minute Walk Test?: No  (No note.)    CPAP complaince download.     Assessment / plan     Discussed diagnosis, its evaluation, treatment and usual course. All questions answered.    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 Discussed impact of obesity on general health.  Wt Readings from Last 9 Encounters:   01/12/22 65.8 kg (145 lb 1 oz)   12/14/21 64.4 kg (142 lb)   12/01/21 64.5 kg (142 lb 3.2 oz)   11/16/21 60.5 kg (133 lb 6.4 oz)   08/16/21 60.6 kg (133 lb 9.6 oz)   07/06/21 59.5 kg (131 lb 4.5 oz)   06/10/21 58 kg (127 lb 12.1 oz)   05/20/21 57.9 kg (127 lb 8.6 oz)   05/13/21 56.9 kg (125 lb 7.1 oz)   Body mass index is 31.39 kg/m².           Aortic arch atherosclerosis    Overview     Stable and controlled.          Current Assessment & Plan     Follow heart healthy diet. regular exercise.          Anxiety and depression    Current Assessment & Plan     Managed by primary care provider               Patient presents stable.   Not willing to wear O2 for sleep with CPAP   Patient reports adherent to CPAP nightly, not willing to use O2.   Patient former patient,Dr. Estrella, daughter would like to establish with Dr. Pinto.     I spent a total of 37  minutes on the day of the visit.  This includes face to face time and non-face to face time preparing to see the patient (eg, review of tests), obtaining and/or reviewing separately obtained history, documenting clinical information in the electronic or other health record, independently interpreting results and communicating results to the patient/family/caregiver, or care coordinator.    Follow up in about 1 year (around 1/12/2023) for CPAP 1 year compliance download w/Dr. Pinto per pt request.

## 2022-01-12 NOTE — ASSESSMENT & PLAN NOTE
Encouraged calorie reduction and 30 minutes of exercise daily. Discussed impact of obesity on general health.  Wt Readings from Last 9 Encounters:   01/12/22 65.8 kg (145 lb 1 oz)   12/14/21 64.4 kg (142 lb)   12/01/21 64.5 kg (142 lb 3.2 oz)   11/16/21 60.5 kg (133 lb 6.4 oz)   08/16/21 60.6 kg (133 lb 9.6 oz)   07/06/21 59.5 kg (131 lb 4.5 oz)   06/10/21 58 kg (127 lb 12.1 oz)   05/20/21 57.9 kg (127 lb 8.6 oz)   05/13/21 56.9 kg (125 lb 7.1 oz)   Body mass index is 31.39 kg/m².

## 2022-02-10 NOTE — TELEPHONE ENCOUNTER
----- Message from Sven Butts sent at 2/10/2022 11:56 AM CST -----  Contact: Connie/Daughter  .Type:  Sooner Apoointment Request    Caller is requesting a sooner appointment.  Caller declined first available appointment listed below.  Caller will not accept being placed on the waitlist and is requesting a message be sent to doctor.  Name of Caller: Connie  When is the first available appointment? unknown  Symptoms: Hard, swollen and painful knot underarm   Would the patient rather a call back or a response via [facility]? call  Best Call Back Number: [phone]  Additional Information: daughter is requesting patient seen today as soon as possible with Dr Wood only.  Thanks,  RP

## 2022-02-11 NOTE — FIRST PROVIDER EVALUATION
Medical screening exam completed.  I have conducted a focused provider triage encounter, findings are as follows:    Brief history of present illness:  Pt. C/o right upper arm swelling, pain, redness.   There were no vitals filed for this visit.      Preliminary workup initiated; this workup will be continued and followed by the physician or advanced practice provider that is assigned to the patient when roomed.

## 2022-02-11 NOTE — ED PROVIDER NOTES
SCRIBE #1 NOTE: I, Jackie Roth, am scribing for, and in the presence of, Saad Villaseñor MD. I have scribed the entire note.       History     Chief Complaint   Patient presents with    Arm Pain     Right arm/shoulder pain started x2 days ago. Pt. Denied any trauma to area. Family presented pictures of pt. Arm with swelling and redness. No other complaints.      Review of patient's allergies indicates:  No Known Allergies      History of Present Illness     HPI    2/11/2022, 2:43 PM  History obtained from the patient      History of Present Illness: Tiffanie Wise is a 81 y.o. female patient with a PMHx of CAD, GERD, HTN, HLD, seizure who presents to the Emergency Department for evaluation of right arm pain which onset gradually 2 days ago. The pt states she was in extreme, horrid pain yesterday in her right arm. The pt daughter states that her right arm was swollen, red, and hot to the touch. The pt denied any recent trauma to the area. Symptoms are constant and moderate in severity. No mitigating or exacerbating factors reported. Patient denies any fever, N/V/D, SOB, CP, chest tightness, weakness, numbness, dizziness, and all other sxs at this time. No prior Tx included. No further complaints or concerns at this time.       Arrival mode: Personal vehicle     PCP: Tiffanie Spence MD        Past Medical History:  Past Medical History:   Diagnosis Date    Anxiety     AP (angina pectoris) 7/18/2013    Arterial ischemic stroke, MCA (middle cerebral artery), left, acute 12/15/2017    Asthma     CAD, multiple vessel 5/13/2021    Class 1 obesity due to excess calories with serious comorbidity and body mass index (BMI) of 30.0 to 30.9 in adult 3/15/2019    Coronary artery disease 7/18/2013    Depression     Diastolic heart failure     GERD (gastroesophageal reflux disease) 7/18/2013    History of PTCA 7/18/2013    Hypertension 7/18/2013    Hypothyroidism     Malignant neoplasm of pharyngeal  tonsil 2020    Mixed hyperlipidemia 2013    Osteoporosis     Pneumonia due to other staphylococcus     Precancerous changes of the vagina     S/P CABG (coronary artery bypass graft) 2021    Seizure 3/15/2019    Sleep apnea     stopped using CPAP  - sores in nose, couldn't breathe, uses Breathe riht strips now.     Trouble in sleeping     Type 2 diabetes mellitus with ophthalmic manifestations     Urinary incontinence     pullups day, pads at night       Past Surgical History:  Past Surgical History:   Procedure Laterality Date    BREAST SURGERY Left     benign cyst    CORONARY ANGIOPLASTY      CORONARY STENT PLACEMENT      x6-7 oer the yeqrs  last 14 BRIANDA to lcfx    EYE SURGERY Bilateral     cataracts w/IOL   Dr Vance    FLUOROSCOPY N/A 2021    Procedure: G tube plaacement;  Surgeon: Shaka Cross MD;  Location[facility] CATH LAB;  Service: General;  Laterality: N/A;    HYSTERECTOMY      vag hyst; ovaries intact    THYROID SURGERY      nodule benign, Dr Hankins    TUBAL LIGATION           Family History:  Family History   Problem Relation Age of Onset    Kidney disease Father     Kidney disease Brother     Heart disease Mother     Fibromyalgia Daughter     Cancer Son         lung    Cirrhosis Sister     Alcohol abuse Sister     Diabetes Sister     Fibromyalgia Daughter     Diabetes Paternal Grandmother     Stroke Neg Hx     Mental illness Neg Hx     Mental retardation Neg Hx        Social History:  Social History     Tobacco Use    Smoking status: Former Smoker     Packs/day: 1.00     Years: 46.00     Pack years: 46.00     Types: Cigarettes     Start date:      Quit date: 2008     Years since quittin.6    Smokeless tobacco: Never Used   Substance and Sexual Activity    Alcohol use: No    Drug use: Never    Sexual activity: Not Currently     Birth control/protection: None        Review of Systems     Review of Systems    Constitutional: Negative for fever.   HENT: Negative for sore throat.    Respiratory: Negative for chest tightness and shortness of breath.    Cardiovascular: Negative for chest pain.   Gastrointestinal: Negative for diarrhea, nausea and vomiting.   Genitourinary: Negative for dysuria.   Musculoskeletal: Negative for back pain.        (+) right arm pain and swelling   Skin: Positive for color change (redness of right arm near axilla area). Negative for rash.   Neurological: Negative for dizziness, weakness and numbness.   Hematological: Does not bruise/bleed easily.   All other systems reviewed and are negative.       Physical Exam     Initial Vitals [02/11/22 1240]   BP Pulse Resp Temp SpO2   (!) 161/87 71 20 98.6 °F (37 °C) 97 %      MAP       --          Physical Exam  Nursing Notes and Vital Signs Reviewed.  Constitutional: Patient is in no acute distress.   Head: Atraumatic. Normocephalic.  Eyes: PERRL. EOM intact. Conjunctivae are not pale. No scleral icterus.  ENT: Mucous membranes are moist. Oropharynx is clear and symmetric.    Neck: Supple. Full ROM. No lymphadenopathy.  Cardiovascular: Regular rate. Regular rhythm. No murmurs, rubs, or gallops. Distal pulses are 2+ and symmetric.  Pulmonary/Chest: No respiratory distress. Clear to auscultation bilaterally. No wheezing or rales.  Abdominal: Soft and non-distended.  There is no tenderness.  No rebound, guarding, or rigidity. Good bowel sounds.  Genitourinary: No CVA tenderness  Musculoskeletal: Moves all extremities. No obvious deformities. No edema. No calf tenderness.  Skin: 5-8 cm region of induration just distal to the right axilla medial aspect of arm. No fluctuance and drainage noted during exam. Bedside US does not reveal a drainable fluid collection.   Neurological:  Alert, awake, and appropriate.  Normal speech.  No acute focal neurological deficits are appreciated.  Psychiatric: Normal affect. Good eye contact. Appropriate in content.     ED  "Course   Procedures  ED Vital Signs:  Vitals:    02/11/22 1240 02/11/22 1508   BP: (!) 161/87    Pulse: 71    Resp: 20 18   Temp: 98.6 °F (37 °C)    TempSrc: Oral    SpO2: 97%    Weight: 64.4 kg (142 lb 1.4 oz)    Height: 4' 9" (1.448 m)        Abnormal Lab Results:  Labs Reviewed   CBC W/ AUTO DIFFERENTIAL - Abnormal; Notable for the following components:       Result Value    WBC 20.23 (*)     Gran # (ANC) 17.9 (*)     Immature Grans (Abs) 0.09 (*)     Lymph # 0.9 (*)     Mono # 1.3 (*)     Gran % 88.5 (*)     Lymph % 4.4 (*)     All other components within normal limits   COMPREHENSIVE METABOLIC PANEL - Abnormal; Notable for the following components:    CO2 30 (*)     Albumin 3.4 (*)     Total Bilirubin 1.7 (*)     All other components within normal limits        All Lab Results:  Results for orders placed or performed during the hospital encounter of 02/11/22   CBC auto differential   Result Value Ref Range    WBC 20.23 (H) 3.90 - 12.70 K/uL    RBC 4.64 4.00 - 5.40 M/uL    Hemoglobin 13.5 12.0 - 16.0 g/dL    Hematocrit 40.6 37.0 - 48.5 %    MCV 88 82 - 98 fL    MCH 29.1 27.0 - 31.0 pg    MCHC 33.3 32.0 - 36.0 g/dL    RDW 13.4 11.5 - 14.5 %    Platelets 244 150 - 450 K/uL    MPV 10.5 9.2 - 12.9 fL    Immature Granulocytes 0.4 0.0 - 0.5 %    Gran # (ANC) 17.9 (H) 1.8 - 7.7 K/uL    Immature Grans (Abs) 0.09 (H) 0.00 - 0.04 K/uL    Lymph # 0.9 (L) 1.0 - 4.8 K/uL    Mono # 1.3 (H) 0.3 - 1.0 K/uL    Eos # 0.0 0.0 - 0.5 K/uL    Baso # 0.05 0.00 - 0.20 K/uL    nRBC 0 0 /100 WBC    Gran % 88.5 (H) 38.0 - 73.0 %    Lymph % 4.4 (L) 18.0 - 48.0 %    Mono % 6.4 4.0 - 15.0 %    Eosinophil % 0.1 0.0 - 8.0 %    Basophil % 0.2 0.0 - 1.9 %    Differential Method Automated    Comprehensive metabolic panel   Result Value Ref Range    Sodium 138 136 - 145 mmol/L    Potassium 3.5 3.5 - 5.1 mmol/L    Chloride 98 95 - 110 mmol/L    CO2 30 (H) 23 - 29 mmol/L    Glucose 100 70 - 110 mg/dL    BUN 14 8 - 23 mg/dL    Creatinine 0.9 0.5 - " 1.4 mg/dL    Calcium 9.3 8.7 - 10.5 mg/dL    Total Protein 7.2 6.0 - 8.4 g/dL    Albumin 3.4 (L) 3.5 - 5.2 g/dL    Total Bilirubin 1.7 (H) 0.1 - 1.0 mg/dL    Alkaline Phosphatase 115 55 - 135 U/L    AST 18 10 - 40 U/L    ALT 14 10 - 44 U/L    Anion Gap 10 8 - 16 mmol/L    eGFR if African American >60 >60 mL/min/1.73 m^2    eGFR if non African American >60 >60 mL/min/1.73 m^2     *Note: Due to a large number of results and/or encounters for the requested time period, some results have not been displayed. A complete set of results can be found in Results Review.         Imaging Results:  Imaging Results          XR Limited Non-Billable (Final result)  Result time 02/11/22 15:51:14    Final result by Hung Young MD (02/11/22 15:51:14)                 Impression:      No acute abnormality.  Mild degenerative changes.      Electronically signed by: Hung Young  Date:    02/11/2022  Time:    15:51             Narrative:    EXAMINATION:  XR LIMITED NON-BILLABLE    CLINICAL HISTORY:  patient's wrong body part was imaged. Non billable order was placed for image dictation;    TECHNIQUE:  Three views of the pelvis/left hip.    COMPARISON:  None    FINDINGS:  No acute fracture. No dislocation.  Sacroiliac joints are intact. Mild degenerative changes. Extensive calcific atherosclerosis.                               US Upper Extremity Veins Right (Final result)  Result time 02/11/22 14:40:31    Final result by Shaka Cross MD (02/11/22 14:40:31)                 Impression:      No thrombus in central veins of the right upper extremity.      Electronically signed by: Shaka Cross MD  Date:    02/11/2022  Time:    14:40             Narrative:    EXAMINATION:  US UPPER EXTREMITY VEINS RIGHT    CLINICAL HISTORY:  DVT;    TECHNIQUE:  Duplex and color flow Doppler evaluation and dynamic compression was performed of the right upper extremity veins.    COMPARISON:  None    FINDINGS:  Central veins: The internal jugular,  subclavian, and axillary veins are patent and free of thrombus.    Arm veins: The brachial, basilic, and cephalic veins are patent and compressible.    Contralateral subclavian/internal jugular veins: The left subclavian and internal jugular veins are patent and free of thrombus.    Other findings: None.                                          The Emergency Provider reviewed the vital signs and test results, which are outlined above.     ED Discussion     2:52 PM: Reassessed pt at this time. Discussed with pt all pertinent ED information and results. Discussed pt dx and plan of tx. Gave pt all f/u and return to the ED instructions. All questions and concerns were addressed at this time. Pt expresses understanding of information and instructions, and is comfortable with plan to discharge. Pt is stable for discharge.    I discussed with patient and/or family/caretaker that evaluation in the ED does not suggest any emergent or life threatening medical conditions requiring immediate intervention beyond what was provided in the ED, and I believe patient is safe for discharge.  Regardless, an unremarkable evaluation in the ED does not preclude the development or presence of a serious of life threatening condition. As such, patient was instructed to return immediately for any worsening or change in current symptoms.      ED Course as of 02/12/22 1801   Fri Feb 11, 2022   1503 No drain able fluid collection on bedside US [BA]   1515 SIRS negative [BA]      ED Course User Index  [BA] Saad Villaseñor MD     Medical Decision Making:   Clinical Tests:   Lab Tests: Ordered and Reviewed  Radiological Study: Ordered and Reviewed           ED Medication(s):  Medications   HYDROcodone-acetaminophen 5-325 mg per tablet 1 tablet (1 tablet Oral Given 2/11/22 1508)   sulfamethoxazole-trimethoprim 800-160mg per tablet 1 tablet (1 tablet Oral Given 2/11/22 1508)       Discharge Medication List as of 2/11/2022  2:52 PM      START taking  these medications    Details   morphine (MSIR) 15 MG tablet Take 0.5 tablets (7.5 mg total) by mouth every 4 (four) hours as needed for Pain., Starting Fri 2/11/2022, Print      sulfamethoxazole-trimethoprim 800-160mg (BACTRIM DS) 800-160 mg Tab Take 1 tablet by mouth 2 (two) times daily. for 7 days, Starting Fri 2/11/2022, Until Fri 2/18/2022, Print              Follow-up Information     Tiffanie Spence MD. Schedule an appointment as soon as possible for a visit in 2 days.    Specialty: Family Medicine  Why: For re-evaluation and further treatment  Contact information:  [address] [place] [address] [phone]             [facility] - Emergency Dept.. Go today.    Specialty: Emergency Medicine  Why: If symptoms worsen, For re-evaluation and further treatment, As needed  Contact information:  [id number] [place] [id number] [phone]                           Scribe Attestation:   Scribe #1: I performed the above scribed service and the documentation accurately describes the services I performed. I attest to the accuracy of the note.     Attending:   Physician Attestation Statement for Scribe #1: I, Saad Villaseñor MD, personally performed the services described in this documentation, as scribed by Jackie Roth, in my presence, and it is both accurate and complete.           Clinical Impression       ICD-10-CM ICD-9-CM   1. Cellulitis of right upper extremity  L03.113 682.3       Disposition:   Disposition: Discharged  Condition: Stable         Saad Villaseñor MD  02/12/22 1802

## 2022-02-11 NOTE — PROGRESS NOTES
Subjective:       Patient ID: Tiffanie Wise is a 81 y.o. female.    Chief Complaint: Arm Pain (Hard knot, redness, and swelling to right arm x 2 days)    81 y old female with severe RUE edema , pain and erythema for 2 d . Denies insect bite. Having chills. Not taking any meds.     Review of Systems   Constitutional: Negative.    HENT: Negative.    Eyes: Negative.    Respiratory: Negative.    Cardiovascular: Negative.    Gastrointestinal: Negative.    Genitourinary: Negative.    Musculoskeletal: Negative.    Skin: Negative.    Hematological: Negative.        Objective:      Physical Exam  Constitutional:       General: She is not in acute distress.     Appearance: She is well-developed and well-nourished. She is not diaphoretic.   HENT:      Head: Normocephalic and atraumatic.      Right Ear: External ear normal.      Left Ear: External ear normal.      Mouth/Throat:      Pharynx: No oropharyngeal exudate.   Eyes:      General: No scleral icterus.        Right eye: No discharge.         Left eye: No discharge.      Extraocular Movements: EOM normal.      Conjunctiva/sclera: Conjunctivae normal.      Pupils: Pupils are equal, round, and reactive to light.   Neck:      Thyroid: No thyromegaly.      Vascular: No JVD.      Trachea: No tracheal deviation.   Cardiovascular:      Rate and Rhythm: Normal rate and regular rhythm.      Heart sounds: Normal heart sounds. No murmur heard.  No friction rub. No gallop.    Pulmonary:      Effort: Pulmonary effort is normal. No respiratory distress.      Breath sounds: Normal breath sounds. No stridor. No wheezing or rales.   Chest:      Chest wall: No tenderness.   Abdominal:      General: Bowel sounds are normal. There is no distension.      Palpations: Abdomen is soft.      Tenderness: There is no abdominal tenderness. There is no guarding or rebound.   Musculoskeletal:         General: Normal range of motion.      Right upper arm: Swelling and edema present.        Arms:        Cervical back: Normal range of motion and neck supple.      Comments: 6 cm in diam indurated, tender , edematous area.    Lymphadenopathy:      Cervical: No cervical adenopathy.   Skin:     General: Skin is warm and dry.   Neurological:      Mental Status: She is alert and oriented to person, place, and time.   Psychiatric:         Mood and Affect: Mood and affect normal.         Behavior: Behavior normal.         Thought Content: Thought content normal.         Judgment: Judgment normal.         Assessment:       1. Pain of right upper extremity        Plan:     Tiffanie was seen today for arm pain.    Diagnoses and all orders for this visit:    Pain of right upper extremity  -     CBC Auto Differential; Future  -     Comprehensive Metabolic Panel; Future  -     D dimer, quantitative; Future  -     X-Ray Humerus 2 View Right; Future  -     US Upper Extremity Veins Right; Future  -     US Soft Tissue Upper Extremity, Right; Future  -     C-Reactive Protein; Future     Unable to have outpatient u/s today  .   ER ramakrishna rec for expedite Diag work up   Daughter will transport

## 2022-02-16 PROBLEM — L03.90 CELLULITIS: Status: ACTIVE | Noted: 2022-01-01

## 2022-02-16 NOTE — FIRST PROVIDER EVALUATION
Medical screening exam completed.  I have conducted a focused provider triage encounter, findings are as follows:    Brief history of present illness:  Cellulitis to right upper ext. Seen here 5 days ago. Currently on bactrim. Reports continued pain to sight with little to no improvement     There were no vitals filed for this visit.      Preliminary workup initiated; this workup will be continued and followed by the physician or advanced practice provider that is assigned to the patient when roomed.

## 2022-02-16 NOTE — PHARMACY MED REC
"Admission Medication History     The home medication history was taken by Royer Hay.    You may go to "Admission" then "Reconcile Home Medications" tabs to review and/or act upon these items.      The home medication list has been updated by the Pharmacy department.    Please read ALL comments highlighted in yellow.    Please address this information as you see fit.     Feel free to contact us if you have any questions or require assistance.      The medications listed below were removed from the home medication list. Please reorder if appropriate:  Patient reports no longer taking the following medication(s):   DUONEB    Medications listed below were obtained from: Patient/family: DAUGHTER  (Not in a hospital admission)      Royer Hay  [id number]    Current Outpatient Medications on File Prior to Encounter   Medication Sig Dispense Refill Last Dose    amLODIPine (NORVASC) 10 MG tablet TAKE 1 TABLET (10 MG TOTAL) BY PER G TUBE ROUTE AS DIRECTED ONCE DAILY. 90 tablet 4 2/16/2022 at Unknown time    aspirin 81 MG Chew 1 tablet (81 mg total) by Per G Tube route once daily.  0 2/15/2022 at Unknown time    atorvastatin (LIPITOR) 40 MG tablet Take 1 tablet (40 mg total) by mouth every evening. 90 tablet 4 2/15/2022 at Unknown time    clonazePAM (KLONOPIN) 0.5 MG tablet 1 tablet (0.5 mg total) by Per G Tube route every evening. 30 tablet 0 2/15/2022 at Unknown time    clopidogreL (PLAVIX) 75 mg tablet Take 1 tablet (75 mg total) by mouth once daily. 90 tablet 3 2/16/2022 at Unknown time    donepeziL (ARICEPT) 10 MG tablet TAKE 1 TABLET (10 MG TOTAL) BY MOUTH ONCE DAILY. 90 tablet 3 2/16/2022 at Unknown time    EScitalopram oxalate (LEXAPRO) 20 MG tablet TAKE 1 TABLET EVERY DAY 90 tablet 3 2/16/2022 at Unknown time    hydroCHLOROthiazide (HYDRODIURIL) 25 MG tablet Take 1 tablet (25 mg total) by mouth once daily. 90 tablet 4 2/16/2022 at Unknown time    insulin aspart protamine-insulin aspart " (NOVOLOG MIX 70-30FLEXPEN U-100) 100 unit/mL (70-30) InPn pen 40 bid  0 2/16/2022 at Unknown time    insulin aspart U-100 (NOVOLOG) 100 unit/mL (3 mL) InPn pen Inject up to 50 units per day, dispense 15 ml   2/15/2022 at Unknown time    isosorbide mononitrate (IMDUR) 30 MG 24 hr tablet Take 1 tablet (30 mg total) by mouth once daily. (Patient taking differently: Take 30 mg by mouth 2 (two) times a day.) 90 tablet 4 2/16/2022 at Unknown time    JANUVIA 100 mg Tab Take 100 mg by mouth once daily.   2/16/2022 at Unknown time    levothyroxine (SYNTHROID) 50 MCG tablet 1 tablet (50 mcg total) by Per G Tube route before breakfast. 30 tablet  2/16/2022 at Unknown time    losartan (COZAAR) 100 MG tablet Take 1 tablet (100 mg total) by mouth once daily. 90 tablet 4 2/16/2022 at Unknown time    morphine (MSIR) 15 MG tablet Take 0.5 tablets (7.5 mg total) by mouth every 4 (four) hours as needed for Pain. 5 tablet 0 Past Week at Unknown time    pantoprazole (PROTONIX) 40 MG tablet TAKE 1 TABLET EVERY DAY 90 tablet 0 2/16/2022 at Unknown time    pregabalin (LYRICA) 75 MG capsule Take 75 mg by mouth 2 (two) times daily.   2/15/2022 at Unknown time    sulfamethoxazole-trimethoprim 800-160mg (BACTRIM DS) 800-160 mg Tab Take 1 tablet by mouth 2 (two) times daily. for 7 days 14 tablet 0 2/16/2022 at Unknown time    vitamin D 1000 units Tab Take 1,000 Units by mouth once daily.   2/16/2022 at Unknown time    BLOOD SUGAR DIAGNOSTIC (TRUETEST TEST STRIPS MISC) by Misc.(Non-Drug; Combo Route) route. Pt is testing 3 times a day       blood-glucose sensor (DEXCOM G6 SENSOR) Tanisha Replace sensor every 10 days       nitroGLYCERIN (NITROSTAT) 0.4 MG SL tablet Place 1 tablet (0.4 mg total) under the tongue every 5 (five) minutes as needed. 25 tablet 6 Unknown at Unknown time                             .

## 2022-02-16 NOTE — ED PROVIDER NOTES
SCRIBE #1 NOTE: I, Jim Corbin, am scribing for, and in the presence of, Saad Villaseñor MD. I have scribed the entire note.       History     Chief Complaint   Patient presents with    Abscess     Abscess to right inner upper arm being seen here for same thing given abx, Reports pain and swelling increasingly worse.      Review of patient's allergies indicates:  No Known Allergies      History of Present Illness     HPI    2/16/2022, 4:17 PM  History obtained from the patient and daughter      History of Present Illness: Tiffanie Wise is a 81 y.o. female patient with a PMHx of CAD, HF, DM, NAWAF here with abscess and cellulitis to [facility]. Pt was seen in the ED x 5 days pta and d/c home with rx for bactrim. Per pt's daughter, swelling improved but the area was still painful for the pt and warm to touch. Symptoms are constant and moderate in severity. No mitigating or exacerbating factors reported. No associated sxs reported. Patient denies any fever, chills, HA, rhinorrhea, n/v, and all other sxs at this time. No further complaints or concerns at this time.        Arrival mode: Personal vehicle     PCP: Tiffanie Spence MD        Past Medical History:  Past Medical History:   Diagnosis Date    Anxiety     AP (angina pectoris) 7/18/2013    Arterial ischemic stroke, MCA (middle cerebral artery), left, acute 12/15/2017    Asthma     CAD, multiple vessel 5/13/2021    Class 1 obesity due to excess calories with serious comorbidity and body mass index (BMI) of 30.0 to 30.9 in adult 3/15/2019    Coronary artery disease 7/18/2013    Depression     Diastolic heart failure     GERD (gastroesophageal reflux disease) 7/18/2013    History of PTCA 7/18/2013    Hypertension 7/18/2013    Hypothyroidism     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     Trouble in sleeping     Type 2 diabetes mellitus with ophthalmic manifestations     Urinary incontinence     pullups day, pads at night       Past Surgical History:  Past Surgical History:   Procedure Laterality Date    BREAST SURGERY Left     benign cyst    CORONARY ANGIOPLASTY      CORONARY STENT PLACEMENT      x6-7 oer the yeqrs  last 14 BRIANDA to lcfx    EYE SURGERY Bilateral     cataracts w/IOL   Dr Vance    FLUOROSCOPY N/A 2021    Procedure: G tube plaacement;  Surgeon: Shaka Cross MD;  Location[facility] CATH LAB;  Service: General;  Laterality: N/A;    HYSTERECTOMY      vag hyst; ovaries intact    THYROID SURGERY      nodule benign, Dr Hankins    TUBAL LIGATION           Family History:  Family History   Problem Relation Age of Onset    Kidney disease Father     Kidney disease Brother     Heart disease Mother     Fibromyalgia Daughter     Cancer Son         lung    Cirrhosis Sister     Alcohol abuse Sister     Diabetes Sister     Fibromyalgia Daughter     Diabetes Paternal Grandmother     Stroke Neg Hx     Mental illness Neg Hx     Mental retardation Neg Hx        Social History:  Social History     Tobacco Use    Smoking status: Former Smoker     Packs/day: 1.00     Years: 46.00     Pack years: 46.00     Types: Cigarettes     Start date:      Quit date: 2008     Years since quittin.6    Smokeless tobacco: Never Used   Substance and Sexual Activity    Alcohol use: No    Drug use: Never    Sexual activity: Not Currently     Birth control/protection: None        Review of Systems     Review of Systems   Constitutional: Negative for chills and fever.   HENT: Negative for rhinorrhea and sore throat.    Respiratory: Negative for shortness of breath.    Cardiovascular: Negative for  chest pain.   Gastrointestinal: Negative for nausea and vomiting.   Genitourinary: Negative for dysuria.   Musculoskeletal: Negative for back pain.   Skin: Positive for color change and wound (10 cm x 6 cm area of indurated skin). Negative for rash.   Neurological: Negative for weakness and headaches.   Hematological: Does not bruise/bleed easily.   All other systems reviewed and are negative.     Physical Exam     Initial Vitals [02/16/22 1152]   BP Pulse Resp Temp SpO2   (!) 118/54 70 18 97.9 °F (36.6 °C) 98 %      MAP       --          Physical Exam  Nursing Notes and Vital Signs Reviewed.  Constitutional: Patient is in mild distress. Well-developed and well-nourished.  Head: Atraumatic. Normocephalic.  Eyes: PERRL. EOM intact. Conjunctivae are not pale. No scleral icterus.  ENT: Mucous membranes are moist. Oropharynx is clear and symmetric.    Neck: Supple. Full ROM. No lymphadenopathy.  Cardiovascular: Regular rate. Regular rhythm. No murmurs, rubs, or gallops. Distal pulses are 2+ and symmetric.  Pulmonary/Chest: No respiratory distress. Clear to auscultation bilaterally. No wheezing or rales.  Abdominal: Soft and non-distended.  There is no tenderness.  No rebound, guarding, or rigidity. Good bowel sounds.  Genitourinary: No CVA tenderness  Musculoskeletal: Moves all extremities. No obvious deformities. No edema. No calf tenderness.  Skin: 10 cm x 6 cm area of indurated skin with 4 cm area of fluctuance.  Neurological:  Alert, awake, and appropriate.  Normal speech.  No acute focal neurological deficits are appreciated.  Psychiatric: Normal affect. Good eye contact. Appropriate in content.             ED Course   Procedures  ED Vital Signs:  Vitals:    02/16/22 1152 02/16/22 1643 02/16/22 1730 02/16/22 1949   BP: (!) 118/54  (!) 153/67 (!) 163/71   Pulse: 70  77 66   Resp: 18 18 18 19   Temp: 97.9 °F (36.6 °C)   98.2 °F (36.8 °C)   TempSrc:    Oral   SpO2: 98%  97% (!) 91%   Weight: 65.2 kg (143 lb 11.8 oz)    "   Height: 4' 9" (1.448 m)       02/16/22 2345 02/17/22 0354 02/17/22 0534 02/17/22 0728   BP: (!) 156/67 (!) 166/91  (!) 179/76   Pulse: (!) 59 (!) 56  66   Resp: 17 14 18 20   Temp: 97.8 °F (36.6 °C) 98.2 °F (36.8 °C)  98.1 °F (36.7 °C)   TempSrc: Axillary Oral  Oral   SpO2: (!) 93% 99%  (!) 92%   Weight: 65.4 kg (144 lb 2.9 oz)      Height:        02/17/22 1200 02/17/22 1612 02/17/22 1950 02/17/22 2204   BP:  (!) 119/59 (!) 125/59    Pulse:  62 64    Resp:  18 18 18   Temp: 97.5 °F (36.4 °C) 99.9 °F (37.7 °C) 98.9 °F (37.2 °C)    TempSrc:  Oral Oral    SpO2:  (!) 94% (!) 93%    Weight:       Height:        02/17/22 2222 02/18/22 0024   BP:  (!) 119/56   Pulse: 69 (!) 49   Resp: 17 17   Temp:  100 °F (37.8 °C)   TempSrc:  Axillary   SpO2: (!) 94% 96%   Weight:     Height:         Abnormal Lab Results:  Labs Reviewed   CBC W/ AUTO DIFFERENTIAL - Abnormal; Notable for the following components:       Result Value    Gran # (ANC) 8.9 (*)     Immature Grans (Abs) 0.05 (*)     Lymph # 0.8 (*)     Gran % 82.7 (*)     Lymph % 7.4 (*)     All other components within normal limits   COMPREHENSIVE METABOLIC PANEL - Abnormal; Notable for the following components:    Creatinine 1.6 (*)     Albumin 3.4 (*)     ALT 6 (*)     eGFR if  35 (*)     eGFR if non  30 (*)     All other components within normal limits   LACTATE DEHYDROGENASE - Abnormal; Notable for the following components:     (*)     All other components within normal limits   POCT GLUCOSE - Abnormal; Notable for the following components:    POCT Glucose 176 (*)     All other components within normal limits   SARS-COV-2 RNA AMPLIFICATION, QUAL   POCT GLUCOSE MONITORING CONTINUOUS        All Lab Results:  Results for orders placed or performed during the hospital encounter of 02/16/22   Blood Culture #2 **CANNOT BE ORDERED STAT**    Specimen: Peripheral, Antecubital, Left; Blood   Result Value Ref Range    Blood Culture, Routine No " Growth to date     Blood Culture, Routine No Growth to date    Blood Culture #1 **CANNOT BE ORDERED STAT**    Specimen: Peripheral, Wrist, Left; Blood   Result Value Ref Range    Blood Culture, Routine No Growth to date    CBC auto differential   Result Value Ref Range    WBC 10.69 3.90 - 12.70 K/uL    RBC 4.24 4.00 - 5.40 M/uL    Hemoglobin 12.4 12.0 - 16.0 g/dL    Hematocrit 37.4 37.0 - 48.5 %    MCV 88 82 - 98 fL    MCH 29.2 27.0 - 31.0 pg    MCHC 33.2 32.0 - 36.0 g/dL    RDW 13.1 11.5 - 14.5 %    Platelets 334 150 - 450 K/uL    MPV 10.4 9.2 - 12.9 fL    Immature Granulocytes 0.5 0.0 - 0.5 %    Gran # (ANC) 8.9 (H) 1.8 - 7.7 K/uL    Immature Grans (Abs) 0.05 (H) 0.00 - 0.04 K/uL    Lymph # 0.8 (L) 1.0 - 4.8 K/uL    Mono # 0.8 0.3 - 1.0 K/uL    Eos # 0.1 0.0 - 0.5 K/uL    Baso # 0.05 0.00 - 0.20 K/uL    nRBC 0 0 /100 WBC    Gran % 82.7 (H) 38.0 - 73.0 %    Lymph % 7.4 (L) 18.0 - 48.0 %    Mono % 7.9 4.0 - 15.0 %    Eosinophil % 1.0 0.0 - 8.0 %    Basophil % 0.5 0.0 - 1.9 %    Platelet Estimate Appears normal     Differential Method Automated    Comprehensive metabolic panel   Result Value Ref Range    Sodium 136 136 - 145 mmol/L    Potassium 4.1 3.5 - 5.1 mmol/L    Chloride 98 95 - 110 mmol/L    CO2 27 23 - 29 mmol/L    Glucose 106 70 - 110 mg/dL    BUN 16 8 - 23 mg/dL    Creatinine 1.6 (H) 0.5 - 1.4 mg/dL    Calcium 9.6 8.7 - 10.5 mg/dL    Total Protein 7.7 6.0 - 8.4 g/dL    Albumin 3.4 (L) 3.5 - 5.2 g/dL    Total Bilirubin 0.5 0.1 - 1.0 mg/dL    Alkaline Phosphatase 103 55 - 135 U/L    AST 14 10 - 40 U/L    ALT 6 (L) 10 - 44 U/L    Anion Gap 11 8 - 16 mmol/L    eGFR if African American 35 (A) >60 mL/min/1.73 m^2    eGFR if non African American 30 (A) >60 mL/min/1.73 m^2   Lactate dehydrogenase   Result Value Ref Range     (H) 110 - 260 U/L   COVID-19 Rapid Screening   Result Value Ref Range    SARS-CoV-2 RNA, Amplification, Qual Negative Negative   Vancomycin, Random   Result Value Ref Range    Vancomycin,  Random 13.3 Not established ug/mL   CBC with Automated Differential   Result Value Ref Range    WBC 10.42 3.90 - 12.70 K/uL    RBC 4.37 4.00 - 5.40 M/uL    Hemoglobin 12.5 12.0 - 16.0 g/dL    Hematocrit 37.7 37.0 - 48.5 %    MCV 86 82 - 98 fL    MCH 28.6 27.0 - 31.0 pg    MCHC 33.2 32.0 - 36.0 g/dL    RDW 13.1 11.5 - 14.5 %    Platelets 332 150 - 450 K/uL    MPV 10.4 9.2 - 12.9 fL    Immature Granulocytes 0.5 0.0 - 0.5 %    Gran # (ANC) 8.2 (H) 1.8 - 7.7 K/uL    Immature Grans (Abs) 0.05 (H) 0.00 - 0.04 K/uL    Lymph # 0.9 (L) 1.0 - 4.8 K/uL    Mono # 0.9 0.3 - 1.0 K/uL    Eos # 0.3 0.0 - 0.5 K/uL    Baso # 0.06 0.00 - 0.20 K/uL    nRBC 0 0 /100 WBC    Gran % 79.0 (H) 38.0 - 73.0 %    Lymph % 8.3 (L) 18.0 - 48.0 %    Mono % 8.7 4.0 - 15.0 %    Eosinophil % 2.9 0.0 - 8.0 %    Basophil % 0.6 0.0 - 1.9 %    Differential Method Automated    Basic Metabolic Panel (BMP)   Result Value Ref Range    Sodium 139 136 - 145 mmol/L    Potassium 4.2 3.5 - 5.1 mmol/L    Chloride 101 95 - 110 mmol/L    CO2 27 23 - 29 mmol/L    Glucose 130 (H) 70 - 110 mg/dL    BUN 12 8 - 23 mg/dL    Creatinine 1.2 0.5 - 1.4 mg/dL    Calcium 9.2 8.7 - 10.5 mg/dL    Anion Gap 11 8 - 16 mmol/L    eGFR if African American 49 (A) >60 mL/min/1.73 m^2    eGFR if non African American 42 (A) >60 mL/min/1.73 m^2   Vancomycin, random   Result Value Ref Range    Vancomycin, Random 21.6 Not established ug/mL   POCT glucose   Result Value Ref Range    POCT Glucose 176 (H) 70 - 110 mg/dL   POCT glucose   Result Value Ref Range    POCT Glucose 152 (H) 70 - 110 mg/dL   POCT glucose   Result Value Ref Range    POCT Glucose 171 (H) 70 - 110 mg/dL   POCT glucose   Result Value Ref Range    POCT Glucose 139 (H) 70 - 110 mg/dL     *Note: Due to a large number of results and/or encounters for the requested time period, some results have not been displayed. A complete set of results can be found in Results Review.       Imaging Results:  Imaging Results    None                  The Emergency Provider reviewed the vital signs and test results, which are outlined above.     ED Discussion     4:35 PM: Discussed case with Smooth Luo NP ([facility]). Dr. Thorpe agrees with current care and management of pt and accepts admission.   Admitting Service: Hospital medicine  Admitting Physician: Dr. Thorpe  Admit to: OBS med surg    4:35 PM: Re-evaluated pt. I have discussed test results, shared treatment plan, and the need for admission with patient and family at bedside. Pt and family express understanding at this time and agree with all information. All questions answered. Pt and family have no further questions or concerns at this time. Pt is ready for admit.    4:35 PM: Discussed pt's case with Dr. Gonzalez (General Surgery) who is coming to see the pt. Recommends admission to  and to hold anti-platelet meds.         Medical Decision Making:   Clinical Tests:   Lab Tests: Ordered and Reviewed           ED Medication(s):  Medications   morphine injection 4 mg (4 mg Intravenous Not Given 2/16/22 1630)   vancomycin - pharmacy to dose (has no administration in time range)   amLODIPine tablet 10 mg (10 mg Oral Given 2/17/22 0830)   atorvastatin tablet 40 mg (40 mg Oral Given 2/17/22 2130)   clonazePAM tablet 0.5 mg (0.5 mg Oral Given 2/17/22 2130)   donepeziL tablet 10 mg (10 mg Oral Given 2/17/22 0831)   EScitalopram oxalate tablet 20 mg (20 mg Oral Given 2/17/22 0831)   levothyroxine tablet 50 mcg (50 mcg Oral Not Given 2/17/22 0600)   losartan tablet 100 mg (100 mg Oral Given 2/17/22 0831)   nitroGLYCERIN SL tablet 0.4 mg (has no administration in time range)   pantoprazole EC tablet 40 mg (40 mg Oral Given 2/17/22 0830)   pregabalin capsule 75 mg (75 mg Oral Given 2/17/22 2100)   isosorbide mononitrate 24 hr tablet 30 mg (30 mg Oral Given 2/17/22 2130)   sodium chloride 0.9% flush 10 mL (10 mLs Intravenous Given 2/17/22 2131)   melatonin tablet 6 mg (6 mg Oral Given 2/17/22 2130)    senna-docusate 8.6-50 mg per tablet 1 tablet (has no administration in time range)   acetaminophen tablet 650 mg (has no administration in time range)   naloxone injection 0.02 mg (has no administration in time range)   glucose chewable tablet 16 g (has no administration in time range)   glucose chewable tablet 24 g (has no administration in time range)   glucagon (human recombinant) injection 1 mg (has no administration in time range)   ondansetron injection 4 mg (has no administration in time range)   insulin detemir U-100 pen 20 Units (20 Units Subcutaneous Given 2/17/22 2133)   insulin aspart U-100 pen 1-10 Units (2 Units Subcutaneous Given 2/17/22 0556)   dextrose 10% bolus 125 mL (has no administration in time range)   dextrose 10% bolus 250 mL (has no administration in time range)   HYDROmorphone injection 0.5 mg (0.5 mg Intravenous Given 2/17/22 2204)   diphenhydrAMINE capsule 25 mg (has no administration in time range)   vancomycin 750 mg in dextrose 5 % 250 mL IVPB (ready to mix system) (750 mg Intravenous New Bag 2/18/22 0112)   HYDROmorphone injection 1 mg (1 mg Intravenous Given 2/16/22 1643)   lactated ringers bolus 1,000 mL (0 mLs Intravenous Stopped 2/16/22 1757)   vancomycin 1.25 g in dextrose 5% 250 mL IVPB (ready to mix) (1,250 mg Intravenous New Bag 2/16/22 1749)   diphenhydrAMINE capsule 50 mg (50 mg Oral Given 2/16/22 1830)   vancomycin in dextrose 5 % 1 gram/250 mL IVPB 1,000 mg (0 mg Intravenous Stopped 2/17/22 1033)       Current Discharge Medication List                  Scribe Attestation:   Scribe #1: I performed the above scribed service and the documentation accurately describes the services I performed. I attest to the accuracy of the note.     Attending:   Physician Attestation Statement for Scribe #1: I, Saad Villaseñor MD, personally performed the services described in this documentation, as scribed by Jim Corbin, in my presence, and it is both accurate and complete.            Clinical Impression       ICD-10-CM ICD-9-CM   1. Coronary artery disease with stable angina pectoris, unspecified vessel or lesion type, unspecified whether native or transplanted heart  I25.118 414.00     413.9   2. Abscess  L02.91 682.9   3. Type 2 diabetes mellitus with hyperglycemia, with long-term current use of insulin  E11.65 250.00    Z79.4 790.29     V58.67   4. Chest pain  R07.9 786.50       Disposition:   Disposition: Placed in Observation  Condition: Serious         Saad Villaseñor MD  02/18/22 0124

## 2022-02-16 NOTE — HPI
Tiffanie Wise is a 81 y.o. female patient with a PMHx of CAD, HF, DM, NAWAF here with abscess and cellulitis to RUE. Pt was seen in the ED x 5 days pta and d/c home with rx for bactrim. Per pt's daughter, swelling improved but the area was still painful for the pt and warm to touch. Patient denies any fever, chills, HA, rhinorrhea, n/v, and all other sxs at this time.In the ED: Cr 1.6, BC x2 obtained. Moderate amount of fluid visualized during bedside US. Surgery consulted for I & D.    contacted for OBS admission. 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  Patient is a [employer]

## 2022-02-17 NOTE — ASSESSMENT & PLAN NOTE
Right axillary cellulits  Was treated with Bactrim initially but returned to ED for worsening erythema and pain  Bedside US showed moderate amount of fluid collection which is a change from when she presented to the ED 5 days ago  Surgery consulted for drainage  BC x2 obtained  Lactic acid and Procal pending  Vanc initiated in the ED  NPO at midnight  HOLD Plavix and ASA for intervention tomorrow

## 2022-02-17 NOTE — H&P
[facility] Emergency Dept.  [facility]  History & Physical    Patient Name: Tiffanie Wise  MRN: [id number]  Patient Class: OP- Observation  Admission Date: 2/16/2022  Attending Physician: Chong Thorpe MD   Primary Care Provider: Tiffanie Spence MD         Patient information was obtained from patient, relative(s), past medical records and ER records.     Subjective:     Principal Problem:Cellulitis    Chief Complaint:   Chief Complaint   Patient presents with    Abscess     Abscess to right inner upper arm being seen here for same thing given abx, Reports pain and swelling increasingly worse.         HPI:  Tiffanie Wise is a 81 y.o. female patient with a PMHx of CAD, HF, DM, NAWAF here with abscess and cellulitis to [facility]. Pt was seen in the ED x 5 days pta and d/c home with rx for bactrim. Per pt's daughter, swelling improved but the area was still painful for the pt and warm to touch. Patient denies any fever, chills, HA, rhinorrhea, n/v, and all other sxs at this time.In the ED: Cr 1.6, BC x2 obtained. Moderate amount of fluid visualized during bedside US. Surgery consulted for I & D.    contacted for OBS admission. Patient is [place] and has expressive aphasia s/t CVA and tonsillar carcinoma history.  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     Trouble in sleeping     Type 2 diabetes mellitus with ophthalmic manifestations     Urinary incontinence     pullups day, pads at night       Past Surgical History:   Procedure Laterality Date    BREAST SURGERY Left     benign cyst    CORONARY ANGIOPLASTY      CORONARY STENT PLACEMENT      x6-7 oer the yeqrs  last 12/17/14 BRIANDA to lcfx    EYE SURGERY Bilateral 2014    cataracts w/IOL   Dr Vance    FLUOROSCOPY N/A 1/20/2021    Procedure: G tube plaacement;  Surgeon: Shaka Cross MD;  [facility] CATH LAB;  Service: General;  Laterality: N/A;    HYSTERECTOMY  1970    vag hyst; ovaries intact    THYROID SURGERY      nodule benign, Dr Hankins    TUBAL LIGATION  1970       Review of patient's allergies indicates:  No Known Allergies    No current facility-administered medications on file prior to encounter.     Current Outpatient Medications on File Prior to Encounter   Medication Sig    amLODIPine (NORVASC) 10 MG tablet TAKE 1 TABLET (10 MG TOTAL) BY PER G TUBE ROUTE AS DIRECTED ONCE DAILY.    aspirin 81 MG Chew 1 tablet (81 mg total) by Per G Tube route once daily.    atorvastatin (LIPITOR) 40 MG tablet Take 1 tablet (40 mg total) by mouth every evening.    clonazePAM (KLONOPIN) 0.5 MG tablet 1 tablet (0.5 mg total) by Per G Tube route every evening.    clopidogreL (PLAVIX) 75 mg tablet Take 1 tablet (75 mg total) by mouth once daily.    donepeziL (ARICEPT) 10 MG tablet TAKE 1 TABLET (10 MG TOTAL) BY MOUTH ONCE DAILY.    EScitalopram oxalate (LEXAPRO) 20 MG tablet TAKE 1 TABLET EVERY DAY    hydroCHLOROthiazide (HYDRODIURIL) 25 MG tablet Take 1 tablet (25 mg total) by mouth once daily.    insulin aspart protamine-insulin aspart (NOVOLOG MIX 70-30FLEXPEN U-100) 100  "unit/mL (70-30) InPn pen 40 bid    insulin aspart U-100 (NOVOLOG) 100 unit/mL (3 mL) InPn pen Inject up to 50 units per day, dispense 15 ml    isosorbide mononitrate (IMDUR) 30 MG 24 hr tablet Take 1 tablet (30 mg total) by mouth once daily. (Patient taking differently: Take 30 mg by mouth 2 (two) times a day.)    JANUVIA 100 mg Tab Take 100 mg by mouth once daily.    levothyroxine (SYNTHROID) 50 MCG tablet 1 tablet (50 mcg total) by Per G Tube route before breakfast.    losartan (COZAAR) 100 MG tablet Take 1 tablet (100 mg total) by mouth once daily.    morphine (MSIR) 15 MG tablet Take 0.5 tablets (7.5 mg total) by mouth every 4 (four) hours as needed for Pain.    pantoprazole (PROTONIX) 40 MG tablet TAKE 1 TABLET EVERY DAY    pregabalin (LYRICA) 75 MG capsule Take 75 mg by mouth 2 (two) times daily.    sulfamethoxazole-trimethoprim 800-160mg (BACTRIM DS) 800-160 mg Tab Take 1 tablet by mouth 2 (two) times daily. for 7 days    vitamin D 1000 units Tab Take 1,000 Units by mouth once daily.    BLOOD SUGAR DIAGNOSTIC (TRUETEST TEST STRIPS MISC) by Misc.(Non-Drug; Combo Route) route. Pt is testing 3 times a day    blood-glucose sensor (DEXCOM G6 SENSOR) Tanisha Replace sensor every 10 days    nitroGLYCERIN (NITROSTAT) 0.4 MG SL tablet Place 1 tablet (0.4 mg total) under the tongue every 5 (five) minutes as needed.    [DISCONTINUED] ACCU-CHEK JOSE LUIS CONTROL SOLN Soln     [DISCONTINUED] ACCU-CHEK JOSE LUIS PLUS TEST STRP Strp     [DISCONTINUED] acetaminophen-codeine 300-30mg (TYLENOL #3) 300-30 mg Tab Take 1 tablet by mouth every 4 to 6 hours as needed.    [DISCONTINUED] albuterol-ipratropium (DUO-NEB) 2.5 mg-0.5 mg/3 mL nebulizer solution Take 3 mLs by nebulization every 6 (six) hours. Rescue    [DISCONTINUED] BD INSULIN SYRINGE ULT-FINE II 1/2 mL 31 x 5/16" Syrg     [DISCONTINUED] EASY COMFORT LANCETS 30 gauge [facility]     [DISCONTINUED] LIDOcaine HCL 2% (XYLOCAINE) 2 % jelly Apply topically as needed (Pain).    [DISCONTINUED] " "pen needle, diabetic 31 gauge x " Ndle USE TWICE DAILY AND PRN    [DISCONTINUED] pregabalin (LYRICA) 75 MG capsule 1 capsule (75 mg total) by Per G Tube route every evening. for 7 days (Patient taking differently: 150 mg by Per G Tube route every evening. )     Family History       Problem Relation (Age of Onset)    Alcohol abuse Sister    Cancer Son    Cirrhosis Sister    Diabetes Sister, Paternal Grandmother    Fibromyalgia Daughter, Daughter    Heart disease Mother    Kidney disease Father, Brother          Tobacco Use    Smoking status: Former Smoker     Packs/day: 1.00     Years: 46.00     Pack years: 46.00     Types: Cigarettes     Start date:      Quit date: 2008     Years since quittin.6    Smokeless tobacco: Never Used   Substance and Sexual Activity    Alcohol use: No    Drug use: Never    Sexual activity: Not Currently     Birth control/protection: None     Review of Systems   Constitutional: Negative for appetite change, chills and unexpected weight change.   HENT: Negative for congestion, mouth sores, sinus pressure, sore throat and trouble swallowing.    Eyes: Negative for photophobia and visual disturbance.   Respiratory: Negative for cough, shortness of breath and wheezing.    Cardiovascular: Negative for chest pain, palpitations and leg swelling.   Gastrointestinal: Negative for abdominal distention and abdominal pain.   Endocrine: Negative for polydipsia, polyphagia and polyuria.   Genitourinary: Negative for flank pain, frequency and hematuria.   Musculoskeletal: Negative for back pain and neck stiffness.   Skin: Positive for color change and wound. Negative for pallor and rash.   Allergic/Immunologic: Negative for immunocompromised state.   Neurological: Negative for dizziness, tremors, seizures, syncope, speech difficulty, weakness and headaches.   Hematological: Negative for adenopathy. Does not bruise/bleed easily.   Psychiatric/Behavioral: Negative for agitation, confusion " and suicidal ideas.     Objective:     Vital Signs (Most Recent):  Temp: 97.9 °F (36.6 °C) (02/16/22 1152)  Pulse: 77 (02/16/22 1730)  Resp: 18 (02/16/22 1730)  BP: (!) 153/67 (02/16/22 1730)  SpO2: 97 % (02/16/22 1730) Vital Signs (24h Range):  Temp:  [97.9 °F (36.6 °C)] 97.9 °F (36.6 °C)  Pulse:  [70-77] 77  Resp:  [18] 18  SpO2:  [97 %-98 %] 97 %  BP: (118-153)/(54-67) 153/67     Weight: 65.2 kg (143 lb 11.8 oz)  Body mass index is 31.11 kg/m².    Physical Exam  Constitutional:       Appearance: Normal appearance.   HENT:      Head: Normocephalic and atraumatic.      Nose: No congestion or rhinorrhea.      Mouth/Throat:      Pharynx: No posterior oropharyngeal erythema.   Eyes:      General: No scleral icterus.     Extraocular Movements: Extraocular movements intact.      Pupils: Pupils are equal, round, and reactive to light.   Neck:      Vascular: No carotid bruit.   Cardiovascular:      Rate and Rhythm: Normal rate and regular rhythm.   Pulmonary:      Effort: Pulmonary effort is normal. No respiratory distress.      Breath sounds: Normal breath sounds. No stridor. No wheezing.   Abdominal:      General: There is no distension.      Palpations: Abdomen is soft.      Tenderness: There is no abdominal tenderness. There is no guarding.   Musculoskeletal:         General: No swelling or deformity. Normal range of motion.      Cervical back: Normal range of motion and neck supple. No rigidity.   Skin:     General: Skin is warm and dry.      Capillary Refill: Capillary refill takes less than 2 seconds.      Coloration: Skin is not jaundiced.      Findings: Abscess, erythema and wound present. No rash.   Neurological:      Mental Status: She is alert and oriented to person, place, and time.      Motor: No weakness.   Psychiatric:         Mood and Affect: Mood normal.         Behavior: Behavior normal.         Thought Content: Thought content normal.         Judgment: Judgment normal.           CRANIAL NERVES     CN  III, IV, VI   Pupils are equal, round, and reactive to light.           Significant Labs: All pertinent labs within the past 24 hours have been reviewed.    Significant Imaging: I have reviewed all pertinent imaging results/findings within the past 24 hours.    Assessment/Plan:     * Cellulitis      Right axillary cellulits  Was treated with Bactrim initially but returned to ED for worsening erythema and pain  Bedside US showed moderate amount of fluid collection which is a change from when she presented to the ED 5 days ago  Surgery consulted for drainage  BC x2 obtained  Lactic acid and Procal pending  Vanc initiated in the ED  NPO at midnight  HOLD Plavix and ASA for intervention tomorrow    Hypertension associated with diabetes  Continued home antihypertensives  IV Hydralazine PRN for systolic blood pressure greater than 170        Long-term insulin use in type 2 diabetes  Patient's FSGs are controlled on current medication regimen.  Last A1c reviewed-   Lab Results   Component Value Date    HGBA1C 7.0 (H) 06/14/2021     Most recent fingerstick glucose reviewed- No results for input(s): POCTGLUCOSE in the last 24 hours.  Current correctional scale  High  Maintain anti-hyperglycemic dose as follows-   Antihyperglycemics (From admission, onward)                Start     Stop Route Frequency Ordered    02/16/22 2100  insulin detemir U-100 pen 20 Units         -- SubQ Nightly 02/16/22 1817 02/16/22 1916  insulin aspart U-100 pen 1-10 Units         -- SubQ Before meals & nightly PRN 02/16/22 1817          Hold Oral hypoglycemics while patient is in the hospital.      Anxiety and depression  Cont Cymbalta and Klonipin    Acquired hypothyroidism  Cont Synthroid      GERD (gastroesophageal reflux disease)  Continue Protonix      HOLD DVT PPX for impending I & D tomorrow  VTE Risk Mitigation (From admission, onward)           Ordered     IP VTE HIGH RISK PATIENT  Once         02/16/22 1817                       Shelley  SIVAKUMAR James NP  Department of Hospital Medicine   [facility] - Emergency Dept.

## 2022-02-17 NOTE — ASSESSMENT & PLAN NOTE
Patient's FSGs are controlled on current medication regimen.  Last A1c reviewed-   Lab Results   Component Value Date    HGBA1C 7.0 (H) 06/14/2021     Most recent fingerstick glucose reviewed-   Recent Labs   Lab 02/16/22  1835 02/16/22 2058 02/17/22  0555   POCTGLUCOSE 176* 152* 171*     Current correctional scale  High  Maintain anti-hyperglycemic dose as follows-   Antihyperglycemics (From admission, onward)            Start     Stop Route Frequency Ordered    02/16/22 2100  insulin detemir U-100 pen 20 Units         -- SubQ Nightly 02/16/22 1817 02/16/22 1916  insulin aspart U-100 pen 1-10 Units         -- SubQ Before meals & nightly PRN 02/16/22 1817        Hold Oral hypoglycemics while patient is in the hospital.

## 2022-02-17 NOTE — ASSESSMENT & PLAN NOTE
Patient's FSGs are controlled on current medication regimen.  Last A1c reviewed-   Lab Results   Component Value Date    HGBA1C 7.0 (H) 06/14/2021     Most recent fingerstick glucose reviewed- No results for input(s): POCTGLUCOSE in the last 24 hours.  Current correctional scale  High  Maintain anti-hyperglycemic dose as follows-   Antihyperglycemics (From admission, onward)            Start     Stop Route Frequency Ordered    02/16/22 2100  insulin detemir U-100 pen 20 Units         -- SubQ Nightly 02/16/22 1817 02/16/22 1916  insulin aspart U-100 pen 1-10 Units         -- SubQ Before meals & nightly PRN 02/16/22 1817        Hold Oral hypoglycemics while patient is in the hospital.

## 2022-02-17 NOTE — ASSESSMENT & PLAN NOTE
Continued home antihypertensives  IV Hydralazine PRN for systolic blood pressure greater than 170

## 2022-02-17 NOTE — CONSULTS
Pharmacokinetic Initial Assessment: IV Vancomycin    Assessment/Plan:  Initiate intravenous vancomycin with a loading dose of 20 mg/kg (1250 mg) once with subsequent doses when random concentrations are less than 20 mcg/mL  Desired empiric serum trough concentration is 15 to 20 mcg/mL (until abscess drained)   Draw vancomycin random level on 2/17 at 0600 with AM labs.  Pharmacy will continue to follow and monitor vancomycin.      Please contact pharmacy at extension [phone] with any questions regarding this assessment.     Thank you for the consult,   Katherine McArdle, Pharm.D. 2/16/2022 9:21 PM       Patient brief summary:  Tiffanie Wise is a 81 y.o. female initiated on antimicrobial therapy with IV Vancomycin for treatment of suspected skin & soft tissue infection    Drug Allergies:   Review of patient's allergies indicates:  No Known Allergies    Actual Body Weight:   65.2 kg    Renal Function:   Estimated Creatinine Clearance: 23.2 mL/min (A) (based on SCr of 1.6 mg/dL (H)). -- up from baseline 0.8    Dialysis Method (if applicable):  N/A    CBC (last 72 hours):  Recent Labs   Lab Result Units 02/16/22  1424   WBC K/uL 10.69   Hemoglobin g/dL 12.4   Hematocrit % 37.4   Platelets K/uL 334   Gran % % 82.7*   Lymph % % 7.4*   Mono % % 7.9   Eosinophil % % 1.0   Basophil % % 0.5   Differential Method  Automated       Metabolic Panel (last 72 hours):  Recent Labs   Lab Result Units 02/16/22  1424   Sodium mmol/L 136   Potassium mmol/L 4.1   Chloride mmol/L 98   CO2 mmol/L 27   Glucose mg/dL 106   BUN mg/dL 16   Creatinine mg/dL 1.6*   Albumin g/dL 3.4*   Total Bilirubin mg/dL 0.5   Alkaline Phosphatase U/L 103   AST U/L 14   ALT U/L 6*       Microbiologic Results:  Microbiology Results (last 7 days)     Procedure Component Value Units Date/Time    Blood Culture #2 **CANNOT BE ORDERED STAT** [[id number]] Collected: 02/16/22 1424    Order Status: Sent Specimen: Blood from Peripheral, Antecubital, Left Updated:  02/16/22 2028    Blood Culture #1 **CANNOT BE ORDERED STAT** [[id number]] Collected: 02/16/22 1651    Order Status: Sent Specimen: Blood from Peripheral, Wrist, Left Updated: 02/16/22 1651

## 2022-02-17 NOTE — CHAPLAIN
Initial visit with patient and family.  Took time to visit with patient to assess for spiritual needs.  Pt was resting but spoke with family member and they were currently doing okay.  Spiritual care remains available as needed.    Chaplain Anthony Mccauley M.Div., [facility]

## 2022-02-17 NOTE — PROGRESS NOTES
[facility]  Progress Note    Patient Name: Tiffanie Wise  MRN: [id number]  Patient Class: OP- Observation   Admission Date: 2/16/2022  Length of Stay: 0 days  Attending Physician: Chong Thorpe MD  Primary Care Provider: Tiffanie Spence MD        Subjective:     Principal Problem:Cellulitis        HPI:   Tiffanie Wise is a 81 y.o. female patient with a PMHx of CAD, HF, DM, NAWAF here with abscess and cellulitis to RUST. Pt was seen in the ED x 5 days pta and d/c home with rx for bactrim. Per pt's daughter, swelling improved but the area was still painful for the pt and warm to touch. Patient denies any fever, chills, HA, rhinorrhea, n/v, and all other sxs at this time.In the ED: Cr 1.6, BC x2 obtained. Moderate amount of fluid visualized during bedside US. Surgery consulted for I & D.    contacted for OBS admission. Patient is [place] and has expressive aphasia s/t CVA and tonsillar carcinoma history.  Patient is a Full Code      Overview/Hospital Course:  Patient placed inpatient for treatment of right axillary cellulitis. Started on vanc in ED. General surgery consulted. Blood cultures with NGTD. She has a history of hard of hearing and expressive aphasia s/p CVA. General surgery recommended IR aspiration which will likely be tomorrow. Continue IV ABX. VSS. Addressed family concerns and updated on POC.      Interval History:   See above      Review of Systems   Constitutional: Positive for chills and fatigue. Negative for appetite change and unexpected weight change.   HENT: Negative for congestion, mouth sores, sinus pressure, sore throat and trouble swallowing.         Chronically [place]   Eyes: Negative for photophobia and visual disturbance.   Respiratory: Negative for cough, shortness of breath and wheezing.    Cardiovascular: Negative for chest pain, palpitations and leg swelling.   Gastrointestinal: Negative for abdominal distention and abdominal pain.   Endocrine: Negative  for polydipsia, polyphagia and polyuria.   Genitourinary: Negative for flank pain, frequency and hematuria.   Musculoskeletal: Positive for arthralgias and myalgias. Negative for back pain and neck stiffness.   Skin: Positive for color change and wound. Negative for pallor and rash.   Allergic/Immunologic: Negative for immunocompromised state.   Neurological: Positive for speech difficulty (chronic aphasia) and weakness. Negative for dizziness, tremors, seizures, syncope and headaches.   Hematological: Negative for adenopathy. Does not bruise/bleed easily.   Psychiatric/Behavioral: Negative for agitation, confusion and suicidal ideas.     Objective:     Vital Signs (Most Recent):  Temp: 97.5 °F (36.4 °C) (02/17/22 1200)  Pulse: 66 (02/17/22 0728)  Resp: 20 (02/17/22 0728)  BP: (!) 179/76 (02/17/22 0728)  SpO2: (!) 92 % (02/17/22 0728) Vital Signs (24h Range):  Temp:  [97.5 °F (36.4 °C)-98.2 °F (36.8 °C)] 97.5 °F (36.4 °C)  Pulse:  [56-77] 66  Resp:  [14-20] 20  SpO2:  [91 %-99 %] 92 %  BP: (153-179)/(67-91) 179/76     Weight: 65.4 kg (144 lb 2.9 oz)  Body mass index is 31.2 kg/m².    Intake/Output Summary (Last 24 hours) at 2/17/2022 1610  Last data filed at 2/17/2022 1400  Gross per 24 hour   Intake 1239 ml   Output --   Net 1239 ml      Physical Exam  Vitals and nursing note reviewed.   Constitutional:       General: She is not in acute distress.     Appearance: Normal appearance. She is ill-appearing.   HENT:      Head: Normocephalic and atraumatic.      Ears:      Comments: Hard of hearing     Nose: No congestion or rhinorrhea.      Mouth/Throat:      Pharynx: No posterior oropharyngeal erythema.   Eyes:      General: No scleral icterus.     Extraocular Movements: Extraocular movements intact.      Pupils: Pupils are equal, round, and reactive to light.   Neck:      Vascular: No carotid bruit.   Cardiovascular:      Rate and Rhythm: Normal rate and regular rhythm.      Pulses: Normal pulses.      Heart sounds:  No murmur heard.      Pulmonary:      Effort: Pulmonary effort is normal. No respiratory distress.      Breath sounds: Normal breath sounds. No stridor. No wheezing.   Abdominal:      General: There is no distension.      Palpations: Abdomen is soft.      Tenderness: There is no abdominal tenderness. There is no guarding.   Musculoskeletal:         General: No swelling or deformity. Normal range of motion.      Cervical back: Normal range of motion and neck supple. No rigidity.   Skin:     General: Skin is warm and dry.      Capillary Refill: Capillary refill takes less than 2 seconds.      Coloration: Skin is not jaundiced.      Findings: Abscess, erythema and wound present. No rash.      Comments: Erythematous, warm, indurated, with palpable mass to the right axilla   Neurological:      Mental Status: She is alert and oriented to person, place, and time.      Motor: No weakness.      Comments: Chronic Slurred speech and exp aphasia   Psychiatric:         Mood and Affect: Mood normal.         Behavior: Behavior normal.         Significant Labs: All pertinent labs within the past 24 hours have been reviewed.  Results for orders placed or performed during the hospital encounter of 02/16/22   Blood Culture #2 **CANNOT BE ORDERED STAT**    Specimen: Peripheral, Antecubital, Left; Blood   Result Value Ref Range    Blood Culture, Routine No Growth to date    Blood Culture #1 **CANNOT BE ORDERED STAT**    Specimen: Peripheral, Wrist, Left; Blood   Result Value Ref Range    Blood Culture, Routine No Growth to date    CBC auto differential   Result Value Ref Range    WBC 10.69 3.90 - 12.70 K/uL    RBC 4.24 4.00 - 5.40 M/uL    Hemoglobin 12.4 12.0 - 16.0 g/dL    Hematocrit 37.4 37.0 - 48.5 %    MCV 88 82 - 98 fL    MCH 29.2 27.0 - 31.0 pg    MCHC 33.2 32.0 - 36.0 g/dL    RDW 13.1 11.5 - 14.5 %    Platelets 334 150 - 450 K/uL    MPV 10.4 9.2 - 12.9 fL    Immature Granulocytes 0.5 0.0 - 0.5 %    Gran # (ANC) 8.9 (H) 1.8 - 7.7  K/uL    Immature Grans (Abs) 0.05 (H) 0.00 - 0.04 K/uL    Lymph # 0.8 (L) 1.0 - 4.8 K/uL    Mono # 0.8 0.3 - 1.0 K/uL    Eos # 0.1 0.0 - 0.5 K/uL    Baso # 0.05 0.00 - 0.20 K/uL    nRBC 0 0 /100 WBC    Gran % 82.7 (H) 38.0 - 73.0 %    Lymph % 7.4 (L) 18.0 - 48.0 %    Mono % 7.9 4.0 - 15.0 %    Eosinophil % 1.0 0.0 - 8.0 %    Basophil % 0.5 0.0 - 1.9 %    Platelet Estimate Appears normal     Differential Method Automated    Comprehensive metabolic panel   Result Value Ref Range    Sodium 136 136 - 145 mmol/L    Potassium 4.1 3.5 - 5.1 mmol/L    Chloride 98 95 - 110 mmol/L    CO2 27 23 - 29 mmol/L    Glucose 106 70 - 110 mg/dL    BUN 16 8 - 23 mg/dL    Creatinine 1.6 (H) 0.5 - 1.4 mg/dL    Calcium 9.6 8.7 - 10.5 mg/dL    Total Protein 7.7 6.0 - 8.4 g/dL    Albumin 3.4 (L) 3.5 - 5.2 g/dL    Total Bilirubin 0.5 0.1 - 1.0 mg/dL    Alkaline Phosphatase 103 55 - 135 U/L    AST 14 10 - 40 U/L    ALT 6 (L) 10 - 44 U/L    Anion Gap 11 8 - 16 mmol/L    eGFR if African American 35 (A) >60 mL/min/1.73 m^2    eGFR if non African American 30 (A) >60 mL/min/1.73 m^2   Lactate dehydrogenase   Result Value Ref Range     (H) 110 - 260 U/L   COVID-19 Rapid Screening   Result Value Ref Range    SARS-CoV-2 RNA, Amplification, Qual Negative Negative   Vancomycin, Random   Result Value Ref Range    Vancomycin, Random 13.3 Not established ug/mL   CBC with Automated Differential   Result Value Ref Range    WBC 10.42 3.90 - 12.70 K/uL    RBC 4.37 4.00 - 5.40 M/uL    Hemoglobin 12.5 12.0 - 16.0 g/dL    Hematocrit 37.7 37.0 - 48.5 %    MCV 86 82 - 98 fL    MCH 28.6 27.0 - 31.0 pg    MCHC 33.2 32.0 - 36.0 g/dL    RDW 13.1 11.5 - 14.5 %    Platelets 332 150 - 450 K/uL    MPV 10.4 9.2 - 12.9 fL    Immature Granulocytes 0.5 0.0 - 0.5 %    Gran # (ANC) 8.2 (H) 1.8 - 7.7 K/uL    Immature Grans (Abs) 0.05 (H) 0.00 - 0.04 K/uL    Lymph # 0.9 (L) 1.0 - 4.8 K/uL    Mono # 0.9 0.3 - 1.0 K/uL    Eos # 0.3 0.0 - 0.5 K/uL    Baso # 0.06 0.00 - 0.20  K/uL    nRBC 0 0 /100 WBC    Gran % 79.0 (H) 38.0 - 73.0 %    Lymph % 8.3 (L) 18.0 - 48.0 %    Mono % 8.7 4.0 - 15.0 %    Eosinophil % 2.9 0.0 - 8.0 %    Basophil % 0.6 0.0 - 1.9 %    Differential Method Automated    Basic Metabolic Panel (BMP)   Result Value Ref Range    Sodium 139 136 - 145 mmol/L    Potassium 4.2 3.5 - 5.1 mmol/L    Chloride 101 95 - 110 mmol/L    CO2 27 23 - 29 mmol/L    Glucose 130 (H) 70 - 110 mg/dL    BUN 12 8 - 23 mg/dL    Creatinine 1.2 0.5 - 1.4 mg/dL    Calcium 9.2 8.7 - 10.5 mg/dL    Anion Gap 11 8 - 16 mmol/L    eGFR if African American 49 (A) >60 mL/min/1.73 m^2    eGFR if non African American 42 (A) >60 mL/min/1.73 m^2   POCT glucose   Result Value Ref Range    POCT Glucose 176 (H) 70 - 110 mg/dL   POCT glucose   Result Value Ref Range    POCT Glucose 152 (H) 70 - 110 mg/dL   POCT glucose   Result Value Ref Range    POCT Glucose 171 (H) 70 - 110 mg/dL     *Note: Due to a large number of results and/or encounters for the requested time period, some results have not been displayed. A complete set of results can be found in Results Review.       Significant Imaging: I have reviewed all pertinent imaging results/findings within the past 24 hours.   Imaging Results    None             Assessment/Plan:      * Cellulitis      Right axillary cellulits  Was treated with Bactrim initially but returned to ED for worsening erythema and pain  Bedside US showed moderate amount of fluid collection which is a change from when she presented to the ED 5 days ago  Surgery consulted for drainage  BC x2 obtained  Lactic acid and Procal pending  Vanc initiated in the ED  NPO at midnight  HOLD Plavix and ASA for intervention tomorrow    02/17/2022  Continue vanc  NPO after midnight  BC with NGTD  IR aspiration tomorrow    Hypertension associated with diabetes  Continued home antihypertensives  IV Hydralazine PRN for systolic blood pressure greater than 170        Long-term insulin use in type 2  diabetes  Patient's FSGs are controlled on current medication regimen.  Last A1c reviewed-   Lab Results   Component Value Date    HGBA1C 7.0 (H) 06/14/2021     Most recent fingerstick glucose reviewed-   Recent Labs   Lab 02/16/22  1835 02/16/22 2058 02/17/22  0555   POCTGLUCOSE 176* 152* 171*     Current correctional scale  High  Maintain anti-hyperglycemic dose as follows-   Antihyperglycemics (From admission, onward)                Start     Stop Route Frequency Ordered    02/16/22 2100  insulin detemir U-100 pen 20 Units         -- SubQ Nightly 02/16/22 1817 02/16/22 1916  insulin aspart U-100 pen 1-10 Units         -- SubQ Before meals & nightly PRN 02/16/22 1817          Hold Oral hypoglycemics while patient is in the hospital.      Anxiety and depression  Cont Cymbalta and Klonipin    Acquired hypothyroidism  Cont Synthroid      GERD (gastroesophageal reflux disease)  Continue Protonix        VTE Risk Mitigation (From admission, onward)           Ordered     IP VTE HIGH RISK PATIENT  Once         02/16/22 1817                    Discharge Planning   [place]:      Code Status: DNR   Is the patient medically ready for discharge?:     Reason for patient still in hospital (select all that apply): Patient trending condition                     Rubi Calzada NP  Department of Hospital Medicine   [facility] - Med Surg

## 2022-02-17 NOTE — PROGRESS NOTES
Pharmacokinetic Assessment Follow Up: IV Vancomycin    Vancomycin serum concentration assessment(s):    The random level was drawn correctly and can be used to guide therapy at this time. The measurement is below the desired definitive target range of 15 to 20 mcg/mL.    Vancomycin Regimen Plan:    Chriss give a 15mg/kg= 1000mg x1 now.   Re-dose when the random level is less than 20 mcg/mL, next level to be drawn at 2000 on 2/17    Drug levels (last 3 results):  Recent Labs   Lab Result Units 02/17/22  0529   Vancomycin, Random ug/mL 13.3       Pharmacy will continue to follow and monitor vancomycin.    Please contact pharmacy at extension [phone] for questions regarding this assessment.    Thank you for the consult,   Maddi Gonsales       Patient brief summary:  Tiffanie Wise is a 81 y.o. female initiated on antimicrobial therapy with IV Vancomycin for treatment of skin & soft tissue infection    The patient's current regimen is pulse dosing based on random level.    Drug Allergies:   Review of patient's allergies indicates:  No Known Allergies    Actual Body Weight:   65.4kg    Renal Function:   Estimated Creatinine Clearance: 31.1 mL/min (based on SCr of 1.2 mg/dL).,     Dialysis Method (if applicable):  N/A    CBC (last 72 hours):  Recent Labs   Lab Result Units 02/16/22  1424 02/17/22  0529   WBC K/uL 10.69 10.42   Hemoglobin g/dL 12.4 12.5   Hematocrit % 37.4 37.7   Platelets K/uL 334 332   Gran % % 82.7* 79.0*   Lymph % % 7.4* 8.3*   Mono % % 7.9 8.7   Eosinophil % % 1.0 2.9   Basophil % % 0.5 0.6   Differential Method  Automated Automated       Metabolic Panel (last 72 hours):  Recent Labs   Lab Result Units 02/16/22  1424 02/17/22  0528   Sodium mmol/L 136 139   Potassium mmol/L 4.1 4.2   Chloride mmol/L 98 101   CO2 mmol/L 27 27   Glucose mg/dL 106 130*   BUN mg/dL 16 12   Creatinine mg/dL 1.6* 1.2   Albumin g/dL 3.4*  --    Total Bilirubin mg/dL 0.5  --    Alkaline Phosphatase U/L 103  --    AST U/L 14   --    ALT U/L 6*  --        Vancomycin Administrations:  vancomycin given in the last 96 hours                     vancomycin in dextrose 5 % 1 gram/250 mL IVPB 1,000 mg (mg) 1,000 mg New Bag 02/17/22 0903    vancomycin 1.25 g in dextrose 5% 250 mL IVPB (ready to mix) (mg) 1,250 mg New Bag 02/16/22 1749                    Microbiologic Results:  Microbiology Results (last 7 days)       Procedure Component Value Units Date/Time    Blood Culture #1 **CANNOT BE ORDERED STAT** [[id number]] Collected: 02/16/22 1651    Order Status: Completed Specimen: Blood from Peripheral, Wrist, Left Updated: 02/17/22 0915     Blood Culture, Routine No Growth to date    Blood Culture #2 **CANNOT BE ORDERED STAT** [[id number]] Collected: 02/16/22 1424    Order Status: Completed Specimen: Blood from Peripheral, Antecubital, Left Updated: 02/17/22 0315     Blood Culture, Routine No Growth to date

## 2022-02-17 NOTE — SUBJECTIVE & OBJECTIVE
Interval History:   See above      Review of Systems   Constitutional: Positive for chills and fatigue. Negative for appetite change and unexpected weight change.   HENT: Negative for congestion, mouth sores, sinus pressure, sore throat and trouble swallowing.         Chronically [place]   Eyes: Negative for photophobia and visual disturbance.   Respiratory: Negative for cough, shortness of breath and wheezing.    Cardiovascular: Negative for chest pain, palpitations and leg swelling.   Gastrointestinal: Negative for abdominal distention and abdominal pain.   Endocrine: Negative for polydipsia, polyphagia and polyuria.   Genitourinary: Negative for flank pain, frequency and hematuria.   Musculoskeletal: Positive for arthralgias and myalgias. Negative for back pain and neck stiffness.   Skin: Positive for color change and wound. Negative for pallor and rash.   Allergic/Immunologic: Negative for immunocompromised state.   Neurological: Positive for speech difficulty (chronic aphasia) and weakness. Negative for dizziness, tremors, seizures, syncope and headaches.   Hematological: Negative for adenopathy. Does not bruise/bleed easily.   Psychiatric/Behavioral: Negative for agitation, confusion and suicidal ideas.     Objective:     Vital Signs (Most Recent):  Temp: 97.5 °F (36.4 °C) (02/17/22 1200)  Pulse: 66 (02/17/22 0728)  Resp: 20 (02/17/22 0728)  BP: (!) 179/76 (02/17/22 0728)  SpO2: (!) 92 % (02/17/22 0728) Vital Signs (24h Range):  Temp:  [97.5 °F (36.4 °C)-98.2 °F (36.8 °C)] 97.5 °F (36.4 °C)  Pulse:  [56-77] 66  Resp:  [14-20] 20  SpO2:  [91 %-99 %] 92 %  BP: (153-179)/(67-91) 179/76     Weight: 65.4 kg (144 lb 2.9 oz)  Body mass index is 31.2 kg/m².    Intake/Output Summary (Last 24 hours) at 2/17/2022 1610  Last data filed at 2/17/2022 1400  Gross per 24 hour   Intake 1239 ml   Output --   Net 1239 ml      Physical Exam  Vitals and nursing note reviewed.   Constitutional:       General: She is not in acute  distress.     Appearance: Normal appearance. She is ill-appearing.   HENT:      Head: Normocephalic and atraumatic.      Ears:      Comments: Hard of hearing     Nose: No congestion or rhinorrhea.      Mouth/Throat:      Pharynx: No posterior oropharyngeal erythema.   Eyes:      General: No scleral icterus.     Extraocular Movements: Extraocular movements intact.      Pupils: Pupils are equal, round, and reactive to light.   Neck:      Vascular: No carotid bruit.   Cardiovascular:      Rate and Rhythm: Normal rate and regular rhythm.      Pulses: Normal pulses.      Heart sounds: No murmur heard.      Pulmonary:      Effort: Pulmonary effort is normal. No respiratory distress.      Breath sounds: Normal breath sounds. No stridor. No wheezing.   Abdominal:      General: There is no distension.      Palpations: Abdomen is soft.      Tenderness: There is no abdominal tenderness. There is no guarding.   Musculoskeletal:         General: No swelling or deformity. Normal range of motion.      Cervical back: Normal range of motion and neck supple. No rigidity.   Skin:     General: Skin is warm and dry.      Capillary Refill: Capillary refill takes less than 2 seconds.      Coloration: Skin is not jaundiced.      Findings: Abscess, erythema and wound present. No rash.      Comments: Erythematous, warm, indurated, with palpable mass to the right axilla   Neurological:      Mental Status: She is alert and oriented to person, place, and time.      Motor: No weakness.      Comments: Chronic Slurred speech and exp aphasia   Psychiatric:         Mood and Affect: Mood normal.         Behavior: Behavior normal.         Significant Labs: All pertinent labs within the past 24 hours have been reviewed.  Results for orders placed or performed during the hospital encounter of 02/16/22   Blood Culture #2 **CANNOT BE ORDERED STAT**    Specimen: Peripheral, Antecubital, Left; Blood   Result Value Ref Range    Blood Culture, Routine No  Growth to date    Blood Culture #1 **CANNOT BE ORDERED STAT**    Specimen: Peripheral, Wrist, Left; Blood   Result Value Ref Range    Blood Culture, Routine No Growth to date    CBC auto differential   Result Value Ref Range    WBC 10.69 3.90 - 12.70 K/uL    RBC 4.24 4.00 - 5.40 M/uL    Hemoglobin 12.4 12.0 - 16.0 g/dL    Hematocrit 37.4 37.0 - 48.5 %    MCV 88 82 - 98 fL    MCH 29.2 27.0 - 31.0 pg    MCHC 33.2 32.0 - 36.0 g/dL    RDW 13.1 11.5 - 14.5 %    Platelets 334 150 - 450 K/uL    MPV 10.4 9.2 - 12.9 fL    Immature Granulocytes 0.5 0.0 - 0.5 %    Gran # (ANC) 8.9 (H) 1.8 - 7.7 K/uL    Immature Grans (Abs) 0.05 (H) 0.00 - 0.04 K/uL    Lymph # 0.8 (L) 1.0 - 4.8 K/uL    Mono # 0.8 0.3 - 1.0 K/uL    Eos # 0.1 0.0 - 0.5 K/uL    Baso # 0.05 0.00 - 0.20 K/uL    nRBC 0 0 /100 WBC    Gran % 82.7 (H) 38.0 - 73.0 %    Lymph % 7.4 (L) 18.0 - 48.0 %    Mono % 7.9 4.0 - 15.0 %    Eosinophil % 1.0 0.0 - 8.0 %    Basophil % 0.5 0.0 - 1.9 %    Platelet Estimate Appears normal     Differential Method Automated    Comprehensive metabolic panel   Result Value Ref Range    Sodium 136 136 - 145 mmol/L    Potassium 4.1 3.5 - 5.1 mmol/L    Chloride 98 95 - 110 mmol/L    CO2 27 23 - 29 mmol/L    Glucose 106 70 - 110 mg/dL    BUN 16 8 - 23 mg/dL    Creatinine 1.6 (H) 0.5 - 1.4 mg/dL    Calcium 9.6 8.7 - 10.5 mg/dL    Total Protein 7.7 6.0 - 8.4 g/dL    Albumin 3.4 (L) 3.5 - 5.2 g/dL    Total Bilirubin 0.5 0.1 - 1.0 mg/dL    Alkaline Phosphatase 103 55 - 135 U/L    AST 14 10 - 40 U/L    ALT 6 (L) 10 - 44 U/L    Anion Gap 11 8 - 16 mmol/L    eGFR if African American 35 (A) >60 mL/min/1.73 m^2    eGFR if non African American 30 (A) >60 mL/min/1.73 m^2   Lactate dehydrogenase   Result Value Ref Range     (H) 110 - 260 U/L   COVID-19 Rapid Screening   Result Value Ref Range    SARS-CoV-2 RNA, Amplification, Qual Negative Negative   Vancomycin, Random   Result Value Ref Range    Vancomycin, Random 13.3 Not established ug/mL   CBC with  Automated Differential   Result Value Ref Range    WBC 10.42 3.90 - 12.70 K/uL    RBC 4.37 4.00 - 5.40 M/uL    Hemoglobin 12.5 12.0 - 16.0 g/dL    Hematocrit 37.7 37.0 - 48.5 %    MCV 86 82 - 98 fL    MCH 28.6 27.0 - 31.0 pg    MCHC 33.2 32.0 - 36.0 g/dL    RDW 13.1 11.5 - 14.5 %    Platelets 332 150 - 450 K/uL    MPV 10.4 9.2 - 12.9 fL    Immature Granulocytes 0.5 0.0 - 0.5 %    Gran # (ANC) 8.2 (H) 1.8 - 7.7 K/uL    Immature Grans (Abs) 0.05 (H) 0.00 - 0.04 K/uL    Lymph # 0.9 (L) 1.0 - 4.8 K/uL    Mono # 0.9 0.3 - 1.0 K/uL    Eos # 0.3 0.0 - 0.5 K/uL    Baso # 0.06 0.00 - 0.20 K/uL    nRBC 0 0 /100 WBC    Gran % 79.0 (H) 38.0 - 73.0 %    Lymph % 8.3 (L) 18.0 - 48.0 %    Mono % 8.7 4.0 - 15.0 %    Eosinophil % 2.9 0.0 - 8.0 %    Basophil % 0.6 0.0 - 1.9 %    Differential Method Automated    Basic Metabolic Panel (BMP)   Result Value Ref Range    Sodium 139 136 - 145 mmol/L    Potassium 4.2 3.5 - 5.1 mmol/L    Chloride 101 95 - 110 mmol/L    CO2 27 23 - 29 mmol/L    Glucose 130 (H) 70 - 110 mg/dL    BUN 12 8 - 23 mg/dL    Creatinine 1.2 0.5 - 1.4 mg/dL    Calcium 9.2 8.7 - 10.5 mg/dL    Anion Gap 11 8 - 16 mmol/L    eGFR if African American 49 (A) >60 mL/min/1.73 m^2    eGFR if non African American 42 (A) >60 mL/min/1.73 m^2   POCT glucose   Result Value Ref Range    POCT Glucose 176 (H) 70 - 110 mg/dL   POCT glucose   Result Value Ref Range    POCT Glucose 152 (H) 70 - 110 mg/dL   POCT glucose   Result Value Ref Range    POCT Glucose 171 (H) 70 - 110 mg/dL     *Note: Due to a large number of results and/or encounters for the requested time period, some results have not been displayed. A complete set of results can be found in Results Review.       Significant Imaging: I have reviewed all pertinent imaging results/findings within the past 24 hours.   Imaging Results    None

## 2022-02-17 NOTE — ASSESSMENT & PLAN NOTE
Right axillary cellulits  Was treated with Bactrim initially but returned to ED for worsening erythema and pain  Bedside US showed moderate amount of fluid collection which is a change from when she presented to the ED 5 days ago  Surgery consulted for drainage  BC x2 obtained  Lactic acid and Procal pending  Vanc initiated in the ED  NPO at midnight  HOLD Plavix and ASA for intervention tomorrow    02/17/2022  Continue vanc  NPO after midnight  BC with NGTD  IR aspiration tomorrow

## 2022-02-17 NOTE — PLAN OF CARE
Pt AAOx4, pt remained free of falls throughout the shift. Hourly rounded complete. Medications administered as orderd. POC reviewed, pt has no complaints at this time. Call bell within reach, pt instructed to call for assistance. Bed alarm set. Pt verbalizes understanding.    Problem: Diabetes Comorbidity  Goal: Blood Glucose Level Within Targeted Range  Outcome: Unable to Meet, Plan Revised     Problem: Adult Inpatient Plan of Care  Goal: Optimal Comfort and Wellbeing  Outcome: Unable to Meet, Plan Revised

## 2022-02-17 NOTE — HOSPITAL COURSE
Patient placed inpatient for treatment of right axillary cellulitis. Started on vanc in ED. General surgery consulted. Blood cultures with NGTD. She has a history of hard of hearing and expressive aphasia s/p CVA. General surgery recommended IR aspiration which will likely be tomorrow. Continue IV ABX. VSS. Addressed family concerns and updated on POC.

## 2022-02-17 NOTE — SUBJECTIVE & OBJECTIVE
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     Trouble in sleeping     Type 2 diabetes mellitus with ophthalmic manifestations     Urinary incontinence     pullups day, pads at night       Past Surgical History:   Procedure Laterality Date    BREAST SURGERY Left     benign cyst    CORONARY ANGIOPLASTY      CORONARY STENT PLACEMENT      x6-7 oer the yeqrs  last 12/17/14 BRIANDA to lcfx    EYE SURGERY Bilateral 2014    cataracts w/IOL   Dr Vance    FLUOROSCOPY N/A 1/20/2021    Procedure: G tube plaacement;  Surgeon: Shaka Cross MD;  Location: [facility] CATH LAB;  Service: General;  Laterality: N/A;    HYSTERECTOMY  1970    vag hyst; ovaries intact    THYROID SURGERY      nodule benign, Dr Hankins    TUBAL LIGATION  1970       Review of patient's allergies indicates:  No Known Allergies    No current facility-administered medications on file prior to encounter.     Current Outpatient Medications on File Prior to Encounter   Medication Sig    amLODIPine (NORVASC) 10 MG tablet TAKE 1 TABLET (10 MG TOTAL) BY PER G TUBE ROUTE AS  DIRECTED ONCE DAILY.    aspirin 81 MG Chew 1 tablet (81 mg total) by Per G Tube route once daily.    atorvastatin (LIPITOR) 40 MG tablet Take 1 tablet (40 mg total) by mouth every evening.    clonazePAM (KLONOPIN) 0.5 MG tablet 1 tablet (0.5 mg total) by Per G Tube route every evening.    clopidogreL (PLAVIX) 75 mg tablet Take 1 tablet (75 mg total) by mouth once daily.    donepeziL (ARICEPT) 10 MG tablet TAKE 1 TABLET (10 MG TOTAL) BY MOUTH ONCE DAILY.    EScitalopram oxalate (LEXAPRO) 20 MG tablet TAKE 1 TABLET EVERY DAY    hydroCHLOROthiazide (HYDRODIURIL) 25 MG tablet Take 1 tablet (25 mg total) by mouth once daily.    insulin aspart protamine-insulin aspart (NOVOLOG MIX 70-30FLEXPEN U-100) 100 unit/mL (70-30) InPn pen 40 bid    insulin aspart U-100 (NOVOLOG) 100 unit/mL (3 mL) InPn pen Inject up to 50 units per day, dispense 15 ml    isosorbide mononitrate (IMDUR) 30 MG 24 hr tablet Take 1 tablet (30 mg total) by mouth once daily. (Patient taking differently: Take 30 mg by mouth 2 (two) times a day.)    JANUVIA 100 mg Tab Take 100 mg by mouth once daily.    levothyroxine (SYNTHROID) 50 MCG tablet 1 tablet (50 mcg total) by Per G Tube route before breakfast.    losartan (COZAAR) 100 MG tablet Take 1 tablet (100 mg total) by mouth once daily.    morphine (MSIR) 15 MG tablet Take 0.5 tablets (7.5 mg total) by mouth every 4 (four) hours as needed for Pain.    pantoprazole (PROTONIX) 40 MG tablet TAKE 1 TABLET EVERY DAY    pregabalin (LYRICA) 75 MG capsule Take 75 mg by mouth 2 (two) times daily.    sulfamethoxazole-trimethoprim 800-160mg (BACTRIM DS) 800-160 mg Tab Take 1 tablet by mouth 2 (two) times daily. for 7 days    vitamin D 1000 units Tab Take 1,000 Units by mouth once daily.    BLOOD SUGAR DIAGNOSTIC (TRUETEST TEST STRIPS MISC) by Misc.(Non-Drug; Combo Route) route. Pt is testing 3 times a day    blood-glucose sensor (DEXCOM G6 SENSOR) Tanisha Replace sensor every 10 days    nitroGLYCERIN  "(NITROSTAT) 0.4 MG SL tablet Place 1 tablet (0.4 mg total) under the tongue every 5 (five) minutes as needed.    [DISCONTINUED] ACCU-CHEK JOSE LUIS CONTROL SOLN Soln     [DISCONTINUED] ACCU-CHEK JOSE LUIS PLUS TEST STRP Strp     [DISCONTINUED] acetaminophen-codeine 300-30mg (TYLENOL #3) 300-30 mg Tab Take 1 tablet by mouth every 4 to 6 hours as needed.    [DISCONTINUED] albuterol-ipratropium (DUO-NEB) 2.5 mg-0.5 mg/3 mL nebulizer solution Take 3 mLs by nebulization every 6 (six) hours. Rescue    [DISCONTINUED] BD INSULIN SYRINGE ULT-FINE II 1/2 mL 31 x 5/16" Syrg     [DISCONTINUED] EASY COMFORT LANCETS 30 gauge Misc     [DISCONTINUED] LIDOcaine HCL 2% (XYLOCAINE) 2 % jelly Apply topically as needed (Pain).    [DISCONTINUED] pen needle, diabetic 31 gauge x 5/16" Ndle USE TWICE DAILY AND PRN    [DISCONTINUED] pregabalin (LYRICA) 75 MG capsule 1 capsule (75 mg total) by Per G Tube route every evening. for 7 days (Patient taking differently: 150 mg by Per G Tube route every evening. )     Family History     Problem Relation (Age of Onset)    Alcohol abuse Sister    Cancer Son    Cirrhosis Sister    Diabetes Sister, Paternal Grandmother    Fibromyalgia Daughter, Daughter    Heart disease Mother    Kidney disease Father, Brother        Tobacco Use    Smoking status: Former Smoker     Packs/day: 1.00     Years: 46.00     Pack years: 46.00     Types: Cigarettes     Start date:      Quit date: 2008     Years since quittin.6    Smokeless tobacco: Never Used   Substance and Sexual Activity    Alcohol use: No    Drug use: Never    Sexual activity: Not Currently     Birth control/protection: None     Review of Systems   Constitutional: Negative for appetite change, chills and unexpected weight change.   HENT: Negative for congestion, mouth sores, sinus pressure, sore throat and trouble swallowing.    Eyes: Negative for photophobia and visual disturbance.   Respiratory: Negative for cough, shortness of breath " and wheezing.    Cardiovascular: Negative for chest pain, palpitations and leg swelling.   Gastrointestinal: Negative for abdominal distention and abdominal pain.   Endocrine: Negative for polydipsia, polyphagia and polyuria.   Genitourinary: Negative for flank pain, frequency and hematuria.   Musculoskeletal: Negative for back pain and neck stiffness.   Skin: Positive for color change and wound. Negative for pallor and rash.   Allergic/Immunologic: Negative for immunocompromised state.   Neurological: Negative for dizziness, tremors, seizures, syncope, speech difficulty, weakness and headaches.   Hematological: Negative for adenopathy. Does not bruise/bleed easily.   Psychiatric/Behavioral: Negative for agitation, confusion and suicidal ideas.     Objective:     Vital Signs (Most Recent):  Temp: 97.9 °F (36.6 °C) (02/16/22 1152)  Pulse: 77 (02/16/22 1730)  Resp: 18 (02/16/22 1730)  BP: (!) 153/67 (02/16/22 1730)  SpO2: 97 % (02/16/22 1730) Vital Signs (24h Range):  Temp:  [97.9 °F (36.6 °C)] 97.9 °F (36.6 °C)  Pulse:  [70-77] 77  Resp:  [18] 18  SpO2:  [97 %-98 %] 97 %  BP: (118-153)/(54-67) 153/67     Weight: 65.2 kg (143 lb 11.8 oz)  Body mass index is 31.11 kg/m².    Physical Exam  Constitutional:       Appearance: Normal appearance.   HENT:      Head: Normocephalic and atraumatic.      Nose: No congestion or rhinorrhea.      Mouth/Throat:      Pharynx: No posterior oropharyngeal erythema.   Eyes:      General: No scleral icterus.     Extraocular Movements: Extraocular movements intact.      Pupils: Pupils are equal, round, and reactive to light.   Neck:      Vascular: No carotid bruit.   Cardiovascular:      Rate and Rhythm: Normal rate and regular rhythm.   Pulmonary:      Effort: Pulmonary effort is normal. No respiratory distress.      Breath sounds: Normal breath sounds. No stridor. No wheezing.   Abdominal:      General: There is no distension.      Palpations: Abdomen is soft.      Tenderness: There is no  abdominal tenderness. There is no guarding.   Musculoskeletal:         General: No swelling or deformity. Normal range of motion.      Cervical back: Normal range of motion and neck supple. No rigidity.   Skin:     General: Skin is warm and dry.      Capillary Refill: Capillary refill takes less than 2 seconds.      Coloration: Skin is not jaundiced.      Findings: Abscess, erythema and wound present. No rash.   Neurological:      Mental Status: She is alert and oriented to person, place, and time.      Motor: No weakness.   Psychiatric:         Mood and Affect: Mood normal.         Behavior: Behavior normal.         Thought Content: Thought content normal.         Judgment: Judgment normal.           CRANIAL NERVES     CN III, IV, VI   Pupils are equal, round, and reactive to light.           Significant Labs: All pertinent labs within the past 24 hours have been reviewed.    Significant Imaging: I have reviewed all pertinent imaging results/findings within the past 24 hours.

## 2022-02-18 NOTE — INTERVAL H&P NOTE
The patient has been examined and the H&P has been reviewed:    I concur with the findings and changes have been noted since the H&P was written: RUE swelling and redness        Active Hospital Problems    Diagnosis  POA    *Cellulitis [L03.90]  Yes    Hypertension associated with diabetes [E11.59, I15.2]  Yes     Chronic    Long-term insulin use in type 2 diabetes [E11.9, Z79.4]  Not Applicable    Anxiety and depression [F41.9, F32.A]  Yes    GERD (gastroesophageal reflux disease) [K21.9]  Yes     Chronic    Acquired hypothyroidism [E03.9]  Yes     Chronic      Resolved Hospital Problems   No resolved problems to display.

## 2022-02-18 NOTE — OP NOTE
Pre Op Diagnosis: RUE cellulitis     Post Op Diagnosis: same     Procedure:  aspiration     Procedure performed by: Jesus HERRING, Nigel LEACH     Written Informed Consent Obtained: Yes     Specimen Removed:  yes     Estimated Blood Loss:  minimal     Findings: Local anesthesia      The patient tolerated the procedure well and there were no complications.      Sterile technique was performed in the RUE, lidocaine was used as a local anesthetic.  Approx 1 cc of purulent fluid removed and sent to lab.  Pt tolerated the procedure well without immediate complications.  Please see radiologist report for details. F/u with PCP and/or ordering physician.

## 2022-02-18 NOTE — OP NOTE
Pre Op Diagnosis: RUE pain and swelling     Post Op Diagnosis: same     Procedure:  drain     Procedure performed by: Jesus HERRING, Nigel LEACH     Written Informed Consent Obtained: Yes     Specimen Removed:  yes     Estimated Blood Loss:  minimal     Findings: Local anesthesia .     The patient tolerated the procedure well and there were no complications.      Sterile technique was performed in the RUE, lidocaine was used as a local anesthetic.  Approx 1 ccs of red thick fluid removed and sent to lab.  Pt tolerated the procedure well without immediate complications.  Please see radiologist report for details. F/u with PCP and/or ordering physician.

## 2022-02-18 NOTE — DISCHARGE SUMMARY
"[facility] Medicine  Discharge Summary      Patient Name: Tiffanie Wise  MRN: [id number]  Patient Class: IP- Inpatient  Admission Date: 2/16/2022  Hospital Length of Stay: 1 days  Discharge Date and Time:  02/18/2022 3:07 PM  Attending Physician: Chong Thorpe MD   Discharging Provider: Denny Lincoln NP  Primary Care Provider: Tiffanie Spence MD      HPI:    Tiffanie Wise is a 81 y.o. female patient with a PMHx of CAD, HF, DM, NAWAF here with abscess and cellulitis to [facility]. Pt was seen in the ED x 5 days pta and d/c home with rx for bactrim. Per pt's daughter, swelling improved but the area was still painful for the pt and warm to touch. Patient denies any fever, chills, HA, rhinorrhea, n/v, and all other sxs at this time.In the ED: Cr 1.6, BC x2 obtained. Moderate amount of fluid visualized during bedside US. Surgery consulted for I & D.   HM contacted for OBS admission. 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  Patient is a Full Code      * No surgery found *      Hospital Course:   Patient placed inpatient for treatment of right axillary cellulitis after failed outpatient treatment with Bactrim. Started on vanc in ED. General surgery consulted who recommended IR aspiration. Blood cultures with NGTD. She has a history of hard of hearing and expressive aphasia s/p CVA. IR aspiration today with 1 cc drainage and sent to lab for analysis. Will d/c today on clindamycin. Patient to f/u with PCP as scheduled. Addressed family concerns and updated on POC.       Goals of Care Treatment Preferences:  Code Status: DNR    Health care agent: Connie Reardon"[facility] agent number: [phone]       [place]: Yes           Consults:   Consults (From admission, onward)          Status Ordering Provider     Inpatient consult to Social Work  Once        Provider:  (Not yet assigned)    Completed DENNY LINCOLN     Inpatient consult to General " "surgery  Once        Provider:  (Not yet assigned)    Acknowledged OJ NESS     Pharmacy to dose Vancomycin consult  Once        Provider:  (Not yet assigned)   "And" Linked Group Details    Acknowledged OJ NESS            * Cellulitis      Right axillary cellulits  Was treated with Bactrim initially but returned to ED for worsening erythema and pain  Bedside US showed moderate amount of fluid collection which is a change from when she presented to the ED 5 days ago  Surgery consulted for drainage  BC x2 obtained  Lactic acid and Procal pending  Vanc initiated in the ED  NPO at midnight  HOLD Plavix and ASA for intervention tomorrow    2/17/22  Continue vanc  NPO after midnight  BC with NGTD  IR aspiration tomorrow  2/18  IR aspiration today with only 1 cc aspirated  Sent for analysis  D/c with clindamycin x 7 days  F/u with PCP      Hypertension associated with diabetes  Continued home antihypertensives  IV Hydralazine PRN for systolic blood pressure greater than 170        Long-term insulin use in type 2 diabetes  Patient's FSGs are controlled on current medication regimen.  Last A1c reviewed-   Lab Results   Component Value Date    HGBA1C 7.0 (H) 06/14/2021     Most recent fingerstick glucose reviewed-   Recent Labs   Lab 02/17/22  2332 02/18/22  1204   POCTGLUCOSE 139* 117*     Current correctional scale  High  Maintain anti-hyperglycemic dose as follows-   Antihyperglycemics (From admission, onward)                Start     Stop Route Frequency Ordered    02/16/22 2100  insulin detemir U-100 pen 20 Units         -- SubQ Nightly 02/16/22 1817    02/16/22 1916  insulin aspart U-100 pen 1-10 Units         -- SubQ Before meals & nightly PRN 02/16/22 1817          Hold Oral hypoglycemics while patient is in the hospital.      Anxiety and depression  Cont Cymbalta and Klonipin    Acquired hypothyroidism  Cont Synthroid      GERD (gastroesophageal reflux disease)  Continue Protonix        Final Active " Diagnoses:    Diagnosis Date Noted POA    PRINCIPAL PROBLEM:  Cellulitis [L03.90] 02/16/2022 Yes    Hypertension associated with diabetes [E11.59, I15.2] 12/29/2016 Yes     Chronic    Long-term insulin use in type 2 diabetes [E11.9, Z79.4] 06/05/2016 Not Applicable    Anxiety and depression [F41.9, F32.A] 10/27/2015 Yes    GERD (gastroesophageal reflux disease) [K21.9] 07/18/2013 Yes     Chronic    Acquired hypothyroidism [E03.9] 04/15/2013 Yes     Chronic      Problems Resolved During this Admission:       Discharged Condition: good    Disposition: Home or Self Care    Follow Up:    Patient Instructions:      Ambulatory referral/consult to Outpatient Case Management   Referral Priority: Routine Referral Type: Consultation   Referral Reason: Specialty Services Required   Number of Visits Requested: 1       Significant Diagnostic Studies:   Results for orders placed or performed during the hospital encounter of 02/16/22   Blood Culture #2 **CANNOT BE ORDERED STAT**    Specimen: Peripheral, Antecubital, Left; Blood   Result Value Ref Range    Blood Culture, Routine No Growth to date     Blood Culture, Routine No Growth to date    Blood Culture #1 **CANNOT BE ORDERED STAT**    Specimen: Peripheral, Wrist, Left; Blood   Result Value Ref Range    Blood Culture, Routine No Growth to date     Blood Culture, Routine No Growth to date    CBC auto differential   Result Value Ref Range    WBC 10.69 3.90 - 12.70 K/uL    RBC 4.24 4.00 - 5.40 M/uL    Hemoglobin 12.4 12.0 - 16.0 g/dL    Hematocrit 37.4 37.0 - 48.5 %    MCV 88 82 - 98 fL    MCH 29.2 27.0 - 31.0 pg    MCHC 33.2 32.0 - 36.0 g/dL    RDW 13.1 11.5 - 14.5 %    Platelets 334 150 - 450 K/uL    MPV 10.4 9.2 - 12.9 fL    Immature Granulocytes 0.5 0.0 - 0.5 %    Gran # (ANC) 8.9 (H) 1.8 - 7.7 K/uL    Immature Grans (Abs) 0.05 (H) 0.00 - 0.04 K/uL    Lymph # 0.8 (L) 1.0 - 4.8 K/uL    Mono # 0.8 0.3 - 1.0 K/uL    Eos # 0.1 0.0 - 0.5 K/uL    Baso # 0.05 0.00 - 0.20 K/uL    nRBC  0 0 /100 WBC    Gran % 82.7 (H) 38.0 - 73.0 %    Lymph % 7.4 (L) 18.0 - 48.0 %    Mono % 7.9 4.0 - 15.0 %    Eosinophil % 1.0 0.0 - 8.0 %    Basophil % 0.5 0.0 - 1.9 %    Platelet Estimate Appears normal     Differential Method Automated    Comprehensive metabolic panel   Result Value Ref Range    Sodium 136 136 - 145 mmol/L    Potassium 4.1 3.5 - 5.1 mmol/L    Chloride 98 95 - 110 mmol/L    CO2 27 23 - 29 mmol/L    Glucose 106 70 - 110 mg/dL    BUN 16 8 - 23 mg/dL    Creatinine 1.6 (H) 0.5 - 1.4 mg/dL    Calcium 9.6 8.7 - 10.5 mg/dL    Total Protein 7.7 6.0 - 8.4 g/dL    Albumin 3.4 (L) 3.5 - 5.2 g/dL    Total Bilirubin 0.5 0.1 - 1.0 mg/dL    Alkaline Phosphatase 103 55 - 135 U/L    AST 14 10 - 40 U/L    ALT 6 (L) 10 - 44 U/L    Anion Gap 11 8 - 16 mmol/L    eGFR if African American 35 (A) >60 mL/min/1.73 m^2    eGFR if non African American 30 (A) >60 mL/min/1.73 m^2   Lactate dehydrogenase   Result Value Ref Range     (H) 110 - 260 U/L   COVID-19 Rapid Screening   Result Value Ref Range    SARS-CoV-2 RNA, Amplification, Qual Negative Negative   Vancomycin, Random   Result Value Ref Range    Vancomycin, Random 13.3 Not established ug/mL   CBC with Automated Differential   Result Value Ref Range    WBC 10.42 3.90 - 12.70 K/uL    RBC 4.37 4.00 - 5.40 M/uL    Hemoglobin 12.5 12.0 - 16.0 g/dL    Hematocrit 37.7 37.0 - 48.5 %    MCV 86 82 - 98 fL    MCH 28.6 27.0 - 31.0 pg    MCHC 33.2 32.0 - 36.0 g/dL    RDW 13.1 11.5 - 14.5 %    Platelets 332 150 - 450 K/uL    MPV 10.4 9.2 - 12.9 fL    Immature Granulocytes 0.5 0.0 - 0.5 %    Gran # (ANC) 8.2 (H) 1.8 - 7.7 K/uL    Immature Grans (Abs) 0.05 (H) 0.00 - 0.04 K/uL    Lymph # 0.9 (L) 1.0 - 4.8 K/uL    Mono # 0.9 0.3 - 1.0 K/uL    Eos # 0.3 0.0 - 0.5 K/uL    Baso # 0.06 0.00 - 0.20 K/uL    nRBC 0 0 /100 WBC    Gran % 79.0 (H) 38.0 - 73.0 %    Lymph % 8.3 (L) 18.0 - 48.0 %    Mono % 8.7 4.0 - 15.0 %    Eosinophil % 2.9 0.0 - 8.0 %    Basophil % 0.6 0.0 - 1.9 %     Differential Method Automated    Basic Metabolic Panel (BMP)   Result Value Ref Range    Sodium 139 136 - 145 mmol/L    Potassium 4.2 3.5 - 5.1 mmol/L    Chloride 101 95 - 110 mmol/L    CO2 27 23 - 29 mmol/L    Glucose 130 (H) 70 - 110 mg/dL    BUN 12 8 - 23 mg/dL    Creatinine 1.2 0.5 - 1.4 mg/dL    Calcium 9.2 8.7 - 10.5 mg/dL    Anion Gap 11 8 - 16 mmol/L    eGFR if African American 49 (A) >60 mL/min/1.73 m^2    eGFR if non African American 42 (A) >60 mL/min/1.73 m^2   Vancomycin, random   Result Value Ref Range    Vancomycin, Random 21.6 Not established ug/mL   POCT glucose   Result Value Ref Range    POCT Glucose 176 (H) 70 - 110 mg/dL   POCT glucose   Result Value Ref Range    POCT Glucose 152 (H) 70 - 110 mg/dL   POCT glucose   Result Value Ref Range    POCT Glucose 171 (H) 70 - 110 mg/dL   POCT glucose   Result Value Ref Range    POCT Glucose 139 (H) 70 - 110 mg/dL   POCT glucose   Result Value Ref Range    POCT Glucose 117 (H) 70 - 110 mg/dL     *Note: Due to a large number of results and/or encounters for the requested time period, some results have not been displayed. A complete set of results can be found in Results Review.         Pending Diagnostic Studies:       Procedure Component Value Units Date/Time    Glucose, Body Fluid (Reference Lab or Non-[facility]) [facility]; Other (Specify) [[id number]] Collected: 02/18/22 1143    Order Status: Sent Lab Status: In process Updated: 02/18/22 1332    Specimen: Body Fluid     Protein, Body Fluid (Reference Lab or Non[facility]) [facility]; Other (Specify) [[id number]] Collected: 02/18/22 1143    Order Status: Sent Lab Status: In process Updated: 02/18/22 1332    Specimen: Body Fluid            Medications:  Reconciled Home Medications:      Medication List        START taking these medications      clindamycin 300 MG capsule  Commonly known as: CLEOCIN  Take 1 capsule (300 mg total) by mouth 3 (three) times daily. for 7 days     HYDROcodone-acetaminophen 5-325 mg per  tablet  Commonly known as: NORCO  Take 1 tablet by mouth every 6 (six) hours as needed for Pain.            CHANGE how you take these medications      isosorbide mononitrate 30 MG 24 hr tablet  Commonly known as: IMDUR  Take 1 tablet (30 mg total) by mouth once daily.  What changed: when to take this            CONTINUE taking these medications      amLODIPine 10 MG tablet  Commonly known as: NORVASC  TAKE 1 TABLET (10 MG TOTAL) BY PER G TUBE ROUTE AS DIRECTED ONCE DAILY.     aspirin 81 MG Chew  1 tablet (81 mg total) by Per G Tube route once daily.     atorvastatin 40 MG tablet  Commonly known as: LIPITOR  Take 1 tablet (40 mg total) by mouth every evening.     clonazePAM 0.5 MG tablet  Commonly known as: KlonoPIN  1 tablet (0.5 mg total) by Per G Tube route every evening.     clopidogreL 75 mg tablet  Commonly known as: PLAVIX  Take 1 tablet (75 mg total) by mouth once daily.     DEXCOM G6 SENSOR Tanisha  Generic drug: blood-glucose sensor  Replace sensor every 10 days     donepeziL 10 MG tablet  Commonly known as: ARICEPT  TAKE 1 TABLET (10 MG TOTAL) BY MOUTH ONCE DAILY.     EScitalopram oxalate 20 MG tablet  Commonly known as: LEXAPRO  TAKE 1 TABLET EVERY DAY     hydroCHLOROthiazide 25 MG tablet  Commonly known as: HYDRODIURIL  Take 1 tablet (25 mg total) by mouth once daily.     insulin aspart protamine-insulin aspart 100 unit/mL (70-30) Inpn pen  Commonly known as: NovoLOG Mix 70-30FlexPen U-100  40 bid     insulin aspart U-100 100 unit/mL (3 mL) Inpn pen  Commonly known as: NovoLOG  Inject up to 50 units per day, dispense 15 ml     JANUVIA 100 MG Tab  Generic drug: SITagliptin  Take 100 mg by mouth once daily.     levothyroxine 50 MCG tablet  Commonly known as: SYNTHROID  1 tablet (50 mcg total) by Per G Tube route before breakfast.     losartan 100 MG tablet  Commonly known as: COZAAR  Take 1 tablet (100 mg total) by mouth once daily.     morphine 15 MG tablet  Commonly known as: MSIR  Take 0.5 tablets (7.5 mg  total) by mouth every 4 (four) hours as needed for Pain.     nitroGLYCERIN 0.4 MG SL tablet  Commonly known as: NITROSTAT  Place 1 tablet (0.4 mg total) under the tongue every 5 (five) minutes as needed.     pantoprazole 40 MG tablet  Commonly known as: PROTONIX  TAKE 1 TABLET EVERY DAY     pregabalin 75 MG capsule  Commonly known as: LYRICA  Take 75 mg by mouth 2 (two) times daily.     sulfamethoxazole-trimethoprim 800-160mg 800-160 mg Tab  Commonly known as: BACTRIM DS  Take 1 tablet by mouth 2 (two) times daily. for 7 days     TRUETEST TEST STRIPS MISC  by Misc.(Non-Drug; Combo Route) route. Pt is testing 3 times a day     vitamin D 1000 units Tab  Commonly known as: VITAMIN D3  Take 1,000 Units by mouth once daily.              Indwelling Lines/Drains at time of discharge:   Lines/Drains/Airways       Drain              Female External Urinary Catheter 03/04/21 2330 350 days                    Time spent on the discharge of patient: >30 minutes         Rubi Calzada NP  Department of Hospital Medicine  [facility]

## 2022-02-18 NOTE — ASSESSMENT & PLAN NOTE
Patient's FSGs are controlled on current medication regimen.  Last A1c reviewed-   Lab Results   Component Value Date    HGBA1C 7.0 (H) 06/14/2021     Most recent fingerstick glucose reviewed-   Recent Labs   Lab 02/17/22  2332 02/18/22  1204   POCTGLUCOSE 139* 117*     Current correctional scale  High  Maintain anti-hyperglycemic dose as follows-   Antihyperglycemics (From admission, onward)            Start     Stop Route Frequency Ordered    02/16/22 2100  insulin detemir U-100 pen 20 Units         -- SubQ Nightly 02/16/22 1817    02/16/22 1916  insulin aspart U-100 pen 1-10 Units         -- SubQ Before meals & nightly PRN 02/16/22 1817        Hold Oral hypoglycemics while patient is in the hospital.

## 2022-02-18 NOTE — ASSESSMENT & PLAN NOTE
Right axillary cellulits  Was treated with Bactrim initially but returned to ED for worsening erythema and pain  Bedside US showed moderate amount of fluid collection which is a change from when she presented to the ED 5 days ago  Surgery consulted for drainage  BC x2 obtained  Lactic acid and Procal pending  Vanc initiated in the ED  NPO at midnight  HOLD Plavix and ASA for intervention tomorrow    2/17/22  Continue vanc  NPO after midnight  BC with NGTD  IR aspiration tomorrow  2/18  IR aspiration today with only 1 cc aspirated  Sent for analysis  D/c with clindamycin x 7 days  F/u with PCP     Name band;

## 2022-02-18 NOTE — PROGRESS NOTES
Pharmacokinetic Assessment Follow Up: IV Vancomycin    Vancomycin serum concentration assessment(s):  Vancomycin 10.5-hr random level (after 1 gm dose) @ 1939 tonight = 21.6 mcg/ml    Vancomycin Regimen Plan:  Will wait 6 hours & then give 750 mg x 1 dose @ 0130 tomorrow  Will check 18-hour random level tomorrow 2/18 @ 1930  Will re-dose for levels < 20 mcg/ml    Drug levels (last 3 results):  Recent Labs   Lab Result Units 02/17/22  0529 02/17/22 1939   Vancomycin, Random ug/mL 13.3 21.6       Pharmacy will continue to follow and monitor vancomycin.    Please contact pharmacy at extension [phone] for questions regarding this assessment.    Thank you for the consult,   Katherine McArdle, Pharm.D. 2/17/2022 8:54 PM       Patient brief summary:  Tiffanie Wise is a 81 y.o. female initiated on antimicrobial therapy with IV Vancomycin for treatment of skin & soft tissue infection    The patient's current regimen is pulse dosing PRN based on random levels (while ELLIOTT resolves)     Drug Allergies:   Review of patient's allergies indicates:  No Known Allergies    Actual Body Weight:   65.4 kg    Renal Function:   Estimated Creatinine Clearance: 31.1 mL/min (based on SCr of 1.2 mg/dL). -- down from 1.6      CBC (last 72 hours):  Recent Labs   Lab Result Units 02/16/22  1424 02/17/22  0529   WBC K/uL 10.69 10.42   Hemoglobin g/dL 12.4 12.5   Hematocrit % 37.4 37.7   Platelets K/uL 334 332   Gran % % 82.7* 79.0*   Lymph % % 7.4* 8.3*   Mono % % 7.9 8.7   Eosinophil % % 1.0 2.9   Basophil % % 0.5 0.6   Differential Method  Automated Automated       Metabolic Panel (last 72 hours):  Recent Labs   Lab Result Units 02/16/22  1424 02/17/22  0528   Sodium mmol/L 136 139   Potassium mmol/L 4.1 4.2   Chloride mmol/L 98 101   CO2 mmol/L 27 27   Glucose mg/dL 106 130*   BUN mg/dL 16 12   Creatinine mg/dL 1.6* 1.2   Albumin g/dL 3.4*  --    Total Bilirubin mg/dL 0.5  --    Alkaline Phosphatase U/L 103  --    AST U/L 14  --    ALT U/L  6*  --        Vancomycin Administrations:  vancomycin given in the last 96 hours                   vancomycin in dextrose 5 % 1 gram/250 mL IVPB 1,000 mg (mg) 1,000 mg New Bag 02/17/22 0903    vancomycin 1.25 g in dextrose 5% 250 mL IVPB (ready to mix) (mg) 1,250 mg New Bag 02/16/22 1749                Microbiologic Results:  Microbiology Results (last 7 days)     Procedure Component Value Units Date/Time    Blood Culture #1 **CANNOT BE ORDERED STAT** [[id number]] Collected: 02/16/22 1651    Order Status: Completed Specimen: Blood from Peripheral, Wrist, Left Updated: 02/17/22 0915     Blood Culture, Routine No Growth to date    Blood Culture #2 **CANNOT BE ORDERED STAT** [[id number]] Collected: 02/16/22 1424    Order Status: Completed Specimen: Blood from Peripheral, Antecubital, Left Updated: 02/17/22 0315     Blood Culture, Routine No Growth to date

## 2022-02-18 NOTE — PLAN OF CARE
[facility] - Med Surg  Discharge Final Note    Primary Care Provider: Tiffanie Spence MD    Expected Discharge Date: 2/18/2022    Final Discharge Note (most recent)     Final Note - 02/18/22 1510        Final Note    Assessment Type Final Discharge Note     Anticipated Discharge Disposition Home or Self Care     Hospital Resources/Appts/Education Provided Provided patient/caregiver with written discharge plan information;Appointments scheduled and added to AVS        Post-Acute Status    Discharge Delays None known at this time                 Important Message from Medicare         Patient to dc home with self care. FU appt scheduled and added to AVS. No other cm needs.

## 2022-02-20 NOTE — Clinical Note
Diagnosis: Sepsis [003466]   Admitting Provider:: ORESTES MENDOZA [[id number]]   Future Attending Provider: ORESTES MENDOZA [[id number]]   Reason for IP Medical Treatment  (Clinical interventions that can only be accomplished in the IP setting? ) :: antibiotics   Estimated Length of Stay:: 2 midnights   I certify that Inpatient services for greater than or equal to 2 midnights are medically necessary:: Yes   Plans for Post-Acute care--if anticipated (pick the single best option):: A. No post acute care anticipated at this time

## 2022-02-21 PROBLEM — A41.9 SEPSIS: Status: ACTIVE | Noted: 2022-01-01

## 2022-02-21 PROBLEM — L03.119 CELLULITIS AND ABSCESS OF UPPER EXTREMITY: Status: ACTIVE | Noted: 2022-01-01

## 2022-02-21 PROBLEM — L02.413 ABSCESS OF RIGHT ARM: Status: ACTIVE | Noted: 2022-01-01

## 2022-02-21 PROBLEM — R53.81 DEBILITY: Status: ACTIVE | Noted: 2022-01-01

## 2022-02-21 PROBLEM — E83.42 HYPOMAGNESEMIA: Status: ACTIVE | Noted: 2022-01-01

## 2022-02-21 PROBLEM — N17.9 ACUTE RENAL FAILURE SUPERIMPOSED ON STAGE 3 CHRONIC KIDNEY DISEASE: Status: ACTIVE | Noted: 2022-01-01

## 2022-02-21 PROBLEM — N18.30 ACUTE RENAL FAILURE SUPERIMPOSED ON STAGE 3 CHRONIC KIDNEY DISEASE: Status: ACTIVE | Noted: 2022-01-01

## 2022-02-21 PROBLEM — D64.9 NORMOCYTIC ANEMIA: Status: ACTIVE | Noted: 2022-01-01

## 2022-02-21 PROBLEM — I50.33 ACUTE ON CHRONIC DIASTOLIC HEART FAILURE: Status: ACTIVE | Noted: 2022-01-01

## 2022-02-21 PROBLEM — E88.09 HYPOALBUMINEMIA: Status: ACTIVE | Noted: 2022-01-01

## 2022-02-21 PROBLEM — L02.419 CELLULITIS AND ABSCESS OF UPPER EXTREMITY: Status: ACTIVE | Noted: 2022-01-01

## 2022-02-21 NOTE — ASSESSMENT & PLAN NOTE
2/22   This patient does have evidence of infective focus  My overall impression is Severe  sepsis. Vital signs were reviewed and noted in progress note.  Antibiotics given-   Antibiotics (From admission, onward)            Start     Stop Route Frequency Ordered    02/21/22 1430  oxacillin 12 g in  mL CONTINUOUS INFUSION         -- IV Every 24 hours (non-standard times) 02/21/22 1255        Cultures were taken-   Microbiology Results (last 7 days)     Procedure Component Value Units Date/Time    Aerobic culture [[id number]] Collected: 02/22/22 0732    Order Status: Sent Specimen: Incision site from Arm, Right Updated: 02/22/22 0758    Blood culture #1 **CANNOT BE ORDERED STAT** [[id number]] Collected: 02/20/22 2311    Order Status: Completed Specimen: Blood from Peripheral, Forearm, Left Updated: 02/22/22 0613     Blood Culture, Routine No Growth to date      No Growth to date    Blood culture #2 **CANNOT BE ORDERED STAT** [[id number]] Collected: 02/20/22 2255    Order Status: Completed Specimen: Blood from Peripheral, Hand, Left Updated: 02/22/22 0613     Blood Culture, Routine No Growth to date      No Growth to date        Latest lactate reviewed, they are-  Recent Labs   Lab 02/20/22 2255   LACTATE 1.8       Organ dysfunction indicated by Acute kidney injury     Source- Right arm abscess    Source control Achieved by- IV abx , S/p I&D on 2/22/2022        S/p I&D 2/22/2022

## 2022-02-21 NOTE — ASSESSMENT & PLAN NOTE
-Stable, no angina  -Continue home meds as tolerated  -ASA and Plavix on hold for now in light of possible impending procedure, resume post OP ASAP

## 2022-02-21 NOTE — SUBJECTIVE & OBJECTIVE
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     Trouble in sleeping     Type 2 diabetes mellitus with ophthalmic manifestations     Urinary incontinence     pullups day, pads at night       Past Surgical History:   Procedure Laterality Date    BREAST SURGERY Left     benign cyst    CORONARY ANGIOPLASTY      CORONARY STENT PLACEMENT      x6-7 oer the yeqrs  last 12/17/14 BRIANDA to lcfx    EYE SURGERY Bilateral 2014    cataracts w/IOL   Dr Vance    FLUOROSCOPY N/A 1/20/2021    Procedure: G tube plaacement;  Surgeon: Shaka Cross MD;  Location: [facility];  Service: General;  Laterality: N/A;    HYSTERECTOMY  1970    vag hyst; ovaries intact    THYROID SURGERY      nodule benign, Dr Hankins    TUBAL LIGATION  1970       Review of patient's allergies indicates:  No Known Allergies    No current facility-administered medications on file prior to encounter.     Current Outpatient Medications on File Prior to Encounter   Medication Sig    amLODIPine (NORVASC) 10 MG tablet TAKE 1 TABLET (10 MG TOTAL) BY PER G TUBE ROUTE AS DIRECTED ONCE DAILY.    aspirin 81 MG  Chew 1 tablet (81 mg total) by Per G Tube route once daily.    atorvastatin (LIPITOR) 40 MG tablet Take 1 tablet (40 mg total) by mouth every evening.    BLOOD SUGAR DIAGNOSTIC (TRUETEST TEST STRIPS MISC) by Misc.(Non-Drug; Combo Route) route. Pt is testing 3 times a day    blood-glucose sensor (DEXCOM G6 SENSOR) Tanisha Replace sensor every 10 days    clindamycin (CLEOCIN) 300 MG capsule Take 1 capsule (300 mg total) by mouth 3 (three) times daily. for 7 days    clonazePAM (KLONOPIN) 0.5 MG tablet 1 tablet (0.5 mg total) by Per G Tube route every evening.    clopidogreL (PLAVIX) 75 mg tablet Take 1 tablet (75 mg total) by mouth once daily.    donepeziL (ARICEPT) 10 MG tablet TAKE 1 TABLET (10 MG TOTAL) BY MOUTH ONCE DAILY.    EScitalopram oxalate (LEXAPRO) 20 MG tablet TAKE 1 TABLET EVERY DAY    hydroCHLOROthiazide (HYDRODIURIL) 25 MG tablet Take 1 tablet (25 mg total) by mouth once daily.    HYDROcodone-acetaminophen (NORCO) 5-325 mg per tablet Take 1 tablet by mouth every 6 (six) hours as needed for Pain.    insulin aspart protamine-insulin aspart (NOVOLOG MIX 70-30FLEXPEN U-100) 100 unit/mL (70-30) InPn pen 40 bid    insulin aspart U-100 (NOVOLOG) 100 unit/mL (3 mL) InPn pen Inject up to 50 units per day, dispense 15 ml    isosorbide mononitrate (IMDUR) 30 MG 24 hr tablet Take 1 tablet (30 mg total) by mouth once daily. (Patient taking differently: Take 30 mg by mouth 2 (two) times a day.)    JANUVIA 100 mg Tab Take 100 mg by mouth once daily.    levothyroxine (SYNTHROID) 50 MCG tablet 1 tablet (50 mcg total) by Per G Tube route before breakfast.    losartan (COZAAR) 100 MG tablet Take 1 tablet (100 mg total) by mouth once daily.    morphine (MSIR) 15 MG tablet Take 0.5 tablets (7.5 mg total) by mouth every 4 (four) hours as needed for Pain.    nitroGLYCERIN (NITROSTAT) 0.4 MG SL tablet Place 1 tablet (0.4 mg total) under the tongue every 5 (five) minutes as needed.    pantoprazole (PROTONIX) 40 MG tablet TAKE 1  TABLET EVERY DAY    pregabalin (LYRICA) 75 MG capsule Take 75 mg by mouth 2 (two) times daily.    vitamin D 1000 units Tab Take 1,000 Units by mouth once daily.     Family History       Problem Relation (Age of Onset)    Alcohol abuse Sister    Cancer Son    Cirrhosis Sister    Diabetes Sister, Paternal Grandmother    Fibromyalgia Daughter, Daughter    Heart disease Mother    Kidney disease Father, Brother          Tobacco Use    Smoking status: Former Smoker     Packs/day: 1.00     Years: 46.00     Pack years: 46.00     Types: Cigarettes     Start date:      Quit date: 2008     Years since quittin.6    Smokeless tobacco: Never Used   Substance and Sexual Activity    Alcohol use: No    Drug use: Never    Sexual activity: Not Currently     Birth control/protection: None     Review of Systems   Constitutional: Positive for chills, fever and malaise/fatigue.   HENT: Negative.     Eyes: Negative.    Cardiovascular:  Positive for dyspnea on exertion.   Respiratory: Negative.     Endocrine: Negative.    Hematologic/Lymphatic: Negative.    Skin:         Erythema and tenderness of right arm/axillary area   Musculoskeletal: Negative.    Gastrointestinal: Negative.    Genitourinary: Negative.    Neurological:         Residual aphasia from prior CVA   Psychiatric/Behavioral: Negative.     Allergic/Immunologic: Negative.    Objective:     Vital Signs (Most Recent):  Temp: 97.5 °F (36.4 °C) (22 1149)  Pulse: (!) 57 (22 1149)  Resp: 17 (22 1220)  BP: (!) 149/65 (22 1149)  SpO2: (!) 93 % (22 1149)   Vital Signs (24h Range):  Temp:  [97.5 °F (36.4 °C)-102.1 °F (38.9 °C)] 97.5 °F (36.4 °C)  Pulse:  [55-91] 57  Resp:  [14-24] 17  SpO2:  [86 %-100 %] 93 %  BP: (138-182)/(61-93) 149/65     Weight: 69.9 kg (154 lb)  Body mass index is 30.08 kg/m².    SpO2: (!) 93 %  O2 Device (Oxygen Therapy): nasal cannula      Intake/Output Summary (Last 24 hours) at 2022 1314  Last data filed at  2/21/2022 0309  Gross per 24 hour   Intake 250 ml   Output --   Net 250 ml       Lines/Drains/Airways       Peripheral Intravenous Line  Duration                  Peripheral IV - Single Lumen 02/20/22 2252 20 G Left Hand <1 day                    Physical Exam  Constitutional:       General: She is not in acute distress.     Appearance: She is well-developed. She is not diaphoretic.   HENT:      Head: Normocephalic and atraumatic.   Eyes:      General:         Right eye: No discharge.         Left eye: No discharge.      Pupils: Pupils are equal, round, and reactive to light.   Neck:      Thyroid: No thyromegaly.      Vascular: No JVD.      Trachea: No tracheal deviation.   Cardiovascular:      Rate and Rhythm: Normal rate and regular rhythm.      Heart sounds: Normal heart sounds, S1 normal and S2 normal. No murmur heard.  Pulmonary:      Effort: Pulmonary effort is normal. No respiratory distress.      Breath sounds: No wheezing.      Comments: Slightly diminished BS at bases  Abdominal:      General: There is no distension.      Palpations: Abdomen is soft.      Tenderness: There is no rebound.   Musculoskeletal:      Cervical back: Neck supple.   Skin:     General: Skin is warm and dry.      Findings: No erythema.   Neurological:      Mental Status: She is alert and oriented to person, place, and time.      Comments: Residual aphasia from prior CVA   Psychiatric:         Mood and Affect: Mood normal.         Behavior: Behavior normal.         Thought Content: Thought content normal.       Significant Labs: CMP   Recent Labs   Lab 02/20/22 2255 02/21/22  0645    136   K 5.1 4.6    101   CO2 26 25   * 254*   BUN 20 22   CREATININE 2.1* 2.0*   CALCIUM 8.6* 8.4*   PROT 6.9 6.3   ALBUMIN 3.0* 2.9*   BILITOT 0.5 0.7   ALKPHOS 103 101   AST 13 13   ALT 7* 8*   ANIONGAP 10 10   ESTGFRAFRICA 25* 26*   EGFRNONAA 22* 23*   , CBC   Recent Labs   Lab 02/20/22 2255 02/21/22  0645   WBC 26.52* 29.21*   HGB  12.3 11.9*   HCT 37.6 36.3*    322   , Troponin   Recent Labs   Lab 02/20/22  2255   TROPONINI <0.006   , and All pertinent lab results from the last 24 hours have been reviewed.    Significant Imaging: Echocardiogram: Transthoracic echo (TTE) complete (Cupid Only):   Results for orders placed or performed during the hospital encounter of 02/20/22   Echo   Result Value Ref Range    BSA 1.72 m2    TDI SEPTAL 0.07 m/s    LV LATERAL E/E' RATIO 7.64 m/s    LV SEPTAL E/E' RATIO 12.00 m/s    LA WIDTH 2.97 cm    IVC diameter 1.65 cm    Left Ventricular Outflow Tract Mean Velocity 0.53557482002212 cm/s    Left Ventricular Outflow Tract Mean Gradient 2.41 mmHg    TDI LATERAL 0.11 m/s    LVIDd 3.50 3.5 - 6.0 cm    IVS 1.32 (A) 0.6 - 1.1 cm    Posterior Wall 1.11 (A) 0.6 - 1.1 cm    Ao root annulus 2.63 cm    LVIDs 2.29 2.1 - 4.0 cm    FS 35 28 - 44 %    LA volume 20.32 cm3    Sinus 2.78 cm    STJ 2.81 cm    Ascending aorta 2.84 cm    LV mass 138.42 g    LA size 2.48 cm    TAPSE 1.53 cm    Left Ventricle Relative Wall Thickness 0.63 cm    AV mean gradient 6 mmHg    AV valve area 1.83 cm2    AV Velocity Ratio 0.61     AV index (prosthetic) 0.74     MV valve area p 1/2 method 2.73 cm2    E/A ratio 0.88     Mean e' 0.09 m/s    E wave deceleration time 278.706389643644076 msec    IVRT 65.383773009395952 msec    LVOT diameter 1.77 cm    LVOT area 2.5 cm2    LVOT peak harpreet 0.97 m/s    LVOT peak VTI 30.60 cm    Ao peak harpreet 1.59 m/s    Ao VTI 41.1 cm    RVOT peak harpreet 0.59 m/s    RVOT peak VTI 16.4 cm    LVOT stroke volume 75.26 cm3    AV peak gradient 10 mmHg    PV mean gradient 0.82 mmHg    E/E' ratio 9.33 m/s    MV Peak E Harpreet 0.84 m/s    TR Max Harpreet 3.08 m/s    MV stenosis pressure 1/2 time 80.037754922110584 ms    MV Peak A Harpreet 0.95 m/s    LV Systolic Volume 17.83 mL    LV Systolic Volume Index 10.7 mL/m2    LV Diastolic Volume 50.85 mL    LV Diastolic Volume Index 30.45 mL/m2    LA Volume Index 12.2 mL/m2    LV Mass Index 83  g/m2    Echo EF Estimated 65 %    RA Major Axis 4.04 cm    Left Atrium Minor Axis 2.66 cm    Left Atrium Major Axis 4.16 cm    Triscuspid Valve Regurgitation Peak Gradient 38 mmHg    RA Width 2.57 cm   , EKG: Reviewed, and X-Ray: CXR: X-Ray Chest 1 View (CXR): No results found for this visit on 02/20/22. and X-Ray Chest PA and Lateral (CXR): No results found for this visit on 02/20/22.

## 2022-02-21 NOTE — CONSULTS
[facility]  Cardiology  Consult Note    Patient Name: Tiffanie Wise  MRN: [id number]  Admission Date: 2/20/2022  Hospital Length of Stay: 0 days  Code Status: DNR   Attending Provider: Jamee James MD   Consulting Provider: Sherri Duncan PA-C  Primary Care Physician: Tiffanie Spence MD  Principal Problem:Sepsis    Patient information was obtained from patient, past medical records and ER records.     Inpatient consult to Cardiology  Consult performed by: Sherri Duncan PA-C  Consult ordered by: Alva Leyva NP        Subjective:     Chief Complaint: Fever    HPI:   Ms. Wise is an 81 year old female patient whose current medical conditions include CAD s/p CABG, prior stents, HTN, CVA, diastolic C HF, anemia, NAWAF, anxiety, depression, tonsillar CA (s/p radiation) who presented to [facility] ED yesterday with a chief complaint of increased redness, warmth, and pain to her right axillary area (known abscess). Associated symptoms included fatigue, fever, chills, and increased weakness. Patient denied any associated nausea, vomiting, or diaphoresis. Her daughter reported she had previously been admitted to this facility last week and underwent needle aspiration and was discharged home on po clindamycin but symptoms failed to improve. Initial workup in ED revealed fever (temp of 102), creatinine of 2.1, BNP of 188 and (O2 sats improved on supplemental O2). CXR showed findings concerning for fluid overload and patient was subsequently admitted for further evaluation and treatment of sepsis. Cardiology consulted to assist with management. Patient seen and examined today, daughter at bedside. Feeling much better. No CV complaints. Remains on supplemental O2. Labs reviewed. Creatinine 2.0, nephrology following. Echo pending.              Past Medical History:   Diagnosis Date    Anxiety     AP (angina pectoris) 7/18/2013    Arterial ischemic stroke, MCA (middle cerebral artery), left, acute  12/15/2017    Asthma     CAD, multiple vessel 5/13/2021    Class 1 obesity due to excess calories with serious comorbidity and body mass index (BMI) of 30.0 to 30.9 in adult 3/15/2019    Coronary artery disease 7/18/2013    Depression     Diastolic heart failure     GERD (gastroesophageal reflux disease) 7/18/2013    History of PTCA 7/18/2013    Hypertension 7/18/2013    Hypothyroidism     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     Trouble in sleeping     Type 2 diabetes mellitus with ophthalmic manifestations     Urinary incontinence     pullups day, pads at night       Past Surgical History:   Procedure Laterality Date    BREAST SURGERY Left     benign cyst    CORONARY ANGIOPLASTY      CORONARY STENT PLACEMENT      x6-7 oer the yeqrs  last 12/17/14 BRIANDA to lcfx    EYE SURGERY Bilateral 2014    cataracts w/IOL   Dr Vance    FLUOROSCOPY N/A 1/20/2021    Procedure: G tube plaacement;  Surgeon: Shaka Cross MD;  [facility] LAB;  Service: General;  Laterality: N/A;    HYSTERECTOMY  1970    vag hyst; ovaries intact    THYROID SURGERY      nodule benign, Dr Hankins    TUBAL LIGATION  1970       Review of patient's allergies indicates:  No Known Allergies    No current facility-administered medications on file prior to encounter.     Current Outpatient Medications on File Prior to Encounter   Medication Sig    amLODIPine (NORVASC) 10 MG tablet TAKE 1 TABLET (10 MG TOTAL) BY PER G TUBE ROUTE AS DIRECTED ONCE DAILY.    aspirin 81 MG Chew 1 tablet (81 mg total) by Per G Tube route once daily.    atorvastatin (LIPITOR) 40 MG tablet Take 1 tablet (40 mg  total) by mouth every evening.    BLOOD SUGAR DIAGNOSTIC (TRUETEST TEST STRIPS MISC) by Misc.(Non-Drug; Combo Route) route. Pt is testing 3 times a day    blood-glucose sensor (DEXCOM G6 SENSOR) Tanisha Replace sensor every 10 days    clindamycin (CLEOCIN) 300 MG capsule Take 1 capsule (300 mg total) by mouth 3 (three) times daily. for 7 days    clonazePAM (KLONOPIN) 0.5 MG tablet 1 tablet (0.5 mg total) by Per G Tube route every evening.    clopidogreL (PLAVIX) 75 mg tablet Take 1 tablet (75 mg total) by mouth once daily.    donepeziL (ARICEPT) 10 MG tablet TAKE 1 TABLET (10 MG TOTAL) BY MOUTH ONCE DAILY.    EScitalopram oxalate (LEXAPRO) 20 MG tablet TAKE 1 TABLET EVERY DAY    hydroCHLOROthiazide (HYDRODIURIL) 25 MG tablet Take 1 tablet (25 mg total) by mouth once daily.    HYDROcodone-acetaminophen (NORCO) 5-325 mg per tablet Take 1 tablet by mouth every 6 (six) hours as needed for Pain.    insulin aspart protamine-insulin aspart (NOVOLOG MIX 70-30FLEXPEN U-100) 100 unit/mL (70-30) InPn pen 40 bid    insulin aspart U-100 (NOVOLOG) 100 unit/mL (3 mL) InPn pen Inject up to 50 units per day, dispense 15 ml    isosorbide mononitrate (IMDUR) 30 MG 24 hr tablet Take 1 tablet (30 mg total) by mouth once daily. (Patient taking differently: Take 30 mg by mouth 2 (two) times a day.)    JANUVIA 100 mg Tab Take 100 mg by mouth once daily.    levothyroxine (SYNTHROID) 50 MCG tablet 1 tablet (50 mcg total) by Per G Tube route before breakfast.    losartan (COZAAR) 100 MG tablet Take 1 tablet (100 mg total) by mouth once daily.    morphine (MSIR) 15 MG tablet Take 0.5 tablets (7.5 mg total) by mouth every 4 (four) hours as needed for Pain.    nitroGLYCERIN (NITROSTAT) 0.4 MG SL tablet Place 1 tablet (0.4 mg total) under the tongue every 5 (five) minutes as needed.    pantoprazole (PROTONIX) 40 MG tablet TAKE 1 TABLET EVERY DAY    pregabalin (LYRICA) 75 MG capsule Take 75 mg by mouth 2 (two) times daily.     vitamin D 1000 units Tab Take 1,000 Units by mouth once daily.     Family History       Problem Relation (Age of Onset)    Alcohol abuse Sister    Cancer Son    Cirrhosis Sister    Diabetes Sister, Paternal Grandmother    Fibromyalgia Daughter, Daughter    Heart disease Mother    Kidney disease Father, Brother          Tobacco Use    Smoking status: Former Smoker     Packs/day: 1.00     Years: 46.00     Pack years: 46.00     Types: Cigarettes     Start date:      Quit date: 2008     Years since quittin.6    Smokeless tobacco: Never Used   Substance and Sexual Activity    Alcohol use: No    Drug use: Never    Sexual activity: Not Currently     Birth control/protection: None     Review of Systems   Constitutional: Positive for chills, fever and malaise/fatigue.   HENT: Negative.     Eyes: Negative.    Cardiovascular:  Positive for dyspnea on exertion.   Respiratory: Negative.     Endocrine: Negative.    Hematologic/Lymphatic: Negative.    Skin:         Erythema and tenderness of right arm/axillary area   Musculoskeletal: Negative.    Gastrointestinal: Negative.    Genitourinary: Negative.    Neurological:         Residual aphasia from prior CVA   Psychiatric/Behavioral: Negative.     Allergic/Immunologic: Negative.    Objective:     Vital Signs (Most Recent):  Temp: 97.5 °F (36.4 °C) (22 1149)  Pulse: (!) 57 (22 1149)  Resp: 17 (22 1220)  BP: (!) 149/65 (22 1149)  SpO2: (!) 93 % (22 1149)   Vital Signs (24h Range):  Temp:  [97.5 °F (36.4 °C)-102.1 °F (38.9 °C)] 97.5 °F (36.4 °C)  Pulse:  [55-91] 57  Resp:  [14-24] 17  SpO2:  [86 %-100 %] 93 %  BP: (138-182)/(61-93) 149/65     Weight: 69.9 kg (154 lb)  Body mass index is 30.08 kg/m².    SpO2: (!) 93 %  O2 Device (Oxygen Therapy): nasal cannula      Intake/Output Summary (Last 24 hours) at 2022 1314  Last data filed at 2022 0309  Gross per 24 hour   Intake 250 ml   Output --   Net 250 ml        Lines/Drains/Airways       Peripheral Intravenous Line  Duration                  Peripheral IV - Single Lumen 02/20/22 2252 20 G Left Hand <1 day                    Physical Exam  Constitutional:       General: She is not in acute distress.     Appearance: She is well-developed. She is not diaphoretic.   HENT:      Head: Normocephalic and atraumatic.   Eyes:      General:         Right eye: No discharge.         Left eye: No discharge.      Pupils: Pupils are equal, round, and reactive to light.   Neck:      Thyroid: No thyromegaly.      Vascular: No JVD.      Trachea: No tracheal deviation.   Cardiovascular:      Rate and Rhythm: Normal rate and regular rhythm.      Heart sounds: Normal heart sounds, S1 normal and S2 normal. No murmur heard.  Pulmonary:      Effort: Pulmonary effort is normal. No respiratory distress.      Breath sounds: No wheezing.      Comments: Slightly diminished BS at bases  Abdominal:      General: There is no distension.      Palpations: Abdomen is soft.      Tenderness: There is no rebound.   Musculoskeletal:      Cervical back: Neck supple.   Skin:     General: Skin is warm and dry.      Findings: No erythema.   Neurological:      Mental Status: She is alert and oriented to person, place, and time.      Comments: Residual aphasia from prior CVA   Psychiatric:         Mood and Affect: Mood normal.         Behavior: Behavior normal.         Thought Content: Thought content normal.       Significant Labs: CMP   Recent Labs   Lab 02/20/22 2255 02/21/22  0645    136   K 5.1 4.6    101   CO2 26 25   * 254*   BUN 20 22   CREATININE 2.1* 2.0*   CALCIUM 8.6* 8.4*   PROT 6.9 6.3   ALBUMIN 3.0* 2.9*   BILITOT 0.5 0.7   ALKPHOS 103 101   AST 13 13   ALT 7* 8*   ANIONGAP 10 10   ESTGFRAFRICA 25* 26*   EGFRNONAA 22* 23*   , CBC   Recent Labs   Lab 02/20/22 2255 02/21/22  0645   WBC 26.52* 29.21*   HGB 12.3 11.9*   HCT 37.6 36.3*    322   , Troponin   Recent Labs   Lab  02/20/22  2255   TROPONINI <0.006   , and All pertinent lab results from the last 24 hours have been reviewed.    Significant Imaging: Echocardiogram: Transthoracic echo (TTE) complete ([facility]upid Only):   Results for orders placed or performed during the hospital encounter of 02/20/22   Echo   Result Value Ref Range    BSA 1.72 m2    TDI SEPTAL 0.07 m/s    LV LATERAL E/E' RATIO 7.64 m/s    LV SEPTAL E/E' RATIO 12.00 m/s    LA WIDTH 2.97 cm    IVC diameter 1.65 cm    Left Ventricular Outflow Tract Mean Velocity 0.32314384286665 cm/s    Left Ventricular Outflow Tract Mean Gradient 2.41 mmHg    TDI LATERAL 0.11 m/s    LVIDd 3.50 3.5 - 6.0 cm    IVS 1.32 (A) 0.6 - 1.1 cm    Posterior Wall 1.11 (A) 0.6 - 1.1 cm    Ao root annulus 2.63 cm    LVIDs 2.29 2.1 - 4.0 cm    FS 35 28 - 44 %    LA volume 20.32 cm3    Sinus 2.78 cm    STJ 2.81 cm    Ascending aorta 2.84 cm    LV mass 138.42 g    LA size 2.48 cm    TAPSE 1.53 cm    Left Ventricle Relative Wall Thickness 0.63 cm    AV mean gradient 6 mmHg    AV valve area 1.83 cm2    AV Velocity Ratio 0.61     AV index (prosthetic) 0.74     MV valve area p 1/2 method 2.73 cm2    E/A ratio 0.88     Mean e' 0.09 m/s    E wave deceleration time 278.435105552037157 msec    IVRT 65.555422105771857 msec    LVOT diameter 1.77 cm    LVOT area 2.5 cm2    LVOT peak harpreet 0.97 m/s    LVOT peak VTI 30.60 cm    Ao peak harpreet 1.59 m/s    Ao VTI 41.1 cm    RVOT peak harperet 0.59 m/s    RVOT peak VTI 16.4 cm    LVOT stroke volume 75.26 cm3    AV peak gradient 10 mmHg    PV mean gradient 0.82 mmHg    E/E' ratio 9.33 m/s    MV Peak E Harpreet 0.84 m/s    TR Max Harpreet 3.08 m/s    MV stenosis pressure 1/2 time 80.080336661116340 ms    MV Peak A Harpreet 0.95 m/s    LV Systolic Volume 17.83 mL    LV Systolic Volume Index 10.7 mL/m2    LV Diastolic Volume 50.85 mL    LV Diastolic Volume Index 30.45 mL/m2    LA Volume Index 12.2 mL/m2    LV Mass Index 83 g/m2    Echo EF Estimated 65 %    RA Major Axis 4.04 cm    Left Atrium  Minor Axis 2.66 cm    Left Atrium Major Axis 4.16 cm    Triscuspid Valve Regurgitation Peak Gradient 38 mmHg    RA Width 2.57 cm   , EKG: Reviewed, and X-Ray: CXR: X-Ray Chest 1 View (CXR): No results found for this visit on 02/20/22. and X-Ray Chest PA and Lateral (CXR): No results found for this visit on 02/20/22.    Assessment and Plan:   Patient who presents with sepsis/abscess. CXR does appear to show congestion-would recommend additional gentle diuresis. Nephrology on board, assisting with management. Continue abx/supportive care.     * Sepsis  -Mgmt as per hospital medicine  -On abx    Acute renal failure superimposed on stage 3 chronic kidney disease  -Nephrology consulted    Acute on chronic diastolic heart failure  - upon admission, CXR concerning for mild fluid overload  -Received IV Lasix x one dose in ED, recommend continued gentle diuresis  -Nephrology on board    Cellulitis and abscess of upper extremity  -Mgmt as per hospital medicine and general surgery  -On abx    CAD, multiple vessel  -Stable, no angina  -Continue home meds as tolerated  -ASA and Plavix on hold for now in light of possible impending procedure, resume post OP ASAP    Hypertension associated with diabetes  -Continue home meds    Type 2 diabetes mellitus with hyperglycemia  -Mgmt as per hospital medicine    Hyperlipidemia associated with type 2 diabetes mellitus  -Continue home meds        VTE Risk Mitigation (From admission, onward)         Ordered     Place sequential compression device  Until discontinued         02/21/22 3755                Thank you for your consult. I will follow-up with patient. Thank you for the consult.    Sherri Duncan PA-C  Cardiology   [facility]

## 2022-02-21 NOTE — ED PROVIDER NOTES
SCRIBE #1 NOTE: I, Delbert Colbert, am scribing for, and in the presence of, Shaka Church MD. I have scribed the entire note.       History     Chief Complaint   Patient presents with    Fever     Patient's daughter states she was discharged Friday from this hospital. Was here for cellulitis of her R arm. Had a fever of 102.4 at home. 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 Patient speaking in triage stating she is freezing. Lives alone.      Review of patient's allergies indicates:  No Known Allergies      History of Present Illness     HPI    2/20/2022, 11:14 PM  History obtained from the patient      History of Present Illness: Tiffanie Wise is a 81 y.o. female patient with a PMHx of cellulitis who presents to the Emergency Department for evaluation of fever and right arm pain which onset a week ago. Daughter brought patient to the ED a week ago and she was sent home with bactrim and hydrocodone. Daughter states that patient's symptoms worsened throughout the beginning of last week and she brought patient back to the ED 4 days ago, at which time pt was admitted. Aspiration was performed of abscess and patient was discharged on clindamycin. Daughter has brought patient to the ED for the third time today because she reports that symptoms are worsening still. Symptoms are progressive and moderate to severe. No mitigating or exacerbating factors reported. Associated sxs include fever, chills, right arm pain. Patient denies any SOB, chest pain, difficulty breathing or any other sxs at this time. Prior Tx includes bactrim and clindamycin for her right arm and hydrocodone for pain management. Patient states that she took her pain medication, hydrocodone, at 8 AM this morning, approximately 12 hours PTA. No further complaints or concerns at this time.       Arrival mode: Personal vehicle    PCP: Tiffanie Spence MD        Past Medical History:  Past Medical History:   Diagnosis  Date    Anxiety     AP (angina pectoris) 7/18/2013    Arterial ischemic stroke, MCA (middle cerebral artery), left, acute 12/15/2017    Asthma     CAD, multiple vessel 5/13/2021    Class 1 obesity due to excess calories with serious comorbidity and body mass index (BMI) of 30.0 to 30.9 in adult 3/15/2019    Coronary artery disease 7/18/2013    Depression     Diastolic heart failure     GERD (gastroesophageal reflux disease) 7/18/2013    History of PTCA 7/18/2013    Hypertension 7/18/2013    Hypothyroidism     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     Trouble in sleeping     Type 2 diabetes mellitus with ophthalmic manifestations     Urinary incontinence     pullups day, pads at night       Past Surgical History:  Past Surgical History:   Procedure Laterality Date    BREAST SURGERY Left     benign cyst    CORONARY ANGIOPLASTY      CORONARY STENT PLACEMENT      x6-7 oer the yeqrs  last 12/17/14 BRIANDA to lcfx    EYE SURGERY Bilateral 2014    cataracts w/IOL   Dr Vance    FLUOROSCOPY N/A 1/20/2021    Procedure: G tube plaacement;  Surgeon: Shaka Cross MD;  Location: [facility] CATH LAB;  Service: General;  Laterality: N/A;    HYSTERECTOMY  1970    vag hyst; ovaries intact    THYROID SURGERY      nodule benign, Dr Hankins    TUBAL LIGATION  1970         Family History:  Family History   Problem Relation Age of Onset    Kidney disease Father     Kidney disease Brother     Heart disease Mother     Fibromyalgia Daughter     Cancer Son         lung    Cirrhosis Sister     Alcohol abuse Sister     Diabetes Sister     Fibromyalgia Daughter     Diabetes Paternal  Grandmother     Stroke Neg Hx     Mental illness Neg Hx     Mental retardation Neg Hx        Social History:  Social History     Tobacco Use    Smoking status: Former Smoker     Packs/day: 1.00     Years: 46.00     Pack years: 46.00     Types: Cigarettes     Start date:      Quit date: 2008     Years since quittin.6    Smokeless tobacco: Never Used   Substance and Sexual Activity    Alcohol use: No    Drug use: Never    Sexual activity: Not Currently     Birth control/protection: None        Review of Systems     Review of Systems   Constitutional: Positive for chills and fever (102.1).   HENT: Negative for sneezing and sore throat.    Eyes: Negative for pain and redness.   Respiratory: Negative for cough, chest tightness, shortness of breath and wheezing.    Cardiovascular: Negative for chest pain and palpitations.   Gastrointestinal: Negative for diarrhea, nausea and vomiting.   Endocrine: Negative for polyphagia and polyuria.   Genitourinary: Negative for dysuria and flank pain.   Musculoskeletal: Negative for arthralgias and back pain.        Right arm pain   Skin: Negative for rash.   Allergic/Immunologic: Negative for immunocompromised state.   Neurological: Negative for weakness and light-headedness.   Hematological: Does not bruise/bleed easily.   Psychiatric/Behavioral: Negative for dysphoric mood. The patient is not nervous/anxious.         Physical Exam     Initial Vitals [22 2216]   BP Pulse Resp Temp SpO2   (!) 142/72 91 (!) 24 (!) 102.1 °F (38.9 °C) (!) 86 %      MAP       --          Physical Exam  Nursing Notes and Vital Signs Reviewed.  Constitutional: Patient is in no acute distress. Well-developed and well-nourished.  Head: Atraumatic. Normocephalic.  Eyes: PERRL. EOM intact. Conjunctivae are not pale. No scleral icterus.  ENT: Mucous membranes are moist. Oropharynx is clear and symmetric.    Neck: Supple. Full ROM. No lymphadenopathy.  Cardiovascular: Regular rate.  Regular rhythm. No murmurs, rubs, or gallops. Distal pulses are 2+ and symmetric.  Pulmonary/Chest: No respiratory distress. Clear to auscultation bilaterally. No wheezing or rales.  Abdominal: Soft and non-distended.  There is no tenderness.  No rebound, guarding, or rigidity. Good bowel sounds.  Genitourinary: No CVA tenderness  Musculoskeletal: Moves all extremities. Erythema, induration, and tenderness to the right proximal upper arm. No edema. No calf tenderness.  Skin: Warm and dry.  Neurological:  Alert, awake, and appropriate.  Normal speech.  No acute focal neurological deficits are appreciated.  Psychiatric: Normal affect. Good eye contact. Appropriate in content.     ED Course   Critical Care    Date/Time: 2/21/2022 12:07 AM  Performed by: Shaka Church MD  Authorized by: Shaka Church MD   Direct patient critical care time: 15 minutes  Additional history critical care time: 5 minutes  Ordering / reviewing critical care time: 5 minutes  Documentation critical care time: 5 minutes  Consulting other physicians critical care time: 5 minutes  Total critical care time (exclusive of procedural time) : 35 minutes  Critical care time was exclusive of separately billable procedures and treating other patients and teaching time.  Critical care was necessary to treat or prevent imminent or life-threatening deterioration of the following conditions: sepsis and renal failure.  Critical care was time spent personally by me on the following activities: blood draw for specimens, development of treatment plan with patient or surrogate, discussions with consultants, interpretation of cardiac output measurements, evaluation of patient's response to treatment, examination of patient, obtaining history from patient or surrogate, ordering and performing treatments and interventions, ordering and review of laboratory studies, ordering and review of radiographic studies, pulse oximetry, re-evaluation of patient's condition  "and review of old charts.        ED Vital Signs:  Vitals:    02/20/22 2216 02/20/22 2315 02/20/22 2320 02/20/22 2321   BP: (!) 142/72 138/61     Pulse: 91 79 77 77   Resp: (!) 24 (!) 24 (!) 21 (!) 21   Temp: (!) 102.1 °F (38.9 °C)      TempSrc: Oral      SpO2: (!) 86% (!) 92% (!) 89% (!) 88%   Weight:       Height: 4' 9" (1.448 m)       02/20/22 2322 02/20/22 2323 02/20/22 2330 02/20/22 2333   BP:   (!) 182/77    Pulse: 77 77 75    Resp: (!) 21 (!) 21 (!) 22    Temp:       TempSrc:       SpO2: (!) 89% (!) 88% (!) 93%    Weight:       Height:    5' (1.524 m)    02/20/22 2344 02/21/22 0000   BP:  (!) 157/68   Pulse:  70   Resp:  19   Temp:     TempSrc:     SpO2:  97%   Weight: 70 kg (154 lb 5.2 oz)    Height:         Abnormal Lab Results:  Labs Reviewed   CBC W/ AUTO DIFFERENTIAL - Abnormal; Notable for the following components:       Result Value    WBC 26.52 (*)     Gran # (ANC) 25.1 (*)     Immature Grans (Abs) 0.13 (*)     Lymph # 0.4 (*)     Gran % 94.7 (*)     Lymph % 1.4 (*)     Mono % 2.9 (*)     All other components within normal limits   COMPREHENSIVE METABOLIC PANEL - Abnormal; Notable for the following components:    Glucose 304 (*)     Creatinine 2.1 (*)     Calcium 8.6 (*)     Albumin 3.0 (*)     ALT 7 (*)     eGFR if  25 (*)     eGFR if non  22 (*)     All other components within normal limits   URINALYSIS, REFLEX TO URINE CULTURE - Abnormal; Notable for the following components:    Protein, UA 2+ (*)     Glucose, UA Trace (*)     Occult Blood UA Trace (*)     All other components within normal limits    Narrative:     Specimen Source->Urine   B-TYPE NATRIURETIC PEPTIDE - Abnormal; Notable for the following components:     (*)     All other components within normal limits   CULTURE, BLOOD   CULTURE, BLOOD   LACTIC ACID, PLASMA   TROPONIN I   URINALYSIS MICROSCOPIC    Narrative:     Specimen Source->Urine   SARS-COV-2 RDRP GENE    Narrative:     This test utilizes " isothermal nucleic acid amplification   technology to detect the SARS-CoV-2 RdRp nucleic acid segment.   The analytical sensitivity (limit of detection) is 125 genome   equivalents/mL.   A POSITIVE result implies infection with the SARS-CoV-2 virus;   the patient is presumed to be contagious.     A NEGATIVE result means that SARS-CoV-2 nucleic acids are not   present above the limit of detection. A NEGATIVE result should be   treated as presumptive. It does not rule out the possibility of   COVID-19 and should not be the sole basis for treatment decisions.   If COVID-19 is strongly suspected based on clinical and exposure   history, re-testing using an alternate molecular assay should be   considered.   This test is only for use under the Food and Drug   Administration s Emergency Use Authorization (EUA).   Commercial kits are provided by [employer].   Performance characteristics of the EUA have been independently   verified by [facility] Department of   Pathology and Laboratory Medicine.   _________________________________________________________________   The authorized Fact Sheet for Healthcare Providers and the authorized Fact   Sheet for Patients of the ID NOW COVID-19 are available on the FDA   website:     https://www.fda.gov/media/740488/download  https://www.fda.gov/media/287131/download                  All Lab Results:  Results for orders placed or performed during the hospital encounter of 02/20/22   CBC auto differential   Result Value Ref Range    WBC 26.52 (H) 3.90 - 12.70 K/uL    RBC 4.24 4.00 - 5.40 M/uL    Hemoglobin 12.3 12.0 - 16.0 g/dL    Hematocrit 37.6 37.0 - 48.5 %    MCV 89 82 - 98 fL    MCH 29.0 27.0 - 31.0 pg    MCHC 32.7 32.0 - 36.0 g/dL    RDW 12.9 11.5 - 14.5 %    Platelets 333 150 - 450 K/uL    MPV 10.1 9.2 - 12.9 fL    Immature Granulocytes 0.5 0.0 - 0.5 %    Gran # (ANC) 25.1 (H) 1.8 - 7.7 K/uL    Immature Grans (Abs) 0.13 (H) 0.00 - 0.04 K/uL    Lymph # 0.4 (L)  1.0 - 4.8 K/uL    Mono # 0.8 0.3 - 1.0 K/uL    Eos # 0.1 0.0 - 0.5 K/uL    Baso # 0.08 0.00 - 0.20 K/uL    nRBC 0 0 /100 WBC    Gran % 94.7 (H) 38.0 - 73.0 %    Lymph % 1.4 (L) 18.0 - 48.0 %    Mono % 2.9 (L) 4.0 - 15.0 %    Eosinophil % 0.2 0.0 - 8.0 %    Basophil % 0.3 0.0 - 1.9 %    Differential Method Automated    Comprehensive metabolic panel   Result Value Ref Range    Sodium 136 136 - 145 mmol/L    Potassium 5.1 3.5 - 5.1 mmol/L    Chloride 100 95 - 110 mmol/L    CO2 26 23 - 29 mmol/L    Glucose 304 (H) 70 - 110 mg/dL    BUN 20 8 - 23 mg/dL    Creatinine 2.1 (H) 0.5 - 1.4 mg/dL    Calcium 8.6 (L) 8.7 - 10.5 mg/dL    Total Protein 6.9 6.0 - 8.4 g/dL    Albumin 3.0 (L) 3.5 - 5.2 g/dL    Total Bilirubin 0.5 0.1 - 1.0 mg/dL    Alkaline Phosphatase 103 55 - 135 U/L    AST 13 10 - 40 U/L    ALT 7 (L) 10 - 44 U/L    Anion Gap 10 8 - 16 mmol/L    eGFR if African American 25 (A) >60 mL/min/1.73 m^2    eGFR if non African American 22 (A) >60 mL/min/1.73 m^2   Lactic acid, plasma   Result Value Ref Range    Lactate (Lactic Acid) 1.8 0.5 - 2.2 mmol/L   Urinalysis, Reflex to Urine Culture Urine, Clean Catch    Specimen: Urine   Result Value Ref Range    Specimen UA Urine, Clean Catch     Color, UA Yellow Yellow, Straw, Sadie    Appearance, UA Clear Clear    pH, UA 7.0 5.0 - 8.0    Specific Gravity, UA 1.015 1.005 - 1.030    Protein, UA 2+ (A) Negative    Glucose, UA Trace (A) Negative    Ketones, UA Negative Negative    Bilirubin (UA) Negative Negative    Occult Blood UA Trace (A) Negative    Nitrite, UA Negative Negative    Urobilinogen, UA Negative <2.0 EU/dL    Leukocytes, UA Negative Negative   Brain natriuretic peptide   Result Value Ref Range     (H) 0 - 99 pg/mL   Troponin I   Result Value Ref Range    Troponin I <0.006 0.000 - 0.026 ng/mL   Urinalysis Microscopic   Result Value Ref Range    RBC, UA 1 0 - 4 /hpf    WBC, UA 1 0 - 5 /hpf    Bacteria Rare None-Occ /hpf    Squam Epithel, UA 3 /hpf    Hyaline  Casts, UA 0 0-1/lpf /lpf    Microscopic Comment SEE COMMENT    POCT COVID-19 Rapid Screening   Result Value Ref Range    POC Rapid COVID Negative Negative     Acceptable Yes      *Note: Due to a large number of results and/or encounters for the requested time period, some results have not been displayed. A complete set of results can be found in Results Review.         Imaging Results:  Imaging Results          X-Ray Chest AP Portable (Final result)  Result time 02/20/22 22:43:56    Final result by Tristin Lei MD (02/20/22 22:43:56)                 Impression:      Cardiomegaly with central pulmonary vascular crowding.  Correlate clinically for element of CHF.  Stable right middle lobe opacity likely related to atelectasis or scarring.      Electronically signed by: Quique Crow  Date:    02/20/2022  Time:    22:43             Narrative:    EXAMINATION:  XR CHEST AP PORTABLE    CLINICAL HISTORY:  fever;    TECHNIQUE:  Single frontal view of the chest was performed.    COMPARISON:  None    FINDINGS:  No pneumonia    Cardiomegaly with median sternotomy.  Device noted in the mid chest.  Mild central pulmonary vascular crowding.  Element of pulmonary edema not excluded.    Bones are intact.  Subsegmental atelectasis/scarring noted in the right middle lobe similar to prior exam.                                        The Emergency Provider reviewed the vital signs and test results, which are outlined above.     ED Discussion     12:24 AM: Discussed case with Alva Leyva NP, ([facility]). Alva agrees with current care and management of pt and accepts admission.   Admitting Service: [facility] medicine  Admitting Physician: Dr. Liu  Admit to: Med surg, inpatient         Medical Decision Making:   Clinical Tests:   Lab Tests: Ordered and Reviewed  Radiological Study: Ordered and Reviewed  Abscess to the right arm with sepsis.  Patient treated with vancomycin.  Lactic within normal limits  and no hypotension           ED Medication(s):  Medications   vancomycin - pharmacy to dose (has no administration in time range)   vancomycin 1.5 g in dextrose 5 % 250 mL IVPB (ready to mix) (1,500 mg Intravenous New Bag 2/21/22 0139)       New Prescriptions    No medications on file               Scribe Attestation:   Scribe #1: I performed the above scribed service and the documentation accurately describes the services I performed. I attest to the accuracy of the note.     Attending:   Physician Attestation Statement for Scribe #1: I, Shaka Church MD, personally performed the services described in this documentation, as scribed by Delbert Colbert, in my presence, and it is both accurate and complete.           Clinical Impression       ICD-10-CM ICD-9-CM   1. Hyperglycemia  R73.9 790.29   2. Acute renal failure, unspecified acute renal failure type  N17.9 584.9   3. Sepsis, due to unspecified organism, unspecified whether acute organ dysfunction present  A41.9 038.9     995.91   4. Hypoxia  R09.02 799.02   5. Abscess of right arm  L02.413 682.3   6. Sepsis  A41.9 038.9     995.91       Disposition:   Disposition: Admitted  Condition: Fair         Shaka Church MD  02/21/22 0147

## 2022-02-21 NOTE — HOSPITAL COURSE
The patient was monitored closely during her stay. She was kept on continuous telemetry monitoring. She was placed on Iv Abx for her RUE abscess. General Surgery was consulted for I&D. ID was consulted for sepsis with failed outpatient po abx therapy. Nephrology was consulted for ELLIOTT with CKD stage 3, and Cardiology was consulted for acute/chronic diastolic CHF.   2/22 Patient S/p I&D right arm today.  2/23 Continue current treatment. Pt received IV diuresis prior to surgery but is now compensated.   2/24 Poor po intake. Change to regular diet. Increased activity encouraged. WBCs trending upward. Cxr shows signs pneumonia.  Patient discussed with ID. Change oxacillin to Unasyn and add Zyvox x 5 days.   2/25 Consult ST to evaluate for possible aspiration. Check MBSS. LELIOTT resolved.   2/26/22: WBC normalized, Afebrile. Oxygenation stable on 3 liters NC. Speech MBSS showed no aspiration - recommend Very Soft Solids with grinded meats and thin liquids and aspiration precautions. Discussed possible discharge soon with daughter. Discussed pt will need 24 hour assistance. Discussed SNF placement. CM consulted   2/27/22: pt remains medically stable. Respiratory status stable. Discussed discharge abx with ID. Abx changed to Augmentin and po Doxycycline. Daughter requests to discuss SNF with CM.   2/28/22: pt doing well. Daughter requests discharge to home- not SNF. Home oxygen eval showed o2sats stable on room air- no oxygen required. Pt will be discharged with HH on oral Augmentin and Doxycycline. F/U with PCP in 3-5 days, Cardiology in 2 weeks for CHF, and Pulmonology in 2-3 weeks for aspiration PNA.

## 2022-02-21 NOTE — HPI
80 y/o WF with hx of angina, GERD, HLD, DM2, hypothyroidism, osteoporosis, CAD, CABG, cardiac stents, HTN, CVA, diastolic HF, anemia, NAWAF, anxiety, depression, tonsillar CA, CKD3 to ED with c/o gradually increasing redness, warmth and pain to the R axillary area (site of known abscess) with associated fever and chills. Symptoms are progressive and of moderate severity with pain exacerbated by movement and palpation and no known mitigating factors. ROS is limited by pt's chronic expressive aphasia. Of note, per pt's daughter this is her third ED visit in the past 11 days with the same complaint - was initially sent home on bactrim with continued worsening, then was admitted from 02/16-02/18/2022, was on vancomycin, had a needle aspiration and was sent home on clindamycin but has again continued to worsen. Found in ED to have temperature of 102.1, H&H of 12.3&37.6, platelets of 333, , K+ 5.1, BUN 20, creatinine 2.1, GFFR 22, , , troponin negative, lactic acid 1.8; UA uninfected; Cxray showed cardiomegaly with pulmonary vascular crowding; COVID-19 negative. In ED the pt was placed on O2 at 2L per NC and was given IV vancomycin - temperature improved to 99.1, HR to 68, O2 saturation to 98%, and BP to 157/68. Hospital medicine was called and the pt was admitted with telemetry monitoring. Pt is a DNR - surrogate decision maker is SHELLI Worley.

## 2022-02-21 NOTE — ASSESSMENT & PLAN NOTE
- upon admission, CXR concerning for mild fluid overload  -Received IV Lasix x one dose in ED, recommend continued gentle diuresis  -Nephrology on board

## 2022-02-21 NOTE — PROGRESS NOTES
Pharmacokinetic Initial Assessment: IV Vancomycin    Assessment/Plan:    Initiate intravenous vancomycin with loading dose of 1500 mg once with subsequent doses when random concentrations are less than 20 mcg/mL  Desired empiric serum trough concentration is 10 to 20 mcg/mL  Draw vancomycin random level on 02/21 at 1330.  Pharmacy will continue to follow and monitor vancomycin.      Please contact pharmacy at extension [phone] with any questions regarding this assessment.     Thank you for the consult,   Beatriz Santos       Patient brief summary:  Tiffanie Wise is a 81 y.o. female initiated on antimicrobial therapy with IV Vancomycin for treatment of suspected skin & soft tissue infection    Drug Allergies:   Review of patient's allergies indicates:  No Known Allergies    Actual Body Weight:   70 kg    Renal Function:   Estimated Creatinine Clearance: 18.3 mL/min (A) (based on SCr of 2.1 mg/dL (H))., -ELLIOTT    Dialysis Method (if applicable):  N/A    CBC (last 72 hours):  Recent Labs   Lab Result Units 02/20/22  2255   WBC K/uL 26.52*   Hemoglobin g/dL 12.3   Hematocrit % 37.6   Platelets K/uL 333   Gran % % 94.7*   Lymph % % 1.4*   Mono % % 2.9*   Eosinophil % % 0.2   Basophil % % 0.3   Differential Method  Automated       Metabolic Panel (last 72 hours):  Recent Labs   Lab Result Units 02/20/22 2255 02/20/22  2333   Sodium mmol/L 136  --    Potassium mmol/L 5.1  --    Chloride mmol/L 100  --    CO2 mmol/L 26  --    Glucose mg/dL 304*  --    Glucose, UA   --  Trace*   BUN mg/dL 20  --    Creatinine mg/dL 2.1*  --    Albumin g/dL 3.0*  --    Total Bilirubin mg/dL 0.5  --    Alkaline Phosphatase U/L 103  --    AST U/L 13  --    ALT U/L 7*  --        Drug levels (last 3 results):  No results for input(s): VANCOMYCINRA, VANCOMYCINPE, VANCOMYCINTR in the last 72 hours.    Microbiologic Results:  Microbiology Results (last 7 days)       Procedure Component Value Units Date/Time    Blood culture #1 **CANNOT BE ORDERED  STAT** [[id number]] Collected: 02/20/22 2311    Order Status: Sent Specimen: Blood from Peripheral, Forearm, Left Updated: 02/21/22 0239    Blood culture #2 **CANNOT BE ORDERED STAT** [[id number]] Collected: 02/20/22 2255    Order Status: Sent Specimen: Blood from Peripheral, Hand, Left Updated: 02/21/22 0239

## 2022-02-21 NOTE — ASSESSMENT & PLAN NOTE
Likely secondary to diuretics/other medications in addition to HTN   Creatinine at 2.1 in ED / Baseline appears to be around 1.2  Monitor for fluid overload  Monitor renal function labs closely  Avoid nephrotoxins as able  Monitor I&Os  Holding home HCTZ, losartan for now

## 2022-02-21 NOTE — ASSESSMENT & PLAN NOTE
ECHO 04/27/2021 - EF 65%, Grade 1 DD   / Cxray - cardiomegaly with pulmonary vascular crowding  Pt requiring O2 2L - not on home O2  Cardiology consulted  Lasix 20mg x1 dose in ED - cautious use d/t worsening renal fxn  ECHO pending  Strict I&Os  Daily weights

## 2022-02-21 NOTE — PROGRESS NOTES
Pharmacist Renal Dose Adjustment Note    Tiffanie Wise is a 81 y.o. female being treated with the medication cefepime    Patient Data:    Vital Signs (Most Recent):  Temp: (!) 102.1 °F (38.9 °C) (02/20/22 2216)  Pulse: 70 (02/21/22 0000)  Resp: 19 (02/21/22 0000)  BP: (!) 157/68 (02/21/22 0000)  SpO2: 97 % (02/21/22 0000)   Vital Signs (72h Range):  Temp:  [102.1 °F (38.9 °C)]   Pulse:  [70-91]   Resp:  [19-24]   BP: (138-182)/(61-77)   SpO2:  [86 %-97 %]      Recent Labs   Lab 02/16/22  1424 02/17/22  0528 02/20/22  2255   CREATININE 1.6* 1.2 2.1*     Serum creatinine: 2.1 mg/dL (H) 02/20/22 2255  Estimated creatinine clearance: 18.3 mL/min (A)    Cefepime 1 g q8h will be changed to cefepime 1 g q24h for CrCl 11-29 mL/min.     Pharmacist's Name: Beatriz Santos  Pharmacist's Extension: [phone]

## 2022-02-21 NOTE — NURSING
Chart reviewed for diabetes  Patient has been seen by this nurse on previous admit  'no further action

## 2022-02-21 NOTE — ASSESSMENT & PLAN NOTE
Pt with dexcom device to abdomen  A1c at 7.6 on 11/01/2021 / BG in ED at 304  Accuchecks with SSI prn   Holding home januvia  A1c pending

## 2022-02-21 NOTE — SUBJECTIVE & OBJECTIVE
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     Trouble in sleeping     Type 2 diabetes mellitus with ophthalmic manifestations     Urinary incontinence     pullups day, pads at night       Past Surgical History:   Procedure Laterality Date    BREAST SURGERY Left     benign cyst    CORONARY ANGIOPLASTY      CORONARY STENT PLACEMENT      x6-7 oer the yeqrs  last 12/17/14 BRIANDA to lcfx    EYE SURGERY Bilateral 2014    cataracts w/IOL   Dr Vance    FLUOROSCOPY N/A 1/20/2021    Procedure: G tube plaacement;  Surgeon: Shaka Cross MD;  Location: [facility] CATH LAB;  Service: General;  Laterality: N/A;    HYSTERECTOMY  1970    vag hyst; ovaries intact    THYROID SURGERY      nodule benign, Dr Hankins    TUBAL LIGATION  1970       Review of patient's allergies indicates:  No Known Allergies    No current facility-administered medications on file prior to encounter.     Current Outpatient Medications on File Prior to Encounter   Medication Sig    amLODIPine (NORVASC) 10 MG tablet TAKE 1 TABLET (10 MG TOTAL) BY PER G TUBE ROUTE AS DIRECTED ONCE DAILY.    aspirin 81 MG  Chew 1 tablet (81 mg total) by Per G Tube route once daily.    atorvastatin (LIPITOR) 40 MG tablet Take 1 tablet (40 mg total) by mouth every evening.    BLOOD SUGAR DIAGNOSTIC (TRUETEST TEST STRIPS MISC) by Misc.(Non-Drug; Combo Route) route. Pt is testing 3 times a day    blood-glucose sensor (DEXCOM G6 SENSOR) Tanisha Replace sensor every 10 days    clindamycin (CLEOCIN) 300 MG capsule Take 1 capsule (300 mg total) by mouth 3 (three) times daily. for 7 days    clonazePAM (KLONOPIN) 0.5 MG tablet 1 tablet (0.5 mg total) by Per G Tube route every evening.    clopidogreL (PLAVIX) 75 mg tablet Take 1 tablet (75 mg total) by mouth once daily.    donepeziL (ARICEPT) 10 MG tablet TAKE 1 TABLET (10 MG TOTAL) BY MOUTH ONCE DAILY.    EScitalopram oxalate (LEXAPRO) 20 MG tablet TAKE 1 TABLET EVERY DAY    hydroCHLOROthiazide (HYDRODIURIL) 25 MG tablet Take 1 tablet (25 mg total) by mouth once daily.    HYDROcodone-acetaminophen (NORCO) 5-325 mg per tablet Take 1 tablet by mouth every 6 (six) hours as needed for Pain.    insulin aspart protamine-insulin aspart (NOVOLOG MIX 70-30FLEXPEN U-100) 100 unit/mL (70-30) InPn pen 40 bid    insulin aspart U-100 (NOVOLOG) 100 unit/mL (3 mL) InPn pen Inject up to 50 units per day, dispense 15 ml    isosorbide mononitrate (IMDUR) 30 MG 24 hr tablet Take 1 tablet (30 mg total) by mouth once daily. (Patient taking differently: Take 30 mg by mouth 2 (two) times a day.)    JANUVIA 100 mg Tab Take 100 mg by mouth once daily.    levothyroxine (SYNTHROID) 50 MCG tablet 1 tablet (50 mcg total) by Per G Tube route before breakfast.    losartan (COZAAR) 100 MG tablet Take 1 tablet (100 mg total) by mouth once daily.    morphine (MSIR) 15 MG tablet Take 0.5 tablets (7.5 mg total) by mouth every 4 (four) hours as needed for Pain.    nitroGLYCERIN (NITROSTAT) 0.4 MG SL tablet Place 1 tablet (0.4 mg total) under the tongue every 5 (five) minutes as needed.    pantoprazole (PROTONIX) 40 MG tablet TAKE 1  TABLET EVERY DAY    pregabalin (LYRICA) 75 MG capsule Take 75 mg by mouth 2 (two) times daily.    vitamin D 1000 units Tab Take 1,000 Units by mouth once daily.     Family History       Problem Relation (Age of Onset)    Alcohol abuse Sister    Cancer Son    Cirrhosis Sister    Diabetes Sister, Paternal Grandmother    Fibromyalgia Daughter, Daughter    Heart disease Mother    Kidney disease Father, Brother          Tobacco Use    Smoking status: Former Smoker     Packs/day: 1.00     Years: 46.00     Pack years: 46.00     Types: Cigarettes     Start date:      Quit date: 2008     Years since quittin.6    Smokeless tobacco: Never Used   Substance and Sexual Activity    Alcohol use: No    Drug use: Never    Sexual activity: Not Currently     Birth control/protection: None     Review of Systems   Unable to perform ROS: Patient nonverbal (Expressive aphasia - information obtained from daughter)   Constitutional:  Positive for chills, fatigue and fever.   HENT:  Positive for hearing loss. Ear pain: Bilateral.   Musculoskeletal:  Positive for myalgias (R axilla/upper arm).   Skin:  Positive for color change (red, warm RUE, axilla).   Neurological:  Positive for speech difficulty (chronic expressive aphasia).        [place] IRA from prior CVA   Objective:     Vital Signs (Most Recent):  Temp: (!) 102.1 °F (38.9 °C) (22 2216)  Pulse: 70 (22 0000)  Resp: 19 (22 0000)  BP: (!) 157/68 (22 0000)  SpO2: 97 % (22 0000)   Vital Signs (24h Range):  Temp:  [102.1 °F (38.9 °C)] 102.1 °F (38.9 °C)  Pulse:  [70-91] 70  Resp:  [19-24] 19  SpO2:  [86 %-97 %] 97 %  BP: (138-182)/(61-77) 157/68     Weight: 70 kg (154 lb 5.2 oz)  Body mass index is 30.14 kg/m².    Physical Exam  Vitals reviewed.   Constitutional:       General: She is not in acute distress.     Appearance: Normal appearance. She is normal weight. She is ill-appearing. She is not toxic-appearing.   HENT:      Head: Normocephalic and  atraumatic.      Nose: Nose normal. No congestion.      Mouth/Throat:      Mouth: Mucous membranes are moist.   Eyes:      General: No scleral icterus.     Pupils: Pupils are equal, round, and reactive to light.   Cardiovascular:      Rate and Rhythm: Normal rate and regular rhythm.      Pulses: Normal pulses.      Heart sounds: Normal heart sounds.   Pulmonary:      Effort: Pulmonary effort is normal. No respiratory distress.      Breath sounds: Normal breath sounds. No wheezing or rhonchi.   Abdominal:      General: Abdomen is flat. Bowel sounds are normal. There is no distension.      Palpations: Abdomen is soft.      Tenderness: There is no abdominal tenderness. There is no rebound.      Comments: Dexcom device to abdomen   Musculoskeletal:         General: Swelling (R upper/inner arm/axilla fluctuance) and tenderness (R upper arm) present. Normal range of motion.      Cervical back: Normal range of motion and neck supple. No tenderness.   Skin:     General: Skin is warm and dry.      Capillary Refill: Capillary refill takes less than 2 seconds.      Coloration: Skin is not jaundiced.      Findings: No bruising or rash.      Comments: R upper/inner arm apparent abscess with surrounding cellulitis - warm, red   Neurological:      General: No focal deficit present.      Mental Status: She is alert and oriented to person, place, and time.      Cranial Nerves: Dysarthria present. No facial asymmetry.      Sensory: Sensory deficit ([place]) present.      Motor: Weakness present.      Comments: Chronic expressive aphasia; appears oriented, baseline per daughter   Psychiatric:         Mood and Affect: Mood normal.         Behavior: Behavior normal.         Thought Content: Thought content normal.         Judgment: Judgment normal.          Significant Labs:   Results for orders placed or performed during the hospital encounter of 02/20/22   CBC auto differential   Result Value Ref Range    WBC 26.52 (H) 3.90 - 12.70 K/uL     RBC 4.24 4.00 - 5.40 M/uL    Hemoglobin 12.3 12.0 - 16.0 g/dL    Hematocrit 37.6 37.0 - 48.5 %    MCV 89 82 - 98 fL    MCH 29.0 27.0 - 31.0 pg    MCHC 32.7 32.0 - 36.0 g/dL    RDW 12.9 11.5 - 14.5 %    Platelets 333 150 - 450 K/uL    MPV 10.1 9.2 - 12.9 fL    Immature Granulocytes 0.5 0.0 - 0.5 %    Gran # (ANC) 25.1 (H) 1.8 - 7.7 K/uL    Immature Grans (Abs) 0.13 (H) 0.00 - 0.04 K/uL    Lymph # 0.4 (L) 1.0 - 4.8 K/uL    Mono # 0.8 0.3 - 1.0 K/uL    Eos # 0.1 0.0 - 0.5 K/uL    Baso # 0.08 0.00 - 0.20 K/uL    nRBC 0 0 /100 WBC    Gran % 94.7 (H) 38.0 - 73.0 %    Lymph % 1.4 (L) 18.0 - 48.0 %    Mono % 2.9 (L) 4.0 - 15.0 %    Eosinophil % 0.2 0.0 - 8.0 %    Basophil % 0.3 0.0 - 1.9 %    Differential Method Automated    Comprehensive metabolic panel   Result Value Ref Range    Sodium 136 136 - 145 mmol/L    Potassium 5.1 3.5 - 5.1 mmol/L    Chloride 100 95 - 110 mmol/L    CO2 26 23 - 29 mmol/L    Glucose 304 (H) 70 - 110 mg/dL    BUN 20 8 - 23 mg/dL    Creatinine 2.1 (H) 0.5 - 1.4 mg/dL    Calcium 8.6 (L) 8.7 - 10.5 mg/dL    Total Protein 6.9 6.0 - 8.4 g/dL    Albumin 3.0 (L) 3.5 - 5.2 g/dL    Total Bilirubin 0.5 0.1 - 1.0 mg/dL    Alkaline Phosphatase 103 55 - 135 U/L    AST 13 10 - 40 U/L    ALT 7 (L) 10 - 44 U/L    Anion Gap 10 8 - 16 mmol/L    eGFR if African American 25 (A) >60 mL/min/1.73 m^2    eGFR if non African American 22 (A) >60 mL/min/1.73 m^2   Lactic acid, plasma   Result Value Ref Range    Lactate (Lactic Acid) 1.8 0.5 - 2.2 mmol/L   Urinalysis, Reflex to Urine Culture Urine, Clean Catch    Specimen: Urine   Result Value Ref Range    Specimen UA Urine, Clean Catch     Color, UA Yellow Yellow, Straw, Sadie    Appearance, UA Clear Clear    pH, UA 7.0 5.0 - 8.0    Specific Gravity, UA 1.015 1.005 - 1.030    Protein, UA 2+ (A) Negative    Glucose, UA Trace (A) Negative    Ketones, UA Negative Negative    Bilirubin (UA) Negative Negative    Occult Blood UA Trace (A) Negative    Nitrite, UA Negative  Negative    Urobilinogen, UA Negative <2.0 EU/dL    Leukocytes, UA Negative Negative   Brain natriuretic peptide   Result Value Ref Range     (H) 0 - 99 pg/mL   Troponin I   Result Value Ref Range    Troponin I <0.006 0.000 - 0.026 ng/mL   Urinalysis Microscopic   Result Value Ref Range    RBC, UA 1 0 - 4 /hpf    WBC, UA 1 0 - 5 /hpf    Bacteria Rare None-Occ /hpf    Squam Epithel, UA 3 /hpf    Hyaline Casts, UA 0 0-1/lpf /lpf    Microscopic Comment SEE COMMENT    POCT COVID-19 Rapid Screening   Result Value Ref Range    POC Rapid COVID Negative Negative     Acceptable Yes      *Note: Due to a large number of results and/or encounters for the requested time period, some results have not been displayed. A complete set of results can be found in Results Review.         Significant Imaging:   Imaging Results              X-Ray Chest AP Portable (Final result)  Result time 02/20/22 22:43:56      Final result by Tristin Lei MD (02/20/22 22:43:56)                   Impression:      Cardiomegaly with central pulmonary vascular crowding.  Correlate clinically for element of CHF.  Stable right middle lobe opacity likely related to atelectasis or scarring.      Electronically signed by: Quique Crow  Date:    02/20/2022  Time:    22:43               Narrative:    EXAMINATION:  XR CHEST AP PORTABLE    CLINICAL HISTORY:  fever;    TECHNIQUE:  Single frontal view of the chest was performed.    COMPARISON:  None    FINDINGS:  No pneumonia    Cardiomegaly with median sternotomy.  Device noted in the mid chest.  Mild central pulmonary vascular crowding.  Element of pulmonary edema not excluded.    Bones are intact.  Subsegmental atelectasis/scarring noted in the right middle lobe similar to prior exam.                                      Results for orders placed or performed in visit on 02/16/22   EKG 12-lead    Collection Time: 02/16/22  7:57 PM    Narrative    Test Reason :     Vent. Rate : 068 BPM      Atrial Rate : 068 BPM     P-R Int : 184 ms          QRS Dur : 080 ms      QT Int : 382 ms       P-R-T Axes : 073 254 235 degrees     QTc Int : 406 ms    Sinus rhythm with Premature supraventricular complexes  Right superior axis deviation  Pulmonary disease pattern  Septal infarct (cited on or before 31-JUL-2020)  Abnormal ECG  When compared with ECG of 03-MAR-2021 12:40,  Premature supraventricular complexes are now Present  Questionable change in initial forces of Septal leads  Confirmed by ETTA HERRING, JOSI ([id number]) on 2/17/2022 2:21:24 PM    Referred By: DARSHAN   SELF           Confirmed By:JOSI QUILES MD

## 2022-02-21 NOTE — SIGNIFICANT EVENT
Patient seen and examined. Daughter at bedside. Patient alert and appropriate. ID and General surgery consulted. Patient stable at this time. Continue Iv Abx.

## 2022-02-21 NOTE — ASSESSMENT & PLAN NOTE
Failed outpatient bactrim and clindamycin  Needle aspiration done 02/18/2022 - sensitivities pending  General surgery consulted for I&D  US of RUE soft tissue pending  Continuing IV vancomycin/cefepime for now  Repeat BC x2 pending  NPO in case of AM procedure  ESR/CRP pending  Holding home ASA, plavix in case of procedure - resume as appropriate  Provide adequate pain control  Tylenol prn fever

## 2022-02-21 NOTE — HPI
Ms. Wise is an 81 year old female patient whose current medical conditions include CAD s/p CABG, prior stents, HTN, CVA, diastolic C HF, anemia, NAWAF, anxiety, depression, tonsillar CA (s/p radiation) who presented to [facility] ED yesterday with a chief complaint of increased redness, warmth, and pain to her right axillary area (known abscess). Associated symptoms included fatigue, fever, chills, and increased weakness. Patient denied any associated nausea, vomiting, or diaphoresis. Her daughter reported she had previously been admitted to this facility last week and underwent needle aspiration and was discharged home on po clindamycin but symptoms failed to improve. Initial workup in ED revealed fever (temp of 102), creatinine of 2.1, BNP of 188 and (O2 sats improved on supplemental O2). CXR showed findings concerning for fluid overload and patient was subsequently admitted for further evaluation and treatment of sepsis. Cardiology consulted to assist with management. Patient seen and examined today, daughter at bedside. Feeling much better. No CV complaints. Remains on supplemental O2. Labs reviewed. Creatinine 2.0, nephrology following. Echo pending.

## 2022-02-21 NOTE — ASSESSMENT & PLAN NOTE
Pt is a 46 y o  female who was brought to the ED with   Chief Complaint   Patient presents with    Alcohol Problem     Pt  reports "I'm an alcoholic and I need help " Last drink was today  Also reports she fell down unknown amount of steps today and is c/o left elbow pain, denies striking head  -LOC  Denies drug use  Denies SI/HI/AH/VH   Pt brought to the ED with ETOH intoxication (pt reports that she drinks 7- 22oz  beers daily) last drink 3/13/2020, Pt was requesting rehab, HOST was called and eval was completed pt was informed by HOST that there were no beds, Pt then informed HOST worker and  ED Nurse that she is have S/I with plan to OD on her pills, Pt stated "If i leave here i will go home and take all my pills" Pt reports increased depression, increased axniety, Pt reports that she sees a psych Dr and therapist at [facility], and she is taking her medicationas prescrbied  ,  Pt reports feeling helpless and hopeless, Pt Pt admits to S/I, Pt denies H/I,A/H,V/H  Intake Assessment completed, Safety risk Assessment completed  CW met with pt and discussed the process of a B[facility] admission, Pt has agreed and signed 201, CW discussed this case and pt plan with ED Physician who is in agreement with this plan  CW will start bed search and complete the Pre-Cert           [facility] Worker Resumed home PPI

## 2022-02-21 NOTE — ASSESSMENT & PLAN NOTE
Improved since ED arrival - possible pain component  Home medications resumed as appropriate   Hydralazine IV prn SBP > 170  Provide adequate pain control  VS per unit routine  Monitor closely

## 2022-02-21 NOTE — CONSULTS
[facility]  Nephrology  Consult Note    Patient Name: Tiffanie Wise  MRN: [id number]  Admission Date: 2/20/2022  Hospital Length of Stay: 0 days  Attending Provider: Jamee James MD   Primary Care Physician: Tiffanie Spence MD  Principal Problem:Sepsis    Consults  Subjective:     HPI:     81-year-old female with history of diabetes mellitus and hypertension admitted with an abscess in her right axilla.  Noted to have elevated creatinine over baseline.  Nephrology has been consulted for evaluation.  Patient was seen in her hospital room.  In bed resting comfortably.  No acute distress noted.  Her daughter was at the bedside.  She relates that her mother was admitted several days ago for the same problem.  About a week ago she was started on Bactrim which she took for about 6 days.  She stopped taking in on Wednesday of last week.  She was treated with vancomycin for a couple of days and then discharged on clindamycin.  She presented back to the emergency department yesterday with fever and pain in her right axilla.  Her daughter relates no change in appetite.  She was eating and drinking as per usual.  No nausea vomiting or diarrhea related.    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     Trouble in sleeping     Type 2 diabetes mellitus with ophthalmic manifestations     Urinary incontinence     pullups day, pads at night       Past Surgical History:   Procedure Laterality Date    BREAST SURGERY Left     benign cyst    CORONARY ANGIOPLASTY      CORONARY STENT PLACEMENT      x6-7 oer the yeqrs  last 12/17/14 BRIANDA to lcfx    EYE SURGERY Bilateral 2014    cataracts w/IOL   Dr Vance    FLUOROSCOPY N/A 1/20/2021    Procedure: G tube plaacement;  Surgeon: Shaka Cross MD;  Location: [facility] CATH LAB;  Service: General;  Laterality: N/A;    HYSTERECTOMY  1970    vag hyst; ovaries intact    THYROID SURGERY      nodule benign, Dr Hankins    TUBAL LIGATION  1970       Review of patient's allergies indicates:  No Known Allergies  Current Facility-Administered Medications   Medication Frequency    acetaminophen tablet 650 mg Q6H PRN    amLODIPine tablet 10 mg Daily    atorvastatin tablet 40 mg QHS    cefepime in dextrose 5 % 1 gram/50 mL IVPB 1 g Q24H    clonazePAM tablet 0.5 mg QHS    dextrose 10% bolus 125 mL PRN    donepeziL tablet 10 mg Daily    glucagon (human recombinant) injection 1 mg PRN    hydrALAZINE injection 10 mg Q6H PRN    HYDROcodone-acetaminophen 5-325 mg per tablet 1 tablet Q6H PRN    insulin aspart U-100 pen 0-5 Units Q6H PRN    isosorbide mononitrate 24 hr tablet 30 mg BID    levothyroxine tablet 50 mcg Before breakfast    magnesium sulfate 2g in water 50mL IVPB (premix) Once    ondansetron injection 4 mg Q6H PRN    pantoprazole EC tablet 40 mg Daily    pregabalin capsule 150 mg QHS    vancomycin - pharmacy to dose pharmacy to manage frequency    vitamin D 1000 units tablet 1,000 Units Daily     Family History     Problem Relation (Age of Onset)    Alcohol abuse Sister    Cancer Son     Cirrhosis Sister    Diabetes Sister, Paternal Grandmother    Fibromyalgia Daughter, Daughter    Heart disease Mother    Kidney disease Father, Brother        Tobacco Use    Smoking status: Former Smoker     Packs/day: 1.00     Years: 46.00     Pack years: 46.00     Types: Cigarettes     Start date:      Quit date: 2008     Years since quittin.6    Smokeless tobacco: Never Used   Substance and Sexual Activity    Alcohol use: No    Drug use: Never    Sexual activity: Not Currently     Birth control/protection: None     Review of Systems   Constitutional: Positive for fever.   HENT: Negative.    Eyes: Negative.    Respiratory: Negative.    Cardiovascular: Negative.    Gastrointestinal: Negative.    Genitourinary: Negative.    Musculoskeletal: Negative.    Skin: Negative.    Neurological: Negative.      Objective:     Vital Signs (Most Recent):  Temp: 98.2 °F (36.8 °C) (22)  Pulse: (!) 58 (22)  Resp: 18 (22)  BP: (!) 141/63 (22)  SpO2: 98 % (22)  O2 Device (Oxygen Therapy): nasal cannula (22) Vital Signs (24h Range):  Temp:  [97.8 °F (36.6 °C)-102.1 °F (38.9 °C)] 98.2 °F (36.8 °C)  Pulse:  [55-91] 58  Resp:  [14-24] 18  SpO2:  [86 %-100 %] 98 %  BP: (138-182)/(61-93) 141/63     Weight: 69.9 kg (154 lb) (22)  Body mass index is 30.08 kg/m².  Body surface area is 1.72 meters squared.    I/O last 3 completed shifts:  In: 250 [IV Piggyback:250]  Out: -     Physical Exam  Constitutional:       Appearance: Normal appearance.   HENT:      Head: Normocephalic and atraumatic.   Eyes:      General: No scleral icterus.     Extraocular Movements: Extraocular movements intact.      Pupils: Pupils are equal, round, and reactive to light.   Cardiovascular:      Rate and Rhythm: Normal rate and regular rhythm.   Pulmonary:      Effort: Pulmonary effort is normal.      Breath sounds: Normal breath sounds.   Musculoskeletal:      Right lower  leg: No edema.      Left lower leg: No edema.   Skin:     General: Skin is warm and dry.      Comments: Bandage to the right upper extremity near the axilla.   Neurological:      General: No focal deficit present.      Mental Status: She is alert and oriented to person, place, and time.   Psychiatric:         Mood and Affect: Mood normal.         Behavior: Behavior normal.         Significant Labs:  BMP:   Recent Labs   Lab 02/21/22  0645   *      K 4.6      CO2 25   BUN 22   CREATININE 2.0*   CALCIUM 8.4*   MG 1.5*     CMP:   Recent Labs   Lab 02/21/22  0645   *   CALCIUM 8.4*   ALBUMIN 2.9*   PROT 6.3      K 4.6   CO2 25      BUN 22   CREATININE 2.0*   ALKPHOS 101   ALT 8*   AST 13   BILITOT 0.7     All labs within the past 24 hours have been reviewed.    Significant Imaging:  Labs: Reviewed  Reviewed    Assessment/Plan:     Active Diagnoses:    Diagnosis Date Noted POA    PRINCIPAL PROBLEM:  Sepsis [A41.9] 02/21/2022 Yes    Cellulitis and abscess of upper extremity [L03.119, L02.419] 02/21/2022 Yes    Acute on chronic diastolic heart failure [I50.33] 02/21/2022 Yes    Acute renal failure superimposed on stage 3 chronic kidney disease [N17.9, N18.30] 02/21/2022 Yes    Hypomagnesemia [E83.42] 02/21/2022 Yes    Hypertension associated with diabetes [E11.59, I15.2] 12/29/2016 Yes     Chronic    Hyperlipidemia associated with type 2 diabetes mellitus [E11.69, E78.5] 07/18/2013 Yes     Chronic    Type 2 diabetes mellitus with hyperglycemia [E11.65] 07/18/2013 Yes     Chronic    GERD (gastroesophageal reflux disease) [K21.9] 07/18/2013 Yes     Chronic    Acquired hypothyroidism [E03.9] 04/15/2013 Yes     Chronic      Problems Resolved During this Admission:       1. ELLIOTT:  Creatinine was 2.1 on admission.  Down to 2.0 this morning.  Urine output has not been recorded.      At this point because of acute kidney injury is not entirely clear however likely secondary to ATN  from ongoing infection.  The fact that she was on Bactrim for 5 or 6 days raises the possibility of interstitial nephritis.  However her urinalysis is not supportive of AIN.  Additionally there is no evidence of significant tubulointerstitial dysfunction.    At this point try to avoid renal toxins were possible.  Monitor strict I's and O's and laboratory studies.  Will check urine studies for completeness.    2. Electrolytes are stable:  Potassium was 4.6.    3. Acid-base:  Serum bicarbonate is stable at 25.    4. CKD 3A:  Baseline creatinine runs between 0.8 and 1.2.  EGFR is normal for her age.  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  UA also shows 2+ proteinuria.      Thank you for your consult.     Mariusz Lizarraga MD  Nephrology  [facility]

## 2022-02-21 NOTE — PLAN OF CARE
Pt remains free of falls/injury this shift. Safety precautions maintained. Pain managed with PRN medications.Tele monitor in place. VSS. No signs and symptoms of acute distress noted at this time. 12 hour chart check completed. Will continue to monitor.

## 2022-02-21 NOTE — ASSESSMENT & PLAN NOTE
Secondary to cellulitis/abscess as above  Failed outpatient bactrim and clindamycin  Needle aspiration done 02/18/2022 - sensitivities pending  General surgery consulted for I&D  Continuing IV vancomycin/cefepime for now  Repeat BC x2 pending  Tylenol prn fever

## 2022-02-21 NOTE — H&P
[place] - Emergency Dept.  [facility] Medicine  History & Physical    Patient Name: Tiffanie Wise  MRN: [id number]  Patient Class: IP- Inpatient  Admission Date: 2/20/2022  Attending Physician: Jamee James MD   Primary Care Provider: Tiffanie Spence MD         Patient information was obtained from patient, past medical records and ER records.     Subjective:     Principal Problem:Sepsis    Chief Complaint:   Chief Complaint   Patient presents with    Fever     Patient's daughter states she was discharged Friday from this hospital. Was here for cellulitis of her R arm. Had a fever of 102.4 at home. 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 Patient speaking in triage stating she is freezing. Lives alone.         HPI: 82 y/o WF with hx of angina, GERD, HLD, DM2, hypothyroidism, osteoporosis, CAD, CABG, cardiac stents, HTN, CVA, diastolic HF, anemia, NAWAF, anxiety, depression, tonsillar CA, CKD3 to ED with c/o gradually increasing redness, warmth and pain to the R axillary area (site of known abscess) with associated fever and chills. Symptoms are progressive and of moderate severity with pain exacerbated by movement and palpation and no known mitigating factors. ROS is limited by pt's chronic expressive aphasia. Of note, per pt's daughter this is her third ED visit in the past 11 days with the same complaint - was initially sent home on bactrim with continued worsening, then was admitted from 02/16-02/18/2022, was on vancomycin, had a needle aspiration and was sent home on clindamycin but has again continued to worsen. Found in ED to have temperature of 102.1, H&H of 12.3&37.6, platelets of 333, , K+ 5.1, BUN 20, creatinine 2.1, GFFR 22, , , troponin negative, lactic acid 1.8; UA uninfected; Cxray showed cardiomegaly with pulmonary vascular crowding; COVID-19 negative. In ED the pt was placed on O2 at 2L per NC and was given IV vancomycin - temperature improved  to 99.1, HR to 68, O2 saturation to 98%, and BP to 157/68. Hospital medicine was called and the pt was admitted with telemetry monitoring. Pt is a DNR - surrogate decision maker is SHELLI Worley.       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     Trouble in sleeping     Type 2 diabetes mellitus with ophthalmic manifestations     Urinary incontinence     pullups day, pads at night       Past Surgical History:   Procedure Laterality Date    BREAST SURGERY Left     benign cyst    CORONARY ANGIOPLASTY      CORONARY STENT PLACEMENT      x6-7 oer the yeqrs  last 12/17/14 BRIANDA to lcfx    EYE SURGERY Bilateral 2014    cataracts w/IOL   Dr Vance    FLUOROSCOPY N/A 1/20/2021    Procedure: G tube plaacement;  Surgeon: Shaka Cross MD;  Location: [facility] CATH LAB;  Service: General;  Laterality: N/A;    HYSTERECTOMY  1970    vag hyst; ovaries intact    THYROID SURGERY      nodule benign, Dr Hankins    TUBAL LIGATION  1970       Review of patient's allergies indicates:  No Known Allergies    No current facility-administered  medications on file prior to encounter.     Current Outpatient Medications on File Prior to Encounter   Medication Sig    amLODIPine (NORVASC) 10 MG tablet TAKE 1 TABLET (10 MG TOTAL) BY PER G TUBE ROUTE AS DIRECTED ONCE DAILY.    aspirin 81 MG Chew 1 tablet (81 mg total) by Per G Tube route once daily.    atorvastatin (LIPITOR) 40 MG tablet Take 1 tablet (40 mg total) by mouth every evening.    BLOOD SUGAR DIAGNOSTIC (TRUETEST TEST STRIPS MISC) by Misc.(Non-Drug; Combo Route) route. Pt is testing 3 times a day    blood-glucose sensor (DEXCOM G6 SENSOR) Tanisha Replace sensor every 10 days    clindamycin (CLEOCIN) 300 MG capsule Take 1 capsule (300 mg total) by mouth 3 (three) times daily. for 7 days    clonazePAM (KLONOPIN) 0.5 MG tablet 1 tablet (0.5 mg total) by Per G Tube route every evening.    clopidogreL (PLAVIX) 75 mg tablet Take 1 tablet (75 mg total) by mouth once daily.    donepeziL (ARICEPT) 10 MG tablet TAKE 1 TABLET (10 MG TOTAL) BY MOUTH ONCE DAILY.    EScitalopram oxalate (LEXAPRO) 20 MG tablet TAKE 1 TABLET EVERY DAY    hydroCHLOROthiazide (HYDRODIURIL) 25 MG tablet Take 1 tablet (25 mg total) by mouth once daily.    HYDROcodone-acetaminophen (NORCO) 5-325 mg per tablet Take 1 tablet by mouth every 6 (six) hours as needed for Pain.    insulin aspart protamine-insulin aspart (NOVOLOG MIX 70-30FLEXPEN U-100) 100 unit/mL (70-30) InPn pen 40 bid    insulin aspart U-100 (NOVOLOG) 100 unit/mL (3 mL) InPn pen Inject up to 50 units per day, dispense 15 ml    isosorbide mononitrate (IMDUR) 30 MG 24 hr tablet Take 1 tablet (30 mg total) by mouth once daily. (Patient taking differently: Take 30 mg by mouth 2 (two) times a day.)    JANUVIA 100 mg Tab Take 100 mg by mouth once daily.    levothyroxine (SYNTHROID) 50 MCG tablet 1 tablet (50 mcg total) by Per G Tube route before breakfast.    losartan (COZAAR) 100 MG tablet Take 1 tablet (100 mg total) by mouth once daily.    morphine (MSIR) 15 MG  tablet Take 0.5 tablets (7.5 mg total) by mouth every 4 (four) hours as needed for Pain.    nitroGLYCERIN (NITROSTAT) 0.4 MG SL tablet Place 1 tablet (0.4 mg total) under the tongue every 5 (five) minutes as needed.    pantoprazole (PROTONIX) 40 MG tablet TAKE 1 TABLET EVERY DAY    pregabalin (LYRICA) 75 MG capsule Take 75 mg by mouth 2 (two) times daily.    vitamin D 1000 units Tab Take 1,000 Units by mouth once daily.     Family History       Problem Relation (Age of Onset)    Alcohol abuse Sister    Cancer Son    Cirrhosis Sister    Diabetes Sister, Paternal Grandmother    Fibromyalgia Daughter, Daughter    Heart disease Mother    Kidney disease Father, Brother          Tobacco Use    Smoking status: Former Smoker     Packs/day: 1.00     Years: 46.00     Pack years: 46.00     Types: Cigarettes     Start date:      Quit date: 2008     Years since quittin.6    Smokeless tobacco: Never Used   Substance and Sexual Activity    Alcohol use: No    Drug use: Never    Sexual activity: Not Currently     Birth control/protection: None     Review of Systems   Unable to perform ROS: Patient nonverbal (Expressive aphasia - information obtained from daughter)   Constitutional:  Positive for chills, fatigue and fever.   HENT:  Positive for hearing loss. Ear pain: Bilateral.   Musculoskeletal:  Positive for myalgias (R axilla/upper arm).   Skin:  Positive for color change (red, warm RUE, axilla).   Neurological:  Positive for speech difficulty (chronic expressive aphasia).        [place] from prior CVA   Objective:     Vital Signs (Most Recent):  Temp: (!) 102.1 °F (38.9 °C) (22 2216)  Pulse: 70 (22 0000)  Resp: 19 (22 0000)  BP: (!) 157/68 (22 0000)  SpO2: 97 % (22 0000)   Vital Signs (24h Range):  Temp:  [102.1 °F (38.9 °C)] 102.1 °F (38.9 °C)  Pulse:  [70-91] 70  Resp:  [19-24] 19  SpO2:  [86 %-97 %] 97 %  BP: (138-182)/(61-77) 157/68     Weight: 70 kg (154 lb 5.2 oz)  Body mass  index is 30.14 kg/m².    Physical Exam  Vitals reviewed.   Constitutional:       General: She is not in acute distress.     Appearance: Normal appearance. She is normal weight. She is ill-appearing. She is not toxic-appearing.   HENT:      Head: Normocephalic and atraumatic.      Nose: Nose normal. No congestion.      Mouth/Throat:      Mouth: Mucous membranes are moist.   Eyes:      General: No scleral icterus.     Pupils: Pupils are equal, round, and reactive to light.   Cardiovascular:      Rate and Rhythm: Normal rate and regular rhythm.      Pulses: Normal pulses.      Heart sounds: Normal heart sounds.   Pulmonary:      Effort: Pulmonary effort is normal. No respiratory distress.      Breath sounds: Normal breath sounds. No wheezing or rhonchi.   Abdominal:      General: Abdomen is flat. Bowel sounds are normal. There is no distension.      Palpations: Abdomen is soft.      Tenderness: There is no abdominal tenderness. There is no rebound.      Comments: Dexcom device to abdomen   Musculoskeletal:         General: Swelling (R upper/inner arm/axilla fluctuance) and tenderness (R upper arm) present. Normal range of motion.      Cervical back: Normal range of motion and neck supple. No tenderness.   Skin:     General: Skin is warm and dry.      Capillary Refill: Capillary refill takes less than 2 seconds.      Coloration: Skin is not jaundiced.      Findings: No bruising or rash.      Comments: R upper/inner arm apparent abscess with surrounding cellulitis - warm, red   Neurological:      General: No focal deficit present.      Mental Status: She is alert and oriented to person, place, and time.      Cranial Nerves: Dysarthria present. No facial asymmetry.      Sensory: Sensory deficit ([place]) present.      Motor: Weakness present.      Comments: Chronic expressive aphasia; appears oriented, baseline per daughter   Psychiatric:         Mood and Affect: Mood normal.         Behavior: Behavior normal.         Thought  Content: Thought content normal.         Judgment: Judgment normal.          Significant Labs:   Results for orders placed or performed during the hospital encounter of 02/20/22   CBC auto differential   Result Value Ref Range    WBC 26.52 (H) 3.90 - 12.70 K/uL    RBC 4.24 4.00 - 5.40 M/uL    Hemoglobin 12.3 12.0 - 16.0 g/dL    Hematocrit 37.6 37.0 - 48.5 %    MCV 89 82 - 98 fL    MCH 29.0 27.0 - 31.0 pg    MCHC 32.7 32.0 - 36.0 g/dL    RDW 12.9 11.5 - 14.5 %    Platelets 333 150 - 450 K/uL    MPV 10.1 9.2 - 12.9 fL    Immature Granulocytes 0.5 0.0 - 0.5 %    Gran # (ANC) 25.1 (H) 1.8 - 7.7 K/uL    Immature Grans (Abs) 0.13 (H) 0.00 - 0.04 K/uL    Lymph # 0.4 (L) 1.0 - 4.8 K/uL    Mono # 0.8 0.3 - 1.0 K/uL    Eos # 0.1 0.0 - 0.5 K/uL    Baso # 0.08 0.00 - 0.20 K/uL    nRBC 0 0 /100 WBC    Gran % 94.7 (H) 38.0 - 73.0 %    Lymph % 1.4 (L) 18.0 - 48.0 %    Mono % 2.9 (L) 4.0 - 15.0 %    Eosinophil % 0.2 0.0 - 8.0 %    Basophil % 0.3 0.0 - 1.9 %    Differential Method Automated    Comprehensive metabolic panel   Result Value Ref Range    Sodium 136 136 - 145 mmol/L    Potassium 5.1 3.5 - 5.1 mmol/L    Chloride 100 95 - 110 mmol/L    CO2 26 23 - 29 mmol/L    Glucose 304 (H) 70 - 110 mg/dL    BUN 20 8 - 23 mg/dL    Creatinine 2.1 (H) 0.5 - 1.4 mg/dL    Calcium 8.6 (L) 8.7 - 10.5 mg/dL    Total Protein 6.9 6.0 - 8.4 g/dL    Albumin 3.0 (L) 3.5 - 5.2 g/dL    Total Bilirubin 0.5 0.1 - 1.0 mg/dL    Alkaline Phosphatase 103 55 - 135 U/L    AST 13 10 - 40 U/L    ALT 7 (L) 10 - 44 U/L    Anion Gap 10 8 - 16 mmol/L    eGFR if African American 25 (A) >60 mL/min/1.73 m^2    eGFR if non African American 22 (A) >60 mL/min/1.73 m^2   Lactic acid, plasma   Result Value Ref Range    Lactate (Lactic Acid) 1.8 0.5 - 2.2 mmol/L   Urinalysis, Reflex to Urine Culture Urine, Clean Catch    Specimen: Urine   Result Value Ref Range    Specimen UA Urine, Clean Catch     Color, UA Yellow Yellow, Straw, Sadie    Appearance, UA Clear Clear    pH,  UA 7.0 5.0 - 8.0    Specific Gravity, UA 1.015 1.005 - 1.030    Protein, UA 2+ (A) Negative    Glucose, UA Trace (A) Negative    Ketones, UA Negative Negative    Bilirubin (UA) Negative Negative    Occult Blood UA Trace (A) Negative    Nitrite, UA Negative Negative    Urobilinogen, UA Negative <2.0 EU/dL    Leukocytes, UA Negative Negative   Brain natriuretic peptide   Result Value Ref Range     (H) 0 - 99 pg/mL   Troponin I   Result Value Ref Range    Troponin I <0.006 0.000 - 0.026 ng/mL   Urinalysis Microscopic   Result Value Ref Range    RBC, UA 1 0 - 4 /hpf    WBC, UA 1 0 - 5 /hpf    Bacteria Rare None-Occ /hpf    Squam Epithel, UA 3 /hpf    Hyaline Casts, UA 0 0-1/lpf /lpf    Microscopic Comment SEE COMMENT    POCT COVID-19 Rapid Screening   Result Value Ref Range    POC Rapid COVID Negative Negative     Acceptable Yes      *Note: Due to a large number of results and/or encounters for the requested time period, some results have not been displayed. A complete set of results can be found in Results Review.         Significant Imaging:   Imaging Results              X-Ray Chest AP Portable (Final result)  Result time 02/20/22 22:43:56      Final result by Tristin Lei MD (02/20/22 22:43:56)                   Impression:      Cardiomegaly with central pulmonary vascular crowding.  Correlate clinically for element of CHF.  Stable right middle lobe opacity likely related to atelectasis or scarring.      Electronically signed by: Quique Crow  Date:    02/20/2022  Time:    22:43               Narrative:    EXAMINATION:  XR CHEST AP PORTABLE    CLINICAL HISTORY:  fever;    TECHNIQUE:  Single frontal view of the chest was performed.    COMPARISON:  None    FINDINGS:  No pneumonia    Cardiomegaly with median sternotomy.  Device noted in the mid chest.  Mild central pulmonary vascular crowding.  Element of pulmonary edema not excluded.    Bones are intact.  Subsegmental atelectasis/scarring  noted in the right middle lobe similar to prior exam.                                      Results for orders placed or performed in visit on 02/16/22   EKG 12-lead    Collection Time: 02/16/22  7:57 PM    Narrative    Test Reason :     Vent. Rate : 068 BPM     Atrial Rate : 068 BPM     P-R Int : 184 ms          QRS Dur : 080 ms      QT Int : 382 ms       P-R-T Axes : 073 254 235 degrees     QTc Int : 406 ms    Sinus rhythm with Premature supraventricular complexes  Right superior axis deviation  Pulmonary disease pattern  Septal infarct (cited on or before 31-JUL-2020)  Abnormal ECG  When compared with ECG of 03-MAR-2021 12:40,  Premature supraventricular complexes are now Present  Questionable change in initial forces of Septal leads  Confirmed by JOSI QUILES MD ([id number]) on 2/17/2022 2:21:24 PM    Referred By: DARSHAN   SELF           Confirmed By:JOSI QUILES MD         Assessment/Plan:     * Sepsis  Secondary to cellulitis/abscess as above  Failed outpatient bactrim and clindamycin  Needle aspiration done 02/18/2022 - sensitivities pending  General surgery consulted for I&D  Continuing IV vancomycin/cefepime for now  Repeat BC x2 pending  Tylenol prn fever    Cellulitis and abscess of upper extremity  Failed outpatient bactrim and clindamycin  Needle aspiration done 02/18/2022 - sensitivities pending  General surgery consulted for I&D  US of RUE soft tissue pending  Continuing IV vancomycin/cefepime for now  Repeat BC x2 pending  NPO in case of AM procedure  ESR/CRP pending  Holding home ASA, plavix in case of procedure - resume as appropriate  Provide adequate pain control  Tylenol prn fever    Acute on chronic diastolic heart failure  ECHO 04/27/2021 - EF 65%, Grade 1 DD   / Cxray - cardiomegaly with pulmonary vascular crowding  Pt requiring O2 2L - not on home O2  Cardiology consulted  Lasix 20mg x1 dose in ED - cautious use d/t worsening renal fxn  ECHO pending  Strict I&Os  Daily  weights          Acute renal failure superimposed on stage 3 chronic kidney disease  Likely secondary to diuretics/other medications in addition to HTN   Creatinine at 2.1 in ED / Baseline appears to be around 1.2  Monitor for fluid overload  Monitor renal function labs closely  Avoid nephrotoxins as able  Monitor I&Os  Holding home HCTZ, losartan for now      Hypertension associated with diabetes  Improved since ED arrival - possible pain component  Home medications resumed as appropriate   Hydralazine IV prn SBP > 170  Provide adequate pain control  VS per unit routine  Monitor closely        Hyperlipidemia associated with type 2 diabetes mellitus  Resumed home statin  Lipid panel pending    Type 2 diabetes mellitus with hyperglycemia  Pt with dexcom device to abdomen  A1c at 7.6 on 11/01/2021 / BG in ED at 304  Accuchecks with SSI prn   Holding home januvia  A1c pending        GERD (gastroesophageal reflux disease)  Resumed home PPI      Acquired hypothyroidism  Continue home synthroid  TSH pending        VTE Risk Mitigation (From admission, onward)         Ordered     Place sequential compression device  Until discontinued         02/21/22 0242                   Alva Leyva NP  Department of Hospital Medicine   '[place] - Emergency Dept.

## 2022-02-22 PROBLEM — I50.32 CHRONIC DIASTOLIC HEART FAILURE: Status: ACTIVE | Noted: 2022-01-01

## 2022-02-22 NOTE — HPI
80 y/o F with h/o angina, GERD, HLD, DM2, hypothyroidism, osteoporosis, CAD, CABG, cardiac stents admitted with worsening right upper arm abscess. She has been seen in the ER multiple times and was discharged on bactrim, but the abscess persisted and worsened. She was recently admitted where she underwent needle aspiration with IR on 2/18/22 and discharged home on clindamycin. Cultures from that specimen are growing MSSA. Daughter states her arm has failed to improve, however, and that it is quite painful.   Patient understands there is an increased risk of corneal edema after cataract surgery.

## 2022-02-22 NOTE — HPI
82 y/o WF with hx of angina, GERD, HLD, DM2, hypothyroidism, osteoporosis, CAD, CABG, cardiac stents admitted with worsening right upper arm abscess.This is her third Ed visit for similar complaints -as per her daughter. She - was initially sent home on bactrim with continued worsening, then was admitted from 02/16-02/18/2022, was on vancomycin, had a needle aspiration and was sent home on clindamycin .  Cultures revewed   2/18- MSSA-oxacillin CONNOR 1  She is febrile.  Component      Latest Ref Rng & Units 2/21/2022 2/20/2022 2/17/2022               WBC      3.90 - 12.70 K/uL 29.21 (H) 26.52 (H) 10.42

## 2022-02-22 NOTE — PT/OT/SLP PROGRESS
Physical Therapy      Patient Name:  Tiffanie Wise   MRN:  [id number]    RAGHAV GIORDANO INITIATED THIS AM VIA CHART REVIEW, PT GONE FOR SX., WILL COMPLETE RAGHAV GIORDANO ONCE PT MEDICALLY APPROPRIATE    Annette Reynolds, PT  2/22/2022  [id number]

## 2022-02-22 NOTE — ASSESSMENT & PLAN NOTE
- Plan for I&D in OR tomorrow with Dr. Parra  - NPO at midnight  - Antibiotics per ID  - Recommend strict glycemic control    Discussed with daughter at bedside.

## 2022-02-22 NOTE — PROGRESS NOTES
[facility]  Progress Note    Patient Name: Tiffanie Wise  MRN: [id number]  Patient Class: IP- Inpatient   Admission Date: 2/20/2022  Length of Stay: 1 days  Attending Physician: Jamee James MD  Primary Care Provider: Tiffanie Spence MD      Subjective:     Principal Problem: Severe sepsis secondary to right arm abscess      HPI:  80 y/o WF with hx of angina, GERD, HLD, DM2, hypothyroidism, osteoporosis, CAD, CABG, cardiac stents, HTN, CVA, diastolic HF, anemia, NAWAF, anxiety, depression, tonsillar CA, CKD3 to ED with c/o gradually increasing redness, warmth and pain to the R axillary area (site of known abscess) with associated fever and chills. Symptoms are progressive and of moderate severity with pain exacerbated by movement and palpation and no known mitigating factors. ROS is limited by pt's chronic expressive aphasia. Of note, per pt's daughter this is her third ED visit in the past 11 days with the same complaint - was initially sent home on bactrim with continued worsening, then was admitted from 02/16-02/18/2022, was on vancomycin, had a needle aspiration and was sent home on clindamycin but has again continued to worsen. Found in ED to have temperature of 102.1, H&H of 12.3&37.6, platelets of 333, , K+ 5.1, BUN 20, creatinine 2.1, GFFR 22, , , troponin negative, lactic acid 1.8; UA uninfected; Cxray showed cardiomegaly with pulmonary vascular crowding; COVID-19 negative. In ED the pt was placed on O2 at 2L per NC and was given IV vancomycin - temperature improved to 99.1, HR to 68, O2 saturation to 98%, and BP to 157/68. Hospital medicine was called and the pt was admitted with telemetry monitoring. Pt is a DNR - surrogate decision maker is SHELLI Worley.       Overview/Hospital Course:  The patient was monitored closely during her stay. She was kept on continuous telemetry monitoring. She was placed on Iv Abx for her RUE abscess. General Surgery  was consulted for I&D. ID was consulted for sepsis with failed outpatient po abx therapy. Nephrology was consulted for ELLIOTT with CKD stage 3, and Cardiology was consulted for acute CHF.   2/22 Patient S/p I&D right arm today.      Interval History:   Patient S/p I&D right arm today.    Review of Systems   Constitutional:  Positive for activity change and fatigue. Negative for appetite change and chills.   HENT:  Positive for hearing loss. Negative for ear discharge, ear pain and facial swelling.    Eyes:  Negative for pain and itching.   Respiratory:  Negative for cough and shortness of breath.    Cardiovascular:  Negative for chest pain, palpitations and leg swelling.   Gastrointestinal:  Negative for abdominal distention, abdominal pain, blood in stool, constipation, diarrhea, nausea and vomiting.   Endocrine: Negative for polydipsia and polyphagia.   Genitourinary:  Negative for difficulty urinating, dysuria, flank pain and hematuria.   Musculoskeletal:  Negative for neck pain and neck stiffness.   Skin:  Positive for wound.        Right arm dressing   Allergic/Immunologic: Negative for food allergies.   Neurological:  Negative for seizures, facial asymmetry, speech difficulty and weakness.   Hematological:  Does not bruise/bleed easily.   Psychiatric/Behavioral:  Negative for agitation, behavioral problems, confusion, hallucinations and suicidal ideas. The patient is not nervous/anxious.    Objective:     Vital Signs (Most Recent):  Temp: 98.4 °F (36.9 °C) (02/22/22 0844)  Pulse: 63 (02/22/22 0844)  Resp: 18 (02/22/22 0844)  BP: (!) 151/65 (02/22/22 0844)  SpO2: 96 % (02/22/22 0900)   Vital Signs (24h Range):  Temp:  [97.5 °F (36.4 °C)-98.6 °F (37 °C)] 98.4 °F (36.9 °C)  Pulse:  [49-78] 63  Resp:  [13-18] 18  SpO2:  [93 %-98 %] 96 %  BP: (106-167)/(46-72) 151/65     Weight: 76.5 kg (168 lb 10.4 oz)  Body mass index is 32.94 kg/m².    Intake/Output Summary (Last 24 hours) at 2/22/2022 1142  Last data filed at  2/22/2022 0733  Gross per 24 hour   Intake 686.29 ml   Output 15 ml   Net 671.29 ml      Physical Exam  Constitutional:       General: She is not in acute distress.     Appearance: She is not diaphoretic.   HENT:      Head: Normocephalic and atraumatic.      Ears:      Comments: Very [place]     Mouth/Throat:      Mouth: Mucous membranes are moist.   Eyes:      General:         Right eye: No discharge.         Left eye: No discharge.      Extraocular Movements: Extraocular movements intact.      Conjunctiva/sclera: Conjunctivae normal.      Pupils: Pupils are equal, round, and reactive to light.   Cardiovascular:      Rate and Rhythm: Normal rate and regular rhythm.      Pulses: Normal pulses.      Heart sounds: Normal heart sounds. No murmur heard.  Pulmonary:      Effort: Pulmonary effort is normal.      Breath sounds: Normal breath sounds. No wheezing, rhonchi or rales.   Abdominal:      General: Bowel sounds are normal. There is no distension.      Palpations: Abdomen is soft.      Tenderness: There is no abdominal tenderness. There is no guarding.   Genitourinary:     Comments: Not examined  Musculoskeletal:         General: Normal range of motion.      Cervical back: Normal range of motion and neck supple. No rigidity or tenderness.      Right lower leg: No edema.      Left lower leg: No edema.   Skin:     General: Skin is warm and dry.      Capillary Refill: Capillary refill takes less than 2 seconds.      Comments: Right arm dressing   Neurological:      General: No focal deficit present.      Mental Status: She is alert and oriented to person, place, and time. Mental status is at baseline.   Psychiatric:         Mood and Affect: Mood normal.         Behavior: Behavior normal.         Thought Content: Thought content normal.         Judgment: Judgment normal.       Lab Results   Component Value Date    WBC 13.81 (H) 02/22/2022    HGB 10.8 (L) 02/22/2022    HCT 33.3 (L) 02/22/2022    MCV 91 02/22/2022      02/22/2022       BMP  Lab Results   Component Value Date     02/22/2022     02/22/2022    K 4.2 02/22/2022    K 4.1 02/22/2022    CL 99 02/22/2022    CL 99 02/22/2022    CO2 29 02/22/2022    CO2 29 02/22/2022    BUN 23 02/22/2022    BUN 23 02/22/2022    CREATININE 1.7 (H) 02/22/2022    CREATININE 1.7 (H) 02/22/2022    CALCIUM 8.2 (L) 02/22/2022    CALCIUM 8.2 (L) 02/22/2022    ANIONGAP 9 02/22/2022    ANIONGAP 8 02/22/2022    ESTGFRAFRICA 32 (A) 02/22/2022    ESTGFRAFRICA 32 (A) 02/22/2022    EGFRNONAA 28 (A) 02/22/2022    EGFRNONAA 28 (A) 02/22/2022           Assessment/Plan:      Normocytic anemia  2/22 Add MVI      Hypoalbuminemia  2/22 Add po supplement      Debility  2/22 Fall and skin precautions  Turn Q 2 hours  Consult PT          Abscess of right arm S/p I&D 2/22/2022  S/p I&D 2/22/2022      Hypomagnesemia  2/21 Add 2 gm Iv mag  2/22 Add po mag oxide      Acute renal failure superimposed on stage 3 chronic kidney disease  2/21 Likely secondary to diuretics/other medications in addition to HTN   Creatinine at 2.1 in ED / Baseline appears to be around 1.2  Monitor for fluid overload  Monitor renal function labs closely  Avoid nephrotoxins as able  Monitor I&Os  Holding home HCTZ, losartan for now  2/22 Improved      Acute on chronic diastolic heart failure  2/21 ECHO 04/27/2021 - EF 65%, Grade 1 DD   / Cxray - cardiomegaly with pulmonary vascular crowding  Pt requiring O2 2L - not on home O2  Cardiology consulted  Lasix 20mg x1 dose in ED - cautious use d/t worsening renal fxn  ECHO pending  Strict I&Os  Daily weights  2/22 Cardiology following          Severe Sepsis with Cellulitis and abscess of right upper extremity  2/22   This patient does have evidence of infective focus  My overall impression is Severe  sepsis. Vital signs were reviewed and noted in progress note.  Antibiotics given-   Antibiotics (From admission, onward)            Start     Stop Route Frequency Ordered    02/21/22  1430  oxacillin 12 g in  mL CONTINUOUS INFUSION         -- IV Every 24 hours (non-standard times) 02/21/22 1255        Cultures were taken-   Microbiology Results (last 7 days)     Procedure Component Value Units Date/Time    Aerobic culture [[id number]] Collected: 02/22/22 0732    Order Status: Sent Specimen: Incision site from Arm, Right Updated: 02/22/22 0758    Blood culture #1 **CANNOT BE ORDERED STAT** [[id number]] Collected: 02/20/22 2311    Order Status: Completed Specimen: Blood from Peripheral, Forearm, Left Updated: 02/22/22 0613     Blood Culture, Routine No Growth to date      No Growth to date    Blood culture #2 **CANNOT BE ORDERED STAT** [[id number]] Collected: 02/20/22 2255    Order Status: Completed Specimen: Blood from Peripheral, Hand, Left Updated: 02/22/22 0613     Blood Culture, Routine No Growth to date      No Growth to date        Latest lactate reviewed, they are-  Recent Labs   Lab 02/20/22 2255   LACTATE 1.8       Organ dysfunction indicated by Acute kidney injury     Source- Right arm abscess    Source control Achieved by- IV abx , S/p I&D on 2/22/2022        S/p I&D 2/22/2022    CAD, multiple vessel  2/22 Stable      Hypertension associated with diabetes  2/21 Improved since ED arrival - possible pain component  Home medications resumed as appropriate   Hydralazine IV prn SBP > 170  Provide adequate pain control  VS per unit routine  2/22 Monitor closely          Acquired hypothyroidism  2/22 Continue home synthroid  Lab Results   Component Value Date    TSH 1.124 02/21/2022           Type 2 diabetes mellitus with hyperglycemia  2/21 Pt with dexcom device to abdomen  A1c at 7.6 on 11/01/2021 / BG in ED at 304  Accuchecks with SSI prn   Holding home januvia  A1c pending    2/22  Lab Results   Component Value Date    HGBA1C 7.2 (H) 02/21/2022    Blood sugars running 190-219-Add 10 units detemir sq qpm          Hyperlipidemia associated with type 2 diabetes mellitus  2/21 Resumed home  statin  2/22  Lab Results   Component Value Date    CHOL 113 (L) 02/21/2022    CHOL 166 06/14/2021    CHOL 130 12/11/2018     Lab Results   Component Value Date    HDL 45 02/21/2022    HDL 65 06/14/2021    HDL 54 12/11/2018     Lab Results   Component Value Date    LDLCALC 53.6 (L) 02/21/2022    LDLCALC 80.0 06/14/2021    LDLCALC 55.4 (L) 12/11/2018     Lab Results   Component Value Date    TRIG 72 02/21/2022    TRIG 105 06/14/2021    TRIG 103 12/11/2018     Lab Results   Component Value Date    CHOLHDL 39.8 02/21/2022    CHOLHDL 39.2 06/14/2021    CHOLHDL 41.5 12/11/2018         GERD (gastroesophageal reflux disease)  2/21 Resumed home PPI        VTE Risk Mitigation (From admission, onward)         Ordered     IP VTE HIGH RISK PATIENT  Once         02/22/22 0900     Place sequential compression device  Until discontinued         02/22/22 0900     Place sequential compression device  Until discontinued         02/21/22 0242                Discharge Planning   [place]:      Code Status: DNR   Is the patient medically ready for discharge?:     Reason for patient still in hospital (select all that apply): Treatment         Time spent seeing patient( greater than 1/2 spent in direct contact) :  38 minutes        ISRAEL Randhawa  Department of Hospital Medicine   [place] - Med Surg

## 2022-02-22 NOTE — ASSESSMENT & PLAN NOTE
She needs I and D of the right upper arm abscess-  Gen surgery consult .  Keep NP  Prelim cultures-MSSA-   Continue Oxacillin

## 2022-02-22 NOTE — ASSESSMENT & PLAN NOTE
2/21 Resumed home statin  2/22  Lab Results   Component Value Date    CHOL 113 (L) 02/21/2022    CHOL 166 06/14/2021    CHOL 130 12/11/2018     Lab Results   Component Value Date    HDL 45 02/21/2022    HDL 65 06/14/2021    HDL 54 12/11/2018     Lab Results   Component Value Date    LDLCALC 53.6 (L) 02/21/2022    LDLCALC 80.0 06/14/2021    LDLCALC 55.4 (L) 12/11/2018     Lab Results   Component Value Date    TRIG 72 02/21/2022    TRIG 105 06/14/2021    TRIG 103 12/11/2018     Lab Results   Component Value Date    CHOLHDL 39.8 02/21/2022    CHOLHDL 39.2 06/14/2021    CHOLHDL 41.5 12/11/2018

## 2022-02-22 NOTE — ASSESSMENT & PLAN NOTE
- P I&D in OR today  - Antibiotics per ID  - Recommend strict glycemic control    Consent obtained.    Risks include infection, bleeding, recurrence, need to create a large open wound    Discussed with daughter at bedside.

## 2022-02-22 NOTE — ANESTHESIA RELEASE NOTE
Anesthesia Release from PACU Note    Patient: Tiffanie Wise    Procedure(s) Performed: Procedure(s) (LRB):  INCISION AND DRAINAGE, UPPER EXTREMITY (Right)    Anesthesia type: general    Post pain: Adequate analgesia    Post assessment: no apparent anesthetic complications    Last Vitals:   Visit Vitals  BP (!) 140/60 (BP Location: Left arm, Patient Position: Lying)   Pulse (!) 50   Temp 36.4 °C (97.5 °F) (Axillary)   Resp 18   Ht 5' (1.524 m)   Wt 76.5 kg (168 lb 10.4 oz)   LMP  (LMP Unknown)   SpO2 97%   Breastfeeding No   BMI 32.94 kg/m²       Post vital signs: stable    Level of consciousness: awake    Nausea/Vomiting: no nausea/no vomiting    Complications: none    Airway Patency: patent    Respiratory: unassisted    Cardiovascular: stable and blood pressure at baseline    Hydration: euvolemic

## 2022-02-22 NOTE — ASSESSMENT & PLAN NOTE
2/21 ECHO 04/27/2021 - EF 65%, Grade 1 DD   / Cxray - cardiomegaly with pulmonary vascular crowding  Pt requiring O2 2L - not on home O2  Cardiology consulted  Lasix 20mg x1 dose in ED - cautious use d/t worsening renal fxn  ECHO pending  Strict I&Os  Daily weights  2/22 Cardiology following

## 2022-02-22 NOTE — CONSULTS
[facility] - Med [facility]  Infectious Disease  Consult Note    Patient Name: Tiffanie Wise  MRN: [id number]  Admission Date: 2/20/2022  Hospital Length of Stay: 0 days  Attending Physician: Jamee James MD  Primary Care Provider: Tiffanie Spence MD     Isolation Status: No active isolations    Patient information was obtained from relative(s) and ER records.      Consults  Assessment/Plan:     Abscess of right arm  Gen surgery consult .  Keep NPO   Continue Oxacillin      Acute renal failure superimposed on stage 3 chronic kidney disease  Nephrology follow up  Avoid nephrotoxic meds    Severe Sepsis with Cellulitis and abscess of right upper extremity  She needs I and D of the right upper arm abscess-  Gen surgery consult .  Keep NP  Prelim cultures-MSSA-   Continue Oxacillin     Hypertension associated with diabetes  BP control as per primary team    Hyperlipidemia associated with type 2 diabetes mellitus  Follow primary team        Thank you for your consult. I will follow-up with patient. Please contact us if you have any additional questions.    Manas Elliott MD  Infectious Disease  [facility]    Subjective:     Principal Problem: <principal problem not specified>    HPI:   82 y/o WF with hx of angina, GERD, HLD, DM2, hypothyroidism, osteoporosis, CAD, CABG, cardiac stents admitted with worsening right upper arm abscess.This is her third Ed visit for similar complaints -as per her daughter. She - was initially sent home on bactrim with continued worsening, then was admitted from 02/16-02/18/2022, was on vancomycin, had a needle aspiration and was sent home on clindamycin .  Cultures revewed   2/18- MSSA-oxacillin CONNOR 1  She is febrile.  Component      Latest Ref Rng & Units 2/21/2022 2/20/2022 2/17/2022               WBC      3.90 - 12.70 K/uL 29.21 (H) 26.52 (H) 10.42       Past Medical History:   Diagnosis Date    Anxiety     AP (angina pectoris) 7/18/2013    Arterial ischemic stroke, MCA  (middle cerebral artery), left, acute 12/15/2017    Asthma     CAD, multiple vessel 5/13/2021    Class 1 obesity due to excess calories with serious comorbidity and body mass index (BMI) of 30.0 to 30.9 in adult 3/15/2019    Coronary artery disease 7/18/2013    Depression     Diastolic heart failure     GERD (gastroesophageal reflux disease) 7/18/2013    History of PTCA 7/18/2013    Hypertension 7/18/2013    Hypothyroidism     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     Trouble in sleeping     Type 2 diabetes mellitus with ophthalmic manifestations     Urinary incontinence     pullups day, pads at night       Past Surgical History:   Procedure Laterality Date    BREAST SURGERY Left     benign cyst    CORONARY ANGIOPLASTY      CORONARY STENT PLACEMENT      x6-7 oer the yeqrs  last 12/17/14 BRIANDA to lcfx    EYE SURGERY Bilateral 2014    cataracts w/IOL   Dr Vance    FLUOROSCOPY N/A 1/20/2021    Procedure: G tube plaacement;  Surgeon: Shaka Cross MD;  Location[facility] CATH LAB;  Service: General;  Laterality: N/A;    HYSTERECTOMY  1970    vag hyst; ovaries intact    THYROID SURGERY      nodule benign, Dr Hankins    TUBAL LIGATION  1970       Review of patient's allergies indicates:  No Known Allergies    Medications:  Medications Prior to Admission   Medication Sig    amLODIPine (NORVASC) 10 MG tablet TAKE 1 TABLET (10 MG TOTAL) BY PER G TUBE ROUTE AS DIRECTED ONCE DAILY.    aspirin 81 MG Chew 1 tablet (81 mg total) by Per G Tube route once daily.    atorvastatin (LIPITOR) 40 MG tablet Take 1 tablet (40 mg total) by mouth every evening.    BLOOD SUGAR  DIAGNOSTIC (TRUETEST TEST STRIPS MISC) by Misc.(Non-Drug; Combo Route) route. Pt is testing 3 times a day    blood-glucose sensor (DEXCOM G6 SENSOR) Tanisha Replace sensor every 10 days    clindamycin (CLEOCIN) 300 MG capsule Take 1 capsule (300 mg total) by mouth 3 (three) times daily. for 7 days    clonazePAM (KLONOPIN) 0.5 MG tablet 1 tablet (0.5 mg total) by Per G Tube route every evening.    clopidogreL (PLAVIX) 75 mg tablet Take 1 tablet (75 mg total) by mouth once daily.    donepeziL (ARICEPT) 10 MG tablet TAKE 1 TABLET (10 MG TOTAL) BY MOUTH ONCE DAILY.    EScitalopram oxalate (LEXAPRO) 20 MG tablet TAKE 1 TABLET EVERY DAY    hydroCHLOROthiazide (HYDRODIURIL) 25 MG tablet Take 1 tablet (25 mg total) by mouth once daily.    HYDROcodone-acetaminophen (NORCO) 5-325 mg per tablet Take 1 tablet by mouth every 6 (six) hours as needed for Pain.    insulin aspart protamine-insulin aspart (NOVOLOG MIX 70-30FLEXPEN U-100) 100 unit/mL (70-30) InPn pen 40 bid    insulin aspart U-100 (NOVOLOG) 100 unit/mL (3 mL) InPn pen Inject up to 50 units per day, dispense 15 ml    isosorbide mononitrate (IMDUR) 30 MG 24 hr tablet Take 1 tablet (30 mg total) by mouth once daily. (Patient taking differently: Take 30 mg by mouth 2 (two) times a day.)    JANUVIA 100 mg Tab Take 100 mg by mouth once daily.    levothyroxine (SYNTHROID) 50 MCG tablet 1 tablet (50 mcg total) by Per G Tube route before breakfast.    losartan (COZAAR) 100 MG tablet Take 1 tablet (100 mg total) by mouth once daily.    morphine (MSIR) 15 MG tablet Take 0.5 tablets (7.5 mg total) by mouth every 4 (four) hours as needed for Pain.    nitroGLYCERIN (NITROSTAT) 0.4 MG SL tablet Place 1 tablet (0.4 mg total) under the tongue every 5 (five) minutes as needed.    pantoprazole (PROTONIX) 40 MG tablet TAKE 1 TABLET EVERY DAY    pregabalin (LYRICA) 75 MG capsule Take 75 mg by mouth 2 (two) times daily.    vitamin D 1000 units Tab Take 1,000 Units by mouth  once daily.     Antibiotics (From admission, onward)                Start     Stop Route Frequency Ordered    22 1430  oxacillin 12 g in  mL CONTINUOUS INFUSION         -- IV Every 24 hours (non-standard times) 22 1255          Antifungals (From admission, onward)                None          Antivirals (From admission, onward)      None             Immunization History   Administered Date(s) Administered    COVID-19, MRNA, LN-S, PF (Pfizer) (Purple Cap) 2021, 2021    Hepatitis B, Adult 2014, 2015, 2015    Influenza (FLUAD) - Quadrivalent - Adjuvanted - PF *Preferred* (65+) 2020    Influenza (FLUAD) - Trivalent - Adjuvanted - PF (65+) 2017    Influenza - High Dose - PF (65 years and older) 10/08/2013, 10/13/2016, 2018, 2019    Influenza - Quadrivalent 2017    Influenza - Trivalent (ADULT) 10/05/2006, 10/28/2008, 10/03/2011, 2014    Influenza - Trivalent - PF (ADULT) 10/05/2006, 10/28/2008, 10/03/2011, 2014    Influenza A (H1N1) 2009 Monovalent - IM 02/10/2010    Influenza Split 10/05/2006, 10/28/2008, 10/03/2011, 2014    PPD Test 2016    Pneumococcal Conjugate - 13 Valent 2015    Pneumococcal Polysaccharide - 23 Valent 2013    Tdap 2020    Zoster Recombinant 2020, 2021       Family History       Problem Relation (Age of Onset)    Alcohol abuse Sister    Cancer Son    Cirrhosis Sister    Diabetes Sister, Paternal Grandmother    Fibromyalgia Daughter, Daughter    Heart disease Mother    Kidney disease Father, Brother          Social History     Socioeconomic History    Marital status:    Tobacco Use    Smoking status: Former Smoker     Packs/day: 1.00     Years: 46.00     Pack years: 46.00     Types: Cigarettes     Start date:      Quit date: 2008     Years since quittin.6    Smokeless tobacco: Never Used   Substance and Sexual Activity    Alcohol  use: No    Drug use: Never    Sexual activity: Not Currently     Birth control/protection: None   Social History Narrative    ( 2nd marriage,  x 1). She has 4 children and 3 step children.. 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 She still drives. [employer] on file, code status is DNR.     Review of Systems   Reason unable to perform ROS: non verbal.   Objective:     Vital Signs (Most Recent):  Temp: 97.5 °F (36.4 °C) (02/21/22 1931)  Pulse: (!) 56 (02/21/22 1931)  Resp: 17 (02/21/22 1931)  BP: (!) 167/72 (02/21/22 1931)  SpO2: 97 % (02/21/22 1931)   Vital Signs (24h Range):  Temp:  [97.5 °F (36.4 °C)-102.1 °F (38.9 °C)] 97.5 °F (36.4 °C)  Pulse:  [55-91] 56  Resp:  [14-24] 17  SpO2:  [86 %-100 %] 97 %  BP: (138-182)/(61-93) 167/72     Weight: 69.9 kg (154 lb)  Body mass index is 30.08 kg/m².    Estimated Creatinine Clearance: 19.3 mL/min (A) (based on SCr of 2 mg/dL (H)).    Physical Exam  Vitals and nursing note reviewed.   Constitutional:       Appearance: She is well-developed.   HENT:      Head: Normocephalic and atraumatic.   Eyes:      Pupils: Pupils are equal, round, and reactive to light.   Neck:      Thyroid: No thyromegaly.      Trachea: No tracheal deviation.   Cardiovascular:      Rate and Rhythm: Normal rate and regular rhythm.   Pulmonary:      Effort: No respiratory distress.      Breath sounds: No wheezing or rales.   Abdominal:      General: Bowel sounds are normal. There is no distension.      Palpations: Abdomen is soft.      Tenderness: There is no abdominal tenderness.   Musculoskeletal:      Cervical back: Normal range of motion and neck supple.   Skin:     General: Skin is warm and dry.      Coloration: Skin is not pale.      Findings: Lesion present.      Comments: See pic   Neurological:      Mental Status: She is alert. She is confused.      Cranial Nerves: No cranial nerve deficit.      Coordination: Coordination  normal.         Significant Labs: CBC:   Recent Labs   Lab 02/20/22 2255 02/21/22  0645   WBC 26.52* 29.21*   HGB 12.3 11.9*   HCT 37.6 36.3*    322     CMP:   Recent Labs   Lab 02/20/22 2255 02/21/22  0645    136   K 5.1 4.6    101   CO2 26 25   * 254*   BUN 20 22   CREATININE 2.1* 2.0*   CALCIUM 8.6* 8.4*   PROT 6.9 6.3   ALBUMIN 3.0* 2.9*   BILITOT 0.5 0.7   ALKPHOS 103 101   AST 13 13   ALT 7* 8*   ANIONGAP 10 10   EGFRNONAA 22* 23*     Microbiology Results (last 7 days)       Procedure Component Value Units Date/Time    Blood culture #1 **CANNOT BE ORDERED STAT** [[id number]] Collected: 02/20/22 2311    Order Status: Completed Specimen: Blood from Peripheral, Forearm, Left Updated: 02/21/22 1115     Blood Culture, Routine No Growth to date    Blood culture #2 **CANNOT BE ORDERED STAT** [[id number]] Collected: 02/20/22 2255    Order Status: Completed Specimen: Blood from Peripheral, Hand, Left Updated: 02/21/22 1115     Blood Culture, Routine No Growth to date            Significant Imaging: I have reviewed all pertinent imaging results/findings within the past 24 hours.

## 2022-02-22 NOTE — SUBJECTIVE & OBJECTIVE
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     Trouble in sleeping     Type 2 diabetes mellitus with ophthalmic manifestations     Urinary incontinence     pullups day, pads at night       Past Surgical History:   Procedure Laterality Date    BREAST SURGERY Left     benign cyst    CORONARY ANGIOPLASTY      CORONARY STENT PLACEMENT      x6-7 oer the yeqrs  last 12/17/14 BRIANDA to lcfx    EYE SURGERY Bilateral 2014    cataracts w/IOL   Dr Vance    FLUOROSCOPY N/A 1/20/2021    Procedure: G tube plaacement;  Surgeon: Shaka Cross MD;  Location: [facility] CATH LAB;  Service: General;  Laterality: N/A;    HYSTERECTOMY  1970    vag hyst; ovaries intact    THYROID SURGERY      nodule benign, Dr Hankins    TUBAL LIGATION  1970       Review of patient's allergies indicates:  No Known Allergies    Medications:  Medications Prior to Admission   Medication Sig    amLODIPine (NORVASC) 10 MG tablet TAKE 1 TABLET (10 MG TOTAL) BY PER G TUBE ROUTE AS DIRECTED ONCE DAILY.    aspirin 81 MG Chew 1 tablet (81 mg total) by Per G Tube route once daily.    atorvastatin (LIPITOR) 40  MG tablet Take 1 tablet (40 mg total) by mouth every evening.    BLOOD SUGAR DIAGNOSTIC (TRUETEST TEST STRIPS MISC) by Misc.(Non-Drug; Combo Route) route. Pt is testing 3 times a day    blood-glucose sensor (DEXCOM G6 SENSOR) Tanisha Replace sensor every 10 days    clindamycin (CLEOCIN) 300 MG capsule Take 1 capsule (300 mg total) by mouth 3 (three) times daily. for 7 days    clonazePAM (KLONOPIN) 0.5 MG tablet 1 tablet (0.5 mg total) by Per G Tube route every evening.    clopidogreL (PLAVIX) 75 mg tablet Take 1 tablet (75 mg total) by mouth once daily.    donepeziL (ARICEPT) 10 MG tablet TAKE 1 TABLET (10 MG TOTAL) BY MOUTH ONCE DAILY.    EScitalopram oxalate (LEXAPRO) 20 MG tablet TAKE 1 TABLET EVERY DAY    hydroCHLOROthiazide (HYDRODIURIL) 25 MG tablet Take 1 tablet (25 mg total) by mouth once daily.    HYDROcodone-acetaminophen (NORCO) 5-325 mg per tablet Take 1 tablet by mouth every 6 (six) hours as needed for Pain.    insulin aspart protamine-insulin aspart (NOVOLOG MIX 70-30FLEXPEN U-100) 100 unit/mL (70-30) InPn pen 40 bid    insulin aspart U-100 (NOVOLOG) 100 unit/mL (3 mL) InPn pen Inject up to 50 units per day, dispense 15 ml    isosorbide mononitrate (IMDUR) 30 MG 24 hr tablet Take 1 tablet (30 mg total) by mouth once daily. (Patient taking differently: Take 30 mg by mouth 2 (two) times a day.)    JANUVIA 100 mg Tab Take 100 mg by mouth once daily.    levothyroxine (SYNTHROID) 50 MCG tablet 1 tablet (50 mcg total) by Per G Tube route before breakfast.    losartan (COZAAR) 100 MG tablet Take 1 tablet (100 mg total) by mouth once daily.    morphine (MSIR) 15 MG tablet Take 0.5 tablets (7.5 mg total) by mouth every 4 (four) hours as needed for Pain.    nitroGLYCERIN (NITROSTAT) 0.4 MG SL tablet Place 1 tablet (0.4 mg total) under the tongue every 5 (five) minutes as needed.    pantoprazole (PROTONIX) 40 MG tablet TAKE 1 TABLET EVERY DAY    pregabalin (LYRICA) 75 MG capsule Take 75 mg by mouth 2 (two) times  daily.    vitamin D 1000 units Tab Take 1,000 Units by mouth once daily.     Antibiotics (From admission, onward)                Start     Stop Route Frequency Ordered    22 1430  oxacillin 12 g in  mL CONTINUOUS INFUSION         -- IV Every 24 hours (non-standard times) 22 1255          Antifungals (From admission, onward)                None          Antivirals (From admission, onward)      None             Immunization History   Administered Date(s) Administered    COVID-19, MRNA, LN-S, PF (Pfizer) (Purple Cap) 2021, 2021    Hepatitis B, Adult 2014, 2015, 2015    Influenza (FLUAD) - Quadrivalent - Adjuvanted - PF *Preferred* (65+) 2020    Influenza (FLUAD) - Trivalent - Adjuvanted - PF (65+) 2017    Influenza - High Dose - PF (65 years and older) 10/08/2013, 10/13/2016, 2018, 2019    Influenza - Quadrivalent 2017    Influenza - Trivalent (ADULT) 10/05/2006, 10/28/2008, 10/03/2011, 2014    Influenza - Trivalent - PF (ADULT) 10/05/2006, 10/28/2008, 10/03/2011, 2014    Influenza A (H1N1) 2009 Monovalent - IM 02/10/2010    Influenza Split 10/05/2006, 10/28/2008, 10/03/2011, 2014    PPD Test 2016    Pneumococcal Conjugate - 13 Valent 2015    Pneumococcal Polysaccharide - 23 Valent 2013    Tdap 2020    Zoster Recombinant 2020, 2021       Family History       Problem Relation (Age of Onset)    Alcohol abuse Sister    Cancer Son    Cirrhosis Sister    Diabetes Sister, Paternal Grandmother    Fibromyalgia Daughter, Daughter    Heart disease Mother    Kidney disease Father, Brother          Social History     Socioeconomic History    Marital status:    Tobacco Use    Smoking status: Former Smoker     Packs/day: 1.00     Years: 46.00     Pack years: 46.00     Types: Cigarettes     Start date:      Quit date: 2008     Years since quittin.6    Smokeless tobacco: Never Used    Substance and Sexual Activity    Alcohol use: No    Drug use: Never    Sexual activity: Not Currently     Birth control/protection: None   Social History Narrative    ( 2nd marriage,  x 1). She has 4 children and 3 step children.. 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 She still drives. [employer] on file, code status is DNR.     Review of Systems   Reason unable to perform ROS: non verbal.   Objective:     Vital Signs (Most Recent):  Temp: 97.5 °F (36.4 °C) (02/21/22 1931)  Pulse: (!) 56 (02/21/22 1931)  Resp: 17 (02/21/22 1931)  BP: (!) 167/72 (02/21/22 1931)  SpO2: 97 % (02/21/22 1931)   Vital Signs (24h Range):  Temp:  [97.5 °F (36.4 °C)-102.1 °F (38.9 °C)] 97.5 °F (36.4 °C)  Pulse:  [55-91] 56  Resp:  [14-24] 17  SpO2:  [86 %-100 %] 97 %  BP: (138-182)/(61-93) 167/72     Weight: 69.9 kg (154 lb)  Body mass index is 30.08 kg/m².    Estimated Creatinine Clearance: 19.3 mL/min (A) (based on SCr of 2 mg/dL (H)).    Physical Exam  Vitals and nursing note reviewed.   Constitutional:       Appearance: She is well-developed.   HENT:      Head: Normocephalic and atraumatic.   Eyes:      Pupils: Pupils are equal, round, and reactive to light.   Neck:      Thyroid: No thyromegaly.      Trachea: No tracheal deviation.   Cardiovascular:      Rate and Rhythm: Normal rate and regular rhythm.   Pulmonary:      Effort: No respiratory distress.      Breath sounds: No wheezing or rales.   Abdominal:      General: Bowel sounds are normal. There is no distension.      Palpations: Abdomen is soft.      Tenderness: There is no abdominal tenderness.   Musculoskeletal:      Cervical back: Normal range of motion and neck supple.   Skin:     General: Skin is warm and dry.      Coloration: Skin is not pale.      Findings: Lesion present.      Comments: See pic   Neurological:      Mental Status: She is alert. She is confused.      Cranial Nerves: No cranial nerve  deficit.      Coordination: Coordination normal.         Significant Labs: CBC:   Recent Labs   Lab 02/20/22 2255 02/21/22  0645   WBC 26.52* 29.21*   HGB 12.3 11.9*   HCT 37.6 36.3*    322     CMP:   Recent Labs   Lab 02/20/22 2255 02/21/22  0645    136   K 5.1 4.6    101   CO2 26 25   * 254*   BUN 20 22   CREATININE 2.1* 2.0*   CALCIUM 8.6* 8.4*   PROT 6.9 6.3   ALBUMIN 3.0* 2.9*   BILITOT 0.5 0.7   ALKPHOS 103 101   AST 13 13   ALT 7* 8*   ANIONGAP 10 10   EGFRNONAA 22* 23*     Microbiology Results (last 7 days)       Procedure Component Value Units Date/Time    Blood culture #1 **CANNOT BE ORDERED STAT** [[id number]] Collected: 02/20/22 2311    Order Status: Completed Specimen: Blood from Peripheral, Forearm, Left Updated: 02/21/22 1115     Blood Culture, Routine No Growth to date    Blood culture #2 **CANNOT BE ORDERED STAT** [[id number]] Collected: 02/20/22 2255    Order Status: Completed Specimen: Blood from Peripheral, Hand, Left Updated: 02/21/22 1115     Blood Culture, Routine No Growth to date            Significant Imaging: I have reviewed all pertinent imaging results/findings within the past 24 hours.

## 2022-02-22 NOTE — ASSESSMENT & PLAN NOTE
2/21 Pt with dexcom device to abdomen  A1c at 7.6 on 11/01/2021 / BG in ED at 304  Accuchecks with SSI prn   Holding home januvia  A1c pending    2/22  Lab Results   Component Value Date    HGBA1C 7.2 (H) 02/21/2022    Blood sugars running 190-219-Add 10 units detemir sq qpm

## 2022-02-22 NOTE — SUBJECTIVE & OBJECTIVE
No current facility-administered medications on file prior to encounter.     Current Outpatient Medications on File Prior to Encounter   Medication Sig    amLODIPine (NORVASC) 10 MG tablet TAKE 1 TABLET (10 MG TOTAL) BY PER G TUBE ROUTE AS DIRECTED ONCE DAILY.    aspirin 81 MG Chew 1 tablet (81 mg total) by Per G Tube route once daily.    atorvastatin (LIPITOR) 40 MG tablet Take 1 tablet (40 mg total) by mouth every evening.    BLOOD SUGAR DIAGNOSTIC (TRUETEST TEST STRIPS MISC) by Misc.(Non-Drug; Combo Route) route. Pt is testing 3 times a day    blood-glucose sensor (DEXCOM G6 SENSOR) Tanisha Replace sensor every 10 days    clindamycin (CLEOCIN) 300 MG capsule Take 1 capsule (300 mg total) by mouth 3 (three) times daily. for 7 days    clonazePAM (KLONOPIN) 0.5 MG tablet 1 tablet (0.5 mg total) by Per G Tube route every evening.    clopidogreL (PLAVIX) 75 mg tablet Take 1 tablet (75 mg total) by mouth once daily.    donepeziL (ARICEPT) 10 MG tablet TAKE 1 TABLET (10 MG TOTAL) BY MOUTH ONCE DAILY.    EScitalopram oxalate (LEXAPRO) 20 MG tablet TAKE 1 TABLET EVERY DAY    hydroCHLOROthiazide (HYDRODIURIL) 25 MG tablet Take 1 tablet (25 mg total) by mouth once daily.    HYDROcodone-acetaminophen (NORCO) 5-325 mg per tablet Take 1 tablet by mouth every 6 (six) hours as needed for Pain.    insulin aspart protamine-insulin aspart (NOVOLOG MIX 70-30FLEXPEN U-100) 100 unit/mL (70-30) InPn pen 40 bid    insulin aspart U-100 (NOVOLOG) 100 unit/mL (3 mL) InPn pen Inject up to 50 units per day, dispense 15 ml    isosorbide mononitrate (IMDUR) 30 MG 24 hr tablet Take 1 tablet (30 mg total) by mouth once daily. (Patient taking differently: Take 30 mg by mouth 2 (two) times a day.)    JANUVIA 100 mg Tab Take 100 mg by mouth once daily.    levothyroxine (SYNTHROID) 50 MCG tablet 1 tablet (50 mcg total) by Per G Tube route before breakfast.    losartan (COZAAR) 100 MG tablet Take 1 tablet (100 mg total) by mouth once daily.    morphine  (MSIR) 15 MG tablet Take 0.5 tablets (7.5 mg total) by mouth every 4 (four) hours as needed for Pain.    nitroGLYCERIN (NITROSTAT) 0.4 MG SL tablet Place 1 tablet (0.4 mg total) under the tongue every 5 (five) minutes as needed.    pantoprazole (PROTONIX) 40 MG tablet TAKE 1 TABLET EVERY DAY    pregabalin (LYRICA) 75 MG capsule Take 75 mg by mouth 2 (two) times daily.    vitamin D 1000 units Tab Take 1,000 Units by mouth once daily.       Review of patient's allergies indicates:  No Known Allergies    Past Medical History:   Diagnosis Date    Anxiety     AP (angina pectoris) 7/18/2013    Arterial ischemic stroke, MCA (middle cerebral artery), left, acute 12/15/2017    Asthma     CAD, multiple vessel 5/13/2021    Class 1 obesity due to excess calories with serious comorbidity and body mass index (BMI) of 30.0 to 30.9 in adult 3/15/2019    Coronary artery disease 7/18/2013    Depression     Diastolic heart failure     GERD (gastroesophageal reflux disease) 7/18/2013    History of PTCA 7/18/2013    Hypertension 7/18/2013    Hypothyroidism     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     Trouble in sleeping     Type 2 diabetes mellitus with ophthalmic manifestations     Urinary incontinence     pullups day, pads at night     Past Surgical History:   Procedure Laterality Date    BREAST SURGERY Left     benign cyst    CORONARY ANGIOPLASTY      CORONARY STENT PLACEMENT      x6-7 oer the yeqrs  last 12/17/14 BRIANDA to lcfx    EYE SURGERY Bilateral 2014    cataracts w/IOL   Dr Vance    FLUOROSCOPY N/A 1/20/2021    Procedure: G tube plaacement;  Surgeon: Shaka Cross MD;  Location: [facility] CATH LAB;   Service: General;  Laterality: N/A;    HYSTERECTOMY      vag hyst; ovaries intact    THYROID SURGERY      nodule benign, Dr Hankins    TUBAL LIGATION       Family History       Problem Relation (Age of Onset)    Alcohol abuse Sister    Cancer Son    Cirrhosis Sister    Diabetes Sister, Paternal Grandmother    Fibromyalgia Daughter, Daughter    Heart disease Mother    Kidney disease Father, Brother          Tobacco Use    Smoking status: Former Smoker     Packs/day: 1.00     Years: 46.00     Pack years: 46.00     Types: Cigarettes     Start date:      Quit date: 2008     Years since quittin.6    Smokeless tobacco: Never Used   Substance and Sexual Activity    Alcohol use: No    Drug use: Never    Sexual activity: Not Currently     Birth control/protection: None     Review of Systems   Constitutional:  Positive for activity change, chills, fatigue and fever.   HENT:  Negative for nosebleeds.    Respiratory:  Negative for cough and shortness of breath.    Cardiovascular:  Negative for chest pain.   Gastrointestinal:  Negative for abdominal pain, nausea and vomiting.   Genitourinary:  Negative for dysuria.   Musculoskeletal:  Negative for neck stiffness.   Skin:  Positive for color change and wound.   Allergic/Immunologic: Negative for immunocompromised state.   Neurological:  Negative for syncope.   Hematological:  Bruises/bleeds easily.   Objective:     Vital Signs (Most Recent):  Temp: 97.5 °F (36.4 °C) (22)  Pulse: (!) 56 (22)  Resp: 17 (22)  BP: (!) 167/72 (22)  SpO2: 97 % (22)   Vital Signs (24h Range):  Temp:  [97.5 °F (36.4 °C)-102.1 °F (38.9 °C)] 97.5 °F (36.4 °C)  Pulse:  [55-91] 56  Resp:  [14-24] 17  SpO2:  [86 %-100 %] 97 %  BP: (138-182)/(61-93) 167/72     Weight: 69.9 kg (154 lb)  Body mass index is 30.08 kg/m².    Physical Exam  Vitals and nursing note reviewed.   Constitutional:       General: She is not in acute distress.      Appearance: She is obese.   HENT:      Head: Normocephalic and atraumatic.      Right Ear: External ear normal.      Left Ear: External ear normal.      Nose: Nose normal. No congestion or rhinorrhea.      Mouth/Throat:      Mouth: Mucous membranes are moist.      Pharynx: Oropharynx is clear.   Eyes:      General: No scleral icterus.        Right eye: No discharge.         Left eye: No discharge.      Extraocular Movements: Extraocular movements intact.      Conjunctiva/sclera: Conjunctivae normal.      Pupils: Pupils are equal, round, and reactive to light.   Cardiovascular:      Rate and Rhythm: Normal rate and regular rhythm.   Pulmonary:      Effort: Pulmonary effort is normal. No respiratory distress.      Breath sounds: No stridor.   Abdominal:      Palpations: Abdomen is soft.      Tenderness: There is no abdominal tenderness.   Musculoskeletal:         General: No deformity. Normal range of motion.      Cervical back: Normal range of motion and neck supple. No rigidity.   Skin:     General: Skin is warm.      Capillary Refill: Capillary refill takes less than 2 seconds.      Coloration: Skin is not jaundiced.      Findings: Erythema present.      Comments: Right upper extremity area of erythema, warmth, induration, and central area of fluctuance   Neurological:      Mental Status: She is alert and oriented to person, place, and time. Mental status is at baseline.      Comments: Hard of hearing       Significant Labs:  I have reviewed all pertinent lab results within the past 24 hours.  CBC:   Recent Labs   Lab 02/21/22  0645   WBC 29.21*   RBC 4.04   HGB 11.9*   HCT 36.3*      MCV 90   MCH 29.5   MCHC 32.8     BMP:   Recent Labs   Lab 02/21/22  0645   *      K 4.6      CO2 25   BUN 22   CREATININE 2.0*   CALCIUM 8.4*   MG 1.5*     CMP:   Recent Labs   Lab 02/21/22  0645   *   CALCIUM 8.4*   ALBUMIN 2.9*   PROT 6.3      K 4.6   CO2 25      BUN 22   CREATININE  2.0*   ALKPHOS 101   ALT 8*   AST 13   BILITOT 0.7     Coagulation: No results for input(s): LABPROT, INR, APTT in the last 168 hours.    Significant Diagnostics:  I have reviewed all pertinent imaging results/findings within the past 24 hours.

## 2022-02-22 NOTE — ASSESSMENT & PLAN NOTE
2/21 ECHO 04/27/2021 - EF 65%, Grade 1 DD   / Cxray - cardiomegaly with pulmonary vascular crowding  Pt requiring O2 2L - not on home O2  Cardiology consulted  Lasix 20mg x1 dose in ED - cautious use d/t worsening renal fxn  ECHO pending  Strict I&Os  Daily weights  2/22 Cardiology following  2/23 Stable

## 2022-02-22 NOTE — HOSPITAL COURSE
02/22/2022.  Patient for drainage of an abscess.  Slightly confused    02/23/2022.  Postop day 1, drainage of an arm abscess, less pain, still confused    02/24/2022.  Postop day 2.  Drainage of an arm abscess, slightly confused, tolerated dressing change    04/25/2022.  Postop day 3 drainage of an arm abscess.  Patient is still confused.  White count is decreasing.  Chest x-ray worsen from pneumonia.  The wound is actually improved

## 2022-02-22 NOTE — CONSULTS
[facility] Surg  General Surgery  Consult Note    Patient Name: Tiffanie Wise  MRN: [id number]  Code Status: DNR  Admission Date: 2/20/2022  Hospital Length of Stay: 0 days  Attending Physician: Jamee James MD  Primary Care Provider: Tiffanie Spence MD    Patient information was obtained from patient and relative(s).     Inpatient consult to General Surgery  Consult performed by: Kezia Ruth DO  Consult ordered by: Alva Leyva NP  Reason for consult: Right upper arm abscess  Assessment/Recommendations: - Plan for I&D in OR tomorrow with Dr. Parra  - NPO at midnight  - Antibiotics per ID  - Recommend strict glycemic control    Discussed with daughter at bedside.        Subjective:     Principal Problem: <principal problem not specified>    History of Present Illness: 82 y/o F with h/o angina, GERD, HLD, DM2, hypothyroidism, osteoporosis, CAD, CABG, cardiac stents admitted with worsening right upper arm abscess. She has been seen in the ER multiple times and was discharged on bactrim, but the abscess persisted and worsened. She was recently admitted where she underwent needle aspiration with IR on 2/18/22 and discharged home on clindamycin. Cultures from that specimen are growing MSSA. Daughter states her arm has failed to improve, however, and that it is quite painful.      No current facility-administered medications on file prior to encounter.     Current Outpatient Medications on File Prior to Encounter   Medication Sig    amLODIPine (NORVASC) 10 MG tablet TAKE 1 TABLET (10 MG TOTAL) BY PER G TUBE ROUTE AS DIRECTED ONCE DAILY.    aspirin 81 MG Chew 1 tablet (81 mg total) by Per G Tube route once daily.    atorvastatin (LIPITOR) 40 MG tablet Take 1 tablet (40 mg total) by mouth every evening.    BLOOD SUGAR DIAGNOSTIC (TRUETEST TEST STRIPS MISC) by Misc.(Non-Drug; Combo Route) route. Pt is testing 3 times a day    blood-glucose sensor (DEXCOM G6 SENSOR) Tanisha Replace sensor  every 10 days    clindamycin (CLEOCIN) 300 MG capsule Take 1 capsule (300 mg total) by mouth 3 (three) times daily. for 7 days    clonazePAM (KLONOPIN) 0.5 MG tablet 1 tablet (0.5 mg total) by Per G Tube route every evening.    clopidogreL (PLAVIX) 75 mg tablet Take 1 tablet (75 mg total) by mouth once daily.    donepeziL (ARICEPT) 10 MG tablet TAKE 1 TABLET (10 MG TOTAL) BY MOUTH ONCE DAILY.    EScitalopram oxalate (LEXAPRO) 20 MG tablet TAKE 1 TABLET EVERY DAY    hydroCHLOROthiazide (HYDRODIURIL) 25 MG tablet Take 1 tablet (25 mg total) by mouth once daily.    HYDROcodone-acetaminophen (NORCO) 5-325 mg per tablet Take 1 tablet by mouth every 6 (six) hours as needed for Pain.    insulin aspart protamine-insulin aspart (NOVOLOG MIX 70-30FLEXPEN U-100) 100 unit/mL (70-30) InPn pen 40 bid    insulin aspart U-100 (NOVOLOG) 100 unit/mL (3 mL) InPn pen Inject up to 50 units per day, dispense 15 ml    isosorbide mononitrate (IMDUR) 30 MG 24 hr tablet Take 1 tablet (30 mg total) by mouth once daily. (Patient taking differently: Take 30 mg by mouth 2 (two) times a day.)    JANUVIA 100 mg Tab Take 100 mg by mouth once daily.    levothyroxine (SYNTHROID) 50 MCG tablet 1 tablet (50 mcg total) by Per G Tube route before breakfast.    losartan (COZAAR) 100 MG tablet Take 1 tablet (100 mg total) by mouth once daily.    morphine (MSIR) 15 MG tablet Take 0.5 tablets (7.5 mg total) by mouth every 4 (four) hours as needed for Pain.    nitroGLYCERIN (NITROSTAT) 0.4 MG SL tablet Place 1 tablet (0.4 mg total) under the tongue every 5 (five) minutes as needed.    pantoprazole (PROTONIX) 40 MG tablet TAKE 1 TABLET EVERY DAY    pregabalin (LYRICA) 75 MG capsule Take 75 mg by mouth 2 (two) times daily.    vitamin D 1000 units Tab Take 1,000 Units by mouth once daily.       Review of patient's allergies indicates:  No Known Allergies    Past Medical History:   Diagnosis Date    Anxiety     AP (angina pectoris) 7/18/2013     Arterial ischemic stroke, MCA (middle cerebral artery), left, acute 12/15/2017    Asthma     CAD, multiple vessel 5/13/2021    Class 1 obesity due to excess calories with serious comorbidity and body mass index (BMI) of 30.0 to 30.9 in adult 3/15/2019    Coronary artery disease 7/18/2013    Depression     Diastolic heart failure     GERD (gastroesophageal reflux disease) 7/18/2013    History of PTCA 7/18/2013    Hypertension 7/18/2013    Hypothyroidism     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     Trouble in sleeping     Type 2 diabetes mellitus with ophthalmic manifestations     Urinary incontinence     pullups day, pads at night     Past Surgical History:   Procedure Laterality Date    BREAST SURGERY Left     benign cyst    CORONARY ANGIOPLASTY      CORONARY STENT PLACEMENT      x6-7 oer the yeqrs  last 12/17/14 BRIANDA to lcfx    EYE SURGERY Bilateral 2014    cataracts w/IOL   Dr Vance    FLUOROSCOPY N/A 1/20/2021    Procedure: G tube plaacement;  Surgeon: Shaka Cross MD;  Location: [facility] CATH LAB;  Service: General;  Laterality: N/A;    HYSTERECTOMY  1970    vag hyst; ovaries intact    THYROID SURGERY      nodule benign, Dr Hankins    TUBAL LIGATION  1970     Family History       Problem Relation (Age of Onset)    Alcohol abuse Sister    Cancer Son    Cirrhosis Sister    Diabetes Sister, Paternal Grandmother    Fibromyalgia Daughter, Daughter    Heart disease Mother    Kidney disease Father, Brother          Tobacco Use    Smoking status: Former Smoker     Packs/day: 1.00     Years: 46.00     Pack years: 46.00     Types: Cigarettes     Start date:       Quit date: 2008     Years since quittin.6    Smokeless tobacco: Never Used   Substance and Sexual Activity    Alcohol use: No    Drug use: Never    Sexual activity: Not Currently     Birth control/protection: None     Review of Systems   Constitutional:  Positive for activity change, chills, fatigue and fever.   HENT:  Negative for nosebleeds.    Respiratory:  Negative for cough and shortness of breath.    Cardiovascular:  Negative for chest pain.   Gastrointestinal:  Negative for abdominal pain, nausea and vomiting.   Genitourinary:  Negative for dysuria.   Musculoskeletal:  Negative for neck stiffness.   Skin:  Positive for color change and wound.   Allergic/Immunologic: Negative for immunocompromised state.   Neurological:  Negative for syncope.   Hematological:  Bruises/bleeds easily.   Objective:     Vital Signs (Most Recent):  Temp: 97.5 °F (36.4 °C) (22)  Pulse: (!) 56 (22)  Resp: 17 (22)  BP: (!) 167/72 (22)  SpO2: 97 % (22)   Vital Signs (24h Range):  Temp:  [97.5 °F (36.4 °C)-102.1 °F (38.9 °C)] 97.5 °F (36.4 °C)  Pulse:  [55-91] 56  Resp:  [14-24] 17  SpO2:  [86 %-100 %] 97 %  BP: (138-182)/(61-93) 167/72     Weight: 69.9 kg (154 lb)  Body mass index is 30.08 kg/m².    Physical Exam  Vitals and nursing note reviewed.   Constitutional:       General: She is not in acute distress.     Appearance: She is obese.   HENT:      Head: Normocephalic and atraumatic.      Right Ear: External ear normal.      Left Ear: External ear normal.      Nose: Nose normal. No congestion or rhinorrhea.      Mouth/Throat:      Mouth: Mucous membranes are moist.      Pharynx: Oropharynx is clear.   Eyes:      General: No scleral icterus.        Right eye: No discharge.         Left eye: No discharge.      Extraocular Movements: Extraocular movements intact.      Conjunctiva/sclera: Conjunctivae normal.      Pupils: Pupils are equal, round, and reactive  to light.   Cardiovascular:      Rate and Rhythm: Normal rate and regular rhythm.   Pulmonary:      Effort: Pulmonary effort is normal. No respiratory distress.      Breath sounds: No stridor.   Abdominal:      Palpations: Abdomen is soft.      Tenderness: There is no abdominal tenderness.   Musculoskeletal:         General: No deformity. Normal range of motion.      Cervical back: Normal range of motion and neck supple. No rigidity.   Skin:     General: Skin is warm.      Capillary Refill: Capillary refill takes less than 2 seconds.      Coloration: Skin is not jaundiced.      Findings: Erythema present.      Comments: Right upper extremity area of erythema, warmth, induration, and central area of fluctuance   Neurological:      Mental Status: She is alert and oriented to person, place, and time. Mental status is at baseline.      Comments: Hard of hearing       Significant Labs:  I have reviewed all pertinent lab results within the past 24 hours.  CBC:   Recent Labs   Lab 02/21/22  0645   WBC 29.21*   RBC 4.04   HGB 11.9*   HCT 36.3*      MCV 90   MCH 29.5   MCHC 32.8     BMP:   Recent Labs   Lab 02/21/22  0645   *      K 4.6      CO2 25   BUN 22   CREATININE 2.0*   CALCIUM 8.4*   MG 1.5*     CMP:   Recent Labs   Lab 02/21/22  0645   *   CALCIUM 8.4*   ALBUMIN 2.9*   PROT 6.3      K 4.6   CO2 25      BUN 22   CREATININE 2.0*   ALKPHOS 101   ALT 8*   AST 13   BILITOT 0.7     Coagulation: No results for input(s): LABPROT, INR, APTT in the last 168 hours.    Significant Diagnostics:  I have reviewed all pertinent imaging results/findings within the past 24 hours.      Assessment/Plan:     Acute renal failure superimposed on stage 3 chronic kidney disease  Medical management    Acute on chronic diastolic heart failure  Medical management    Severe Sepsis with Cellulitis and abscess of right upper extremity  - Plan for I&D in OR tomorrow with Dr. Parra  - DOLORESO at  midnight  - Antibiotics per ID  - Recommend strict glycemic control    Discussed with daughter at bedside.    CAD, multiple vessel  Medical management  Please continue to hold Plavix before surgery    Hypertension associated with diabetes  Medical management    Type 2 diabetes mellitus with hyperglycemia  Recommend strict glycemic control, defer management to medicine.    Hyperlipidemia associated with type 2 diabetes mellitus  Medical management      VTE Risk Mitigation (From admission, onward)         Ordered     Place sequential compression device  Until discontinued         02/21/22 6428                Thank you for your consult.     Kezia Ruth, DO  General Surgery  [facility]

## 2022-02-22 NOTE — ANESTHESIA PROCEDURE NOTES
Intubation    Date/Time: 2/22/2022 7:08 AM  Performed by: Hung Cardozo CRNA  Authorized by: Kole Layton II, MD     Intubation:     Induction:  Intravenous    Intubated:  Postinduction    Mask Ventilation:  Easy mask    Attempts:  1    Attempted By:  CRNA    Difficult Airway Encountered?: No      Complications:  None    Airway Device:  Supraglottic airway/LMA    Airway Device Size:  3.0    Style/Cuff Inflation:  Cuffed (inflated to minimal occlusive pressure)    Inflation Amount (mL):  8    Secured at:  The lips    Placement Verified By:  Capnometry    Complicating Factors:  None    Findings Post-Intubation:  BS equal bilateral

## 2022-02-22 NOTE — PLAN OF CARE
Problem: Adult Inpatient Plan of Care  Goal: Plan of Care Review  Outcome: Ongoing, Progressing  Goal: Patient-Specific Goal (Individualized)  Outcome: Ongoing, Progressing  Goal: Absence of Hospital-Acquired Illness or Injury  Outcome: Ongoing, Progressing  Goal: Optimal Comfort and Wellbeing  Outcome: Ongoing, Progressing  Goal: Readiness for Transition of Care  Outcome: Ongoing, Progressing     Problem: Diabetes Comorbidity  Goal: Blood Glucose Level Within Targeted Range  Outcome: Ongoing, Progressing     Problem: Adjustment to Illness (Sepsis/Septic Shock)  Goal: Optimal Coping  Outcome: Ongoing, Progressing     Problem: Bleeding (Sepsis/Septic Shock)  Goal: Absence of Bleeding  Outcome: Ongoing, Progressing     Problem: Glycemic Control Impaired (Sepsis/Septic Shock)  Goal: Blood Glucose Level Within Desired Range  Outcome: Ongoing, Progressing     Problem: Infection Progression (Sepsis/Septic Shock)  Goal: Absence of Infection Signs and Symptoms  Outcome: Ongoing, Progressing     Problem: Nutrition Impaired (Sepsis/Septic Shock)  Goal: Optimal Nutrition Intake  Outcome: Ongoing, Progressing     Problem: Fluid and Electrolyte Imbalance (Acute Kidney Injury/Impairment)  Goal: Fluid and Electrolyte Balance  Outcome: Ongoing, Progressing     Problem: Oral Intake Inadequate (Acute Kidney Injury/Impairment)  Goal: Optimal Nutrition Intake  Outcome: Ongoing, Progressing     Problem: Renal Function Impairment (Acute Kidney Injury/Impairment)  Goal: Effective Renal Function  Outcome: Ongoing, Progressing

## 2022-02-22 NOTE — ASSESSMENT & PLAN NOTE
2/21 Improved since ED arrival - possible pain component  Home medications resumed as appropriate   Hydralazine IV prn SBP > 170  Provide adequate pain control  VS per unit routine  2/22 Monitor closely

## 2022-02-22 NOTE — TRANSFER OF CARE
Anesthesia Transfer of Care Note    Patient: Tiffanie Wise    Procedure(s) Performed: Procedure(s) (LRB):  INCISION AND DRAINAGE, UPPER EXTREMITY (Right)    Patient location: PACU    Anesthesia Type: general    Transport from OR: Transported from OR on room air with adequate spontaneous ventilation    Post pain: adequate analgesia    Post assessment: no apparent anesthetic complications    Post vital signs: stable    Level of consciousness: awake    Nausea/Vomiting: no nausea/vomiting    Complications: none    Transfer of care protocol was followed      Last vitals:   Visit Vitals  BP (!) 140/60 (BP Location: Left arm, Patient Position: Lying)   Pulse (!) 50   Temp 36.4 °C (97.5 °F) (Axillary)   Resp 18   Ht 5' (1.524 m)   Wt 76.5 kg (168 lb 10.4 oz)   LMP  (LMP Unknown)   SpO2 97%   Breastfeeding No   BMI 32.94 kg/m²

## 2022-02-22 NOTE — SUBJECTIVE & OBJECTIVE
Interval History:   Patient S/p I&D right arm today.    Review of Systems   Constitutional:  Positive for activity change and fatigue. Negative for appetite change and chills.   HENT:  Positive for hearing loss. Negative for ear discharge, ear pain and facial swelling.    Eyes:  Negative for pain and itching.   Respiratory:  Negative for cough and shortness of breath.    Cardiovascular:  Negative for chest pain, palpitations and leg swelling.   Gastrointestinal:  Negative for abdominal distention, abdominal pain, blood in stool, constipation, diarrhea, nausea and vomiting.   Endocrine: Negative for polydipsia and polyphagia.   Genitourinary:  Negative for difficulty urinating, dysuria, flank pain and hematuria.   Musculoskeletal:  Negative for neck pain and neck stiffness.   Skin:  Positive for wound.        Right arm dressing   Allergic/Immunologic: Negative for food allergies.   Neurological:  Negative for seizures, facial asymmetry, speech difficulty and weakness.   Hematological:  Does not bruise/bleed easily.   Psychiatric/Behavioral:  Negative for agitation, behavioral problems, confusion, hallucinations and suicidal ideas. The patient is not nervous/anxious.    Objective:     Vital Signs (Most Recent):  Temp: 98.4 °F (36.9 °C) (02/22/22 0844)  Pulse: 63 (02/22/22 0844)  Resp: 18 (02/22/22 0844)  BP: (!) 151/65 (02/22/22 0844)  SpO2: 96 % (02/22/22 0900)   Vital Signs (24h Range):  Temp:  [97.5 °F (36.4 °C)-98.6 °F (37 °C)] 98.4 °F (36.9 °C)  Pulse:  [49-78] 63  Resp:  [13-18] 18  SpO2:  [93 %-98 %] 96 %  BP: (106-167)/(46-72) 151/65     Weight: 76.5 kg (168 lb 10.4 oz)  Body mass index is 32.94 kg/m².    Intake/Output Summary (Last 24 hours) at 2/22/2022 1142  Last data filed at 2/22/2022 0733  Gross per 24 hour   Intake 686.29 ml   Output 15 ml   Net 671.29 ml      Physical Exam  Constitutional:       General: She is not in acute distress.     Appearance: She is not diaphoretic.   HENT:      Head:  Normocephalic and atraumatic.      Ears:      Comments: Very [place]     Mouth/Throat:      Mouth: Mucous membranes are moist.   Eyes:      General:         Right eye: No discharge.         Left eye: No discharge.      Extraocular Movements: Extraocular movements intact.      Conjunctiva/sclera: Conjunctivae normal.      Pupils: Pupils are equal, round, and reactive to light.   Cardiovascular:      Rate and Rhythm: Normal rate and regular rhythm.      Pulses: Normal pulses.      Heart sounds: Normal heart sounds. No murmur heard.  Pulmonary:      Effort: Pulmonary effort is normal.      Breath sounds: Normal breath sounds. No wheezing, rhonchi or rales.   Abdominal:      General: Bowel sounds are normal. There is no distension.      Palpations: Abdomen is soft.      Tenderness: There is no abdominal tenderness. There is no guarding.   Genitourinary:     Comments: Not examined  Musculoskeletal:         General: Normal range of motion.      Cervical back: Normal range of motion and neck supple. No rigidity or tenderness.      Right lower leg: No edema.      Left lower leg: No edema.   Skin:     General: Skin is warm and dry.      Capillary Refill: Capillary refill takes less than 2 seconds.      Comments: Right arm dressing   Neurological:      General: No focal deficit present.      Mental Status: She is alert and oriented to person, place, and time. Mental status is at baseline.   Psychiatric:         Mood and Affect: Mood normal.         Behavior: Behavior normal.         Thought Content: Thought content normal.         Judgment: Judgment normal.       Lab Results   Component Value Date    WBC 13.81 (H) 02/22/2022    HGB 10.8 (L) 02/22/2022    HCT 33.3 (L) 02/22/2022    MCV 91 02/22/2022     02/22/2022       BMP  Lab Results   Component Value Date     02/22/2022     02/22/2022    K 4.2 02/22/2022    K 4.1 02/22/2022    CL 99 02/22/2022    CL 99 02/22/2022    CO2 29 02/22/2022    CO2 29 02/22/2022     BUN 23 02/22/2022    BUN 23 02/22/2022    CREATININE 1.7 (H) 02/22/2022    CREATININE 1.7 (H) 02/22/2022    CALCIUM 8.2 (L) 02/22/2022    CALCIUM 8.2 (L) 02/22/2022    ANIONGAP 9 02/22/2022    ANIONGAP 8 02/22/2022    ESTGFRAFRICA 32 (A) 02/22/2022    ESTGFRAFRICA 32 (A) 02/22/2022    EGFRNONAA 28 (A) 02/22/2022    EGFRNONAA 28 (A) 02/22/2022

## 2022-02-22 NOTE — ANESTHESIA PREPROCEDURE EVALUATION
02/22/2022  Tiffanie Wise is a 81 y.o., female.    82 y/o F with h/o angina, GERD, HLD, DM2, hypothyroidism, osteoporosis, CAD, CABG, cardiac stents admitted with worsening right upper arm abscess    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    Anxiety and depression    Pulmonary nodule, right - stable    Left ventricular diastolic dysfunction with preserved systolic function    Long-term insulin use in type 2 diabetes    CAD with remote CABG x 1V (LIMA-LAD) in 6/2016 + PCI of RCA and LCx    Hypertension associated with diabetes    Unspecified vitamin D deficiency    Amnesia    History of arterial ischemic stroke, left MCA (middle cerebral artery) 12/2017    Aortic arch atherosclerosis    Arthritis of hand    Microcytic anemia    Seizure    Overweight (BMI 25.0-29.9)    History of PTCA    Malignant neoplasm of pharyngeal tonsil    Dehydration    Encounter for palliative care    Cancer associated pain    Severe Oropharyngeal dysphagia sec to XRT for Tonsilar Ca    Therapeutic opioid-induced constipation (OIC)    History of stroke    CAD, multiple vessel    S/P CABG (coronary artery bypass graft)    Nonrheumatic aortic valve insufficiency    Diastolic dysfunction    Iron deficiency anemia secondary to inadequate dietary iron intake    History of fall    Class 1 obesity due to excess calories with serious comorbidity and body mass index (BMI) of 30.0 to 30.9 in adult    Cellulitis    Severe Sepsis with Cellulitis and abscess of right upper extremity    Acute on chronic diastolic heart failure    Acute renal failure superimposed on stage 3 chronic  kidney disease    Hypomagnesemia    Abscess of right arm    Debility    Hypoalbuminemia    Normocytic anemia     Past Surgical History:   Procedure Laterality Date    BREAST SURGERY Left     benign cyst    CORONARY ANGIOPLASTY      CORONARY STENT PLACEMENT      x6-7 oer the yeqrs  last 12/17/14 BRIANDA to lcfx    EYE SURGERY Bilateral 2014    cataracts w/IOL   Dr Vance    FLUOROSCOPY N/A 1/20/2021    Procedure: G tube plaacement;  Surgeon: Shaka Cross MD;  [facility];  Service: General;  Laterality: N/A;    HYSTERECTOMY  1970    vag hyst; ovaries intact    THYROID SURGERY      nodule benign, Dr Hankins    TUBAL LIGATION  1970       Pre-op Assessment    I have reviewed the Patient Summary Reports.     I have reviewed the Nursing Notes. I have reviewed the NPO Status.   I have reviewed the Medications.     Review of Systems  Anesthesia Hx:  No problems with previous Anesthesia    Social:  Non-Smoker    Hematology/Oncology:  Hematology Normal        Cardiovascular:   Hypertension CAD  CABG/stent  Angina ECG has been reviewed.    Pulmonary:   Asthma Sleep Apnea    Renal/:  Renal/ Normal     Hepatic/GI:   GERD    Neurological:   CVA Seizures    Endocrine:   Diabetes Hypothyroidism    Psych:   anxiety          Physical Exam  General: Well nourished    Airway:  Mallampati: II   Mouth Opening: Normal  TM Distance: Normal  Neck ROM: Normal ROM    Dental:  Intact    Chest/Lungs:  Clear to auscultation    Heart:  Rate: Normal  Rhythm: Regular Rhythm        Anesthesia Plan  Type of Anesthesia, risks & benefits discussed:    Anesthesia Type: MAC, Gen Supraglottic Airway, Gen Natural Airway  Intra-op Monitoring Plan: Standard ASA Monitors  Post Op Pain Control Plan: multimodal analgesia  Induction:  IV  Airway Plan: , Post-Induction  Informed Consent: Informed consent signed with the Patient representative and all parties understand the risks and agree with anesthesia plan.  All questions  answered.   ASA Score: 3  Anesthesia Plan Notes: Pt is hard of hearing.  She has had radiation to her neck last February.  She has not been put to sleep since.  We will attempt to avoid an intubation.  Will place LMA versus MAC.    Ready For Surgery From Anesthesia Perspective.     .    Chemistry        Component Value Date/Time     02/22/2022 0448    K 4.1 02/22/2022 0448    CL 99 02/22/2022 0448    CO2 29 02/22/2022 0448    BUN 23 02/22/2022 0448    CREATININE 1.7 (H) 02/22/2022 0448     (H) 02/22/2022 0448        Component Value Date/Time    CALCIUM 8.2 (L) 02/22/2022 0448    ALKPHOS 101 02/22/2022 0448    AST 19 02/22/2022 0448    ALT 14 02/22/2022 0448    BILITOT 0.4 02/22/2022 0448    ESTGFRAFRICA 32 (A) 02/22/2022 0448    EGFRNONAA 28 (A) 02/22/2022 0448        Lab Results   Component Value Date    WBC 29.21 (H) 02/21/2022    HGB 11.9 (L) 02/21/2022    HCT 36.3 (L) 02/21/2022    MCV 90 02/21/2022     02/21/2022     Echo 2/21/22:    · The left ventricle is normal in size with concentric remodeling and normal systolic function.  · The estimated ejection fraction is 60%.  · Normal left ventricular diastolic function.  · Normal right ventricular size with normal right ventricular systolic function.  · Normal central venous pressure (3 mmHg).  · The estimated PA systolic pressure is 41 mmHg.  · There is mild pulmonary hypertension.  · There is mild aortic valve stenosis.  · Aortic valve area is 1.83 cm2; peak velocity is 1.59 m/s; mean gradient is 6 mmHg.      Latest Reference Range & Units 12/13/16 13:35   SPIROMETRY WITH/WITHOUT BRONCHODILATOR  Rpt !   Pre FVC 1.9 - 2.49 L 1.61 (L)   Pre FEV1 1.38 - 1.87 L 1.18 (L)   Pre FEV1 FVC % 73   !: Data is abnormal  (L): Data is abnormally low  Rpt: View report in Results Review for more information

## 2022-02-22 NOTE — CONSULTS
[facility] [facility] Surg  Nephrology  Consult Note    Patient Name: Tiffanie Wise  MRN: [id number]  Admission Date: 2/20/2022  Hospital Length of Stay: 1 days  Attending Provider: Jamee James MD   Primary Care Physician: Tiffanie Spence MD  Principal Problem:<principal problem not specified>    Consults  Subjective:     HPI:     81-year-old female with history of diabetes mellitus and hypertension admitted with an abscess in her right axilla.  Noted to have elevated creatinine over baseline.  Nephrology has been consulted for evaluation.  Patient was seen in her hospital room.  In bed resting comfortably.  No acute distress noted.  Her daughter was at the bedside.  She relates that her mother was admitted several days ago for the same problem.  About a week ago she was started on Bactrim which she took for about 6 days.  She stopped taking in on Wednesday of last week.  She was treated with vancomycin for a couple of days and then discharged on clindamycin.  She presented back to the emergency department yesterday with fever and pain in her right axilla.  Her daughter relates no change in appetite.  She was eating and drinking as per usual.  No nausea vomiting or diarrhea related.    02/22/2022:  Underwent surgical I&D of her abscess site this morning.    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     Trouble in sleeping     Type 2 diabetes mellitus with ophthalmic manifestations     Urinary incontinence     pullups day, pads at night       Past Surgical History:   Procedure Laterality Date    BREAST SURGERY Left     benign cyst    CORONARY ANGIOPLASTY      CORONARY STENT PLACEMENT      x6-7 oer the yeqrs  last 12/17/14 BRIANDA to lcfx    EYE SURGERY Bilateral 2014    cataracts w/IOL   Dr Vance    FLUOROSCOPY N/A 1/20/2021    Procedure: G tube plaacement;  Surgeon: Shaka Cross MD;  Location: [facility] CATH LAB;  Service: General;  Laterality: N/A;    HYSTERECTOMY  1970    vag hyst; ovaries intact    THYROID SURGERY      nodule benign, Dr Hankins    TUBAL LIGATION  1970       Review of patient's allergies indicates:  No Known Allergies  Current Facility-Administered Medications   Medication Frequency    0.9%  NaCl infusion PRN    acetaminophen tablet 650 mg Q6H PRN    amLODIPine tablet 10 mg Daily    atorvastatin tablet 40 mg QHS    chlorhexidine 0.12 % solution 10 mL BID    clonazePAM tablet 0.5 mg QHS    dextrose 10% bolus 125 mL PRN    donepeziL tablet 10 mg Daily    glucagon (human recombinant) injection 1 mg PRN    hydrALAZINE injection 10 mg Q6H PRN    HYDROcodone-acetaminophen 5-325 mg per tablet 1 tablet Q6H PRN    HYDROcodone-acetaminophen 5-325 mg per tablet 1 tablet Q4H PRN    HYDROmorphone (PF) injection 0.5 mg Q4H PRN    insulin aspart U-100 pen 0-5 Units Q6H PRN    insulin detemir U-100 pen 10 Units QHS    isosorbide mononitrate 24 hr tablet 30 mg BID    lactated ringers infusion Continuous    levothyroxine tablet 50 mcg Before breakfast    ondansetron disintegrating tablet 8 mg Q8H PRN     ondansetron injection 4 mg Q6H PRN    oxacillin 12 g in  mL CONTINUOUS INFUSION Q24H    pantoprazole EC tablet 40 mg Daily    pregabalin capsule 150 mg QHS    vitamin D 1000 units tablet 1,000 Units Daily     Family History     Problem Relation (Age of Onset)    Alcohol abuse Sister    Cancer Son    Cirrhosis Sister    Diabetes Sister, Paternal Grandmother    Fibromyalgia Daughter, Daughter    Heart disease Mother    Kidney disease Father, Brother        Tobacco Use    Smoking status: Former Smoker     Packs/day: 1.00     Years: 46.00     Pack years: 46.00     Types: Cigarettes     Start date:      Quit date: 2008     Years since quittin.6    Smokeless tobacco: Never Used   Substance and Sexual Activity    Alcohol use: No    Drug use: Never    Sexual activity: Not Currently     Birth control/protection: None     Review of Systems   Constitutional: Positive for fever.   HENT: Negative.    Eyes: Negative.    Respiratory: Negative.    Cardiovascular: Negative.    Gastrointestinal: Negative.    Genitourinary: Negative.    Musculoskeletal: Negative.    Skin: Negative.    Neurological: Negative.      Objective:     Vital Signs (Most Recent):  Temp: 98.4 °F (36.9 °C) (22)  Pulse: 63 (22)  Resp: 18 (22)  BP: (!) 151/65 (22 08)  SpO2: 96 % (22 0900)  O2 Device (Oxygen Therapy): nasal cannula (22 09) Vital Signs (24h Range):  Temp:  [97.5 °F (36.4 °C)-98.6 °F (37 °C)] 98.4 °F (36.9 °C)  Pulse:  [49-78] 63  Resp:  [13-18] 18  SpO2:  [93 %-98 %] 96 %  BP: (106-167)/(46-72) 151/65     Weight: 76.5 kg (168 lb 10.4 oz) (22 0456)  Body mass index is 32.94 kg/m².  Body surface area is 1.8 meters squared.    I/O last 3 completed shifts:  In: 936.3 [P.O.:360; I.V.:47.4; IV Piggyback:528.9]  Out: -     Physical Exam  Constitutional:       Appearance: Normal appearance.   HENT:      Head: Normocephalic and atraumatic.   Eyes:      General: No  scleral icterus.     Extraocular Movements: Extraocular movements intact.      Pupils: Pupils are equal, round, and reactive to light.   Cardiovascular:      Rate and Rhythm: Normal rate and regular rhythm.   Pulmonary:      Effort: Pulmonary effort is normal.      Breath sounds: Normal breath sounds.   Musculoskeletal:      Right lower leg: No edema.      Left lower leg: No edema.   Skin:     General: Skin is warm and dry.      Comments: Bandage to the right upper extremity near the axilla.   Neurological:      General: No focal deficit present.      Mental Status: She is alert and oriented to person, place, and time.   Psychiatric:         Mood and Affect: Mood normal.         Behavior: Behavior normal.         Significant Labs:  BMP:   Recent Labs   Lab 02/22/22  0448   *  235*     136   K 4.2  4.1   CL 99  99   CO2 29  29   BUN 23  23   CREATININE 1.7*  1.7*   CALCIUM 8.2*  8.2*   MG 1.9     CMP:   Recent Labs   Lab 02/22/22  0448   *  235*   CALCIUM 8.2*  8.2*   ALBUMIN 2.2*  2.3*   PROT 5.8*     136   K 4.2  4.1   CO2 29  29   CL 99  99   BUN 23  23   CREATININE 1.7*  1.7*   ALKPHOS 101   ALT 14   AST 19   BILITOT 0.4     All labs within the past 24 hours have been reviewed.    Significant Imaging:  Labs: Reviewed  Reviewed    Assessment/Plan:     Active Diagnoses:    Diagnosis Date Noted POA    Severe Sepsis with Cellulitis and abscess of right upper extremity [L03.119, L02.419] 02/21/2022 Yes    Acute on chronic diastolic heart failure [I50.33] 02/21/2022 Yes    Acute renal failure superimposed on stage 3 chronic kidney disease [N17.9, N18.30] 02/21/2022 Yes    Hypomagnesemia [E83.42] 02/21/2022 Yes    Abscess of right arm S/p I&D 2/22/2022 [L02.413] 02/21/2022 Yes    Debility [R53.81] 02/21/2022 Yes    Hypoalbuminemia [E88.09] 02/21/2022 Yes    Normocytic anemia [D64.9] 02/21/2022 Yes    CAD, multiple vessel [I25.10] 05/13/2021 Yes    Hypertension  associated with diabetes [E11.59, I15.2] 12/29/2016 Yes     Chronic    Hyperlipidemia associated with type 2 diabetes mellitus [E11.69, E78.5] 07/18/2013 Yes     Chronic    Type 2 diabetes mellitus with hyperglycemia [E11.65] 07/18/2013 Yes     Chronic    GERD (gastroesophageal reflux disease) [K21.9] 07/18/2013 Yes     Chronic    Acquired hypothyroidism [E03.9] 04/15/2013 Yes     Chronic      Problems Resolved During this Admission:       1. ELLIOTT:  Creatinine was 2.1 on admission.  Down to 1.7 this morning.  Urine output is not recorded.    At this point because of acute kidney injury is not entirely clear however likely secondary to ATN from ongoing infection.  The fact that she was on Bactrim for 5 or 6 days raises the possibility of interstitial nephritis.  However her urinalysis is not supportive of AIN.  Additionally there is no evidence of significant tubulointerstitial dysfunction.    At this point try to avoid renal toxins were possible.  Monitor strict I's and O's and laboratory studies.  Will check urine studies have not been collected, will reorder however they may not be particularly accurate at this point.    2. Electrolytes are stable:  Potassium was 4.2.    3. Acid-base:  Serum bicarbonate is stable at 29.    4. CKD 3A:  Baseline creatinine runs between 0.8 and 1.2.  EGFR is normal for her age.  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  UA also shows 2+ proteinuria.      Thank you for your consult.     Mariusz Lizarraga MD  Nephrology  [place] [facility]

## 2022-02-22 NOTE — OP NOTE
[facility] - Surgery ([facility])  Surgery Department  Operative Note    SUMMARY     Date of Procedure: 2/22/2022     Procedure: Procedure(s) (LRB):  INCISION AND DRAINAGE, UPPER EXTREMITY (Right)     Surgeon(s) and Role:     * Brett Parra MD - Primary    Assisting Surgeon: None    Pre-Operative Diagnosis: Cellulitis and abscess of upper extremity [L03.119, L02.419]    Post-Operative Diagnosis: Post-Op Diagnosis Codes:     * Cellulitis and abscess of upper extremity [L03.119, L02.419]    Anesthesia: Monitor Anesthesia Care    Operative Findings (including complications, if any):     A 5 x 1-1/2 x 1 cm abscess cavity    Description of Technical Procedures:     Patient was brought in the operative room placed on the operative table supine position.  Laryngeal mask anesthesia was induced.  She is receiving antibiotics.  Pneumatic compression devices on the lower extremity.  The right arm was prepped and draped in the standard fashion.    Time-out was performed.    Transverse incision was made through the area of fluctuance.  Abscess cavity was identified, purulent material was drained and cultured.    Loculations were disrupted.  Hemostasis ensured.  Marcaine was infiltrated.    The wound was packed with dry gauze.  A dry sterile dressing was placed.    Patient tolerated procedure well.  Transferred to the recovery room in stable condition    Significant Surgical Tasks Conducted by the Assistant(s), if Applicable:  None    Estimated Blood Loss (EBL): 15 mL           Implants: * No implants in log *    Specimens:   Specimen (24h ago, onward)            None                  Condition: Stable    Disposition: PACU - hemodynamically stable.    Attestation: I performed the procedure.

## 2022-02-23 PROBLEM — E87.5 HYPERKALEMIA: Status: ACTIVE | Noted: 2022-01-01

## 2022-02-23 PROBLEM — A49.01 MSSA (METHICILLIN SUSCEPTIBLE STAPHYLOCOCCUS AUREUS) INFECTION: Status: ACTIVE | Noted: 2022-01-01

## 2022-02-23 NOTE — ASSESSMENT & PLAN NOTE
-sepsis has resolved  Wound is clean  Erythema is improving        Home health referral has been placed  Discharge per Medicine with antibiotics with MRSA coverage, patient will likely need a low-dose narcotic.    Follow up with surgery in 2 weeks.    Please recall General surgery as needed

## 2022-02-23 NOTE — PROGRESS NOTES
[facility] Medicine  Progress Note    Patient Name: Tiffanie Wise  MRN: [id number]  Patient Class: IP- Inpatient   Admission Date: 2/20/2022  Length of Stay: 2 days  Attending Physician: Jamee James MD  Primary Care Provider: Tiffanie Spence MD        Subjective:     Principal Problem:Cellulitis and abscess of upper extremity        HPI:  82 y/o WF with hx of angina, GERD, HLD, DM2, hypothyroidism, osteoporosis, CAD, CABG, cardiac stents, HTN, CVA, diastolic HF, anemia, NAWAF, anxiety, depression, tonsillar CA, CKD3 to ED with c/o gradually increasing redness, warmth and pain to the R axillary area (site of known abscess) with associated fever and chills. Symptoms are progressive and of moderate severity with pain exacerbated by movement and palpation and no known mitigating factors. ROS is limited by pt's chronic expressive aphasia. Of note, per pt's daughter this is her third ED visit in the past 11 days with the same complaint - was initially sent home on bactrim with continued worsening, then was admitted from 02/16-02/18/2022, was on vancomycin, had a needle aspiration and was sent home on clindamycin but has again continued to worsen. Found in ED to have temperature of 102.1, H&H of 12.3&37.6, platelets of 333, , K+ 5.1, BUN 20, creatinine 2.1, GFFR 22, , , troponin negative, lactic acid 1.8; UA uninfected; Cxray showed cardiomegaly with pulmonary vascular crowding; COVID-19 negative. In ED the pt was placed on O2 at 2L per NC and was given IV vancomycin - temperature improved to 99.1, HR to 68, O2 saturation to 98%, and BP to 157/68. Hospital medicine was called and the pt was admitted with telemetry monitoring. Pt is a DNR - surrogate decision maker is SHELLI Worley.       Overview/Hospital Course:  The patient was monitored closely during her stay. She was kept on continuous telemetry monitoring. She was placed on Iv Abx for her RUE abscess. General Surgery  was consulted for I&D. ID was consulted for sepsis with failed outpatient po abx therapy. Nephrology was consulted for ELLIOTT with CKD stage 3, and Cardiology was consulted for acute CHF.   2/22 Patient S/p I&D right arm today.  2/23 Continue current treatment.       Interval History:  Continue current treatment.     Review of Systems   Constitutional:  Positive for activity change and fatigue. Negative for appetite change and chills.   HENT:  Positive for hearing loss. Negative for ear discharge, ear pain and facial swelling.    Eyes:  Negative for pain and itching.   Respiratory:  Negative for cough and shortness of breath.    Cardiovascular:  Negative for chest pain, palpitations and leg swelling.   Gastrointestinal:  Negative for abdominal distention, abdominal pain, blood in stool, constipation, diarrhea, nausea and vomiting.   Endocrine: Negative for polydipsia and polyphagia.   Genitourinary:  Negative for difficulty urinating, dysuria, flank pain and hematuria.   Musculoskeletal:  Negative for neck pain and neck stiffness.   Skin:  Positive for wound.        Right arm dressing   Allergic/Immunologic: Negative for food allergies.   Neurological:  Negative for seizures, facial asymmetry, speech difficulty and weakness.   Hematological:  Does not bruise/bleed easily.   Psychiatric/Behavioral:  Negative for agitation, behavioral problems, confusion, hallucinations and suicidal ideas. The patient is not nervous/anxious.    Objective:     Vital Signs (Most Recent):  Temp: 98.9 °F (37.2 °C) (02/23/22 1514)  Pulse: (!) 57 (02/23/22 1514)  Resp: 18 (02/23/22 1514)  BP: 135/60 (02/23/22 1514)  SpO2: (!) 94 % (02/23/22 1514)   Vital Signs (24h Range):  Temp:  [97.9 °F (36.6 °C)-99.1 °F (37.3 °C)] 98.9 °F (37.2 °C)  Pulse:  [56-75] 57  Resp:  [14-18] 18  SpO2:  [92 %-96 %] 94 %  BP: (113-166)/(48-72) 135/60     Weight: 79.4 kg (175 lb 0.7 oz)  Body mass index is 34.19 kg/m².  No intake or output data in the 24 hours ending  02/23/22 1618   Physical Exam  Constitutional:       General: She is not in acute distress.     Appearance: She is not diaphoretic.   HENT:      Head: Normocephalic and atraumatic.      Ears:      Comments: Very [place]     Mouth/Throat:      Mouth: Mucous membranes are moist.   Eyes:      General:         Right eye: No discharge.         Left eye: No discharge.      Extraocular Movements: Extraocular movements intact.      Conjunctiva/sclera: Conjunctivae normal.      Pupils: Pupils are equal, round, and reactive to light.   Cardiovascular:      Rate and Rhythm: Normal rate and regular rhythm.      Pulses: Normal pulses.      Heart sounds: Normal heart sounds. No murmur heard.  Pulmonary:      Effort: Pulmonary effort is normal.      Breath sounds: Normal breath sounds. No wheezing, rhonchi or rales.   Abdominal:      General: Bowel sounds are normal. There is no distension.      Palpations: Abdomen is soft.      Tenderness: There is no abdominal tenderness. There is no guarding.   Genitourinary:     Comments: Not examined  Musculoskeletal:         General: Normal range of motion.      Cervical back: Normal range of motion and neck supple. No rigidity or tenderness.      Right lower leg: No edema.      Left lower leg: No edema.   Skin:     General: Skin is warm and dry.      Capillary Refill: Capillary refill takes less than 2 seconds.      Comments: Right arm dressing   Neurological:      General: No focal deficit present.      Mental Status: She is alert and oriented to person, place, and time. Mental status is at baseline.   Psychiatric:         Mood and Affect: Mood normal.         Behavior: Behavior normal.         Thought Content: Thought content normal.         Judgment: Judgment normal.          Lab Results   Component Value Date    WBC 13.54 (H) 02/23/2022    HGB 11.9 (L) 02/23/2022    HCT 38.9 02/23/2022    MCV 92 02/23/2022     02/23/2022       BMP  Lab Results   Component Value Date      02/23/2022    K 4.4 02/23/2022     02/23/2022    CO2 23 02/23/2022    BUN 20 02/23/2022    CREATININE 1.7 (H) 02/23/2022    CALCIUM 8.3 (L) 02/23/2022    ANIONGAP 12 02/23/2022    ESTGFRAFRICA 32 (A) 02/23/2022    EGFRNONAA 28 (A) 02/23/2022           Assessment/Plan:      * Severe Sepsis with Cellulitis and abscess of right upper extremity  2/22   This patient does have evidence of infective focus  My overall impression is Severe  sepsis. Vital signs were reviewed and noted in progress note.  Antibiotics given-   Antibiotics (From admission, onward)            Start     Stop Route Frequency Ordered    02/21/22 1430  oxacillin 12 g in  mL CONTINUOUS INFUSION         -- IV Every 24 hours (non-standard times) 02/21/22 1255        Cultures were taken-   Microbiology Results (last 7 days)     Procedure Component Value Units Date/Time    Aerobic culture [[id number]] Collected: 02/22/22 0732    Order Status: Completed Specimen: Incision site from Arm, Right Updated: 02/23/22 0643     Aerobic Bacterial Culture No growth    Blood culture #1 **CANNOT BE ORDERED STAT** [[id number]] Collected: 02/20/22 2311    Order Status: Completed Specimen: Blood from Peripheral, Forearm, Left Updated: 02/23/22 0612     Blood Culture, Routine No Growth to date      No Growth to date      No Growth to date    Blood culture #2 **CANNOT BE ORDERED STAT** [[id number]] Collected: 02/20/22 2255    Order Status: Completed Specimen: Blood from Peripheral, Hand, Left Updated: 02/23/22 0612     Blood Culture, Routine No Growth to date      No Growth to date      No Growth to date        Latest lactate reviewed, they are-  Recent Labs   Lab 02/20/22 2255   LACTATE 1.8       Organ dysfunction indicated by Acute kidney injury     Source- Right arm abscess    Source control Achieved by- IV abx      S/p I&D 2/22/2022    Hx MSSA (methicillin susceptible Staphylococcus aureus) infection  Prior culture from 2/18/2022 shows MSSA      Hyperkalemia-  Excluded  2/23 Repeat Potassium level Wnl      Normocytic anemia  2/22 Add MVI      Hypoalbuminemia  2/22 Add po supplement      Debility  2/22 Fall and skin precautions  Turn Q 2 hours  2/23 Continue  PT          Abscess of right arm S/p I&D 2/22/2022  S/p I&D 2/22/2022      Hypomagnesemia  2/21 Add 2 gm Iv mag  2/22 Add po mag oxide      Acute renal failure superimposed on stage 3 chronic kidney disease  2/21 Likely secondary to diuretics/other medications in addition to HTN   Creatinine at 2.1 in ED / Baseline appears to be around 1.2  Monitor for fluid overload  Monitor renal function labs closely  Avoid nephrotoxins as able  Monitor I&Os  Holding home HCTZ, losartan for now  2/23 Home diuretic on hold  Patient on Ivf for gentle hydration  ELLIOTT Improved      Chronic diastolic heart failure  2/21 ECHO 04/27/2021 - EF 65%, Grade 1 DD   / Cxray - cardiomegaly with pulmonary vascular crowding  Pt requiring O2 2L - not on home O2  Cardiology consulted  Lasix 20mg x1 dose in ED - cautious use d/t worsening renal fxn  ECHO pending  Strict I&Os  Daily weights  2/22 Cardiology following  2/23 Stable          CAD, multiple vessel  2/22 Stable      Hypertension associated with diabetes  2/21 Improved since ED arrival - possible pain component  Home medications resumed as appropriate   Hydralazine IV prn SBP > 170  Provide adequate pain control  VS per unit routine  2/22 Monitor closely          Acquired hypothyroidism  2/22 Continue home synthroid  Lab Results   Component Value Date    TSH 1.124 02/21/2022           Type 2 diabetes mellitus with hyperglycemia  2/21 Pt with dexcom device to abdomen  A1c at 7.6 on 11/01/2021 / BG in ED at 304  Accuchecks with SSI prn   Holding home januvia  A1c pending    2/22  Lab Results   Component Value Date    HGBA1C 7.2 (H) 02/21/2022    Blood sugars running 190-219-Add 10 units detemir sq qpm  2/23 Blood suagrs running 179-283- Increase detemir to 14 units q  pm        Hyperlipidemia associated with type 2 diabetes mellitus  2/21 Resumed home statin  2/22  Lab Results   Component Value Date    CHOL 113 (L) 02/21/2022    CHOL 166 06/14/2021    CHOL 130 12/11/2018     Lab Results   Component Value Date    HDL 45 02/21/2022    HDL 65 06/14/2021    HDL 54 12/11/2018     Lab Results   Component Value Date    LDLCALC 53.6 (L) 02/21/2022    LDLCALC 80.0 06/14/2021    LDLCALC 55.4 (L) 12/11/2018     Lab Results   Component Value Date    TRIG 72 02/21/2022    TRIG 105 06/14/2021    TRIG 103 12/11/2018     Lab Results   Component Value Date    CHOLHDL 39.8 02/21/2022    CHOLHDL 39.2 06/14/2021    CHOLHDL 41.5 12/11/2018         GERD (gastroesophageal reflux disease)  2/21 Resumed home PPI        VTE Risk Mitigation (From admission, onward)         Ordered     IP VTE HIGH RISK PATIENT  Once         02/22/22 0900     Place sequential compression device  Until discontinued         02/22/22 0900     Place sequential compression device  Until discontinued         02/21/22 0242                Discharge Planning   EVERTON:      Code Status: DNR   Is the patient medically ready for discharge?:     Reason for patient still in hospital (select all that apply): Treatment  Discharge Plan A: Home Health        Time spent seeing patient( greater than 1/2 spent in direct contact) :  36 minutes    ISRAEL Randhawa  Department of Hospital Medicine   [facility] - Med Surg

## 2022-02-23 NOTE — PT/OT/SLP EVAL
Physical Therapy Evaluation    Patient Name:  Tiffanie Wise   MRN:  [id number]    Recommendations:     Discharge Recommendations:  home health PT (WITH FAMILY ASSISTANCE AS NEEDED)   Discharge Equipment Recommendations: none   Barriers to discharge: Decreased caregiver support    Assessment:     Tiffanie Wise is a 81 y.o. female admitted with a medical diagnosis of Cellulitis and abscess of upper extremity.  She presents with the following impairments/functional limitations:  weakness, impaired endurance, impaired functional mobilty, gait instability, impaired balance, pain, decreased safety awareness, decreased coordination.    Rehab Prognosis: Good; patient would benefit from acute skilled PT services to address these deficits and reach maximum level of function.    Recent Surgery: Procedure(s) (LRB):  INCISION AND DRAINAGE, UPPER EXTREMITY (Right) 1 Day Post-Op    Plan:     During this hospitalization, patient to be seen 3 x/week to address the identified rehab impairments via gait training, therapeutic exercises, therapeutic activities and progress toward the following goals:    · Plan of Care Expires:  03/09/22    Subjective     Chief Complaint:   Patient/Family Comments/goals:   Pain/Comfort:  · Pain Rating 1: 7/10  · Location - Side 1: Right  · Location 1: axilla  · Pain Addressed 1: Reposition, Distraction    Patients cultural, spiritual, Restorationist conflicts given the current situation:    Living Environment:  PT LIVES ALONE, DAUGHTER CHECKS IN AND NEIGHBOR ASSISTS WHEN NEEDED, 1ST FLOOR APT, AMB INDEP COMMUNITY DISTANCES, DRIVES, GROCERY SHOPS FOR HERSELF, INDEP WITH ADL'S, NO O2 USED AT HOME  Prior to admission, patients level of function was INDEP.  Equipment used at home: grab bar, shower chair, walker, rolling, bedside commode, cane, straight.  DME owned (not currently used): none.  Upon discharge, patient will have assistance from ?.    Objective:     Communicated with NURSE CHOWDHURY prior to session.   Patient found supine with peripheral IV, telemetry, oxygen  upon PT entry to room.    General Precautions: Standard, fall, respiratory, hearing impaired   Orthopedic Precautions:N/A   Braces: N/A  Respiratory Status: Nasal cannula, flow 2 L/min    Exams:  · Cognitive Exam:  Patient is oriented to Person, Place, Time, Situation and LETHARGIC, DAUGHTER REPORTS SHE JUST HAD DILAUDID PAIN MED  · Postural Exam:  Patient presented with the following abnormalities:    · -       Rounded shoulders  · Sensation:    · -       Intact  · RLE ROM: WFL  · RLE Strength: GROSSLY 3+/5  · LLE ROM: WFL  · LLE Strength: GROSSLY 3+/5    Functional Mobility:  · Bed Mobility:     · Rolling Left:  stand by assistance  · Scooting: stand by assistance  · Supine to Sit: stand by assistance  · Transfers:     · Sit to Stand:  minimum assistance with rolling walker  · Bed to Chair: minimum assistance with  rolling walker  using  Step Transfer  · Toilet Transfer: minimum assistance with  rolling walker  using  Step Transfer  · Gait: PT ' WITH RW AND MOD/SARAH, QUICK/IMPULSIVE, RUNNING INTO OBJECTS IN HALLWAY, CUES FOR UPRIGHT POSTURE AND TO STAY ON TASK  · Balance: POOR+    Therapeutic Activities and Exercises:   PT EDUCATED IN ROLE OF P.T. AND POC, PT EDUCATED IN RW USE AND SAFETY DURING TF'S AND GAIT, PT ENCOURAGED TO SLOW DOWN WITH FUNCTIONAL MOBILITY AND TO USE RW AT HOME TO IMPROVE SAFETY AND DECREASE RISK FOR FALLS, PT PERFORMED TOILETING IN BATHROOM WITH SARAH FOR TOILET TF, NO ASSIST FOR CLEANING, PT ENCOURAGED TO INCREASE TIME OOB IN CHAIR, PT SET UP FOR BREAKFAST    AM-PAC 6 CLICK MOBILITY  Total Score:17     Patient left up in chair with all lines intact, call button in reach, NURSE notified and DAUGHTER present.    GOALS:   Multidisciplinary Problems     Physical Therapy Goals        Problem: Physical Therapy Goal    Goal Priority Disciplines Outcome Goal Variances Interventions   Physical Therapy Goal     PT, PT/OT       Description: LTG'S TO BE MET IN 14 DAYS (3-9-22)  1. PT WILL BE CLAY WITH BED MOBILITY  2. PT WILL REQUIRE SBA FOR TF'S  3. PT WILL ' WITH RW AND SBA                   History:     Past Medical History:   Diagnosis Date    Anxiety     AP (angina pectoris) 7/18/2013    Arterial ischemic stroke, MCA (middle cerebral artery), left, acute 12/15/2017    Asthma     CAD, multiple vessel 5/13/2021    Class 1 obesity due to excess calories with serious comorbidity and body mass index (BMI) of 30.0 to 30.9 in adult 3/15/2019    Coronary artery disease 7/18/2013    Depression     Diastolic heart failure     GERD (gastroesophageal reflux disease) 7/18/2013    History of PTCA 7/18/2013    Hypertension 7/18/2013    Hypothyroidism     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     Trouble in sleeping     Type 2 diabetes mellitus with ophthalmic manifestations     Urinary incontinence     pullups day, pads at night       Past Surgical History:   Procedure Laterality Date    BREAST SURGERY Left     benign cyst    CORONARY ANGIOPLASTY      CORONARY STENT PLACEMENT      x6-7 oer the yeqrs  last 12/17/14 BRIANDA to lcfx    EYE SURGERY Bilateral 2014    cataracts w/IOL   Dr Vance    FLUOROSCOPY N/A 1/20/2021    Procedure: G tube plaacement;  Surgeon: Shaka Cross MD;  Location: [facility] CATH LAB;  Service: General;  Laterality: N/A;    HYSTERECTOMY  1970    vag hyst; ovaries intact    THYROID SURGERY      nodule benign, Dr Hankins    TUBAL LIGATION  1970       Time Tracking:     PT Received On: 02/23/22  PT Start Time: 0755     PT Stop Time: 0820  PT Total Time (min): 25 min      Billable Minutes: Evaluation 15 and Therapeutic Activity 10    02/23/2022

## 2022-02-23 NOTE — PROGRESS NOTES
[facility]  General Surgery  Progress Note    Subjective:     History of Present Illness:  82 y/o F with h/o angina, GERD, HLD, DM2, hypothyroidism, osteoporosis, CAD, CABG, cardiac stents admitted with worsening right upper arm abscess. She has been seen in the ER multiple times and was discharged on bactrim, but the abscess persisted and worsened. She was recently admitted where she underwent needle aspiration with IR on 2/18/22 and discharged home on clindamycin. Cultures from that specimen are growing MSSA. Daughter states her arm has failed to improve, however, and that it is quite painful.      Post-Op Info:  Procedure(s) (LRB):  INCISION AND DRAINAGE, UPPER EXTREMITY (Right)   1 Day Post-Op     Interval History:  Tolerated dressing change, wound is clean.    Home health orders have been placed.  .  Patient is can be discharged from the surgery point of view can be discharged from the surgery point of view    Medications:  Continuous Infusions:   lactated ringers 50 mL/hr at 02/22/22 1541     Scheduled Meds:   amLODIPine  10 mg Oral Daily    ascorbic acid (vitamin C)  500 mg Oral QHS    atorvastatin  40 mg Oral QHS    chlorhexidine  10 mL Mouth/Throat BID    clonazePAM  0.5 mg Oral QHS    donepeziL  10 mg Oral Daily    insulin detemir U-100  14 Units Subcutaneous QHS    isosorbide mononitrate  30 mg Oral BID    levothyroxine  50 mcg Oral Before breakfast    magnesium oxide  400 mg Oral Daily    multivitamin  1 tablet Oral Daily    oxacillin 12 g in  mL CONTINUOUS INFUSION  12 g Intravenous Q24H    pantoprazole  40 mg Oral Daily    pregabalin  150 mg Oral QHS    vitamin D  1,000 Units Oral Daily    zinc sulfate  220 mg Oral QHS     PRN Meds:sodium chloride 0.9%, acetaminophen, albuterol-ipratropium, dextrose 10%, glucagon (human recombinant), hydrALAZINE, HYDROcodone-acetaminophen, HYDROmorphone, insulin aspart U-100, ondansetron, ondansetron     Review of patient's allergies  indicates:  No Known Allergies  Objective:     Vital Signs (Most Recent):  Temp: 99.1 °F (37.3 °C) (02/23/22 0715)  Pulse: 72 (02/23/22 0715)  Resp: 18 (02/23/22 0915)  BP: (!) 113/48 (02/23/22 0715)  SpO2: (!) 94 % (02/23/22 0715)   Vital Signs (24h Range):  Temp:  [97.3 °F (36.3 °C)-99.1 °F (37.3 °C)] 99.1 °F (37.3 °C)  Pulse:  [56-75] 72  Resp:  [14-18] 18  SpO2:  [92 %-97 %] 94 %  BP: (113-166)/(48-77) 113/48     Weight: 79.4 kg (175 lb 0.7 oz)  Body mass index is 34.19 kg/m².    Intake/Output - Last 3 Shifts         02/21 0700  02/22 0659 02/22 0700  02/23 0659 02/23 0700  02/24 0659    P.O. 360      I.V. (mL/kg) 47.4 (0.6)      IV Piggyback 278.9      Total Intake(mL/kg) 686.3 (9)      Blood  15     Total Output  15     Net +686.3 -15            Urine Occurrence 2 x 1 x             Physical Exam  Constitutional:       Comments: Overweight   Cardiovascular:      Rate and Rhythm: Normal rate and regular rhythm.   Pulmonary:      Effort: Pulmonary effort is normal.      Breath sounds: Normal breath sounds.   Abdominal:      General: Bowel sounds are normal.      Palpations: Abdomen is soft.   Skin:     Comments: The right upper arm abscess site has some mild surrounding erythema.  The wound itself is clean.  There is no purulent drainage.   Neurological:      Mental Status: She is alert.       Significant Labs:  I have reviewed all pertinent lab results within the past 24 hours.  CBC:   Recent Labs   Lab 02/23/22  0503   WBC 13.54*   RBC 4.21   HGB 11.9*   HCT 38.9      MCV 92   MCH 28.3   MCHC 30.6*     BMP:   Recent Labs   Lab 02/22/22  0448 02/23/22  0503   *  235* 227*     136 136   K 4.2  4.1 5.8*   CL 99  99 101   CO2 29  29 23   BUN 23  23 20   CREATININE 1.7*  1.7* 1.7*   CALCIUM 8.2*  8.2* 8.3*   MG 1.9  --        Significant Diagnostics:  I have reviewed all pertinent imaging results/findings within the past 24 hours.  No new    Assessment/Plan:     * Severe Sepsis with  Cellulitis and abscess of right upper extremity  -sepsis has resolved  Wound is clean  Erythema is improving        Home health referral has been placed  Discharge per Medicine with antibiotics with MRSA coverage, patient will likely need a low-dose narcotic.    Follow up with surgery in 2 weeks.    Please recall General surgery as needed    Acute renal failure superimposed on stage 3 chronic kidney disease  Medical management    Chronic diastolic heart failure  Medical management    CAD, multiple vessel  Medical management  Please continue to hold Plavix before surgery    Hypertension associated with diabetes  Medical management    Type 2 diabetes mellitus with hyperglycemia  Recommend strict glycemic control, defer management to medicine.    Hyperlipidemia associated with type 2 diabetes mellitus  Medical management        Brett Parra MD  General Surgery  [facility]

## 2022-02-23 NOTE — SUBJECTIVE & OBJECTIVE
Interval History:  Continue current treatment.     Review of Systems   Constitutional:  Positive for activity change and fatigue. Negative for appetite change and chills.   HENT:  Positive for hearing loss. Negative for ear discharge, ear pain and facial swelling.    Eyes:  Negative for pain and itching.   Respiratory:  Negative for cough and shortness of breath.    Cardiovascular:  Negative for chest pain, palpitations and leg swelling.   Gastrointestinal:  Negative for abdominal distention, abdominal pain, blood in stool, constipation, diarrhea, nausea and vomiting.   Endocrine: Negative for polydipsia and polyphagia.   Genitourinary:  Negative for difficulty urinating, dysuria, flank pain and hematuria.   Musculoskeletal:  Negative for neck pain and neck stiffness.   Skin:  Positive for wound.        Right arm dressing   Allergic/Immunologic: Negative for food allergies.   Neurological:  Negative for seizures, facial asymmetry, speech difficulty and weakness.   Hematological:  Does not bruise/bleed easily.   Psychiatric/Behavioral:  Negative for agitation, behavioral problems, confusion, hallucinations and suicidal ideas. The patient is not nervous/anxious.    Objective:     Vital Signs (Most Recent):  Temp: 98.9 °F (37.2 °C) (02/23/22 1514)  Pulse: (!) 57 (02/23/22 1514)  Resp: 18 (02/23/22 1514)  BP: 135/60 (02/23/22 1514)  SpO2: (!) 94 % (02/23/22 1514)   Vital Signs (24h Range):  Temp:  [97.9 °F (36.6 °C)-99.1 °F (37.3 °C)] 98.9 °F (37.2 °C)  Pulse:  [56-75] 57  Resp:  [14-18] 18  SpO2:  [92 %-96 %] 94 %  BP: (113-166)/(48-72) 135/60     Weight: 79.4 kg (175 lb 0.7 oz)  Body mass index is 34.19 kg/m².  No intake or output data in the 24 hours ending 02/23/22 1618   Physical Exam  Constitutional:       General: She is not in acute distress.     Appearance: She is not diaphoretic.   HENT:      Head: Normocephalic and atraumatic.      Ears:      Comments: Very [place]     Mouth/Throat:      Mouth: Mucous membranes  are moist.   Eyes:      General:         Right eye: No discharge.         Left eye: No discharge.      Extraocular Movements: Extraocular movements intact.      Conjunctiva/sclera: Conjunctivae normal.      Pupils: Pupils are equal, round, and reactive to light.   Cardiovascular:      Rate and Rhythm: Normal rate and regular rhythm.      Pulses: Normal pulses.      Heart sounds: Normal heart sounds. No murmur heard.  Pulmonary:      Effort: Pulmonary effort is normal.      Breath sounds: Normal breath sounds. No wheezing, rhonchi or rales.   Abdominal:      General: Bowel sounds are normal. There is no distension.      Palpations: Abdomen is soft.      Tenderness: There is no abdominal tenderness. There is no guarding.   Genitourinary:     Comments: Not examined  Musculoskeletal:         General: Normal range of motion.      Cervical back: Normal range of motion and neck supple. No rigidity or tenderness.      Right lower leg: No edema.      Left lower leg: No edema.   Skin:     General: Skin is warm and dry.      Capillary Refill: Capillary refill takes less than 2 seconds.      Comments: Right arm dressing   Neurological:      General: No focal deficit present.      Mental Status: She is alert and oriented to person, place, and time. Mental status is at baseline.   Psychiatric:         Mood and Affect: Mood normal.         Behavior: Behavior normal.         Thought Content: Thought content normal.         Judgment: Judgment normal.          Lab Results   Component Value Date    WBC 13.54 (H) 02/23/2022    HGB 11.9 (L) 02/23/2022    HCT 38.9 02/23/2022    MCV 92 02/23/2022     02/23/2022       BMP  Lab Results   Component Value Date     02/23/2022    K 4.4 02/23/2022     02/23/2022    CO2 23 02/23/2022    BUN 20 02/23/2022    CREATININE 1.7 (H) 02/23/2022    CALCIUM 8.3 (L) 02/23/2022    ANIONGAP 12 02/23/2022    ESTGFRAFRICA 32 (A) 02/23/2022    EGFRNONAA 28 (A) 02/23/2022

## 2022-02-23 NOTE — PLAN OF CARE
"[facility]  Initial Discharge Assessment       Primary Care Provider: Tiffanie Spence MD    Admission Diagnosis: CHF (congestive heart failure) [I50.9]  Hyperglycemia [R73.9]  Hypoxia [R09.02]  Abscess of right arm [L02.413]  Sepsis [A41.9]  Severe sepsis [A41.9, R65.20]  Acute renal failure, unspecified acute renal failure type [N17.9]  Sepsis, due to unspecified organism, unspecified whether acute organ dysfunction present [A41.9]    Admission Date: 2/20/2022  Expected Discharge Date:     Discharge Barriers Identified: None    Payor: HU[employer] MANAGED MEDICARE / Plan: HU[employer] MEDICARE HMO / Product Type: Capitation /     Extended Emergency Contact Information  Primary Emergency Contact: MeirConnie "[facility]"   [place]  Mobile Phone: [phone]  Relation: Healthcare Power of   Secondary Emergency Contact: CelestinoJuan Jose bland  Mobile Phone: [phone]  Relation: Son    Discharge Plan A: Home Health  Discharge Plan B: Home Health      [facility] - [place], [place] - [address]  [address] [address]  Phone: [phone] Fax: [phone]    [facility] - [place], [place] - [address] [place]  [address] [address]  Phone: [phone] Fax: [phone]    [facility] [address] [place] [address]  Phone: [phone] Fax: [phone]    [facility] [place], [place] - [address]   [address] [facility] [address]  Phone: [phone] Fax: [phone]      Initial Assessment (most recent)     Adult Discharge Assessment - 02/23/22 1505        Discharge Assessment    Assessment Type Discharge Planning Assessment     Confirmed/corrected address, phone number and insurance Yes     Confirmed Demographics Correct on Facesheet     Source of Information family     Communicated [place] with patient/caregiver Date not available/Unable to determine     " Lives With alone     Do you expect to return to your current living situation? Yes     Do you have help at home or someone to help you manage your care at home? No     Prior to hospitilization cognitive status: Alert/Oriented     Current cognitive status: Alert/Oriented     Walking or Climbing Stairs Difficulty none     Dressing/Bathing Difficulty none     Equipment Currently Used at Home grab bar;shower chair;walker, rolling;bedside commode;cane, straight     Readmission within 30 days? Yes     Patient currently being followed by outpatient case management? No     Do you currently have service(s) that help you manage your care at home? No     Do you take prescription medications? Yes     Do you have prescription coverage? Yes     Do you have any problems affording any of your prescribed medications? No     Is the patient taking medications as prescribed? yes     Who is going to help you get home at discharge? Daughter     How do you get to doctors appointments? car, drives self;family or friend will provide     Are you on dialysis? No     Do you take coumadin? No     Discharge Plan A Home Health     Discharge Plan B Home Health     Discharge Plan discussed with: Adult children     Discharge Barriers Identified None                 Readmission Assessment (most recent)     Readmission Assessment - 02/23/22 1507        Readmission    Why were you hospitalized in the last 30 days? Cellulitis right arm     Why were you readmitted? Alarmed about signs/symptoms;Related to previous admission     When you left the hospital how did you feel? Fine     When you left the hospital where did you go? Home Alone     Did patient/caregiver refused recommended DC plan? No     Tell me about what happened between when you left the hospital and the day you returned. Redness and swekking got worse, not better     Did you try to manage your symptoms your self? No     Did you call anyone? No     Why? Was told to return to hospital if  symptoms worsened.     Did you try to see or did see a doctor or nurse before you came? No     Why? Was told to return to hospital if symptoms worsened.     Did you have  a follow-up appointment on discharge? Yes     Did you go? No     Why? --   Readmitted 5 days before appointment    Was this a planned readmission? No                Spoke with pt's daughter Connie via phone for DC assessment. Pt lives at home alone and owns the equipment listed above but only using the shower chair presently. Pt recently DCed from this hospital for the same reason as current admission. Pt is currently being recommended for  for wound care and physical therapy. May benefit from home IV ABX pending ID review.     Danish Arango LMSW 2/23/2022 3:13 PM

## 2022-02-23 NOTE — ASSESSMENT & PLAN NOTE
2/21 Pt with dexcom device to abdomen  A1c at 7.6 on 11/01/2021 / BG in ED at 304  Accuchecks with SSI prn   Holding home januvia  A1c pending    2/22  Lab Results   Component Value Date    HGBA1C 7.2 (H) 02/21/2022    Blood sugars running 190-219-Add 10 units detemir sq qpm  2/23 Blood suagrs running 179-283- Increase detemir to 14 units q pm

## 2022-02-23 NOTE — SUBJECTIVE & OBJECTIVE
Interval History:  Tolerated dressing change, wound is clean.    Home health orders have been placed.  .  Patient is can be discharged from the surgery point of view can be discharged from the surgery point of view    Medications:  Continuous Infusions:   lactated ringers 50 mL/hr at 02/22/22 1541     Scheduled Meds:   amLODIPine  10 mg Oral Daily    ascorbic acid (vitamin C)  500 mg Oral QHS    atorvastatin  40 mg Oral QHS    chlorhexidine  10 mL Mouth/Throat BID    clonazePAM  0.5 mg Oral QHS    donepeziL  10 mg Oral Daily    insulin detemir U-100  14 Units Subcutaneous QHS    isosorbide mononitrate  30 mg Oral BID    levothyroxine  50 mcg Oral Before breakfast    magnesium oxide  400 mg Oral Daily    multivitamin  1 tablet Oral Daily    oxacillin 12 g in  mL CONTINUOUS INFUSION  12 g Intravenous Q24H    pantoprazole  40 mg Oral Daily    pregabalin  150 mg Oral QHS    vitamin D  1,000 Units Oral Daily    zinc sulfate  220 mg Oral QHS     PRN Meds:sodium chloride 0.9%, acetaminophen, albuterol-ipratropium, dextrose 10%, glucagon (human recombinant), hydrALAZINE, HYDROcodone-acetaminophen, HYDROmorphone, insulin aspart U-100, ondansetron, ondansetron     Review of patient's allergies indicates:  No Known Allergies  Objective:     Vital Signs (Most Recent):  Temp: 99.1 °F (37.3 °C) (02/23/22 0715)  Pulse: 72 (02/23/22 0715)  Resp: 18 (02/23/22 0915)  BP: (!) 113/48 (02/23/22 0715)  SpO2: (!) 94 % (02/23/22 0715)   Vital Signs (24h Range):  Temp:  [97.3 °F (36.3 °C)-99.1 °F (37.3 °C)] 99.1 °F (37.3 °C)  Pulse:  [56-75] 72  Resp:  [14-18] 18  SpO2:  [92 %-97 %] 94 %  BP: (113-166)/(48-77) 113/48     Weight: 79.4 kg (175 lb 0.7 oz)  Body mass index is 34.19 kg/m².    Intake/Output - Last 3 Shifts         02/21 0700 02/22 0659 02/22 0700 02/23 0659 02/23 0700 02/24 0659    P.O. 360      I.V. (mL/kg) 47.4 (0.6)      IV Piggyback 278.9      Total Intake(mL/kg) 686.3 (9)      Blood  15     Total Output  15     Net  +686.3 -15            Urine Occurrence 2 x 1 x             Physical Exam  Constitutional:       Comments: Overweight   Cardiovascular:      Rate and Rhythm: Normal rate and regular rhythm.   Pulmonary:      Effort: Pulmonary effort is normal.      Breath sounds: Normal breath sounds.   Abdominal:      General: Bowel sounds are normal.      Palpations: Abdomen is soft.   Skin:     Comments: The right upper arm abscess site has some mild surrounding erythema.  The wound itself is clean.  There is no purulent drainage.   Neurological:      Mental Status: She is alert.       Significant Labs:  I have reviewed all pertinent lab results within the past 24 hours.  CBC:   Recent Labs   Lab 02/23/22  0503   WBC 13.54*   RBC 4.21   HGB 11.9*   HCT 38.9      MCV 92   MCH 28.3   MCHC 30.6*     BMP:   Recent Labs   Lab 02/22/22  0448 02/23/22  0503   *  235* 227*     136 136   K 4.2  4.1 5.8*   CL 99  99 101   CO2 29  29 23   BUN 23  23 20   CREATININE 1.7*  1.7* 1.7*   CALCIUM 8.2*  8.2* 8.3*   MG 1.9  --        Significant Diagnostics:  I have reviewed all pertinent imaging results/findings within the past 24 hours.  No new

## 2022-02-23 NOTE — MEDICAL/APP STUDENT
Progress Note  Hospital Medicine    Admit Date: 2/20/2022    SUBJECTIVE:     Follow-up For:  Cellulitis and abscess of upper extremity    HPI: Tiffanie Wise is a 80 y/o F with hx of GERD, hyperlipidemia, T2DM, HTN, CAD, diastolic HF, CKD stage 3 who was admitted with worsening right upper arm abscess. She was seen multiple times in the ED prior to this visit for same abscess. She was previously discharged on Bactrim, but abscess continued to worsen. She was admitted on 2/18/22 where she underwent a needle aspiration of the abscess and discharged on clindamycin. Cultures from that specimen grew MSSA. Arm still failed to improve despite aspiration and abx, which lead to this admission.     Hospital Course: Pt has been monitored closely during her stay. She is on continuous telemetry monitoring and was placed on IV abx for her R arm abscess. General Surgery was consulted for I&D - s/p I&D of R arm on 2/22. ID was consulted for sepsis with failed outpatient po abx therapy. Nephrology was consulted for ELLIOTT with CKD stage 3. Cardiology was consulted for acute CHF    Interval History: Pt is stable and condition is improving - continue current management.       Review of Systems   Constitutional: Negative for chills and fever.   HENT: Positive for hearing loss. Negative for ear discharge and ear pain.    Eyes: Negative for pain and redness.   Respiratory: Negative for cough, shortness of breath and stridor.    Cardiovascular: Negative for chest pain and leg swelling.   Gastrointestinal: Negative for abdominal pain, nausea and vomiting.   Genitourinary: Negative for dysuria and flank pain.   Musculoskeletal: Negative.    Skin: Positive for itching (around area of upper arm abscess).   Neurological: Negative for dizziness and headaches.   Psychiatric/Behavioral: Negative.      OBJECTIVE:     Vital Signs Range (Last 24H):  Temp: 98.3 °F (36.8 °C) (02/23/22 1213)  Pulse: 70 (02/23/22 1213)  Resp: 18 (02/23/22 1314)  BP: (!)  121/56 (02/23/22 1213)  SpO2: 95 % (02/23/22 1213)      I & O (Last 24H):  No intake or output data in the 24 hours ending 02/23/22 1403      Estimated body mass index is 34.19 kg/m² as calculated from the following:    Height as of this encounter: 5' (1.524 m).    Weight as of this encounter: 79.4 kg (175 lb 0.7 oz).    Physical Exam  Vitals reviewed.   Constitutional:       General: She is not in acute distress.     Appearance: Normal appearance. She is not ill-appearing or toxic-appearing.   HENT:      Head: Normocephalic.      Nose: Nose normal. No congestion.      Mouth/Throat:      Pharynx: Oropharynx is clear.   Eyes:      General:         Right eye: No discharge.         Left eye: No discharge.   Cardiovascular:      Rate and Rhythm: Normal rate.      Heart sounds: Normal heart sounds. No murmur heard.    No friction rub. No gallop.   Pulmonary:      Effort: Pulmonary effort is normal. No respiratory distress.      Breath sounds: No stridor.   Chest:      Chest wall: No tenderness.   Abdominal:      General: Bowel sounds are normal. There is no distension.      Palpations: There is no mass.      Tenderness: There is no guarding or rebound.   Musculoskeletal:         General: No swelling.      Cervical back: Normal range of motion.   Skin:     General: Skin is warm and dry.   Neurological:      Mental Status: She is alert and oriented to person, place, and time. Mental status is at baseline.       Laboratory Data:    Recent Results (from the past 168 hour(s))   Basic Metabolic Panel    Collection Time: 02/23/22  5:03 AM   Result Value Ref Range    Sodium 136 136 - 145 mmol/L    Potassium 5.8 (H) 3.5 - 5.1 mmol/L    Chloride 101 95 - 110 mmol/L    CO2 23 23 - 29 mmol/L    BUN 20 8 - 23 mg/dL    Creatinine 1.7 (H) 0.5 - 1.4 mg/dL    Calcium 8.3 (L) 8.7 - 10.5 mg/dL    Anion Gap 12 8 - 16 mmol/L   Basic Metabolic Panel (BMP)    Collection Time: 02/17/22  5:28 AM   Result Value Ref Range    Sodium 139 136 - 145  mmol/L    Potassium 4.2 3.5 - 5.1 mmol/L    Chloride 101 95 - 110 mmol/L    CO2 27 23 - 29 mmol/L    BUN 12 8 - 23 mg/dL    Creatinine 1.2 0.5 - 1.4 mg/dL    Calcium 9.2 8.7 - 10.5 mg/dL    Anion Gap 11 8 - 16 mmol/L       Recent Results (from the past 168 hour(s))   CBC Auto Differential    Collection Time: 02/23/22  5:03 AM   Result Value Ref Range    WBC 13.54 (H) 3.90 - 12.70 K/uL    Hemoglobin 11.9 (L) 12.0 - 16.0 g/dL    Hematocrit 38.9 37.0 - 48.5 %    Platelets 254 150 - 450 K/uL   CBC Auto Differential    Collection Time: 02/22/22  4:48 AM   Result Value Ref Range    WBC 13.81 (H) 3.90 - 12.70 K/uL    Hemoglobin 10.8 (L) 12.0 - 16.0 g/dL    Hematocrit 33.3 (L) 37.0 - 48.5 %    Platelets 281 150 - 450 K/uL   CBC auto differential    Collection Time: 02/21/22  6:45 AM   Result Value Ref Range    WBC 29.21 (H) 3.90 - 12.70 K/uL    Hemoglobin 11.9 (L) 12.0 - 16.0 g/dL    Hematocrit 36.3 (L) 37.0 - 48.5 %    Platelets 322 150 - 450 K/uL           Medication list was reviewed and changes noted under Assessment/Plan.      Diagnostic Results:  Labs: Reviewed  ECG: Reviewed  X-Ray: Reviewed  US: Reviewed  Echo: Reviewed      Transthoracic echo (TTE) complete  Order# [id number]  Reading physician: Mariam Bell MD Ordering physician: Alva Leyva NP Study date: 2/21/22       Reason for Exam  Priority: ASAP  Dx: CHF (congestive heart failure) [I50.9 (ICD-10-CM)]           View Images Vital Vitrea     Show images for Echo    Summary    · The left ventricle is normal in size with concentric remodeling and normal systolic function.  · The estimated ejection fraction is 60%.  · Normal left ventricular diastolic function.  · Normal right ventricular size with normal right ventricular systolic function.  · Normal central venous pressure (3 mmHg).  · The estimated PA systolic pressure is 41 mmHg.  · There is mild pulmonary hypertension.  · There is mild aortic valve stenosis.  · Aortic valve area is 1.83 cm2; peak  velocity is 1.59 m/s; mean gradient is 6 mmHg.        US Soft Tissue Upper Extremity, Right  Order: [id number]   Status: Final result     Visible to patient: Yes (seen)     Next appt: 02/25/2022 at 01:00 PM in Cardiology (Brendan Short MD)     0 Result Notes    Details    Reading Physician Reading Date Result Priority   Shaka Cross MD  [phone]  [phone] 2/21/2022 STAT     Narrative & Impression  EXAMINATION:  US SOFT TISSUE, UPPER EXTREMITY, RIGHT     CLINICAL HISTORY:  abscess;     TECHNIQUE:  Limited ultrasound right arm.     COMPARISON:  02/17/2022.     FINDINGS:  Subcutaneous edema is seen with a tiny focus of complex echogenicity measuring 17 x 6 x 4 mm.  This finding was previously measured an aspirated.  Findings are smaller than was seen on prior exam.     Impression:     See above.           ASSESSMENT/PLAN:     Active Problems:  Active Hospital Problems    Diagnosis  POA    *Severe Sepsis with Cellulitis and abscess of right upper extremity [L03.119, L02.419]  S/p I&D on 2/22   ID consulted for failed outpatient tx of abscess with clinda and bactrim  Continue pt on IV abx  Cultures pending  To follow up with surgery in 2 weeks    Antibiotics (From admission, onward)            Start     Stop Route Frequency Ordered    02/21/22 1430  oxacillin 12 g in  mL CONTINUOUS INFUSION         -- IV Every 24 hours (non-standard times) 02/21/22 1255        Microbiology Results (last 7 days)     Procedure Component Value Units Date/Time    Aerobic culture [[id number]] Collected: 02/22/22 0732    Order Status: Completed Specimen: Incision site from Arm, Right Updated: 02/23/22 0643     Aerobic Bacterial Culture No growth    Blood culture #1 **CANNOT BE ORDERED STAT** [[id number]] Collected: 02/20/22 2311    Order Status: Completed Specimen: Blood from Peripheral, Forearm, Left Updated: 02/23/22 0612     Blood Culture, Routine No Growth to date      No Growth to date      No Growth to date    Blood  culture #2 **CANNOT BE ORDERED STAT** [[id number]] Collected: 02/20/22 3201    Order Status: Completed Specimen: Blood from Peripheral, Hand, Left Updated: 02/23/22 0612     Blood Culture, Routine No Growth to date      No Growth to date      No Growth to date        Yes    Chronic diastolic heart failure [I50.32]  Echo performed on 2/21 - results above  On continuous telemetry monitoring  Monitor strict I&Os and daily weights    Yes    Acute renal failure superimposed on stage 3 chronic kidney disease [N17.9, N18.30]  Suspected renal failure possibly secondary to dehydration or infection  Nephrology consulted for worsening renal function  Pt on renal diet   Home diuretic being held   Pt currently remains on IVF for hydration    Yes    Hypomagnesemia [E83.42]  Pt on mag oxide tab 400mg daily    Yes    Abscess of right arm S/p I&D 2/22/2022 [L02.413]  Pt on oxacillin following I&D  Wound care and dressing changes daily  Wound culture pending - see above     Yes    Debility [R53.81]  PT to evaluate/tx pt     Yes    CAD, multiple vessel [I25.10]  Pt stable    Yes    Hypertension associated with diabetes [E11.59, I15.2]  Monitor BP closely  Home amlodipine tab 10mg daily restarted  Hydralazine IV prn SBP > 170    Yes    Hyperlipidemia associated with type 2 diabetes mellitus [E11.69, E78.5]  Pt on home statin - atorvastatin tab 40mg nightly    Yes    Type 2 diabetes mellitus with hyperglycemia [E11.65]  Pt has dexcom device to abd  A1C at 7.6 on 11/1/21  BG in ED at 304;  today (2/23)  Holding home januvia  POCT testing     Yes    GERD (gastroesophageal reflux disease) [K21.9]  Pt on home PPI - pantoprazole tab 40mg daily     Yes    Acquired hypothyroidism [E03.9]  Continue home synthroid  Yes                  DVT prophylaxis addressed with: SCDs        Time spent in care of patient (Greater than 1/2 spent in direct face to face contact: 40 minutes

## 2022-02-23 NOTE — DISCHARGE INSTRUCTIONS
It is okay to shower and get the right upper arm abscess site wet.    Home health should show you how to change the dressing.    With regards to the Nozin  it should be used in the nose twice a day for 10 days.      You should have a follow-up appointment with me, Dr. Parra in about 2 weeks.  If there is any concerns or questions the office numbers are listed below    Our office phone numbers are  [phone] and   If the appointment is not made at the [facility] office you can call the above numbers and they should be able to rearrange her follow-up

## 2022-02-23 NOTE — PROGRESS NOTES
[facility] Surg  General Surgery  Progress Note    Subjective:     History of Present Illness:  80 y/o F with h/o angina, GERD, HLD, DM2, hypothyroidism, osteoporosis, CAD, CABG, cardiac stents admitted with worsening right upper arm abscess. She has been seen in the ER multiple times and was discharged on bactrim, but the abscess persisted and worsened. She was recently admitted where she underwent needle aspiration with IR on 2/18/22 and discharged home on clindamycin. Cultures from that specimen are growing MSSA. Daughter states her arm has failed to improve, however, and that it is quite painful.      Post-Op Info:  Procedure(s) (LRB):  INCISION AND DRAINAGE, UPPER EXTREMITY (Right)   1 Day Post-Op     Interval History:  No significant pain, slightly confused, for drainage of arm abscess    Medications:  Continuous Infusions:  Scheduled Meds:   amLODIPine  10 mg Oral Daily    atorvastatin  40 mg Oral QHS    clonazePAM  0.5 mg Oral QHS    donepeziL  10 mg Oral Daily    isosorbide mononitrate  30 mg Oral BID    levothyroxine  50 mcg Oral Before breakfast    oxacillin 12 g in  mL CONTINUOUS INFUSION  12 g Intravenous Q24H    pantoprazole  40 mg Oral Daily    pregabalin  150 mg Oral QHS    vitamin D  1,000 Units Oral Daily     PRN Meds:sodium chloride 0.9%, acetaminophen, dextrose 10%, glucagon (human recombinant), hydrALAZINE, HYDROcodone-acetaminophen, insulin aspart U-100, ondansetron     Review of patient's allergies indicates:  No Known Allergies  Objective:     Vital Signs (Most Recent):  Temp: 97.5 °F (36.4 °C) (02/22/22 0456)  Pulse: (!) 50 (02/22/22 0456)  Resp: 18 (02/22/22 0456)  BP: (!) 140/60 (02/22/22 0456)  SpO2: 97 % (02/22/22 0456)   Vital Signs (24h Range):  Temp:  [97.5 °F (36.4 °C)-98.6 °F (37 °C)] 97.5 °F (36.4 °C)  Pulse:  [49-62] 50  Resp:  [16-18] 18  SpO2:  [93 %-98 %] 97 %  BP: (106-167)/(46-72) 140/60     Weight: 76.5 kg (168 lb 10.4 oz)  Body mass index is 32.94  kg/m².    Intake/Output - Last 3 Shifts         02/20 0700  02/21 0659 02/21 0700  02/22 0659 02/22 0700  02/23 0659    P.O.  360     I.V. (mL/kg)  47.4 (0.6)     IV Piggyback 250 278.9     Total Intake(mL/kg) 250 (3.6) 686.3 (9)     Net +250 +686.3            Urine Occurrence  2 x             Physical Exam  Vitals reviewed.   Constitutional:       Comments: Overweight   Cardiovascular:      Rate and Rhythm: Normal rate and regular rhythm.   Pulmonary:      Effort: Pulmonary effort is normal.      Breath sounds: Normal breath sounds.   Abdominal:      General: Abdomen is flat.      Palpations: Abdomen is soft.   Skin:     Comments: Erythema and fluctuance of the right upper arm   Neurological:      Mental Status: She is alert.       Significant Labs:  I have reviewed all pertinent lab results within the past 24 hours.  CBC:   Recent Labs   Lab 02/22/22  0448   WBC 13.81*   RBC 3.68*   HGB 10.8*   HCT 33.3*      MCV 91   MCH 29.3   MCHC 32.4     CMP:   Recent Labs   Lab 02/22/22  0448   *  235*   CALCIUM 8.2*  8.2*   ALBUMIN 2.2*  2.3*   PROT 5.8*     136   K 4.2  4.1   CO2 29  29   CL 99  99   BUN 23  23   CREATININE 1.7*  1.7*   ALKPHOS 101   ALT 14   AST 19   BILITOT 0.4       Significant Diagnostics:  I have reviewed all pertinent imaging results/findings within the past 24 hours.  None    Assessment/Plan:     * Severe Sepsis with Cellulitis and abscess of right upper extremity  - P I&D in OR today  - Antibiotics per ID  - Recommend strict glycemic control    Consent obtained.    Risks include infection, bleeding, recurrence, need to create a large open wound    Discussed with daughter at bedside.    Acute renal failure superimposed on stage 3 chronic kidney disease  Medical management    Chronic diastolic heart failure  Medical management    CAD, multiple vessel  Medical management  Please continue to hold Plavix before surgery    Hypertension associated with diabetes  Medical  management    Type 2 diabetes mellitus with hyperglycemia  Recommend strict glycemic control, defer management to medicine.    Hyperlipidemia associated with type 2 diabetes mellitus  Medical management        Brett Parra MD  General Surgery  [facility]

## 2022-02-23 NOTE — ASSESSMENT & PLAN NOTE
2/21 Likely secondary to diuretics/other medications in addition to HTN   Creatinine at 2.1 in ED / Baseline appears to be around 1.2  Monitor for fluid overload  Monitor renal function labs closely  Avoid nephrotoxins as able  Monitor I&Os  Holding home HCTZ, losartan for now  2/23 Home diuretic on hold  Patient on Ivf for gentle hydration  ELLIOTT Improved

## 2022-02-23 NOTE — ASSESSMENT & PLAN NOTE
2/22   This patient does have evidence of infective focus  My overall impression is Severe  sepsis. Vital signs were reviewed and noted in progress note.  Antibiotics given-   Antibiotics (From admission, onward)            Start     Stop Route Frequency Ordered    02/21/22 1430  oxacillin 12 g in  mL CONTINUOUS INFUSION         -- IV Every 24 hours (non-standard times) 02/21/22 1255        Cultures were taken-   Microbiology Results (last 7 days)     Procedure Component Value Units Date/Time    Aerobic culture [[id number]] Collected: 02/22/22 0732    Order Status: Completed Specimen: Incision site from Arm, Right Updated: 02/23/22 0643     Aerobic Bacterial Culture No growth    Blood culture #1 **CANNOT BE ORDERED STAT** [[id number]] Collected: 02/20/22 2311    Order Status: Completed Specimen: Blood from Peripheral, Forearm, Left Updated: 02/23/22 0612     Blood Culture, Routine No Growth to date      No Growth to date      No Growth to date    Blood culture #2 **CANNOT BE ORDERED STAT** [[id number]] Collected: 02/20/22 2255    Order Status: Completed Specimen: Blood from Peripheral, Hand, Left Updated: 02/23/22 0612     Blood Culture, Routine No Growth to date      No Growth to date      No Growth to date        Latest lactate reviewed, they are-  Recent Labs   Lab 02/20/22 2255   LACTATE 1.8       Organ dysfunction indicated by Acute kidney injury     Source- Right arm abscess    Source control Achieved by- IV abx      S/p I&D 2/22/2022

## 2022-02-23 NOTE — CONSULTS
[facility]  Nephrology  Consult Note    Patient Name: Tiffanie Wise  MRN: [id number]  Admission Date: 2/20/2022  Hospital Length of Stay: 2 days  Attending Provider: Jamee James MD   Primary Care Physician: Tiffanie Spence MD  Principal Problem:Cellulitis and abscess of upper extremity    Consults  Subjective:     HPI:     81-year-old female with history of diabetes mellitus and hypertension admitted with an abscess in her right axilla.  Noted to have elevated creatinine over baseline.  Nephrology has been consulted for evaluation.  Patient was seen in her hospital room.  In bed resting comfortably.  No acute distress noted.  Her daughter was at the bedside.  She relates that her mother was admitted several days ago for the same problem.  About a week ago she was started on Bactrim which she took for about 6 days.  She stopped taking in on Wednesday of last week.  She was treated with vancomycin for a couple of days and then discharged on clindamycin.  She presented back to the emergency department yesterday with fever and pain in her right axilla.  Her daughter relates no change in appetite.  She was eating and drinking as per usual.  No nausea vomiting or diarrhea related.    02/22/2022:  Underwent surgical I&D of her abscess site this morning.    02/23/2022:  Sitting up in a chair at bedside eating breakfast.  No acute distress.    Review of systems:  Complains of cough this morning but no shortness of breath.  No chest discomfort.  No nausea vomiting or diarrhea.  No swelling.  No fevers or chills.    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     Trouble in sleeping     Type 2 diabetes mellitus with ophthalmic manifestations     Urinary incontinence     pullups day, pads at night       Past Surgical History:   Procedure Laterality Date    BREAST SURGERY Left     benign cyst    CORONARY ANGIOPLASTY      CORONARY STENT PLACEMENT      x6-7 oer the yeqrs  last 12/17/14 BRIANDA to lcfx    EYE SURGERY Bilateral 2014    cataracts w/IOL   Dr Vance    FLUOROSCOPY N/A 1/20/2021    Procedure: G tube plaacement;  Surgeon: Shaka Cross MD;  Location: [facility] CATH LAB;  Service: General;  Laterality: N/A;    HYSTERECTOMY  1970    vag hyst; ovaries intact    THYROID SURGERY      nodule benign, Dr Hankins    TUBAL LIGATION  1970       Review of patient's allergies indicates:  No Known Allergies  Current Facility-Administered Medications   Medication Frequency    0.9%  NaCl infusion PRN    acetaminophen tablet 650 mg Q6H PRN    albuterol-ipratropium 2.5 mg-0.5 mg/3 mL nebulizer solution 3 mL Q4H PRN    amLODIPine tablet 10 mg Daily    ascorbic acid (vitamin C) tablet 500 mg QHS    atorvastatin tablet 40 mg QHS    chlorhexidine 0.12 % solution 10 mL BID    clonazePAM tablet 0.5 mg QHS    dextrose 10% bolus 125 mL PRN    donepeziL tablet 10 mg Daily    glucagon (human recombinant) injection 1 mg PRN    hydrALAZINE injection 10 mg Q6H PRN    HYDROcodone-acetaminophen 5-325 mg per tablet 1 tablet Q6H PRN     HYDROmorphone (PF) injection 0.5 mg Q4H PRN    insulin aspart U-100 pen 0-5 Units Q6H PRN    insulin detemir U-100 pen 14 Units QHS    isosorbide mononitrate 24 hr tablet 30 mg BID    lactated ringers infusion Continuous    levothyroxine tablet 50 mcg Before breakfast    magnesium oxide tablet 400 mg Daily    multivitamin tablet Daily    ondansetron disintegrating tablet 8 mg Q8H PRN    ondansetron injection 4 mg Q6H PRN    oxacillin 12 g in  mL CONTINUOUS INFUSION Q24H    pantoprazole EC tablet 40 mg Daily    pregabalin capsule 150 mg QHS    vitamin D 1000 units tablet 1,000 Units Daily    zinc sulfate capsule 220 mg QHS     Family History     Problem Relation (Age of Onset)    Alcohol abuse Sister    Cancer Son    Cirrhosis Sister    Diabetes Sister, Paternal Grandmother    Fibromyalgia Daughter, Daughter    Heart disease Mother    Kidney disease Father, Brother        Tobacco Use    Smoking status: Former Smoker     Packs/day: 1.00     Years: 46.00     Pack years: 46.00     Types: Cigarettes     Start date:      Quit date: 2008     Years since quittin.6    Smokeless tobacco: Never Used   Substance and Sexual Activity    Alcohol use: No    Drug use: Never    Sexual activity: Not Currently     Birth control/protection: None       Objective:     Vital Signs (Most Recent):  Temp: 99.1 °F (37.3 °C) (22)  Pulse: 72 (22)  Resp: 18 (22)  BP: (!) 113/48 (22)  SpO2: (!) 94 % (22)  O2 Device (Oxygen Therapy): room air (22) Vital Signs (24h Range):  Temp:  [97.3 °F (36.3 °C)-99.1 °F (37.3 °C)] 99.1 °F (37.3 °C)  Pulse:  [56-75] 72  Resp:  [14-18] 18  SpO2:  [92 %-97 %] 94 %  BP: (113-166)/(48-77) 113/48     Weight: 79.4 kg (175 lb 0.7 oz) (224)  Body mass index is 34.19 kg/m².  Body surface area is 1.83 meters squared.    I/O last 3 completed shifts:  In: 586 [P.O.:360; IV Piggyback:226]  Out: 15  [Blood:15]    Physical Exam  Constitutional:       Appearance: Normal appearance.   HENT:      Head: Normocephalic and atraumatic.   Eyes:      General: No scleral icterus.     Extraocular Movements: Extraocular movements intact.      Pupils: Pupils are equal, round, and reactive to light.   Cardiovascular:      Rate and Rhythm: Normal rate and regular rhythm.   Pulmonary:      Effort: Pulmonary effort is normal.      Breath sounds: Normal breath sounds.   Musculoskeletal:      Right lower leg: No edema.      Left lower leg: No edema.   Skin:     General: Skin is warm and dry.      Comments: Bandage to the right upper extremity near the axilla.   Neurological:      General: No focal deficit present.      Mental Status: She is alert and oriented to person, place, and time.   Psychiatric:         Mood and Affect: Mood normal.         Behavior: Behavior normal.         Significant Labs:  BMP:   Recent Labs   Lab 02/22/22  0448 02/23/22  0503   *  235* 227*     136 136   K 4.2  4.1 5.8*   CL 99  99 101   CO2 29  29 23   BUN 23  23 20   CREATININE 1.7*  1.7* 1.7*   CALCIUM 8.2*  8.2* 8.3*   MG 1.9  --      CMP:   Recent Labs   Lab 02/22/22  0448 02/23/22  0503   *  235* 227*   CALCIUM 8.2*  8.2* 8.3*   ALBUMIN 2.2*  2.3*  --    PROT 5.8*  --      136 136   K 4.2  4.1 5.8*   CO2 29  29 23   CL 99  99 101   BUN 23  23 20   CREATININE 1.7*  1.7* 1.7*   ALKPHOS 101  --    ALT 14  --    AST 19  --    BILITOT 0.4  --      All labs within the past 24 hours have been reviewed.    Significant Imaging:  Labs: Reviewed  Reviewed    Assessment/Plan:     Active Diagnoses:    Diagnosis Date Noted POA    PRINCIPAL PROBLEM:  Severe Sepsis with Cellulitis and abscess of right upper extremity [L03.119, L02.419] 02/21/2022 Yes    Hyperkalemia [E87.5] 02/23/2022 Yes    MSSA (methicillin susceptible Staphylococcus aureus) infection [A49.01] 02/23/2022 Yes    Chronic diastolic heart failure  [I50.32] 02/21/2022 Yes    Acute renal failure superimposed on stage 3 chronic kidney disease [N17.9, N18.30] 02/21/2022 Yes    Hypomagnesemia [E83.42] 02/21/2022 Yes    Abscess of right arm S/p I&D 2/22/2022 [L02.413] 02/21/2022 Yes    Debility [R53.81] 02/21/2022 Yes    Hypoalbuminemia [E88.09] 02/21/2022 Yes    Normocytic anemia [D64.9] 02/21/2022 Yes    CAD, multiple vessel [I25.10] 05/13/2021 Yes    Hypertension associated with diabetes [E11.59, I15.2] 12/29/2016 Yes     Chronic    Hyperlipidemia associated with type 2 diabetes mellitus [E11.69, E78.5] 07/18/2013 Yes     Chronic    Type 2 diabetes mellitus with hyperglycemia [E11.65] 07/18/2013 Yes     Chronic    GERD (gastroesophageal reflux disease) [K21.9] 07/18/2013 Yes     Chronic    Acquired hypothyroidism [E03.9] 04/15/2013 Yes     Chronic      Problems Resolved During this Admission:       1. ELLIOTT:  Creatinine was 2.1 on admission.  Has stabilized at 1.7.    At this point because of acute kidney injury is not entirely clear however likely secondary to ATN from ongoing infection.      At this point try to avoid renal toxins were possible.  Monitor strict I's and O's and laboratory studies.      Urine electrolytes are not useful at this point.    2. Hyperkalemia:  Potassium is increased to 5.8.  Will change to a renal diabetic low-potassium diet.  Will give a couple of doses of Lokelma.    3. Acid-base:  Serum bicarbonate is stable at 29.    4. CKD 3A:  Baseline creatinine runs between 0.8 and 1.2.  EGFR is normal for her age.  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  UA also shows 2+ proteinuria.      Thank you for your consult.     Mariusz Lizarraga MD  Nephrology  [facility]

## 2022-02-23 NOTE — PLAN OF CARE
P.T. EVAL COMPLETE, PT CURRENTLY REQUIRES SBA FOR BED MOBILITY, SARAH FOR TF'S, MOD/SARAH FOR GAIT USING RW, PT QUICK/IMPULSIVE, HIGH RISK FOR FALL, P.T. RECOMMEND HHPT WITH FAMILY ASSISTANCE AS NEEDED

## 2022-02-24 PROBLEM — E87.5 HYPERKALEMIA: Status: RESOLVED | Noted: 2022-01-01 | Resolved: 2022-01-01

## 2022-02-24 NOTE — ASSESSMENT & PLAN NOTE
2/22   This patient does have evidence of infective focus  My overall impression is Severe  sepsis. Vital signs were reviewed and noted in progress note.  Antibiotics given-   Antibiotics (From admission, onward)            Start     Stop Route Frequency Ordered    02/21/22 1430  oxacillin 12 g in  mL CONTINUOUS INFUSION         -- IV Every 24 hours (non-standard times) 02/21/22 1255        Cultures were taken-   Microbiology Results (last 7 days)     Procedure Component Value Units Date/Time    Blood culture #1 **CANNOT BE ORDERED STAT** [[id number]] Collected: 02/20/22 2311    Order Status: Completed Specimen: Blood from Peripheral, Forearm, Left Updated: 02/24/22 0612     Blood Culture, Routine No Growth to date      No Growth to date      No Growth to date      No Growth to date    Blood culture #2 **CANNOT BE ORDERED STAT** [[id number]] Collected: 02/20/22 2255    Order Status: Completed Specimen: Blood from Peripheral, Hand, Left Updated: 02/24/22 0612     Blood Culture, Routine No Growth to date      No Growth to date      No Growth to date      No Growth to date    Aerobic culture [[id number]] Collected: 02/22/22 0732    Order Status: Completed Specimen: Incision site from Arm, Right Updated: 02/23/22 0643     Aerobic Bacterial Culture No growth        Latest lactate reviewed, they are-  No results for input(s): LACTATE in the last 72 hours.    Organ dysfunction indicated by Acute kidney injury     Source- Right arm abscess    Source control Achieved by- IV abx      S/p I&D 2/22/2022 2/24 WBCs up to 22 today- Discussed with ID- Continue current Iv Abx for now.    Repeat UA and Cxr

## 2022-02-24 NOTE — PT/OT/SLP PROGRESS
Physical Therapy  Treatment    Tiffanie Wise   MRN: [id number]   Admitting Diagnosis: Cellulitis and abscess of upper extremity    PT Received On: 02/24/22  PT Start Time: 0750     PT Stop Time: 0815    PT Total Time (min): 25 min       Billable Minutes:  Therapeutic Activity 25    Treatment Type: Treatment  PT/PTA: PTA     PTA Visit Number: 1       General Precautions: Standard, fall, hearing impaired, respiratory  Orthopedic Precautions: N/A   Braces: N/A  Respiratory Status: Nasal cannula, flow 2 L/min         Subjective:  Communicated with nurse So and completed Epic chart reveiw prior to session.  Patient with increased lethargy throughout session, making verbal communication difficult for patient.     Pain/Comfort  Pain Rating 1: 0/10  Pain Rating Post-Intervention 1: 0/10    Objective:   Patient found with: peripheral IV, telemetry, oxygen    Functional Mobility:  Supine > sit EOB: Min A    Seated EOB x10 min total focusing on increased tolerance to upright position, core stability, trunk control and CV endurance. Consistently required CGA-Min A to maintain sitting balance while EOB. Frequently loses balance laterally due to increased lethargy.    x5 reps AAROM TE while EOB to BLE: LAQ, Hip Flex, AP    STS from EOB > RW: Mod A   Patient's eyes were began fluttering and balance swaying so she was quickly returned to sitting EOB. Gait and T/F to chair not attempted.    Lateral scoot towards HOB: Min A    Sit > supine: Min A    Re enforced importance of utilizing call light to meet needs in room and not attempt to get up without staff assistance. Patient nodded in understanding.     AM-PAC 6 CLICK MOBILITY  How much help from another person does this patient currently need?   1 = Unable, Total/Dependent Assistance  2 = A lot, Maximum/Moderate Assistance  3 = A little, Minimum/Contact Guard/Supervision  4 = None, Modified [place]/Independent    Turning over in bed (including adjusting bedclothes, sheets  and blankets)?: 3  Sitting down on and standing up from a chair with arms (e.g., wheelchair, bedside commode, etc.): 3  Moving from lying on back to sitting on the side of the bed?: 3  Moving to and from a bed to a chair (including a wheelchair)?: 1  Need to walk in hospital room?: 1  Climbing 3-5 steps with a railing?: 1  Basic Mobility Total Score: 12    AM-PAC Raw Score CMS G-Code Modifier Level of Impairment Assistance   6 % Total / Unable   7 - 9 CM 80 - 100% Maximal Assist   10 - 14 CL 60 - 80% Moderate Assist   15 - 19 CK 40 - 60% Moderate Assist   20 - 22 CJ 20 - 40% Minimal Assist   23 CI 1-20% SBA / CGA   24 CH 0% Independent/ Mod I     Patient left with bed in chair position with all lines intact, call button in reach and bed alarm on.    Assessment:  Tiffanie Wise is a 81 y.o. female with a medical diagnosis of Cellulitis and abscess of upper extremity and presents with overall decline in functional mobility. Patient would continue to benefit from skilled PT to address functional limitations listed below in order to return to PLOF/decrease caregiver burden    Rehab identified problem list/impairments: Rehab identified problem list/impairments: weakness, impaired endurance, impaired self care skills, impaired functional mobilty, gait instability, impaired balance, impaired cognition, decreased coordination, decreased upper extremity function, decreased lower extremity function, decreased safety awareness, decreased ROM, impaired coordination, impaired cardiopulmonary response to activity    Rehab potential is fair.    Activity tolerance: Poor    Discharge recommendations: Discharge Facility/Level of Care Needs: home with home health (WITH FAMILY ASSISTANCE AS NEEDED)     Barriers to discharge:      Equipment recommendations: Equipment Needed After Discharge: none     GOALS:   Multidisciplinary Problems     Physical Therapy Goals        Problem: Physical Therapy Goal    Goal Priority Disciplines  Outcome Goal Variances Interventions   Physical Therapy Goal     PT, PT/OT      Description: LTG'S TO BE MET IN 14 DAYS (3-9-22)  1. PT WILL BE CLAY WITH BED MOBILITY  2. PT WILL REQUIRE SBA FOR TF'S  3. PT WILL ' WITH RW AND SBA                   PLAN:    Patient to be seen 3 x/week  to address the above listed problems via gait training, therapeutic activities, therapeutic exercises  Plan of Care expires: 03/09/22  Plan of Care reviewed with: patient         02/24/2022

## 2022-02-24 NOTE — SIGNIFICANT EVENT
"[facility]" Healthcare Power of    [phone]     Called and spoke with daughter and updated her on patient's status and plan of care. Patient with decreased po intake- Change to regular diet.  "

## 2022-02-24 NOTE — ASSESSMENT & PLAN NOTE
2/21 Pt with dexcom device to abdomen  A1c at 7.6 on 11/01/2021 / BG in ED at 304  Accuchecks with SSI prn   Holding home januvia  A1c pending    2/22  Lab Results   Component Value Date    HGBA1C 7.2 (H) 02/21/2022    Blood sugars running 190-219-Add 10 units detemir sq qpm  2/23 Blood suagrs running 179-283- Increase detemir to 14 units q pm  2/24 Blood sugars running 161-241- Increase detemir to 18 units q pm

## 2022-02-24 NOTE — CONSULTS
[facility] Surg  Nephrology  Consult Note    Patient Name: Tiffanie Wise  MRN: [id number]  Admission Date: 2/20/2022  Hospital Length of Stay: 3 days  Attending Provider: Agustín Hernandez MD   Primary Care Physician: Tiffanie Spence MD  Principal Problem:Cellulitis and abscess of upper extremity    Consults  Subjective:     HPI:     81-year-old female with history of diabetes mellitus and hypertension admitted with an abscess in her right axilla.  Noted to have elevated creatinine over baseline.  Nephrology has been consulted for evaluation.  Patient was seen in her hospital room.  In bed resting comfortably.  No acute distress noted.  Her daughter was at the bedside.  She relates that her mother was admitted several days ago for the same problem.  About a week ago she was started on Bactrim which she took for about 6 days.  She stopped taking in on Wednesday of last week.  She was treated with vancomycin for a couple of days and then discharged on clindamycin.  She presented back to the emergency department yesterday with fever and pain in her right axilla.  Her daughter relates no change in appetite.  She was eating and drinking as per usual.  No nausea vomiting or diarrhea related.    02/22/2022:  Underwent surgical I&D of her abscess site this morning.    02/23/2022:  Sitting up in a chair at bedside eating breakfast.  No acute distress.    02/24/2022:  In bed resting comfortably.  No acute distress noted.    Review of systems:  No shortness of breath.  No chest discomfort.  No nausea vomiting or diarrhea.  No swelling.  No fevers or chills.    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     Trouble in sleeping     Type 2 diabetes mellitus with ophthalmic manifestations     Urinary incontinence     pullups day, pads at night       Past Surgical History:   Procedure Laterality Date    BREAST SURGERY Left     benign cyst    CORONARY ANGIOPLASTY      CORONARY STENT PLACEMENT      x6-7 oer the yeqrs  last 12/17/14 BRIANDA to lcfx    EYE SURGERY Bilateral 2014    cataracts w/IOL   Dr Vance    FLUOROSCOPY N/A 1/20/2021    Procedure: G tube plaacement;  Surgeon: Shaka Cross MD;  Location: [facility] CATH LAB;  Service: General;  Laterality: N/A;    HYSTERECTOMY  1970    vag hyst; ovaries intact    THYROID SURGERY      nodule benign, Dr Hankins    TUBAL LIGATION  1970       Review of patient's allergies indicates:  No Known Allergies  Current Facility-Administered Medications   Medication Frequency    0.9%  NaCl infusion PRN    acetaminophen tablet 650 mg Q6H PRN    albuterol-ipratropium 2.5 mg-0.5 mg/3 mL nebulizer solution 3 mL Q4H PRN    amLODIPine tablet 10 mg Daily    ascorbic acid (vitamin C) tablet 500 mg QHS    atorvastatin tablet 40 mg QHS    chlorhexidine 0.12 % solution 10 mL BID    clonazePAM tablet 0.5 mg QHS    dextrose 10% bolus 125 mL PRN    donepeziL tablet 10 mg Daily    glucagon (human recombinant) injection 1 mg PRN    guaiFENesin 12 hr tablet 600 mg BID    hydrALAZINE injection 10 mg Q6H PRN     HYDROcodone-acetaminophen 5-325 mg per tablet 1 tablet Q6H PRN    hydrOXYzine HCL tablet 25 mg TID PRN    insulin aspart U-100 pen 0-5 Units Q6H PRN    insulin detemir U-100 pen 14 Units QHS    isosorbide mononitrate 24 hr tablet 30 mg BID    lactated ringers infusion Continuous    levothyroxine tablet 50 mcg Before breakfast    magnesium oxide tablet 400 mg Daily    multivitamin tablet Daily    ondansetron disintegrating tablet 8 mg Q8H PRN    ondansetron injection 4 mg Q6H PRN    oxacillin 12 g in  mL CONTINUOUS INFUSION Q24H    pantoprazole EC tablet 40 mg Daily    pregabalin capsule 150 mg QHS    sodium zirconium cyclosilicate packet 10 g Daily    vitamin D 1000 units tablet 1,000 Units Daily    zinc sulfate capsule 220 mg QHS     Family History     Problem Relation (Age of Onset)    Alcohol abuse Sister    Cancer Son    Cirrhosis Sister    Diabetes Sister, Paternal Grandmother    Fibromyalgia Daughter, Daughter    Heart disease Mother    Kidney disease Father, Brother        Tobacco Use    Smoking status: Former Smoker     Packs/day: 1.00     Years: 46.00     Pack years: 46.00     Types: Cigarettes     Start date:      Quit date: 2008     Years since quittin.6    Smokeless tobacco: Never Used   Substance and Sexual Activity    Alcohol use: No    Drug use: Never    Sexual activity: Not Currently     Birth control/protection: None       Objective:     Vital Signs (Most Recent):  Temp: 99.7 °F (37.6 °C) (22 0744)  Pulse: 76 (22 0744)  Resp: (!) 22 (22 07)  BP: (!) 162/72 (22 0744)  SpO2: 95 % (22 0900)  O2 Device (Oxygen Therapy): nasal cannula (22 0900) Vital Signs (24h Range):  Temp:  [98.3 °F (36.8 °C)-99.7 °F (37.6 °C)] 99.7 °F (37.6 °C)  Pulse:  [57-76] 76  Resp:  [14-] 22  SpO2:  [91 %-96 %] 95 %  BP: (121-163)/(56-72) 162/72     Weight: 80.2 kg (176 lb 12.9 oz) (22 0459)  Body mass index is 34.53 kg/m².  Body surface area  is 1.84 meters squared.    I/O last 3 completed shifts:  In: 1851 [I.V.:1300.9; IV Piggyback:550.1]  Out: -     Physical Exam  Constitutional:       Appearance: Normal appearance.   HENT:      Head: Normocephalic and atraumatic.   Eyes:      General: No scleral icterus.     Extraocular Movements: Extraocular movements intact.      Pupils: Pupils are equal, round, and reactive to light.   Cardiovascular:      Rate and Rhythm: Normal rate and regular rhythm.   Pulmonary:      Effort: Pulmonary effort is normal.      Breath sounds: Normal breath sounds.   Musculoskeletal:      Right lower leg: No edema.      Left lower leg: No edema.   Skin:     General: Skin is warm and dry.      Comments: Bandage to the right upper extremity near the axilla.   Neurological:      General: No focal deficit present.      Mental Status: She is alert and oriented to person, place, and time.   Psychiatric:         Mood and Affect: Mood normal.         Behavior: Behavior normal.         Significant Labs:  BMP:   Recent Labs   Lab 02/24/22  0518   *      K 3.9   CL 99   CO2 29   BUN 17   CREATININE 1.5*   CALCIUM 8.3*   MG 1.7     CMP:   Recent Labs   Lab 02/22/22  0448 02/23/22  0503 02/24/22  0518   *  235*   < > 180*   CALCIUM 8.2*  8.2*   < > 8.3*   ALBUMIN 2.2*  2.3*  --   --    PROT 5.8*  --   --      136   < > 138   K 4.2  4.1   < > 3.9   CO2 29  29   < > 29   CL 99  99   < > 99   BUN 23  23   < > 17   CREATININE 1.7*  1.7*   < > 1.5*   ALKPHOS 101  --   --    ALT 14  --   --    AST 19  --   --    BILITOT 0.4  --   --     < > = values in this interval not displayed.     All labs within the past 24 hours have been reviewed.    Significant Imaging:  Labs: Reviewed  Reviewed    Assessment/Plan:     Active Diagnoses:    Diagnosis Date Noted POA    PRINCIPAL PROBLEM:  Severe Sepsis with Cellulitis and abscess of right upper extremity [L03.119, L02.419] 02/21/2022 Yes    Hyperkalemia- Excluded [E87.5]  02/23/2022 Yes    Hx MSSA (methicillin susceptible Staphylococcus aureus) infection [A49.01] 02/23/2022 Yes    Chronic diastolic heart failure [I50.32] 02/21/2022 Yes    Acute renal failure superimposed on stage 3 chronic kidney disease [N17.9, N18.30] 02/21/2022 Yes    Hypomagnesemia [E83.42] 02/21/2022 Yes    Abscess of right arm S/p I&D 2/22/2022 [L02.413] 02/21/2022 Yes    Debility [R53.81] 02/21/2022 Yes    Hypoalbuminemia [E88.09] 02/21/2022 Yes    Normocytic anemia [D64.9] 02/21/2022 Yes    CAD, multiple vessel [I25.10] 05/13/2021 Yes    Hypertension associated with diabetes [E11.59, I15.2] 12/29/2016 Yes     Chronic    Hyperlipidemia associated with type 2 diabetes mellitus [E11.69, E78.5] 07/18/2013 Yes     Chronic    Type 2 diabetes mellitus with hyperglycemia [E11.65] 07/18/2013 Yes     Chronic    GERD (gastroesophageal reflux disease) [K21.9] 07/18/2013 Yes     Chronic    Acquired hypothyroidism [E03.9] 04/15/2013 Yes     Chronic      Problems Resolved During this Admission:       1. ELLIOTT:  Creatinine was 2.1 on admission.  Creatinine has improved to 1.5 this morning.    At this point because of acute kidney injury is not entirely clear however likely secondary to ATN from ongoing infection.      2. Hyperkalemia:  Potassium has improved to 3.9.    3. Acid-base:  Serum bicarbonate is stable at 29.    4. CKD 3A:  Baseline creatinine runs between 0.8 and 1.2.  EGFR is normal for her age.  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  UA also shows 2+ proteinuria.      Patient is stable to improved from a renal standpoint.  Will sign off.  Please recall if needed.    Approximately 30 minutes was spent in chart review, coordination of care and patient examination.    Thank you for your consult.     Mariusz Lizarraga MD  Nephrology  [facility]

## 2022-02-24 NOTE — ASSESSMENT & PLAN NOTE
Incision and drainage on 02/22.  The incision is improved in appearance.  Her white count however is 22,000.  Continue antibiotics per Medicine.  Monitor white blood cell count

## 2022-02-24 NOTE — SUBJECTIVE & OBJECTIVE
Interval History:  Patient is a bit confused this morning.  The wound looks relatively clean however her white count is 22,000.  Question etiology    Medications:  Continuous Infusions:   lactated ringers 50 mL/hr at 02/24/22 0629     Scheduled Meds:   amLODIPine  10 mg Oral Daily    ascorbic acid (vitamin C)  500 mg Oral QHS    atorvastatin  40 mg Oral QHS    chlorhexidine  10 mL Mouth/Throat BID    clonazePAM  0.5 mg Oral QHS    donepeziL  10 mg Oral Daily    guaiFENesin  600 mg Oral BID    insulin detemir U-100  14 Units Subcutaneous QHS    isosorbide mononitrate  30 mg Oral BID    levothyroxine  50 mcg Oral Before breakfast    magnesium oxide  400 mg Oral Daily    multivitamin  1 tablet Oral Daily    oxacillin 12 g in  mL CONTINUOUS INFUSION  12 g Intravenous Q24H    pantoprazole  40 mg Oral Daily    pregabalin  150 mg Oral QHS    sodium zirconium cyclosilicate  10 g Oral Daily    vitamin D  1,000 Units Oral Daily    zinc sulfate  220 mg Oral QHS     PRN Meds:sodium chloride 0.9%, acetaminophen, albuterol-ipratropium, dextrose 10%, glucagon (human recombinant), hydrALAZINE, HYDROcodone-acetaminophen, hydrOXYzine HCL, insulin aspart U-100, ondansetron, ondansetron     Review of patient's allergies indicates:  No Known Allergies  Objective:     Vital Signs (Most Recent):  Temp: 99.7 °F (37.6 °C) (02/24/22 0744)  Pulse: 76 (02/24/22 0744)  Resp: (!) 22 (02/24/22 0744)  BP: (!) 162/72 (02/24/22 0744)  SpO2: (!) 93 % (02/24/22 0744)   Vital Signs (24h Range):  Temp:  [98.3 °F (36.8 °C)-99.7 °F (37.6 °C)] 99.7 °F (37.6 °C)  Pulse:  [57-76] 76  Resp:  [14-22] 22  SpO2:  [91 %-96 %] 93 %  BP: (121-163)/(56-72) 162/72     Weight: 80.2 kg (176 lb 12.9 oz)  Body mass index is 34.53 kg/m².    Intake/Output - Last 3 Shifts         02/22 0700 02/23 0659 02/23 0700 02/24 0659 02/24 0700 02/25 0659    P.O.       I.V. (mL/kg)  1300.9 (16.2)     IV Piggyback  550.1     Total Intake(mL/kg)  1851 (23.1)     Blood 15       Total Output 15      Net -15 +1851            Urine Occurrence 1 x 3 x     Stool Occurrence  1 x             Physical Exam  Vitals reviewed.   Constitutional:       Appearance: Ill appearance: chronically.   Skin:     Comments: Mild erythema surrounding right upper arm open wound.  The wound is clean with no drainage.   Neurological:      Mental Status: She is alert.       Significant Labs:  I have reviewed all pertinent lab results within the past 24 hours.  CBC:   Recent Labs   Lab 02/24/22  0518   WBC 22.05*   RBC 3.87*   HGB 11.1*   HCT 34.8*      MCV 90   MCH 28.7   MCHC 31.9*       Significant Diagnostics:  None

## 2022-02-24 NOTE — MEDICAL/APP STUDENT
Progress Note  Hospital Medicine    Admit Date: 2/20/2022    SUBJECTIVE:     Follow-up For:  Cellulitis and abscess of upper extremity    HPI: Tiffanie Wise is a 80 y/o F with hx of GERD, hyperlipidemia, T2DM, HTN, CAD, diastolic HF, CKD stage 3 who was admitted with worsening right upper arm abscess. She was seen multiple times in the ED prior to this visit for same abscess. She was previously discharged on Bactrim, but abscess continued to worsen. She was admitted on 2/18/22 where she underwent a needle aspiration of the abscess and discharged on clindamycin. Cultures from that specimen grew MSSA. Arm still failed to improve despite aspiration and abx, which lead to this admission.     Hospital Course: Pt has been monitored closely during her stay. She is on continuous telemetry monitoring and was placed on IV abx for her R arm abscess. General Surgery was consulted for I&D - s/p I&D of R arm on 2/22. ID was consulted for sepsis with failed outpatient po abx therapy. Nephrology was consulted for ELLIOTT with CKD stage 3. Cardiology was consulted for acute CHF. Cultures from I&D pending.     Interval History: Pt lethargic this AM with headache. Mild erythema and itching around wound site. WBC this AM 22.05 with mild fever 99.7F.      Review of Systems   Constitutional: Positive for fever (99.7F) and malaise/fatigue. Negative for chills.   HENT: Positive for hearing loss. Negative for ear discharge, ear pain and sore throat.    Eyes: Negative for pain, discharge and redness.   Respiratory: Negative for cough, shortness of breath and stridor.    Cardiovascular: Negative for chest pain, palpitations and leg swelling.   Gastrointestinal: Negative for diarrhea, nausea and vomiting.   Genitourinary: Negative for dysuria and flank pain.   Musculoskeletal: Negative.    Skin: Positive for itching (around area of abscess (RUE)).   Neurological: Positive for headaches. Negative for dizziness.   Psychiatric/Behavioral: Negative.         OBJECTIVE:     Vital Signs Range (Last 24H):  Temp: 99.7 °F (37.6 °C) (02/24/22 0744)  Pulse: 76 (02/24/22 0744)  Resp: (!) 22 (02/24/22 0744)  BP: (!) 162/72 (02/24/22 0744)  SpO2: 95 % (02/24/22 0900)      I & O (Last 24H):      Intake/Output Summary (Last 24 hours) at 2/24/2022 1039  Last data filed at 2/23/2022 1821  Gross per 24 hour   Intake 1850.95 ml   Output --   Net 1850.95 ml         Estimated body mass index is 34.53 kg/m² as calculated from the following:    Height as of this encounter: 5' (1.524 m).    Weight as of this encounter: 80.2 kg (176 lb 12.9 oz).    Physical Exam  Constitutional:       General: She is not in acute distress.     Appearance: Normal appearance. She is not ill-appearing or toxic-appearing.   HENT:      Head: Normocephalic and atraumatic.      Nose: Nose normal. No congestion or rhinorrhea.      Mouth/Throat:      Pharynx: Oropharynx is clear.   Eyes:      Extraocular Movements: Extraocular movements intact.   Cardiovascular:      Rate and Rhythm: Normal rate.      Pulses: Normal pulses.      Heart sounds: Normal heart sounds.   Pulmonary:      Effort: Pulmonary effort is normal. No respiratory distress.      Breath sounds: Normal breath sounds. No wheezing, rhonchi or rales.   Abdominal:      General: Abdomen is flat. Bowel sounds are normal. There is no distension.      Tenderness: There is no abdominal tenderness. There is no guarding or rebound.   Musculoskeletal:      Cervical back: Normal range of motion.   Skin:     General: Skin is warm and dry.      Findings: Erythema (RUE around abscess area) present.   Neurological:      Mental Status: She is oriented to person, place, and time.   Psychiatric:         Mood and Affect: Mood normal.         Laboratory Data:    Recent Results (from the past 168 hour(s))   Basic Metabolic Panel    Collection Time: 02/24/22  5:18 AM   Result Value Ref Range    Sodium 138 136 - 145 mmol/L    Potassium 3.9 3.5 - 5.1 mmol/L    Chloride 99  95 - 110 mmol/L    CO2 29 23 - 29 mmol/L    BUN 17 8 - 23 mg/dL    Creatinine 1.5 (H) 0.5 - 1.4 mg/dL    Calcium 8.3 (L) 8.7 - 10.5 mg/dL    Anion Gap 10 8 - 16 mmol/L   Basic Metabolic Panel    Collection Time: 02/23/22  5:03 AM   Result Value Ref Range    Sodium 136 136 - 145 mmol/L    Potassium 5.8 (H) 3.5 - 5.1 mmol/L    Chloride 101 95 - 110 mmol/L    CO2 23 23 - 29 mmol/L    BUN 20 8 - 23 mg/dL    Creatinine 1.7 (H) 0.5 - 1.4 mg/dL    Calcium 8.3 (L) 8.7 - 10.5 mg/dL    Anion Gap 12 8 - 16 mmol/L       Recent Results (from the past 168 hour(s))   CBC Auto Differential    Collection Time: 02/24/22  5:18 AM   Result Value Ref Range    WBC 22.05 (H) 3.90 - 12.70 K/uL    Hemoglobin 11.1 (L) 12.0 - 16.0 g/dL    Hematocrit 34.8 (L) 37.0 - 48.5 %    Platelets 282 150 - 450 K/uL   CBC Auto Differential    Collection Time: 02/23/22  5:03 AM   Result Value Ref Range    WBC 13.54 (H) 3.90 - 12.70 K/uL    Hemoglobin 11.9 (L) 12.0 - 16.0 g/dL    Hematocrit 38.9 37.0 - 48.5 %    Platelets 254 150 - 450 K/uL   CBC Auto Differential    Collection Time: 02/22/22  4:48 AM   Result Value Ref Range    WBC 13.81 (H) 3.90 - 12.70 K/uL    Hemoglobin 10.8 (L) 12.0 - 16.0 g/dL    Hematocrit 33.3 (L) 37.0 - 48.5 %    Platelets 281 150 - 450 K/uL           Medication list was reviewed and changes noted under Assessment/Plan.      Diagnostic Results:  Labs: Reviewed  ECG: Reviewed  X-Ray: Reviewed  US: Reviewed  Echo: Reviewed        ASSESSMENT/PLAN:       Active Problems:        Active Hospital Problems     Diagnosis   POA    *Severe Sepsis with Cellulitis and abscess of right upper extremity [L03.119, L02.419]  S/p I&D on 2/22   ID consulted for failed outpatient tx of abscess with clinda and bactrim  Continue pt on IV abx  Cultures pending  2/24: WBC elevated to 22.05 - discussed with ID  To follow up with surgery as outpatient in 2 weeks     Antibiotics (From admission, onward)            Start     Stop Route Frequency Ordered     02/21/22 1430  oxacillin 12 g in  mL CONTINUOUS INFUSION         -- IV Every 24 hours (non-standard times) 02/21/22 1255        Microbiology Results (last 7 days)     Procedure Component Value Units Date/Time    Blood culture #1 **CANNOT BE ORDERED STAT** [[id number]] Collected: 02/20/22 2311    Order Status: Completed Specimen: Blood from Peripheral, Forearm, Left Updated: 02/24/22 0612     Blood Culture, Routine No Growth to date      No Growth to date      No Growth to date      No Growth to date    Blood culture #2 **CANNOT BE ORDERED STAT** [[id number]] Collected: 02/20/22 2255    Order Status: Completed Specimen: Blood from Peripheral, Hand, Left Updated: 02/24/22 0612     Blood Culture, Routine No Growth to date      No Growth to date      No Growth to date      No Growth to date    Aerobic culture [[id number]] Collected: 02/22/22 0732    Order Status: Completed Specimen: Incision site from Arm, Right Updated: 02/23/22 0643     Aerobic Bacterial Culture No growth            Yes    Chronic diastolic heart failure [I50.32]  Echo performed on 2/21  On continuous telemetry monitoring  Monitor strict I&Os and daily weights      Yes    Acute renal failure superimposed on stage 3 chronic kidney disease [N17.9, N18.30]  Suspected renal failure possibly secondary to dehydration or infection  Baseline creatinine runs 0.8-1.2; this AM: 1.5  Pt on renal diet   Home diuretic being held   Pt currently remains on IVF for hydration  2/24: nephrology saw pt and stated pt has improved from a renal standpoint      Yes    Hypomagnesemia [E83.42]  Pt on mag oxide tab 400mg daily      Yes    Abscess of right arm S/p I&D 2/22/2022 [L02.413]  Pt on oxacillin following I&D  Wound care and dressing changes daily  Wound culture pending - see above   2/24: WBC elevated to 22.05 - monitor WBC closely      Yes    Debility [R53.81]  PT to evaluate/tx pt       Yes    CAD, multiple vessel [I25.10]  Pt stable      Yes     Hypertension associated with diabetes [E11.59, I15.2]  Monitor BP closely  Home amlodipine tab 10mg daily restarted  Hydralazine IV prn SBP > 170      Yes    Hyperlipidemia associated with type 2 diabetes mellitus [E11.69, E78.5]  Pt on home statin - atorvastatin tab 40mg nightly      Yes    Type 2 diabetes mellitus with hyperglycemia [E11.65]  Pt has dexcom device to abd  A1C at 7.6 on 11/1/21  BG in ED at 304;  today (2/24)  Holding home januvia  POCT testing   Pt receiving boost glucose control supplement      Yes    GERD (gastroesophageal reflux disease) [K21.9]  Pt on home PPI - pantoprazole tab 40mg daily       Yes    Acquired hypothyroidism [E03.9]  Continue home synthroid   Yes              DVT prophylaxis addressed with: SCDs        Time spent in care of patient (Greater than 1/2 spent in direct face to face contact: 30 minutes

## 2022-02-24 NOTE — PROGRESS NOTES
[facility]  General Surgery  Progress Note    Subjective:     History of Present Illness:  82 y/o F with h/o angina, GERD, HLD, DM2, hypothyroidism, osteoporosis, CAD, CABG, cardiac stents admitted with worsening right upper arm abscess. She has been seen in the ER multiple times and was discharged on bactrim, but the abscess persisted and worsened. She was recently admitted where she underwent needle aspiration with IR on 2/18/22 and discharged home on clindamycin. Cultures from that specimen are growing MSSA. Daughter states her arm has failed to improve, however, and that it is quite painful.      Post-Op Info:  Procedure(s) (LRB):  INCISION AND DRAINAGE, UPPER EXTREMITY (Right)   2 Days Post-Op     Interval History:  Patient is a bit confused this morning.  The wound looks relatively clean however her white count is 22,000.  Question etiology    Medications:  Continuous Infusions:   lactated ringers 50 mL/hr at 02/24/22 0629     Scheduled Meds:   amLODIPine  10 mg Oral Daily    ascorbic acid (vitamin C)  500 mg Oral QHS    atorvastatin  40 mg Oral QHS    chlorhexidine  10 mL Mouth/Throat BID    clonazePAM  0.5 mg Oral QHS    donepeziL  10 mg Oral Daily    guaiFENesin  600 mg Oral BID    insulin detemir U-100  14 Units Subcutaneous QHS    isosorbide mononitrate  30 mg Oral BID    levothyroxine  50 mcg Oral Before breakfast    magnesium oxide  400 mg Oral Daily    multivitamin  1 tablet Oral Daily    oxacillin 12 g in  mL CONTINUOUS INFUSION  12 g Intravenous Q24H    pantoprazole  40 mg Oral Daily    pregabalin  150 mg Oral QHS    sodium zirconium cyclosilicate  10 g Oral Daily    vitamin D  1,000 Units Oral Daily    zinc sulfate  220 mg Oral QHS     PRN Meds:sodium chloride 0.9%, acetaminophen, albuterol-ipratropium, dextrose 10%, glucagon (human recombinant), hydrALAZINE, HYDROcodone-acetaminophen, hydrOXYzine HCL, insulin aspart U-100, ondansetron, ondansetron     Review of  patient's allergies indicates:  No Known Allergies  Objective:     Vital Signs (Most Recent):  Temp: 99.7 °F (37.6 °C) (02/24/22 0744)  Pulse: 76 (02/24/22 0744)  Resp: (!) 22 (02/24/22 0744)  BP: (!) 162/72 (02/24/22 0744)  SpO2: (!) 93 % (02/24/22 0744)   Vital Signs (24h Range):  Temp:  [98.3 °F (36.8 °C)-99.7 °F (37.6 °C)] 99.7 °F (37.6 °C)  Pulse:  [57-76] 76  Resp:  [14-22] 22  SpO2:  [91 %-96 %] 93 %  BP: (121-163)/(56-72) 162/72     Weight: 80.2 kg (176 lb 12.9 oz)  Body mass index is 34.53 kg/m².    Intake/Output - Last 3 Shifts         02/22 0700  02/23 0659 02/23 0700  02/24 0659 02/24 0700  02/25 0659    P.O.       I.V. (mL/kg)  1300.9 (16.2)     IV Piggyback  550.1     Total Intake(mL/kg)  1851 (23.1)     Blood 15      Total Output 15      Net -15 +1851            Urine Occurrence 1 x 3 x     Stool Occurrence  1 x             Physical Exam  Vitals reviewed.   Constitutional:       Appearance: Ill appearance: chronically.   Skin:     Comments: Mild erythema surrounding right upper arm open wound.  The wound is clean with no drainage.   Neurological:      Mental Status: She is alert.       Significant Labs:  I have reviewed all pertinent lab results within the past 24 hours.  CBC:   Recent Labs   Lab 02/24/22  0518   WBC 22.05*   RBC 3.87*   HGB 11.1*   HCT 34.8*      MCV 90   MCH 28.7   MCHC 31.9*       Significant Diagnostics:  None    Assessment/Plan:     * Severe Sepsis with Cellulitis and abscess of right upper extremity  -sepsis has resolved  Wound is clean  Erythema is improving        Home health referral has been placed  Discharge per Medicine with antibiotics with MRSA coverage, patient will likely need a low-dose narcotic.    Follow up with surgery in 2 weeks.    Please recall General surgery as needed    Abscess of right arm S/p I&D 2/22/2022  Incision and drainage on 02/22.  The incision is improved in appearance.  Her white count however is 22,000.  Continue antibiotics per Medicine.   Monitor white blood cell count    Acute renal failure superimposed on stage 3 chronic kidney disease  Medical management    Chronic diastolic heart failure  Medical management    CAD, multiple vessel  Medical management  Please continue to hold Plavix before surgery    Hypertension associated with diabetes  Medical management    Type 2 diabetes mellitus with hyperglycemia  Recommend strict glycemic control, defer management to medicine.    Hyperlipidemia associated with type 2 diabetes mellitus  Medical management        Brett Parra MD  General Surgery  [facility]

## 2022-02-25 PROBLEM — J18.9 PNEUMONIA: Status: ACTIVE | Noted: 2022-01-01

## 2022-02-25 PROBLEM — E87.6 HYPOKALEMIA: Status: ACTIVE | Noted: 2022-01-01

## 2022-02-25 NOTE — SUBJECTIVE & OBJECTIVE
Interval History: Encourage increased activity. Encourage increased po intake. Repeat UA and CXR. Monitor WBCs.     Review of Systems   Constitutional:  Positive for activity change and fatigue. Negative for appetite change and chills.   HENT:  Positive for hearing loss. Negative for ear discharge, ear pain and facial swelling.    Eyes:  Negative for pain and itching.   Respiratory:  Negative for cough and shortness of breath.    Cardiovascular:  Negative for chest pain, palpitations and leg swelling.   Gastrointestinal:  Negative for abdominal distention, abdominal pain, blood in stool, constipation, diarrhea, nausea and vomiting.   Endocrine: Negative for polydipsia and polyphagia.   Genitourinary:  Negative for difficulty urinating, dysuria, flank pain and hematuria.   Musculoskeletal:  Negative for neck pain and neck stiffness.   Skin:  Positive for wound.        Right arm dressing   Allergic/Immunologic: Negative for food allergies.   Neurological:  Negative for seizures, facial asymmetry, speech difficulty and weakness.   Hematological:  Does not bruise/bleed easily.   Psychiatric/Behavioral:  Negative for agitation, behavioral problems, confusion, hallucinations and suicidal ideas. The patient is not nervous/anxious.    Objective:     Vital Signs (Most Recent):  Temp: 98.6 °F (37 °C) (02/24/22 1542)  Pulse: 75 (02/24/22 1542)  Resp: 18 (02/24/22 1542)  BP: (!) 162/71 (02/24/22 1542)  SpO2: 96 % (02/24/22 1542)   Vital Signs (24h Range):  Temp:  [98.3 °F (36.8 °C)-99.7 °F (37.6 °C)] 98.6 °F (37 °C)  Pulse:  [64-92] 75  Resp:  [14-22] 18  SpO2:  [87 %-96 %] 96 %  BP: (143-194)/(63-79) 162/71     Weight: 80.2 kg (176 lb 12.9 oz)  Body mass index is 34.53 kg/m².  No intake or output data in the 24 hours ending 02/24/22 1839   Physical Exam  Constitutional:       General: She is not in acute distress.     Appearance: She is not diaphoretic.   HENT:      Head: Normocephalic and atraumatic.      Ears:      Comments:  Very [place]     Mouth/Throat:      Mouth: Mucous membranes are moist.   Eyes:      General:         Right eye: No discharge.         Left eye: No discharge.      Extraocular Movements: Extraocular movements intact.      Conjunctiva/sclera: Conjunctivae normal.      Pupils: Pupils are equal, round, and reactive to light.   Cardiovascular:      Rate and Rhythm: Normal rate and regular rhythm.      Pulses: Normal pulses.      Heart sounds: Normal heart sounds. No murmur heard.  Pulmonary:      Effort: Pulmonary effort is normal.      Breath sounds: Normal breath sounds. No wheezing, rhonchi or rales.   Abdominal:      General: Bowel sounds are normal. There is no distension.      Palpations: Abdomen is soft.      Tenderness: There is no abdominal tenderness. There is no guarding.   Genitourinary:     Comments: Not examined  Musculoskeletal:         General: Normal range of motion.      Cervical back: Normal range of motion and neck supple. No rigidity or tenderness.      Right lower leg: No edema.      Left lower leg: No edema.   Skin:     General: Skin is warm and dry.      Capillary Refill: Capillary refill takes less than 2 seconds.      Comments: Right arm dressing   Neurological:      General: No focal deficit present.      Mental Status: She is alert and oriented to person, place, and time. Mental status is at baseline.   Psychiatric:         Mood and Affect: Mood normal.         Behavior: Behavior normal.         Thought Content: Thought content normal.         Judgment: Judgment normal.       Lab Results   Component Value Date    WBC 22.05 (H) 02/24/2022    HGB 11.1 (L) 02/24/2022    HCT 34.8 (L) 02/24/2022    MCV 90 02/24/2022     02/24/2022       BMP  Lab Results   Component Value Date     02/24/2022    K 3.9 02/24/2022    CL 99 02/24/2022    CO2 29 02/24/2022    BUN 17 02/24/2022    CREATININE 1.5 (H) 02/24/2022    CALCIUM 8.3 (L) 02/24/2022    ANIONGAP 10 02/24/2022    ESTGFRAFRICA 37 (A)  02/24/2022    EGFRNONAA 32 (A) 02/24/2022

## 2022-02-25 NOTE — PROGRESS NOTES
[facility]  Infectious Disease  Progress Note    Patient Name: Tiffanie Wise  MRN: [id number]  Admission Date: 2/20/2022  Length of Stay: 3 days  Attending Physician: Agustín Hernandez MD  Primary Care Provider: Tiffanie Spence MD    Isolation Status: No active isolations  Assessment/Plan:      Hx MSSA (methicillin susceptible Staphylococcus aureus) infection  Was on oxacillin, with worsening leucocytosis -will add Unasyn.  Monitor repeat CBC.    Follow drug susceptibility of staph .  ?CONNOR creep -add 5 days of zyvox  Will need swallow evaluation           Abscess of right arm S/p I&D 2/22/2022  Gen surgery consult .  Keep NPO   Continue Oxacillin      2/24/21-  With worsening leucocytosis and previous history of dysphagia- will add unasyn, follow cbc in AM.  Will do chest imaging in AM if wbc worsen.    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  Will repeat cbc in AM     Hypertension associated with diabetes  BP control as per primary team    Type 2 diabetes mellitus with hyperglycemia  Insulin sliding scale, diabetic diet         Anticipated Disposition:     Thank you for your consult. I will follow-up with patient. Please contact us if you have any additional questions.    Manas Elliott MD  Infectious Disease  [facility]    Subjective:     Principal Problem:Cellulitis and abscess of upper extremity    HPI:   80 y/o WF with hx of angina, GERD, HLD, DM2, hypothyroidism, osteoporosis, CAD, CABG, cardiac stents admitted with worsening right upper arm abscess.This is her third Ed visit for similar complaints -as per her daughter. She - was initially sent home on bactrim with continued worsening, then was admitted from 02/16-02/18/2022, was on vancomycin, had a needle aspiration and was sent  home on clindamycin .  Cultures revewed   2/18- MSSA-oxacillin CONNOR 1  She is febrile.  Component      Latest Ref Rng & Units 2/21/2022 2/20/2022 2/17/2022               WBC      3.90 - 12.70 K/uL 29.21 (H) 26.52 (H) 10.42     Interval History: 81 year old woman with right upper arm MSSA abscess s/p I and D.  CBC- wbc -22 2/22- cultures- Staph Aureus   Review of Systems   Constitutional:  Positive for activity change. Negative for appetite change, chills, diaphoresis and fatigue.   HENT:  Positive for hearing loss. Negative for ear discharge, ear pain and facial swelling.    Eyes:  Negative for pain and itching.   Respiratory:  Negative for cough and shortness of breath.    Cardiovascular:  Negative for chest pain, palpitations and leg swelling.   Gastrointestinal:  Negative for abdominal distention, abdominal pain, blood in stool, constipation, diarrhea, nausea and vomiting.   Endocrine: Negative for polydipsia and polyphagia.   Genitourinary:  Negative for difficulty urinating, dysuria, flank pain and hematuria.   Musculoskeletal:  Negative for neck pain and neck stiffness.   Skin:  Positive for wound.        Right arm dressing   Allergic/Immunologic: Negative for food allergies.   Neurological:  Negative for seizures, facial asymmetry, speech difficulty and weakness.   Hematological:  Does not bruise/bleed easily.   Psychiatric/Behavioral:  Negative for agitation, behavioral problems, confusion, hallucinations and suicidal ideas. The patient is not nervous/anxious.    Objective:     Vital Signs (Most Recent):  Temp: 98.6 °F (37 °C) (02/24/22 1542)  Pulse: 75 (02/24/22 1542)  Resp: 18 (02/24/22 1542)  BP: (!) 162/71 (02/24/22 1542)  SpO2: 96 % (02/24/22 1542)   Vital Signs (24h Range):  Temp:  [98.3 °F (36.8 °C)-99.7 °F (37.6 °C)] 98.6 °F (37 °C)  Pulse:  [64-92] 75  Resp:  [14-22] 18  SpO2:  [87 %-96 %] 96 %  BP: (143-194)/(63-79) 162/71     Weight: 80.2 kg (176 lb 12.9 oz)  Body mass index is 34.53  kg/m².    Estimated Creatinine Clearance: 27.6 mL/min (A) (based on SCr of 1.5 mg/dL (H)).    Physical Exam  Vitals and nursing note reviewed.   Constitutional:       General: She is not in acute distress.     Appearance: She is not diaphoretic.   HENT:      Head: Normocephalic and atraumatic.      Ears:      Comments: Very [place]     Mouth/Throat:      Mouth: Mucous membranes are moist.   Eyes:      General:         Right eye: No discharge.         Left eye: No discharge.      Extraocular Movements: Extraocular movements intact.      Conjunctiva/sclera: Conjunctivae normal.      Pupils: Pupils are equal, round, and reactive to light.   Cardiovascular:      Rate and Rhythm: Normal rate and regular rhythm.      Pulses: Normal pulses.      Heart sounds: Normal heart sounds. No murmur heard.  Pulmonary:      Effort: Pulmonary effort is normal.      Breath sounds: Normal breath sounds. No wheezing, rhonchi or rales.   Abdominal:      General: Bowel sounds are normal. There is no distension.      Palpations: Abdomen is soft.      Tenderness: There is no abdominal tenderness. There is no guarding.   Genitourinary:     Comments: Not examined  Musculoskeletal:         General: Normal range of motion.      Cervical back: Normal range of motion and neck supple. No rigidity or tenderness.      Right lower leg: No edema.      Left lower leg: No edema.   Skin:     General: Skin is warm and dry.      Capillary Refill: Capillary refill takes less than 2 seconds.      Comments: Right arm dressing   Neurological:      General: No focal deficit present.      Mental Status: She is alert and oriented to person, place, and time. Mental status is at baseline.   Psychiatric:         Mood and Affect: Mood normal.         Behavior: Behavior normal.         Thought Content: Thought content normal.         Judgment: Judgment normal.       Significant Labs: Blood Culture:   Recent Labs   Lab 02/16/22  1424 02/16/22  1651 02/20/22  5132  02/20/22  2311   LABBLOO No growth after 5 days. No growth after 5 days. No Growth to date  No Growth to date  No Growth to date  No Growth to date No Growth to date  No Growth to date  No Growth to date  No Growth to date     BMP:   Recent Labs   Lab 02/24/22  0518   *      K 3.9   CL 99   CO2 29   BUN 17   CREATININE 1.5*   CALCIUM 8.3*   MG 1.7     Wound Culture:   Recent Labs   Lab 02/22/22  0732   LABAERO STAPHYLOCOCCUS AUREUS  Moderate  Susceptibility pending  *     All pertinent labs within the past 24 hours have been reviewed.    Significant Imaging:

## 2022-02-25 NOTE — PROGRESS NOTES
Pharmacist Renal Dose Adjustment Note    Tiffanie Wise is a 81 y.o. female being treated with the medication enoxaparin.     Patient Data:    Vital Signs (Most Recent):  Temp: 98.6 °F (37 °C) (02/24/22 1542)  Pulse: 75 (02/24/22 1542)  Resp: 18 (02/24/22 1542)  BP: (!) 162/71 (02/24/22 1542)  SpO2: 96 % (02/24/22 1542)   Vital Signs (72h Range):  Temp:  [97.3 °F (36.3 °C)-99.7 °F (37.6 °C)]   Pulse:  [49-92]   Resp:  [13-22]   BP: (106-194)/(46-79)   SpO2:  [87 %-98 %]      Recent Labs   Lab 02/22/22  0448 02/23/22  0503 02/24/22  0518   CREATININE 1.7*  1.7* 1.7* 1.5*     Serum creatinine: 1.5 mg/dL (H) 02/24/22 0518  Estimated creatinine clearance: 27.6 mL/min (A)    Medication:enoxaparin dose: 40 mg frequency Q24H will be changed to medication:enoxaparin dose:30 mg frequency:Q24H.     Pharmacist's Name: Idalia Summers  Pharmacist's Extension: [phone]

## 2022-02-25 NOTE — SUBJECTIVE & OBJECTIVE
Interval History: Yesterday WBCs trending upward. Cxr shows signs pneumonia.  Patient discussed with ID. Change oxacillin to Unasyn and add Zyvox x 5 days.   2/25 Consult ST to evaluate for possible aspiration. Check MBSS.    Review of Systems   Constitutional:  Positive for activity change and fatigue. Negative for appetite change and chills.   HENT:  Positive for hearing loss. Negative for ear discharge, ear pain and facial swelling.    Eyes:  Negative for pain and itching.   Respiratory:  Negative for cough and shortness of breath.    Cardiovascular:  Negative for chest pain, palpitations and leg swelling.   Gastrointestinal:  Negative for abdominal distention, abdominal pain, blood in stool, constipation, diarrhea, nausea and vomiting.   Endocrine: Negative for polydipsia and polyphagia.   Genitourinary:  Negative for difficulty urinating, dysuria, flank pain and hematuria.   Musculoskeletal:  Negative for neck pain and neck stiffness.   Skin:  Positive for wound.        Right arm dressing   Allergic/Immunologic: Negative for food allergies.   Neurological:  Negative for seizures, facial asymmetry, speech difficulty and weakness.   Hematological:  Does not bruise/bleed easily.   Psychiatric/Behavioral:  Negative for agitation, behavioral problems, confusion, hallucinations and suicidal ideas. The patient is not nervous/anxious.    Objective:     Vital Signs (Most Recent):  Temp: 99.3 °F (37.4 °C) (02/25/22 0719)  Pulse: 71 (02/25/22 0719)  Resp: 16 (02/25/22 0710)  BP: 136/60 (02/25/22 0719)  SpO2: 96 % (02/25/22 0719) Vital Signs (24h Range):  Temp:  [97.4 °F (36.3 °C)-99.3 °F (37.4 °C)] 99.3 °F (37.4 °C)  Pulse:  [62-92] 71  Resp:  [14-20] 16  SpO2:  [87 %-98 %] 96 %  BP: (110-194)/(54-79) 136/60     Weight: 80.2 kg (176 lb 12.9 oz)  Body mass index is 34.53 kg/m².    Intake/Output Summary (Last 24 hours) at 2/25/2022 1123  Last data filed at 2/25/2022 0800  Gross per 24 hour   Intake 1409.52 ml   Output --    Net 1409.52 ml      Physical Exam  Constitutional:       General: She is not in acute distress.     Appearance: She is not diaphoretic.   HENT:      Head: Normocephalic and atraumatic.      Ears:      Comments: Very [place]     Mouth/Throat:      Mouth: Mucous membranes are moist.   Eyes:      General:         Right eye: No discharge.         Left eye: No discharge.      Extraocular Movements: Extraocular movements intact.      Conjunctiva/sclera: Conjunctivae normal.      Pupils: Pupils are equal, round, and reactive to light.   Cardiovascular:      Rate and Rhythm: Normal rate and regular rhythm.      Pulses: Normal pulses.      Heart sounds: Normal heart sounds. No murmur heard.  Pulmonary:      Effort: Pulmonary effort is normal.      Breath sounds: Rhonchi present. No wheezing or rales.   Abdominal:      General: Bowel sounds are normal. There is no distension.      Palpations: Abdomen is soft.      Tenderness: There is no abdominal tenderness. There is no guarding.   Genitourinary:     Comments: Not examined  Musculoskeletal:         General: Normal range of motion.      Cervical back: Normal range of motion and neck supple. No rigidity or tenderness.      Right lower leg: No edema.      Left lower leg: No edema.   Skin:     General: Skin is warm and dry.      Capillary Refill: Capillary refill takes less than 2 seconds.      Comments: Right arm dressing   Neurological:      General: No focal deficit present.      Mental Status: She is alert and oriented to person, place, and time. Mental status is at baseline.   Psychiatric:         Mood and Affect: Mood normal.         Behavior: Behavior normal.         Thought Content: Thought content normal.         Judgment: Judgment normal.          Lab Results   Component Value Date    WBC 17.89 (H) 02/25/2022    HGB 10.6 (L) 02/25/2022    HCT 32.5 (L) 02/25/2022    MCV 88 02/25/2022     02/25/2022       BMP  Lab Results   Component Value Date     02/25/2022     K 3.1 (L) 02/25/2022     02/25/2022    CO2 29 02/25/2022    BUN 17 02/25/2022    CREATININE 1.2 02/25/2022    CALCIUM 8.7 02/25/2022    ANIONGAP 8 02/25/2022    ESTGFRAFRICA 49 (A) 02/25/2022    EGFRNONAA 42 (A) 02/25/2022

## 2022-02-25 NOTE — PROGRESS NOTES
[facility] Medicine  Progress Note    Patient Name: Tiffanie Wise  MRN: [id number]  Patient Class: IP- Inpatient   Admission Date: 2/20/2022  Length of Stay: 4 days  Attending Physician: Fabi Garcia MD  Primary Care Provider: Tiffanie Spence MD        Subjective:     Principal Problem:Cellulitis and abscess of upper extremity, pneumonia        HPI:  80 y/o WF with hx of angina, GERD, HLD, DM2, hypothyroidism, osteoporosis, CAD, CABG, cardiac stents, HTN, CVA, diastolic HF, anemia, NAWAF, anxiety, depression, tonsillar CA, CKD3 to ED with c/o gradually increasing redness, warmth and pain to the R axillary area (site of known abscess) with associated fever and chills. Symptoms are progressive and of moderate severity with pain exacerbated by movement and palpation and no known mitigating factors. ROS is limited by pt's chronic expressive aphasia. Of note, per pt's daughter this is her third ED visit in the past 11 days with the same complaint - was initially sent home on bactrim with continued worsening, then was admitted from 02/16-02/18/2022, was on vancomycin, had a needle aspiration and was sent home on clindamycin but has again continued to worsen. Found in ED to have temperature of 102.1, H&H of 12.3&37.6, platelets of 333, , K+ 5.1, BUN 20, creatinine 2.1, GFFR 22, , , troponin negative, lactic acid 1.8; UA uninfected; Cxray showed cardiomegaly with pulmonary vascular crowding; COVID-19 negative. In ED the pt was placed on O2 at 2L per NC and was given IV vancomycin - temperature improved to 99.1, HR to 68, O2 saturation to 98%, and BP to 157/68. Hospital medicine was called and the pt was admitted with telemetry monitoring. Pt is a DNR - surrogate decision maker is SHELLI Worley.       Overview/Hospital Course:  The patient was monitored closely during her stay. She was kept on continuous telemetry monitoring. She was placed on Iv Abx for her RUE abscess.  General Surgery was consulted for I&D. ID was consulted for sepsis with failed outpatient po abx therapy. Nephrology was consulted for ELLIOTT with CKD stage 3, and Cardiology was consulted for acute CHF.   2/22 Patient S/p I&D right arm today.  2/23 Continue current treatment.   2/24 Poor po intake. Change to regular diet. Increased activity encouraged. WBCs trending upward. Cxr shows signs pneumonia.  Patient discussed with ID. Change oxacillin to Unasyn and add Zyvox x 5 days.   2/25 Consult ST to evaluate for possible aspiration. Check MBSS.      Interval History: Yesterday WBCs trending upward. Cxr shows signs pneumonia.  Patient discussed with ID. Change oxacillin to Unasyn and add Zyvox x 5 days.   2/25 Consult ST to evaluate for possible aspiration. Check MBSS. Daughter at bedside and updated on patient's status and plan of care.     Review of Systems   Constitutional:  Positive for activity change and fatigue. Negative for appetite change and chills.   HENT:  Positive for hearing loss. Negative for ear discharge, ear pain and facial swelling.    Eyes:  Negative for pain and itching.   Respiratory:  Negative for cough and shortness of breath.    Cardiovascular:  Negative for chest pain, palpitations and leg swelling.   Gastrointestinal:  Negative for abdominal distention, abdominal pain, blood in stool, constipation, diarrhea, nausea and vomiting.   Endocrine: Negative for polydipsia and polyphagia.   Genitourinary:  Negative for difficulty urinating, dysuria, flank pain and hematuria.   Musculoskeletal:  Negative for neck pain and neck stiffness.   Skin:  Positive for wound.        Right arm dressing   Allergic/Immunologic: Negative for food allergies.   Neurological:  Negative for seizures, facial asymmetry, speech difficulty and weakness.   Hematological:  Does not bruise/bleed easily.   Psychiatric/Behavioral:  Negative for agitation, behavioral problems, confusion, hallucinations and suicidal ideas. The  patient is not nervous/anxious.    Objective:     Vital Signs (Most Recent):  Temp: 99.3 °F (37.4 °C) (02/25/22 0719)  Pulse: 71 (02/25/22 0719)  Resp: 16 (02/25/22 0710)  BP: 136/60 (02/25/22 0719)  SpO2: 96 % (02/25/22 0719) Vital Signs (24h Range):  Temp:  [97.4 °F (36.3 °C)-99.3 °F (37.4 °C)] 99.3 °F (37.4 °C)  Pulse:  [62-92] 71  Resp:  [14-20] 16  SpO2:  [87 %-98 %] 96 %  BP: (110-194)/(54-79) 136/60     Weight: 80.2 kg (176 lb 12.9 oz)  Body mass index is 34.53 kg/m².    Intake/Output Summary (Last 24 hours) at 2/25/2022 1123  Last data filed at 2/25/2022 0800  Gross per 24 hour   Intake 1409.52 ml   Output --   Net 1409.52 ml      Physical Exam  Constitutional:       General: She is not in acute distress.     Appearance: She is not diaphoretic.   HENT:      Head: Normocephalic and atraumatic.      Ears:      Comments: Very [place]     Mouth/Throat:      Mouth: Mucous membranes are moist.   Eyes:      General:         Right eye: No discharge.         Left eye: No discharge.      Extraocular Movements: Extraocular movements intact.      Conjunctiva/sclera: Conjunctivae normal.      Pupils: Pupils are equal, round, and reactive to light.   Cardiovascular:      Rate and Rhythm: Normal rate and regular rhythm.      Pulses: Normal pulses.      Heart sounds: Normal heart sounds. No murmur heard.  Pulmonary:      Effort: Pulmonary effort is normal.      Breath sounds: Rhonchi present. No wheezing or rales.   Abdominal:      General: Bowel sounds are normal. There is no distension.      Palpations: Abdomen is soft.      Tenderness: There is no abdominal tenderness. There is no guarding.   Genitourinary:     Comments: Not examined  Musculoskeletal:         General: Normal range of motion.      Cervical back: Normal range of motion and neck supple. No rigidity or tenderness.      Right lower leg: No edema.      Left lower leg: No edema.   Skin:     General: Skin is warm and dry.      Capillary Refill: Capillary refill  takes less than 2 seconds.      Comments: Right arm dressing   Neurological:      General: No focal deficit present.      Mental Status: She is alert and oriented to person, place, and time. Mental status is at baseline.   Psychiatric:         Mood and Affect: Mood normal.         Behavior: Behavior normal.         Thought Content: Thought content normal.         Judgment: Judgment normal.          Lab Results   Component Value Date    WBC 17.89 (H) 02/25/2022    HGB 10.6 (L) 02/25/2022    HCT 32.5 (L) 02/25/2022    MCV 88 02/25/2022     02/25/2022       BMP  Lab Results   Component Value Date     02/25/2022    K 3.1 (L) 02/25/2022     02/25/2022    CO2 29 02/25/2022    BUN 17 02/25/2022    CREATININE 1.2 02/25/2022    CALCIUM 8.7 02/25/2022    ANIONGAP 8 02/25/2022    ESTGFRAFRICA 49 (A) 02/25/2022    EGFRNONAA 42 (A) 02/25/2022           Assessment/Plan:      * Severe Sepsis with Cellulitis and abscess of right upper extremity  2/22   This patient does have evidence of infective focus  My overall impression is Severe  sepsis. Vital signs were reviewed and noted in progress note.  Antibiotics given-   Antibiotics (From admission, onward)            Start     Stop Route Frequency Ordered    02/25/22 1100  ampicillin-sulbactam 3 g in sodium chloride 0.9 % 100 mL IVPB (ready to mix system)         03/02 1859 IV Every 8 hours (non-standard times) 02/25/22 1032    02/24/22 2130  linezolid tablet 600 mg         03/01 2059 Oral Every 12 hours 02/24/22 2020        Cultures were taken-   Microbiology Results (last 7 days)     Procedure Component Value Units Date/Time    Aerobic culture [[id number]]  (Abnormal) Collected: 02/22/22 0732    Order Status: Completed Specimen: Incision site from Arm, Right Updated: 02/25/22 1130     Aerobic Bacterial Culture STAPHYLOCOCCUS AUREUS  Moderate  Susceptibility pending      Blood culture #1 **CANNOT BE ORDERED STAT** [[id number]] Collected: 02/20/22 2311    Order  Status: Completed Specimen: Blood from Peripheral, Forearm, Left Updated: 02/25/22 0612     Blood Culture, Routine No Growth to date      No Growth to date      No Growth to date      No Growth to date      No Growth to date    Blood culture #2 **CANNOT BE ORDERED STAT** [[id number]] Collected: 02/20/22 2255    Order Status: Completed Specimen: Blood from Peripheral, Hand, Left Updated: 02/25/22 0612     Blood Culture, Routine No Growth to date      No Growth to date      No Growth to date      No Growth to date      No Growth to date    Urine culture [[id number]] Collected: 02/24/22 1955    Order Status: No result Specimen: Urine Updated: 02/24/22 2108        Latest lactate reviewed, they are-  Recent Labs   Lab 02/24/22 2335   LACTATE 0.9       Organ dysfunction indicated by Acute kidney injury     Source- Right arm abscess    Source control Achieved by- IV abx      S/p I&D 2/22/2022 2/24 WBCs up to 22 today- Discussed with ID- Continue current Iv Abx for now.    Cxr shows pneumonia  Oxacillin changed to Unasyn and 5 days po zyvox ordered    Pneumonia- possible aspiration  2/25 Oxacillin changes to Unasyn  Consult ST for MBSS        Hypokalemia  2/25 Kdur 60 meq x 1 dose      Hx MSSA (methicillin susceptible Staphylococcus aureus) infection  2/23 Prior culture from 2/18/2022 shows MSSA      Normocytic anemia  2/22 Add MVI      Hypoalbuminemia  2/22 Add po supplement      Debility  2/22 Fall and skin precautions  Turn Q 2 hours  2/23 Continue  PT    2/24 Increased activity encouraged.   2/25 Discharge planning discussed with patient and daughter at bedside and plan is Discharge to home with Home Health once medically cleared        Abscess of right arm S/p I&D 2/22/2022  S/p I&D 2/22/2022      Hypomagnesemia  2/21 Add 2 gm Iv mag  2/22 Add po mag oxide  2/24 Add 2 gm IV mag sulfate today      Acute renal failure superimposed on stage 3 chronic kidney disease  2/21 Likely secondary to diuretics/other medications  in addition to HTN   Creatinine at 2.1 in ED / Baseline appears to be around 1.2  Monitor for fluid overload  Monitor renal function labs closely  Avoid nephrotoxins as able  Monitor I&Os  Holding home HCTZ, losartan for now  2/23 Home diuretic on hold  Patient on Ivf for gentle hydration  2/24 ELLIOTT Improved  2/25 ELLIOTT resolved      Chronic diastolic heart failure  2/21 ECHO 04/27/2021 - EF 65%, Grade 1 DD   / Cxray - cardiomegaly with pulmonary vascular crowding  Pt requiring O2 2L - not on home O2  Cardiology consulted  Lasix 20mg x1 dose in ED - cautious use d/t worsening renal fxn  ECHO pending  Strict I&Os  Daily weights  2/22 Cardiology following  2/23 Stable          CAD, multiple vessel  2/22 Stable      Hypertension associated with diabetes  2/21 Improved since ED arrival - possible pain component  Home medications resumed as appropriate   Hydralazine IV prn SBP > 170  Provide adequate pain control  VS per unit routine  2/22 Monitor closely          NAWAF (obstructive sleep apnea)  2/24 Resume home CPAP q Hs      Acquired hypothyroidism  2/22 Continue home synthroid  Lab Results   Component Value Date    TSH 1.124 02/21/2022           Type 2 diabetes mellitus with hyperglycemia  2/21 Pt with dexcom device to abdomen  A1c at 7.6 on 11/01/2021 / BG in ED at 304  Accuchecks with SSI prn   Holding home januvia  A1c pending    2/22  Lab Results   Component Value Date    HGBA1C 7.2 (H) 02/21/2022    Blood sugars running 190-219-Add 10 units detemir sq qpm  2/23 Blood suagrs running 179-283- Increase detemir to 14 units q pm  2/24 Blood sugars running 161-241- Increase detemir to 18 units q pm  2/25 Blood sugars running 137-284      Hyperlipidemia associated with type 2 diabetes mellitus  2/21 Resumed home statin  2/22  Lab Results   Component Value Date    CHOL 113 (L) 02/21/2022    CHOL 166 06/14/2021    CHOL 130 12/11/2018     Lab Results   Component Value Date    HDL 45 02/21/2022    HDL 65 06/14/2021     HDL 54 12/11/2018     Lab Results   Component Value Date    LDLCALC 53.6 (L) 02/21/2022    LDLCALC 80.0 06/14/2021    LDLCALC 55.4 (L) 12/11/2018     Lab Results   Component Value Date    TRIG 72 02/21/2022    TRIG 105 06/14/2021    TRIG 103 12/11/2018     Lab Results   Component Value Date    CHOLHDL 39.8 02/21/2022    CHOLHDL 39.2 06/14/2021    CHOLHDL 41.5 12/11/2018         GERD (gastroesophageal reflux disease)  2/21 Resumed home PPI        VTE Risk Mitigation (From admission, onward)         Ordered     enoxaparin injection 40 mg  Daily         02/25/22 0949     Place CARMINA hose  Until discontinued         02/24/22 1851     IP VTE HIGH RISK PATIENT  Once         02/22/22 0900     Place sequential compression device  Until discontinued         02/22/22 0900     Place sequential compression device  Until discontinued         02/21/22 0242                Discharge Planning   EVERTON:      Code Status: DNR   Is the patient medically ready for discharge?:     Reason for patient still in hospital (select all that apply): Treatment  Discharge Plan A: Home Health       Time spent seeing patient( greater than 1/2 spent in direct contact) : 38 minutes    ISRAEL Randhawa  Department of Hospital Medicine   [facility] - Med [facility]

## 2022-02-25 NOTE — ASSESSMENT & PLAN NOTE
2/22 Fall and skin precautions  Turn Q 2 hours  2/23 Continue  PT    2/24 Increased activity encouraged.   2/25 Discharge planning discussed with patient and daughter at bedside and plan is Discharge to home with Home Health once medically cleared

## 2022-02-25 NOTE — PROGRESS NOTES
[facility]  General Surgery  Progress Note    Subjective:     History of Present Illness:  80 y/o F with h/o angina, GERD, HLD, DM2, hypothyroidism, osteoporosis, CAD, CABG, cardiac stents admitted with worsening right upper arm abscess. She has been seen in the ER multiple times and was discharged on bactrim, but the abscess persisted and worsened. She was recently admitted where she underwent needle aspiration with IR on 2/18/22 and discharged home on clindamycin. Cultures from that specimen are growing MSSA. Daughter states her arm has failed to improve, however, and that it is quite painful.      Post-Op Info:  Procedure(s) (LRB):  INCISION AND DRAINAGE, UPPER EXTREMITY (Right)   3 Days Post-Op     Interval History:  Still confused  Arm wound is improving  White count is decreasing  Chest x-ray shows infiltrate likely the source of increased white blood count    Medications:  Continuous Infusions:   lactated ringers 50 mL/hr at 02/25/22 0648     Scheduled Meds:   albuterol-ipratropium  3 mL Nebulization Q6H WAKE    amLODIPine  10 mg Oral Daily    ampicillin-sulbactim (UNASYN) IVPB  3 g Intravenous Q8H    ascorbic acid (vitamin C)  500 mg Oral QHS    atorvastatin  40 mg Oral QHS    chlorhexidine  10 mL Mouth/Throat BID    clonazePAM  0.5 mg Oral QHS    clopidogreL  75 mg Oral Daily    donepeziL  10 mg Oral Daily    enoxaparin  40 mg Subcutaneous Daily    EScitalopram oxalate  20 mg Oral Daily    guaiFENesin  600 mg Oral BID    insulin detemir U-100  18 Units Subcutaneous QHS    isosorbide mononitrate  30 mg Oral BID    levothyroxine  50 mcg Oral Before breakfast    linezolid  600 mg Oral Q12H    losartan  100 mg Oral Daily    magnesium oxide  400 mg Oral Daily    multivitamin  1 tablet Oral Daily    pantoprazole  40 mg Oral Daily    pregabalin  150 mg Oral QHS    vitamin D  1,000 Units Oral Daily    zinc sulfate  220 mg Oral QHS     PRN Meds:sodium chloride 0.9%, acetaminophen,  albuterol-ipratropium, dextrose 10%, glucagon (human recombinant), hydrALAZINE, insulin aspart U-100, nitroGLYCERIN, ondansetron     Review of patient's allergies indicates:  No Known Allergies  Objective:     Vital Signs (Most Recent):  Temp: 99.3 °F (37.4 °C) (02/25/22 0719)  Pulse: 71 (02/25/22 0719)  Resp: 16 (02/25/22 0710)  BP: 136/60 (02/25/22 0719)  SpO2: 96 % (02/25/22 0719) Vital Signs (24h Range):  Temp:  [97.4 °F (36.3 °C)-99.3 °F (37.4 °C)] 99.3 °F (37.4 °C)  Pulse:  [62-92] 71  Resp:  [14-20] 16  SpO2:  [87 %-98 %] 96 %  BP: (110-194)/(54-79) 136/60     Weight: 80.2 kg (176 lb 12.9 oz)  Body mass index is 34.53 kg/m².    Intake/Output - Last 3 Shifts         02/23 0700  02/24 0659 02/24 0700  02/25 0659 02/25 0700  02/26 0659    P.O.   250    I.V. (mL/kg) 1300.9 (16.2) 823.7 (10.3)     IV Piggyback 550.1 335.8     Total Intake(mL/kg) 1851 (23.1) 1159.5 (14.5) 250 (3.1)    Blood       Total Output       Net +1851 +1159.5 +250           Urine Occurrence 3 x  1 x    Stool Occurrence 1 x  1 x            Physical Exam  Vitals reviewed.   Constitutional:       Comments: Confused   Cardiovascular:      Rate and Rhythm: Normal rate.   Pulmonary:      Effort: Pulmonary effort is normal.      Comments: Decreased breath sounds on right posterior  Skin:     Comments: The right upper arm wound is clean.  There is little to no erythema.  Wound shows some granulation.  There is no drainage   Neurological:      Mental Status: She is alert.       Significant Labs:  I have reviewed all pertinent lab results within the past 24 hours.  CBC:   Recent Labs   Lab 02/25/22 0627   WBC 17.89*   RBC 3.68*   HGB 10.6*   HCT 32.5*      MCV 88   MCH 28.8   MCHC 32.6     BMP:   Recent Labs   Lab 02/25/22 0627   *      K 3.1*      CO2 29   BUN 17   CREATININE 1.2   CALCIUM 8.7   MG 1.9     Recent Labs   Lab 02/24/22 1955   COLORU Yellow   SPECGRAV 1.020   PHUR 6.0   PROTEINUA 1+*   BACTERIA Occasional    NITRITE Negative   LEUKOCYTESUR Trace*   UROBILINOGEN Negative   HYALINECASTS 0       Significant Diagnostics:  I have reviewed all pertinent imaging results/findings within the past 24 hours.  CXR: I have reviewed all pertinent results/findings within the past 24 hours and my personal findings are:  Right lower lobe infiltrate    Assessment/Plan:     * Severe Sepsis with Cellulitis and abscess of right upper extremity  -sepsis has resolved  Wound is clean  Erythema is improving        Home health referral has been placed  Discharge per Medicine with antibiotics with MRSA coverage, patient will likely need a low-dose narcotic.    Follow up with surgery in 2 weeks.    Please recall General surgery as needed    Abscess of right arm S/p I&D 2/22/2022  Incision and drainage on 02/22.  The incision is improved in appearance.  Her white count however is 22,000.  Continue antibiotics per Medicine.  Monitor white blood cell count    Acute renal failure superimposed on stage 3 chronic kidney disease  Medical management    Chronic diastolic heart failure  Medical management    CAD, multiple vessel  Medical management  Please continue to hold Plavix before surgery    Hypertension associated with diabetes  Medical management    Type 2 diabetes mellitus with hyperglycemia  Recommend strict glycemic control, defer management to medicine.    Hyperlipidemia associated with type 2 diabetes mellitus  Medical management        Brett Parra MD  General Surgery  [facility]

## 2022-02-25 NOTE — PROCEDURES
Modified Barium Swallow    Patient Name:  Tiffanie Wise   MRN:  [id number]      Recommendations:     Recommendations:                General Recommendations:  Dysphagia therapy  Diet recommendations:  Very Soft Solids with grinded meats and thin liquids   Aspiration Precautions: 1 bite/sip at a time, Alternating bites/sips, Double swallow with each bite/sip, Eliminate distractions, HOB to 90 degrees, Remain upright 30 minutes post meal, Small bites/sips and Standard aspiration precautions   General Precautions: Standard, aspiration, hearing impaired, fall  Communication strategies:  none    Referral     Reason for Referral  Patient was referred for a Modified Barium Swallow Study to assess the efficiency of his/her swallow function, rule out aspiration and make recommendations regarding safe dietary consistencies, effective compensatory strategies, and safe eating environment.     Diagnosis: Cellulitis and abscess of upper extremity   Pt with a h/o dysphagia, Head/neck CA, PEG tube placement and removal.  She reports having difficulty with meats and solids foods.     History:     Past Medical History:   Diagnosis Date    Anxiety     AP (angina pectoris) 7/18/2013    Arterial ischemic stroke, MCA (middle cerebral artery), left, acute 12/15/2017    Asthma     CAD, multiple vessel 5/13/2021    Class 1 obesity due to excess calories with serious comorbidity and body mass index (BMI) of 30.0 to 30.9 in adult 3/15/2019    Coronary artery disease 7/18/2013    Depression     Diastolic heart failure     GERD (gastroesophageal reflux disease) 7/18/2013    History of PTCA 7/18/2013    Hypertension 7/18/2013    Hypothyroidism     Malignant neoplasm of pharyngeal tonsil 12/4/2020    Mixed hyperlipidemia 7/18/2013    Osteoporosis     Pneumonia due to other staphylococcus     Precancerous changes of the vagina     S/P CABG (coronary artery bypass graft) 5/13/2021    Seizure 3/15/2019    Sleep apnea     stopped  using CPAP 2015 - sores in nose, couldn't breathe, uses Breathe riht strips now.     Trouble in sleeping     Type 2 diabetes mellitus with ophthalmic manifestations     Urinary incontinence     pullups day, pads at night       Objective:     Current Respiratory Status: 02/25/22Pt tolerating room air     Alert: yes    Cooperative: yes    Follows Directions: yes    Visualization  · Patient was seen in the lateral view    Consistencies Assessed  · Thin lqiuids, puree, and solids with barium     Oral Preparation/Oral Phase  · prolonged A-P transfer  · Oral residue present post swallow  · Decreased base of tongue mobility  · Premature spillage    Pharyngeal Phase   · Decreased laryngeal elevation with vallecular residue  · Pooling into valleculae and vestibule of solids prior to initiation of swallow   · Supraglottic penetration of thin liquids with no aspiration visualized     Cervical Esophageal Phase  · UES appeared to accommodate all bolus types without stasis or retrograde movement observed     Assessment:     Impressions  ·  Pt exhibiting oropharyngeal dysphagia characterized by decreased oral prep and transport, a swallow delay, decreased laryngeal elevation, and supraglottic penetration of thin liquids.  No obvious aspiration visualized.  She experiences most difficulty with solid consistencies 2/2 oral weakness and decreased coordination of swallow.  ST recommends a po diet of Very Soft Solids with grinded meats and thin liquids with strict aspiration precautions.   Education  Results were discussed with patient.    Goals:   Multidisciplinary Problems     SLP Goals        Problem: SLP Goal    Goal Priority Disciplines Outcome   SLP Goal     SLP Ongoing, Progressing   Description: 1. Pt will tolerate a soft mechanical diet with grinded meats and thin liquids without incidence  2. Pt will complete oral and pharyngeal ex's with min A for increased strength and ROM                   Plan:   · Patient to be seen:   Therapy Frequency: 2 x/week   · Plan of Care expires:  03/03/22  · Plan of Care reviewed with:  patient          Time Tracking:   SLP Treatment Date:   02/25/22  Speech Start Time:  1130  Speech Stop Time:  1200     Speech Total Time (min):  30 min    02/25/2022

## 2022-02-25 NOTE — PROGRESS NOTES
[facility] Medicine  Progress Note    Patient Name: Tiffanie Wise  MRN: [id number]  Patient Class: IP- Inpatient   Admission Date: 2/20/2022  Length of Stay: 3 days  Attending Physician: Agustín Hernandez MD  Primary Care Provider: Tiffanie Spence MD      Subjective:     Principal Problem:Cellulitis and abscess of upper extremity        HPI:  82 y/o WF with hx of angina, GERD, HLD, DM2, hypothyroidism, osteoporosis, CAD, CABG, cardiac stents, HTN, CVA, diastolic HF, anemia, NAWAF, anxiety, depression, tonsillar CA, CKD3 to ED with c/o gradually increasing redness, warmth and pain to the R axillary area (site of known abscess) with associated fever and chills. Symptoms are progressive and of moderate severity with pain exacerbated by movement and palpation and no known mitigating factors. ROS is limited by pt's chronic expressive aphasia. Of note, per pt's daughter this is her third ED visit in the past 11 days with the same complaint - was initially sent home on bactrim with continued worsening, then was admitted from 02/16-02/18/2022, was on vancomycin, had a needle aspiration and was sent home on clindamycin but has again continued to worsen. Found in ED to have temperature of 102.1, H&H of 12.3&37.6, platelets of 333, , K+ 5.1, BUN 20, creatinine 2.1, GFFR 22, , , troponin negative, lactic acid 1.8; UA uninfected; Cxray showed cardiomegaly with pulmonary vascular crowding; COVID-19 negative. In ED the pt was placed on O2 at 2L per NC and was given IV vancomycin - temperature improved to 99.1, HR to 68, O2 saturation to 98%, and BP to 157/68. Hospital medicine was called and the pt was admitted with telemetry monitoring. Pt is a DNR - surrogate decision maker is SHELLI Worley.       Overview/Hospital Course:  The patient was monitored closely during her stay. She was kept on continuous telemetry monitoring. She was placed on Iv Abx for her RUE abscess. General Surgery was  consulted for I&D. ID was consulted for sepsis with failed outpatient po abx therapy. Nephrology was consulted for ELLIOTT with CKD stage 3, and Cardiology was consulted for acute CHF.   2/22 Patient S/p I&D right arm today.  2/23 Continue current treatment.   2/24 Poor po intake. Change to regular diet. Increased activity encouraged.       Interval History: Encourage increased activity. Encourage increased po intake. Repeat UA and CXR. Monitor WBCs.     Review of Systems   Constitutional:  Positive for activity change and fatigue. Negative for appetite change and chills.   HENT:  Positive for hearing loss. Negative for ear discharge, ear pain and facial swelling.    Eyes:  Negative for pain and itching.   Respiratory:  Negative for cough and shortness of breath.    Cardiovascular:  Negative for chest pain, palpitations and leg swelling.   Gastrointestinal:  Negative for abdominal distention, abdominal pain, blood in stool, constipation, diarrhea, nausea and vomiting.   Endocrine: Negative for polydipsia and polyphagia.   Genitourinary:  Negative for difficulty urinating, dysuria, flank pain and hematuria.   Musculoskeletal:  Negative for neck pain and neck stiffness.   Skin:  Positive for wound.        Right arm dressing   Allergic/Immunologic: Negative for food allergies.   Neurological:  Negative for seizures, facial asymmetry, speech difficulty and weakness.   Hematological:  Does not bruise/bleed easily.   Psychiatric/Behavioral:  Negative for agitation, behavioral problems, confusion, hallucinations and suicidal ideas. The patient is not nervous/anxious.    Objective:     Vital Signs (Most Recent):  Temp: 98.6 °F (37 °C) (02/24/22 1542)  Pulse: 75 (02/24/22 1542)  Resp: 18 (02/24/22 1542)  BP: (!) 162/71 (02/24/22 1542)  SpO2: 96 % (02/24/22 1542)   Vital Signs (24h Range):  Temp:  [98.3 °F (36.8 °C)-99.7 °F (37.6 °C)] 98.6 °F (37 °C)  Pulse:  [64-92] 75  Resp:  [14-22] 18  SpO2:  [87 %-96 %] 96 %  BP:  (143-194)/(63-79) 162/71     Weight: 80.2 kg (176 lb 12.9 oz)  Body mass index is 34.53 kg/m².  No intake or output data in the 24 hours ending 02/24/22 1839   Physical Exam  Constitutional:       General: She is not in acute distress.     Appearance: She is not diaphoretic.   HENT:      Head: Normocephalic and atraumatic.      Ears:      Comments: Very [place]     Mouth/Throat:      Mouth: Mucous membranes are moist.   Eyes:      General:         Right eye: No discharge.         Left eye: No discharge.      Extraocular Movements: Extraocular movements intact.      Conjunctiva/sclera: Conjunctivae normal.      Pupils: Pupils are equal, round, and reactive to light.   Cardiovascular:      Rate and Rhythm: Normal rate and regular rhythm.      Pulses: Normal pulses.      Heart sounds: Normal heart sounds. No murmur heard.  Pulmonary:      Effort: Pulmonary effort is normal.      Breath sounds: Normal breath sounds. No wheezing, rhonchi or rales.   Abdominal:      General: Bowel sounds are normal. There is no distension.      Palpations: Abdomen is soft.      Tenderness: There is no abdominal tenderness. There is no guarding.   Genitourinary:     Comments: Not examined  Musculoskeletal:         General: Normal range of motion.      Cervical back: Normal range of motion and neck supple. No rigidity or tenderness.      Right lower leg: No edema.      Left lower leg: No edema.   Skin:     General: Skin is warm and dry.      Capillary Refill: Capillary refill takes less than 2 seconds.      Comments: Right arm dressing   Neurological:      General: No focal deficit present.      Mental Status: She is alert and oriented to person, place, and time. Mental status is at baseline.   Psychiatric:         Mood and Affect: Mood normal.         Behavior: Behavior normal.         Thought Content: Thought content normal.         Judgment: Judgment normal.       Lab Results   Component Value Date    WBC 22.05 (H) 02/24/2022    HGB 11.1 (L)  02/24/2022    HCT 34.8 (L) 02/24/2022    MCV 90 02/24/2022     02/24/2022       BMP  Lab Results   Component Value Date     02/24/2022    K 3.9 02/24/2022    CL 99 02/24/2022    CO2 29 02/24/2022    BUN 17 02/24/2022    CREATININE 1.5 (H) 02/24/2022    CALCIUM 8.3 (L) 02/24/2022    ANIONGAP 10 02/24/2022    ESTGFRAFRICA 37 (A) 02/24/2022    EGFRNONAA 32 (A) 02/24/2022           Assessment/Plan:      * Severe Sepsis with Cellulitis and abscess of right upper extremity  2/22   This patient does have evidence of infective focus  My overall impression is Severe  sepsis. Vital signs were reviewed and noted in progress note.  Antibiotics given-   Antibiotics (From admission, onward)            Start     Stop Route Frequency Ordered    02/21/22 1430  oxacillin 12 g in  mL CONTINUOUS INFUSION         -- IV Every 24 hours (non-standard times) 02/21/22 1255        Cultures were taken-   Microbiology Results (last 7 days)     Procedure Component Value Units Date/Time    Blood culture #1 **CANNOT BE ORDERED STAT** [[id number]] Collected: 02/20/22 2311    Order Status: Completed Specimen: Blood from Peripheral, Forearm, Left Updated: 02/24/22 0612     Blood Culture, Routine No Growth to date      No Growth to date      No Growth to date      No Growth to date    Blood culture #2 **CANNOT BE ORDERED STAT** [[id number]] Collected: 02/20/22 2255    Order Status: Completed Specimen: Blood from Peripheral, Hand, Left Updated: 02/24/22 0612     Blood Culture, Routine No Growth to date      No Growth to date      No Growth to date      No Growth to date    Aerobic culture [[id number]] Collected: 02/22/22 0732    Order Status: Completed Specimen: Incision site from Arm, Right Updated: 02/23/22 0643     Aerobic Bacterial Culture No growth        Latest lactate reviewed, they are-  No results for input(s): LACTATE in the last 72 hours.    Organ dysfunction indicated by Acute kidney injury     Source- Right arm  abscess    Source control Achieved by- IV abx      S/p I&D 2/22/2022 2/24 WBCs up to 22 today- Discussed with ID- Continue current Iv Abx for now.    Repeat UA and Cxr    Hx MSSA (methicillin susceptible Staphylococcus aureus) infection  2/23 Prior culture from 2/18/2022 shows MSSA      Normocytic anemia  2/22 Add MVI      Hypoalbuminemia  2/22 Add po supplement      Debility  2/22 Fall and skin precautions  Turn Q 2 hours  2/23 Continue  PT    2/24 Increased activity encouraged.         Abscess of right arm S/p I&D 2/22/2022  S/p I&D 2/22/2022      Hypomagnesemia  2/21 Add 2 gm Iv mag  2/22 Add po mag oxide  2/24 Add 2 gm IV mag sulfate today      Acute renal failure superimposed on stage 3 chronic kidney disease  2/21 Likely secondary to diuretics/other medications in addition to HTN   Creatinine at 2.1 in ED / Baseline appears to be around 1.2  Monitor for fluid overload  Monitor renal function labs closely  Avoid nephrotoxins as able  Monitor I&Os  Holding home HCTZ, losartan for now  2/23 Home diuretic on hold  Patient on Ivf for gentle hydration  2/24 ELLIOTT Improved      Chronic diastolic heart failure  2/21 ECHO 04/27/2021 - EF 65%, Grade 1 DD   / Cxray - cardiomegaly with pulmonary vascular crowding  Pt requiring O2 2L - not on home O2  Cardiology consulted  Lasix 20mg x1 dose in ED - cautious use d/t worsening renal fxn  ECHO pending  Strict I&Os  Daily weights  2/22 Cardiology following  2/23 Stable          CAD, multiple vessel  2/22 Stable      Hypertension associated with diabetes  2/21 Improved since ED arrival - possible pain component  Home medications resumed as appropriate   Hydralazine IV prn SBP > 170  Provide adequate pain control  VS per unit routine  2/22 Monitor closely          NAWAF (obstructive sleep apnea)  2/24 Resume home CPAP q Hs      Acquired hypothyroidism  2/22 Continue home synthroid  Lab Results   Component Value Date    TSH 1.124 02/21/2022           Type 2 diabetes  mellitus with hyperglycemia  2/21 Pt with dexcom device to abdomen  A1c at 7.6 on 11/01/2021 / BG in ED at 304  Accuchecks with SSI prn   Holding home januvia  A1c pending    2/22  Lab Results   Component Value Date    HGBA1C 7.2 (H) 02/21/2022    Blood sugars running 190-219-Add 10 units detemir sq qpm  2/23 Blood suagrs running 179-283- Increase detemir to 14 units q pm  2/24 Blood sugars running 161-241- Increase detemir to 18 units q pm      Hyperlipidemia associated with type 2 diabetes mellitus  2/21 Resumed home statin  2/22  Lab Results   Component Value Date    CHOL 113 (L) 02/21/2022    CHOL 166 06/14/2021    CHOL 130 12/11/2018     Lab Results   Component Value Date    HDL 45 02/21/2022    HDL 65 06/14/2021    HDL 54 12/11/2018     Lab Results   Component Value Date    LDLCALC 53.6 (L) 02/21/2022    LDLCALC 80.0 06/14/2021    LDLCALC 55.4 (L) 12/11/2018     Lab Results   Component Value Date    TRIG 72 02/21/2022    TRIG 105 06/14/2021    TRIG 103 12/11/2018     Lab Results   Component Value Date    CHOLHDL 39.8 02/21/2022    CHOLHDL 39.2 06/14/2021    CHOLHDL 41.5 12/11/2018         GERD (gastroesophageal reflux disease)  2/21 Resumed home PPI        VTE Risk Mitigation (From admission, onward)         Ordered     IP VTE HIGH RISK PATIENT  Once         02/22/22 0900     Place sequential compression device  Until discontinued         02/22/22 0900     Place sequential compression device  Until discontinued         02/21/22 0242                Discharge Planning   EVERTON:      Code Status: DNR   Is the patient medically ready for discharge?:     Reason for patient still in hospital (select all that apply): Treatment  Discharge Plan A: Home Health      Time spent seeing patient( greater than 1/2 spent in direct contact) : 38 minutes    ISRAEL Randhawa  Department of Hospital Medicine   [facility] - Med Surg

## 2022-02-25 NOTE — ASSESSMENT & PLAN NOTE
Was on oxacillin, with worsening leucocytosis -will add Unasyn.  Monitor repeat CBC.    Follow drug susceptibility of staph .  ?CONNOR creep -add 5 days of zyvox  Will need swallow evaluation

## 2022-02-25 NOTE — PLAN OF CARE
Patient voiced no pain or discomfort during shift. Blood sugars stable. Patient continued on IV ABX. No adverse reactions noted. Daughter remains at bedside. Call bell with in reach. Observation continued.

## 2022-02-25 NOTE — PROGRESS NOTES
Pharmacist Renal Dose Adjustment Note    Tiffanie Wise is a 81 y.o. female being treated with the medication lovenox    Patient Data:    Vital Signs (Most Recent):  Temp: 99.3 °F (37.4 °C) (02/25/22 0719)  Pulse: 71 (02/25/22 0719)  Resp: 16 (02/25/22 0710)  BP: 136/60 (02/25/22 0719)  SpO2: 96 % (02/25/22 0719) Vital Signs (72h Range):  Temp:  [97.3 °F (36.3 °C)-99.7 °F (37.6 °C)]   Pulse:  [56-92]   Resp:  [14-22]   BP: (110-194)/(48-79)   SpO2:  [87 %-98 %]      Recent Labs   Lab 02/23/22  0503 02/24/22  0518 02/25/22 0627   CREATININE 1.7* 1.5* 1.2     Serum creatinine: 1.2 mg/dL 02/25/22 0627  Estimated creatinine clearance: 34.5 mL/min    Medication lovenox 30mg daily will be changed to lovenox 40mg daily for crcl > 30ml/min    Pharmacist's Name: Maddi Gonsales  Pharmacist's Extension: [phone]

## 2022-02-25 NOTE — PT/OT/SLP PROGRESS
Physical Therapy      Patient Name:  Tiffanie Wise   MRN:  [id number]    Time: 0925-0950    Communicated with nurse, Alice , and completed Epic chart review prior to tx session.    S: Patient agreed to PT session.    O: supine > sit EOB: SBA    STS from EOB > RW: CGA (VC for hand placement)    40ft w/ RW Min-Mod A (consistently closing eyes while walking despite VC; frequently runs RW into walls, door, bed frame etc due to eyes closed; poor safety awareness)    Stand pivot T/F to chair w/ RW: Min A    Educated patient on TE to be performed throughout the day to maintain current level of ROM and strength to BLE. Exercises included: LAQ, Hip Flexion, AP. Patient successfully demonstrated x10 reps of each exercise with correct form. Re enforced importance of utilizing call light and not attempt to get up without staff assistance due to increased fall risk. Encouraged patient to increase OOB tolerance by remaining up in chair for 2 hour minimum, especially with meals. Patient verbalized understanding.     Patient left sitting up in chair with call light in reach and chair alarm active. Son and daughter present.     A: Patient would continue to benefit from skilled PT to address functional limitations in order to return to PLOF/decrease caregiver burden. Patient continues to display significant safety awareness deficits which place her at a higher fall risk. Patient consistently performs activities in standing, seated and ambulatory with her eyes closed and does not keep them open more than momentarily when corrected.     P: Cont PT POC & recommended placement    Charges: 1G 1TA    Tasia Ravi, PTA

## 2022-02-25 NOTE — ASSESSMENT & PLAN NOTE
2/21 Likely secondary to diuretics/other medications in addition to HTN   Creatinine at 2.1 in ED / Baseline appears to be around 1.2  Monitor for fluid overload  Monitor renal function labs closely  Avoid nephrotoxins as able  Monitor I&Os  Holding home HCTZ, losartan for now  2/23 Home diuretic on hold  Patient on Ivf for gentle hydration  2/24 ELLIOTT Improved  2/25 ELLIOTT resolved

## 2022-02-25 NOTE — ASSESSMENT & PLAN NOTE
2/21 Pt with dexcom device to abdomen  A1c at 7.6 on 11/01/2021 / BG in ED at 304  Accuchecks with SSI prn   Holding home januvia  A1c pending    2/22  Lab Results   Component Value Date    HGBA1C 7.2 (H) 02/21/2022    Blood sugars running 190-219-Add 10 units detemir sq qpm  2/23 Blood suagrs running 179-283- Increase detemir to 14 units q pm  2/24 Blood sugars running 161-241- Increase detemir to 18 units q pm  2/25 Blood sugars running 137-284

## 2022-02-25 NOTE — SUBJECTIVE & OBJECTIVE
Interval History:  Still confused  Arm wound is improving  White count is decreasing  Chest x-ray shows infiltrate likely the source of increased white blood count    Medications:  Continuous Infusions:   lactated ringers 50 mL/hr at 02/25/22 0648     Scheduled Meds:   albuterol-ipratropium  3 mL Nebulization Q6H WAKE    amLODIPine  10 mg Oral Daily    ampicillin-sulbactim (UNASYN) IVPB  3 g Intravenous Q8H    ascorbic acid (vitamin C)  500 mg Oral QHS    atorvastatin  40 mg Oral QHS    chlorhexidine  10 mL Mouth/Throat BID    clonazePAM  0.5 mg Oral QHS    clopidogreL  75 mg Oral Daily    donepeziL  10 mg Oral Daily    enoxaparin  40 mg Subcutaneous Daily    EScitalopram oxalate  20 mg Oral Daily    guaiFENesin  600 mg Oral BID    insulin detemir U-100  18 Units Subcutaneous QHS    isosorbide mononitrate  30 mg Oral BID    levothyroxine  50 mcg Oral Before breakfast    linezolid  600 mg Oral Q12H    losartan  100 mg Oral Daily    magnesium oxide  400 mg Oral Daily    multivitamin  1 tablet Oral Daily    pantoprazole  40 mg Oral Daily    pregabalin  150 mg Oral QHS    vitamin D  1,000 Units Oral Daily    zinc sulfate  220 mg Oral QHS     PRN Meds:sodium chloride 0.9%, acetaminophen, albuterol-ipratropium, dextrose 10%, glucagon (human recombinant), hydrALAZINE, insulin aspart U-100, nitroGLYCERIN, ondansetron     Review of patient's allergies indicates:  No Known Allergies  Objective:     Vital Signs (Most Recent):  Temp: 99.3 °F (37.4 °C) (02/25/22 0719)  Pulse: 71 (02/25/22 0719)  Resp: 16 (02/25/22 0710)  BP: 136/60 (02/25/22 0719)  SpO2: 96 % (02/25/22 0719) Vital Signs (24h Range):  Temp:  [97.4 °F (36.3 °C)-99.3 °F (37.4 °C)] 99.3 °F (37.4 °C)  Pulse:  [62-92] 71  Resp:  [14-20] 16  SpO2:  [87 %-98 %] 96 %  BP: (110-194)/(54-79) 136/60     Weight: 80.2 kg (176 lb 12.9 oz)  Body mass index is 34.53 kg/m².    Intake/Output - Last 3 Shifts         02/23 0700 02/24 0659 02/24 0700 02/25 0659 02/25 0700 02/26  0659    P.O.   250    I.V. (mL/kg) 1300.9 (16.2) 823.7 (10.3)     IV Piggyback 550.1 335.8     Total Intake(mL/kg) 1851 (23.1) 1159.5 (14.5) 250 (3.1)    Blood       Total Output       Net +1851 +1159.5 +250           Urine Occurrence 3 x  1 x    Stool Occurrence 1 x  1 x            Physical Exam  Vitals reviewed.   Constitutional:       Comments: Confused   Cardiovascular:      Rate and Rhythm: Normal rate.   Pulmonary:      Effort: Pulmonary effort is normal.      Comments: Decreased breath sounds on right posterior  Skin:     Comments: The right upper arm wound is clean.  There is little to no erythema.  Wound shows some granulation.  There is no drainage   Neurological:      Mental Status: She is alert.       Significant Labs:  I have reviewed all pertinent lab results within the past 24 hours.  CBC:   Recent Labs   Lab 02/25/22 0627   WBC 17.89*   RBC 3.68*   HGB 10.6*   HCT 32.5*      MCV 88   MCH 28.8   MCHC 32.6     BMP:   Recent Labs   Lab 02/25/22 0627   *      K 3.1*      CO2 29   BUN 17   CREATININE 1.2   CALCIUM 8.7   MG 1.9     Recent Labs   Lab 02/24/22 1955   COLORU Yellow   SPECGRAV 1.020   PHUR 6.0   PROTEINUA 1+*   BACTERIA Occasional   NITRITE Negative   LEUKOCYTESUR Trace*   UROBILINOGEN Negative   HYALINECASTS 0       Significant Diagnostics:  I have reviewed all pertinent imaging results/findings within the past 24 hours.  CXR: I have reviewed all pertinent results/findings within the past 24 hours and my personal findings are:  Right lower lobe infiltrate

## 2022-02-25 NOTE — ASSESSMENT & PLAN NOTE
With worsening leucocytosis , there is concern for aspiration- will add unasyn/zyvox .  Will repeat cbc in AM

## 2022-02-25 NOTE — ASSESSMENT & PLAN NOTE
2/22   This patient does have evidence of infective focus  My overall impression is Severe  sepsis. Vital signs were reviewed and noted in progress note.  Antibiotics given-   Antibiotics (From admission, onward)            Start     Stop Route Frequency Ordered    02/25/22 1100  ampicillin-sulbactam 3 g in sodium chloride 0.9 % 100 mL IVPB (ready to mix system)         03/02 1859 IV Every 8 hours (non-standard times) 02/25/22 1032    02/24/22 2130  linezolid tablet 600 mg         03/01 2059 Oral Every 12 hours 02/24/22 2020        Cultures were taken-   Microbiology Results (last 7 days)     Procedure Component Value Units Date/Time    Aerobic culture [[id number]]  (Abnormal) Collected: 02/22/22 0732    Order Status: Completed Specimen: Incision site from Arm, Right Updated: 02/25/22 1130     Aerobic Bacterial Culture STAPHYLOCOCCUS AUREUS  Moderate  Susceptibility pending      Blood culture #1 **CANNOT BE ORDERED STAT** [[id number]] Collected: 02/20/22 2311    Order Status: Completed Specimen: Blood from Peripheral, Forearm, Left Updated: 02/25/22 0612     Blood Culture, Routine No Growth to date      No Growth to date      No Growth to date      No Growth to date      No Growth to date    Blood culture #2 **CANNOT BE ORDERED STAT** [[id number]] Collected: 02/20/22 2255    Order Status: Completed Specimen: Blood from Peripheral, Hand, Left Updated: 02/25/22 0612     Blood Culture, Routine No Growth to date      No Growth to date      No Growth to date      No Growth to date      No Growth to date    Urine culture [[id number]] Collected: 02/24/22 1955    Order Status: No result Specimen: Urine Updated: 02/24/22 2108        Latest lactate reviewed, they are-  Recent Labs   Lab 02/24/22  2335   LACTATE 0.9       Organ dysfunction indicated by Acute kidney injury     Source- Right arm abscess    Source control Achieved by- IV abx      S/p I&D 2/22/2022 2/24 WBCs up to 22 today- Discussed with ID- Continue current Iv  Abx for now.    Cxr shows pneumonia  Oxacillin changed to Unasyn and 5 days po zyvox ordered

## 2022-02-25 NOTE — SUBJECTIVE & OBJECTIVE
Interval History: 81 year old woman with right upper arm MSSA abscess s/p I and D.  CBC- wbc -22  2/22- cultures- Staph Aureus   Review of Systems   Constitutional:  Positive for activity change. Negative for appetite change, chills, diaphoresis and fatigue.   HENT:  Positive for hearing loss. Negative for ear discharge, ear pain and facial swelling.    Eyes:  Negative for pain and itching.   Respiratory:  Negative for cough and shortness of breath.    Cardiovascular:  Negative for chest pain, palpitations and leg swelling.   Gastrointestinal:  Negative for abdominal distention, abdominal pain, blood in stool, constipation, diarrhea, nausea and vomiting.   Endocrine: Negative for polydipsia and polyphagia.   Genitourinary:  Negative for difficulty urinating, dysuria, flank pain and hematuria.   Musculoskeletal:  Negative for neck pain and neck stiffness.   Skin:  Positive for wound.        Right arm dressing   Allergic/Immunologic: Negative for food allergies.   Neurological:  Negative for seizures, facial asymmetry, speech difficulty and weakness.   Hematological:  Does not bruise/bleed easily.   Psychiatric/Behavioral:  Negative for agitation, behavioral problems, confusion, hallucinations and suicidal ideas. The patient is not nervous/anxious.    Objective:     Vital Signs (Most Recent):  Temp: 98.6 °F (37 °C) (02/24/22 1542)  Pulse: 75 (02/24/22 1542)  Resp: 18 (02/24/22 1542)  BP: (!) 162/71 (02/24/22 1542)  SpO2: 96 % (02/24/22 1542)   Vital Signs (24h Range):  Temp:  [98.3 °F (36.8 °C)-99.7 °F (37.6 °C)] 98.6 °F (37 °C)  Pulse:  [64-92] 75  Resp:  [14-22] 18  SpO2:  [87 %-96 %] 96 %  BP: (143-194)/(63-79) 162/71     Weight: 80.2 kg (176 lb 12.9 oz)  Body mass index is 34.53 kg/m².    Estimated Creatinine Clearance: 27.6 mL/min (A) (based on SCr of 1.5 mg/dL (H)).    Physical Exam  Vitals and nursing note reviewed.   Constitutional:       General: She is not in acute distress.     Appearance: She is not  diaphoretic.   HENT:      Head: Normocephalic and atraumatic.      Ears:      Comments: Very [place]     Mouth/Throat:      Mouth: Mucous membranes are moist.   Eyes:      General:         Right eye: No discharge.         Left eye: No discharge.      Extraocular Movements: Extraocular movements intact.      Conjunctiva/sclera: Conjunctivae normal.      Pupils: Pupils are equal, round, and reactive to light.   Cardiovascular:      Rate and Rhythm: Normal rate and regular rhythm.      Pulses: Normal pulses.      Heart sounds: Normal heart sounds. No murmur heard.  Pulmonary:      Effort: Pulmonary effort is normal.      Breath sounds: Normal breath sounds. No wheezing, rhonchi or rales.   Abdominal:      General: Bowel sounds are normal. There is no distension.      Palpations: Abdomen is soft.      Tenderness: There is no abdominal tenderness. There is no guarding.   Genitourinary:     Comments: Not examined  Musculoskeletal:         General: Normal range of motion.      Cervical back: Normal range of motion and neck supple. No rigidity or tenderness.      Right lower leg: No edema.      Left lower leg: No edema.   Skin:     General: Skin is warm and dry.      Capillary Refill: Capillary refill takes less than 2 seconds.      Comments: Right arm dressing   Neurological:      General: No focal deficit present.      Mental Status: She is alert and oriented to person, place, and time. Mental status is at baseline.   Psychiatric:         Mood and Affect: Mood normal.         Behavior: Behavior normal.         Thought Content: Thought content normal.         Judgment: Judgment normal.       Significant Labs: Blood Culture:   Recent Labs   Lab 02/16/22  1424 02/16/22  1651 02/20/22  2255 02/20/22  2311   LABBLOO No growth after 5 days. No growth after 5 days. No Growth to date  No Growth to date  No Growth to date  No Growth to date No Growth to date  No Growth to date  No Growth to date  No Growth to date     BMP:    Recent Labs   Lab 02/24/22  0518   *      K 3.9   CL 99   CO2 29   BUN 17   CREATININE 1.5*   CALCIUM 8.3*   MG 1.7     Wound Culture:   Recent Labs   Lab 02/22/22  0732   LABAERO STAPHYLOCOCCUS AUREUS  Moderate  Susceptibility pending  *     All pertinent labs within the past 24 hours have been reviewed.    Significant Imaging:

## 2022-02-25 NOTE — ASSESSMENT & PLAN NOTE
2/21 Likely secondary to diuretics/other medications in addition to HTN   Creatinine at 2.1 in ED / Baseline appears to be around 1.2  Monitor for fluid overload  Monitor renal function labs closely  Avoid nephrotoxins as able  Monitor I&Os  Holding home HCTZ, losartan for now  2/23 Home diuretic on hold  Patient on Ivf for gentle hydration  2/24 ELLIOTT Improved

## 2022-02-25 NOTE — PLAN OF CARE
Pt AAO, able to make needs known. Ambulatory with asssit x1. Dressing to RUE C/D/I. IV ABT therapy remains in progress. No adverse reactions noted, remains afebrile. LR @ 50mL/hr. Heart monitor 8688. Accu checks AC&HS. Sliding scale insulin adm per sliding scale. Neuro checks Q4hrs. Remains free from incident/injury. Call light in reach.

## 2022-02-25 NOTE — PLAN OF CARE
MBSS completed.  Pt exhibiting oropharyngeal dysphagia characterized by decreased oral prep and transport, a swallow delay, decreased laryngeal elevation, and supraglottic penetration of thin liquids.  No obvious aspiration visualized.  She experiences most difficulty with solid consistencies 2/2 oral weakness and decreased coordination of swallow.  ST recommends a po diet of Very Soft Solids with grinded meats and thin liquids with strict aspiration precautions.

## 2022-02-25 NOTE — PLAN OF CARE
Problem: Adult Inpatient Plan of Care  Goal: Plan of Care Review  Outcome: Ongoing, Progressing     Problem: Diabetes Comorbidity  Goal: Blood Glucose Level Within Targeted Range  Outcome: Ongoing, Progressing     Problem: Infection Progression (Sepsis/Septic Shock)  Goal: Absence of Infection Signs and Symptoms  Outcome: Ongoing, Progressing     Problem: Skin Injury Risk Increased  Goal: Skin Health and Integrity  Outcome: Ongoing, Progressing

## 2022-02-25 NOTE — PROGRESS NOTES
Pharmacist Renal Dose Adjustment Note    Tiffanie Wise is a 81 y.o. female being treated with the medication levoFLOXacin    Patient Data:    Vital Signs (Most Recent):  Temp: 98.6 °F (37 °C) (02/24/22 1542)  Pulse: 75 (02/24/22 1542)  Resp: 18 (02/24/22 1542)  BP: (!) 162/71 (02/24/22 1542)  SpO2: 96 % (02/24/22 1542)   Vital Signs (72h Range):  Temp:  [97.3 °F (36.3 °C)-99.7 °F (37.6 °C)]   Pulse:  [49-92]   Resp:  [13-22]   BP: (106-194)/(46-79)   SpO2:  [87 %-98 %]      Recent Labs   Lab 02/22/22  0448 02/23/22  0503 02/24/22  0518   CREATININE 1.7*  1.7* 1.7* 1.5*     Serum creatinine: 1.5 mg/dL (H) 02/24/22 0518  Estimated creatinine clearance: 27.6 mL/min (A)    Medication levoFLOXacin 750 mg IV daily will be changed to levoFLOXacin 750 mg IV every 48 hours per pharmacy renal dose adjustment protocol for patients with CrCl 20-49 mL/min.    Pharmacist's Name: Tasia Buchanan PharmD  Pharmacist's Extension: [phone]    Thank you for allowing us to participate in this patient's care.     Tasia Buchanan PharmD 02/24/2022 7:01 PM

## 2022-02-25 NOTE — ASSESSMENT & PLAN NOTE
Gen surgery consult .  Keep NPO   Continue Oxacillin      2/24/21-  With worsening leucocytosis and previous history of dysphagia- will add unasyn, follow cbc in AM.  Will do chest imaging in AM if wbc worsen.

## 2022-02-25 NOTE — PROGRESS NOTES
Pharmacist Renal Dose Adjustment Note    Tiffanie Wise is a 81 y.o. female being treated with the medication unasyn    Patient Data:    Vital Signs (Most Recent):  Temp: 99.3 °F (37.4 °C) (02/25/22 0719)  Pulse: 71 (02/25/22 0719)  Resp: 16 (02/25/22 0710)  BP: 136/60 (02/25/22 0719)  SpO2: 96 % (02/25/22 0719) Vital Signs (72h Range):  Temp:  [97.3 °F (36.3 °C)-99.7 °F (37.6 °C)]   Pulse:  [56-92]   Resp:  [14-22]   BP: (110-194)/(48-79)   SpO2:  [87 %-98 %]      Recent Labs   Lab 02/23/22  0503 02/24/22  0518 02/25/22 0627   CREATININE 1.7* 1.5* 1.2     Serum creatinine: 1.2 mg/dL 02/25/22 0627  Estimated creatinine clearance: 34.5 mL/min    Medication: unasyn 3g every 12 hours will be changed to unasyn 3g every 8 hours for crcl > 30ml/min    Pharmacist's Name: Maddi Gonsales  Pharmacist's Extension: [phone]

## 2022-02-25 NOTE — ASSESSMENT & PLAN NOTE
2/22 Fall and skin precautions  Turn Q 2 hours  2/23 Continue  PT    2/24 Increased activity encouraged.

## 2022-02-26 PROBLEM — E83.42 HYPOMAGNESEMIA: Status: RESOLVED | Noted: 2022-01-01 | Resolved: 2022-01-01

## 2022-02-26 PROBLEM — E87.6 HYPOKALEMIA: Status: RESOLVED | Noted: 2022-01-01 | Resolved: 2022-01-01

## 2022-02-26 PROBLEM — N17.9 ACUTE RENAL FAILURE SUPERIMPOSED ON STAGE 3 CHRONIC KIDNEY DISEASE: Status: RESOLVED | Noted: 2022-01-01 | Resolved: 2022-01-01

## 2022-02-26 PROBLEM — N18.30 ACUTE RENAL FAILURE SUPERIMPOSED ON STAGE 3 CHRONIC KIDNEY DISEASE: Status: RESOLVED | Noted: 2022-01-01 | Resolved: 2022-01-01

## 2022-02-26 NOTE — SUBJECTIVE & OBJECTIVE
Interval History: See hospital course    Review of Systems   Constitutional:  Positive for activity change and fatigue. Negative for appetite change and chills.   HENT:  Positive for hearing loss. Negative for ear discharge, ear pain and facial swelling.    Eyes:  Positive for visual disturbance. Negative for pain and itching.   Respiratory:  Negative for cough and shortness of breath.    Cardiovascular:  Negative for chest pain, palpitations and leg swelling.   Gastrointestinal:  Negative for abdominal distention, abdominal pain, blood in stool, constipation, diarrhea, nausea and vomiting.   Endocrine: Negative for polydipsia and polyphagia.   Genitourinary:  Negative for difficulty urinating, dysuria, flank pain and hematuria.   Musculoskeletal:  Negative for neck pain and neck stiffness.   Skin:  Positive for wound.        Right arm dressing   Allergic/Immunologic: Negative for food allergies.   Neurological:  Negative for seizures, facial asymmetry, speech difficulty and weakness.   Hematological:  Does not bruise/bleed easily.   Psychiatric/Behavioral:  Negative for agitation, behavioral problems, confusion, hallucinations and suicidal ideas. The patient is not nervous/anxious.    Objective:     Vital Signs (Most Recent):  Temp: 98.4 °F (36.9 °C) (02/26/22 1232)  Pulse: 67 (02/26/22 1404)  Resp: 18 (02/26/22 1404)  BP: (!) 174/76 (02/26/22 1232)  SpO2: 98 % (02/26/22 1404) Vital Signs (24h Range):  Temp:  [98.4 °F (36.9 °C)-99.3 °F (37.4 °C)] 98.4 °F (36.9 °C)  Pulse:  [61-67] 67  Resp:  [16-22] 18  SpO2:  [94 %-98 %] 98 %  BP: (140-174)/(63-76) 174/76     Weight: 80.6 kg (177 lb 11.1 oz)  Body mass index is 34.7 kg/m².    Intake/Output Summary (Last 24 hours) at 2/26/2022 1711  Last data filed at 2/26/2022 1400  Gross per 24 hour   Intake 1431.39 ml   Output --   Net 1431.39 ml        Physical Exam  Constitutional:       General: She is not in acute distress.     Appearance: She is not diaphoretic.   HENT:       Head: Normocephalic and atraumatic.      Ears:      Comments: Very [place]     Mouth/Throat:      Mouth: Mucous membranes are moist.   Eyes:      General:         Right eye: No discharge.         Left eye: No discharge.      Extraocular Movements: Extraocular movements intact.      Conjunctiva/sclera: Conjunctivae normal.      Pupils: Pupils are equal, round, and reactive to light.   Cardiovascular:      Rate and Rhythm: Normal rate and regular rhythm.      Pulses: Normal pulses.      Heart sounds: Normal heart sounds. No murmur heard.  Pulmonary:      Effort: Pulmonary effort is normal.      Breath sounds: Rhonchi present. No wheezing or rales.   Abdominal:      General: Bowel sounds are normal. There is no distension.      Palpations: Abdomen is soft.      Tenderness: There is no abdominal tenderness. There is no guarding.   Genitourinary:     Comments: Not examined  Musculoskeletal:         General: Normal range of motion.      Cervical back: Normal range of motion and neck supple. No rigidity or tenderness.      Right lower leg: No edema.      Left lower leg: No edema.   Skin:     General: Skin is warm and dry.      Capillary Refill: Capillary refill takes less than 2 seconds.      Comments: Right arm dressing   Neurological:      General: No focal deficit present.      Mental Status: She is alert and oriented to person, place, and time. Mental status is at baseline.   Psychiatric:         Mood and Affect: Mood normal.         Behavior: Behavior normal.         Thought Content: Thought content normal.         Judgment: Judgment normal.          Lab Results   Component Value Date    WBC 10.51 02/26/2022    HGB 10.1 (L) 02/26/2022    HCT 31.2 (L) 02/26/2022    MCV 88 02/26/2022     02/26/2022       BMP  Lab Results   Component Value Date     02/26/2022    K 4.2 02/26/2022     02/26/2022    CO2 20 (L) 02/26/2022    BUN 10 02/26/2022    CREATININE 1.1 02/26/2022    CALCIUM 8.6 (L) 02/26/2022     ANIONGAP 14 02/26/2022    ESTGFRAFRICA 54 (A) 02/26/2022    EGFRNONAA 47 (A) 02/26/2022

## 2022-02-26 NOTE — PT/OT/SLP PROGRESS
Speech Language Pathology Treatment    Patient Name:  Tiffanie Wise   MRN:  [id number]  Admitting Diagnosis: Cellulitis and abscess of upper extremity    Recommendations:                 General Recommendations:  Aspiration precutions  Diet recommendations:  Mechanical soft, Pureed meat, Liquid Diet Level: Thin   Aspiration Precautions: In place  General Precautions: Standard, aspiration, hearing impaired, fall  Communication strategies:  Verbal    Subjective     Pt's daughter present for therapy  Patient goals: pt did not state goals at this time     Pain/Comfort:  ·  No pain exhibited     Respiratory Status: see medical chart    Objective:     Has the patient been evaluated by SLP for swallowing?     yes  Keep patient NPO?   no  Current Respiratory Status:    See medical chart     Pt completed 20 reps of oral and pharyngeal exercises with moderate cues.  Swallow precautions reviewed with pt and her daughter.    Assessment:     Tiffanie Wise is an 81 y.o. female with diagnosis of Dysphagia.  Pt currently on a mechanical soft consistency/ground meats diet and thin liquids with swallow precautions.      Goals:   Multidisciplinary Problems     SLP Goals        Problem: SLP Goal    Goal Priority Disciplines Outcome   SLP Goal     SLP Ongoing, Progressing   Description: 1. Pt will tolerate a soft mechanical diet with grinded meats and thin liquids without incidence  2. Pt will complete oral and pharyngeal ex's with min A for increased strength and ROM                   Plan:     · Patient to be seen:  2 x/week   · Plan of Care expires:  03/03/22  · Plan of Care reviewed with:  patient   · SLP Follow-Up:  Yes       Discharge recommendations:    continued S.T. for swallowing  Barriers to Discharge:  Pt does require assistance     Time Tracking:     SLP Treatment Date:    02/26/22  Speech Start Time:   1045  Speech Stop Time:    1115   Speech Total Time (min):   30    Billable Minutes: 30 minutes     02/26/2022

## 2022-02-26 NOTE — PLAN OF CARE
Pt AAO, able to make needs known. Ambulatory via walker with asssit x1. Dressing to RUE C/D/I. Wound care performed as ordered. IV ABT therapy remains in progress. No adverse reactions noted, remains afebrile. LR @ 50mL/hr. Heart monitor 8688. Accu checks AC&HS. Sliding scale insulin adm per sliding scale. Neuro checks Q4hrs. Remains free from incident/injury. Call light in reach.

## 2022-02-26 NOTE — PROGRESS NOTES
[facility]  Progress Note    Patient Name: Tiffanie Wise  MRN: [id number]  Patient Class: IP- Inpatient   Admission Date: 2/20/2022  Length of Stay: 5 days  Attending Physician: Fabi Garcia MD  Primary Care Provider: Tiffanie Spence MD        Subjective:     Principal Problem:Cellulitis and abscess of upper extremity        HPI:  80 y/o WF with hx of angina, GERD, HLD, DM2, hypothyroidism, osteoporosis, CAD, CABG, cardiac stents, HTN, CVA, diastolic HF, anemia, NAWAF, anxiety, depression, tonsillar CA, CKD3 to ED with c/o gradually increasing redness, warmth and pain to the R axillary area (site of known abscess) with associated fever and chills. Symptoms are progressive and of moderate severity with pain exacerbated by movement and palpation and no known mitigating factors. ROS is limited by pt's chronic expressive aphasia. Of note, per pt's daughter this is her third ED visit in the past 11 days with the same complaint - was initially sent home on bactrim with continued worsening, then was admitted from 02/16-02/18/2022, was on vancomycin, had a needle aspiration and was sent home on clindamycin but has again continued to worsen. Found in ED to have temperature of 102.1, H&H of 12.3&37.6, platelets of 333, , K+ 5.1, BUN 20, creatinine 2.1, GFFR 22, , , troponin negative, lactic acid 1.8; UA uninfected; Cxray showed cardiomegaly with pulmonary vascular crowding; COVID-19 negative. In ED the pt was placed on O2 at 2L per NC and was given IV vancomycin - temperature improved to 99.1, HR to 68, O2 saturation to 98%, and BP to 157/68. Hospital medicine was called and the pt was admitted with telemetry monitoring. Pt is a DNR - surrogate decision maker is SHELLI Worley.       Overview/Hospital Course:  The patient was monitored closely during her stay. She was kept on continuous telemetry monitoring. She was placed on Iv Abx for her RUE abscess. General Surgery  was consulted for I&D. ID was consulted for sepsis with failed outpatient po abx therapy. Nephrology was consulted for ELLIOTT with CKD stage 3, and Cardiology was consulted for acute CHF.   2/22 Patient S/p I&D right arm today.  2/23 Continue current treatment.   2/24 Poor po intake. Change to regular diet. Increased activity encouraged. WBCs trending upward. Cxr shows signs pneumonia.  Patient discussed with ID. Change oxacillin to Unasyn and add Zyvox x 5 days.   2/25 Consult ST to evaluate for possible aspiration. Check MBSS.  2/26/22: WBC normalized, Afebrile. Oxygenation stable on 3 liters NC. Speech MBSS showed no aspiration - recommend Very Soft Solids with grinded meats and thin liquids and aspiration precautions. Discussed possible discharge soon with daughter. Discussed pt will need 24 hour assistance. Discussed SNF placement. CM consulted       Interval History: See hospital course    Review of Systems   Constitutional:  Positive for activity change and fatigue. Negative for appetite change and chills.   HENT:  Positive for hearing loss. Negative for ear discharge, ear pain and facial swelling.    Eyes:  Positive for visual disturbance. Negative for pain and itching.   Respiratory:  Negative for cough and shortness of breath.    Cardiovascular:  Negative for chest pain, palpitations and leg swelling.   Gastrointestinal:  Negative for abdominal distention, abdominal pain, blood in stool, constipation, diarrhea, nausea and vomiting.   Endocrine: Negative for polydipsia and polyphagia.   Genitourinary:  Negative for difficulty urinating, dysuria, flank pain and hematuria.   Musculoskeletal:  Negative for neck pain and neck stiffness.   Skin:  Positive for wound.        Right arm dressing   Allergic/Immunologic: Negative for food allergies.   Neurological:  Negative for seizures, facial asymmetry, speech difficulty and weakness.   Hematological:  Does not bruise/bleed easily.   Psychiatric/Behavioral:  Negative  for agitation, behavioral problems, confusion, hallucinations and suicidal ideas. The patient is not nervous/anxious.    Objective:     Vital Signs (Most Recent):  Temp: 98.4 °F (36.9 °C) (02/26/22 1232)  Pulse: 67 (02/26/22 1404)  Resp: 18 (02/26/22 1404)  BP: (!) 174/76 (02/26/22 1232)  SpO2: 98 % (02/26/22 1404) Vital Signs (24h Range):  Temp:  [98.4 °F (36.9 °C)-99.3 °F (37.4 °C)] 98.4 °F (36.9 °C)  Pulse:  [61-67] 67  Resp:  [16-22] 18  SpO2:  [94 %-98 %] 98 %  BP: (140-174)/(63-76) 174/76     Weight: 80.6 kg (177 lb 11.1 oz)  Body mass index is 34.7 kg/m².    Intake/Output Summary (Last 24 hours) at 2/26/2022 1711  Last data filed at 2/26/2022 1400  Gross per 24 hour   Intake 1431.39 ml   Output --   Net 1431.39 ml        Physical Exam  Constitutional:       General: She is not in acute distress.     Appearance: She is not diaphoretic.   HENT:      Head: Normocephalic and atraumatic.      Ears:      Comments: Very [place]     Mouth/Throat:      Mouth: Mucous membranes are moist.   Eyes:      General:         Right eye: No discharge.         Left eye: No discharge.      Extraocular Movements: Extraocular movements intact.      Conjunctiva/sclera: Conjunctivae normal.      Pupils: Pupils are equal, round, and reactive to light.   Cardiovascular:      Rate and Rhythm: Normal rate and regular rhythm.      Pulses: Normal pulses.      Heart sounds: Normal heart sounds. No murmur heard.  Pulmonary:      Effort: Pulmonary effort is normal.      Breath sounds: Rhonchi present. No wheezing or rales.   Abdominal:      General: Bowel sounds are normal. There is no distension.      Palpations: Abdomen is soft.      Tenderness: There is no abdominal tenderness. There is no guarding.   Genitourinary:     Comments: Not examined  Musculoskeletal:         General: Normal range of motion.      Cervical back: Normal range of motion and neck supple. No rigidity or tenderness.      Right lower leg: No edema.      Left lower leg: No  edema.   Skin:     General: Skin is warm and dry.      Capillary Refill: Capillary refill takes less than 2 seconds.      Comments: Right arm dressing   Neurological:      General: No focal deficit present.      Mental Status: She is alert and oriented to person, place, and time. Mental status is at baseline.   Psychiatric:         Mood and Affect: Mood normal.         Behavior: Behavior normal.         Thought Content: Thought content normal.         Judgment: Judgment normal.          Lab Results   Component Value Date    WBC 10.51 02/26/2022    HGB 10.1 (L) 02/26/2022    HCT 31.2 (L) 02/26/2022    MCV 88 02/26/2022     02/26/2022       BMP  Lab Results   Component Value Date     02/26/2022    K 4.2 02/26/2022     02/26/2022    CO2 20 (L) 02/26/2022    BUN 10 02/26/2022    CREATININE 1.1 02/26/2022    CALCIUM 8.6 (L) 02/26/2022    ANIONGAP 14 02/26/2022    ESTGFRAFRICA 54 (A) 02/26/2022    EGFRNONAA 47 (A) 02/26/2022           Assessment/Plan:      * Severe Sepsis with Cellulitis and abscess of right upper extremity  2/22   This patient does have evidence of infective focus  My overall impression is Severe  sepsis. Vital signs were reviewed and noted in progress note.  Antibiotics given-   Antibiotics (From admission, onward)            Start     Stop Route Frequency Ordered    02/25/22 1100  ampicillin-sulbactam 3 g in sodium chloride 0.9 % 100 mL IVPB (ready to mix system)         03/02 1859 IV Every 8 hours (non-standard times) 02/25/22 1032    02/24/22 2130  linezolid tablet 600 mg         03/01 2059 Oral Every 12 hours 02/24/22 2020        Cultures were taken-   Microbiology Results (last 7 days)     Procedure Component Value Units Date/Time    Aerobic culture [[id number]]  (Abnormal)  (Susceptibility) Collected: 02/22/22 0732    Order Status: Completed Specimen: Incision site from Arm, Right Updated: 02/26/22 1201     Aerobic Bacterial Culture STAPHYLOCOCCUS AUREUS  Moderate      Urine  culture [[id number]] Collected: 02/24/22 1955    Order Status: Completed Specimen: Urine Updated: 02/26/22 1023     Urine Culture, Routine No significant growth    Narrative:      Specimen Source->Urine    Blood culture #1 **CANNOT BE ORDERED STAT** [[id number]] Collected: 02/20/22 2311    Order Status: Completed Specimen: Blood from Peripheral, Forearm, Left Updated: 02/26/22 0612     Blood Culture, Routine No growth after 5 days.    Blood culture #2 **CANNOT BE ORDERED STAT** [[id number]] Collected: 02/20/22 2255    Order Status: Completed Specimen: Blood from Peripheral, Hand, Left Updated: 02/26/22 0612     Blood Culture, Routine No growth after 5 days.        Latest lactate reviewed, they are-  Recent Labs   Lab 02/24/22  2335   LACTATE 0.9       Organ dysfunction indicated by Acute kidney injury     Source- Right arm abscess    Source control Achieved by- IV abx      S/p I&D 2/22/2022 2/24 WBCs up to 22 today- Discussed with ID- Continue current Iv Abx for now.    Cxr shows pneumonia  Oxacillin changed to Unasyn and 5 days po zyvox ordered    2/26/22: WBC normalized. Will discuss d/c abx with Dr. Elliott     Pneumonia- possible aspiration  2/25 Oxacillin changes to Unasyn  Consult ST for MBSS        MSSA (methicillin susceptible Staphylococcus aureus) infection  Arm abscess +MSSA  Cont Zyvox      Abscess of right arm S/p I&D 2/22/2022  S/p I&D 2/22/2022      Severe Oropharyngeal dysphagia sec to XRT for Tonsilar Ca  MBSS showed no aspiration   Speech recommended aspiration precautions, Very Soft Solids with grinded meats and thin liquids     Normocytic anemia  2/22 Add MVI      Hypoalbuminemia  2/22 Add po supplement      Debility  2/22 Fall and skin precautions  Turn Q 2 hours  2/23 Continue  PT    2/24 Increased activity encouraged.   2/25 Discharge planning discussed with patient and daughter at bedside and plan is Discharge to home with Home Health once medically cleared  2/26/22: SNF versus HH      Chronic  diastolic heart failure  2/21 ECHO 04/27/2021 - EF 65%, Grade 1 DD   / Cxray - cardiomegaly with pulmonary vascular crowding  Pt requiring O2 2L - not on home O2  Cardiology consulted  Lasix 20mg x1 dose in ED - cautious use d/t worsening renal fxn  ECHO pending  Strict I&Os  Daily weights  2/22 Cardiology following    Stable          CAD, multiple vessel  2/22 Stable      Hypertension associated with diabetes  2/21 Improved since ED arrival - possible pain component  Home medications resumed as appropriate   Hydralazine IV prn SBP > 170  Provide adequate pain control  VS per unit routine  2/22 Monitor closely          NAWAF (obstructive sleep apnea)  cont home CPAP q Hs      Acquired hypothyroidism  2/22 Continue home synthroid  Lab Results   Component Value Date    TSH 1.124 02/21/2022           Type 2 diabetes mellitus with hyperglycemia  2/21 Pt with dexcom device to abdomen  A1c at 7.6 on 11/01/2021 / BG in ED at 304  Accuchecks with SSI prn   Holding home januvia  A1c pending    2/22  Lab Results   Component Value Date    HGBA1C 7.2 (H) 02/21/2022    Blood sugars running 190-219-Add 10 units detemir sq qpm  2/23 Blood suagrs running 179-283- Increase detemir to 14 units q pm  2/24 Blood sugars running 161-241- Increase detemir to 18 units q pm  2/25 Blood sugars running 137-284     Stable      Hyperlipidemia associated with type 2 diabetes mellitus  2/21 Resumed home statin  2/22  Lab Results   Component Value Date    CHOL 113 (L) 02/21/2022    CHOL 166 06/14/2021    CHOL 130 12/11/2018     Lab Results   Component Value Date    HDL 45 02/21/2022    HDL 65 06/14/2021    HDL 54 12/11/2018     Lab Results   Component Value Date    LDLCALC 53.6 (L) 02/21/2022    LDLCALC 80.0 06/14/2021    LDLCALC 55.4 (L) 12/11/2018     Lab Results   Component Value Date    TRIG 72 02/21/2022    TRIG 105 06/14/2021    TRIG 103 12/11/2018     Lab Results   Component Value Date    CHOLHDL 39.8 02/21/2022    CHOLHDL 39.2  06/14/2021    CHOLHDL 41.5 12/11/2018         GERD (gastroesophageal reflux disease)  cont home PPI        VTE Risk Mitigation (From admission, onward)         Ordered     enoxaparin injection 40 mg  Daily         02/25/22 0949     Place CARMINA hose  Until discontinued         02/24/22 1851     IP VTE HIGH RISK PATIENT  Once         02/22/22 0900     Place sequential compression device  Until discontinued         02/22/22 0900     Place sequential compression device  Until discontinued         02/21/22 0242                Discharge Planning   EVERTON:      Code Status: DNR   Is the patient medically ready for discharge?:     Reason for patient still in hospital (select all that apply): Patient trending condition  Discharge Plan A: Home Health                  Alisha Castillo NP  Department of Hospital Medicine   [facility] - Med Surg

## 2022-02-26 NOTE — PT/OT/SLP PROGRESS
Physical Therapy      Patient Name:  Tiffanie Wise   MRN:  [id number]    Time: 8817-8798    Communicated with nurse, Alice , and completed Epic chart review prior to tx session.    S: Patient agreed to PT session.    O: supine > sit EOB: SBA    STS from EOB > RW: CGA (VC for hand placement and safety)    8ft x2 trials (EOB > toilet> chair) w/ RW: Min A (intermittent VC for safe RW mgmt)    Stand pivot T/F to toilet w/ RW: Min A (VC for safe RW mgmt)  Stand pivot T/F to chair w/ RW: Min A (VC for safe RW mgmt)    400ft w/ RW : CGA (improved safety; no eyes closed or running into objects; no significant safety concerns or LOB)    Stand pivot T/F to chair w/ RW: CGA (improved safety with RW mgmt)    Educated patient on TE to be performed throughout the day to maintain current level of ROM and strength to BLE. Exercises included: LAQ, Hip Flexion, AP. Patient successfully demonstrated x10 reps of each exercise with correct form. Re enforced importance of utilizing call light and not attempt to get up without staff assistance due to increased fall risk. Encouraged patient to increase OOB tolerance by remaining up in chair for 2 hour minimum, especially with meals. Patient verbalized understanding.     Per patient's daughter, patient uses a rollator intermittently at home and daughter will supervise patient walking to/from the toilet in hospital room without AD. Educated patient and her daughter on importance of utilizing RW for transfers and gait at this time until patient's balance and safety improve consistently. Also encouraged patient's daughter to alert staff member when patient wants to T/F, walk, or toilet instead of supervising the activities herself.     Patient left sitting up in chair with call light in reach, chair alarm active and daughter present.     A: Patient would continue to benefit from skilled PT to address functional limitations in order to return to PLOF/decrease caregiver burden.    P: Cont PT  POC & recommended placement    Charges: 1G 1TA    Tasia Ravi, JULIAN

## 2022-02-26 NOTE — ASSESSMENT & PLAN NOTE
2/22 Fall and skin precautions  Turn Q 2 hours  2/23 Continue  PT    2/24 Increased activity encouraged.   2/25 Discharge planning discussed with patient and daughter at bedside and plan is Discharge to home with Home Health once medically cleared  2/26/22: SNF versus HH

## 2022-02-26 NOTE — PLAN OF CARE
Pt tolerated S.T to address swallowing and education completed with pt and her daughter. Pt continuing on a soft mech.diet/ground meats and thin liquids with strict aspiration precautions.

## 2022-02-26 NOTE — ASSESSMENT & PLAN NOTE
MBSS showed no aspiration   Speech recommended aspiration precautions, Very Soft Solids with grinded meats and thin liquids

## 2022-02-26 NOTE — ASSESSMENT & PLAN NOTE
2/22   This patient does have evidence of infective focus  My overall impression is Severe  sepsis. Vital signs were reviewed and noted in progress note.  Antibiotics given-   Antibiotics (From admission, onward)            Start     Stop Route Frequency Ordered    02/25/22 1100  ampicillin-sulbactam 3 g in sodium chloride 0.9 % 100 mL IVPB (ready to mix system)         03/02 1859 IV Every 8 hours (non-standard times) 02/25/22 1032    02/24/22 2130  linezolid tablet 600 mg         03/01 2059 Oral Every 12 hours 02/24/22 2020        Cultures were taken-   Microbiology Results (last 7 days)     Procedure Component Value Units Date/Time    Aerobic culture [[id number]]  (Abnormal)  (Susceptibility) Collected: 02/22/22 0732    Order Status: Completed Specimen: Incision site from Arm, Right Updated: 02/26/22 1201     Aerobic Bacterial Culture STAPHYLOCOCCUS AUREUS  Moderate      Urine culture [[id number]] Collected: 02/24/22 1955    Order Status: Completed Specimen: Urine Updated: 02/26/22 1023     Urine Culture, Routine No significant growth    Narrative:      Specimen Source->Urine    Blood culture #1 **CANNOT BE ORDERED STAT** [[id number]] Collected: 02/20/22 2311    Order Status: Completed Specimen: Blood from Peripheral, Forearm, Left Updated: 02/26/22 0612     Blood Culture, Routine No growth after 5 days.    Blood culture #2 **CANNOT BE ORDERED STAT** [[id number]] Collected: 02/20/22 2255    Order Status: Completed Specimen: Blood from Peripheral, Hand, Left Updated: 02/26/22 0612     Blood Culture, Routine No growth after 5 days.        Latest lactate reviewed, they are-  Recent Labs   Lab 02/24/22  2335   LACTATE 0.9       Organ dysfunction indicated by Acute kidney injury     Source- Right arm abscess    Source control Achieved by- IV abx      S/p I&D 2/22/2022 2/24 WBCs up to 22 today- Discussed with ID- Continue current Iv Abx for now.    Cxr shows pneumonia  Oxacillin changed to Unasyn and 5 days po zyvox  ordered    2/26/22: WBC normalized. Will discuss d/c abx with Dr. Elliott

## 2022-02-26 NOTE — PLAN OF CARE
No acute distress noted. Daughter remains at bedside. Per daughter they don't want anyone in the room between 10 pm and 5 am so patient can get some rest. Call bell with in  reach. Observation continued.

## 2022-02-26 NOTE — ASSESSMENT & PLAN NOTE
2/21 Pt with dexcom device to abdomen  A1c at 7.6 on 11/01/2021 / BG in ED at 304  Accuchecks with SSI prn   Holding home januvia  A1c pending    2/22  Lab Results   Component Value Date    HGBA1C 7.2 (H) 02/21/2022    Blood sugars running 190-219-Add 10 units detemir sq qpm  2/23 Blood suagrs running 179-283- Increase detemir to 14 units q pm  2/24 Blood sugars running 161-241- Increase detemir to 18 units q pm  2/25 Blood sugars running 137-284     Stable

## 2022-02-26 NOTE — ASSESSMENT & PLAN NOTE
2/21 ECHO 04/27/2021 - EF 65%, Grade 1 DD   / Cxray - cardiomegaly with pulmonary vascular crowding  Pt requiring O2 2L - not on home O2  Cardiology consulted  Lasix 20mg x1 dose in ED - cautious use d/t worsening renal fxn  ECHO pending  Strict I&Os  Daily weights  2/22 Cardiology following    Stable

## 2022-02-26 NOTE — ASSESSMENT & PLAN NOTE
2/21 Likely secondary to diuretics/other medications in addition to HTN   Creatinine at 2.1 in ED / Baseline appears to be around 1.2  Monitor for fluid overload  Monitor renal function labs closely  Avoid nephrotoxins as able  Monitor I&Os  Holding home HCTZ, losartan for now  2/23 Home diuretic on hold  Patient on Ivf for gentle hydration  2/24 ELLIOTT Improved    ELLIOTT resolved

## 2022-02-27 NOTE — ASSESSMENT & PLAN NOTE
2/22   This patient does have evidence of infective focus  My overall impression is Severe  sepsis. Vital signs were reviewed and noted in progress note.  Antibiotics given-   Antibiotics (From admission, onward)            Start     Stop Route Frequency Ordered    02/27/22 1245  amoxicillin-clavulanate 875-125mg per tablet 1 tablet         -- Oral Every 12 hours 02/27/22 1230    02/27/22 1245  doxycycline tablet 100 mg         -- Oral Every 12 hours 02/27/22 1230        Cultures were taken-   Microbiology Results (last 7 days)     Procedure Component Value Units Date/Time    Aerobic culture [[id number]]  (Abnormal)  (Susceptibility) Collected: 02/22/22 0732    Order Status: Completed Specimen: Incision site from Arm, Right Updated: 02/26/22 1201     Aerobic Bacterial Culture STAPHYLOCOCCUS AUREUS  Moderate      Urine culture [[id number]] Collected: 02/24/22 1955    Order Status: Completed Specimen: Urine Updated: 02/26/22 1023     Urine Culture, Routine No significant growth    Narrative:      Specimen Source->Urine    Blood culture #1 **CANNOT BE ORDERED STAT** [[id number]] Collected: 02/20/22 2311    Order Status: Completed Specimen: Blood from Peripheral, Forearm, Left Updated: 02/26/22 0612     Blood Culture, Routine No growth after 5 days.    Blood culture #2 **CANNOT BE ORDERED STAT** [[id number]] Collected: 02/20/22 2255    Order Status: Completed Specimen: Blood from Peripheral, Hand, Left Updated: 02/26/22 0612     Blood Culture, Routine No growth after 5 days.        Latest lactate reviewed, they are-  Recent Labs   Lab 02/24/22  2335   LACTATE 0.9       Organ dysfunction indicated by Acute kidney injury     Source- Right arm abscess    Source control Achieved by- IV abx      S/p I&D 2/22/2022 2/24 WBCs up to 22 today- Discussed with ID- Continue current Iv Abx for now.    Cxr shows pneumonia  Oxacillin changed to Unasyn and 5 days po zyvox ordered    2/26/22: WBC normalized. Will discuss d/c abx with   Nnadi   2/27/22: discharge on oral Doxycycline

## 2022-02-27 NOTE — ASSESSMENT & PLAN NOTE
2/25 Oxacillin changes to Unasyn  Consult ST for MBSS    2/26/22: No aspiration on MBSS, Use aspiration precautions and mech soft diet, pureed meat. Cont abx     2/27/22: IV abx changed to oral Augmentin

## 2022-02-27 NOTE — PLAN OF CARE
Pt remains free of falls/injury this shift. Safety precautions maintained. Pain managed with PRN medications.Tele monitor in place. No signs and symptoms of acute distress noted at this time. 12 hour chart check completed. Will continue to monitor.

## 2022-02-27 NOTE — ASSESSMENT & PLAN NOTE
MBSS showed no aspiration   Speech recommended aspiration precautions, Very Soft Solids with grinded meats and thin liquids -pt and daughter educated on diet and aspiration precautions

## 2022-02-27 NOTE — SUBJECTIVE & OBJECTIVE
Interval History: See hospital course    Review of Systems   Constitutional:  Positive for activity change and fatigue. Negative for appetite change and chills.   HENT:  Positive for hearing loss. Negative for ear discharge, ear pain and facial swelling.    Eyes:  Negative for pain and itching.   Respiratory:  Negative for cough and shortness of breath.    Cardiovascular:  Negative for chest pain, palpitations and leg swelling.   Gastrointestinal:  Negative for abdominal distention, abdominal pain, blood in stool, constipation, diarrhea, nausea and vomiting.   Endocrine: Negative for polydipsia and polyphagia.   Genitourinary:  Negative for difficulty urinating, dysuria, flank pain and hematuria.   Musculoskeletal:  Negative for neck pain and neck stiffness.   Skin:  Positive for wound.        Right arm dressing   Allergic/Immunologic: Negative for food allergies.   Neurological:  Negative for seizures, facial asymmetry, speech difficulty and weakness.   Hematological:  Does not bruise/bleed easily.   Psychiatric/Behavioral:  Negative for agitation, behavioral problems, confusion, hallucinations and suicidal ideas. The patient is not nervous/anxious.    Objective:     Vital Signs (Most Recent):  Temp: 98.2 °F (36.8 °C) (02/27/22 1615)  Pulse: 66 (02/27/22 1615)  Resp: 16 (02/27/22 1615)  BP: (!) 153/67 (02/27/22 1615)  SpO2: (!) 93 % (02/27/22 1615) Vital Signs (24h Range):  Temp:  [98.2 °F (36.8 °C)-99.3 °F (37.4 °C)] 98.2 °F (36.8 °C)  Pulse:  [59-75] 66  Resp:  [16-18] 16  SpO2:  [93 %-96 %] 93 %  BP: (150-176)/(67-74) 153/67     Weight: 80.2 kg (176 lb 12.9 oz)  Body mass index is 34.53 kg/m².    Intake/Output Summary (Last 24 hours) at 2/27/2022 1717  Last data filed at 2/27/2022 0612  Gross per 24 hour   Intake 1173.91 ml   Output --   Net 1173.91 ml        Physical Exam  Constitutional:       General: She is not in acute distress.     Appearance: She is not diaphoretic.   HENT:      Head: Normocephalic and  atraumatic.      Ears:      Comments: Very [place]     Mouth/Throat:      Mouth: Mucous membranes are moist.   Eyes:      General:         Right eye: No discharge.         Left eye: No discharge.      Extraocular Movements: Extraocular movements intact.      Conjunctiva/sclera: Conjunctivae normal.      Pupils: Pupils are equal, round, and reactive to light.   Cardiovascular:      Rate and Rhythm: Normal rate and regular rhythm.      Pulses: Normal pulses.      Heart sounds: Normal heart sounds. No murmur heard.  Pulmonary:      Effort: Pulmonary effort is normal.      Breath sounds: Normal breath sounds. No wheezing or rales.   Abdominal:      General: Bowel sounds are normal. There is no distension.      Palpations: Abdomen is soft.      Tenderness: There is no abdominal tenderness. There is no guarding.   Genitourinary:     Comments: Not examined  Musculoskeletal:         General: Normal range of motion.      Cervical back: Normal range of motion and neck supple. No rigidity or tenderness.      Right lower leg: No edema.      Left lower leg: No edema.   Skin:     General: Skin is warm and dry.      Capillary Refill: Capillary refill takes less than 2 seconds.      Comments: Right arm dressing   Neurological:      General: No focal deficit present.      Mental Status: She is alert and oriented to person, place, and time. Mental status is at baseline.   Psychiatric:         Mood and Affect: Mood normal.         Behavior: Behavior normal.         Thought Content: Thought content normal.         Judgment: Judgment normal.          Lab Results   Component Value Date    WBC 10.51 02/26/2022    HGB 10.1 (L) 02/26/2022    HCT 31.2 (L) 02/26/2022    MCV 88 02/26/2022     02/26/2022       BMP  Lab Results   Component Value Date     02/26/2022    K 4.2 02/26/2022     02/26/2022    CO2 20 (L) 02/26/2022    BUN 10 02/26/2022    CREATININE 1.1 02/26/2022    CALCIUM 8.6 (L) 02/26/2022    ANIONGAP 14 02/26/2022     ESTGFRAFRICA 54 (A) 02/26/2022    EGFRNONAA 47 (A) 02/26/2022

## 2022-02-27 NOTE — PROGRESS NOTES
[facility] Medicine  Progress Note    Patient Name: Tiffanie Wise  MRN: [id number]  Patient Class: IP- Inpatient   Admission Date: 2/20/2022  Length of Stay: 6 days  Attending Physician: Fabi Garcia MD  Primary Care Provider: Tiffanie Spence MD        Subjective:     Principal Problem:Cellulitis and abscess of upper extremity        HPI:  80 y/o WF with hx of angina, GERD, HLD, DM2, hypothyroidism, osteoporosis, CAD, CABG, cardiac stents, HTN, CVA, diastolic HF, anemia, NAWAF, anxiety, depression, tonsillar CA, CKD3 to ED with c/o gradually increasing redness, warmth and pain to the R axillary area (site of known abscess) with associated fever and chills. Symptoms are progressive and of moderate severity with pain exacerbated by movement and palpation and no known mitigating factors. ROS is limited by pt's chronic expressive aphasia. Of note, per pt's daughter this is her third ED visit in the past 11 days with the same complaint - was initially sent home on bactrim with continued worsening, then was admitted from 02/16-02/18/2022, was on vancomycin, had a needle aspiration and was sent home on clindamycin but has again continued to worsen. Found in ED to have temperature of 102.1, H&H of 12.3&37.6, platelets of 333, , K+ 5.1, BUN 20, creatinine 2.1, GFFR 22, , , troponin negative, lactic acid 1.8; UA uninfected; Cxray showed cardiomegaly with pulmonary vascular crowding; COVID-19 negative. In ED the pt was placed on O2 at 2L per NC and was given IV vancomycin - temperature improved to 99.1, HR to 68, O2 saturation to 98%, and BP to 157/68. Hospital medicine was called and the pt was admitted with telemetry monitoring. Pt is a DNR - surrogate decision maker is SHELLI Worley.       Overview/Hospital Course:  The patient was monitored closely during her stay. She was kept on continuous telemetry monitoring. She was placed on Iv Abx for her RUE abscess. General Surgery  was consulted for I&D. ID was consulted for sepsis with failed outpatient po abx therapy. Nephrology was consulted for ELLIOTT with CKD stage 3, and Cardiology was consulted for acute/chronic diastolic CHF.   2/22 Patient S/p I&D right arm today.  2/23 Continue current treatment. Pt received IV diuresis prior to surgery but is now compensated.   2/24 Poor po intake. Change to regular diet. Increased activity encouraged. WBCs trending upward. Cxr shows signs pneumonia.  Patient discussed with ID. Change oxacillin to Unasyn and add Zyvox x 5 days.   2/25 Consult ST to evaluate for possible aspiration. Check MBSS. ELLIOTT resolved.   2/26/22: WBC normalized, Afebrile. Oxygenation stable on 3 liters NC. Speech MBSS showed no aspiration - recommend Very Soft Solids with grinded meats and thin liquids and aspiration precautions. Discussed possible discharge soon with daughter. Discussed pt will need 24 hour assistance. Discussed SNF placement. CM consulted   2/27/22: pt remains medically stable. Respiratory status stable. Discussed discharge abx with ID. Abx changed to Augmentin and po Doxycycline. Daughter requests to discuss SNF with CM.       Interval History: See hospital course    Review of Systems   Constitutional:  Positive for activity change and fatigue. Negative for appetite change and chills.   HENT:  Positive for hearing loss. Negative for ear discharge, ear pain and facial swelling.    Eyes:  Negative for pain and itching.   Respiratory:  Negative for cough and shortness of breath.    Cardiovascular:  Negative for chest pain, palpitations and leg swelling.   Gastrointestinal:  Negative for abdominal distention, abdominal pain, blood in stool, constipation, diarrhea, nausea and vomiting.   Endocrine: Negative for polydipsia and polyphagia.   Genitourinary:  Negative for difficulty urinating, dysuria, flank pain and hematuria.   Musculoskeletal:  Negative for neck pain and neck stiffness.   Skin:  Positive for wound.         Right arm dressing   Allergic/Immunologic: Negative for food allergies.   Neurological:  Negative for seizures, facial asymmetry, speech difficulty and weakness.   Hematological:  Does not bruise/bleed easily.   Psychiatric/Behavioral:  Negative for agitation, behavioral problems, confusion, hallucinations and suicidal ideas. The patient is not nervous/anxious.    Objective:     Vital Signs (Most Recent):  Temp: 98.2 °F (36.8 °C) (02/27/22 1615)  Pulse: 66 (02/27/22 1615)  Resp: 16 (02/27/22 1615)  BP: (!) 153/67 (02/27/22 1615)  SpO2: (!) 93 % (02/27/22 1615) Vital Signs (24h Range):  Temp:  [98.2 °F (36.8 °C)-99.3 °F (37.4 °C)] 98.2 °F (36.8 °C)  Pulse:  [59-75] 66  Resp:  [16-18] 16  SpO2:  [93 %-96 %] 93 %  BP: (150-176)/(67-74) 153/67     Weight: 80.2 kg (176 lb 12.9 oz)  Body mass index is 34.53 kg/m².    Intake/Output Summary (Last 24 hours) at 2/27/2022 1717  Last data filed at 2/27/2022 0612  Gross per 24 hour   Intake 1173.91 ml   Output --   Net 1173.91 ml        Physical Exam  Constitutional:       General: She is not in acute distress.     Appearance: She is not diaphoretic.   HENT:      Head: Normocephalic and atraumatic.      Ears:      Comments: Very [place]     Mouth/Throat:      Mouth: Mucous membranes are moist.   Eyes:      General:         Right eye: No discharge.         Left eye: No discharge.      Extraocular Movements: Extraocular movements intact.      Conjunctiva/sclera: Conjunctivae normal.      Pupils: Pupils are equal, round, and reactive to light.   Cardiovascular:      Rate and Rhythm: Normal rate and regular rhythm.      Pulses: Normal pulses.      Heart sounds: Normal heart sounds. No murmur heard.  Pulmonary:      Effort: Pulmonary effort is normal.      Breath sounds: Normal breath sounds. No wheezing or rales.   Abdominal:      General: Bowel sounds are normal. There is no distension.      Palpations: Abdomen is soft.      Tenderness: There is no abdominal tenderness. There is  no guarding.   Genitourinary:     Comments: Not examined  Musculoskeletal:         General: Normal range of motion.      Cervical back: Normal range of motion and neck supple. No rigidity or tenderness.      Right lower leg: No edema.      Left lower leg: No edema.   Skin:     General: Skin is warm and dry.      Capillary Refill: Capillary refill takes less than 2 seconds.      Comments: Right arm dressing   Neurological:      General: No focal deficit present.      Mental Status: She is alert and oriented to person, place, and time. Mental status is at baseline.   Psychiatric:         Mood and Affect: Mood normal.         Behavior: Behavior normal.         Thought Content: Thought content normal.         Judgment: Judgment normal.          Lab Results   Component Value Date    WBC 10.51 02/26/2022    HGB 10.1 (L) 02/26/2022    HCT 31.2 (L) 02/26/2022    MCV 88 02/26/2022     02/26/2022       BMP  Lab Results   Component Value Date     02/26/2022    K 4.2 02/26/2022     02/26/2022    CO2 20 (L) 02/26/2022    BUN 10 02/26/2022    CREATININE 1.1 02/26/2022    CALCIUM 8.6 (L) 02/26/2022    ANIONGAP 14 02/26/2022    ESTGFRAFRICA 54 (A) 02/26/2022    EGFRNONAA 47 (A) 02/26/2022           Assessment/Plan:      * Severe Sepsis with Cellulitis and abscess of right upper extremity  2/22   This patient does have evidence of infective focus  My overall impression is Severe  sepsis. Vital signs were reviewed and noted in progress note.  Antibiotics given-   Antibiotics (From admission, onward)            Start     Stop Route Frequency Ordered    02/27/22 1245  amoxicillin-clavulanate 875-125mg per tablet 1 tablet         -- Oral Every 12 hours 02/27/22 1230    02/27/22 1245  doxycycline tablet 100 mg         -- Oral Every 12 hours 02/27/22 1230        Cultures were taken-   Microbiology Results (last 7 days)     Procedure Component Value Units Date/Time    Aerobic culture [[id number]]  (Abnormal)   (Susceptibility) Collected: 02/22/22 0732    Order Status: Completed Specimen: Incision site from Arm, Right Updated: 02/26/22 1201     Aerobic Bacterial Culture STAPHYLOCOCCUS AUREUS  Moderate      Urine culture [[id number]] Collected: 02/24/22 1955    Order Status: Completed Specimen: Urine Updated: 02/26/22 1023     Urine Culture, Routine No significant growth    Narrative:      Specimen Source->Urine    Blood culture #1 **CANNOT BE ORDERED STAT** [[id number]] Collected: 02/20/22 2311    Order Status: Completed Specimen: Blood from Peripheral, Forearm, Left Updated: 02/26/22 0612     Blood Culture, Routine No growth after 5 days.    Blood culture #2 **CANNOT BE ORDERED STAT** [[id number]] Collected: 02/20/22 2255    Order Status: Completed Specimen: Blood from Peripheral, Hand, Left Updated: 02/26/22 0612     Blood Culture, Routine No growth after 5 days.        Latest lactate reviewed, they are-  Recent Labs   Lab 02/24/22  2335   LACTATE 0.9       Organ dysfunction indicated by Acute kidney injury     Source- Right arm abscess    Source control Achieved by- IV abx      S/p I&D 2/22/2022 2/24 WBCs up to 22 today- Discussed with ID- Continue current Iv Abx for now.    Cxr shows pneumonia  Oxacillin changed to Unasyn and 5 days po zyvox ordered    2/26/22: WBC normalized. Will discuss d/c abx with Dr. Elliott   2/27/22: discharge on oral Doxycycline     Pneumonia- possible aspiration  2/25 Oxacillin changes to Unasyn  Consult ST for MBSS    2/26/22: No aspiration on MBSS, Use aspiration precautions and mech soft diet, pureed meat. Cont abx     2/27/22: IV abx changed to oral Augmentin         MSSA (methicillin susceptible Staphylococcus aureus) infection  Arm abscess +MSSA    See plan above       Abscess of right arm S/p I&D 2/22/2022  S/p I&D 2/22/2022      Severe Oropharyngeal dysphagia sec to XRT for Tonsilar Ca  MBSS showed no aspiration   Speech recommended aspiration precautions, Very Soft Solids with grinded  meats and thin liquids -pt and daughter educated on diet and aspiration precautions     Normocytic anemia  2/22 Add MVI      Hypoalbuminemia  2/22 Add po supplement      Debility  2/22 Fall and skin precautions  Turn Q 2 hours  2/23 Continue  PT    2/24 Increased activity encouraged.   2/25 Discharge planning discussed with patient and daughter at bedside and plan is Discharge to home with Home Health once medically cleared  2/26/22: SNF versus HH      Chronic diastolic heart failure  2/21 ECHO 04/27/2021 - EF 65%, Grade 1 DD   / Cxray - cardiomegaly with pulmonary vascular crowding  Pt requiring O2 2L - not on home O2  Cardiology consulted  Lasix 20mg x1 dose in ED - cautious use d/t worsening renal fxn  ECHO pending  Strict I&Os  Daily weights  2/22 Cardiology following    Stable          CAD, multiple vessel  2/22 Stable      Hypertension associated with diabetes  2/21 Improved since ED arrival - possible pain component  Home medications resumed as appropriate   Hydralazine IV prn SBP > 170  Provide adequate pain control  VS per unit routine    Stable, monitor           NAWAF (obstructive sleep apnea)  cont home CPAP q Hs      Acquired hypothyroidism  2/22 Continue home synthroid  Lab Results   Component Value Date    TSH 1.124 02/21/2022           Type 2 diabetes mellitus with hyperglycemia  2/21 Pt with dexcom device to abdomen  A1c at 7.6 on 11/01/2021 / BG in ED at 304  Accuchecks with SSI prn   Holding home januvia  A1c pending    2/22  Lab Results   Component Value Date    HGBA1C 7.2 (H) 02/21/2022    Blood sugars running 190-219-Add 10 units detemir sq qpm  2/23 Blood suagrs running 179-283- Increase detemir to 14 units q pm  2/24 Blood sugars running 161-241- Increase detemir to 18 units q pm  2/25 Blood sugars running 137-284     Stable      Hyperlipidemia associated with type 2 diabetes mellitus  2/21 Resumed home statin  2/22  Lab Results   Component Value Date    CHOL 113 (L) 02/21/2022    CHOL  166 06/14/2021    CHOL 130 12/11/2018     Lab Results   Component Value Date    HDL 45 02/21/2022    HDL 65 06/14/2021    HDL 54 12/11/2018     Lab Results   Component Value Date    LDLCALC 53.6 (L) 02/21/2022    LDLCALC 80.0 06/14/2021    LDLCALC 55.4 (L) 12/11/2018     Lab Results   Component Value Date    TRIG 72 02/21/2022    TRIG 105 06/14/2021    TRIG 103 12/11/2018     Lab Results   Component Value Date    CHOLHDL 39.8 02/21/2022    CHOLHDL 39.2 06/14/2021    CHOLHDL 41.5 12/11/2018         GERD (gastroesophageal reflux disease)  cont home PPI        VTE Risk Mitigation (From admission, onward)         Ordered     enoxaparin injection 40 mg  Daily         02/25/22 0949     Place CARMINA hose  Until discontinued         02/24/22 1851     IP VTE HIGH RISK PATIENT  Once         02/22/22 0900     Place sequential compression device  Until discontinued         02/22/22 0900     Place sequential compression device  Until discontinued         02/21/22 0242                Discharge Planning   [place]:      Code Status: DNR   Is the patient medically ready for discharge?:     Reason for patient still in hospital (select all that apply): Patient trending condition  Discharge Plan A: Home Health                  Alisha Castillo NP  Department of Hospital Medicine   [place] [facility]

## 2022-02-27 NOTE — PLAN OF CARE
Problem: Adult Inpatient Plan of Care  Goal: Plan of Care Review  Outcome: Ongoing, Progressing  Goal: Patient-Specific Goal (Individualized)  Outcome: Ongoing, Progressing  Goal: Absence of Hospital-Acquired Illness or Injury  Outcome: Ongoing, Progressing  Goal: Optimal Comfort and Wellbeing  Outcome: Ongoing, Progressing  Goal: Readiness for Transition of Care  Outcome: Ongoing, Progressing     Problem: Diabetes Comorbidity  Goal: Blood Glucose Level Within Targeted Range  Outcome: Ongoing, Progressing     Problem: Adjustment to Illness (Sepsis/Septic Shock)  Goal: Optimal Coping  Outcome: Ongoing, Progressing     Problem: Bleeding (Sepsis/Septic Shock)  Goal: Absence of Bleeding  Outcome: Ongoing, Progressing     Problem: Glycemic Control Impaired (Sepsis/Septic Shock)  Goal: Blood Glucose Level Within Desired Range  Outcome: Ongoing, Progressing     Problem: Infection Progression (Sepsis/Septic Shock)  Goal: Absence of Infection Signs and Symptoms  Outcome: Ongoing, Progressing     Problem: Nutrition Impaired (Sepsis/Septic Shock)  Goal: Optimal Nutrition Intake  Outcome: Ongoing, Progressing     Problem: Fluid and Electrolyte Imbalance (Acute Kidney Injury/Impairment)  Goal: Fluid and Electrolyte Balance  Outcome: Ongoing, Progressing     Problem: Oral Intake Inadequate (Acute Kidney Injury/Impairment)  Goal: Optimal Nutrition Intake  Outcome: Ongoing, Progressing     Problem: Renal Function Impairment (Acute Kidney Injury/Impairment)  Goal: Effective Renal Function  Outcome: Ongoing, Progressing     Problem: Skin Injury Risk Increased  Goal: Skin Health and Integrity  Outcome: Ongoing, Progressing     Problem: Infection  Goal: Absence of Infection Signs and Symptoms  Outcome: Ongoing, Progressing     Problem: Fall Injury Risk  Goal: Absence of Fall and Fall-Related Injury  Outcome: Ongoing, Progressing     Problem: Fluid Imbalance (Pneumonia)  Goal: Fluid Balance  Outcome: Ongoing, Progressing     Problem:  Infection (Pneumonia)  Goal: Resolution of Infection Signs and Symptoms  Outcome: Ongoing, Progressing     Problem: Respiratory Compromise (Pneumonia)  Goal: Effective Oxygenation and Ventilation  Outcome: Ongoing, Progressing

## 2022-02-27 NOTE — PLAN OF CARE
CM provided daughter Connie with SNF list with m'care ratings.  CM informed daughter if pt is medically ready to discharge, she will receive a letter of financial responsibility beginning the date pt was medically cleared for discharge.  Daughter Connie verbalized understanding.

## 2022-02-28 NOTE — SUBJECTIVE & OBJECTIVE
Interval History: 81 year old woman with right upper arm MSSA abscess s/p I and D.  CBC- wbc -22 2/22- cultures- Staph Aureus     2/27- she is afebrile  Review of Systems   Constitutional:  Positive for activity change. Negative for appetite change, chills, diaphoresis and fatigue.   HENT:  Positive for hearing loss. Negative for ear discharge, ear pain and facial swelling.    Eyes:  Negative for pain and itching.   Respiratory:  Negative for cough and shortness of breath.    Cardiovascular:  Negative for chest pain, palpitations and leg swelling.   Gastrointestinal:  Negative for abdominal distention, abdominal pain, blood in stool, constipation, diarrhea, nausea and vomiting.   Endocrine: Negative for polydipsia and polyphagia.   Genitourinary:  Negative for difficulty urinating, dysuria, flank pain and hematuria.   Musculoskeletal:  Negative for neck pain and neck stiffness.   Skin:  Positive for wound.        Right arm dressing   Allergic/Immunologic: Negative for food allergies.   Neurological:  Negative for seizures, facial asymmetry, speech difficulty and weakness.   Hematological:  Does not bruise/bleed easily.   Psychiatric/Behavioral:  Negative for agitation, behavioral problems, confusion, hallucinations and suicidal ideas. The patient is not nervous/anxious.    Objective:     Vital Signs (Most Recent):  Temp: 98.1 °F (36.7 °C) (02/27/22 1946)  Pulse: (!) 59 (02/27/22 2037)  Resp: 20 (02/27/22 2037)  BP: (!) 174/74 (02/27/22 1946)  SpO2: (!) 94 % (02/27/22 2037)   Vital Signs (24h Range):  Temp:  [98.1 °F (36.7 °C)-99.3 °F (37.4 °C)] 98.1 °F (36.7 °C)  Pulse:  [57-75] 59  Resp:  [16-20] 20  SpO2:  [93 %-96 %] 94 %  BP: (153-176)/(67-74) 174/74     Weight: 80.2 kg (176 lb 12.9 oz)  Body mass index is 34.53 kg/m².    Estimated Creatinine Clearance: 37.6 mL/min (based on SCr of 1.1 mg/dL).    Physical Exam  Vitals and nursing note reviewed.   Constitutional:       General: She is not in acute distress.      Appearance: She is not diaphoretic.   HENT:      Head: Normocephalic and atraumatic.      Ears:      Comments: Very [place]     Mouth/Throat:      Mouth: Mucous membranes are moist.   Eyes:      General:         Right eye: No discharge.         Left eye: No discharge.      Extraocular Movements: Extraocular movements intact.      Conjunctiva/sclera: Conjunctivae normal.      Pupils: Pupils are equal, round, and reactive to light.   Cardiovascular:      Rate and Rhythm: Normal rate and regular rhythm.      Pulses: Normal pulses.      Heart sounds: Normal heart sounds. No murmur heard.  Pulmonary:      Effort: Pulmonary effort is normal.      Breath sounds: Normal breath sounds. No wheezing, rhonchi or rales.   Abdominal:      General: Bowel sounds are normal. There is no distension.      Palpations: Abdomen is soft.      Tenderness: There is no abdominal tenderness. There is no guarding.   Genitourinary:     Comments: Not examined  Musculoskeletal:         General: Normal range of motion.      Cervical back: Normal range of motion and neck supple. No rigidity or tenderness.      Right lower leg: No edema.      Left lower leg: No edema.   Skin:     General: Skin is warm and dry.      Capillary Refill: Capillary refill takes less than 2 seconds.      Comments: Right arm dressing   Neurological:      General: No focal deficit present.      Mental Status: She is alert and oriented to person, place, and time. Mental status is at baseline.   Psychiatric:         Mood and Affect: Mood normal.         Behavior: Behavior normal.         Thought Content: Thought content normal.         Judgment: Judgment normal.       Significant Labs: Blood Culture:   Recent Labs   Lab 02/16/22  1424 02/16/22  1651 02/20/22  2255 02/20/22  2311   LABBLOO No growth after 5 days. No growth after 5 days. No growth after 5 days. No growth after 5 days.       BMP:   Recent Labs   Lab 02/26/22  1344   *      K 4.2      CO2 20*   BUN  10   CREATININE 1.1   CALCIUM 8.6*       Wound Culture:   Recent Labs   Lab 02/22/22  0732   LABAERO STAPHYLOCOCCUS AUREUS  Moderate  *       All pertinent labs within the past 24 hours have been reviewed.    Significant Imaging:

## 2022-02-28 NOTE — DISCHARGE SUMMARY
[facility] Medicine  Discharge Summary      Patient Name: Tiffanie Wise  MRN: [id number]  Patient Class: IP- Inpatient  Admission Date: 2/20/2022  Hospital Length of Stay: 7 days  Discharge Date and Time: 2/28/2022  3:00 PM  Attending Physician: Dr. Garcia  Discharging Provider: Alisha Castillo NP  Primary Care Provider: Tiffanie Spence MD      HPI:   80 y/o WF with hx of angina, GERD, HLD, DM2, hypothyroidism, osteoporosis, CAD, CABG, cardiac stents, HTN, CVA, diastolic HF, anemia, NAWAF, anxiety, depression, tonsillar CA, CKD3 to ED with c/o gradually increasing redness, warmth and pain to the R axillary area (site of known abscess) with associated fever and chills. Symptoms are progressive and of moderate severity with pain exacerbated by movement and palpation and no known mitigating factors. ROS is limited by pt's chronic expressive aphasia. Of note, per pt's daughter this is her third ED visit in the past 11 days with the same complaint - was initially sent home on bactrim with continued worsening, then was admitted from 02/16-02/18/2022, was on vancomycin, had a needle aspiration and was sent home on clindamycin but has again continued to worsen. Found in ED to have temperature of 102.1, H&H of 12.3&37.6, platelets of 333, , K+ 5.1, BUN 20, creatinine 2.1, GFFR 22, , , troponin negative, lactic acid 1.8; UA uninfected; Cxray showed cardiomegaly with pulmonary vascular crowding; COVID-19 negative. In ED the pt was placed on O2 at 2L per NC and was given IV vancomycin - temperature improved to 99.1, HR to 68, O2 saturation to 98%, and BP to 157/68. Hospital medicine was called and the pt was admitted with telemetry monitoring. Pt is a DNR - surrogate decision maker is SHELLI Worley.       Procedure(s) (LRB):  INCISION AND DRAINAGE, UPPER EXTREMITY (Right)      Hospital Course:   The patient was monitored closely during her stay. She was kept on continuous telemetry  "monitoring. She was placed on Iv Abx for her RUE abscess. General Surgery was consulted for I&D. ID was consulted for sepsis with failed outpatient po abx therapy. Nephrology was consulted for ELLIOTT with CKD stage 3, and Cardiology was consulted for acute/chronic diastolic CHF.   2/22 Patient S/p I&D right arm today.  2/23 Continue current treatment. Pt received IV diuresis prior to surgery but is now compensated.   2/24 Poor po intake. Change to regular diet. Increased activity encouraged. WBCs trending upward. Cxr shows signs pneumonia.  Patient discussed with ID. Change oxacillin to Unasyn and add Zyvox x 5 days.   2/25 Consult ST to evaluate for possible aspiration. Check MBSS. ELLIOTT resolved.   2/26/22: WBC normalized, Afebrile. Oxygenation stable on 3 liters NC. Speech MBSS showed no aspiration - recommend Very Soft Solids with grinded meats and thin liquids and aspiration precautions. Discussed possible discharge soon with daughter. Discussed pt will need 24 hour assistance. Discussed SNF placement. CM consulted   2/27/22: pt remains medically stable. Respiratory status stable. Discussed discharge abx with ID. Abx changed to Augmentin and po Doxycycline. Daughter requests to discuss SNF with CM.   2/28/22: pt doing well. Daughter requests discharge to home- not SNF. Home oxygen eval showed o2sats stable on room air- no oxygen required. Pt will be discharged with HH on oral Augmentin and Doxycycline. F/U with PCP in 3-5 days, Cardiology in 2 weeks for CHF, and Pulmonology in 2-3 weeks for aspiration PNA.      The patient was seen and examined today and determined to be stable for discharge.       Goals of Care Treatment Preferences:  Code Status: DNR    Health care agent: Connie Reardon" [facility] agent number: [phone]       [place]: Yes           Consults:   Consults (From admission, onward)        Status Ordering Provider     Inpatient consult to Social Work  Once        Provider:  (Not yet " assigned)    Completed KARLA STOCKTON     Inpatient consult to Social Work  Once        Provider:  (Not yet assigned)    Completed KARLA STOCKTON     Inpatient consult to Social Work  Once        Provider:  (Not yet assigned)    Completed XANDER SMITH     Inpatient consult to Infectious Diseases  Once        Provider:  Manas Elliott MD    Acknowledged XANDER SMITH     Inpatient consult to Cardiology  Once        Provider:  Mariam Bell MD    Completed RADHA GUTIERREZ     Inpatient consult to Nephrology  Once        Provider:  Mariusz Lizarraga MD    Acknowledged XANDER SMITH     Inpatient consult to General Surgery  Once        Provider:  Kezia Ruth DO    Completed RADHA GUTIERREZ              Final Active Diagnoses:    Diagnosis Date Noted POA    PRINCIPAL PROBLEM:  Severe Sepsis with Cellulitis and abscess of right upper extremity [L03.119, L02.419] 02/21/2022 Yes    Pneumonia- possible aspiration [J18.9] 02/25/2022 Yes    MSSA (methicillin susceptible Staphylococcus aureus) infection [A49.01] 02/23/2022 Yes    Abscess of right arm S/p I&D 2/22/2022 [L02.413] 02/21/2022 Yes    Severe Oropharyngeal dysphagia sec to XRT for Tonsilar Ca [R13.12] 01/21/2021 Yes    Chronic diastolic heart failure [I50.32] 02/21/2022 Yes    Debility [R53.81] 02/21/2022 Yes    Hypoalbuminemia [E88.09] 02/21/2022 Yes    Normocytic anemia [D64.9] 02/21/2022 Yes    CAD, multiple vessel [I25.10] 05/13/2021 Yes    Hypertension associated with diabetes [E11.59, I15.2] 12/29/2016 Yes     Chronic    NAWAF (obstructive sleep apnea) [G47.33] 07/14/2015 Yes    Hyperlipidemia associated with type 2 diabetes mellitus [E11.69, E78.5] 07/18/2013 Yes     Chronic    Type 2 diabetes mellitus with hyperglycemia [E11.65] 07/18/2013 Yes     Chronic    GERD (gastroesophageal reflux disease) [K21.9] 07/18/2013 Yes     Chronic    Acquired hypothyroidism [E03.9] 04/15/2013 Yes     Chronic      Problems  Resolved During this Admission:    Diagnosis Date Noted Date Resolved POA    Hypokalemia [E87.6] 02/25/2022 02/26/2022 Yes    Hyperkalemia- Excluded [E87.5] 02/23/2022 02/24/2022 Yes    Acute renal failure superimposed on stage 3 chronic kidney disease [N17.9, N18.30] 02/21/2022 02/26/2022 Yes    Hypomagnesemia [E83.42] 02/21/2022 02/26/2022 Yes       Discharged Condition: stable    Disposition: Home-Health Care [facility]    Follow Up:   Follow-up Information     Brett Parra MD Follow up.    Specialty: General Surgery  Why: post op appt, right arm abscess I and D on 02/22/2022  Contact information:  [address]  [place] [address]  [phone]             Tiffanie Spence MD Follow up in 3 day(s).    Specialty: Family Medicine  Contact information:  [address] [facility] [id number] [phone]             [facility] PULMONARY MEDICINE Follow up in 2 week(s).    Specialty: Pulmonology  Contact information:  [id number] [place] [address]  [phone]           [facility] CARDIOLOGY Follow up in 2 week(s).    Specialty: Cardiology  Contact information:  [address] [place] [address]  [phone]                     Patient Instructions:      Ambulatory referral/consult to Pulmonology   Standing Status: Future   Referral Priority: Routine Referral Type: Consultation   Referral Reason: Specialty Services Required   Requested Specialty: Pulmonary Disease   Number of Visits Requested: 1     Ambulatory referral/consult to Cardiology   Standing Status: Future   Referral Priority: Routine Referral Type: Consultation   Referral Reason: Specialty Services Required   Requested Specialty: Cardiology     Diet Cardiac     Diet diabetic     Activity as tolerated       Significant Diagnostic Studies:   Imaging Results          X-Ray Chest AP Portable (Final result)  Result time 02/20/22 22:43:56    Final result by Tristin Lei MD (02/20/22  22:43:56)                 Impression:      Cardiomegaly with central pulmonary vascular crowding.  Correlate clinically for element of CHF.  Stable right middle lobe opacity likely related to atelectasis or scarring.      Electronically signed by: Quique Crow  Date:    02/20/2022  Time:    22:43             Narrative:    EXAMINATION:  XR CHEST AP PORTABLE    CLINICAL HISTORY:  fever;    TECHNIQUE:  Single frontal view of the chest was performed.    COMPARISON:  None    FINDINGS:  No pneumonia    Cardiomegaly with median sternotomy.  Device noted in the mid chest.  Mild central pulmonary vascular crowding.  Element of pulmonary edema not excluded.    Bones are intact.  Subsegmental atelectasis/scarring noted in the right middle lobe similar to prior exam.                                Pending Diagnostic Studies:     None         Medications:  Reconciled Home Medications:      Medication List      START taking these medications    albuterol-ipratropium 2.5 mg-0.5 mg/3 mL nebulizer solution  Commonly known as: DUO-NEB  Take 3 mLs by nebulization every 6 (six) hours while awake. Rescue     amoxicillin-clavulanate 875-125mg 875-125 mg per tablet  Commonly known as: AUGMENTIN  Take 1 tablet by mouth every 12 (twelve) hours. for 5 days     doxycycline 100 MG tablet  Commonly known as: VIBRA-TABS  Take 1 tablet (100 mg total) by mouth every 12 (twelve) hours. for 7 days     insulin detemir U-100 100 unit/mL (3 mL) Inpn pen  Commonly known as: Levemir FLEXTOUCH  Inject 20 Units into the skin once daily.  Start taking on: March 1, 2022        CHANGE how you take these medications    amLODIPine 10 MG tablet  Commonly known as: NORVASC  TAKE 1 TABLET (10 MG TOTAL) BY PER G TUBE ROUTE AS DIRECTED ONCE DAILY.  What changed: See the new instructions.     aspirin 81 MG Chew  1 tablet (81 mg total) by Per G Tube route once daily.  What changed: how to take this     clonazePAM 0.5 MG tablet  Commonly known as: KlonoPIN  1 tablet  (0.5 mg total) by Per G Tube route every evening.  What changed: how to take this     isosorbide mononitrate 30 MG 24 hr tablet  Commonly known as: IMDUR  Take 1 tablet (30 mg total) by mouth once daily.  What changed: when to take this     levothyroxine 50 MCG tablet  Commonly known as: SYNTHROID  1 tablet (50 mcg total) by Per G Tube route before breakfast.  What changed: how to take this        CONTINUE taking these medications    atorvastatin 40 MG tablet  Commonly known as: LIPITOR  Take 1 tablet (40 mg total) by mouth every evening.     clopidogreL 75 mg tablet  Commonly known as: PLAVIX  Take 1 tablet (75 mg total) by mouth once daily.     donepeziL 10 MG tablet  Commonly known as: ARICEPT  TAKE 1 TABLET (10 MG TOTAL) BY MOUTH ONCE DAILY.     EScitalopram oxalate 20 MG tablet  Commonly known as: LEXAPRO  TAKE 1 TABLET EVERY DAY     HYDROcodone-acetaminophen 5-325 mg per tablet  Commonly known as: NORCO  Take 1 tablet by mouth every 6 (six) hours as needed for Pain.     losartan 100 MG tablet  Commonly known as: COZAAR  Take 1 tablet (100 mg total) by mouth once daily.     nitroGLYCERIN 0.4 MG SL tablet  Commonly known as: NITROSTAT  Place 1 tablet (0.4 mg total) under the tongue every 5 (five) minutes as needed.     pantoprazole 40 MG tablet  Commonly known as: PROTONIX  TAKE 1 TABLET EVERY DAY     pregabalin 75 MG capsule  Commonly known as: LYRICA  Take 75 mg by mouth 2 (two) times daily.     vitamin D 1000 units Tab  Commonly known as: VITAMIN D3  Take 1,000 Units by mouth once daily.        STOP taking these medications    clindamycin 300 MG capsule  Commonly known as: CLEOCIN     DEXCOM G6 SENSOR Tanisha  Generic drug: blood-glucose sensor     hydroCHLOROthiazide 25 MG tablet  Commonly known as: HYDRODIURIL     insulin aspart protamine-insulin aspart 100 unit/mL (70-30) Inpn pen  Commonly known as: NovoLOG Mix 70-30FlexPen U-100     insulin aspart U-100 100 unit/mL (3 mL) Inpn pen  Commonly known as:  NovoLOG     JANUVIA 100 MG Tab  Generic drug: SITagliptin     morphine 15 MG tablet  Commonly known as: MSIR     TRUETEST TEST STRIPS MISC            Indwelling Lines/Drains at time of discharge:   Lines/Drains/Airways     None                 Time spent on the discharge of patient: 47 minutes         Alisha Castillo NP  Department of Hospital Medicine  '[facility] Surg

## 2022-02-28 NOTE — PT/OT/SLP PROGRESS
"Physical Therapy      Patient Name:  Tiffanie Wise   MRN:  [id number]    PT tx attempted at 10:00am, pt currently sleeping and daughter requested not to wake pt. PT will follow up on next visit.    Kaylynn Dias, PT/OT  2/28/2022      1320: Therapy department notified that pt's daughter requesting to have PT walk pt before discharging home with home health, however no one from department is available at this time.  This therapist notified Charge Nurse Radha that pt is able to ambulate with any staff member and that she ambulates ~400ft with CGA to SBA using RW, therefore therapy does not need to "walk" pt before discharge.     Kaylynn Dias, PT/OT  2/28/2022       "

## 2022-02-28 NOTE — PHYSICIAN QUERY
PT Name: Tiffanie Wise  MR #: [id number]    DOCUMENTATION CLARIFICATION      CDS/: Lily Gong RN CDI            Contact information:  [email or web address]    This form is a permanent document in the medical record.     Query Date: February 28, 2022    By submitting this query, we are merely seeking further clarification of documentation. Please utilize your independent clinical judgment when addressing the question(s) below.    The Medical Record contains the following:   Indicators   Supporting Clinical Findings Location in Medical Record   X Hypertension associated with diabetes documented Hypertension associated with diabetes  Improved since ED arrival - possible pain component   H&P 2/21 [facility]/ STEPHAN Liu MD   X Chronic condition(s) DM2  ELLIOTT on CKD3  Hyperlipidemia associated with type 2 diabetes mellitus  Acute on chronic diastolic heart failure  GERD  Acquired hypothyroidism   H&P 2/21 [facility]/ TSEPHAN Liu MD   X Vital signs   2/20   58  156/74  2/21   50  140/60  2/22   71  129/60  2/23   67  143/63 Vitals   X Treatment/Medication Home medications resumed as appropriate   Hydralazine IV prn SBP > 170  Provide adequate pain control  VS per unit routine  Monitor closely   H&P 2/21 E [facility] DOLORES/ STEPHAN Liu MD    Other     Provider, please clarify the relationship between the Hypertension and Diabetes Mellitus  [  x ] Hypertension is a manifestation of Diabetes Mellitus (Secondary Hypertension)   [   ]  Hypertension is not a manifestation of Diabetes Mellitus (Essential Hypertension)   [   ] Other Clarification (please specify): ____________   [  ] Clinically Undetermined       Please document in your progress notes daily for the duration of treatment, until resolved, and include in your discharge summary.

## 2022-02-28 NOTE — PROGRESS NOTES
Patient and daughter resting physical therapy. I spoke with PT and Kaylynn with PT states she didn't have time to come back and see patient. Patient was sleeping when rounded this morning. I informed Alisha NP and she states patient can be discharged home with home health. Kaylynn also messaged and stated patient doesn't need to be seen prior to discharge. Patient can walk with anyone. Sadie OWUSU went to go offer to walk patient and the daughter was upset and wanted PT. Then the daughter of patient said she just wants the discharge papers.

## 2022-02-28 NOTE — PLAN OF CARE
[facility] - Med Surg  Discharge Final Note    Primary Care Provider: Tiffanie Spence MD    Expected Discharge Date: 2/28/2022    Final Discharge Note (most recent)     Final Note - 02/28/22 1227        Final Note    Assessment Type Final Discharge Note     Anticipated Discharge Disposition [facility]     Hospital Resources/Appts/Education Provided Provided patient/caregiver with written discharge plan information;Appointments scheduled and added to AVS        Post-Acute Status    Discharge Delays None known at this time                 Important Message from Medicare  Important Message from Medicare regarding Discharge Appeal Rights: Given to patient/caregiver, Explained to patient/caregiver, Signed/date by patient/caregiver     Date IMM was signed: 02/28/22  Time IMM was signed: 1026    Contact Info     Brett Parra MD   Specialty: General Surgery    [address] [place] [address]   Phone: [phone]       Next Steps: Follow up    Instructions: post op appt, right arm abscess I and D on 02/22/2022    Tiffanie Spence MD   Specialty: Family Medicine   Relationship: PCP - [address] [address]   Phone: [phone]       Next Steps: Follow up in 3 day(s)    [facility]   Specialty: Pulmonology    [address] [place] [address]   Phone: [phone]       Next Steps: Follow up in 2 week(s)    [facility]   Specialty: Cardiology    [address] [place] [address]   Phone: [phone]       Next Steps: Follow up in 2 week(s)        Patient to dc home with [facility], FU appt scheduled and added to AVS. No other cm needs.

## 2022-02-28 NOTE — CONSULTS
"Cm spoke with patients daughter at the bedside to discuss SNF placement. Patients daughter stated that "she was not sure if SNF was the correct placement for her mom, asked if CM could give opinon". CM informed that I could not speak to the patients medical ability on whether or not she would be appropriate for SNF. CM offered to call in the nurse, charge, or NP to further discuss patients medical abilities, patients daughter declined. Patient's daughter decided that she would like for cm to work on SNF but only at [facility]. Per patient's daughter if [facility] doesn't have a bed then SNF is not meant to be and she would like to move forward with . CM sent referral via care port to [facility] SNF, pending acceptance.       Update: Received reply from [facility] currently no beds available, provider notified via secure chat. 02/28/2022   11:27AM  "

## 2022-02-28 NOTE — CONSULTS
CM spoke with patients daughter, preference obtained for [facility], referral sent via care port.

## 2022-02-28 NOTE — ASSESSMENT & PLAN NOTE
With worsening leucocytosis , there is concern for aspiration- will add unasyn/zyvox .  Will repeat cbc in AM   2/27- aspiration precaution

## 2022-02-28 NOTE — RESPIRATORY THERAPY
Home Oxygen Evaluation    Date Performed: 2022    1) Patient's Home O2 Sat on room air, while at rest: 94%        If O2 sats on room air at rest are 88% or below, patient qualifies. No additional testing needed. Document N/A in steps 2 and 3. If 89% or above, complete steps 2.      2) Patient's O2 Sat on room air while exercisin%        If O2 sats on room air while exercising remain 89% or above patient does not qualify, no further testing needed Document N/A in step 3. If O2 sats on room air while exercising are 88% or below, continue to step 3.      3) Patient's O2 Sat while exercising on O2: n/a at n/a LPM         (Must show improvement from #2 for patients to qualify)    If O2 sats improve on oxygen, patient qualifies for portable oxygen. If not, the patient does not qualify.

## 2022-02-28 NOTE — PLAN OF CARE
Problem: Adult Inpatient Plan of Care  Goal: Plan of Care Review  Outcome: Ongoing, Progressing     Problem: Adult Inpatient Plan of Care  Goal: Patient-Specific Goal (Individualized)  Outcome: Ongoing, Progressing     Problem: Adult Inpatient Plan of Care  Goal: Absence of Hospital-Acquired Illness or Injury  Outcome: Ongoing, Progressing     Problem: Adult Inpatient Plan of Care  Goal: Optimal Comfort and Wellbeing  Outcome: Ongoing, Progressing      Pt remained free of injury and falls. Call bell and personal items within reach. Daughter at bedside. Pt refused midnight and 4a.m. vitals. No IV present at shift change, pt refused to have a new one started. VSS. Pain moderately controlled with PRN tylenol. Glucose monitoring continued. Chart check complete. Will continue to monitor.

## 2022-02-28 NOTE — ASSESSMENT & PLAN NOTE
Gen surgery consult .  Keep NPO   Continue Oxacillin      2/24/21-  With worsening leucocytosis and previous history of dysphagia- will add unasyn, follow cbc in AM.  Will do chest imaging in AM if wbc worsen.  2/27-  Will use 10 days of po doxycycline

## 2022-02-28 NOTE — PROGRESS NOTES
[facility]  Infectious Disease  Progress Note    Patient Name: Tiffanie Wise  MRN: [id number]  Admission Date: 2/20/2022  Length of Stay: 6 days  Attending Physician: Fabi Garcia MD  Primary Care Provider: Tiffanie Spence MD    Isolation Status: No active isolations  Assessment/Plan:      Pneumonia- possible aspiration  Will use 10 days of PO Augmentin .  Aspiration precaution    Abscess of right arm S/p I&D 2/22/2022  Gen surgery consult .  Keep NPO   Continue Oxacillin      2/24/21-  With worsening leucocytosis and previous history of dysphagia- will add unasyn, follow cbc in AM.  Will do chest imaging in AM if wbc worsen.  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  Will repeat cbc in AM   2/27- aspiration precaution     Type 2 diabetes mellitus with hyperglycemia  Insulin sliding scale, diabetic diet         Anticipated Disposition:     Thank you for your consult. I will follow-up with patient. Please contact us if you have any additional questions.    Manas Elliott MD  Infectious Disease  [facility]    Subjective:     Principal Problem:Cellulitis and abscess of upper extremity    HPI:   80 y/o WF with hx of angina, GERD, HLD, DM2, hypothyroidism, osteoporosis, CAD, CABG, cardiac stents admitted with worsening right upper arm abscess.This is her third Ed visit for similar complaints -as per her daughter. She - was initially sent home on bactrim with continued worsening, then was admitted from 02/16-02/18/2022, was on vancomycin, had a needle aspiration and was sent home on clindamycin .  Cultures revewed   2/18- MSSA-oxacillin CONNOR 1  She is febrile.  Component      Latest Ref Rng & Units 2/21/2022 2/20/2022 2/17/2022               WBC      3.90 - 12.70 K/uL 29.21 (H) 26.52 (H) 10.42     Interval History: 81 year old woman with right upper  arm MSSA abscess s/p I and D.  CBC- wbc -22 2/22- cultures- Staph Aureus     2/27- she is afebrile  Review of Systems   Constitutional:  Positive for activity change. Negative for appetite change, chills, diaphoresis and fatigue.   HENT:  Positive for hearing loss. Negative for ear discharge, ear pain and facial swelling.    Eyes:  Negative for pain and itching.   Respiratory:  Negative for cough and shortness of breath.    Cardiovascular:  Negative for chest pain, palpitations and leg swelling.   Gastrointestinal:  Negative for abdominal distention, abdominal pain, blood in stool, constipation, diarrhea, nausea and vomiting.   Endocrine: Negative for polydipsia and polyphagia.   Genitourinary:  Negative for difficulty urinating, dysuria, flank pain and hematuria.   Musculoskeletal:  Negative for neck pain and neck stiffness.   Skin:  Positive for wound.        Right arm dressing   Allergic/Immunologic: Negative for food allergies.   Neurological:  Negative for seizures, facial asymmetry, speech difficulty and weakness.   Hematological:  Does not bruise/bleed easily.   Psychiatric/Behavioral:  Negative for agitation, behavioral problems, confusion, hallucinations and suicidal ideas. The patient is not nervous/anxious.    Objective:     Vital Signs (Most Recent):  Temp: 98.1 °F (36.7 °C) (02/27/22 1946)  Pulse: (!) 59 (02/27/22 2037)  Resp: 20 (02/27/22 2037)  BP: (!) 174/74 (02/27/22 1946)  SpO2: (!) 94 % (02/27/22 2037)   Vital Signs (24h Range):  Temp:  [98.1 °F (36.7 °C)-99.3 °F (37.4 °C)] 98.1 °F (36.7 °C)  Pulse:  [57-75] 59  Resp:  [16-20] 20  SpO2:  [93 %-96 %] 94 %  BP: (153-176)/(67-74) 174/74     Weight: 80.2 kg (176 lb 12.9 oz)  Body mass index is 34.53 kg/m².    Estimated Creatinine Clearance: 37.6 mL/min (based on SCr of 1.1 mg/dL).    Physical Exam  Vitals and nursing note reviewed.   Constitutional:       General: She is not in acute distress.     Appearance: She is not diaphoretic.   HENT:       Head: Normocephalic and atraumatic.      Ears:      Comments: Very [place]     Mouth/Throat:      Mouth: Mucous membranes are moist.   Eyes:      General:         Right eye: No discharge.         Left eye: No discharge.      Extraocular Movements: Extraocular movements intact.      Conjunctiva/sclera: Conjunctivae normal.      Pupils: Pupils are equal, round, and reactive to light.   Cardiovascular:      Rate and Rhythm: Normal rate and regular rhythm.      Pulses: Normal pulses.      Heart sounds: Normal heart sounds. No murmur heard.  Pulmonary:      Effort: Pulmonary effort is normal.      Breath sounds: Normal breath sounds. No wheezing, rhonchi or rales.   Abdominal:      General: Bowel sounds are normal. There is no distension.      Palpations: Abdomen is soft.      Tenderness: There is no abdominal tenderness. There is no guarding.   Genitourinary:     Comments: Not examined  Musculoskeletal:         General: Normal range of motion.      Cervical back: Normal range of motion and neck supple. No rigidity or tenderness.      Right lower leg: No edema.      Left lower leg: No edema.   Skin:     General: Skin is warm and dry.      Capillary Refill: Capillary refill takes less than 2 seconds.      Comments: Right arm dressing   Neurological:      General: No focal deficit present.      Mental Status: She is alert and oriented to person, place, and time. Mental status is at baseline.   Psychiatric:         Mood and Affect: Mood normal.         Behavior: Behavior normal.         Thought Content: Thought content normal.         Judgment: Judgment normal.       Significant Labs: Blood Culture:   Recent Labs   Lab 02/16/22  1424 02/16/22  1651 02/20/22  2255 02/20/22  2311   LABBLOO No growth after 5 days. No growth after 5 days. No growth after 5 days. No growth after 5 days.       BMP:   Recent Labs   Lab 02/26/22  1344   *      K 4.2      CO2 20*   BUN 10   CREATININE 1.1   CALCIUM 8.6*       Wound  Culture:   Recent Labs   Lab 02/22/22  0732   LABAERO STAPHYLOCOCCUS AUREUS  Moderate  *       All pertinent labs within the past 24 hours have been reviewed.    Significant Imaging:

## 2022-03-02 PROBLEM — I27.20 PULMONARY HYPERTENSION: Status: ACTIVE | Noted: 2022-01-01

## 2022-03-02 PROBLEM — I35.0 NONRHEUMATIC AORTIC VALVE STENOSIS: Status: ACTIVE | Noted: 2022-01-01

## 2022-03-02 NOTE — PROGRESS NOTES
Subjective:    Patient ID:  Tiffanie Wise is a 81 y.o. female who presents for evaluation of Congestive Heart Failure, Coronary Artery Disease, Valvular Heart Disease, Hyperlipidemia, and Hypertension        HPI Pt presents for eval.   Her current medical conditions include CAD (CABG LIMA-LAD 6/16), DM, NAWAF, pharyngeal cancer, obesity, h/o CVA, HTN, dyslipidemia, GERD. Nonsmoker.  Her prior history is pertinent for following:  NSTEMI 11/10 and underwent LHC and sucessful stenting of 90% RCA (3 x 23 mm Promus BRIANDA).    S/p PCI of 70% mid-LCX 12/12 with Xience BRIANDA.    S/p LHC for worsening anginal sxs on 11/21/13 with successful PCI 90% ISR mid-LCX with Xience BRIANDA.    S/p unstable angina and had PTCA of ISR of her LCX March 2014.  S/p 7/14 PTCA of severe LCX/OM ISR and PTCA of distal LCX at her bifurcation lesion at stent site July 2014 for ACS.  S/p 10/14 repeat revascularization of severe LCX ISR with cutting balloon Oct 2014 for ACS.  S/p 12/14 admit with unstable angina. She had ISR LCX again, Resolute BRIANDA implanted with good results.    Pt underwent repeat LHC again late June 2016 for unstable anginal sxs and had severe 95% distal LAD stenosis not amenable to PCI.  She had patent LCX, RCA stents, chronic occlusion of R PLB, normal LVEF.  She had urgent CABG w LIMA-LAD June 2016.  S/p hospitalization for ischemic CVA Dec 2017 tx with TPA and tx to [facility] [place] (distal left parietal MCA branch occlusion). Unclear mechanism of CVA.  She had some hemorrhage noted in CVA distribution.  Was maintained on asa, plavix at time of discharge.  Metoprolol was changed to Coreg for bradycardia.  Her ecg showed sinus bradycardia, inferior/anterolateral st-t abnl.  Echo showed normal EF, LAE.  Has speech deficits, expressive aphasia.  S/p rehab at [facility].  S/p ILR 2/18 with Dr. Devine, no a fib noted so far per reports.  Was taken off beta-blockers due to bradycardia.   Echo 7/18 normal EF, DD.  Admitted Feb 2019 to [facility] and  had seizures, attributed to pneumonia.  Chart reviewed from [facility].  MRI brain no acute changes.  Echo showed normal EF, LVH.  Still has speech limitations from her CVA.  She was admitted 9/19 with atypical cp.  Cards consult done.  Stress MPI no ischemia, normal EF.  - troponin levels.  dx with pharyngeal cancer, s/p peg tube in 2021 and peg tube since removed.  Has residual speech impediment since her CVA.  Echo 4/21 normal EF, grade I DD, mild AI.  Now here.  I last saw pt May 2021.  Pt admitted 2/22 with right axillary abscess, sepsis.  Chart reviewed.  Cards team saw pt, mild diastolic CHF.  Tx w Lasix.  Echo 2/22 normal EF, mild AS, mild PHTN.  ECG 2/16/22 NSR, chronic septal infarct.  Pt with generalized decline.  Weight loss.  Weak/frail.  Difficult to eat.  BP stable.  Lipids well controlled.  DM HGAIC stable.  Compliant w meds.  No recurrent CVA since last visit.  Angina controlled on current med tx.  Chronic SHERMAN.  Compliant w CPAP.      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     Trouble in sleeping     Type 2 diabetes mellitus  with ophthalmic manifestations     Urinary incontinence     pullups day, pads at night     Current Outpatient Medications   Medication Instructions    albuterol-ipratropium (DUO-NEB) 2.5 mg-0.5 mg/3 mL nebulizer solution 3 mLs, Nebulization, Every 6 hours while awake, Rescue    amLODIPine (NORVASC) 10 mg, Oral, Daily    amoxicillin-clavulanate 875-125mg (AUGMENTIN) 875-125 mg per tablet 1 tablet, Oral, Every 12 hours    aspirin 81 mg, Per G Tube, Daily    atorvastatin (LIPITOR) 40 mg, Oral, Nightly    clonazePAM (KLONOPIN) 0.5 mg, Per G Tube, Nightly    clopidogreL (PLAVIX) 75 mg, Oral, Daily    donepeziL (ARICEPT) 10 mg, Oral, Daily    doxycycline (VIBRA-TABS) 100 mg, Oral, Every 12 hours    EScitalopram oxalate (LEXAPRO) 20 MG tablet TAKE 1 TABLET EVERY DAY    hydroCHLOROthiazide (HYDRODIURIL) 25 mg, Oral, Daily    HYDROcodone-acetaminophen (NORCO) 5-325 mg per tablet 1 tablet, Oral, Every 6 hours PRN    insulin detemir U-100 (LEVEMIR FLEXTOUCH) 20 Units, Subcutaneous, Daily    isosorbide mononitrate (IMDUR) 30 mg, Oral, Daily    levothyroxine (SYNTHROID) 50 mcg, Per G Tube, Before breakfast    losartan (COZAAR) 100 mg, Oral, Daily    nitroGLYCERIN (NITROSTAT) 0.4 mg, Sublingual, Every 5 min PRN    pantoprazole (PROTONIX) 40 MG tablet TAKE 1 TABLET EVERY DAY    pregabalin (LYRICA) 75 mg, Oral, 2 times daily    vitamin D (VITAMIN D3) 1,000 Units, Oral, Daily         Review of Systems   Constitutional: Positive for malaise/fatigue and weight loss.   HENT: Negative.    Eyes: Negative.    Cardiovascular: Negative.    Respiratory: Negative.    Endocrine: Negative.    Hematologic/Lymphatic: Negative.    Skin: Negative.    Musculoskeletal: Positive for arthritis and joint pain.   Gastrointestinal: Positive for dysphagia.   Genitourinary: Negative.    Neurological: Positive for difficulty with concentration and weakness.        Aphasia   Psychiatric/Behavioral: Negative.    Allergic/Immunologic:  Negative.        BP (!) 122/52 (BP Location: Left arm, Patient Position: Sitting, BP Method: Large (Manual))   Pulse 74   Ht 5' (1.524 m)   Wt 64.5 kg (142 lb 3.2 oz)   LMP  (LMP Unknown)   SpO2 96%   BMI 27.77 kg/m²     Wt Readings from Last 3 Encounters:   03/02/22 64.5 kg (142 lb 3.2 oz)   02/27/22 80.2 kg (176 lb 12.9 oz)   02/18/22 65.3 kg (143 lb 15.4 oz)     Temp Readings from Last 3 Encounters:   02/28/22 98.2 °F (36.8 °C) (Oral)   02/18/22 98.3 °F (36.8 °C) (Oral)   02/11/22 98.6 °F (37 °C) (Oral)     BP Readings from Last 3 Encounters:   03/02/22 (!) 122/52   02/28/22 (!) 162/68   02/18/22 (!) 147/65     Pulse Readings from Last 3 Encounters:   03/02/22 74   02/28/22 (!) 58   02/18/22 (!) 59          Objective:    Physical Exam  Vitals and nursing note reviewed.   Constitutional:       General: She is not in acute distress.     Appearance: Normal appearance. She is well-developed. She is not diaphoretic.   HENT:      Head: Normocephalic.   Neck:      Thyroid: No thyromegaly.      Vascular: No carotid bruit or JVD.   Cardiovascular:      Rate and Rhythm: Normal rate and regular rhythm.      Chest Wall: PMI is not displaced.      Pulses: Normal pulses.           Radial pulses are 2+ on the right side and 2+ on the left side.      Heart sounds: Normal heart sounds, S1 normal and S2 normal. No murmur heard.    No friction rub. No gallop.   Pulmonary:      Effort: Pulmonary effort is normal.      Breath sounds: Normal breath sounds. No wheezing or rales.   Abdominal:      General: Bowel sounds are normal. There is no abdominal bruit.      Palpations: Abdomen is soft.      Tenderness: There is no abdominal tenderness.   Musculoskeletal:      Cervical back: Neck supple.   Lymphadenopathy:      Cervical: No cervical adenopathy.   Skin:     General: Skin is warm.   Neurological:      Mental Status: She is alert.   Psychiatric:         Behavior: Behavior is cooperative.         I have reviewed all pertinent  labs and cardiac studies.      Chemistry        Component Value Date/Time     02/26/2022 1344    K 4.2 02/26/2022 1344     02/26/2022 1344    CO2 20 (L) 02/26/2022 1344    BUN 10 02/26/2022 1344    CREATININE 1.1 02/26/2022 1344     (H) 02/26/2022 1344        Component Value Date/Time    CALCIUM 8.6 (L) 02/26/2022 1344    ALKPHOS 101 02/22/2022 0448    AST 19 02/22/2022 0448    ALT 14 02/22/2022 0448    BILITOT 0.4 02/22/2022 0448    ESTGFRAFRICA 54 (A) 02/26/2022 1344    EGFRNONAA 47 (A) 02/26/2022 1344        Lab Results   Component Value Date    WBC 10.51 02/26/2022    HGB 10.1 (L) 02/26/2022    HCT 31.2 (L) 02/26/2022    MCV 88 02/26/2022     02/26/2022       Lab Results   Component Value Date    HGBA1C 7.2 (H) 02/21/2022     Lab Results   Component Value Date    CHOL 113 (L) 02/21/2022    CHOL 166 06/14/2021    CHOL 130 12/11/2018     Lab Results   Component Value Date    HDL 45 02/21/2022    HDL 65 06/14/2021    HDL 54 12/11/2018     Lab Results   Component Value Date    LDLCALC 53.6 (L) 02/21/2022    LDLCALC 80.0 06/14/2021    LDLCALC 55.4 (L) 12/11/2018     Lab Results   Component Value Date    TRIG 72 02/21/2022    TRIG 105 06/14/2021    TRIG 103 12/11/2018     Lab Results   Component Value Date    CHOLHDL 39.8 02/21/2022    CHOLHDL 39.2 06/14/2021    CHOLHDL 41.5 12/11/2018         Results for orders placed during the hospital encounter of 02/20/22    Echo    Interpretation Summary  · The left ventricle is normal in size with concentric remodeling and normal systolic function.  · The estimated ejection fraction is 60%.  · Normal left ventricular diastolic function.  · Normal right ventricular size with normal right ventricular systolic function.  · Normal central venous pressure (3 mmHg).  · The estimated PA systolic pressure is 41 mmHg.  · There is mild pulmonary hypertension.  · There is mild aortic valve stenosis.  · Aortic valve area is 1.83 cm2; peak velocity is 1.59 m/s;  mean gradient is 6 mmHg.        Assessment:       1. Coronary artery disease with stable angina pectoris, unspecified vessel or lesion type, unspecified whether native or transplanted heart    2. Nonrheumatic aortic valve stenosis    3. Pulmonary hypertension    4. Aspiration pneumonia of both lungs due to gastric secretions, unspecified part of lung    5. Abnormal ECG    6. AP (angina pectoris)    7. CAD, multiple vessel    8. Chronic diastolic heart failure    9. Chronic ischemic heart disease    10. History of arterial ischemic stroke, left MCA (middle cerebral artery) 12/2017    11. History of stroke    12. History of PTCA    13. Left ventricular diastolic dysfunction with preserved systolic function    14. Type 2 diabetes mellitus with hyperglycemia, with long-term current use of insulin    15. Nonrheumatic aortic valve insufficiency    16. NAWAF (obstructive sleep apnea)    17. S/P CABG (coronary artery bypass graft)         Plan:             Stable cardiovascular conditions at present time on current medical treatment.  Continue current CV meds.  Continue DAPT for CVA/CAD protection.  CHF compensated.  Reviewed all tests and above medical conditions with patient in detail and formulated treatment plan.  Continue optimal medical treatment for cardiovascular conditions.  Cardiac low salt diet advised.  HTN control.  Continue statin tx.  F/u with heme/onc for h/o malignancy, currently in remission per pt/family.  DM control.  CPAP.  F/u in 6 months.       I have reviewed all pertinent labs and cardiac studies independently. Plans and recommendations have been formulated under my direct supervision. All questions answered and patient voiced understanding.

## 2022-03-02 NOTE — PROGRESS NOTES
C3 nurse attempted to contact Tiffanie Montes De Ocally for a TCC post hospital discharge follow up call. No answer. Left voicemail with callback information. The patient has a scheduled HOSFU appointment with Dr. Brantley on 3/3/2022 @ 9:20 am.       C3 nurse attempted to contact Tiffanie WADE Frandy for a TCC post hospital discharge follow up call. No answer. Left voicemail with callback information. The patient has a scheduled HOSFU appointment with Dr. Brantley on 3/3/2022 @ 9:20 am.     C3 nurse spoke with Tiffanie Wise's daughter for a TCC post hospital discharge follow up call. The patient has a scheduled HOSFU appointment with Dr. Spence  on 3/8/2022 @ 11:00 am. Message sent to Dc provider for pain medication, patient's daughter verbalized understanding.

## 2022-03-04 NOTE — TELEPHONE ENCOUNTER
Will send topical antifungal . Theres a dangerous interaction between diflucan and lexapro. Prn f.u

## 2022-03-04 NOTE — TELEPHONE ENCOUNTER
Ronna with [facility]  called stating patient has yeast rash under arm and around groin. Requesting Diflucan sent to [facility].

## 2022-03-04 NOTE — TELEPHONE ENCOUNTER
----- Message from Nola Yousif sent at 3/4/2022 11:30 AM CST -----  Donna called on behalf of pt. Pt call back number is [phone]. Thx. El

## 2022-03-04 NOTE — TELEPHONE ENCOUNTER
----- Message from Mary Ann Pacheco sent at 3/4/2022 11:36 AM CST -----  .Type:  Needs Medical Advice    Who Called:  Donna from [facility] [phone]  Symptoms (please be specific):    How long has patient had these symptoms:   Pharmacy name and phone #:     [facility] - [place], [place] - [address]  [address] [address]  Phone: [phone] Fax: [phone]        Would the patient rather a call back or a response via [facility]?  Best Call Back Number:  Additional Information:  Yeast rash underneath arm and groin area. Please call in Diflucan medication to pharmacy       Please call shai (Connie) @ [phone]

## 2022-03-07 NOTE — TELEPHONE ENCOUNTER
From 3/4 : Will send topical antifungal . Theres a dangerous interaction between diflucan and lexapro. Prn f.u     Hydrocodone sent

## 2022-03-08 NOTE — PROGRESS NOTES
"Subjective:       Patient ID: Tiffanie Wise is a 81 y.o. female.    Chief Complaint: Hospital Follow Up    Transitional Care Note    Family and/or Caretaker present at visit?  Yes   Diagnostic tests reviewed/disposition: yes   Disease/illness education: yes   Home health/community services discussion/referrals: yes  Establishment or re-establishment of referral orders for community resources: yes  Discussion with other health care providers:  No     81 y old female with CAD , t2 dm , o porosis , HTN , hx of CVA , NAWAF,hx of tonsillar ca , stage 3 ckd  F.u today on  hospitalization on 2/20 due to R arm abscess  After failing outpatient treatment  with TMP . She was found to have "temperature of 102.1, H&H of 12.3&37.6, platelets of 333, , K+ 5.1, BUN 20, creatinine 2.1, GFFR 22, , , troponin negative, lactic acid 1.8; UA uninfected; Cxray showed cardiomegaly with pulmonary vascular crowding; COVID-19 negative.   Started on IV abx.  was consulted for I/d . ID, card and nephro  Were  also consulted . Started on IV diuretics also . WBC trended up on day 2 of hospitalization . CXR demonstrated pneumonia . She was started on Unasyn and zyvox. Speech therapy was consulted . MBBS was ordered which did not show aspiration . ELLIOTT resolved , wbc normalized on day 4 of hospitalization . o2 sats were stable on 3 lts with NC . Discharged on 2/28 on Augmentin and doxycycline . Doing fair . She has candidal intertrigo in inguinal area and arms. Daughter  started applying topical clotrimazole + betamethasone yesterday . She has f.u with card and will see GS this pm and pulmonology in 2 w               Review of Systems   Constitutional: Negative.    HENT: Negative.    Eyes: Negative.    Respiratory: Negative.    Cardiovascular: Negative.    Gastrointestinal: Negative.    Genitourinary: Negative.    Musculoskeletal: Negative.    Skin: Positive for rash.   Hematological: Negative.        Objective:      Physical " Exam  Constitutional:       General: She is not in acute distress.     Appearance: She is well-developed. She is not diaphoretic.   HENT:      Head: Normocephalic and atraumatic.      Right Ear: External ear normal.      Left Ear: External ear normal.      Mouth/Throat:      Pharynx: No oropharyngeal exudate.   Eyes:      General: No scleral icterus.        Right eye: No discharge.         Left eye: No discharge.      Conjunctiva/sclera: Conjunctivae normal.      Pupils: Pupils are equal, round, and reactive to light.   Neck:      Thyroid: No thyromegaly.      Vascular: No JVD.      Trachea: No tracheal deviation.   Cardiovascular:      Rate and Rhythm: Normal rate and regular rhythm.      Heart sounds: Normal heart sounds. No murmur heard.    No friction rub. No gallop.   Pulmonary:      Effort: Pulmonary effort is normal. No respiratory distress.      Breath sounds: Normal breath sounds. No stridor. No wheezing or rales.   Chest:      Chest wall: No tenderness.   Abdominal:      General: Bowel sounds are normal. There is no distension.      Palpations: Abdomen is soft.      Tenderness: There is no abdominal tenderness. There is no guarding or rebound.   Musculoskeletal:         General: Normal range of motion.      Cervical back: Normal range of motion and neck supple.   Lymphadenopathy:      Cervical: No cervical adenopathy.   Skin:     General: Skin is warm and dry.      Findings: Erythema present.          Neurological:      Mental Status: She is alert and oriented to person, place, and time.   Psychiatric:         Behavior: Behavior normal.         Thought Content: Thought content normal.         Judgment: Judgment normal.         Assessment:       Tiffanie was seen today for hospital follow up.    Diagnoses and all orders for this visit:    Pain of right hand  -     Ambulatory referral/consult to Orthopedics; Future    Severe sepsis    Cellulitis, upper arm    Abscess of arm, right    ELLIOTT (acute kidney  injury)    Pneumonia due to infectious organism, unspecified laterality, unspecified part of lung    Candidal intertrigo      Plan:     Tiffanie was seen today for hospital follow up.    Diagnoses and all orders for this visit:    Pain of right hand  -     Ambulatory referral/consult to Orthopedics; Future     Ortho   Resolved   F.u with GS   Resolved  F.u with pulm   Prn f.u

## 2022-03-09 NOTE — ASSESSMENT & PLAN NOTE
3/9/2022 repeat chest xray resolving opacities  Patient symptomatically improved    2/24/2022 modified barium swallow  Recommendations:                General Recommendations:  Dysphagia therapy  Diet recommendations:  Very Soft Solids with grinded meats and thin liquids   Aspiration Precautions: 1 bite/sip at a time, Alternating bites/sips, Double swallow with each bite/sip, Eliminate distractions, HOB to 90 degrees, Remain upright 30 minutes post meal, Small bites/sips and Standard aspiration precautions   General Precautions: Standard, aspiration, hearing impaired, fall  Communication strategies:  none

## 2022-03-09 NOTE — ASSESSMENT & PLAN NOTE
Managed by endocrinology, Dr. Yana De Leon, [facility]  Hemoglobin A1C   Date Value Ref Range Status   02/21/2022 7.2 (H) 4.0 - 5.6 % Final     Comment:     ADA Screening Guidelines:  5.7-6.4%  Consistent with prediabetes  >or=6.5%  Consistent with diabetes    High levels of fetal hemoglobin interfere with the HbA1C  assay. Heterozygous hemoglobin variants (HbS, HgC, etc)do  not significantly interfere with this assay.   However, presence of multiple variants may affect accuracy.     06/14/2021 7.0 (H) 4.0 - 5.6 % Final     Comment:     ADA Screening Guidelines:  5.7-6.4%  Consistent with prediabetes  >or=6.5%  Consistent with diabetes    High levels of fetal hemoglobin interfere with the HbA1C  assay. Heterozygous hemoglobin variants (HbS, HgC, etc)do  not significantly interfere with this assay.   However, presence of multiple variants may affect accuracy.     03/03/2021 7.1 (H) 4.0 - 5.6 % Final     Comment:     ADA Screening Guidelines:  5.7-6.4%  Consistent with prediabetes  >or=6.5%  Consistent with diabetes    High levels of fetal hemoglobin interfere with the HbA1C  assay. Heterozygous hemoglobin variants (HbS, HgC, etc)do  not significantly interfere with this assay.   However, presence of multiple variants may affect accuracy.

## 2022-03-09 NOTE — PROGRESS NOTES
Patient ID: Tiffanie Wise is a 81 y.o. female.    Patient Active Problem List   Diagnosis    Abnormal ECG    AP (angina pectoris)    GERD (gastroesophageal reflux disease)    Hyperlipidemia associated with type 2 diabetes mellitus    Type 2 diabetes mellitus with hyperglycemia    Acquired hypothyroidism    Osteoporosis    NAWAF (obstructive sleep apnea)    Anxiety and depression    Pulmonary nodule, right - stable    Left ventricular diastolic dysfunction with preserved systolic function    Long-term insulin use in type 2 diabetes    CAD with remote CABG x 1V (LIMA-LAD) in 6/2016 + PCI of RCA and LCx    Hypertension associated with diabetes    Unspecified vitamin D deficiency    Amnesia    History of arterial ischemic stroke, left MCA (middle cerebral artery) 12/2017    Aortic arch atherosclerosis    Arthritis of hand    Microcytic anemia    Seizure    Overweight (BMI 25.0-29.9)    History of PTCA    Malignant neoplasm of pharyngeal tonsil    Dehydration    Encounter for palliative care    Cancer associated pain    Severe Oropharyngeal dysphagia sec to XRT for Tonsilar Ca    Therapeutic opioid-induced constipation (OIC)    History of stroke    CAD, multiple vessel    S/P CABG (coronary artery bypass graft)    Nonrheumatic aortic valve insufficiency    Diastolic dysfunction    Iron deficiency anemia secondary to inadequate dietary iron intake    History of fall    Class 1 obesity due to excess calories with serious comorbidity and body mass index (BMI) of 30.0 to 30.9 in adult    Cellulitis    Severe Sepsis with Cellulitis and abscess of right upper extremity    Chronic diastolic heart failure    Abscess of right arm S/p I&D 2/22/2022    Debility    Hypoalbuminemia    Normocytic anemia    MSSA (methicillin susceptible Staphylococcus aureus) infection    Pneumonia- possible aspiration    Nonrheumatic aortic valve stenosis    Pulmonary hypertension     Problem list  has been reviewed.      Chief Complaint: Pneumonia and Shortness of Breath       HPI:   Tiffanie Wise 81 y.o. female presents with Connie Worley, daughter, power of , and primary care giver here for follow up on pneumonia.     History of aspiration pneumonia    Hospital admit 2/20/2022.     3/9/2022 prior opacity near complete resolution.     Patient is improved.    Not on home oxygen nasal cannula 2 L, patient turned O2 back in.     Obstructive sleep apnea on CPAP    Patient is former patient of Dr. Estrella, she was hoping to see Dr. Pinto, follow up with Dr. Pinto at next follow up.     No pulmonary symptoms. There is occasional cough when sinus draining, no wheezing, no shortness of breath, no sputum production, no chest pain.    On APAP  of  6 - 20 CMWP     4/5/2021 overnight oximetry abnormal indicating need to continue 2 L oxygen blended into auto CPAP night.  Daughter reports, orders placed, patient refused oxygen. Told at recent hospitalization no indication for home oxygen.     Patient denies snoring, headaches, excessive daytime sleepiness.    Cpap set up date  6/4/2015     On 1/12/2022 Assisted patient register with [employer] [facility] related to recall confirmation code is: [id number]    8/16/2014 PSG The diagnostic polysomnography revealed a severe obstructive sleep apnea / hypopnea syndrome (A + H Index = 38.9 events/ hr asleep; 35.6 arousals / hr asleep), with a for the study, with a mean SpO2 value of 92.7 %, moderate; minimum oxygen saturation during sleep of 84.0 %, waking baseline SpO2 = 96 %. Sporadic, moderately loud snoring was noted.    Patient has to bring SIM card in for download   Compliance Summary  Auto CPAP 6-20 cm   12/10/2021 - 1/8/2022 (30 days)  Days with Device Usage 29 days  Days without Device Usage 1 day  Percent Days with Device Usage 96.7%  Cumulative Usage 9 days 19 hrs. 56 mins. 52 secs.  Maximum Usage (1 Day) 12 hrs. 30 mins. 49 secs.  Average Usage (All Days) 7 hrs.  51 mins. 53 secs.  Average Usage (Days Used) 8 hrs. 8 mins. 10 secs.  Minimum Usage (1 Day) 53 mins. 5 secs.  Percent of Days with Usage >= 4 Hours 93.3%  Percent of Days with Usage < 4 Hours 6.7%  Date Range  Total Blower Time 10 days 4 hrs. 38 mins. 17 secs.  Average AHI 4.7  Auto-CPAP Summary ([facility])  Auto-CPAP Mean Pressure 7.3 cmH2O  Auto-CPAP Peak Average Pressure 9.7 cmH2O  Device Pressure <= 90% of Time 9.8 cmH2O  Average Time in Large Leak Per Day 1 hrs. 4 mins. 33 secs.      Simpson Sleepiness Scale     EPWORTH SLEEPINESS SCALE 3/9/2022 1/12/2022 7/6/2021 4/5/2021 11/4/2020 9/18/2019 3/19/2019   Sitting and reading 0 0 0 0 0 0 0   Watching TV 0 0 1 0 0 0 0   Sitting, inactive in a public place (e.g. a theatre or a meeting) 0 0 0 0 0 0 0   As a passenger in a car for an hour without a break 0 1 2 0 0 1 0   Lying down to rest in the afternoon when circumstances permit 3 1 3 3 3 3 3   Sitting and talking to someone 0 0 0 0 0 0 0   Sitting quietly after a lunch without alcohol 0 0 1 2 0 0 0   In a car, while stopped for a few minutes in traffic 0 0 0 0 0 0 0   Total score 3 2 7 5 3 4 3         Immunization:    []        Pneumococcal Polysaccharide  Vaccine (23 Valent) (IM)  []        Pneumococcal Conjugate Vaccine (13 Valent) (IM)  [x]        Influenza - High Dose (65+) (PF) (IM)    She reports intermittent SHERMAN of recent onset    Dyspnea Characteristics:   Exertional Dyspnea   Dyspnea Duration:  Sub acute  Dyspnea Severity:  LUIS 7  Dyspnea Timing:  Exertional only  Dyspnea Contributing Factors:  Tobacco Abuse   Dyspnea Associated Symptoms:   Cough and  Sputum Production       Modified Luis Dyspnea Scale      0  Nothing at all    0.5  Very, very slight (just noticeable)    1  Very slight    2  Slight    3  Moderate    4 Somewhat severe    5 Severe      6    7  Very severe    8    9   Very, very severe (almost maximal)  10  Maximal    Her current respiratory therapy regimen is Pulmicort nebs twice  daily. Duo nebs if needed. She is NOT adherent with her regimen.     A full  review of systems, past , family  and social histories was performed except as mentioned in the note above, these are non contributory to the main issues discussed today.     Previous Report Reviewed: office notes     The following portions of the patient's history were reviewed and updated as appropriate: She  has a past medical history of Anxiety, AP (angina pectoris) (7/18/2013), Arterial ischemic stroke, MCA (middle cerebral artery), left, acute (12/15/2017), Asthma, CAD, multiple vessel (5/13/2021), Class 1 obesity due to excess calories with serious comorbidity and body mass index (BMI) of 30.0 to 30.9 in adult (3/15/2019), Coronary artery disease (7/18/2013), Depression, Diastolic heart failure, GERD (gastroesophageal reflux disease) (7/18/2013), History of PTCA (7/18/2013), Hypertension (7/18/2013), Hypothyroidism, Malignant neoplasm of pharyngeal tonsil (12/4/2020), Mixed hyperlipidemia (7/18/2013), Osteoporosis, Pneumonia due to other staphylococcus, Precancerous changes of the vagina, S/P CABG (coronary artery bypass graft) (5/13/2021), Seizure (3/15/2019), Sleep apnea, Trouble in sleeping, Type 2 diabetes mellitus with ophthalmic manifestations, and Urinary incontinence.  She  has a past surgical history that includes Coronary stent placement; Thyroid surgery; Coronary angioplasty; Breast surgery (Left); Hysterectomy (1970); Eye surgery (Bilateral, 2014); Tubal ligation (1970); and Fluoroscopy (N/A, 1/20/2021).  Her family history includes Alcohol abuse in her sister; Cancer in her son; Cirrhosis in her sister; Diabetes in her paternal grandmother and sister; Fibromyalgia in her daughter and daughter; Heart disease in her mother; Kidney disease in her brother and father.  She  reports that she quit smoking about 13 years ago. Her smoking use included cigarettes. She started smoking about 60 years ago. She has a 46.00 pack-year  smoking history. She has never used smokeless tobacco. She reports that she does not drink alcohol and does not use drugs.  She has a current medication list which includes the following prescription(s): albuterol-ipratropium, amlodipine, aspirin, atorvastatin, clonazepam, clopidogrel, clotrimazole-betamethasone 1-0.05%, donepezil, escitalopram oxalate, hydrochlorothiazide, hydrocodone-acetaminophen, insulin detemir u-100, isosorbide mononitrate, levothyroxine, losartan, nitroglycerin, pantoprazole, pregabalin, vitamin d, [DISCONTINUED] dexcom g6 sensor, and [DISCONTINUED] insulin aspart protamine-insulin aspart.  She has No Known Allergies..    Review of Systems   Constitutional: Negative for fever, chills, weight loss, weight gain, activity change, appetite change, fatigue and night sweats.   HENT: Negative for postnasal drip, rhinorrhea, sinus pressure, voice change and congestion.    Eyes: Negative for redness and itching.   Respiratory: Negative for snoring, cough, sputum production, chest tightness, shortness of breath, wheezing, orthopnea, asthma nighttime symptoms, dyspnea on extertion, use of rescue inhaler and somnolence.    Cardiovascular: Negative.  Negative for chest pain, palpitations and leg swelling.   Genitourinary: Negative for difficulty urinating and hematuria.   Endocrine: Negative for cold intolerance and heat intolerance.    Musculoskeletal: Negative for arthralgias, gait problem, joint swelling and myalgias.   Skin: Negative.    Gastrointestinal: Negative for nausea, vomiting, abdominal pain and acid reflux.   Neurological: Negative for dizziness, weakness, light-headedness and headaches.   Hematological: Negative for adenopathy. No excessive bruising.   All other systems reviewed and are negative.       Objective:     Vitals:    03/09/22 0943   BP: 130/60   Pulse: 73   SpO2: 98%   Weight: 60.3 kg (133 lb)   Height: 5' (1.524 m)     body mass index is 25.97 kg/m².    Physical Exam  Vitals and  nursing note reviewed.   Constitutional:       General: She is not in acute distress.     Appearance: She is well-developed. She is not ill-appearing or toxic-appearing.   HENT:      Head: Normocephalic.      Right Ear: External ear normal.      Left Ear: External ear normal.      Nose: Nose normal.      Mouth/Throat:      Pharynx: No oropharyngeal exudate.   Eyes:      Conjunctiva/sclera: Conjunctivae normal.   Cardiovascular:      Rate and Rhythm: Normal rate and regular rhythm.      Heart sounds: Normal heart sounds.   Pulmonary:      Effort: Pulmonary effort is normal.      Breath sounds: Normal breath sounds. No stridor.   Abdominal:      Palpations: Abdomen is soft.   Musculoskeletal:         General: Normal range of motion.      Cervical back: Normal range of motion and neck supple.   Lymphadenopathy:      Cervical: No cervical adenopathy.   Skin:     General: Skin is warm and dry.   Neurological:      Mental Status: She is alert and oriented to person, place, and time.   Psychiatric:         Behavior: Behavior normal. Behavior is cooperative.         Thought Content: Thought content normal.         Judgment: Judgment normal.         Personal Diagnostic Review    X-Ray Chest PA And Lateral  Narrative: EXAMINATION:  XR CHEST PA AND LATERAL    CLINICAL HISTORY:  Shortness of breath    TECHNIQUE:  PA and lateral views of the chest were performed.    COMPARISON:  Chest radiograph February 24, 2022    FINDINGS:  Mild bilateral basilar opacities seen on recent chest radiograph have improved and are now nearly resolved.  No acute opacity within either lung.  No pneumothorax or pleural effusion.  Heart is at the upper limits of normal for size.  Atherosclerotic calcifications are present within the thoracic aorta.  No acute osseous abnormality.  Median sternotomy wires are unchanged in alignment.  Loop recorder device position within the anterior chest wall soft tissues.  Impression: As above.    Electronically  signed by: Marcus Raya  Date:    03/09/2022  Time:    09:37        2/24/2022 Modified Barium Swallow     Patient Name:  Tiffanie Wise   MRN:  [id number]        Recommendations:      Recommendations:                General Recommendations:  Dysphagia therapy  Diet recommendations:  Very Soft Solids with grinded meats and thin liquids   Aspiration Precautions: 1 bite/sip at a time, Alternating bites/sips, Double swallow with each bite/sip, Eliminate distractions, HOB to 90 degrees, Remain upright 30 minutes post meal, Small bites/sips and Standard aspiration precautions   General Precautions: Standard, aspiration, hearing impaired, fall  Communication strategies:  none        NM PET CT Routine Skull to Mid Thigh  Narrative: EXAMINATION:  NM PET CT ROUTINE    CLINICAL HISTORY:  palate SCC s/p XRT, f/u;  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  Noncontrast CT imaging was performed for attenuation correction, diagnosis, and anatomical fusion with PET    COMPARISON:  05/06/2021    FINDINGS:  Head/neck: There is normal physiologic uptake noted within the visualized brain parenchyma. There is some persistent very low level uptake in the right side of the neck in a level 2 location which demonstrates a SUV max of 3 as compared to 2.8 on the prior exam.  This is not considered a significant change.  It is possible that some of this uptake in this region could be vascular/muscular in nature.    Chest: All of the previously noted parenchymal opacities within the right lung have resolved in the interval and demonstrate no residual FDG uptake.  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  There is a stable right paratracheal lymph node that demonstrates a short axis measurement approximately 8 mm and  demonstrates no FDG uptake there is evidence for prior median sternotomy.    Abdomen/Pelvis: Normal physiologic uptake noted within the liver, spleen, urinary tract, and bowel.The adrenals are unremarkable in appearance.  No adenopathy noted.  There is prominent diverticulosis of the sigmoid colon.  No diverticulitis.    Skeletal: No FDG avid osseus lesions identified.  Impression: 1. Persistent low level uptake associated with a level 2 lymph node on the right demonstrating SUV max of 3.0 as compared to 2.8.  This is not considered a significant change.  Part of this activity may even be physiologic.  2. Interval resolution of the previously noted right lung opacities in FDG uptake within the right lung.  3. Remaining findings as discussed above.    Electronically signed by: Travis Fritz DO  Date:    08/13/2021  Time:    11:38      4/5/2021 overnight oximetry on room air on auto CPAP 6-20 cm, abnormal     4/5/2021 Overnight Oxygen Saturation Study on room air on auto CPAP 6-20 cm.     INTERPRETATION:   Conditions of Test: Noted above in report     Interpretation of results of overnight oxygen saturation study.   This was a technically  adequate study. Overnight oxygen   saturation study is abnormal with O2 saturation less than 88%.   Time with SpO2<89: 0:07:20, 1.1% of the study period. Lowest   oxygen saturation recorded was 85%.   Clinical correlation suggested.   Vick Meraz MD     Medicare Criteria Comments:   Overnight Oximetry test results suggest the patient does fall   under Medicare Group 1 Criteria and would be eligible for oxygen   prescription.    (Arterial oxygen saturation at or below 88% for at least 5   minutes taken during sleep on stable outpatient)    4/5/2021 6mwd No desaturations requiring supplemental oxygen at rest or exertion.    Phase Oxygen Assessment Supplemental O2 Heart   Rate Blood Pressure Luis Dyspnea Scale Rating   Resting 95 % Room Air 61 bpm 139/61 0   Exercise              Minute             1 93 % Room Air 67 bpm       2 92 % Room Air 68 bpm       3 93 % Room Air 68 bpm       4 93 % Room Air 70 bpm       5 92 % Room Air 72 bpm       6  93 % Room Air 74 bpm 128/55 0   Recovery             Minute             1 95 % Room Air 67 bpm       2 97 % Room Air 60 bpm       3 97 % Room Air 61 bpm       4 97 % Room Air 61 bpm 122/54 0      Six Minute Walk Summary  6MWT Status: completed without stopping  Patient Reported: No complaints             Interpretation:  Did the patient stop or pause?: No  Total Time Walked (Calculated): 360 seconds  Final Partial Lap Distance (feet): 150 feet  Total Distance Meters (Calculated): 228.6 meters  Predicted Distance Meters (Calculated): 378.68 meters  Percentage of Predicted (Calculated): 60.37  Peak VO2 (Calculated): 10.84  Mets: 3.1  Has The Patient Had a Previous Six Minute Walk Test?: No     Previous 6MWT Results  Has The Patient Had a Previous Six Minute Walk Test?: No  (No note.)    CPAP complaince download.     Assessment / plan     Discussed diagnosis, its evaluation, treatment and usual course. All questions answered.    Problem List Items Addressed This Visit     Type 2 diabetes mellitus with hyperglycemia (Chronic)    Overview     Managed by endocrinology, Dr. Yana De Leon, [facility]             Current Assessment & Plan     Managed by endocrinology, Dr. Yana De Leon, [facility]  Hemoglobin A1C   Date Value Ref Range Status   02/21/2022 7.2 (H) 4.0 - 5.6 % Final     Comment:     ADA Screening Guidelines:  5.7-6.4%  Consistent with prediabetes  >or=6.5%  Consistent with diabetes    High levels of fetal hemoglobin interfere with the HbA1C  assay. Heterozygous hemoglobin variants (HbS, HgC, etc)do  not significantly interfere with this assay.   However, presence of multiple variants may affect accuracy.     06/14/2021 7.0 (H) 4.0 - 5.6 % Final     Comment:     ADA Screening Guidelines:  5.7-6.4%  Consistent with  prediabetes  >or=6.5%  Consistent with diabetes    High levels of fetal hemoglobin interfere with the HbA1C  assay. Heterozygous hemoglobin variants (HbS, HgC, etc)do  not significantly interfere with this assay.   However, presence of multiple variants may affect accuracy.     03/03/2021 7.1 (H) 4.0 - 5.6 % Final     Comment:     ADA Screening Guidelines:  5.7-6.4%  Consistent with prediabetes  >or=6.5%  Consistent with diabetes    High levels of fetal hemoglobin interfere with the HbA1C  assay. Heterozygous hemoglobin variants (HbS, HgC, etc)do  not significantly interfere with this assay.   However, presence of multiple variants may affect accuracy.                 Pulmonary nodule, right - stable    Overview     PET CT 12/16/2021  Chest: No FDG avid pulmonary nodules/masses. No FDG avid mediastinal, hilar or axillary lymph nodes.Physiologic FDG uptake in the myocardium.  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  No evidence of FDG avid distant disease.                  Current Assessment & Plan     PET CT 12/16/2021  Chest: No FDG avid pulmonary nodules/masses. No FDG avid mediastinal, hilar or axillary lymph nodes.Physiologic FDG uptake in the myocardium.  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  No evidence of FDG avid distant disease.     Managed by oncology.                  Pulmonary hypertension    Current Assessment & Plan     Adherent to CPAP            Pneumonia- possible aspiration - Primary    Overview     2/24/2022 modified barium swallow  Recommendations:                General Recommendations:  Dysphagia therapy  Diet recommendations:  Very Soft Solids with grinded meats and thin liquids   Aspiration Precautions: 1 bite/sip at a time, Alternating bites/sips, Double swallow with each  bite/sip, Eliminate distractions, HOB to 90 degrees, Remain upright 30 minutes post meal, Small bites/sips and Standard aspiration precautions   General Precautions: Standard, aspiration, hearing impaired, fall  Communication strategies:  none             Current Assessment & Plan     3/9/2022 repeat chest xray resolving opacities  Patient symptomatically improved    2/24/2022 modified barium swallow  Recommendations:                General Recommendations:  Dysphagia therapy  Diet recommendations:  Very Soft Solids with grinded meats and thin liquids   Aspiration Precautions: 1 bite/sip at a time, Alternating bites/sips, Double swallow with each bite/sip, Eliminate distractions, HOB to 90 degrees, Remain upright 30 minutes post meal, Small bites/sips and Standard aspiration precautions   General Precautions: Standard, aspiration, hearing impaired, fall  Communication strategies:  none             Relevant Orders    X-Ray Chest PA And Lateral        Patient presents improved post pneumonia.   Daughter reports patient is following dietary recommendations to prevent aspiration pneumonia as above.   Patient reports adherent to CPAP nightly, not willing to use O2.   Patient former patient,Dr. Estrella, daughter would like to establish with Dr. Pinto.       Follow up in about 3 months (around 6/9/2022) for history aspiration pneumonia review cxr. carter on cpap w/Dr. Pinto, pt request former pt dr estrella.

## 2022-03-09 NOTE — ASSESSMENT & PLAN NOTE
PET CT 12/16/2021  Chest: No FDG avid pulmonary nodules/masses. No FDG avid mediastinal, hilar or axillary lymph nodes.Physiologic FDG uptake in the myocardium.  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  No evidence of FDG avid distant disease.     Managed by oncology.

## 2022-03-11 NOTE — PROGRESS NOTES
CHW - Initial Contact    This Community Health Worker completed OR updated the Social Determinant of Health questionnaire with Connie Worley by telephone today.    Pt identified barriers of most importance are: Speech    Referrals to community agencies completed with patient/caregiver consent outside of [facility] include: [facility] and [facility].  Referrals were put through [facility] - No  Support and Services: Reminding patient to go to [facility] 1st Monday of each month for assistance.  Other information discussed the patient needs / wants help with: Long term care, SDOH and need for mental health evaluation.   Follow up required: Yes  Initial Outreach - Due: 3/9/2022

## 2022-03-21 NOTE — PROGRESS NOTES
Subjective:       Patient ID: Tiffanie Wise is a 81 y.o. female.     right upper arm abscess    Chief Complaint: Post-op Evaluation     Patient reports no pain.  Daughter says that the abscess site is improving    Review of Systems   Skin:        Abscess site is healing       Objective:      Physical Exam  Skin:     Comments: Right arm abscess site is nearly closed it is 2 x 0.2 cm in size.  Silver nitrate was applied   Neurological:      Mental Status: She is alert.         Assessment:       Right upper arm abscess site is nearly healed     Plan:        antibiotic ointment and a Band-Aid for 10 days.      Follow up with surgery as needed

## 2022-03-21 NOTE — PROGRESS NOTES
Health Maintenance Due   Topic Date Due    Eye Exam  08/13/2021    Influenza Vaccine (1) 09/01/2021    COVID-19 Vaccine (3 - Booster for Pfizer series) 09/20/2021    Foot Exam  09/21/2021     Updates were requested from care everywhere.  Chart was reviewed for overdue Proactive [facility] Encounters ([facility]) topics (CRS, Breast Cancer Screening, Eye exam)  Health Maintenance has been updated.  LIN[place] immunization registry triggered.  Immunizations were reconciled.

## 2022-03-22 NOTE — PROGRESS NOTES
CHW - Follow Up    This Community Health Worker completed a follow up visit with Connie Worley by telephone today.  Pt/Caregiver reported: Has to wait until 1st Monday of next month to apply for assistance at [facility].  Community Health Worker provided: Encouragement  Follow up required: yes  Follow-up Outreach - Due: 4/5/2022

## 2022-03-23 NOTE — PROGRESS NOTES
Subjective:     Patient ID: Tiffanie Wise is a 81 y.o. female.    Chief Complaint: Pain of the Right Hand      HPI:  The patient is an 81-year-old female with multiple medical problems.  She has insulin-dependent diabetic.  She has had a stroke.  She has had nerve conduction studies that confirmed carpal tunnel syndrome as well as diabetic neuropathy and she does have calcific deposits in her arterial tree in the hand.  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  She has tried Neurontin and Lyrica without improvement.  She has not tried injection.    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     Trouble in sleeping     Type 2 diabetes mellitus with ophthalmic manifestations     Urinary incontinence     pullups day, pads at night     Past Surgical History:   Procedure Laterality Date    BREAST SURGERY Left     benign cyst    CORONARY ANGIOPLASTY      CORONARY STENT PLACEMENT      x6-7 oer the yeqrs  last 12/17/14 BRIANDA to lcfx    EYE SURGERY Bilateral 2014    cataracts w/IOL     Pinky    FLUOROSCOPY N/A 2021    Procedure: G tube plaacement;  Surgeon: Shaka Cross MD;  Location: [facility] CATH LAB;  Service: General;  Laterality: N/A;    HYSTERECTOMY      vag hyst; ovaries intact    THYROID SURGERY      nodule benign, Dr Hankins    TUBAL LIGATION       Family History   Problem Relation Age of Onset    Kidney disease Father     Kidney disease Brother     Heart disease Mother     Fibromyalgia Daughter     Cancer Son         lung    Cirrhosis Sister     Alcohol abuse Sister     Diabetes Sister     Fibromyalgia Daughter     Diabetes Paternal Grandmother     Stroke Neg Hx     Mental illness Neg Hx     Mental retardation Neg Hx      Social History     Socioeconomic History    Marital status:    Tobacco Use    Smoking status: Former Smoker     Packs/day: 1.00     Years: 46.00     Pack years: 46.00     Types: Cigarettes     Start date:      Quit date: 2008     Years since quittin.7    Smokeless tobacco: Never Used   Substance and Sexual Activity    Alcohol use: No    Drug use: Never    Sexual activity: Not Currently     Birth control/protection: None   Social History Narrative    ( 2nd marriage,  x 1). She has 4 children and 3 step children.. 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 She still drives. [employer] on file, code status is DNR.     Social Determinants of Health     Financial Resource Strain: Low Risk     Difficulty of Paying Living Expenses: Not hard at all   Food Insecurity: No Food Insecurity    Worried About Running Out of Food in the Last Year: Never true    Ran Out of Food in the Last Year: Never true   Transportation Needs: No Transportation Needs    Lack of Transportation (Medical): No    Lack of Transportation (Non-Medical): No   Physical Activity: Inactive    Days of Exercise per Week: 0 days    Minutes of Exercise per Session: 0 min    Stress: Stress Concern Present    Feeling of Stress : Rather much   Social Connections: Moderately Integrated    Frequency of Communication with Friends and Family: More than three times a week    Frequency of Social Gatherings with Friends and Family: More than three times a week    Attends [employer] Services: 1 to 4 times per year    Active Member of Clubs or Organizations: Yes    Attends Club or Organization Meetings: 1 to 4 times per year    Marital Status:    Housing Stability: Low Risk     Unable to Pay for Housing in the Last Year: No    Number of Places Lived in the Last Year: 1    Unstable Housing in the Last Year: No     Medication List with Changes/Refills   Current Medications    ALBUTEROL-IPRATROPIUM (DUO-NEB) 2.5 MG-0.5 MG/3 ML NEBULIZER SOLUTION    Take 3 mLs by nebulization every 6 (six) hours while awake. Rescue    AMLODIPINE (NORVASC) 10 MG TABLET    Take 1 tablet (10 mg total) by mouth once daily.    ASPIRIN 81 MG CHEW    1 tablet (81 mg total) by Per G Tube route once daily.    ATORVASTATIN (LIPITOR) 40 MG TABLET    Take 1 tablet (40 mg total) by mouth every evening.    CLONAZEPAM (KLONOPIN) 0.5 MG TABLET    1 tablet (0.5 mg total) by Per G Tube route every evening.    CLOPIDOGREL (PLAVIX) 75 MG TABLET    Take 1 tablet (75 mg total) by mouth once daily.    CLOTRIMAZOLE-BETAMETHASONE 1-0.05% (LOTRISONE) CREAM    Apply topically 2 (two) times daily.    DONEPEZIL (ARICEPT) 10 MG TABLET    TAKE 1 TABLET (10 MG TOTAL) BY MOUTH ONCE DAILY.    ESCITALOPRAM OXALATE (LEXAPRO) 20 MG TABLET    TAKE 1 TABLET EVERY DAY    HYDROCHLOROTHIAZIDE (HYDRODIURIL) 25 MG TABLET    Take 1 tablet (25 mg total) by mouth once daily.    HYDROCODONE-ACETAMINOPHEN (NORCO) 5-325 MG PER TABLET    Take 1 tablet by mouth every 6 (six) hours as needed for Pain.    INSULIN DETEMIR U-100 (LEVEMIR FLEXTOUCH) 100 UNIT/ML (3 ML) SUBQ INPN PEN    Inject 20 Units into the skin once daily.    ISOSORBIDE MONONITRATE  (IMDUR) 30 MG 24 HR TABLET    Take 1 tablet (30 mg total) by mouth once daily.    LEVOTHYROXINE (SYNTHROID) 50 MCG TABLET    1 tablet (50 mcg total) by Per G Tube route before breakfast.    LOSARTAN (COZAAR) 100 MG TABLET    Take 1 tablet (100 mg total) by mouth once daily.    NITROGLYCERIN (NITROSTAT) 0.4 MG SL TABLET    Place 1 tablet (0.4 mg total) under the tongue every 5 (five) minutes as needed.    PANTOPRAZOLE (PROTONIX) 40 MG TABLET    TAKE 1 TABLET EVERY DAY    PREGABALIN (LYRICA) 75 MG CAPSULE    Take 75 mg by mouth 2 (two) times daily.    VITAMIN D 1000 UNITS TAB    Take 1,000 Units by mouth once daily.     Review of patient's allergies indicates:  No Known Allergies  Review of Systems   Constitutional: Negative for malaise/fatigue.   HENT: Negative for hearing loss.    Eyes: Negative for double vision and visual disturbance.   Cardiovascular: Positive for chest pain.   Respiratory: Positive for sleep disturbances due to breathing. Negative for shortness of breath.    Endocrine: Negative for cold intolerance.   Hematologic/Lymphatic: Does not bruise/bleed easily.   Skin: Negative for poor wound healing and suspicious lesions.   Musculoskeletal: Positive for arthritis, joint pain and joint swelling. Negative for gout.   Gastrointestinal: Positive for dysphagia and heartburn. Negative for nausea and vomiting.   Genitourinary: Negative for dysuria.   Neurological: Positive for focal weakness, numbness, paresthesias, seizures and sensory change.   Psychiatric/Behavioral: Positive for altered mental status and depression. Negative for memory loss and substance abuse. The patient is nervous/anxious.    Allergic/Immunologic: Negative for persistent infections.       Objective:   Body mass index is 26.17 kg/m².  There were no vitals filed for this visit.             General    Constitutional: She is oriented to person, place, and time. She appears well-developed and well-nourished. No distress.   HENT:   Head:  Normocephalic.   Eyes: EOM are normal.   Pulmonary/Chest: Effort normal.   Neurological: She is oriented to person, place, and time.   Psychiatric: She has a normal mood and affect.             Right Hand/Wrist Exam     Inspection   Scars: Wrist - absent Hand -  absent  Effusion: Wrist - absent Hand -  absent    Pain   Wrist - The patient exhibits pain of the flexor/pronator group.    Tests   Phalens sign: positive  Tinel's sign (median nerve): positive  Carpal Tunnel Compression Test: positive    Atrophy   Thenar:  negative  Intrinsic:  negative    Other     Neuorologic Exam    Median Distribution: abnormal  Ulnar Distribution: normal  Radial Distribution: normal    Comments:  The patient has a positive Tinel and positive Phalen sign.  There is no thenar atrophy noted.  She has no area of point tenderness from arthritic change          Vascular Exam       Capillary Refill  Right Hand: normal capillary refill      Relevant imaging results reviewed and interpreted by me, discussed with the patient and / or family today radiographs right hand showed basal joint arthritis and is otherwise unrevealing  Assessment:     Encounter Diagnosis   Name Primary?    Pain of right hand     Carpal tunnel syndrome  Diabetic neuropathy  Ischemia      Plan:       The patient is due for a PET scan tomorrow for her cancer.  I told them to return in about 2 weeks for consideration of injection right wrist              Disclaimer: This note was prepared using a voice recognition system and is likely to have sound alike errors within the text.

## 2022-03-24 NOTE — PROGRESS NOTES
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 female who presents today for follow-up.  She was last seen on 21.  Follow up delayed bc of cellulitis/abscess of R arm and admissions.    Today, she is still eating most foods.   Continues with dental work and using lidocaine occasionally for pain relief in mouth. No sores. Taking lots of Tylenol lately.   Worsening R neck/ear pain, hydrocodone helped but now out.  Small firm nodule on R posterior neck also new.    PET today showed interval increase in R level 2 cervical nodes also with avidity, now SUV max 8.8, no distant disease.    PHYSICAL EXAM:   Constitutional: well appearing, no acute distress, ECOG 2 - Ambulates, capable of self care only  Vitals:    BP (!) 173/55   Pulse (!) 54   Temp 97.3 °F (36.3 °C)   Resp 18   Ht 4' 9" (1.448 m)   Wt 60.5 kg (133 lb 6.4 oz)   LMP  (LMP Unknown)   BMI 28.87 kg/m²   Eyes: sclera anicteric, EOMI, pupils equal, round and reactive to light  ENT: oral cavity without lesions, moist mucous membranes, no mucositis appreciated, visible deficit in R soft palate where tumor was previously  Neck: trachea midline, R neck SCM fibrosis, small nodule on R posterior neck concerning for dermal metastasis  Lymphatic: no cervical, supraclavicular or axillary adenopathy    Laboratory & X-Ray Findings: Per above.  Images reviewed personally.    ASSESSMENT: interval progression of disease on imaging    PLAN: Ms Wise has recurrence of adenopathy in R neck along with nodule on skin of R neck that is concerning for dermal metastasis, after 1 year VICKI.    Generally only curative option for locally recurrent karen disease is salvage resection +/- " adjuvant radiation. I do not feel Ms. Wise could tolerate this, nor would she want to. She and her daughter agree.    Could re-biopsy especially to check for PDL-1 but worry about seeding skin further with new concern for dermal lesion.    Other options would be palliative immunotherapy and/or palliative re-irradiation such as Quadshot regimen.    Would prefer trying for immunotherapy first saving re-irradiation for later if needed.    Palliative care - previously on board, will renotify them to join back for future planning given recurrence of disease  Biopsy for PDL1 - will consider and discuss [facility] potential for immunotherapy  H&N tumor board - will present next week  Hydrocodone has worked well recently so will rx this.    I spent approximately 60 minutes reviewing the available records and evaluating the patient, out of which over 50% of the time was spent face to face with the patient in counseling and coordinating this patient's care.    Isidoro Arce III, M.D.  Radiation Oncology  [facility] [address] [place] [address]  Ph: [phone]  [email or web address]

## 2022-04-01 NOTE — PROGRESS NOTES
Virtual Tumor Board Conference: Virtual  Date Presented to Tumor Board: 4/1/2022  Specialties Present: Medical Oncology; Radiation Oncology; Navigation; Pathology; Head and Neck; Nutrition; Speech Pathology; Radiology  Presentation at Cancer Conference: Prospective  Cancer Type: Head and neck cancer  Recommended Plan Note: Keytruda

## 2022-04-04 NOTE — TELEPHONE ENCOUNTER
Received message from Dr. Arce to have pt see oncology in Dr. Osman's absence for recurrence and recommended Keytruda by H&N tumor board.  I have spoken with pt daughter over the phone today and together we set up appt with dR Walker for 4/8/22 at the cancer center at 1020am . They are in agreement with this appt.   FYI sent to dr. Walker regarding above .   They know to call with any further needs meanwhile.

## 2022-04-06 NOTE — PATIENT INSTRUCTIONS
You may increase hydrocodone 5mg to every 4 hours as needed for pain.    Continue taking Lyrica as previously prescribed.    Follow up with the orthopedic doctor for steroid injection.    Follow up with Dr. Walker on 4/8 to talk about starting immunotherapy.    Follow up with me in 1 month

## 2022-04-06 NOTE — PROGRESS NOTES
Palliative Medicine         Follow up    SUBJECTIVE:   Chief complaint: pain and GOC follow up    04/06/2022   Ms. Wise comes to clinic with her daughter Yfn Henderson to re-engage due to concern of recurrent disease. She has not been seen in the PM clinic in nearly a year as she was able to recover from the toxicity of radiation treatment, wean off all opioids, resume PO intake, and return to independent living. She was recently admitted to the hospital for cellulitis of the arm which is now healing. Ms. Wise admits that she is scared that the lesion on her neck is cancer and will be told that she is dying. She does not wish to proceed with surgery and would not want aggressive radiation but would be open to limited radiation treatments and immunotherapy. She meets with Dr. Walker this Friday to discuss the recommendation of Keytruda and when to start. While she is afraid of dying she does understand that death is nonnegotiable and when her time is shorter would value quality over quantity. She would like to try Keytruda to see if this can prolong her life with minimal side effects. Her biggest complaints are the right ear and jaw pain which is the same pain as in the past when her cancer was so symptomatic. She took a hydrocodone this morning but typically takes only at bedtime. I agree with hydrocodone and am okay with increasing to q4hr PRN. She is very resistant to take opioids given her late daughter's addiction history and recent death. However she acknowledges that it has been helpful and improves her quality of life. We discussed the close monitoring that accompanies our prescribing as well as acknowledging the addictive nature of opioids. She is open to continuing the opioids under my care. In the past she was on fentanyl, oxycodone, and MSIR but this was at the height of her pain from radiation toxicity and eventually improved as the tissues healed. She is currently tolerating hydrocodone with no side  effects besides constipation so we will continue. She having right arm pain since the cellulitis appeared which is neuropathic in nature and refractory to Lyrica. She has met with ortho and plans to follow up for a steroid injection in the wrist soon. I have sent [facility] resources and will plan to follow up in 1 month.  She has HCPOA on file and LaPOST. I confirmed her LaPOST and DNR/I are consistent with her current views and wishes.    Past visits:  4/6/2021: 81 yo female seen in f/u for oral pain s/p radiation therapy. She was recently discharged from SNF following hospitalization and has experienced some deconditioning, but is able to ambulate with rolling walker and standby assistance. She is using magic mouthwash/viscous lidocaine for oral pain with good relief. She has continued on fentanyl 50 mcg TD for pain management, patch change due today and is being given oxycodone 5 mg twice daily. She is home from SNF with PEG for feeding and home health from ST/OT/PT. She saw rad onc today and has upcoming PET scan for restaging today. Daughter reports that things are going well with home health; she is hopeful dysphagia will resolve with continued ST.  Brain's main concerns today are that her mother is drowsy and continues to sleep much of the day. She thinks that her mom's pain is much improved outside of continued tongue ulceration and is interested in backing off of her pain medication in hopes that this will increase her wakeful time.       ANAND GARCIA reviewed and summarized:        Past Medical History:   Diagnosis Date    Anxiety     AP (angina pectoris) 7/18/2013    Arterial ischemic stroke, MCA (middle cerebral artery), left, acute 12/15/2017    Asthma     CAD, multiple vessel 5/13/2021    Class 1 obesity due to excess calories with serious comorbidity and body mass index (BMI) of 30.0 to 30.9 in adult 3/15/2019    Coronary artery disease 7/18/2013    Depression     Diastolic heart failure     GERD  (gastroesophageal reflux disease) 7/18/2013    History of PTCA 7/18/2013    Hypertension 7/18/2013    Hypothyroidism     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     Trouble in sleeping     Type 2 diabetes mellitus with ophthalmic manifestations     Urinary incontinence     pullups day, pads at night     Review of patient's allergies indicates:  No Known Allergies    Medications:    Current Outpatient Medications:     albuterol-ipratropium (DUO-NEB) 2.5 mg-0.5 mg/3 mL nebulizer solution, Take 3 mLs by nebulization every 6 (six) hours while awake. Rescue, Disp: 90 mL, Rfl: 0    amLODIPine (NORVASC) 10 MG tablet, Take 1 tablet (10 mg total) by mouth once daily., Disp: 90 tablet, Rfl: 4    atorvastatin (LIPITOR) 40 MG tablet, Take 1 tablet (40 mg total) by mouth every evening., Disp: 90 tablet, Rfl: 4    clonazePAM (KLONOPIN) 0.5 MG tablet, 1 tablet (0.5 mg total) by Per G Tube route every evening. (Patient taking differently: Take 0.5 mg by mouth every evening.), Disp: 30 tablet, Rfl: 0    clopidogreL (PLAVIX) 75 mg tablet, Take 1 tablet (75 mg total) by mouth once daily., Disp: 90 tablet, Rfl: 3    clotrimazole-betamethasone 1-0.05% (LOTRISONE) cream, Apply topically 2 (two) times daily., Disp: 45 g, Rfl: 0    donepeziL (ARICEPT) 10 MG tablet, TAKE 1 TABLET (10 MG TOTAL) BY MOUTH ONCE DAILY., Disp: 90 tablet, Rfl: 3    EScitalopram oxalate (LEXAPRO) 20 MG tablet, TAKE 1 TABLET EVERY DAY, Disp: 90 tablet, Rfl: 3    hydroCHLOROthiazide (HYDRODIURIL) 25 MG tablet, Take 1 tablet (25 mg total) by mouth once daily., Disp: 90 tablet, Rfl: 4    HYDROcodone-acetaminophen (NORCO) 5-325  mg per tablet, Take 1 tablet by mouth every 6 (six) hours as needed for Pain., Disp: 90 tablet, Rfl: 0    insulin detemir U-100 (LEVEMIR FLEXTOUCH) 100 unit/mL (3 mL) SubQ InPn pen, Inject 20 Units into the skin once daily., Disp: , Rfl: 0    isosorbide mononitrate (IMDUR) 30 MG 24 hr tablet, Take 1 tablet (30 mg total) by mouth once daily., Disp: 90 tablet, Rfl: 4    losartan (COZAAR) 100 MG tablet, Take 1 tablet (100 mg total) by mouth once daily., Disp: 90 tablet, Rfl: 4    nitroGLYCERIN (NITROSTAT) 0.4 MG SL tablet, Place 1 tablet (0.4 mg total) under the tongue every 5 (five) minutes as needed., Disp: 25 tablet, Rfl: 6    pantoprazole (PROTONIX) 40 MG tablet, TAKE 1 TABLET EVERY DAY, Disp: 90 tablet, Rfl: 0    pregabalin (LYRICA) 75 MG capsule, Take 75 mg by mouth 2 (two) times daily., Disp: , Rfl:     vitamin D 1000 units Tab, Take 1,000 Units by mouth once daily., Disp: , Rfl:     aspirin 81 MG Chew, 1 tablet (81 mg total) by Per G Tube route once daily. (Patient taking differently: Take 81 mg by mouth once daily.), Disp: , Rfl: 0    levothyroxine (SYNTHROID) 50 MCG tablet, 1 tablet (50 mcg total) by Per G Tube route before breakfast. (Patient taking differently: Take 50 mcg by mouth before breakfast.), Disp: 30 tablet, Rfl:     OBJECTIVE:     ROS:  Review of Systems   Constitutional: Negative for activity change, appetite change, chills and fever.   HENT: Positive for ear pain and hearing loss. Negative for ear discharge, mouth sores, postnasal drip, sore throat, trouble swallowing and voice change.    Respiratory: Negative for cough and shortness of breath.    Cardiovascular: Negative for chest pain and leg swelling.   Gastrointestinal: Negative for abdominal pain, constipation, nausea and vomiting.   Genitourinary: Negative for difficulty urinating and dysuria.   Musculoskeletal: Negative for back pain and myalgias.   Skin: Negative for rash and wound.   Allergic/Immunologic: Negative for  immunocompromised state.   Neurological: Negative for weakness and headaches.   Psychiatric/Behavioral: Negative for confusion and hallucinations.       Review of Symptoms    Symptom Assessment (ESAS 0-10 Scale)  Pain:  7  Dyspnea:  0  Anxiety:  10  Nausea:  0  Depression:  10  Anorexia:  0  Fatigue:  7  Insomnia:  0  Restlessness:  0  Agitation:  10     CAM / Delirium:  Negative  Constipation:  Positive  Diarrhea:  Negative    Bowel Management Plan (BMP):  No      Pain Assessment:  Location(s): ear    Ear       Location: right        Quality: cramping        Quantity: 7/10 in intensity week(s) ago, unchanged        Aggravating Factors: none        Alleviating Factors: opiates        Associated Symptoms: none    ECOG Performance Status ndGndrndanddndend:nd nd2nd Living Arrangements:  Lives alone      Advance Care Planning   Advance Directives:   Living Will: No    LaPOST: Yes    Do Not Resuscitate Status: Yes    Medical Power of : Yes    Agent's Name:  1: dtr Connie Chinchilla (Harilynn)arre [phone], 2: son in law Juan Jose [place] [phone]    Decision Making:  Patient answered questions and Family answered questions          Physical Exam:  Vitals: Temp: 98 °F (36.7 °C) (04/06/22 1139)  Pulse: (!) 53 (04/06/22 1139)  Resp: 16 (04/06/22 1139)  BP: 134/80 (04/06/22 1139)  SpO2: 99 % (04/06/22 1139)  Physical Exam  Vitals and nursing note reviewed.   Constitutional:       General: She is not in acute distress.     Appearance: Normal appearance. She is not ill-appearing.   HENT:      Head: Normocephalic and atraumatic.      Ears:      Comments: Very hard of hearing, reads lips  Eyes:      Pupils: Pupils are equal, round, and reactive to light.   Cardiovascular:      Rate and Rhythm: Normal rate and regular rhythm.      Pulses: Normal pulses.      Heart sounds: Normal heart sounds. No murmur heard.  Pulmonary:      Effort: Pulmonary effort is normal. No respiratory distress.      Breath sounds: Normal breath sounds.   Abdominal:       General: Bowel sounds are normal. There is no distension.      Palpations: Abdomen is soft.      Tenderness: There is no abdominal tenderness.   Musculoskeletal:         General: Normal range of motion.      Cervical back: Normal range of motion.      Right lower leg: No edema.      Left lower leg: No edema.   Skin:     General: Skin is warm and dry.      Findings: Lesion (at base of R ear) present.   Neurological:      Mental Status: She is alert and oriented to person, place, and time. Mental status is at baseline.      Cranial Nerves: No cranial nerve deficit.   Psychiatric:         Mood and Affect: Mood normal.         Behavior: Behavior normal.         Thought Content: Thought content normal.         Judgment: Judgment normal.         Radiology and laboratory data reviewed    ASSESSMENT/PLAN:     Problem List Items Addressed This Visit        Oncology    Malignant neoplasm of pharyngeal tonsil    Current Assessment & Plan     Now with dermal lesions concerning for recurrent and metastatic disease    She will see [facility] this week to discuss tumor board's recommendation of Keytruda           Cancer associated pain - Primary    Overview     Ear and jaw referred pain well controlled with hydrocodone    Continue hydrocodone 5mg q4hr PRN pain- will refill when needed              GI    Therapeutic opioid-induced constipation (OIC)    Overview     Resources provided    Recommend daily Miralax and Senna PRN                 Other    Encounter for palliative care    Overview     LaPOST on file current with wishes of DNR/I    HCPOA on file: 1: dtr Connie (Malvin) Meir [phone], 2: son in law Juan Jose [place] [phone]                     Follow up: 1 month    65 minutes total visit time    45 minutes of total time spent on the encounter, which includes face to face time and non-face to face time preparing to see the patient (eg, review of tests), Obtaining and/or reviewing separately obtained history,  Documenting clinical information in the electronic or other health record, Independently interpreting results (not separately reported) and communicating results to the patient/family/caregiver, or Care coordination (not separately reported).    20 minutes spent in discussing ACP    Signature: Zohra Dudley PA-C

## 2022-04-06 NOTE — ASSESSMENT & PLAN NOTE
Now with dermal lesions concerning for recurrent and metastatic disease    She will see [facility] this week to discuss tumor board's recommendation of Keytruda

## 2022-04-08 PROBLEM — F02.80 DEMENTIA IN OTHER DISEASES CLASSIFIED ELSEWHERE WITHOUT BEHAVIORAL DISTURBANCE: Status: ACTIVE | Noted: 2022-01-01

## 2022-04-08 PROBLEM — D84.821 IMMUNODEFICIENCY DUE TO CHEMOTHERAPY: Status: ACTIVE | Noted: 2022-01-01
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
## 2022-04-08 PROBLEM — T45.1X5A IMMUNODEFICIENCY DUE TO CHEMOTHERAPY: Status: ACTIVE | Noted: 2022-01-01

## 2022-04-08 PROBLEM — D84.822 IMMUNODEFICIENCY SECONDARY TO RADIATION THERAPY: Status: ACTIVE | Noted: 2022-01-01

## 2022-04-08 PROBLEM — Z79.899 IMMUNODEFICIENCY DUE TO CHEMOTHERAPY: Status: ACTIVE | Noted: 2022-01-01

## 2022-04-08 NOTE — PROGRESS NOTES
Subjective:       Patient ID: Tiffanie Wise is a 81 y.o. female.    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  Patient now has clinical evidence of dermal recurrence per send recently had neck tumor conference was asked to initiate systemic immunotherapy.  ECOG status 2 accompanied by daughter    Past Medical History:   Diagnosis Date    Anxiety     AP (angina pectoris) 7/18/2013    Arterial ischemic stroke, MCA (middle cerebral artery), left, acute 12/15/2017    Asthma     CAD, multiple vessel 5/13/2021    Class 1 obesity due to excess calories with serious comorbidity and body mass index (BMI) of 30.0 to 30.9 in adult 3/15/2019    Coronary artery disease 7/18/2013    Depression     Diastolic heart failure     GERD (gastroesophageal reflux disease) 7/18/2013    History of PTCA 7/18/2013    Hypertension 7/18/2013    Hypothyroidism     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     Trouble in sleeping     Type 2 diabetes mellitus with ophthalmic manifestations     Urinary incontinence     pullups day, pads at night     Family History   Problem Relation Age of Onset    Kidney disease Father     Kidney disease Brother     Heart disease Mother     Fibromyalgia Daughter     Cancer Son         lung    Cirrhosis Sister     Alcohol abuse Sister     Diabetes Sister     Fibromyalgia Daughter     Diabetes Paternal Grandmother     Stroke  Neg Hx     Mental illness Neg Hx     Mental retardation Neg Hx      Social History     Socioeconomic History    Marital status:    Tobacco Use    Smoking status: Former Smoker     Packs/day: 1.00     Years: 46.00     Pack years: 46.00     Types: Cigarettes     Start date:      Quit date: 2008     Years since quittin.7    Smokeless tobacco: Never Used   Substance and Sexual Activity    Alcohol use: No    Drug use: Never    Sexual activity: Not Currently     Birth control/protection: None   Social History Narrative    ( 2nd marriage,  x 1). She has 4 children and 3 step children.. 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 She still drives. LaPost on file, code status is DNR.     Social Determinants of Health     Financial Resource Strain: Low Risk     Difficulty of Paying Living Expenses: Not hard at all   Food Insecurity: No Food Insecurity    Worried About Running Out of Food in the Last Year: Never true    Ran Out of Food in the Last Year: Never true   Transportation Needs: No Transportation Needs    Lack of Transportation (Medical): No    Lack of Transportation (Non-Medical): No   Physical Activity: Inactive    Days of Exercise per Week: 0 days    Minutes of Exercise per Session: 0 min   Stress: Stress Concern Present    Feeling of Stress : Rather much   Social Connections: Moderately Integrated    Frequency of Communication with Friends and Family: More than three times a week    Frequency of Social Gatherings with Friends and Family: More than three times a week    Attends Orthodox Services: 1 to 4 times per year    Active Member of Clubs or Organizations: Yes    Attends Club or Organization Meetings: 1 to 4 times per year    Marital Status:    Housing Stability: Low Risk     Unable to Pay for Housing in the Last Year: No    Number of Places Lived in the Last Year: 1    Unstable Housing  in the Last Year: No     Past Surgical History:   Procedure Laterality Date    BREAST SURGERY Left     benign cyst    CORONARY ANGIOPLASTY      CORONARY STENT PLACEMENT      x6-7 oer the yeqrs  last 12/17/14 BRIANDA to lcfx    EYE SURGERY Bilateral 2014    cataracts w/IOL   Dr Vance    FLUOROSCOPY N/A 1/20/2021    Procedure: G tube plaacement;  Surgeon: Shaka Cross MD;  Location[facility] CATH LAB;  Service: General;  Laterality: N/A;    HYSTERECTOMY  1970    vag hyst; ovaries intact    THYROID SURGERY      nodule benign, Dr Hankins    TUBAL LIGATION  1970       Labs:  Lab Results   Component Value Date    WBC 10.51 02/26/2022    HGB 10.1 (L) 02/26/2022    HCT 31.2 (L) 02/26/2022    MCV 88 02/26/2022     02/26/2022     BMP  Lab Results   Component Value Date     02/26/2022    K 4.2 02/26/2022     02/26/2022    CO2 20 (L) 02/26/2022    BUN 10 02/26/2022    CREATININE 1.1 02/26/2022    CALCIUM 8.6 (L) 02/26/2022    ANIONGAP 14 02/26/2022    ESTGFRAFRICA 54 (A) 02/26/2022    EGFRNONAA 47 (A) 02/26/2022     Lab Results   Component Value Date    ALT 14 02/22/2022    AST 19 02/22/2022    ALKPHOS 101 02/22/2022    BILITOT 0.4 02/22/2022       Lab Results   Component Value Date    IRON 82 08/05/2021    TIBC 292 08/05/2021    FERRITIN 277 08/05/2021     Lab Results   Component Value Date    MLDPHBCU62 814 01/28/2021     Lab Results   Component Value Date    FOLATE 9.4 01/28/2021     Lab Results   Component Value Date    TSH 1.124 02/21/2022         Review of Systems   Constitutional: Positive for fatigue. Negative for activity change, appetite change, chills, diaphoresis, fever and unexpected weight change.   HENT: Positive for ear pain and hearing loss. Negative for congestion, dental problem, drooling, ear discharge, facial swelling, mouth sores, nosebleeds, postnasal drip, rhinorrhea, sinus pressure, sneezing, sore throat, tinnitus, trouble swallowing and voice change.    Eyes: Negative for  photophobia, pain, discharge, redness, itching and visual disturbance.   Respiratory: Negative for cough, choking, chest tightness, shortness of breath, wheezing and stridor.    Cardiovascular: Negative for chest pain, palpitations and leg swelling.   Gastrointestinal: Negative for abdominal distention, abdominal pain, anal bleeding, blood in stool, constipation, diarrhea, nausea, rectal pain and vomiting.   Endocrine: Negative for cold intolerance, heat intolerance, polydipsia, polyphagia and polyuria.   Genitourinary: Negative for decreased urine volume, difficulty urinating, dyspareunia, dysuria, enuresis, flank pain, frequency, genital sores, hematuria, menstrual problem, pelvic pain, urgency, vaginal bleeding, vaginal discharge and vaginal pain.   Musculoskeletal: Negative for arthralgias, back pain, gait problem, joint swelling, myalgias, neck pain and neck stiffness.   Skin: Negative for color change, pallor and rash.   Allergic/Immunologic: Negative for environmental allergies, food allergies and immunocompromised state.   Neurological: Positive for weakness. Negative for dizziness, tremors, seizures, syncope, facial asymmetry, speech difficulty, light-headedness, numbness and headaches.   Hematological: Negative for adenopathy. Does not bruise/bleed easily.   Psychiatric/Behavioral: Positive for dysphoric mood. Negative for agitation, behavioral problems, confusion, decreased concentration, hallucinations, self-injury, sleep disturbance and suicidal ideas. The patient is nervous/anxious. The patient is not hyperactive.        Objective:      Physical Exam  Vitals reviewed.   Constitutional:       General: She is not in acute distress.     Appearance: She is well-developed. She is ill-appearing. She is not diaphoretic.   HENT:      Head: Normocephalic and atraumatic.        Right Ear: External ear normal.      Left Ear: External ear normal.      Nose: Nose normal.      Right Sinus: No maxillary sinus  tenderness or frontal sinus tenderness.      Left Sinus: No maxillary sinus tenderness or frontal sinus tenderness.      Mouth/Throat:      Pharynx: No oropharyngeal exudate.   Eyes:      General: Lids are normal. No scleral icterus.        Right eye: No discharge.         Left eye: No discharge.      Conjunctiva/sclera: Conjunctivae normal.      Right eye: Right conjunctiva is not injected. No hemorrhage.     Left eye: Left conjunctiva is not injected. No hemorrhage.     Pupils: Pupils are equal, round, and reactive to light.   Neck:      Thyroid: No thyromegaly.      Vascular: No JVD.      Trachea: No tracheal deviation.   Cardiovascular:      Rate and Rhythm: Normal rate and regular rhythm.      Heart sounds: Normal heart sounds.   Pulmonary:      Effort: Pulmonary effort is normal. No respiratory distress.      Breath sounds: Normal breath sounds. No stridor.   Chest:      Chest wall: No tenderness.   Breasts:      Right: No supraclavicular adenopathy.      Left: No supraclavicular adenopathy.       Abdominal:      General: Bowel sounds are normal. There is no distension.      Palpations: Abdomen is soft. There is no hepatomegaly, splenomegaly or mass.      Tenderness: There is no abdominal tenderness. There is no rebound.   Musculoskeletal:         General: No tenderness. Normal range of motion.      Cervical back: Normal range of motion and neck supple.   Lymphadenopathy:      Cervical: No cervical adenopathy.      Upper Body:      Right upper body: No supraclavicular adenopathy.      Left upper body: No supraclavicular adenopathy.   Skin:     General: Skin is dry.      Findings: No erythema or rash.   Neurological:      Mental Status: She is alert and oriented to person, place, and time.      Cranial Nerves: No cranial nerve deficit.      Coordination: Coordination normal.   Psychiatric:         Behavior: Behavior normal.         Thought Content: Thought content normal.         Judgment: Judgment normal.              Assessment:      1. Malignant neoplasm of pharyngeal tonsil    2. Abnormal results of thyroid function studies     3. 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 Seizure    5. Gastrostomy status    6. 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 Cancer associated pain           Plan:   Reviewed information provided reviewed of tumor conference recommendations from head and neck.  To initiate single agent Keytruda.  Spoke with radiation oncologist prefers not to biopsy for fear of spread of dermal metastasis.  At this time will proceed with next generation sequencing on 2020 material.  And will proceed with in with initiation of single agent Keytruda therapy orders written reviewed signed orders placed and reviewed information through the Oncology pathways.  Discussed implications nurse navigator present total time 40 minutes returns 6 weeks standing orders CBC CMP and TSH          Arden Walker Jr, MD FACP

## 2022-04-08 NOTE — NURSING
Met with patient and her daughter in person during DR. Walker office visit today.  We reviewed my role as nurse navigator and they have my direct contact information as well as the after hours number  Per DR. Walker and Dr. Arce conversation the patient will not have a biopsy performed on her neck  Treatment plan placed for Keytruda today per Dr. Walker   I will follow insurance approval per process and notify pt once approved and set up for infusion  Pt had labs drawn today and informed consent obtained and scanned to media  Infusion will take place with orders written and no new labs needed per dr. Walker  Message to Leilani Ricketts RN in research to obtain STRATA on  biopsy sample as per dr Walker request.   Immunotherapy education session set for 22 at the [facility] at 1000am  Pt does not want to have any appts other than at the cancer center  Once approved I will set up c1d1 and notify infusion nurse to set follow up with lab, MD and infusion 6 weeks later.  I will notify social work team and  of new drug start.   Pt is vaccinated against covid 19 so therefore no need for screening test.   Pt and daughter know to contact me directly with any further concerns meanwhile.   Oncology Navigation   Intake  Cancer Type: Head and Neck  Initial Nurse Navigator Contact: 2022  Date Worked: 2022  Multiple appointments: Yes     Treatment  Current Status: Active  Date Presented to Tumor Board: 2022       Medical Oncologist: carlitos  Immunotherapy: Planned  Immunotherapy Name: Immunotherapy  Keytruda                   Support Systems: Family members     Acuity  Systemic Treatment - predicted or initiated: Chemotherapy regimen with multiple drugs (+1)  Treatment Tolerability: Has not started treatment yet/treatment fully completed and side effects resolved  ECO-3 (+1)   Needed: No  Support: Patient reports adequate support system  Transportation: Adequate transportation  for treatment  Verbalizes the need for more education: Yes  Navigation Acuity: 4     Follow Up  No follow-ups on file.

## 2022-04-08 NOTE — LETTER
April 11, 2022    Tiffanie M Frandy  [address]  [place] [address]             [facility]  [address] [place] [address] Dear Mrs. Wise:    My name is Nata Quispe and I work with [facility]s outpatient case management department. We received a referral to call you to discuss your medical history. I attempted to reach you by telephone, but I was unsuccessful. Please call our department that we may go over some questions with you regarding your health. My phone number is [phone]. This is a FREE program from [facility].     The outpatient case management department can be reached at [phone] from 8:00 am to 4:30 PM on Monday thru Friday. [facility] also has a program where a nurse is available 24/7 to answer questions or provide medical advice their number is [phone].    If you have any questions or concerns, please don't hesitate to call.    Sincerely,        Nata Quispe RN

## 2022-04-08 NOTE — PLAN OF CARE
START ON PATHWAY REGIMEN - Head and Neck    [id number]        Pembrolizumab (Keytruda)           Additional Orders: Serious immune-mediated adverse events can occur with   pembrolizumab. Please monitor your patient and refer to the linked   immune-mediated adverse reaction management materials for more information.    **Always confirm dose/schedule in your pharmacy ordering system**    Patient Characteristics:  Oropharynx, HPV Positive, Metastatic, First Line, No Prior Platinum-Based   Chemoradiation within 6 Months, PD-L1 Expression Positive (CPS ? 1)  Disease Classification: Oropharynx  HPV Status: Positive (+)  Therapeutic Status: Metastatic Disease  Line of Therapy: First Line  Prior Platinum Status: No Prior Platinum-Based Chemoradiation within 6 Months  PD-L1 Expression Status: PD-L1 Positive (CPS ? 1)  Intent of Therapy:  Non-Curative / Palliative Intent, Discussed with Patient

## 2022-04-12 NOTE — PROGRESS NOTES
Met with patient and daughter, in person____) to discuss upcoming systemic therapy initiation. Discussed patient diagnosis including staging information. Also discussed that therapy regimen prescribed involves the following drugs: __Keytruda__, and timeline of therapy administration. Went over what to expect on first day of treatment, including what to bring with you, visitor policy, and infusion suite guidelines. Also discussed supportive and shared services available to patient, including social work, financial counseling, oncology nutrition, and oncology psychology.      Covered with patient potential side effects and symptom management. Reviewed supportive medications that will be given before, during, and after treatment. Also stressed that other side effects are possible outside of those listed. Spent additional time on signs of infection, infection prevention, and proper nutrition/hydration.    Education provided to patient via [employer] specific drug information and  chemotherapy education binder___). Also provided contact information for clinic and information related to [facility] communication. Discussed communication process for after-hours needs and some common scenarios in which patient should call provider for guidance vs. immediately report to the emergency room.     Finally, patient was given my contact information. Encouraged patient to call with any questions, concerns, or needs. Patient verbalized understanding of all above information.

## 2022-04-13 NOTE — TELEPHONE ENCOUNTER
Spoke with pt daughter over the phone to review upcoming Keytruda infusion appt.   Pt will attend 4/22/22 at 1PM at the [facility] center infusion room with orders written, and using labs from 4/8 per dr urbina, He is aware of and ok with TSH value.   . Explained that infusion nurses and/or  will set next appt for lab, MD and Keytruda infusion in six weeks from first infusion. They know to call in the interim with any concerns.   Social work team and  notified of new pt start.

## 2022-04-19 NOTE — PROGRESS NOTES
4/1/22 1603  Attempt assessment with patient for outpatient case management. Left voicemail message requesting call back. RN OPCM 3rd assessment attempt.Message sent to Dr. Wood regarding unable to reach for enrollment into OPCM program. OPCM brochure with RN case  and [facility] brochure sent to . CASE CLOSURE.  Nata Quispe RN

## 2022-04-22 NOTE — DISCHARGE INSTRUCTIONS
.[facility]  [address]  [address]  [phone] phone     [phone] fax  Hours of Operation: Monday- Friday 8:00am- 5:00pm  After hours phone  [phone]  Hematology / Oncology Physicians on call    Dr. Aaron Garcia      Nurse Practitioners:    Susan Cardenas, DOLORES Bah, DOLORES Perez, DEEPAK Mac      Please don't hesitate to call if you have any concerns.       .WAYS TO HELP PREVENT INFECTION        WASH YOUR HANDS OFTEN DURING THE DAY, ESPECIALLY BEFORE YOU EAT, AFTER USING THE BATHROOM, AND AFTER TOUCHING ANIMALS    STAY AWAY FROM PEOPLE WHO HAVE ILLNESSES YOU CAN CATCH; SUCH AS COLDS, FLU, CHICKEN POX    TRY TO AVOID CROWDS    STAY AWAY FROM CHILDREN WHO RECENTLY HAVE RECEIVED LIVE VIRUS VACCINES    MAINTAIN GOOD MOUTH CARE    DO NOT SQUEEZE OR SCRATCH PIMPLES    CLEAN CUTS & SCRAPES RIGHT AWAY AND DAILY UNTIL HEALED WITH WARM WATER, SOAP & AN ANTISEPTIC    AVOID CONTACT WITH LITTER BOXES, BIRD CAGES, & FISH TANKS    AVOID STANDING WATER, IE., BIRD BATHS, FLOWER POTS/VASES, OR HUMIDIFIERS    WEAR GLOVES WHEN GARDENING OR CLEANING UP AFTER OTHERS, ESPECIALLY BABIES & SMALL CHILDREN    DO NOT EAT RAW FISH, SEAFOOD, MEAT, OR EGGS   .FALL PREVENTION   Falls often occur due to slipping, tripping or losing your balance. Here are ways to reduce your risk of falling again.   Was there anything that caused your fall that can be fixed, removed or replaced?   Make your home safe by keeping walkways clear of objects you may trip over.   Use non-slip pads under rugs.   Do not walk in poorly lit areas.   Do not stand on chairs or wobbly ladders.   Use caution when reaching overhead or looking upward. This position can cause a loss of balance.   Be sure your shoes fit properly, have non-slip bottoms and are in good condition.   Be cautious when going up and down stairs, curbs, and when walking on  uneven sidewalks.   If your balance is poor, consider using a cane or walker.   If your fall was related to alcohol use, stop or limit alcohol intake.   If your fall was related to use of sleeping medicines, talk to your doctor about this. You may need to reduce your dosage at bedtime if you awaken during the night to go to the bathroom.   To reduce the need for nighttime bathroom trips:   Avoid drinking fluids for several hours before going to bed   Empty your bladder before going to bed   Men can keep a urinal at the bedside   © 9903-4754 [facility], [address] [place] [address], [place] [address]. All rights reserved. This information is not intended as a substitute for professional medical care. Always follow your healthcare professional's instructions.

## 2022-04-29 NOTE — TELEPHONE ENCOUNTER
No new care gaps identified.  Powered by [employer]. Reference number: [id number].   4/29/2022 2:20:35 AM CDT

## 2022-04-29 NOTE — TELEPHONE ENCOUNTER
Refill Routing Note   Medication(s) are not appropriate for processing by [facility] for the following reason(s):      - Drug-Disease Interaction (Osteoporosis and pantoprazole)    [facility] action(s):  Defer Medication-related problems identified: Drug-disease interaction     Medication Therapy Plan: Drug disease interaction: Osteoporosis and pantoprazole; Defer to PCP  Medication reconciliation completed: No     Appointments  past 12m or future 3m with PCP    Date Provider   Last Visit   3/8/2022 Tiffanie Spence MD   Next Visit   9/8/2022 Tiffanie Spence MD   ED visits in past 90 days: 1        Note composed:8:46 AM 04/29/2022

## 2022-05-05 NOTE — TELEPHONE ENCOUNTER
Attempted to call patient's daughter regarding the message she sent about the tumor on the side of her mother's head & Dr Arce's recommendation about it. There was no answer, left a message along with our number for her to call back.

## 2022-06-01 NOTE — PROGRESS NOTES
Palliative Medicine         Follow up    SUBJECTIVE:   Chief complaint: pain and GOC follow up    06/01/2022   Ms. Wise returns to clinic with her daughter Malvin. During the check in process the two ended up having an emotional conversation about Malvin's concerns that her mom is not taking her pain medications and suffering which is frustrating and stressful for her daughter. By the time I entered the room they were both smiling and said they felt better. Ms. Wise says since I saw her last she is not doing well. The knot below her right ear has ruptured and draining but also intensifying in pain. She has preauricular fullness and induration which is painful and now affecting her ability to eat. She is having odynophagia and minimizing her oral intake to liquids and soft foods. Malvin is worried about getting her large pill tablets down and is expecting to have to crush them soon. Ms. Wise's main complaint is not being able to sleep and admits she cannot wear her CPAP as the strap was rubbing on the sore. She usually sleeps very well with the CPAP and Malvin thinks this is the main culprit for her insomnia. Ms. Wise is reluctant to take pain medications given her late daugther's substance abuse history. She brought her log of pain medication usage and shows TID usage on average. She does not like taking the medication but admits that she waits too long then the pain is too severe for the medications to make much difference. She tries to nap after taking a dose but rarely succeeds. I recommend using a shield to keep the CPAP straps off her neck and hope this allows her to rest. During her first round of treatment we had a difficult time managing her pain and she required dose escalation with multiple agents. She seems to respond better to long acting agents likely because of the basal control but also minimal dosing. In light of her neuropathic component I would like to restart methadone. She  initially was resistant as she thought I meant methamphetamine but after more education agreed. I will start low but plan to titrate the dose in the coming weeks given her past opioid usage and requirements. It also seems that the dose of hydrocodone is inadequate thus I will increase to 10mg q4hr PRN pain. Malvin is worried about progression and wonders when it is time to admit this is a failed treatment plan and begin to look at other options. She understands that radiation is an option but is not recommended unless it is the last option. Ms. Wise knows that the worsening pain and induration likely mean progression and knows that she will likely be making hard decisions soon. They will meet with Dr. Arce on Friday and both are eager to hear his take on the situation.     Past visits:  4/6/22: Ms. Wise comes to clinic with her daughter Yfn Henderson to re-engage due to concern of recurrent disease. She has not been seen in the PM clinic in nearly a year as she was able to recover from the toxicity of radiation treatment, wean off all opioids, resume PO intake, and return to independent living. She was recently admitted to the hospital for cellulitis of the arm which is now healing. Ms. Wise admits that she is scared that the lesion on her neck is cancer and will be told that she is dying. She does not wish to proceed with surgery and would not want aggressive radiation but would be open to limited radiation treatments and immunotherapy. She meets with Dr. Wlaker this Friday to discuss the recommendation of Keytruda and when to start. While she is afraid of dying she does understand that death is nonnegotiable and when her time is shorter would value quality over quantity. She would like to try Keytruda to see if this can prolong her life with minimal side effects. Her biggest complaints are the right ear and jaw pain which is the same pain as in the past when her cancer was so symptomatic. She took a  hydrocodone this morning but typically takes only at bedtime. I agree with hydrocodone and am okay with increasing to q4hr PRN. She is very resistant to take opioids given her late daughter's addiction history and recent death. However she acknowledges that it has been helpful and improves her quality of life. We discussed the close monitoring that accompanies our prescribing as well as acknowledging the addictive nature of opioids. She is open to continuing the opioids under my care. In the past she was on fentanyl, oxycodone, and MSIR but this was at the height of her pain from radiation toxicity and eventually improved as the tissues healed. She is currently tolerating hydrocodone with no side effects besides constipation so we will continue. She having right arm pain since the cellulitis appeared which is neuropathic in nature and refractory to Lyrica. She has met with ortho and plans to follow up for a steroid injection in the wrist soon. I have sent [facility] resources and will plan to follow up in 1 month.  She has HCPOA on file and LaPOST. I confirmed her LaPOST and DNR/I are consistent with her current views and wishes.      4/6/2021: 79 yo female seen in f/u for oral pain s/p radiation therapy. She was recently discharged from SNF following hospitalization and has experienced some deconditioning, but is able to ambulate with rolling walker and standby assistance. She is using magic mouthwash/viscous lidocaine for oral pain with good relief. She has continued on fentanyl 50 mcg TD for pain management, patch change due today and is being given oxycodone 5 mg twice daily. She is home from SNF with PEG for feeding and home health from ST/[facility]/PT. She saw rad onc today and has upcoming PET scan for restaging today. Daughter reports that things are going well with home health; she is hopeful dysphagia will resolve with continued STNahum  Brain's main concerns today are that her mother is drowsy and continues to sleep much  of the day. She thinks that her mom's pain is much improved outside of continued tongue ulceration and is interested in backing off of her pain medication in hopes that this will increase her wakeful time.       ANAND GARCIA reviewed and summarized:        Past Medical History:   Diagnosis Date    Anxiety     AP (angina pectoris) 7/18/2013    Arterial ischemic stroke, MCA (middle cerebral artery), left, acute 12/15/2017    Asthma     CAD, multiple vessel 5/13/2021    Class 1 obesity due to excess calories with serious comorbidity and body mass index (BMI) of 30.0 to 30.9 in adult 3/15/2019    Coronary artery disease 7/18/2013    Depression     Diastolic heart failure     GERD (gastroesophageal reflux disease) 7/18/2013    History of PTCA 7/18/2013    Hypertension 7/18/2013    Hypothyroidism     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     Trouble in sleeping     Type 2 diabetes mellitus with ophthalmic manifestations     Urinary incontinence     pullups day, pads at night     Review of patient's allergies indicates:  No Known Allergies    Medications:    Current Outpatient Medications:     blood sugar diagnostic (ACCU-CHEK JOSE LUIS PLUS TEST STRP) Strp, CHECK BLOOD SUGAR THREE TIMES DAILY, Disp: , Rfl:     insulin aspart U-100 (NOVOLOG FLEXPEN U-100 INSULIN) 100 unit/mL (3 mL) InPn pen, Inject up to 50 units per day, dispense 15 ml, Disp: , Rfl:     levothyroxine (SYNTHROID) 50 MCG tablet, Take 1 tablet by mouth once daily., Disp: , Rfl:     pregabalin (LYRICA) 75 MG  "capsule, Take 2 capsules by mouth nightly., Disp: , Rfl:     SITagliptin (JANUVIA) 100 MG Tab, Take 1 tablet by mouth once daily., Disp: , Rfl:     amLODIPine (NORVASC) 10 MG tablet, Take 1 tablet (10 mg total) by mouth once daily., Disp: 90 tablet, Rfl: 4    aspirin 81 MG Chew, 1 tablet (81 mg total) by Per G Tube route once daily. (Patient taking differently: Take 81 mg by mouth once daily.), Disp: , Rfl: 0    atorvastatin (LIPITOR) 40 MG tablet, Take 1 tablet (40 mg total) by mouth every evening., Disp: 90 tablet, Rfl: 4    clonazePAM (KLONOPIN) 0.5 MG tablet, 1 tablet (0.5 mg total) by Per G Tube route every evening. (Patient taking differently: Take 0.5 mg by mouth every evening.), Disp: 30 tablet, Rfl: 0    clopidogreL (PLAVIX) 75 mg tablet, Take 1 tablet (75 mg total) by mouth once daily., Disp: 90 tablet, Rfl: 3    donepeziL (ARICEPT) 10 MG tablet, TAKE 1 TABLET (10 MG TOTAL) BY MOUTH ONCE DAILY., Disp: 90 tablet, Rfl: 3    DROPLET PEN NEEDLE 32 gauge x 5/32" Ndle, , Disp: , Rfl:     EScitalopram oxalate (LEXAPRO) 20 MG tablet, TAKE 1 TABLET EVERY DAY, Disp: 90 tablet, Rfl: 3    hydroCHLOROthiazide (HYDRODIURIL) 25 MG tablet, Take 1 tablet (25 mg total) by mouth once daily., Disp: 90 tablet, Rfl: 4    HYDROcodone-acetaminophen (NORCO) 5-325 mg per tablet, Take 1 tablet by mouth every 6 (six) hours as needed for Pain., Disp: 90 tablet, Rfl: 0    insulin detemir U-100 (LEVEMIR FLEXTOUCH) 100 unit/mL (3 mL) SubQ InPn pen, Inject 20 Units into the skin once daily., Disp: , Rfl: 0    isosorbide mononitrate (IMDUR) 30 MG 24 hr tablet, Take 1 tablet (30 mg total) by mouth once daily., Disp: 90 tablet, Rfl: 4    losartan (COZAAR) 100 MG tablet, Take 1 tablet (100 mg total) by mouth once daily., Disp: 90 tablet, Rfl: 4    methadone (DOLOPHINE) 5 MG tablet, Take 0.5 tablets (2.5 mg total) by mouth every evening., Disp: 30 tablet, Rfl: 0    nitroGLYCERIN (NITROSTAT) 0.4 MG SL tablet, Place 1 tablet (0.4 " mg total) under the tongue every 5 (five) minutes as needed., Disp: 25 tablet, Rfl: 6    pantoprazole (PROTONIX) 40 MG tablet, TAKE 1 TABLET EVERY DAY, Disp: 90 tablet, Rfl: 0    vitamin D 1000 units Tab, Take 1,000 Units by mouth once daily., Disp: , Rfl:     OBJECTIVE:     ROS:  Review of Systems   Constitutional: Negative for activity change, appetite change, chills and fever.   HENT: Positive for ear pain, hearing loss and trouble swallowing. Negative for ear discharge, mouth sores, postnasal drip, sore throat and voice change.    Respiratory: Negative for cough and shortness of breath.    Cardiovascular: Negative for chest pain and leg swelling.   Gastrointestinal: Positive for constipation. Negative for abdominal pain, nausea and vomiting.   Genitourinary: Negative for difficulty urinating and dysuria.   Musculoskeletal: Negative for back pain and myalgias.   Skin: Negative for rash and wound.   Allergic/Immunologic: Negative for immunocompromised state.   Neurological: Negative for weakness and headaches.   Psychiatric/Behavioral: Positive for sleep disturbance. Negative for confusion and hallucinations.       Review of Symptoms    Symptom Assessment (ESAS 0-10 Scale)  Pain:  8  Dyspnea:  0  Anxiety:  0  Nausea:  0  Depression:  0  Anorexia:  0  Fatigue:  0  Insomnia:  10  Restlessness:  0  Agitation:  0     CAM / Delirium:  Negative  Constipation:  Positive  Diarrhea:  Negative    Bowel Management Plan (BMP):  No      Pain Assessment:  Location(s): ear    Ear       Location: right        Quality: cramping        Quantity: 8/10 in intensity week(s) ago, unchanged        Aggravating Factors: none        Alleviating Factors: opiates        Associated Symptoms: none    ECOG Performance Status ndGndrndanddndend:nd nd2nd Living Arrangements:  Lives alone      Advance Care Planning   Advance Directives:   Living Will: No    LaPOST: Yes    Do Not Resuscitate Status: Yes    Medical Power of : Yes    Agent's Name:  1: dtr  Connie Rush) Meir [phone], 2: son in law Juna Jose Worley [phone]    Decision Making:  Patient answered questions and Family answered questions          Physical Exam:  Vitals: Temp: 97 °F (36.1 °C) (06/01/22 1017)  Pulse: (!) 56 (06/01/22 1017)  Resp: 16 (06/01/22 1017)  BP: (!) 130/51 (06/01/22 1017)  Physical Exam  Vitals and nursing note reviewed.   Constitutional:       General: She is not in acute distress.     Appearance: Normal appearance. She is not ill-appearing.   HENT:      Head: Normocephalic and atraumatic.      Ears:      Comments: Very hard of hearing, reads lips  Bandaid covering draining knot at based of R ear  AD preauricular fullness, induration along L lateral neck starting at the base of the ear  Eyes:      Pupils: Pupils are equal, round, and reactive to light.   Cardiovascular:      Rate and Rhythm: Normal rate and regular rhythm.      Pulses: Normal pulses.      Heart sounds: Normal heart sounds. No murmur heard.  Pulmonary:      Effort: Pulmonary effort is normal. No respiratory distress.      Breath sounds: Normal breath sounds.   Abdominal:      General: Bowel sounds are normal. There is no distension.      Palpations: Abdomen is soft.      Tenderness: There is no abdominal tenderness.   Musculoskeletal:         General: Normal range of motion.      Cervical back: Normal range of motion. Tenderness present.      Right lower leg: No edema.      Left lower leg: No edema.   Skin:     General: Skin is warm and dry.      Findings: Lesion (at base of R ear) present.   Neurological:      Mental Status: She is alert and oriented to person, place, and time. Mental status is at baseline.      Cranial Nerves: No cranial nerve deficit.   Psychiatric:         Mood and Affect: Mood normal.         Behavior: Behavior normal.         Thought Content: Thought content normal.         Judgment: Judgment normal.         Radiology and laboratory data reviewed    ASSESSMENT/PLAN:     Problem List  Items Addressed This Visit        Oncology    Malignant neoplasm of pharyngeal tonsil    Overview     T2v3 N1 (bilateral nodes on PET) Mx, p16 POSITIVE            Current Assessment & Plan     She has received her first dose of Keytruda but worrisome for ongoing progression.    Rad onc appointment on Friday to discuss options.           Cancer related pain - Primary    Overview     Ear and jaw referred pain worsening.    Increase hydrocodone to 10mg q4hr PRN pain    Start methadone 2.5mg HS (QTc 406 2/17/22).     Plan to obtain EKG in 1 month              Palliative Care    Encounter for palliative care    Overview     LaPOST on file current with wishes of DNR/I    HCPOA on file: 1: dtr Connie (Malvin) Meir [phone], 2: son in law Juan Jose Meir [phone]                     Follow up: 1 month    70 minutes total visit time    40 minutes of total time spent on the encounter, which includes face to face time and non-face to face time preparing to see the patient (eg, review of tests), Obtaining and/or reviewing separately obtained history, Documenting clinical information in the electronic or other health record, Independently interpreting results (not separately reported) and communicating results to the patient/family/caregiver, or Care coordination (not separately reported).    30 minutes spent in discussing goals of care and emotional support    Signature: Zohra Dudley PA-C

## 2022-06-01 NOTE — ASSESSMENT & PLAN NOTE
She has received her first dose of Keytruda but worrisome for ongoing progression.    Rad onc appointment on Friday to discuss options.

## 2022-06-03 NOTE — PROGRESS NOTES
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 66Gy/30fx radiation alone completed 2/10/21  2. Immunotherapy for R neck recurrence/dermal lesion (ongoing)    INTERVAL HISTORY: Tiffanie Wise is a pleasant 81 y.o. female who presents today for follow-up.  She was last seen on 3/24/22  Since then started immunotherapy 1 cycle so far.    Having worsening pain, growing R neck skin nodule, appetite down.  Worsening R neck fibrosis.  Feeling down has declined attempts at behavGeneral acute hospital health.  Worsening R neck/ear pain, hydrocodone helped but now on methadone per palliative care.    PHYSICAL EXAM:   Constitutional: well appearing, no acute distress, ECOG 2 - Ambulates, capable of self care only  Vitals:    BP (!) 151/54   Pulse 60   Temp 97.8 °F (36.6 °C)   Resp 18   Ht 4' 9" (1.448 m)   Wt 64 kg (141 lb)   LMP  (LMP Unknown)   SpO2 (!) 94%   BMI 30.51 kg/m²   Eyes: sclera anicteric, EOMI, pupils equal, round and reactive to light  ENT: oral cavity without lesions, moist mucous membranes, no mucositis appreciated, visible deficit in R soft palate where tumor was previously  Neck: trachea midline, R neck SCM fibrosis,  nodule on R posterior neck enlarged from prior about 3cm  Lymphatic: no cervical, supraclavicular or axillary adenopathy    Laboratory & X-Ray Findings: Per above.  Images reviewed personally.    ASSESSMENT: progressing symptomatic disease in R neck on immunotherapy    PLAN: Ms. Wise has worsening R neck pain and growth with skin involvement from recurrent R neck mass even on immunotherapy. She is requiring more pain medication.  I feel reasonable at this point to offer re-irradiation with Quadshot treatment to only gross recurrent disease in R neck. Continue " immunotherapy.  Would recommend 14.8Gy/4fx given BID over 2 days.    We discussed risks, benefits, potential toxicities of radiation and she understands that although lower dose that a second course could cause worse toxicities including skin ulceration.   Informed consent was obtained and ST simulation will be scheduled shortly.    Continue f/u w Palliative care    I spent approximately 30 minutes reviewing the available records and evaluating the patient, out of which over 50% of the time was spent face to face with the patient in counseling and coordinating this patient's care.    Isidoro Arce III, M.D.  Radiation Oncology  [facility] [address] [place] [address]  Ph: [phone]  [email or web address]

## 2022-06-13 NOTE — PLAN OF CARE
Day 1 of outpatient xrt to the oropharynx.Short term side effects handout & verbal instructions were given to the patient. Skin care & side effects were reviewed. Contact info was provided. Patient verbalized understanding.

## 2022-06-13 NOTE — PROGRESS NOTES
Subjective:      DATE OF VISIT: 6/13/22     ?  Patient ID:?Tiffanie Wise is a 81 y.o. female.?? MR#: [id number]   ?   REFERRING PROVIDER: No referring provider defined for this encounter.     ? Primary Care Providers:  Tiffanie Spence MD, MD (General)     CHIEF COMPLAINT:  Follow-up???   ?   ONCOLOGIC DIAGNOSIS:  Squamous cell carcinoma, p16 positive, of the right tonsillar pillar    CURRENT TREATMENT:   Palliative pembrolizumab, C1 4/22/22, and palliative re-irradiation    PAST TREATMENT:  Radiation, without chemotherapy due to functional status/palliative intent, completed February 2021  ?   ONCOLOGIC HISTORY:     Ms. Wise is a 81yo woman with multiple comorbidities including CVA with residual speech impairment,  hearing impairment, CAD, former tobacco user, type 2 diabetes on insulin complicated by neuropathy, CKD, arthritis and early dementia.      Over the last 1-2 years she has had progressive right ear pain and recent evaluation in November 2020 by ENT palpation right soft palate mass and right cervical adenopathy.    11/19/2020 CT neck notable for right oropharyngeal mass at tonsillar pillar crossing midline 3.5 cm with adenopathy including jugulardigastric metastatic lymph node 3.4 x 2.4 x 2 cm with mass effect on or pharyngeal airway.    11/25/2020 ultrasound-guided lymph node biopsy   Pathology:  Squamous cell carcinoma.  P 16 positive    12/16/2020 PET-CT hypermetabolic right oropharyngeal/tonsillar mass SUV max 11, 3.7 cm, enlarged 2.5 cm hypermetabolic jugular digastric lymph node SUV 7.4.  Additional smaller S FDG avid right submandibular and posterior cervical lymph nodes SUV max 4.3.  Solitary left cervical node SUV max 3.8.  No evidence of FDG avid distant disease.    12/14/2020 MRI brain no evidence of intracranial metastatic disease.  Chronic left parietal infarct.  Chronic right cerebellar infarct    I had the pleasure seeing Ms. Newton in clinic.  She was initially seen for right  oropharyngeal squamous cell carcinoma treated with palliative radiation completed February 2021.    Local dermal recurrence local dermal recurrence right neck with PET 03/24/2022 demonstrating avidity multiple suspicious clustered right level 2 cervical lymph nodes.  No evidence of distant metastatic disease.    She was initiated on palliative single agent immunotherapy with pembrolizumab.  Cycle 1 day 1 on 04/22/2022.    Due to progressive pain in right neck proceeded with re-irradiation      INTERVAL EVENTS    She notes over the last couple weeks increased swelling and firmness right cervical region.  She does endorse pain recent increase in methadone dose from palliative care.   She is eating soft diet which is limited.  No notable weight loss.  She is using nutritional supplement but does have issues with glucose control. She is awaiting ENT appointment to be arranged.    Objective:      Physical Exam      ?   Vitals:    06/13/22 0856   BP: (!) 152/66   Pulse: 66   Temp: 97.3 °F (36.3 °C)      ?   ECOG:?2   General appearance:  In no acute distress significant hearing loss, impaired speech, occasional gait imbalance  Head, eyes, ears, nose, and throat:right cervical adenopathy, firm nonmobile, tender, trace prior bleeding.  Cardiovascular:  Regular rate and rhyth  Extremities: Warm, without edema.   Neurologic: Alert and oriented.  Sitting in wheelchair.  Skin: No rashes, ecchymoses or petechial lesion.   ?     ?   Laboratory:  ?   Lab Results   Component Value Date    WBC 10.99 06/13/2022    HGB 12.9 06/13/2022    HCT 40.9 06/13/2022    MCV 90 06/13/2022     06/13/2022       Lab Results   Component Value Date    IRON 82 08/05/2021    TIBC 292 08/05/2021    FERRITIN 277 08/05/2021       ?   Imaging:  ?    No results found. However, due to the size of the patient record, not all encounters were searched. Please check Results Review for a complete set of results.  No results found. However, due to the size  of the patient record, not all encounters were searched. Please check Results Review for a complete set of results.  Results for orders placed or performed during the hospital encounter of 03/24/22 (from the past 2160 hour(s))   NM PET CT Routine Skull to Mid Thigh    Impression    1. Interval increase in size and FDG avidity of multiple suspicious clustered right level II cervical lymph nodes  2. No evidence of distant metastatic disease.      Electronically signed by: Waldo Verde MD  Date:    03/24/2022  Time:    13:41     *Note: Due to a large number of results and/or encounters for the requested time period, some results have not been displayed. A complete set of results can be found in Results Review.     NM PET CT ROUTINE     CLINICAL HISTORY:  oropharynx SCC s/p XRT, f/u; Malignant neoplasm of posterior wall of nasopharynx     TECHNIQUE:  10.8 mCi of F18-FDG was administered intravenously in the left antecubital fossa.  After an approximately 60 min distribution time, PET/CT images were acquired from the skull base to the mid thigh. Transmission images were acquired to correct for attenuation using a whole body low-dose CT scan without contrast with the arms positioned along the side of the torso. Glycemia at the time of injection was 205 mg/dL.     COMPARISON:  08/13/2021     FINDINGS:  Quality of the study: Adequate.     Head neck: There has been interval increase in size and FDG avidity of multiple clustered right level II cervical lymph nodes with SUV max of 8.8, previously 3.2.     Chest: No abnormal foci of increased tracer uptake are present.     Abdomen and pelvis: No abnormal foci of increased tracer uptake are present.     Skeletal: No abnormal foci of increased tracer uptake are present.     Physiologic uptake of the tracer is present within the brain, salivary glands, myocardium, GI and  tracts.     Incidental CT findings: There are numerous sigmoid colonic diverticula present without  evidence of acute diverticulitis.  Prior sternotomy noted.  There is a loop recorder device in place within the left anterior chest wall.  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     Impression:     1. Interval increase in size and FDG avidity of multiple suspicious clustered right level II cervical lymph nodes  2. No evidence of distant metastatic disease.        Electronically signed by: Waldo Verde MD  Date:                                            03/24/2022  Time:                                           13:41    Pathology:    See above    ?   Assessment/Plan:   Malignant neoplasm of pharyngeal tonsil  -     TSH; Standing  -     CBC W/ AUTO DIFFERENTIAL; Standing  -     Comprehensive Metabolic Panel; Standing    Abnormal results of thyroid function studies   -     TSH; Standing    Dementia in other diseases classified elsewhere without behavioral disturbance    Immunodeficiency secondary to radiation therapy    Cancer associated pain    Anemia in neoplastic disease    Dysphagia, unspecified type    Other orders  -     pembrolizumab (KEYTRUDA) 400 mg in sodium chloride 0.9% 116 mL infusion  -     EPINEPHrine (EPIPEN) 0.3 mg/0.3 mL pen injection 0.3 mg  -     diphenhydrAMINE injection 50 mg  -     hydrocortisone sodium succinate injection 100 mg  -     sodium chloride 0.9% 100 mL flush bag  -     sodium chloride 0.9% flush 10 mL  -     heparin, porcine (PF) 100 unit/mL injection flush 500 Units  -     alteplase injection 2 mg       1. Malignant neoplasm of pharyngeal tonsil    2. Abnormal results of thyroid function studies     3. 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 Immunodeficiency secondary to radiation therapy    5. Cancer associated pain    6. Anemia in neoplastic disease    7. Dysphagia, unspecified type          Plan:     # Stage II, cT2 N2 M0, p16+ right tonsilar is squamous cell carcinoma: staging PET-CT with 3.7 cm FDG avid primary  "right tonsillar mass and local adenopathy as well as solitary contralateral cervical node.  MRI brain and PET without evidence of distant metastatic disease.  Unfortunately Ms. Wise has several significant comorbidities including CAD, CVA, severe hearing loss and dementia with limited baseline functional status not chemotherapy candidate.  She completed definitive radiation therapy alone requiring significant supportive care  In February 2021.  Repeat PET-CT 05/06/2021 with significant improvement in lymphadenopathy/avidity with residual avidity and right cervical region.   Right cervical recurrence March 2022 with pain and has proceeded with palliative immunotherapy pembrolizumab in addition to reirradiation.    She has received 1 dose of immunotherapy thus far tolerating well.  She does have symptomatic right cervical pain.  Recommend continued following with palliative care for analgesia.    Her daughter asks about experimental use of ivermectin and provides article on " ivermectin reverses the drug resistance in cancer cells or EGFR /ERK/AKT /NFkappaB pathway. By Reynaldo et al."  We discussed concern for early in-vitro studies not necessarily demonstrating efficacy in patients, her clinical condition as well as potential safety concerns with experimental treatments which have not been rigorously studied or approved for malignancy indication.  We discussed her current therapy including palliative radiation and immunotherapy may require some time to demonstrate improvement.  She endorses some increased swelling since initiation of immunotherapy and possibly she is inflammation system augmentation require restaging evaluation after several cycles  To determine if efficacious.  Recommend close follow-up with palliative care for pain control in addition with palliative radiation set to be completed 06/14/2022.     labs reviewed with hyperglycemia, elevated TSH but normal free T4.  Okay to proceed with 2nd dose " immunotherapy tomorrow.      Advance Care Planning   Discussion of pain control, goals of care, ACP, referral to palliative care discussed with patient and daughter.        nutrition concerns:  Recommend modified diet to avoid aspiration and would consider re-evaluation with speech and swallow as needed, appropriate nutritional supplement    Follow-Up:    pembrolizumab infusion planned tomorrow, 06/14/2022   Revisit 6 weeks with CBC, CMP, TSH, free T4 prior

## 2022-06-14 NOTE — PLAN OF CARE
Day 2 treatment 3 of outpatient xrt to the head & neck. C/o pain to the neck 10/10- says the mask from treatment is tight. Off balance during the morning especially, was bleeding from the ear off & on, going to see Dr Gomez today. Will continue to monitor.

## 2022-06-14 NOTE — DISCHARGE INSTRUCTIONS
[facility]  [address] [address]  [address]  [phone] phone     [phone] fax  Hours of Operation: Monday- Friday 8:00am- 5:00pm  After hours phone  [phone]  Hematology / Oncology Physicians on call      HAYLEE [address] Dr., DOLORES Cardenas, DOLORES Perez, ISRAEL Liao    Please call with any concerns regarding your appointment today.        FALL PREVENTION   Falls often occur due to slipping, tripping or losing your balance. Here are ways to reduce your risk of falling again.   Was there anything that caused your fall that can be fixed, removed or replaced?   Make your home safe by keeping walkways clear of objects you may trip over.   Use non-slip pads under rugs.   Do not walk in poorly lit areas.   Do not stand on chairs or wobbly ladders.   Use caution when reaching overhead or looking upward. This position can cause a loss of balance.   Be sure your shoes fit properly, have non-slip bottoms and are in good condition.   Be cautious when going up and down stairs, curbs, and when walking on uneven sidewalks.   If your balance is poor, consider using a cane or walker.   If your fall was related to alcohol use, stop or limit alcohol intake.   If your fall was related to use of sleeping medicines, talk to your doctor about this. You may need to reduce your dosage at bedtime if you awaken during the night to go to the bathroom.   To reduce the need for nighttime bathroom trips:   Avoid drinking fluids for several hours before going to bed   Empty your bladder before going to bed   Men can keep a urinal at the bedside   © 3165-7359 [facility] [facility], [address] [place] [address], [place] [address]. All rights reserved. This information is not intended as a substitute for professional medical care. Always follow your healthcare professional's instructions.

## 2022-06-14 NOTE — PLAN OF CARE
Problem: Adult Inpatient Plan of Care  Goal: Plan of Care Review  Outcome: Ongoing, Progressing  Flowsheets (Taken 6/14/2022 0840)  Plan of Care Reviewed With: patient  Goal: Patient-Specific Goal (Individualized)  Outcome: Ongoing, Progressing  Flowsheets (Taken 6/14/2022 0840)  Anxieties, Fears or Concerns: patient reports bleeding in ear patient has appt scheduled with ENT today  Individualized Care Needs: Feet elevated in recliner, blanket and pillow provided for comfort measures  Patient-Specific Goals (Include Timeframe): tolerate treatment without adverse reaction  Goal: Optimal Comfort and Wellbeing  Outcome: Ongoing, Progressing  Intervention: Provide Person-Centered Care  Flowsheets (Taken 6/14/2022 0840)  Trust Relationship/Rapport:   care explained   questions encouraged   choices provided   reassurance provided   emotional support provided   thoughts/feelings acknowledged   empathic listening provided   questions answered

## 2022-06-14 NOTE — NURSING
Patient reports bleeding to right ear. Patient's daughter states Dr. Osman made aware yesterday and Rad Onc provider aware. Patient has follow up with ENT today.

## 2022-06-14 NOTE — PROGRESS NOTES
Referring Provider:    No referring provider defined for this encounter.  Subjective:   Patient: Tiffanie Wise [id number], :1940   Visit date:2022 2:24 PM    Chief Complaint:  right ear pain (Pt has squamous cell carcinoma right under ear, going through radiation. Saturday all of a sudden had a pop ,started bleeding and bled all weekend)    HPI:    Prior notes reviewed by myself.  Clinical documentation obtained by nursing staff reviewed.     82 y/o female with history of right sided oropharyngeal/tonsil cancer treated in early , finished radiation in 2021.  Recently she was diagnosed with a regional recurrence her right neck/skin/periauricular area.  She is currently undergoing re-irradiation and immunotherapy with Keytruda.  Over the past week or so she has been complaining of decreased hearing, ear popping and bleeding from her right ear.        Objective:     Physical Exam:  Vitals:  BP (!) 114/55   Pulse (!) 57   Temp 98.4 °F (36.9 °C) (Temporal)   Wt 64 kg (141 lb 1.5 oz)   LMP  (LMP Unknown)   BMI 30.53 kg/m²   General appearance:  Well developed, well nourished    Ears:  Otoscopy of external auditory canals and tympanic membranes was normal left, she has tumor vs. Granulation tissue in her right EAC along the postero-inferior wall extending up to the TM as well as cerumen and yellow exudate.  clinical speech reception thresholds grossly intact, no mass/lesion of auricle.    Nose:  No masses/lesions of external nose, nasal mucosa, septum, and turbinates were within normal limits.    Mouth:  No mass/lesion of lips, teeth, gums, hard/soft palate, tongue, tonsils, or oropharynx.    Neck & Lymphatics:  Induration, radiation changes and swelling of right pre and postauricular area extending down into right level V, bandaid over draining skin lesion, trachea is midline, no thyroid enlargement/tenderness/mass.        [x]  Data Reviewed:    Lab Results   Component Value Date    WBC 10.99  06/13/2022    HGB 12.9 06/13/2022    HCT 40.9 06/13/2022    MCV 90 06/13/2022    EOSINOPHIL 1.8 06/13/2022       Procedure Note    CHIEF COMPLAINT:  Cerumen Impaction, otitis externa, otorrhea    Description:  The patient was seated in an exam chair.  An ear speculum was placed in the right EAC and was examined under the microscope.  Suction and/or loop curettes were used to remove cerumen, yellow mucoid drainage, blood.  She was noted to have irregular red tissue along her posterior EAC extending down to her TM.  The tympanic membrane was visualized and was normal in appearance.  The patient tolerated the procedure well.      Procedure:  Biopsy right ear canal mass  Preprocedure Diagnosis:  Right ear canal mass  Postprocedure Diagnosis:    Same  Findings: irregular red hypervascular ear canal mass  Complications:  None  Blood loss:  Minimal    Procedure in detail:  After verbal consent was obtained, the patient's right ear was examined with a 5mm ear speculum.  Several small biopsies of her EAC mass were taken using an alligator forceps and a duckbill forceps.  Minimal bleeding was encountered.  Ciprodex and a cottonball were placed.   The patient tolerated the procedure well, and there were no complications.        Assessment & Plan:   Malignant neoplasm of pharyngeal tonsil    Otorrhea of right ear    Ear canal mass, right  -     Specimen to Pathology ENT    Acute otitis externa of right ear, unspecified type        She has recent onset of decreased hearing and bloody otorrhea from her right ear canal.  She was also recently diagnosed with a recurrence of her pharyngeal squamous cell carcinoma for which she is receiving reirradiation and Keytruda.  She has a reddish mass in her right ear canal which may be granulation tissue, but, with her history, malignancy is a strong possibility.  We are going to treat her with Ciprodex and we took a small biopsy of lesion today to be sent to pathology.  I am going to have  her follow-up with Dr. Tavarez.

## 2022-06-14 NOTE — TELEPHONE ENCOUNTER
Amandar received consult to assist with pt. BOOST assistance. Swer called [facility] on 6/13/22 to inquire if pt. had been referred previously. Pt. was previously referred, however swer was informed that a Continuing Services Request Form could be sent in which would start a 6 month eligibility for pt. to receive BOOST once a month. Swer faxed request form to [facility] on today's date. Fanny attempted to reach pt. at ([phone]) however amandar received no telephone pickup. Fanny was able to leave a voice message for a call back. Swer will remain available.

## 2022-06-27 NOTE — TELEPHONE ENCOUNTER
Fanny received call back from pt.'s daughter, Connie regarding pt. assistance with BOOST. Amandar provided address and telephone number to [facility]. Pt.'s daughter also inquired about hospice agencies in the area. Amandar provided Connie with information on agencies. Amandar will remain available.

## 2022-07-08 NOTE — ED PROVIDER NOTES
SCRIBE #1 NOTE: I, Clay Chappell, am scribing for, and in the presence of, Eric Lovett MD. I have scribed the entire note.      History      Chief Complaint   Patient presents with    Shortness of Breath     Short of breath. Has throat cancer      Review of patient's allergies indicates:  No Known Allergies     HPI   HPI    7/8/2022, 11:02 AM   History obtained from the patient and family      History of Present Illness: Tiffanie Wise is a 81 y.o. female patient with a PMHx of CAD, HTN, malignant neoplasm of pharyngeal tonsil who presents to the Emergency Department for SOB, onset several days PTA. Symptoms are constant and moderate in severity. No mitigating or exacerbating factors reported. Associated sxs include generalized weakness. Patient denies any fever, chills, n/v/d, CP, numbness, dizziness, headache, and all other sxs at this time. Pt is currently on immunotherapy, having last received treatment several weeks ago. No further complaints or concerns at this time.     Arrival mode: Personal vehicle    PCP: Tiffanie Spence MD       Past Medical History:  Past Medical History:   Diagnosis Date    Anxiety     AP (angina pectoris) 7/18/2013    Arterial ischemic stroke, MCA (middle cerebral artery), left, acute 12/15/2017    Asthma     CAD, multiple vessel 5/13/2021    Class 1 obesity due to excess calories with serious comorbidity and body mass index (BMI) of 30.0 to 30.9 in adult 3/15/2019    Coronary artery disease 7/18/2013    Depression     Diastolic heart failure     Gastrostomy status     GERD (gastroesophageal reflux disease) 7/18/2013    History of PTCA 7/18/2013    Hypertension 7/18/2013    Hypothyroidism     Immunodeficiency due to chemotherapy 4/8/2022    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     Trouble in sleeping     Type 2 diabetes mellitus with ophthalmic manifestations     Urinary incontinence     pullups day, pads at night       Past Surgical History:  Past Surgical History:   Procedure Laterality Date    BREAST SURGERY Left     benign cyst    CORONARY ANGIOPLASTY      CORONARY STENT PLACEMENT      x6-7 oer the yeqrs  last 14 BRIANDA to lcfx    EYE SURGERY Bilateral     cataracts w/IOL   Dr Vance    FLUOROSCOPY N/A 2021    Procedure: G tube plaacement;  Surgeon: Shaka Cross MD;  Location: [facility] LAB;  Service: General;  Laterality: N/A;    HYSTERECTOMY      vag hyst; ovaries intact    THYROID SURGERY      nodule benign, Dr Hankins    TUBAL LIGATION           Family History:  Family History   Problem Relation Age of Onset    Kidney disease Father     Kidney disease Brother     Heart disease Mother     Fibromyalgia Daughter     Cancer Son         lung    Cirrhosis Sister     Alcohol abuse Sister     Diabetes Sister     Fibromyalgia Daughter     Diabetes Paternal Grandmother     Stroke Neg Hx     Mental illness Neg Hx     Mental retardation Neg Hx        Social History:  Social History     Tobacco Use    Smoking status: Former Smoker     Packs/day: 1.00     Years: 46.00     Pack years: 46.00     Types: Cigarettes     Start date:      Quit date: 2008     Years since quittin.0    Smokeless tobacco: Never Used   Substance and Sexual Activity    Alcohol use: No    Drug use: Never    Sexual activity: Not Currently     Birth control/protection: None       ROS   Review of Systems   Constitutional: Negative for chills and fever.   HENT: Negative for sore throat.    Respiratory: Positive for shortness of breath.     Cardiovascular: Negative for chest pain.   Gastrointestinal: Negative for diarrhea, nausea and vomiting.   Genitourinary: Negative for dysuria.   Musculoskeletal: Negative for back pain.   Skin: Negative for rash.   Neurological: Positive for weakness (generalized). Negative for dizziness, light-headedness, numbness and headaches.   Hematological: Does not bruise/bleed easily.   All other systems reviewed and are negative.    Physical Exam      Initial Vitals   BP Pulse Resp Temp SpO2   07/08/22 1057 07/08/22 1055 07/08/22 1055 07/08/22 1055 07/08/22 1055   (!) 147/71 68 18 98.3 °F (36.8 °C) 96 %      MAP       --                 Physical Exam  Nursing Notes and Vital Signs Reviewed.  Constitutional: Patient is in mild distress. Elderly. Frail.  Head: Atraumatic. Normocephalic.  Eyes: PERRL. EOM intact. Conjunctivae are not pale. No scleral icterus.  ENT: Mucous membranes are moist. Oropharynx is clear and symmetric.    Neck: Supple. Full ROM.   Cardiovascular: Regular rate. Regular rhythm. No murmurs, rubs, or gallops. Distal pulses are 2+ and symmetric.  Pulmonary/Chest: Tachypneic. Rales at the bilateral bases.  Abdominal: Soft and non-distended.  There is no tenderness.  No rebound, guarding, or rigidity.   Musculoskeletal: Moves all extremities. No obvious deformities. No edema.  Skin: Warm and dry.  Neurological:  Alert, awake, and appropriate.  Normal speech.  No acute focal neurological deficits are appreciated.  Psychiatric: Normal affect. Good eye contact. Appropriate in content.    ED Course    Critical Care    Date/Time: 7/8/2022 12:10 PM  Performed by: Eric Lovett MD  Authorized by: Eric Lovett MD   Direct patient critical care time: 25 minutes  Additional history critical care time: 10 minutes  Ordering / reviewing critical care time: 15 minutes  Documentation critical care time: 5 minutes  Consulting other physicians critical care time: 5 minutes  Total critical care time (exclusive of  procedural time) : 60 minutes  Critical care time was exclusive of separately billable procedures and treating other patients and teaching time.  Critical care was necessary to treat or prevent imminent or life-threatening deterioration of the following conditions: cardiac failure.  Critical care was time spent personally by me on the following activities: blood draw for specimens, development of treatment plan with patient or surrogate, discussions with consultants, interpretation of cardiac output measurements, evaluation of patient's response to treatment, examination of patient, obtaining history from patient or surrogate, ordering and performing treatments and interventions, ordering and review of laboratory studies, ordering and review of radiographic studies, pulse oximetry, re-evaluation of patient's condition and review of old charts.        ED Vital Signs:  Vitals:    07/08/22 1055 07/08/22 1057 07/08/22 1121 07/08/22 1135   BP:  (!) 147/71     Pulse: 68  60 61   Resp: 18  (!) 24    Temp: 98.3 °F (36.8 °C)      TempSrc: Oral      SpO2: 96%  (!) 88%     07/08/22 1138 07/08/22 1200 07/08/22 1230   BP: (!) 148/68 (!) 161/67 (!) 147/69   Pulse: 62 68 60   Resp: 15 19 20   Temp:      TempSrc:      SpO2: 97% 98% 99%       Abnormal Lab Results:  Labs Reviewed   CBC W/ AUTO DIFFERENTIAL - Abnormal; Notable for the following components:       Result Value    WBC 13.09 (*)     Gran # (ANC) 11.8 (*)     Immature Grans (Abs) 0.05 (*)     Lymph # 0.6 (*)     Gran % 90.3 (*)     Lymph % 4.9 (*)     Mono % 3.4 (*)     All other components within normal limits   COMPREHENSIVE METABOLIC PANEL - Abnormal; Notable for the following components:    Sodium 135 (*)     Chloride 94 (*)     Glucose 210 (*)     Albumin 3.2 (*)     ALT 7 (*)     All other components within normal limits   B-TYPE NATRIURETIC PEPTIDE - Abnormal; Notable for the following components:     (*)     All other components within normal limits   CK -  Abnormal; Notable for the following components:    CPK <7 (*)     All other components within normal limits   TROPONIN I   URINALYSIS, REFLEX TO URINE CULTURE   SARS-COV-2 RDRP GENE    Narrative:     This test utilizes isothermal nucleic acid amplification   technology to detect the SARS-CoV-2 RdRp nucleic acid segment.   The analytical sensitivity (limit of detection) is 125 genome   equivalents/mL.   A POSITIVE result implies infection with the SARS-CoV-2 virus;   the patient is presumed to be contagious.     A NEGATIVE result means that SARS-CoV-2 nucleic acids are not   present above the limit of detection. A NEGATIVE result should be   treated as presumptive. It does not rule out the possibility of   COVID-19 and should not be the sole basis for treatment decisions.   If COVID-19 is strongly suspected based on clinical and exposure   history, re-testing using an alternate molecular assay should be   considered.   This test is only for use under the Food and Drug   Administration s Emergency Use Authorization (EUA).   Commercial kits are provided by [employer].   Performance characteristics of the EUA have been independently   verified by [facility] Department of   Pathology and Laboratory Medicine.   _________________________________________________________________   The authorized Fact Sheet for Healthcare Providers and the authorized Fact   Sheet for Patients of the ID NOW COVID-19 are available on the FDA   website:     https://www.fda.gov/media/381781/download  https://www.fda.gov/media/174300/download          POCT INFLUENZA A/B MOLECULAR        All Lab Results:  Results for orders placed or performed during the hospital encounter of 07/08/22   CBC Auto Differential   Result Value Ref Range    WBC 13.09 (H) 3.90 - 12.70 K/uL    RBC 4.68 4.00 - 5.40 M/uL    Hemoglobin 12.8 12.0 - 16.0 g/dL    Hematocrit 39.8 37.0 - 48.5 %    MCV 85 82 - 98 fL    MCH 27.4 27.0 - 31.0 pg    MCHC 32.2 32.0  - 36.0 g/dL    RDW 13.0 11.5 - 14.5 %    Platelets 311 150 - 450 K/uL    MPV 10.7 9.2 - 12.9 fL    Immature Granulocytes 0.4 0.0 - 0.5 %    Gran # (ANC) 11.8 (H) 1.8 - 7.7 K/uL    Immature Grans (Abs) 0.05 (H) 0.00 - 0.04 K/uL    Lymph # 0.6 (L) 1.0 - 4.8 K/uL    Mono # 0.4 0.3 - 1.0 K/uL    Eos # 0.1 0.0 - 0.5 K/uL    Baso # 0.07 0.00 - 0.20 K/uL    nRBC 0 0 /100 WBC    Gran % 90.3 (H) 38.0 - 73.0 %    Lymph % 4.9 (L) 18.0 - 48.0 %    Mono % 3.4 (L) 4.0 - 15.0 %    Eosinophil % 0.5 0.0 - 8.0 %    Basophil % 0.5 0.0 - 1.9 %    Differential Method Automated    Comprehensive Metabolic Panel   Result Value Ref Range    Sodium 135 (L) 136 - 145 mmol/L    Potassium 4.0 3.5 - 5.1 mmol/L    Chloride 94 (L) 95 - 110 mmol/L    CO2 28 23 - 29 mmol/L    Glucose 210 (H) 70 - 110 mg/dL    BUN 11 8 - 23 mg/dL    Creatinine 0.7 0.5 - 1.4 mg/dL    Calcium 10.2 8.7 - 10.5 mg/dL    Total Protein 7.5 6.0 - 8.4 g/dL    Albumin 3.2 (L) 3.5 - 5.2 g/dL    Total Bilirubin 0.5 0.1 - 1.0 mg/dL    Alkaline Phosphatase 110 55 - 135 U/L    AST 15 10 - 40 U/L    ALT 7 (L) 10 - 44 U/L    Anion Gap 13 8 - 16 mmol/L    eGFR if African American >60 >60 mL/min/1.73 m^2    eGFR if non African American >60 >60 mL/min/1.73 m^2   BNP   Result Value Ref Range     (H) 0 - 99 pg/mL   CK   Result Value Ref Range    CPK <7 (L) 20 - 180 U/L   Troponin I   Result Value Ref Range    Troponin I <0.006 0.000 - 0.026 ng/mL   POCT COVID-19 Rapid Screening   Result Value Ref Range    POC Rapid COVID Negative Negative     Acceptable Yes    POCT Influenza A/B Molecular   Result Value Ref Range    POC Molecular Influenza A Ag Negative Negative, Not Reported    POC Molecular Influenza B Ag Negative Negative, Not Reported     Acceptable Yes      *Note: Due to a large number of results and/or encounters for the requested time period, some results have not been displayed. A complete set of results can be found in Results Review.      Imaging Results:  Imaging Results          X-Ray Chest AP Portable (Final result)  Result time 07/08/22 11:36:50    Final result by Manas Garza MD (07/08/22 11:36:50)                 Impression:      Suspect mild pulmonary vascular congestion.      Electronically signed by: Manas Garza  Date:    07/08/2022  Time:    11:36             Narrative:    EXAMINATION:  XR CHEST AP PORTABLE    CLINICAL HISTORY:  sob;    COMPARISON:  03/09/2022    FINDINGS:  The heart is normal in size.  Sternotomy wires.  Loop recorder on the left.  Mild pulmonary vascular congestion without focal infiltrate or edema.                               The EKG was ordered, reviewed, and independently interpreted by the ED provider.  Interpretation time: 11:08  Rate: 63 BPM  Rhythm: normal sinus rhythm  Interpretation: Right superior axis deviation. RVH. Cannot rule out anterior infarct, age undetermined. ST & T wave abnormality, consider inferolateral ischemia. No STEMI.           The Emergency Provider reviewed the vital signs and test results, which are outlined above.    ED Discussion     1:01 PM: Discussed case with Pricila Gutierrez NP ([facility]). Dr. Srinivasan agrees with current care and management of pt and accepts admission.   Admitting Service: Hospital Medicine  Admitting Physician: Dr. Srinivasan  Admit to: Obs Unit    1:02 PM: Re-evaluated pt. I have discussed test results, shared treatment plan, and the need for admission with patient and family at bedside. Pt and family express understanding at this time and agree with all information. All questions answered. Pt and family have no further questions or concerns at this time. Pt is ready for admit.         ED Medication(s):  Medications   furosemide injection 60 mg (60 mg Intravenous Given 7/8/22 1200)          New Prescriptions    No medications on file         Medical Decision Making    Medical Decision Making:   Clinical Tests:   Lab Tests: Ordered and Reviewed  Radiological  Study: Ordered and Reviewed  Medical Tests: Ordered and Reviewed           Scribe Attestation:   Scribe #1: I performed the above scribed service and the documentation accurately describes the services I performed. I attest to the accuracy of the note.    Attending:   Physician Attestation Statement for Scribe #1: I, Eric Lovett MD, personally performed the services described in this documentation, as scribed by Clay Chappell, in my presence, and it is both accurate and complete.          Clinical Impression       ICD-10-CM ICD-9-CM   1. Chronic diastolic heart failure  I50.32 428.32   2. SOB (shortness of breath)  R06.02 786.05   3. Coronary artery disease with stable angina pectoris, unspecified vessel or lesion type, unspecified whether native or transplanted heart  I25.118 414.00     413.9   4. Type 2 diabetes mellitus with hyperglycemia, with long-term current use of insulin  E11.65 250.00    Z79.4 790.29     V58.67   5. Acute congestive heart failure, unspecified heart failure type  I50.9 428.0   6. Hypoxia  R09.02 799.02       Disposition:   Disposition: Placed in Observation  Condition: Fair         Eric Lovett MD  07/08/22 6884

## 2022-07-08 NOTE — SUBJECTIVE & OBJECTIVE
Past Medical History:   Diagnosis Date    Anxiety     AP (angina pectoris) 7/18/2013    Arterial ischemic stroke, MCA (middle cerebral artery), left, acute 12/15/2017    Asthma     CAD, multiple vessel 5/13/2021    Class 1 obesity due to excess calories with serious comorbidity and body mass index (BMI) of 30.0 to 30.9 in adult 3/15/2019    Coronary artery disease 7/18/2013    Depression     Diastolic heart failure     Gastrostomy status     GERD (gastroesophageal reflux disease) 7/18/2013    History of PTCA 7/18/2013    Hypertension 7/18/2013    Hypothyroidism     Immunodeficiency due to chemotherapy 4/8/2022    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     Trouble in sleeping     Type 2 diabetes mellitus with ophthalmic manifestations     Urinary incontinence     pullups day, pads at night       Past Surgical History:   Procedure Laterality Date    BREAST SURGERY Left     benign cyst    CORONARY ANGIOPLASTY      CORONARY STENT PLACEMENT      x6-7 oer the yeqrs  last 12/17/14 BRIANDA to lcfx    EYE SURGERY Bilateral 2014    cataracts w/IOL   Dr Vance    FLUOROSCOPY N/A 1/20/2021    Procedure: G tube plaacement;  Surgeon: Shaka Cross MD;  Location: [facility] CATH LAB;  Service: General;  Laterality: N/A;    HYSTERECTOMY  1970    vag hyst; ovaries intact    THYROID SURGERY      nodule benign, Dr Hankins    TUBAL LIGATION  1970       Review of patient's allergies indicates:  No Known Allergies    No current facility-administered medications on file prior to encounter.     Current Outpatient Medications on File Prior to Encounter    Medication Sig    amLODIPine (NORVASC) 10 MG tablet Take 1 tablet (10 mg total) by mouth once daily.    aspirin 81 MG Chew 1 tablet (81 mg total) by Per G Tube route once daily.    atorvastatin (LIPITOR) 40 MG tablet Take 1 tablet (40 mg total) by mouth every evening.    clonazePAM (KLONOPIN) 0.5 MG tablet 1 tablet (0.5 mg total) by Per G Tube route every evening.    clopidogreL (PLAVIX) 75 mg tablet Take 1 tablet (75 mg total) by mouth once daily.    donepeziL (ARICEPT) 10 MG tablet TAKE 1 TABLET (10 MG TOTAL) BY MOUTH ONCE DAILY.    EScitalopram oxalate (LEXAPRO) 20 MG tablet TAKE 1 TABLET EVERY DAY    hydroCHLOROthiazide (HYDRODIURIL) 25 MG tablet Take 1 tablet (25 mg total) by mouth once daily.    HYDROcodone-acetaminophen (NORCO) 5-325 mg per tablet Take 1 tablet by mouth every 6 (six) hours as needed for Pain.    insulin aspart protamine-insulin aspart (NOVOLOG MIX 70-30FLEXPEN U-100) 100 unit/mL (70-30) InPn pen Inject 18 Units into the skin once daily.    insulin aspart U-100 (NOVOLOG) 100 unit/mL (3 mL) InPn pen Inject up to 50 units per day, dispense 15 ml    isosorbide mononitrate (IMDUR) 30 MG 24 hr tablet Take 1 tablet (30 mg total) by mouth once daily.    levothyroxine (SYNTHROID) 50 MCG tablet Take 1 tablet by mouth once daily.    losartan (COZAAR) 100 MG tablet Take 1 tablet (100 mg total) by mouth once daily.    methadone (DOLOPHINE) 5 MG tablet Take 0.5 tablets (2.5 mg total) by mouth every evening.    pantoprazole (PROTONIX) 40 MG tablet TAKE 1 TABLET EVERY DAY    pregabalin (LYRICA) 75 MG capsule Take 150 mg by mouth nightly.    SITagliptin (JANUVIA) 100 MG Tab Take 1 tablet by mouth once daily.    vitamin D 1000 units Tab Take 1,000 Units by mouth once daily.    blood sugar diagnostic Strp CHECK BLOOD SUGAR THREE TIMES DAILY    insulin detemir U-100 (LEVEMIR FLEXTOUCH) 100 unit/mL (3 mL) SubQ InPn pen Inject 20 Units into the skin once daily. (Patient not taking: Reported on 7/8/2022)     "nitroGLYCERIN (NITROSTAT) 0.4 MG SL tablet Place 1 tablet (0.4 mg total) under the tongue every 5 (five) minutes as needed.    [DISCONTINUED] blood-glucose sensor (DEXCOM G6 SENSOR) Tanisha Replace sensor every 10 days    [DISCONTINUED] DROPLET PEN NEEDLE 32 gauge x " Ndle      Family History       Problem Relation (Age of Onset)    Alcohol abuse Sister    Cancer Son    Cirrhosis Sister    Diabetes Sister, Paternal Grandmother    Fibromyalgia Daughter, Daughter    Heart disease Mother    Kidney disease Father, Brother          Tobacco Use    Smoking status: Former Smoker     Packs/day: 1.00     Years: 46.00     Pack years: 46.00     Types: Cigarettes     Start date:      Quit date: 2008     Years since quittin.0    Smokeless tobacco: Never Used   Substance and Sexual Activity    Alcohol use: No    Drug use: Never    Sexual activity: Not Currently     Birth control/protection: None     Review of Systems   HENT:  Positive for sore throat and trouble swallowing.    Respiratory:  Positive for shortness of breath. Negative for cough.    Cardiovascular:  Negative for chest pain, palpitations and leg swelling.   Gastrointestinal:  Negative for abdominal pain, diarrhea, nausea and vomiting.   Genitourinary: Negative.    Musculoskeletal:  Positive for arthralgias and myalgias. Neck stiffness: chronic wound to right lateral neck.  Skin:  Positive for wound.   Neurological:  Positive for weakness.   Psychiatric/Behavioral:  Positive for confusion.    All other systems reviewed and are negative.  Objective:     Vital Signs (Most Recent):  Temp: 97.9 °F (36.6 °C) (22)  Pulse: 60 (22)  Resp: 18 (22)  BP: (!) 156/69 (22)  SpO2: 96 % (22)   Vital Signs (24h Range):  Temp:  [97.9 °F (36.6 °C)-98.3 °F (36.8 °C)] 97.9 °F (36.6 °C)  Pulse:  [60-68] 60  Resp:  [15-24] 18  SpO2:  [88 %-100 %] 96 %  BP: (147-161)/(67-71) 156/69     Weight: 58.6 kg (129 lb 3 oz)  Body " mass index is 27.96 kg/m².    Physical Exam  Vitals and nursing note reviewed.   Constitutional:       Appearance: She is well-developed. She is not ill-appearing.   HENT:      Head: Normocephalic and atraumatic.      Nose: Nose normal.   Eyes:      General: No scleral icterus.     Conjunctiva/sclera: Conjunctivae normal.      Pupils: Pupils are equal, round, and reactive to light.   Cardiovascular:      Rate and Rhythm: Normal rate and regular rhythm.      Heart sounds: Normal heart sounds. No murmur heard.    No friction rub. No gallop.   Pulmonary:      Effort: Pulmonary effort is normal.      Breath sounds: Normal breath sounds.   Abdominal:      General: Bowel sounds are normal.      Palpations: Abdomen is soft.   Musculoskeletal:         General: No tenderness. Normal range of motion.      Cervical back: Normal range of motion and neck supple.   Skin:     General: Skin is warm and dry.      Comments: Bandaid to right lateral neck   Neurological:      Mental Status: She is alert and oriented to person, place, and time.   Psychiatric:         Behavior: Behavior normal.         CRANIAL NERVES     CN III, IV, VI   Pupils are equal, round, and reactive to light.     Significant Labs: All pertinent labs within the past 24 hours have been reviewed.  CBC:   Recent Labs   Lab 07/08/22  1120   WBC 13.09*   HGB 12.8   HCT 39.8        CMP:   Recent Labs   Lab 07/08/22  1120   *   K 4.0   CL 94*   CO2 28   *   BUN 11   CREATININE 0.7   CALCIUM 10.2   PROT 7.5   ALBUMIN 3.2*   BILITOT 0.5   ALKPHOS 110   AST 15   ALT 7*   ANIONGAP 13   EGFRNONAA >60       Significant Imaging: CXR - pulmonary edema

## 2022-07-08 NOTE — ED NOTES
Pt AAOx4, resting in bed, side rails up x 2, call bell within reach. Family at bedside. NAD at this time. Will continue to monitor.

## 2022-07-08 NOTE — ASSESSMENT & PLAN NOTE
Is followed by Hematology/Oncology  Last Keytruda infusion on 6/14/22  Monitor for fevers  Low threshold to start ABX

## 2022-07-08 NOTE — PHARMACY MED REC
"Admission Medication History     The home medication history was taken by Royer Hay.    You may go to "Admission" then "Reconcile Home Medications" tabs to review and/or act upon these items.      The home medication list has been updated by the Pharmacy department.    Please read ALL comments highlighted in yellow.    Please address this information as you see fit.     Feel free to contact us if you have any questions or require assistance.      Medications listed below were obtained from: Patient/family: DAUGHTER  (Not in a hospital admission)      Royer Satnam  [id number]      Current Outpatient Medications on File Prior to Encounter   Medication Sig Dispense Refill Last Dose    amLODIPine (NORVASC) 10 MG tablet Take 1 tablet (10 mg total) by mouth once daily. 90 tablet 4 7/8/2022 at Unknown time    aspirin 81 MG Chew 1 tablet (81 mg total) by Per G Tube route once daily.  0 7/7/2022 at Unknown time    atorvastatin (LIPITOR) 40 MG tablet Take 1 tablet (40 mg total) by mouth every evening. 90 tablet 4 7/7/2022 at Unknown time    clonazePAM (KLONOPIN) 0.5 MG tablet 1 tablet (0.5 mg total) by Per G Tube route every evening. 30 tablet 0 7/7/2022 at Unknown time    clopidogreL (PLAVIX) 75 mg tablet Take 1 tablet (75 mg total) by mouth once daily. 90 tablet 3 7/8/2022 at Unknown time    donepeziL (ARICEPT) 10 MG tablet TAKE 1 TABLET (10 MG TOTAL) BY MOUTH ONCE DAILY. 90 tablet 3 7/7/2022 at Unknown time    EScitalopram oxalate (LEXAPRO) 20 MG tablet TAKE 1 TABLET EVERY DAY 90 tablet 3 7/8/2022 at Unknown time    hydroCHLOROthiazide (HYDRODIURIL) 25 MG tablet Take 1 tablet (25 mg total) by mouth once daily. 90 tablet 4 7/8/2022 at Unknown time    HYDROcodone-acetaminophen (NORCO) 5-325 mg per tablet Take 1 tablet by mouth every 6 (six) hours as needed for Pain. 120 tablet 0 7/7/2022 at Unknown time    insulin aspart protamine-insulin aspart (NOVOLOG MIX 70-30FLEXPEN U-100) 100 unit/mL (70-30) " InPn pen Inject 18 Units into the skin once daily.   7/7/2022 at Unknown time    insulin aspart U-100 (NOVOLOG) 100 unit/mL (3 mL) InPn pen Inject up to 50 units per day, dispense 15 ml   7/7/2022 at Unknown time    isosorbide mononitrate (IMDUR) 30 MG 24 hr tablet Take 1 tablet (30 mg total) by mouth once daily. 90 tablet 4 7/8/2022 at Unknown time    levothyroxine (SYNTHROID) 50 MCG tablet Take 1 tablet by mouth once daily.   7/7/2022 at Unknown time    losartan (COZAAR) 100 MG tablet Take 1 tablet (100 mg total) by mouth once daily. 90 tablet 4 7/7/2022 at Unknown time    methadone (DOLOPHINE) 5 MG tablet Take 0.5 tablets (2.5 mg total) by mouth every evening. 30 tablet 0 7/7/2022 at Unknown time    pantoprazole (PROTONIX) 40 MG tablet TAKE 1 TABLET EVERY DAY 90 tablet 0 7/8/2022 at Unknown time    pregabalin (LYRICA) 75 MG capsule Take 150 mg by mouth nightly.   7/7/2022 at Unknown time    SITagliptin (JANUVIA) 100 MG Tab Take 1 tablet by mouth once daily.   7/8/2022 at Unknown time    vitamin D 1000 units Tab Take 1,000 Units by mouth once daily.   7/8/2022 at Unknown time    blood sugar diagnostic Strp CHECK BLOOD SUGAR THREE TIMES DAILY       insulin detemir U-100 (LEVEMIR FLEXTOUCH) 100 unit/mL (3 mL) SubQ InPn pen Inject 20 Units into the skin once daily. (Patient not taking: Reported on 7/8/2022)  0 Not Taking at Unknown time    nitroGLYCERIN (NITROSTAT) 0.4 MG SL tablet Place 1 tablet (0.4 mg total) under the tongue every 5 (five) minutes as needed. 25 tablet 6 Unknown at Unknown time                           .

## 2022-07-08 NOTE — ASSESSMENT & PLAN NOTE
Patient's FSGs are uncontrolled due to hyperglycemia on current medication regimen.  Last A1c reviewed-   Lab Results   Component Value Date    HGBA1C 7.2 (H) 02/21/2022     Most recent fingerstick glucose reviewed- No results for input(s): POCTGLUCOSE in the last 24 hours.  Current correctional scale  Medium  Maintain anti-hyperglycemic dose as follows-   Antihyperglycemics (From admission, onward)            Sliding scale insulin        Hold Oral hypoglycemics while patient is in the hospital.

## 2022-07-08 NOTE — H&P
[facility]  [facility] Medicine  History & Physical    Patient Name: Tiffanie Wise  MRN: [id number]  Patient Class: OP- Observation  Admission Date: 7/8/2022  Attending Physician: Suha Srinivasan MD   Primary Care Provider: Tiffanie Spence MD         Patient information was obtained from patient, relative(s) and ER records.     Subjective:     Principal Problem:Diastolic dysfunction with acute on chronic heart failure    Chief Complaint:   Chief Complaint   Patient presents with    Shortness of Breath     Short of breath. Has throat cancer         HPI: Tiffanie Wise is a 81 y.o. female patient with a PMHx of CAD, HTN, diastolic heart failure, malignant neoplasm of pharyngeal tonsil, chronic throat pain, hx of stroke with residual speech deficits who presents to the Emergency Department for SOB which she told her daughter of today. Daughter says her oxygen level was 91,92, 94 and she noted increased work of breathing. Also, daughter stated she seemed more confused. Associated sxs include generalized weakness. Patient denies any fever, chills, n/v/d, CP, numbness, dizziness, headache, and all other sxs at this time. Pt is currently on immunotherapy, having last received treatment several weeks ago. Daughter reports chronic throat pain in which she takes methadone prescribed by Palliative Care. Also, pt eats a regular diet with soft foods such as puddings.  Pt is hard of hearing as history is difficulty - HPI gathered through discussion with daughter.  According to Palliative Care - pt is a DNR.  In the ED, pulse ox 88 % and pt given lasix 60 mg IV x 1. Daughter stated she urinated at least 4 times in the ED.   CXR - suspect mild pulmonary vascular congestion, BNP = 120  Labs note WBC 13.09, Na 135, Chloride 94, gluc 210, troponin normal  As clarification, on 7/8/2022 patient should be admitted for hospital observation services under my care in collaboration with Suha Srinivasan MD    Diagnoses include  decompensated diastolic heart failure & hypoxic respiratory failure.         Past Medical History:   Diagnosis Date    Anxiety     AP (angina pectoris) 7/18/2013    Arterial ischemic stroke, MCA (middle cerebral artery), left, acute 12/15/2017    Asthma     CAD, multiple vessel 5/13/2021    Class 1 obesity due to excess calories with serious comorbidity and body mass index (BMI) of 30.0 to 30.9 in adult 3/15/2019    Coronary artery disease 7/18/2013    Depression     Diastolic heart failure     Gastrostomy status     GERD (gastroesophageal reflux disease) 7/18/2013    History of PTCA 7/18/2013    Hypertension 7/18/2013    Hypothyroidism     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     Trouble in sleeping     Type 2 diabetes mellitus with ophthalmic manifestations     Urinary incontinence     pullups day, pads at night       Past Surgical History:   Procedure Laterality Date    BREAST SURGERY Left     benign cyst    CORONARY ANGIOPLASTY      CORONARY STENT PLACEMENT      x6-7 oer the yeqrs  last 12/17/14 BRIANDA to lcfx    EYE SURGERY Bilateral 2014    cataracts w/IOL   Dr Vance    FLUOROSCOPY N/A 1/20/2021    Procedure: G tube plaacement;  Surgeon: Shaka Cross MD;  Location: [facility] CATH LAB;  Service: General;  Laterality: N/A;    HYSTERECTOMY  1970    vag hyst; ovaries intact    THYROID SURGERY      nodule benign, Dr Hankins    TUBAL LIGATION  1970       Review of patient's allergies indicates:  No Known Allergies    No current facility-administered  medications on file prior to encounter.     Current Outpatient Medications on File Prior to Encounter   Medication Sig    amLODIPine (NORVASC) 10 MG tablet Take 1 tablet (10 mg total) by mouth once daily.    aspirin 81 MG Chew 1 tablet (81 mg total) by Per G Tube route once daily.    atorvastatin (LIPITOR) 40 MG tablet Take 1 tablet (40 mg total) by mouth every evening.    clonazePAM (KLONOPIN) 0.5 MG tablet 1 tablet (0.5 mg total) by Per G Tube route every evening.    clopidogreL (PLAVIX) 75 mg tablet Take 1 tablet (75 mg total) by mouth once daily.    donepeziL (ARICEPT) 10 MG tablet TAKE 1 TABLET (10 MG TOTAL) BY MOUTH ONCE DAILY.    EScitalopram oxalate (LEXAPRO) 20 MG tablet TAKE 1 TABLET EVERY DAY    hydroCHLOROthiazide (HYDRODIURIL) 25 MG tablet Take 1 tablet (25 mg total) by mouth once daily.    HYDROcodone-acetaminophen (NORCO) 5-325 mg per tablet Take 1 tablet by mouth every 6 (six) hours as needed for Pain.    insulin aspart protamine-insulin aspart (NOVOLOG MIX 70-30FLEXPEN U-100) 100 unit/mL (70-30) InPn pen Inject 18 Units into the skin once daily.    insulin aspart U-100 (NOVOLOG) 100 unit/mL (3 mL) InPn pen Inject up to 50 units per day, dispense 15 ml    isosorbide mononitrate (IMDUR) 30 MG 24 hr tablet Take 1 tablet (30 mg total) by mouth once daily.    levothyroxine (SYNTHROID) 50 MCG tablet Take 1 tablet by mouth once daily.    losartan (COZAAR) 100 MG tablet Take 1 tablet (100 mg total) by mouth once daily.    methadone (DOLOPHINE) 5 MG tablet Take 0.5 tablets (2.5 mg total) by mouth every evening.    pantoprazole (PROTONIX) 40 MG tablet TAKE 1 TABLET EVERY DAY    pregabalin (LYRICA) 75 MG capsule Take 150 mg by mouth nightly.    SITagliptin (JANUVIA) 100 MG Tab Take 1 tablet by mouth once daily.    vitamin D 1000 units Tab Take 1,000 Units by mouth once daily.    blood sugar diagnostic Strp CHECK BLOOD SUGAR THREE TIMES DAILY    insulin detemir U-100 (LEVEMIR FLEXTOUCH) 100  "unit/mL (3 mL) SubQ InPn pen Inject 20 Units into the skin once daily. (Patient not taking: Reported on 2022)    nitroGLYCERIN (NITROSTAT) 0.4 MG SL tablet Place 1 tablet (0.4 mg total) under the tongue every 5 (five) minutes as needed.    [DISCONTINUED] blood-glucose sensor (DEXCOM G6 SENSOR) Tanisha Replace sensor every 10 days    [DISCONTINUED] DROPLET PEN NEEDLE 32 gauge x /32" Ndle      Family History       Problem Relation (Age of Onset)    Alcohol abuse Sister    Cancer Son    Cirrhosis Sister    Diabetes Sister, Paternal Grandmother    Fibromyalgia Daughter, Daughter    Heart disease Mother    Kidney disease Father, Brother          Tobacco Use    Smoking status: Former Smoker     Packs/day: 1.00     Years: 46.00     Pack years: 46.00     Types: Cigarettes     Start date:      Quit date: 2008     Years since quittin.0    Smokeless tobacco: Never Used   Substance and Sexual Activity    Alcohol use: No    Drug use: Never    Sexual activity: Not Currently     Birth control/protection: None     Review of Systems   HENT:  Positive for sore throat and trouble swallowing.    Respiratory:  Positive for shortness of breath. Negative for cough.    Cardiovascular:  Negative for chest pain, palpitations and leg swelling.   Gastrointestinal:  Negative for abdominal pain, diarrhea, nausea and vomiting.   Genitourinary: Negative.    Musculoskeletal:  Positive for arthralgias and myalgias. Neck stiffness: chronic wound to right lateral neck.  Skin:  Positive for wound.   Neurological:  Positive for weakness.   Psychiatric/Behavioral:  Positive for confusion.    All other systems reviewed and are negative.  Objective:     Vital Signs (Most Recent):  Temp: 97.9 °F (36.6 °C) (22)  Pulse: 60 (22)  Resp: 18 (22)  BP: (!) 156/69 (22)  SpO2: 96 % (22)   Vital Signs (24h Range):  Temp:  [97.9 °F (36.6 °C)-98.3 °F (36.8 °C)] 97.9 °F (36.6 °C)  Pulse:  " [60-68] 60  Resp:  [15-24] 18  SpO2:  [88 %-100 %] 96 %  BP: (147-161)/(67-71) 156/69     Weight: 58.6 kg (129 lb 3 oz)  Body mass index is 27.96 kg/m².    Physical Exam  Vitals and nursing note reviewed.   Constitutional:       Appearance: She is well-developed. She is not ill-appearing.   HENT:      Head: Normocephalic and atraumatic.      Nose: Nose normal.   Eyes:      General: No scleral icterus.     Conjunctiva/sclera: Conjunctivae normal.      Pupils: Pupils are equal, round, and reactive to light.   Cardiovascular:      Rate and Rhythm: Normal rate and regular rhythm.      Heart sounds: Normal heart sounds. No murmur heard.    No friction rub. No gallop.   Pulmonary:      Effort: Pulmonary effort is normal.      Breath sounds: Normal breath sounds.   Abdominal:      General: Bowel sounds are normal.      Palpations: Abdomen is soft.   Musculoskeletal:         General: No tenderness. Normal range of motion.      Cervical back: Normal range of motion and neck supple.   Skin:     General: Skin is warm and dry.      Comments: Bandaid to right lateral neck   Neurological:      Mental Status: She is alert and oriented to person, place, and time.   Psychiatric:         Behavior: Behavior normal.         CRANIAL NERVES     CN III, IV, VI   Pupils are equal, round, and reactive to light.     Significant Labs: All pertinent labs within the past 24 hours have been reviewed.  CBC:   Recent Labs   Lab 07/08/22  1120   WBC 13.09*   HGB 12.8   HCT 39.8        CMP:   Recent Labs   Lab 07/08/22  1120   *   K 4.0   CL 94*   CO2 28   *   BUN 11   CREATININE 0.7   CALCIUM 10.2   PROT 7.5   ALBUMIN 3.2*   BILITOT 0.5   ALKPHOS 110   AST 15   ALT 7*   ANIONGAP 13   EGFRNONAA >60       Significant Imaging: CXR - pulmonary edema    Assessment/Plan:     * Diastolic dysfunction with acute on chronic heart failure    Start CHF pathway  Patient is identified as having Diastolic (HFpEF) heart failure that is Acute on  Chronic. CHF is currently uncontrolled due to Rales/crackles on pulmonary exam and Pulmonary edema/pleural effusion on CXR. Latest ECHO performed and demonstrates- Results for orders placed during the hospital encounter of 02/20/22    Echo    Interpretation Summary  · The left ventricle is normal in size with concentric remodeling and normal systolic function.  · The estimated ejection fraction is 60%.  · Normal left ventricular diastolic function.  · Normal right ventricular size with normal right ventricular systolic function.  · Normal central venous pressure (3 mmHg).  · The estimated PA systolic pressure is 41 mmHg.  · There is mild pulmonary hypertension.  · There is mild aortic valve stenosis.  · Aortic valve area is 1.83 cm2; peak velocity is 1.59 m/s; mean gradient is 6 mmHg.  . Continue Furosemide and monitor clinical status closely. Monitor on telemetry. Patient is on CHF pathway.  Monitor strict Is&Os and daily weights.  Place on fluid restriction of 1.5 L. Continue to stress to patient importance of self efficacy and  on diet for CHF. Last BNP reviewed- and noted below   Recent Labs   Lab 07/08/22  1120   *   .          Dementia in other diseases classified elsewhere without behavioral disturbance  Continue Aricept      Cancer related pain  Continue pain meds      Acute hypoxemic respiratory failure  Patient with Hypoxic Respiratory failure which is Acute on chronic.  she is not on home oxygen. Supplemental oxygen was provided and noted-  .   Signs/symptoms of respiratory failure include- tachypnea and increased work of breathing. Contributing diagnoses includes - CHF Labs and images were reviewed. Patient Has not had a recent ABG. Will treat underlying causes and adjust management of respiratory failure as follows- CHF treatment    Malignant neoplasm of pharyngeal tonsil  Is followed by Hematology/Oncology      Hypertension associated with diabetes  Resume amlodipine, losartan and  imdur  Hydralazine IV every 6 hours prn SBP > 170  IV lasix      CAD with remote CABG x 1V (LIMA-LAD) in 6/2016 + PCI of RCA and LCx  Continue ASA, STATIN, Plavix, Imdur and losartan    Type 2 diabetes mellitus with hyperglycemia  Patient's FSGs are uncontrolled due to hyperglycemia on current medication regimen.  Last A1c reviewed-   Lab Results   Component Value Date    HGBA1C 7.2 (H) 02/21/2022     Most recent fingerstick glucose reviewed- No results for input(s): POCTGLUCOSE in the last 24 hours.  Current correctional scale  Medium  Maintain anti-hyperglycemic dose as follows-   Antihyperglycemics (From admission, onward)            Sliding scale insulin        Hold Oral hypoglycemics while patient is in the hospital.        VTE Risk Mitigation (From admission, onward)         Ordered     enoxaparin injection 40 mg  Daily         07/08/22 1349     IP VTE HIGH RISK PATIENT  Once         07/08/22 1349     Place sequential compression device  Until discontinued         07/08/22 1349                   Maddi Dunne NP  Department of Hospital Medicine   [facility]

## 2022-07-08 NOTE — HPI
Tiffanie Wise is a 81 y.o. female patient with a PMHx of CAD, HTN, diastolic heart failure, malignant neoplasm of pharyngeal tonsil, chronic throat pain, hx of stroke with residual speech deficits who presents to the Emergency Department for SOB which she told her daughter of today. Daughter says her oxygen level was 91,92, 94 and she noted increased work of breathing. Also, daughter stated she seemed more confused. Associated sxs include generalized weakness. Patient denies any fever, chills, n/v/d, CP, numbness, dizziness, headache, and all other sxs at this time. Pt is currently on immunotherapy, having last received treatment several weeks ago. Daughter reports chronic throat pain in which she takes methadone prescribed by Palliative Care. Also, pt eats a regular diet with soft foods such as puddings.  Pt is hard of hearing as history is difficulty - HPI gathered through discussion with daughter.  According to Palliative Care - pt is a DNR.  In the ED, pulse ox 88 % and pt given lasix 60 mg IV x 1. Daughter stated she urinated at least 4 times in the ED.   CXR - suspect mild pulmonary vascular congestion, BNP = 120  Labs note WBC 13.09, Na 135, Chloride 94, gluc 210, troponin normal  As clarification, on 7/8/2022 patient should be admitted for hospital observation services under my care in collaboration with Suha Srinivasan MD    Diagnoses include decompensated diastolic heart failure & hypoxic respiratory failure.

## 2022-07-08 NOTE — ASSESSMENT & PLAN NOTE
Start CHF pathway  Patient is identified as having Diastolic (HFpEF) heart failure that is Acute on Chronic. CHF is currently uncontrolled due to Rales/crackles on pulmonary exam and Pulmonary edema/pleural effusion on CXR. Latest ECHO performed and demonstrates- Results for orders placed during the hospital encounter of 02/20/22    Echo    Interpretation Summary  · The left ventricle is normal in size with concentric remodeling and normal systolic function.  · The estimated ejection fraction is 60%.  · Normal left ventricular diastolic function.  · Normal right ventricular size with normal right ventricular systolic function.  · Normal central venous pressure (3 mmHg).  · The estimated PA systolic pressure is 41 mmHg.  · There is mild pulmonary hypertension.  · There is mild aortic valve stenosis.  · Aortic valve area is 1.83 cm2; peak velocity is 1.59 m/s; mean gradient is 6 mmHg.  . Continue Furosemide and monitor clinical status closely. Monitor on telemetry. Patient is on CHF pathway.  Monitor strict Is&Os and daily weights.  Place on fluid restriction of 1.5 L. Continue to stress to patient importance of self efficacy and  on diet for CHF. Last BNP reviewed- and noted below   Recent Labs   Lab 07/08/22  1120   *   .

## 2022-07-08 NOTE — ASSESSMENT & PLAN NOTE
Patient with Hypoxic Respiratory failure which is Acute on chronic.  she is not on home oxygen. Supplemental oxygen was provided and noted-  .   Signs/symptoms of respiratory failure include- tachypnea and increased work of breathing. Contributing diagnoses includes - CHF Labs and images were reviewed. Patient Has not had a recent ABG. Will treat underlying causes and adjust management of respiratory failure as follows- CHF treatment

## 2022-07-09 NOTE — DISCHARGE SUMMARY
[facility]  Hospital Medicine  Discharge Summary      Patient Name: Tiffanie Wise  MRN: [id number]  Patient Class: OP- Observation  Admission Date: 7/8/2022  Hospital Length of Stay: 0 days  Discharge Date and Time:  07/09/2022 3:52 PM  Attending Physician: Suha Srinivasan MD   Discharging Provider: Maddi Dunne NP  Primary Care Provider: Tiffanie Spence MD      HPI:   Tiffanie Wise is a 81 y.o. female patient with a PMHx of CAD, HTN, diastolic heart failure, malignant neoplasm of pharyngeal tonsil, chronic throat pain, hx of stroke with residual speech deficits who presents to the Emergency Department for SOB which she told her daughter of today. Daughter says her oxygen level was 91,92, 94 and she noted increased work of breathing. Also, daughter stated she seemed more confused. Associated sxs include generalized weakness. Patient denies any fever, chills, n/v/d, CP, numbness, dizziness, headache, and all other sxs at this time. Pt is currently on immunotherapy, having last received treatment several weeks ago. Daughter reports chronic throat pain in which she takes methadone prescribed by Palliative Care. Also, pt eats a regular diet with soft foods such as puddings.  Pt is hard of hearing as history is difficulty - HPI gathered through discussion with daughter.  According to Palliative Care - pt is a DNR.  In the ED, pulse ox 88 % and pt given lasix 60 mg IV x 1. Daughter stated she urinated at least 4 times in the ED.   CXR - suspect mild pulmonary vascular congestion, BNP = 120  Labs note WBC 13.09, Na 135, Chloride 94, gluc 210, troponin normal  As clarification, on 7/8/2022 patient should be admitted for hospital observation services under my care in collaboration with Suha Srinivasan MD    Diagnoses include decompensated diastolic heart failure & hypoxic respiratory failure.         * No surgery found *      Hospital Course:   CXR revealed pulmonary edema. Lasix diuresis initiated and pt  urinated without difficulty. Supplemental oxygen at 1 to 2 liters provided. Normal troponin. Last 2 D ECHO indicated diastolic dysfunction. Pt with hx of right tonsilar cancer and she has hx of throat pain and dysphagia. She has had multiple modified barium swallows and again she reported dysphagia. A repeat MBSS indicated no aspiration but precautions were advised. Speech recommended mechanical soft diet with thin liquids and finely chopped meats. Pt did tolerate eating and verbalized improvement in her SOB. Pt's pulse ox was 93 - 94 % at rest. Potassium was replaced.  She was seen and examined and determined to be safe and stable for discharge. Her usual home meds were resumed. She was advised to follow up with Dr. Short, her Cardiologist.        Goals of Care Treatment Preferences:  Code Status: DNR    Health care agent: 1: dtr Connie Worley (Harilynn) [phone], 2: son in law Juan Jose Worley [phone]  Health care agent number: [phone]       [place]           Consults:   Consults (From admission, onward)        Status Ordering Provider     Inpatient consult to Registered Dietitian/Nutritionist  Once        Provider:  (Not yet assigned)    Acknowledged DARRIN SWANSON          Malignant neoplasm of pharyngeal tonsil  Is followed by Hematology/Oncology  Last Keytruda infusion on 6/14/22  Monitor for fevers  Low threshold to start ABX        Final Active Diagnoses:    Diagnosis Date Noted POA    PRINCIPAL PROBLEM:  Diastolic dysfunction with acute on chronic heart failure [I50.33] 06/02/2016 Yes    Dementia in other diseases classified elsewhere without behavioral disturbance [F02.80] 04/08/2022 Yes    Cancer related pain [G89.3]  Yes    Acute hypoxemic respiratory failure [J96.01] 01/17/2021 Yes    Malignant neoplasm of pharyngeal tonsil [C11.1] 12/04/2020 Yes    Hypertension associated with diabetes [E11.59, I15.2] 12/29/2016 Yes     Chronic    CAD with remote CABG x 1V (LIMA-LAD) in 6/2016 +  PCI of RCA and LCx [I25.10]  Yes     Chronic    Type 2 diabetes mellitus with hyperglycemia [E11.65] 07/18/2013 Yes     Chronic      Problems Resolved During this Admission:       Discharged Condition: stable    Disposition: Home or Self Care    Follow Up:   Follow-up Information     Brendan Short MD Follow up in 1 week(s).    Specialties: Interventional Cardiology, Cardiology  Why: Hospital Follow Up - Diastolic Heart falure  Contact information:  [address]  [place] [id number] [phone]                       Patient Instructions:      Ambulatory referral/consult to Outpatient Case Management   Referral Priority: Routine Referral Type: Consultation   Referral Reason: Specialty Services Required   Number of Visits Requested: 1     Call MD for:  temperature >100.4   Standing Status:      Call MD for:  persistent nausea and vomiting or diarrhea   Standing Status:      Call MD for:  severe uncontrolled pain   Standing Status:      Call MD for:  redness, tenderness, or signs of infection (pain, swelling, redness, odor or green/yellow discharge around incision site)   Standing Status:      Call MD for:  difficulty breathing or increased cough   Standing Status:      Call MD for:  severe persistent headache   Standing Status:      Call MD for:  worsening rash   Standing Status:      Call MD for:  persistent dizziness, light-headedness, or visual disturbances   Standing Status:      Call MD for:  increased confusion or weakness   Standing Status:        Significant Diagnostic Studies: Labs:   CMP   Recent Labs   Lab 07/08/22  1120 07/09/22  0459   * 137   K 4.0 3.3*   CL 94* 92*   CO2 28 31*   * 135*   BUN 11 14   CREATININE 0.7 0.8   CALCIUM 10.2 9.8   PROT 7.5  --    ALBUMIN 3.2*  --    BILITOT 0.5  --    ALKPHOS 110  --    AST 15  --    ALT 7*  --    ANIONGAP 13 14   ESTGFRAFRICA >60 >60   EGFRNONAA >60 >60    and CBC   Recent Labs   Lab 07/08/22  1120 07/09/22  0459   WBC 13.09* 7.96    HGB 12.8 12.4   HCT 39.8 38.2    324       Pending Diagnostic Studies:     None         Medications:  Reconciled Home Medications:      Medication List      CHANGE how you take these medications    NovoLOG Mix 70-30FlexPen U-100 100 unit/mL (70-30) Inpn pen  Generic drug: insulin aspart protamine-insulin aspart  Inject 18 Units into the skin once daily.  What changed: Another medication with the same name was removed. Continue taking this medication, and follow the directions you see here.        CONTINUE taking these medications    amLODIPine 10 MG tablet  Commonly known as: NORVASC  Take 1 tablet (10 mg total) by mouth once daily.     aspirin 81 MG Chew  1 tablet (81 mg total) by Per G Tube route once daily.     atorvastatin 40 MG tablet  Commonly known as: LIPITOR  Take 1 tablet (40 mg total) by mouth every evening.     blood sugar diagnostic Strp  CHECK BLOOD SUGAR THREE TIMES DAILY     clonazePAM 0.5 MG tablet  Commonly known as: KlonoPIN  1 tablet (0.5 mg total) by Per G Tube route every evening.     clopidogreL 75 mg tablet  Commonly known as: PLAVIX  Take 1 tablet (75 mg total) by mouth once daily.     donepeziL 10 MG tablet  Commonly known as: ARICEPT  TAKE 1 TABLET (10 MG TOTAL) BY MOUTH ONCE DAILY.     EScitalopram oxalate 20 MG tablet  Commonly known as: LEXAPRO  TAKE 1 TABLET EVERY DAY     hydroCHLOROthiazide 25 MG tablet  Commonly known as: HYDRODIURIL  Take 1 tablet (25 mg total) by mouth once daily.     HYDROcodone-acetaminophen 5-325 mg per tablet  Commonly known as: NORCO  Take 1 tablet by mouth every 6 (six) hours as needed for Pain.     insulin aspart U-100 100 unit/mL (3 mL) Inpn pen  Commonly known as: NovoLOG  Inject up to 50 units per day, dispense 15 ml     insulin detemir U-100 100 unit/mL (3 mL) Inpn pen  Commonly known as: Levemir FLEXTOUCH  Inject 20 Units into the skin once daily.     isosorbide mononitrate 30 MG 24 hr tablet  Commonly known as: IMDUR  Take 1 tablet (30 mg  total) by mouth once daily.     levothyroxine 50 MCG tablet  Commonly known as: SYNTHROID  Take 1 tablet by mouth once daily.     losartan 100 MG tablet  Commonly known as: COZAAR  Take 1 tablet (100 mg total) by mouth once daily.     methadone 5 MG tablet  Commonly known as: DOLOPHINE  Take 0.5 tablets (2.5 mg total) by mouth every evening.     nitroGLYCERIN 0.4 MG SL tablet  Commonly known as: NITROSTAT  Place 1 tablet (0.4 mg total) under the tongue every 5 (five) minutes as needed.     pantoprazole 40 MG tablet  Commonly known as: PROTONIX  TAKE 1 TABLET EVERY DAY     pregabalin 75 MG capsule  Commonly known as: LYRICA  Take 150 mg by mouth nightly.     SITagliptin 100 MG Tab  Commonly known as: JANUVIA  Take 1 tablet by mouth once daily.     vitamin D 1000 units Tab  Commonly known as: VITAMIN D3  Take 1,000 Units by mouth once daily.        STOP taking these medications    DEXCOM G6 SENSOR Tanisha  Generic drug: blood-glucose sensor            Indwelling Lines/Drains at time of discharge:   Lines/Drains/Airways     Drain  Duration           Female External Urinary Catheter 07/08/22 1513 1 day                Time spent on the discharge of patient: > 30 minutes         Maddi Dunne NP  Department of Hospital Medicine  [facility]

## 2022-07-09 NOTE — PLAN OF CARE
Problem: Adult Inpatient Plan of Care  Goal: Plan of Care Review  Outcome: Ongoing, Progressing  Goal: Patient-Specific Goal (Individualized)  Outcome: Ongoing, Progressing  Goal: Absence of Hospital-Acquired Illness or Injury  Outcome: Ongoing, Progressing  Goal: Optimal Comfort and Wellbeing  Outcome: Ongoing, Progressing  Goal: Readiness for Transition of Care  Outcome: Ongoing, Progressing     Problem: Infection  Goal: Absence of Infection Signs and Symptoms  Outcome: Ongoing, Progressing     Problem: Diabetes Comorbidity  Goal: Blood Glucose Level Within Targeted Range  Outcome: Ongoing, Progressing     Problem: Fluid Imbalance (Pneumonia)  Goal: Fluid Balance  Outcome: Ongoing, Progressing     Problem: Infection (Pneumonia)  Goal: Resolution of Infection Signs and Symptoms  Outcome: Ongoing, Progressing     Problem: Respiratory Compromise (Pneumonia)  Goal: Effective Oxygenation and Ventilation  Outcome: Ongoing, Progressing     Problem: Fall Injury Risk  Goal: Absence of Fall and Fall-Related Injury  Outcome: Ongoing, Progressing     Problem: Skin Injury Risk Increased  Goal: Skin Health and Integrity  Outcome: Ongoing, Progressing

## 2022-07-09 NOTE — HOSPITAL COURSE
CXR revealed pulmonary edema. Lasix diuresis initiated and pt urinated without difficulty. Supplemental oxygen at 1 to 2 liters provided. Normal troponin. Last 2 D ECHO indicated diastolic dysfunction. Pt with hx of right tonsilar cancer and she has hx of throat pain and dysphagia. She has had multiple modified barium swallows and again she reported dysphagia. A repeat MBSS indicated no aspiration but precautions were advised. Speech recommended mechanical soft diet with thin liquids and finely chopped meats. Pt did tolerate eating and verbalized improvement in her SOB. Pt's pulse ox was 93 - 94 % at rest. She was seen and examined and determined to be safe and stable for discharge. Her usual home meds were resumed. She was advised to follow up with Dr. Short, her Cardiologist.

## 2022-07-09 NOTE — PT/OT/SLP EVAL
Speech Language Pathology Evaluation  Bedside Swallow    Patient Name:  Tiffanie Wise   MRN:  [id number]  Admitting Diagnosis: Diastolic dysfunction with acute on chronic heart failure    Recommendations:                 General Recommendations:  Aspiration precautions   Diet recommendations:  NPO, NPO   Aspiration Precautions: In place  General Precautions: Standard, aspiration  Communication strategies:  Verbal    History:   Pt admitted for SOB and decreased 02 sats.  Pt with hx.of neoplasm of pharyngeal tonsil with some history of swallowing difficulties.  Pt reported that that she has has some swallow studies in the past and that she is currently on a soft diet and thin liquids. Pt did report that her swallowing has worsened over the past few weeks with increased coughing and choking.     Past Medical History:   Diagnosis Date    Anxiety     AP (angina pectoris) 7/18/2013    Arterial ischemic stroke, MCA (middle cerebral artery), left, acute 12/15/2017    Asthma     CAD, multiple vessel 5/13/2021    Class 1 obesity due to excess calories with serious comorbidity and body mass index (BMI) of 30.0 to 30.9 in adult 3/15/2019    Coronary artery disease 7/18/2013    Depression     Diastolic heart failure     Gastrostomy status     GERD (gastroesophageal reflux disease) 7/18/2013    History of PTCA 7/18/2013    Hypertension 7/18/2013    Hypothyroidism     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     Trouble in sleeping     Type 2 diabetes mellitus with ophthalmic  manifestations     Urinary incontinence     pullups day, pads at night       Past Surgical History:   Procedure Laterality Date    BREAST SURGERY Left     benign cyst    CORONARY ANGIOPLASTY      CORONARY STENT PLACEMENT      x6-7 oer the yeqrs  last 12/17/14 BRIANDA to lcfx    EYE SURGERY Bilateral 2014    cataracts w/IOL   Dr Vance    FLUOROSCOPY N/A 1/20/2021    Procedure: G tube plaacement;  Surgeon: Shaka Cross MD;  [facility] C[facility] LAB;  Service: General;  Laterality: N/A;    HYSTERECTOMY  1970    vag hyst; ovaries intact    THYROID SURGERY      nodule benign, Dr Hankins    TUBAL LIGATION  1970       Social History: Patient lives at home by herself and is independent with daily living skills.     Prior Intubation HX:  None reported     Modified Barium Swallow: none reported    Chest X-Rays: see medical chart     Prior diet: Soft diet; thin liquids    Occupation/hobbies/homemaking: n/a    Subjective     Pt reported she had a headache and nursing awaiting results of bedside swallow study to give tylenol  Patient goals: I want to eat per pt report     Pain/Comfort:  · Pain Rating 1: 5/10  · Location - Side 1:  (head)    Respiratory Status: Pt currently on 02    Objective:     Oral Musculature Evaluation  · Oral Musculature: general weakness  · Dentition: scattered dentition  · Mucosal Quality: good  · Velar Elevation: WNL  · Volitional Cough: able to produce effectively  · Volitional Swallow: able to produce effectively  · Oral Musculature Comments: some general weakness but functional for patient at this time    Bedside Swallow Eval:   Consistencies Assessed:  · Thin liquids, thick liquids, pureed, cracker    Oral Phase:   · Pt with minimal chewing but cleared out of oral cavity effectively.  Pt did not want to chew cracker because she reported that she can only tolerate very soft foods.     Pharyngeal Phase:   · Pt did require 2-3 swallows but did not present with any coughing or changes in  vocal quality.  Pt did report that the thickened liquids were easier to swallow.    Compensatory Strategies  · Basic swallow precautions     Treatment: Pt followed commands; was oriented and answered basic yes/no appropriately.    Assessment:     Tiffanie Wise is a 81 y.o. female with an SLP diagnosis of Dysphagia.  Pt with history of Dysphagia with reported new coughing/choking prior to this admit.  Pt has been recommended for a MBSS to assess swallowing with further goals.     Goals:   Multidisciplinary Problems     SLP Goals        Problem: SLP    Goal Priority Disciplines Outcome   SLP Goal     SLP    Description: 1. MBSS to further assess swallowing with goals as appropriate.                     Plan:     · Patient to be seen:   (3-5X weekly)   · Plan of Care expires:  07/16/22  · Plan of Care reviewed with:  patient   · SLP Follow-Up:  Yes       Discharge recommendations:   determine at d/c   Barriers to Discharge:  n/a    Time Tracking:     SLP Treatment Date:   07/09/22  Speech Start Time:  0845  Speech Stop Time:  0915     Speech Total Time (min):  30 min    Billable Minutes: 30 minutes     07/09/2022

## 2022-07-09 NOTE — PT/OT/SLP PROGRESS
Speech Language Pathology Treatment  MODIFIED BARIUM SWALLOW STUDY    Patient Name:  Tiffanie Wise   MRN:  [id number]  Admitting Diagnosis: Diastolic dysfunction with acute on chronic heart failure    Recommendations:                 General Recommendations:  BASIC ASPIRATION PRECAUTIONS  Diet recommendations:  Mechanical Soft diet; ground meats  Thin liquids   Aspiration Precautions: Reviewed   General Precautions: Standard, Aspiration  Communication strategies: Verbal    Subjective     Pt brought down for a Modified Barium Swallow Study   Patient goals: reviewed    Pain/Comfort:  · Pain Rating 1: 5/10  · Location - Side 1:  (head)    Respiratory Status: see medical chart     Objective:     Has the patient been evaluated by SLP for swallowing?   Yes  Keep patient NPO? Yes   Current Respiratory Status:   NC    Results of Modified Barium Swallow Study:  1. Thin liquids: Pt with no aspiration or supraglottic penetration;  Pt cleared residue with 2 swallows.  2. Nectar thick liquids:  Pt with no aspiration or supraglottic penetration; pt cleared residue with 1 swallow;  Minimal oral prep and tongue pumping.  3.  Pureed:  Pt with 5-6 sec swallow delay with premature spillage to vallaculae before swallow initiated. Pt swallowed with no premature spillage or aspiration and cleared with 1 swallow.  No residue noted.  4. Solids:  Pt with min swallow delay (3-4 sec) and swallowed with no supraglottic penetration or aspiration.  Pt needed 2 swallows to clear residue.       Assessment:     Tifafnie Wise is a 81 y.o. female with an SLP diagnosis of Dysphagia. She completed a MBSS today with no aspiration and is recommended for a [facility]. Soft consistency diet, ground meats and thin liquids.  Swallow precautions reviewed with the patient.  No further S.T. recommended at this time.     Goals:   Multidisciplinary Problems     SLP Goals        Problem: SLP    Goal Priority Disciplines Outcome   SLP Goal     SLP    Description: 1. MBSS  to further assess swallowing with goals as appropriate- MBSS completed and pt is recommended for a Mechanical soft consistency diet; ground meats and thin liquids.  Swallow precautions reviewed with the patient.  No further S.T. warranted at this time.                      Plan:     · Patient to be seen:   (3-5X weekly)   · Plan of Care expires:  07/16/22  · Plan of Care reviewed with:  patient   · SLP Follow-Up:  No     Discharge recommendations:    n/a  Barriers to Discharge:  n/a    Time Tracking:     SLP Treatment Date:   07/09/22  Speech Start Time:  1100  Speech Stop Time:  1130   Speech Total Time (min):  30 min    Billable Minutes: 30 minutes    07/09/2022

## 2022-07-09 NOTE — PLAN OF CARE
Chart review complete.   With Dx of heart failure admission criteria met at OBSERVATION level of care.

## 2022-07-09 NOTE — PLAN OF CARE
MBSS completed.  Pt recommended for a Mechanical soft consistency diet, ground meats and thin liquids allowed.  Swallow precautions reviewed with the patient.

## 2022-07-09 NOTE — PLAN OF CARE
Pt's swallowing assessed bedside and pt has been recommended for a MBSS to further assess swallowing with goals to follow.

## 2022-07-09 NOTE — PLAN OF CARE
[facility]  Discharge Final Note    Primary Care Provider: Tiffanie Spence MD    Expected Discharge Date: 7/9/2022    Final Discharge Note (most recent)     Final Note - 07/09/22 1639        Final Note    Assessment Type Final Discharge Note     Anticipated Discharge Disposition Home or Self Care        Post-Acute Status    Discharge Delays None known at this time                 Important Message from Medicare             Contact Info     Brendan Short MD   Specialty: Interventional Cardiology, Cardiology    [address] [facility] [place] [address]   Phone: [phone]       Next Steps: Follow up in 1 week(s)    Instructions: Hospital Follow Up - Diastolic Heart falure

## 2022-07-09 NOTE — NURSING
Pt d/c . Advised of f/u and scheduled   Appointments. Pt was nervous about staying alone but son stated that he will be staying the night pt. Room O2 sat at d/c 96%. Iv removed intact.

## 2022-07-13 NOTE — PROGRESS NOTES
Palliative Medicine         Follow up  The patient location is: [place], [place]  The chief complaint leading to consultation is: pain follow up    Visit type: audiovisual    Face to Face time with patient: 20  45 minutes of total time spent on the encounter, which includes face to face time and non-face to face time preparing to see the patient (eg, review of tests), Obtaining and/or reviewing separately obtained history, Documenting clinical information in the electronic or other health record, Independently interpreting results (not separately reported) and communicating results to the patient/family/caregiver, or Care coordination (not separately reported).     Each patient to whom he or she provides medical services by telemedicine is:  (1) informed of the relationship between the physician and patient and the respective role of any other health care provider with respect to management of the patient; and (2) notified that he or she may decline to receive medical services by telemedicine and may withdraw from such care at any time.    SUBJECTIVE:   Chief complaint: pain and GOC follow up    07/13/2022   Seen via televisit with daughter Malvin. I have received messages lately regarding panic attacks and worsening pain. In today's visit, they report that her pain and anxiety have improved in the last 48hrs. She is still chewing her nails constantly and appeared anxious on the call but daughter says this is much improved from what she had been doing. Daughter laid out additional doses of clonazepam and instructed her to take PRN for anxiety. Thankfully she only took one additional dose. I educated them of the long acting nature of this medication and should not be given PRN. We discussed adding Wellbutrin or increasing clonazepam to BID and they chose the latter for now. Her pain has also improved over the last few days and is now having drainage from the ear. It is unclear whether this was radiation benefit and/ or the  delayed effects of Keytuda. She has been taking methadone nightly and has not required a dose of hydrocodone in 1 week. She has been supplementing with APAP PRN with good response. They understand the biopsy with ENT was suggestive of cancer and a repeat was offered however they declined citing it would not change the treatment plan in their opinion and feel the first report was conclusive. Her PO intake has improved recently but still limited to soft foods.     Past visits:  6/1/22: Ms. Wise returns to clinic with her daughter Malvin. During the check in process the two ended up having an emotional conversation about Malvin's concerns that her mom is not taking her pain medications and suffering which is frustrating and stressful for her daughter. By the time I entered the room they were both smiling and said they felt better. Ms. Wise says since I saw her last she is not doing well. The knot below her right ear has ruptured and draining but also intensifying in pain. She has preauricular fullness and induration which is painful and now affecting her ability to eat. She is having odynophagia and minimizing her oral intake to liquids and soft foods. Malvin is worried about getting her large pill tablets down and is expecting to have to crush them soon. Ms. Wise's main complaint is not being able to sleep and admits she cannot wear her CPAP as the strap was rubbing on the sore. She usually sleeps very well with the CPAP and Malvin thinks this is the main culprit for her insomnia. Ms. Wise is reluctant to take pain medications given her late daugther's substance abuse history. She brought her log of pain medication usage and shows TID usage on average. She does not like taking the medication but admits that she waits too long then the pain is too severe for the medications to make much difference. She tries to nap after taking a dose but rarely succeeds. I recommend using a shield to keep the CPAP  straps off her neck and hope this allows her to rest. During her first round of treatment we had a difficult time managing her pain and she required dose escalation with multiple agents. She seems to respond better to long acting agents likely because of the basal control but also minimal dosing. In light of her neuropathic component I would like to restart methadone. She initially was resistant as she thought I meant methamphetamine but after more education agreed. I will start low but plan to titrate the dose in the coming weeks given her past opioid usage and requirements. It also seems that the dose of hydrocodone is inadequate thus I will increase to 10mg q4hr PRN pain. Malvin is worried about progression and wonders when it is time to admit this is a failed treatment plan and begin to look at other options. She understands that radiation is an option but is not recommended unless it is the last option. Ms. Wise knows that the worsening pain and induration likely mean progression and knows that she will likely be making hard decisions soon. They will meet with Dr. Arce on Friday and both are eager to hear his take on the situation.     4/6/22: Ms. Wise comes to clinic with her daughter Yfn Henderson to re-engage due to concern of recurrent disease. She has not been seen in the PM clinic in nearly a year as she was able to recover from the toxicity of radiation treatment, wean off all opioids, resume PO intake, and return to independent living. She was recently admitted to the hospital for cellulitis of the arm which is now healing. Ms. Wise admits that she is scared that the lesion on her neck is cancer and will be told that she is dying. She does not wish to proceed with surgery and would not want aggressive radiation but would be open to limited radiation treatments and immunotherapy. She meets with Dr. Walker this Friday to discuss the recommendation of Keytruda and when to start. While she is afraid of  dying she does understand that death is nonnegotiable and when her time is shorter would value quality over quantity. She would like to try Keytruda to see if this can prolong her life with minimal side effects. Her biggest complaints are the right ear and jaw pain which is the same pain as in the past when her cancer was so symptomatic. She took a hydrocodone this morning but typically takes only at bedtime. I agree with hydrocodone and am okay with increasing to q4hr PRN. She is very resistant to take opioids given her late daughter's addiction history and recent death. However she acknowledges that it has been helpful and improves her quality of life. We discussed the close monitoring that accompanies our prescribing as well as acknowledging the addictive nature of opioids. She is open to continuing the opioids under my care. In the past she was on fentanyl, oxycodone, and MSIR but this was at the height of her pain from radiation toxicity and eventually improved as the tissues healed. She is currently tolerating hydrocodone with no side effects besides constipation so we will continue. She having right arm pain since the cellulitis appeared which is neuropathic in nature and refractory to Lyrica. She has met with ortho and plans to follow up for a steroid injection in the wrist soon. I have sent OIC resources and will plan to follow up in 1 month.  She has HCPOA on file and LaPOST. I confirmed her LaPOST and DNR/I are consistent with her current views and wishes.    4/6/2021: 79 yo female seen in f/u for oral pain s/p radiation therapy. She was recently discharged from SNF following hospitalization and has experienced some deconditioning, but is able to ambulate with rolling walker and standby assistance. She is using magic mouthwash/viscous lidocaine for oral pain with good relief. She has continued on fentanyl 50 mcg TD for pain management, patch change due today and is being given oxycodone 5 mg twice daily.  She is home from SNF with PEG for feeding and home health from ST/OT/PT. She saw rad onc today and has upcoming PET scan for restaging today. Daughter reports that things are going well with home health; she is hopeful dysphagia will resolve with continued ST.  Brain's main concerns today are that her mother is drowsy and continues to sleep much of the day. She thinks that her mom's pain is much improved outside of continued tongue ulceration and is interested in backing off of her pain medication in hopes that this will increase her wakeful time.       ANAND GARCIA reviewed and summarized:        Past Medical History:   Diagnosis Date    Anxiety     AP (angina pectoris) 7/18/2013    Arterial ischemic stroke, MCA (middle cerebral artery), left, acute 12/15/2017    Asthma     CAD, multiple vessel 5/13/2021    Class 1 obesity due to excess calories with serious comorbidity and body mass index (BMI) of 30.0 to 30.9 in adult 3/15/2019    Coronary artery disease 7/18/2013    Depression     Diastolic heart failure     Gastrostomy status     GERD (gastroesophageal reflux disease) 7/18/2013    History of PTCA 7/18/2013    Hypertension 7/18/2013    Hypothyroidism     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     Trouble in sleeping     Type 2 diabetes mellitus with ophthalmic manifestations     Urinary incontinence     pullups day, pads at night     Review of patient's allergies indicates:  No Known Allergies    Medications:    Current Outpatient Medications:     amLODIPine  (NORVASC) 10 MG tablet, Take 1 tablet (10 mg total) by mouth once daily., Disp: 90 tablet, Rfl: 4    aspirin 81 MG Chew, 1 tablet (81 mg total) by Per G Tube route once daily., Disp: , Rfl: 0    atorvastatin (LIPITOR) 40 MG tablet, Take 1 tablet (40 mg total) by mouth every evening., Disp: 90 tablet, Rfl: 4    blood sugar diagnostic Strp, CHECK BLOOD SUGAR THREE TIMES DAILY, Disp: , Rfl:     clonazePAM (KLONOPIN) 0.5 MG tablet, 1 tablet (0.5 mg total) by Per G Tube route every evening., Disp: 30 tablet, Rfl: 0    clopidogreL (PLAVIX) 75 mg tablet, TAKE 1 TABLET EVERY DAY, Disp: 90 tablet, Rfl: 3    donepeziL (ARICEPT) 10 MG tablet, TAKE 1 TABLET (10 MG TOTAL) BY MOUTH ONCE DAILY., Disp: 90 tablet, Rfl: 3    EScitalopram oxalate (LEXAPRO) 20 MG tablet, TAKE 1 TABLET EVERY DAY, Disp: 90 tablet, Rfl: 3    hydroCHLOROthiazide (HYDRODIURIL) 25 MG tablet, Take 1 tablet (25 mg total) by mouth once daily., Disp: 90 tablet, Rfl: 4    HYDROcodone-acetaminophen (NORCO) 5-325 mg per tablet, Take 1 tablet by mouth every 6 (six) hours as needed for Pain., Disp: 120 tablet, Rfl: 0    insulin aspart protamine-insulin aspart (NOVOLOG MIX 70-30FLEXPEN U-100) 100 unit/mL (70-30) InPn pen, Inject 18 Units into the skin once daily., Disp: , Rfl:     insulin detemir U-100 (LEVEMIR FLEXTOUCH) 100 unit/mL (3 mL) SubQ InPn pen, Inject 20 Units into the skin once daily. (Patient not taking: Reported on 7/8/2022), Disp: , Rfl: 0    isosorbide mononitrate (IMDUR) 30 MG 24 hr tablet, Take 1 tablet (30 mg total) by mouth once daily., Disp: 90 tablet, Rfl: 4    levothyroxine (SYNTHROID) 50 MCG tablet, Take 1 tablet by mouth once daily., Disp: , Rfl:     losartan (COZAAR) 100 MG tablet, Take 1 tablet (100 mg total) by mouth once daily., Disp: 90 tablet, Rfl: 4    methadone (DOLOPHINE) 5 MG tablet, Take 0.5 tablets (2.5 mg total) by mouth every evening., Disp: 30 tablet, Rfl: 0    nitroGLYCERIN (NITROSTAT) 0.4 MG SL tablet, Place 1  tablet (0.4 mg total) under the tongue every 5 (five) minutes as needed., Disp: 25 tablet, Rfl: 6    pantoprazole (PROTONIX) 40 MG tablet, TAKE 1 TABLET EVERY DAY, Disp: 90 tablet, Rfl: 0    pregabalin (LYRICA) 75 MG capsule, Take 150 mg by mouth nightly., Disp: , Rfl:     SITagliptin (JANUVIA) 100 MG Tab, Take 1 tablet by mouth once daily., Disp: , Rfl:     vitamin D 1000 units Tab, Take 1,000 Units by mouth once daily., Disp: , Rfl:     OBJECTIVE:     ROS:  Review of Systems   Constitutional: Negative for activity change, appetite change, chills and fever.   HENT: Positive for ear pain (improved), hearing loss and trouble swallowing (improved). Negative for ear discharge, mouth sores, postnasal drip, sore throat and voice change.    Respiratory: Negative for cough and shortness of breath.    Cardiovascular: Negative for chest pain and leg swelling.   Gastrointestinal: Negative for abdominal pain, constipation, nausea and vomiting.   Genitourinary: Negative for difficulty urinating and dysuria.   Musculoskeletal: Negative for back pain and myalgias.   Skin: Negative for rash and wound.   Allergic/Immunologic: Negative for immunocompromised state.   Neurological: Negative for weakness and headaches.   Psychiatric/Behavioral: Negative for confusion, hallucinations and sleep disturbance. The patient is nervous/anxious.        Review of Symptoms    Symptom Assessment (ESAS 0-10 Scale)  Pain:  0  Dyspnea:  0  Anxiety:  0  Nausea:  0  Depression:  0  Anorexia:  0  Fatigue:  0  Insomnia:  0  Restlessness:  0  Agitation:  0     CAM / Delirium:  Negative  Constipation:  Positive  Diarrhea:  Negative    Bowel Management Plan (BMP):  No      ECOG Performance Status ndGndrndanddndend:nd nd2nd Living Arrangements:  Lives alone      Advance Care Planning   Advance Directives:   Living Will: No    LaPOST: Yes    Do Not Resuscitate Status: Yes    Medical Power of : Yes    Agent's Name:  1: dtr Connie MonoreMalvinCayden Meir [phone],  2: son in [facility] [phone]    Decision Making:  Patient answered questions and Family answered questions          Physical Exam:  Vitals:    Physical Exam  Vitals reviewed: televisit.         Radiology and laboratory data reviewed    ASSESSMENT/PLAN:     Problem List Items Addressed This Visit        Psychiatric    Anxiety and depression    Overview     Lexapro at max dose    Discussed starting Wellbutrin or increasing clonzepam to BID. She opted for the latter. Happy to assume ongoing refills              Oncology    Malignant neoplasm of pharyngeal tonsil    Overview     T2v3 N1 (bilateral nodes on PET) Mx, p16 POSITIVE            Current Assessment & Plan     Recently completed XRT which has improved ear pain and jaw pain.    Receiving Keytruda under the care of Dr. Osman. Repeat imaging planned after cycle 3.     They opted to forgo additional biopsy of aural tissue as this would not alter the treatment plan in their opinion or plans.            Cancer related pain - Primary    Overview     Ear and jaw referred pain improved post radiation but possibly delayed effect of Keytruda.    Continue hydrocodone 10mg q4hr PRN pain but has not taken a dose in a week.    Continue methadone 2.5mg HS (QTc 406 2/17/22).     Plan to obtain EKG at next visit              GI    Therapeutic opioid-induced constipation (OIC)    Overview     Resources provided    Recommend daily Miralax and Senna PRN                 Palliative Care    Encounter for palliative care    Overview     LaPOST on file current with wishes of DNR/I    HCPOA on file: 1: dtr Connie Dixonvallilli) [place] [phone], 2: son in [facility] [phone]                   Follow up: 1 month    Signature: Zohra Dudley PA-C

## 2022-07-13 NOTE — ASSESSMENT & PLAN NOTE
Recently completed XRT which has improved ear pain and jaw pain.    Receiving Keytruda under the care of Dr. Osman. Repeat imaging planned after cycle 3.     They opted to forgo additional biopsy of aural tissue as this would not alter the treatment plan in their opinion or plans.

## 2022-07-18 NOTE — ED PROVIDER NOTES
"SCRIBE #1 NOTE: I, Altagracia Pearson, am scribing for, and in the presence of, Shaka Church MD. I have scribed the entire note.      History      Chief Complaint   Patient presents with    Fall     Ground level slip and fall. Takes plavix. L hip pain        Review of patient's allergies indicates:  No Known Allergies     HPI   HPI    7/18/2022, 1:36 PM   History obtained from the patient and the pt's family member at bedside      History of Present Illness: Tiffanie Wise is a 81 y.o. female patient with a PMHx of AP, arterial ischemic stroke, CAD s/p CABG, diastolic heart failure, HTN, hypothyroidism, malignant neoplasm or pharyngeal tonsil, immunodeficiency due to chemotherapy, mixed HLD, and DM2 who presents to the Emergency Department for an evaluation after she had multiple falls this morning. Per the pt's family member, the pt fell at 0400 this morning and could not get up, so she went to the pt's home and put the pt back into bed. Then, at 0900, the pt's family member reports that the pt had another fall where she fell onto an ottoman and rolled onto the floor. After 0900, the pt's family member reports that the pt's neighbor called her because the pt called for help. The pt's family member reports that the pt was in bed when she checked on the pt again, but believes that the pt may have had another fall. Per the pt's family member, the pt reported that she did not hit her head during the falls, but the pt is confused at baseline. Now, the pt c/o L rib pain and L lower back pain. Symptoms are constant and moderate in severity. No mitigating or exacerbating factors reported. Associated sxs include unsteady gait. Per the pt's family member, the pt's gait has been unsteady for "a while."  Patient denies any abdominal pain, fever, chills, headache, N/V/D, CP, SOB, and all other sxs at this time. Prior Tx includes a 5 mg Norco taken at 1110 this morning. Pt is on Plaxix and ASA. Per the pt's family member, 3 days " ago the pt's Klonopin was increased from only taking it at night to taking it at night and in the morning. Pt reportedly lives by herself. No further complaints or concerns at this time.         Arrival mode: EMS    PCP: Tiffanie Spence MD       Past Medical History:  Past Medical History:   Diagnosis Date    Anxiety     AP (angina pectoris) 7/18/2013    Arterial ischemic stroke, MCA (middle cerebral artery), left, acute 12/15/2017    Asthma     CAD, multiple vessel 5/13/2021    Class 1 obesity due to excess calories with serious comorbidity and body mass index (BMI) of 30.0 to 30.9 in adult 3/15/2019    Coronary artery disease 7/18/2013    Depression     Diastolic heart failure     Gastrostomy status     GERD (gastroesophageal reflux disease) 7/18/2013    History of PTCA 7/18/2013    Hypertension 7/18/2013    Hypothyroidism     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     Trouble in sleeping     Type 2 diabetes mellitus with ophthalmic manifestations     Urinary incontinence     pullups day, pads at night       Past Surgical History:  Past Surgical History:   Procedure Laterality Date    BREAST SURGERY Left     benign cyst    CORONARY ANGIOPLASTY      CORONARY STENT PLACEMENT      x6-7 oer the yeqrs  last 12/17/14 BRIANDA to lcfx    EYE SURGERY Bilateral 2014    cataracts w/IOL   Dr Vance    FLUOROSCOPY N/A 1/20/2021    Procedure: G tube plaacement;  Surgeon: Shaka Cross MD;  Location: [facility] CATH LAB;  Service: General;  Laterality: N/A;     HYSTERECTOMY  1970    vag hyst; ovaries intact    THYROID SURGERY      nodule benign, Dr Hankins    TUBAL LIGATION  1970         Family History:  Family History   Problem Relation Age of Onset    Kidney disease Father     Kidney disease Brother     Heart disease Mother     Fibromyalgia Daughter     Cancer Son         lung    Cirrhosis Sister     Alcohol abuse Sister     Diabetes Sister     Fibromyalgia Daughter     Diabetes Paternal Grandmother     Stroke Neg Hx     Mental illness Neg Hx     Mental retardation Neg Hx        Social History:  Social History     Tobacco Use    Smoking status: Former Smoker     Packs/day: 1.00     Years: 46.00     Pack years: 46.00     Types: Cigarettes     Start date:      Quit date: 2008     Years since quittin.0    Smokeless tobacco: Never Used   Substance and Sexual Activity    Alcohol use: No    Drug use: Never    Sexual activity: Not Currently     Birth control/protection: None       ROS   Review of Systems   Constitutional: Negative for chills and fever.   HENT: Negative for sore throat.    Respiratory: Negative for shortness of breath.    Cardiovascular: Negative for chest pain.   Gastrointestinal: Negative for abdominal pain, diarrhea, nausea and vomiting.   Genitourinary: Negative for dysuria.   Musculoskeletal: Positive for back pain (L lower) and gait problem (unsteady).        (+) L rib   Skin: Negative for rash.   Neurological: Negative for weakness and headaches.   Hematological: Does not bruise/bleed easily.   All other systems reviewed and are negative.    Physical Exam      Initial Vitals [22 1244]   BP Pulse Resp Temp SpO2   (!) 125/53 70 18 97.8 °F (36.6 °C) 96 %      MAP       --          Physical Exam  Nursing Notes and Vital Signs Reviewed.  Constitutional: Patient is in no acute distress. Well-developed and well-nourished.  Head: Atraumatic. Normocephalic.  Eyes: PERRL. EOM intact. Conjunctivae are not pale. No scleral  icterus.  ENT: Mucous membranes are moist. Oropharynx is clear and symmetric.    Neck: Supple. Full ROM. No lymphadenopathy.  Cardiovascular: Regular rate. Regular rhythm. No murmurs, rubs, or gallops. Distal pulses are 2+ and symmetric.  Pulmonary/Chest: No respiratory distress. Clear to auscultation bilaterally. No wheezing or rales.  Abdominal: Soft and non-distended.  There is no tenderness.  No rebound, guarding, or rigidity.  Musculoskeletal: Moves all extremities. No obvious deformities. No edema. There is lumbar tenderness and L rib tenderness. No hip ttp  Skin: Warm and dry.  Neurological:  Awake, conversant, and at baseline confusion.  Normal speech.  No acute focal neurological deficits are appreciated.  Psychiatric: Normal affect. Good eye contact. Appropriate in content.    ED Course    Procedures  ED Vital Signs:  Vitals:    07/18/22 1244 07/18/22 1310 07/18/22 1319 07/18/22 1321   BP: (!) 125/53  139/64    Pulse: 70   63   Resp: 18      Temp: 97.8 °F (36.6 °C)      TempSrc: Oral      SpO2: 96%      Weight:  58.6 kg (129 lb 3 oz)      07/18/22 1332 07/18/22 1451 07/18/22 1452 07/18/22 1504   BP: 136/65 (!) 142/95  (!) 133/59   Pulse: 66 (!) 56  (!) 55   Resp: 10      Temp:       TempSrc:       SpO2: (!) 91%  95% 99%   Weight:        07/18/22 1528 07/18/22 1604 07/18/22 1622   BP:  131/62 127/64   Pulse:  (!) 55 62   Resp: 16     Temp:      TempSrc:      SpO2:  98% 96%   Weight:          Abnormal Lab Results:  Labs Reviewed - No data to display       Imaging Results:  Imaging Results          CT Cervical Spine Without Contrast (Final result)  Result time 07/18/22 14:53:43    Final result by Wellington Callahan MD (07/18/22 14:53:43)                 Impression:      1.  Negative CT scan of the cervical spine. No evidence of fracture or subluxation.    2.  Mild degenerative changes of the cervical spine as detailed above.    3.  Findings consistent with increase in size of the patient's known right neck mass  with central low density suggesting necrosis, either a true soft tissue mass versus conglomeration of abnormal lymph nodes.  Clinical correlation is advised.  A repeat PET-CT scan should be considered.    4.  Stable findings as noted above.    All CT scans at this facility are performed  using dose modulation techniques as appropriate to performed exam including the following:  automated exposure control; adjustment of mA and/or kV according to the patients size (this includes techniques or standardized protocols for targeted exams where dose is matched to indication/reason for exam: i.e. extremities or head);  iterative reconstruction technique.      Electronically signed by: Wellington Callahan MD  Date:    07/18/2022  Time:    14:53             Narrative:    EXAMINATION:  CT CERVICAL SPINE WITHOUT CONTRAST    CLINICAL HISTORY:  Neck trauma (Age >= 65y);    TECHNIQUE:  Axial noncontrast CT scan of the cervical spine with sagittal and coronal reconstructions.    COMPARISON:  PET-CT scan from March 24, 2022.    FINDINGS:  The cervical vertebrae reveal normal alignment, shape and bone density. The cervical vertebra are intact without evidence of fracture or dislocation.    Mild degenerative changes between the anterior arch of C1 and the dens.  Mild marginal spondylosis at multiple levels most significantly involving C6/C7.  Mild C5/C6 and C6/C7 disc height reduction.  Facets are well aligned.  Convex right curvature of the cervical spine noted.    Mild spinal canal stenosis at C5/C6 through C7/T1 related to posterior spondylosis and disc bulges, along with mild disc bulges at C2/C3 and C3/C4, without significant spinal canal stenosis.  Nerve roots are patent.    Interval increase in soft tissue mass versus conglomeration of lymph nodes in the right neck soft tissues with central areas of low density.  Carotid artery calcifications noted.  The surrounding neck soft tissues are otherwise normal.    Apical pleuroparenchymal  changes noted.  The lung apices are otherwise clear.  The thyroid gland is normal.  Aortic calcifications without aneurysmal change.  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  Stable median sternotomy wires.                               CT Head Without Contrast (Final result)  Result time 07/18/22 14:44:44    Final result by Wellington Callahan MD (07/18/22 14:44:44)                 Impression:      1.  Negative for acute intracranial process. Negative for hemorrhage, or skull fracture.    2.  Cerebral atrophy noted.  Intracranial atherosclerotic disease noted.  Small vessel ischemic changes noted.  Stable encephalomalacia involving the lateral left parietal lobe and right superior cerebellum.  Stable left basal ganglia lacunar infarcts.    3.  Mild patchy ethmoid air cell disease.    All CT scans at this facility are performed  using dose modulation techniques as appropriate to performed exam including the following:  automated exposure control; adjustment of mA and/or kV according to the patients size (this includes techniques or standardized protocols for targeted exams where dose is matched to indication/reason for exam: i.e. extremities or head);  iterative reconstruction technique.      Electronically signed by: Wellington Callahan MD  Date:    07/18/2022  Time:    14:44             Narrative:    EXAMINATION:  CT HEAD WITHOUT CONTRAST    CLINICAL HISTORY:  Head trauma, minor (Age >= 65y);    TECHNIQUE:  Axial images through the brain and posterior fossa were obtained without the use of IV contrast.  Sagittal and coronal reconstructions are provided for review.    COMPARISON:  December 14, 2020 brain MRI    FINDINGS:  The ventricles are midline and the CSF spaces are prominent.  Stable area of encephalomalacia involving the lateral left parietal lobe and superior right cerebellum.  The gray-white matter junction is otherwise well preserved. Negative for intracranial vascular abnormalities. Negative for mass, mass  effect, cerebral edema, hemorrhage or abnormal fluid collections.  Intracranial atherosclerotic disease noted.  Small vessel ischemic changes noted.  Small left basal ganglia lacunar infarcts again seen.  Basal ganglia calcifications and falx calcifications again seen.    The skull and scalp are  intact.  Posterior right ethmoid air cell disease noted.  The rest of the paranasal sinuses, mastoid air cells, middle ears and ear canals are clear. The globes are intact.  Postoperative changes to the lens regions.                               X-Ray Hip 2 or 3 views Left (with Pelvis when performed) (Final result)  Result time 07/18/22 14:28:21    Final result by SUSIE Flores Sr., MD (07/18/22 14:28:21)                 Impression:      1. No fracture or dislocation  2. There is a moderate amount of atherosclerosis.      Electronically signed by: Hung Flores MD  Date:    07/18/2022  Time:    14:28             Narrative:    EXAMINATION:  XR HIP WITH PELVIS WHEN PERFORMED, 2 OR 3 VIEWS LEFT    CLINICAL HISTORY:  Unspecified fall, initial encounter    COMPARISON:  None    FINDINGS:  There is no fracture. There is no dislocation.  The joint space of the left hip is normal in appearance.  There is a moderate amount of atherosclerosis.                               X-Ray Lumbar Spine Ap And Lateral (Final result)  Result time 07/18/22 14:23:43    Final result by SUSIE Flores Sr., MD (07/18/22 14:23:43)                 Impression:      1. The lumbar spine is normal in appearance.  2. There is a moderate amount of atherosclerosis.      Electronically signed by: Hung Flores MD  Date:    07/18/2022  Time:    14:23             Narrative:    EXAMINATION:  XR LUMBAR SPINE AP AND LATERAL    CLINICAL HISTORY:  fall;    COMPARISON:  None    FINDINGS:  There are 5 lumbar type vertebral bodies. There is no fracture, spondylolisthesis, or scoliosis. There is normal lumbar lordosis.  There is a moderate amount of  atherosclerosis.                               X-Ray Ribs 2 View Left (Final result)  Result time 07/18/22 14:36:42    Final result by SUSIE Flores Sr., MD (07/18/22 14:36:42)                 Impression:      1. There is an acute appearing fracture in the anterolateral aspect of the left 11th rib.  2. There is no pneumothorax.  3. There are surgical changes associated with a prior sternotomy.  .      Electronically signed by: Hung Flores MD  Date:    07/18/2022  Time:    14:36             Narrative:    EXAMINATION:  XR RIBS 2 VIEW LEFT    CLINICAL HISTORY:  Unspecified fall, initial encounter    COMPARISON:  None    FINDINGS:  There is an acute appearing fracture in the anterolateral aspect of the left 11th rib.  There is no focal pulmonary infiltrate visualized.  There is no pneumothorax.  The left costophrenic angle is sharp.  There are surgical changes associated with a prior sternotomy.                               X-Ray Thoracic Spine AP And Lateral (Final result)  Result time 07/18/22 14:30:26    Final result by SUSIE Flores Sr., MD (07/18/22 14:30:26)                 Impression:      1. The thoracic spine is normal in appearance.  2. There has been interval surgical changes associated with a prior sternotomy.  3. There is a moderate amount of atherosclerosis.      Electronically signed by: Hung Flores MD  Date:    07/18/2022  Time:    14:30             Narrative:    EXAMINATION:  XR THORACIC SPINE AP LATERAL    CLINICAL HISTORY:  Unspecified fall, initial encounter    COMPARISON:  10/14/2014    FINDINGS:  There is no fracture, spondylolisthesis, or scoliosis. There is normal thoracic kyphosis.  There has been interval surgical changes associated with a prior sternotomy.  There is a moderate amount of atherosclerosis.                                        The Emergency Provider reviewed the vital signs and test results, which are outlined above.    ED Discussion     4:21 PM: Reassessed pt at  this time. Discussed with pt all pertinent ED information and results. Discussed pt dx and plan of tx. Gave pt all f/u and return to the ED instructions. All questions and concerns were addressed at this time. Pt expresses understanding of information and instructions, and is comfortable with plan to discharge. Pt is stable for discharge.    I discussed with patient and/or family/caretaker that evaluation in the ED does not suggest any emergent or life threatening medical conditions requiring immediate intervention beyond what was provided in the ED, and I believe patient is safe for discharge.  Regardless, an unremarkable evaluation in the ED does not preclude the development or presence of a serious of life threatening condition. As such, patient was instructed to return immediately for any worsening or change in current symptoms.           ED Medication(s):  Medications   HYDROcodone-acetaminophen 5-325 mg per tablet 1 tablet (1 tablet Oral Given 7/18/22 1528)        Follow-up Information     Tiffanie Spence MD In 1 day.    Specialty: Family Medicine  Contact information:  [address] [facility] [address]  [phone]             [facility] - Emergency Dept..    Specialty: Emergency Medicine  Why: As needed, If symptoms worsen  Contact information:  [id number] [place] [address] [phone]                       Discharge Medication List as of 7/18/2022  4:22 PM            Medical Decision Making    Medical Decision Making:   Clinical Tests:   Radiological Study: Ordered and Reviewed           Scribe Attestation:   Scribe #1: I performed the above scribed service and the documentation accurately describes the services I performed. I attest to the accuracy of the note.    Attending:   Physician Attestation Statement for Scribe #1: I, Shaka Church MD, personally performed the services described in this documentation, as scribed by , in my presence, and it is both  accurate and complete.          Clinical Impression       ICD-10-CM ICD-9-CM   1. Closed fracture of one rib of left side, initial encounter  S22.32XA 807.01   2. Fall  W19.XXXA E888.9       Disposition:   Disposition: Discharged  Condition: Stable         Shaka Church MD  07/20/22 0757

## 2022-07-24 PROBLEM — J96.01 ACUTE HYPOXEMIC RESPIRATORY FAILURE: Chronic | Status: ACTIVE | Noted: 2021-01-17

## 2022-07-24 NOTE — PROGRESS NOTES
Patient ID: Tiffanie Wise is a 81 y.o. female.    Patient Active Problem List   Diagnosis    Abnormal ECG    AP (angina pectoris)    GERD (gastroesophageal reflux disease)    Hyperlipidemia associated with type 2 diabetes mellitus    Type 2 diabetes mellitus with hyperglycemia    Acquired hypothyroidism    Osteoporosis    NAWAF (obstructive sleep apnea)    Anxiety and depression    Pulmonary nodule, right - stable    Diastolic dysfunction with acute on chronic heart failure    Long-term insulin use in type 2 diabetes    CAD with remote CABG x 1V (LIMA-LAD) in 6/2016 + PCI of RCA and LCx    Hypertension associated with diabetes    Unspecified vitamin D deficiency    Amnesia    History of arterial ischemic stroke, left MCA (middle cerebral artery) 12/2017    Aortic arch atherosclerosis    Arthritis of hand    Microcytic anemia    Seizure    Overweight (BMI 25.0-29.9)    History of PTCA    Malignant neoplasm of pharyngeal tonsil    Dehydration    Encounter for palliative care    Cancer related pain    Severe Oropharyngeal dysphagia sec to XRT for Tonsilar Ca    Therapeutic opioid-induced constipation (OIC)    Gastrostomy status    History of stroke    CAD, multiple vessel    S/P CABG (coronary artery bypass graft)    Nonrheumatic aortic valve insufficiency    Diastolic dysfunction    Iron deficiency anemia secondary to inadequate dietary iron intake    History of fall    Class 1 obesity due to excess calories with serious comorbidity and body mass index (BMI) of 30.0 to 30.9 in adult    Severe Sepsis with Cellulitis and abscess of right upper extremity    Chronic diastolic heart failure    Debility    Hypoalbuminemia    Normocytic anemia    Nonrheumatic aortic valve stenosis    Pulmonary hypertension    Dementia in other diseases classified elsewhere without behavioral disturbance    Immunodeficiency due to chemotherapy    Immunodeficiency secondary to radiation  therapy    Falls frequently    Broken ribs    At risk for falls due to medication     Problem list has been reviewed.      Chief Complaint: No chief complaint on file.       HPI:   Tiffanie Wise 81 y.o. female presents with Connie Worley, daughter, power of , and primary care giver here for follow up on pneumonia.   Last visit 03/09/2022  Here to review CXR  Hx aspiration pna  CXR shows clearing  Not using CPAP due to pain wearing CPAP  Took wrong pain meds feel on Thurday  Has hospital bed, rail was down  CXR 11th rib fracture  Had Home health in past  Lives alone  No pulmonary symptoms. There is occasional cough when sinus draining, no wheezing, no shortness of breath, no sputum production, no chest pain.  On APAP  of  6 - 20 CMWP   Has 2 L oxygen blended into auto CPAP night.  Daughter reports, orders placed, patient refused oxygen.   Patient declined O2  Time with SpO2<89: 0:07:20, 1.1%  Oncology cancelled infusion and ordered PET CT  SP x4 XRT    Patient denies snoring, headaches, excessive daytime sleepiness.         Patient has to bring SIM card in for download   Compliance Summary  Auto CPAP 6-20 cm   12/10/2021 - 1/8/2022 (30 days)  Days with Device Usage 29 days  Days without Device Usage 1 day  Percent Days with Device Usage 96.7%  Cumulative Usage 9 days 19 hrs. 56 mins. 52 secs.  Maximum Usage (1 Day) 12 hrs. 30 mins. 49 secs.  Average Usage (All Days) 7 hrs. 51 mins. 53 secs.  Average Usage (Days Used) 8 hrs. 8 mins. 10 secs.  Minimum Usage (1 Day) 53 mins. 5 secs.  Percent of Days with Usage >= 4 Hours 93.3%  Percent of Days with Usage < 4 Hours 6.7%  Date Range  Total Blower Time 10 days 4 hrs. 38 mins. 17 secs.  Average AHI 4.7  Auto-CPAP Summary ([employer] [facility])  Auto-CPAP Mean Pressure 7.3 cmH2O  Auto-CPAP Peak Average Pressure 9.7 cmH2O  Device Pressure <= 90% of Time 9.8 cmH2O  Average Time in Large Leak Per Day 1 hrs. 4 mins. 33 secs.       Her current respiratory therapy  regimen is Pulmicort nebs twice daily. Duo nebs if needed. She is NOT adherent with her regimen.     A full  review of systems, past , family  and social histories was performed except as mentioned in the note above, these are non contributory to the main issues discussed today.     Previous Report Reviewed: office notes     The following portions of the patient's history were reviewed and updated as appropriate: She  has a past medical history of Anxiety, AP (angina pectoris) (7/18/2013), Arterial ischemic stroke, MCA (middle cerebral artery), left, acute (12/15/2017), Asthma, Broken ribs, CAD, multiple vessel (5/13/2021), Class 1 obesity due to excess calories with serious comorbidity and body mass index (BMI) of 30.0 to 30.9 in adult (3/15/2019), Coronary artery disease (7/18/2013), Depression, Diastolic heart failure, Gastrostomy status, GERD (gastroesophageal reflux disease) (7/18/2013), History of PTCA (7/18/2013), Hypertension (7/18/2013), Hypothyroidism, Immunodeficiency due to chemotherapy (4/8/2022), Immunodeficiency secondary to radiation therapy (4/8/2022), Malignant neoplasm of pharyngeal tonsil (12/4/2020), Mixed hyperlipidemia (7/18/2013), Osteoporosis, Pneumonia due to other staphylococcus, Precancerous changes of the vagina, S/P CABG (coronary artery bypass graft) (5/13/2021), Seizure (3/15/2019), Sleep apnea, Trouble in sleeping, Type 2 diabetes mellitus with ophthalmic manifestations, and Urinary incontinence.  She  has a past surgical history that includes Coronary stent placement; Thyroid surgery; Coronary angioplasty; Breast surgery (Left); Hysterectomy (1970); Eye surgery (Bilateral, 2014); Tubal ligation (1970); and Fluoroscopy (N/A, 1/20/2021).  Her family history includes Alcohol abuse in her sister; Cancer in her son; Cirrhosis in her sister; Diabetes in her paternal grandmother and sister; Fibromyalgia in her daughter and daughter; Heart disease in her mother; Kidney disease in her  brother and father.  She  reports that she quit smoking about 14 years ago. Her smoking use included cigarettes. She started smoking about 60 years ago. She has a 46.00 pack-year smoking history. She has never used smokeless tobacco. She reports that she does not drink alcohol and does not use drugs.  She has a current medication list which includes the following prescription(s): amlodipine, atorvastatin, blood sugar diagnostic, clonazepam, clopidogrel, donepezil, escitalopram oxalate, hydrochlorothiazide, hydrocodone-acetaminophen, insulin aspart protamine-insulin aspart, insulin detemir u-100, isosorbide mononitrate, levothyroxine, losartan, methadone, nitroglycerin, pantoprazole, potassium chloride sa, pregabalin, sitagliptin, vitamin d, aspirin, and [DISCONTINUED] dexcom g6 sensor.  She has No Known Allergies..    Review of Systems   Constitutional: Negative for fever, chills, weight loss, weight gain, activity change, appetite change, fatigue and night sweats.   HENT: Negative for postnasal drip, rhinorrhea, sinus pressure, voice change and congestion.    Eyes: Negative for redness and itching.   Respiratory: Negative for snoring, cough, sputum production, chest tightness, shortness of breath, wheezing, orthopnea, asthma nighttime symptoms, dyspnea on extertion, use of rescue inhaler and somnolence.    Cardiovascular: Negative.  Negative for chest pain, palpitations and leg swelling.   Genitourinary: Negative for difficulty urinating and hematuria.   Endocrine: Negative for cold intolerance and heat intolerance.    Musculoskeletal: Negative for arthralgias, gait problem, joint swelling and myalgias.   Skin: Negative.    Gastrointestinal: Negative for nausea, vomiting, abdominal pain and acid reflux.   Neurological: Negative for dizziness, weakness, light-headedness and headaches.   Hematological: Negative for adenopathy. No excessive bruising.   All other systems reviewed and are negative.       Objective:  "    Vitals:    07/25/22 1056   BP: 106/74   Pulse: (!) 59   Resp: 16   SpO2: 95%   Weight: 63.5 kg (139 lb 15.9 oz)   Height: 4' 11" (1.499 m)     body mass index is 28.27 kg/m².    Physical Exam  Vitals and nursing note reviewed.   Constitutional:       General: She is not in acute distress.     Appearance: She is well-developed. She is ill-appearing. She is not toxic-appearing.   HENT:      Head: Normocephalic.        Right Ear: External ear normal.      Left Ear: External ear normal.      Nose: Nose normal.      Mouth/Throat:      Pharynx: No oropharyngeal exudate.   Eyes:      Conjunctiva/sclera: Conjunctivae normal.   Cardiovascular:      Rate and Rhythm: Normal rate and regular rhythm.      Heart sounds: Normal heart sounds.   Pulmonary:      Effort: Pulmonary effort is normal.      Breath sounds: Normal breath sounds. No stridor.   Abdominal:      Palpations: Abdomen is soft.   Musculoskeletal:         General: Normal range of motion.      Cervical back: Normal range of motion and neck supple.   Lymphadenopathy:      Cervical: No cervical adenopathy.   Skin:     General: Skin is warm and dry.   Neurological:      Mental Status: She is alert and oriented to person, place, and time.   Psychiatric:         Behavior: Behavior normal. Behavior is cooperative.         Thought Content: Thought content normal.         Judgment: Judgment normal.         Personal Diagnostic Review    X-Ray Chest PA And Lateral  Narrative: EXAMINATION:  XR CHEST PA AND LATERAL    CLINICAL HISTORY:  Pneumonitis due to inhalation of food and vomit    TECHNIQUE:  PA and lateral views of the chest were performed.    COMPARISON:  Chest x-ray dated July 8, 2022    FINDINGS:  Patient is status post median sternotomy.  The trachea and cardiomediastinal silhouette are within normal limits.  There is no evidence of pleural effusions, pneumothoraces or consolidations.  Lungs are clear.  Osseous structures demonstrate no evidence for acute " fractures or dislocations.  Impression: No acute abnormality.    Electronically signed by: Benjamin Waters MD  Date:    07/25/2022  Time:    11:01            Recommendations:         Mayra Camarillo [facility]. [address] [place] [id number] [phone]  Compliance Information 12/10/2021 - 1/8/2022  Compliance Summary  12/10/2021 - 1/8/2022 (30 days)  Days with Device Usage 29 days  Days without Device Usage 1 day  Percent Days with Device Usage 96.7%  Cumulative Usage 9 days 19 hrs. 56 mins. 52 secs.  Maximum Usage (1 Day) 12 hrs. 30 mins. 49 secs.  Average Usage (All Days) 7 hrs. 51 mins. 53 secs.  Average Usage (Days Used) 8 hrs. 8 mins. 10 secs.  Minimum Usage (1 Day) 53 mins. 5 secs.  Percent of Days with Usage >= 4 Hours 93.3%  Percent of Days with Usage < 4 Hours 6.7%  Date Range  Total Blower Time 10 days 4 hrs. 38 mins. 17 secs.  Average AHI 4.7  Auto-CPAP Summary ([facility])  Auto-CPAP Mean Pressure 7.3 cmH2O  Auto-CPAP Peak Average Pressure 9.7 cmH2O  Device Pressure <= 90% of Time 9.8 cmH2O  Average Time in Large Leak Per Day 1 hrs. 4 mins. 33 secs.  CPAP complaince download.     Assessment / plan     Discussed diagnosis, its evaluation, treatment and usual course. All questions answered.    Problem List Items Addressed This Visit     Hyperlipidemia associated with type 2 diabetes mellitus (Chronic)    Current Assessment & Plan     On LIPITOR           Hypertension associated with diabetes (Chronic)    Current Assessment & Plan     On NORVASC           RESOLVED: Acute hypoxemic respiratory failure (Chronic)    Gastrostomy status    Diastolic dysfunction with acute on chronic heart failure    Overview     9/14/15  2 D echo:  Conc remodeling.  Normal LVEF 60-65%.  Left ventricular diastolic dysfunction.    Normal RV systolic function . estimated PA systolic pressure is 17 mmHg.  Trivial tricuspid regurgitation              NAWAF (obstructive sleep apnea)    Current Assessment  & Plan     Declined CPAP  Declined Oxygen that was prescribed in past  Will order ONSAT if patient not going into Hospice             Pulmonary nodule, right - stable    Overview     PET CT 12/16/2021  Chest: No FDG avid pulmonary nodules/masses. No FDG avid mediastinal, hilar or axillary lymph nodes.Physiologic FDG uptake in the myocardium.  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  No evidence of FDG avid distant disease.                  Current Assessment & Plan     Follow up PET CT           Long-term insulin use in type 2 diabetes    Current Assessment & Plan     On Novolog and detemir insulin           Malignant neoplasm of pharyngeal tonsil    Overview     T2v3 N1 (bilateral nodes on PET) Mx, p16 POSITIVE            Current Assessment & Plan     Managed by Oncology  SP XRT  On Keytruda  PET Ct planned           Relevant Orders    Ambulatory referral/consult to Outpatient Case Management    Pulmonary hypertension - Primary    Current Assessment & Plan     Non adherent with CPAP or oxygen  GOC need to be addressed           Falls frequently    Current Assessment & Plan     Lives alone  Has Hospital beds  Recent rib #           Relevant Orders    Ambulatory referral/consult to Outpatient Case Management    Broken ribs    Current Assessment & Plan     Recent Hx fall  Demonstrated on CXR           At risk for falls due to medication    Current Assessment & Plan     Patient states lives alone, mixes up medication doses  Will need safe supervised enviroment: NH or Hospice care           Relevant Orders    Ambulatory referral/consult to Outpatient Case Management        Patient presents improved post pneumonia.   Daughter reports patient is not safe at home alone.   Patient reports non  adherent to CPAP nightly, not willing to use O2.        JENY العلي about in house hospice      Follow up in about 6 months  (around 1/25/2023), or CXR, appropriate for hospice care defer to oncology, ref OPCM.    This note was prepared using voice recognition system and is likely to have sound alike errors that may have been overlooked even after proof reading.  Please call me with any questions    Discussed diagnosis, its evaluation, treatment and usual course. All questions answered.    Thank you for the courtesy of participating in the care of this patient    George Pinto MD

## 2022-07-25 PROBLEM — J18.9 PNEUMONIA: Status: RESOLVED | Noted: 2022-01-01 | Resolved: 2022-01-01

## 2022-07-25 PROBLEM — R29.6 FALLS FREQUENTLY: Status: ACTIVE | Noted: 2022-01-01

## 2022-07-25 PROBLEM — J96.01 ACUTE HYPOXEMIC RESPIRATORY FAILURE: Chronic | Status: RESOLVED | Noted: 2021-01-17 | Resolved: 2022-01-01

## 2022-07-25 NOTE — TELEPHONE ENCOUNTER
Nurse received message from Dr Osman to contact pt's daughter to discuss inpatient hospice, her daughter seem to think her mom maybe better off in nursing home at this time, I offered pt apt with juvenal tomorrow while in office getting IVF and visit with dr mcclain.

## 2022-07-25 NOTE — ASSESSMENT & PLAN NOTE
Declined CPAP  Declined Oxygen that was prescribed in past  Will order ONSAT if patient not going into Hospice

## 2022-07-25 NOTE — ASSESSMENT & PLAN NOTE
Chart reviewed per consult order. Patient seen by LPS on 7/1 and orders written for wound care. Nursing to follow dressing care as ordered.   Managed by Oncology  SP XRT  On Keytruda  PET Ct planned

## 2022-07-25 NOTE — ASSESSMENT & PLAN NOTE
Patient states lives alone, mixes up medication doses  Will need safe supervised enviroment: NH or Hospice care

## 2022-07-25 NOTE — TELEPHONE ENCOUNTER
Fanny received consult to reach out to pt.'s daughter, Malvin for information regarding inpatient hospice agencies. Fanny was unable to reach her at [phone], however sw was able to leave a  for a call back. Fanny will remain available.

## 2022-07-25 NOTE — Clinical Note
Would benefit from [facility] care and  fup. Can someone please reach out to daughter to provide info on inpatient hospice? Please see note for further details
no

## 2022-07-25 NOTE — PROGRESS NOTES
Subjective:      DATE OF VISIT: 7/25/22     ?  Patient ID:?Tiffanie Wise is a 81 y.o. female.?? MR#: [id number]   ?   REFERRING PROVIDER: No referring provider defined for this encounter.     ? Primary Care Providers:  Tiffanie Spence MD, MD (General)     CHIEF COMPLAINT:  Follow-up???   ?   ONCOLOGIC DIAGNOSIS:  Squamous cell carcinoma, p16 positive, of the right tonsillar pillar    CURRENT TREATMENT:   Palliative pembrolizumab, C1 4/22/22, and palliative re-irradiation    PAST TREATMENT:  Radiation, without chemotherapy due to functional status/palliative intent, completed February 2021  ?   ONCOLOGIC HISTORY:     Ms. Wise is a 81yo woman with multiple comorbidities including CVA with residual speech impairment,  hearing impairment, CAD, former tobacco user, type 2 diabetes on insulin complicated by neuropathy, CKD, arthritis and early dementia.      Over the last 1-2 years she has had progressive right ear pain and recent evaluation in November 2020 by ENT palpation right soft palate mass and right cervical adenopathy.    11/19/2020 CT neck notable for right oropharyngeal mass at tonsillar pillar crossing midline 3.5 cm with adenopathy including jugulardigastric metastatic lymph node 3.4 x 2.4 x 2 cm with mass effect on or pharyngeal airway.    11/25/2020 ultrasound-guided lymph node biopsy   Pathology:  Squamous cell carcinoma.  P 16 positive    12/16/2020 PET-CT hypermetabolic right oropharyngeal/tonsillar mass SUV max 11, 3.7 cm, enlarged 2.5 cm hypermetabolic jugular digastric lymph node SUV 7.4.  Additional smaller S FDG avid right submandibular and posterior cervical lymph nodes SUV max 4.3.  Solitary left cervical node SUV max 3.8.  No evidence of FDG avid distant disease.    12/14/2020 MRI brain no evidence of intracranial metastatic disease.  Chronic left parietal infarct.  Chronic right cerebellar infarct    I had the pleasure seeing Ms. Newton in clinic.  She was initially seen for right  oropharyngeal squamous cell carcinoma treated with palliative radiation completed February 2021.    Local dermal recurrence local dermal recurrence right neck with PET 03/24/2022 demonstrating avidity multiple suspicious clustered right level 2 cervical lymph nodes.  No evidence of distant metastatic disease.    She was initiated on palliative single agent immunotherapy with pembrolizumab.  Cycle 1 day 1 on 04/22/2022.    Due to progressive pain in right neck proceeded with re-irradiation      INTERVAL EVENTS      Accompanied by daughter noting recurrent firmness right cervical region.  Pain control with methadone generally well controlled however does contribute to drowsiness during the day.  She does note progressive fatigue and drowsiness related to medication may have contributed to recent fall with rib fracture cast on right wrist.  They are looking into additional home care options.    Objective:      Physical Exam      ?   Vitals:    07/25/22 0837   BP: (!) 95/52   Pulse: 60   Temp: 98.1 °F (36.7 °C)      ?   ECOG:3  General appearance:  In no acute distress significant hearing loss, impaired speech, occasional gait imbalance  Head, eyes, ears, nose, and throat:right cervical adenopathy, firm nonmobile, tender, trace prior bleeding.  Cardiovascular:  Regular rate and rhyth  Extremities: Warm, without edema.   Neurologic: Alert and oriented.  Sitting in wheelchair.  Skin: No rashes, ecchymoses or petechial lesion.   ?     ?   Laboratory:  ?   Lab Results   Component Value Date    WBC 13.12 (H) 07/25/2022    HGB 12.5 07/25/2022    HCT 38.2 07/25/2022    MCV 84 07/25/2022     07/25/2022       Lab Results   Component Value Date    IRON 82 08/05/2021    TIBC 292 08/05/2021    FERRITIN 277 08/05/2021       ?   Imaging:  ?    Results for orders placed or performed during the hospital encounter of 07/18/22 (from the past 2160 hour(s))   CT Cervical Spine Without Contrast    Impression    1.  Negative CT scan  of the cervical spine. No evidence of fracture or subluxation.    2.  Mild degenerative changes of the cervical spine as detailed above.    3.  Findings consistent with increase in size of the patient's known right neck mass with central low density suggesting necrosis, either a true soft tissue mass versus conglomeration of abnormal lymph nodes.  Clinical correlation is advised.  A repeat PET-CT scan should be considered.    4.  Stable findings as noted above.    All CT scans at this facility are performed  using dose modulation techniques as appropriate to performed exam including the following:  automated exposure control; adjustment of mA and/or kV according to the patients size (this includes techniques or standardized protocols for targeted exams where dose is matched to indication/reason for exam: i.e. extremities or head);  iterative reconstruction technique.      Electronically signed by: Wellington Callahan MD  Date:    07/18/2022  Time:    14:53   CT Head Without Contrast    Impression    1.  Negative for acute intracranial process. Negative for hemorrhage, or skull fracture.    2.  Cerebral atrophy noted.  Intracranial atherosclerotic disease noted.  Small vessel ischemic changes noted.  Stable encephalomalacia involving the lateral left parietal lobe and right superior cerebellum.  Stable left basal ganglia lacunar infarcts.    3.  Mild patchy ethmoid air cell disease.    All CT scans at this facility are performed  using dose modulation techniques as appropriate to performed exam including the following:  automated exposure control; adjustment of mA and/or kV according to the patients size (this includes techniques or standardized protocols for targeted exams where dose is matched to indication/reason for exam: i.e. extremities or head);  iterative reconstruction technique.      Electronically signed by: Wellington Callahan MD  Date:    07/18/2022  Time:    14:44     *Note: Due to a large number of results and/or  encounters for the requested time period, some results have not been displayed. A complete set of results can be found in Results Review.     No results found. However, due to the size of the patient record, not all encounters were searched. Please check Results Review for a complete set of results.  No results found. However, due to the size of the patient record, not all encounters were searched. Please check Results Review for a complete set of results.  NM PET CT ROUTINE     CLINICAL HISTORY:  oropharynx SCC s/p XRT, f/u; Malignant neoplasm of posterior wall of nasopharynx     TECHNIQUE:  10.8 mCi of F18-FDG was administered intravenously in the left antecubital fossa.  After an approximately 60 min distribution time, PET/CT images were acquired from the skull base to the mid thigh. Transmission images were acquired to correct for attenuation using a whole body low-dose CT scan without contrast with the arms positioned along the side of the torso. Glycemia at the time of injection was 205 mg/dL.     COMPARISON:  08/13/2021     FINDINGS:  Quality of the study: Adequate.     Head neck: There has been interval increase in size and FDG avidity of multiple clustered right level II cervical lymph nodes with SUV max of 8.8, previously 3.2.     Chest: No abnormal foci of increased tracer uptake are present.     Abdomen and pelvis: No abnormal foci of increased tracer uptake are present.     Skeletal: No abnormal foci of increased tracer uptake are present.     Physiologic uptake of the tracer is present within the brain, salivary glands, myocardium, GI and  tracts.     Incidental CT findings: There are numerous sigmoid colonic diverticula present without evidence of acute diverticulitis.  Prior sternotomy noted.  There is a loop recorder device in place within the left anterior chest wall.  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     Impression:     1. Interval increase in size and FDG avidity of  multiple suspicious clustered right level II cervical lymph nodes  2. No evidence of distant metastatic disease.        Electronically signed by: Waldo Verde MD  Date:                                            03/24/2022  Time:                                           13:41    Pathology:    See above    ?   Assessment/Plan:   Abnormal results of thyroid function studies     Malignant neoplasm of pharyngeal tonsil    Immunodeficiency secondary to radiation therapy       1. Abnormal results of thyroid function studies     2. Malignant neoplasm of pharyngeal tonsil    3. Immunodeficiency secondary to radiation therapy          Plan:     # Stage II, cT2 N2 M0, p16+ right tonsilar is squamous cell carcinoma: staging PET-CT with 3.7 cm FDG avid primary right tonsillar mass and local adenopathy as well as solitary contralateral cervical node.  MRI brain and PET without evidence of distant metastatic disease.  Unfortunately Ms. Wise has several significant comorbidities including CAD, CVA, severe hearing loss and dementia with limited baseline functional status not chemotherapy candidate.  She completed definitive radiation therapy alone requiring significant supportive care  In February 2021.  Repeat PET-CT 05/06/2021 with significant improvement in lymphadenopathy/avidity with residual avidity and right cervical region.   Right cervical recurrence March 2022 with pain and has proceeded with palliative immunotherapy pembrolizumab in addition to reirradiation.        She has had 2 doses of immunotherapy with pembrolizumab palliatively.  Unfortunately she has had progressive clinical decline with fatigue and recent fall.  We discussed concern regarding pain management balanced with sedating potential side effects which may increase risk of fall.  Blood pressure also low contributed to by poor p.o. intake.Will give IV fluid support.  Recommend holding her treatment, restaging evaluation (CT neck did note progressive  size of cervical lymph node), follow-up with palliative Care and Radiation Oncology and follow-up with us after restaging full-body exam.  We discussed options of supportive care only and potential hospice.  They may be interested in inpatient hospice given concerns of limited home resources for 247 care.        hypokalemia:  Discussed dietary change and supplementation sent to pharmacy.  Close lab recheck next week  Advance Care Planning   Discussion of pain control, goals of care, ACP, referral to palliative care discussed with patient and daughter.           Follow-Up:   Patient Instructions   1L ivf over 2 hrs tomorrow instead of infusion keytruda  PET  RV in 1 wk after PET with repeat cmp and to review results  Follow-up with \[facility]\"" care and SW, discuss possible inpatient hospice

## 2022-07-25 NOTE — PROGRESS NOTES
Nurse reached out to pt's to discuss inpatient hospice or nursing home placement. Pt's daughter sated her mother has fallen 6+ times in 3 days, she stated she cant stay at her apartment any longer, its to hard.  After discussing pt's health concerns with falls, pt's dgt stated she may need nursing home placement, and her daughter stated ms alas is not happy about the though. Ms Alas's dgt stated after the last fall, she fx her ribs and wrist.   Nurse will send message to oncologist provider for nursing home placement.

## 2022-07-25 NOTE — PATIENT INSTRUCTIONS
1L ivf over 2 hrs tomorrow instead of infusion keytruda  PET  RV in 1 wk after PET with repeat cmp and to review results  Follow-up with [facility] care and SW, discuss possible inpatient hospice

## 2022-07-26 NOTE — PROGRESS NOTES
[facility] [place]  RADIATION ONCOLOGY FOLLOW UP    Name: Tiffanie Wise : 1940     DIAGNOSIS: R oropharyngeal squamous cell carcinoma likely T2v3 N1 (bilateral nodes on PET) Mx, p16 unknown --> local recurrence in R neck    TREATMENT HISTORY:   1. 66Gy/30fx radiation alone completed 2/10/21  2. Immunotherapy for R neck recurrence/dermal lesion (ongoing)  3. 14.8Gy/4fx Quadshot palliative radiation to R neck completed 6/15/22    INTERVAL HISTORY: Tiffanie Wise is a pleasant 81 y.o. female who presents today for follow-up.  First f/u since completing palliative re-irradiation     R ear canal pathology came back as atypical squamous cell proliferation, neoplastic process cannot be completely excluded    Had fall and a few ER visits since completion of radiation  Daughter stating she is not safe to stay alone anymore    R neck pain improved after radiation, skin a bit thicker - no worsening of skin nodule  Now eating/swallowing but not much drive to eat, sleeping all the time  R neck mass decrased in size  Worsening R ear pain and drainage, none today noted  Pain meds per palliative, had decreased off all narcotics until recently    PHYSICAL EXAM:   Constitutional: well appearing, no acute distress, ECOG 2 - Ambulates, capable of self care only  Vitals:      122/64       Pulse   63       Temp   97.5 °F (36.4 °C)       Resp   17       Wt   63.5 kg (139 lb 15.9 oz)         LMP    (LMP Unknown)    SpO2   95%    BMI   28.27 kg/m²          Eyes: sclera anicteric, EOMI, pupils equal, round and reactive to light  ENT: oral cavity without lesions, moist mucous membranes, no mucositis appreciated, visible deficit in R soft palate where tumor was previously  Neck: trachea midline, R neck SCM fibrosis,  nodule on R posterior neck decreased in size  Lymphatic: no cervical, supraclavicular or axillary adenopathy    Laboratory & X-Ray Findings: Per above.  Images reviewed personally.    ASSESSMENT: fair  response to palliative re-irradiation of R neck but decline in overall performance status    PLAN: Ms. Wise has good local response to palliative re-irradiation with shrinkage of R neck mass and improved swallowing function.  Ear canal path came back as questionable - Unsure if disease there, if worsens could use electron only plan but I would prefer to not give her any more radiation    She is not safe to live alone anymore, and after discussion with daughter needs either nursing home or inpatient hospice placement.   I will discuss with palliative care and [facility] goals of care, response given this recent decline in status.    Continue f/u w Palliative care  Continue f/u with medical oncology re immunotherapy    I spent approximately 30 minutes reviewing the available records and evaluating the patient, out of which over 50% of the time was spent face to face with the patient in counseling and coordinating this patient's care.    Isidoro Arce III, M.D.  Radiation Oncology  [facility]  [id number] [place] [address] [place] [address]  Ph: [phone]  [email or web address]

## 2022-07-26 NOTE — PROGRESS NOTES
Palliative Medicine         Follow up  The patient location is: [place], [place]  The chief complaint leading to consultation is: pain follow up    Visit type: audiovisual    Face to Face time with patient: 36  45 minutes of total time spent on the encounter, which includes face to face time and non-face to face time preparing to see the patient (eg, review of tests), Obtaining and/or reviewing separately obtained history, Documenting clinical information in the electronic or other health record, Independently interpreting results (not separately reported) and communicating results to the patient/family/caregiver, or Care coordination (not separately reported).     Each patient to whom he or she provides medical services by telemedicine is:  (1) informed of the relationship between the physician and patient and the respective role of any other health care provider with respect to management of the patient; and (2) notified that he or she may decline to receive medical services by telemedicine and may withdraw from such care at any time.    SUBJECTIVE:   Chief complaint: pain and GOC follow up    07/26/2022   Ms. Wise was seen with her daughter today per request of multiple providers in light of functional decline. Mlavin did the majority of the speaking given Ms. Wise's poor hearing. Malvin says that her mom has been falling recently likely related to taking her PM meds in AM and possibly taking more than rx, poor appetite and intake unless prompted, sleeping more, and overall not mentally as sharp. She is still taking methadone 2.5mg nightly and hydrocodone decreased to 2.5mg BID plus APAP PRN. They have also decreased her Klonopin dose to HS as she feared that she was taking it frequently over the day. Over the last week they have visited with rad onc, med onc, and pulmonology discussing their concerns of her functional decline and inquiring about the next step. She received modest benefit from radiation however  has visible progression and now recurring pain. She has enlargement of a cervical lymph node on Keytruda and PET was ordered for thorough eval but progression high concern. Malvin says that her mother can no longer live alone but has no family members or friends to assist. She is exhibiting high levels of stress and caregiver burnout. This is not the first time we are in this situation with Ms. Wise and her daughter however the previous time she was showing response to treatment with optimism she would improve and regain her independence. This time we are in a different place and without other treatments available for cancer treatment it seems that her decline is not expected to improve. I recommend earlier hospice enrollment and redirecting to comfort focused care as the trajectory implies she will need more support in the coming weeks and month. I think that hospice enrollment would enable the comfort focused care to begin quickly but also provide additional support and resources to Malvin. She was emotional but agreed wholeheartedly that her mother is dying despite the current treatment plan. Ms. Wise also agreed today that she is dying and does not see the use in continuing treatment since it is not making her better. Malvin would like a referral for [facility] due to prior experience and availability of inpatient facility. She is hopeful that they will be able to move her to the inpatient unit and assist with NH placement. We discussed the criteria for IPU vs respite and the time it can take to place in LTC. She says she can manage for now with the 24 hr care but is growing quite weary and needs help ASAP. Hospice rep was notified of referral and social situation as well as needs. I will defer to their tea for ongoing management but available for assistance if needed.     Past visits:  7/13/22: Seen via televisit with rene Coombs. I have received messages lately regarding panic attacks  and worsening pain. In today's visit, they report that her pain and anxiety have improved in the last 48hrs. She is still chewing her nails constantly and appeared anxious on the call but daughter says this is much improved from what she had been doing. Daughter laid out additional doses of clonazepam and instructed her to take PRN for anxiety. Thankfully she only took one additional dose. I educated them of the long acting nature of this medication and should not be given PRN. We discussed adding Wellbutrin or increasing clonazepam to BID and they chose the latter for now. Her pain has also improved over the last few days and is now having drainage from the ear. It is unclear whether this was radiation benefit and/ or the delayed effects of Keytuda. She has been taking methadone nightly and has not required a dose of hydrocodone in 1 week. She has been supplementing with APAP PRN with good response. They understand the biopsy with ENT was suggestive of cancer and a repeat was offered however they declined citing it would not change the treatment plan in their opinion and feel the first report was conclusive. Her PO intake has improved recently but still limited to soft foods.     6/1/22: Ms. Wise returns to clinic with her daughter Malvin. During the check in process the two ended up having an emotional conversation about Malvin's concerns that her mom is not taking her pain medications and suffering which is frustrating and stressful for her daughter. By the time I entered the room they were both smiling and said they felt better. Ms. Wise says since I saw her last she is not doing well. The knot below her right ear has ruptured and draining but also intensifying in pain. She has preauricular fullness and induration which is painful and now affecting her ability to eat. She is having odynophagia and minimizing her oral intake to liquids and soft foods. Malvin is worried about getting her large pill  tablets down and is expecting to have to crush them soon. Ms. Wise's main complaint is not being able to sleep and admits she cannot wear her CPAP as the strap was rubbing on the sore. She usually sleeps very well with the CPAP and Malvin thinks this is the main culprit for her insomnia. Ms. Wise is reluctant to take pain medications given her late daugther's substance abuse history. She brought her log of pain medication usage and shows TID usage on average. She does not like taking the medication but admits that she waits too long then the pain is too severe for the medications to make much difference. She tries to nap after taking a dose but rarely succeeds. I recommend using a shield to keep the CPAP straps off her neck and hope this allows her to rest. During her first round of treatment we had a difficult time managing her pain and she required dose escalation with multiple agents. She seems to respond better to long acting agents likely because of the basal control but also minimal dosing. In light of her neuropathic component I would like to restart methadone. She initially was resistant as she thought I meant methamphetamine but after more education agreed. I will start low but plan to titrate the dose in the coming weeks given her past opioid usage and requirements. It also seems that the dose of hydrocodone is inadequate thus I will increase to 10mg q4hr PRN pain. Malvin is worried about progression and wonders when it is time to admit this is a failed treatment plan and begin to look at other options. She understands that radiation is an option but is not recommended unless it is the last option. Ms. Wise knows that the worsening pain and induration likely mean progression and knows that she will likely be making hard decisions soon. They will meet with Dr. Arce on Friday and both are eager to hear his take on the situation.     4/6/22: Ms. Wise comes to clinic with her daughter Yfn Henderson to  re-engage due to concern of recurrent disease. She has not been seen in the PM clinic in nearly a year as she was able to recover from the toxicity of radiation treatment, wean off all opioids, resume PO intake, and return to independent living. She was recently admitted to the hospital for cellulitis of the arm which is now healing. Ms. Wise admits that she is scared that the lesion on her neck is cancer and will be told that she is dying. She does not wish to proceed with surgery and would not want aggressive radiation but would be open to limited radiation treatments and immunotherapy. She meets with Dr. Walker this Friday to discuss the recommendation of Keytruda and when to start. While she is afraid of dying she does understand that death is nonnegotiable and when her time is shorter would value quality over quantity. She would like to try Keytruda to see if this can prolong her life with minimal side effects. Her biggest complaints are the right ear and jaw pain which is the same pain as in the past when her cancer was so symptomatic. She took a hydrocodone this morning but typically takes only at bedtime. I agree with hydrocodone and am okay with increasing to q4hr PRN. She is very resistant to take opioids given her late daughter's addiction history and recent death. However she acknowledges that it has been helpful and improves her quality of life. We discussed the close monitoring that accompanies our prescribing as well as acknowledging the addictive nature of opioids. She is open to continuing the opioids under my care. In the past she was on fentanyl, oxycodone, and MSIR but this was at the height of her pain from radiation toxicity and eventually improved as the tissues healed. She is currently tolerating hydrocodone with no side effects besides constipation so we will continue. She having right arm pain since the cellulitis appeared which is neuropathic in nature and refractory to Lyrica. She has  met with ortho and plans to follow up for a steroid injection in the wrist soon. I have sent OI resources and will plan to follow up in 1 month.  She has HCPOA on file and LaPOST. I confirmed her LaPOST and DNR/I are consistent with her current views and wishes.    4/6/2021: 79 yo female seen in f/u for oral pain s/p radiation therapy. She was recently discharged from SNF following hospitalization and has experienced some deconditioning, but is able to ambulate with rolling walker and standby assistance. She is using magic mouthwash/viscous lidocaine for oral pain with good relief. She has continued on fentanyl 50 mcg TD for pain management, patch change due today and is being given oxycodone 5 mg twice daily. She is home from SNF with PEG for feeding and home health from ST/OT/PT. She saw rad onc today and has upcoming PET scan for restaging today. Daughter reports that things are going well with home health; she is hopeful dysphagia will resolve with continued ST.  Brain's main concerns today are that her mother is drowsy and continues to sleep much of the day. She thinks that her mom's pain is much improved outside of continued tongue ulceration and is interested in backing off of her pain medication in hopes that this will increase her wakeful time.       ANAND GARCIA reviewed and summarized:        Past Medical History:   Diagnosis Date    Anxiety     AP (angina pectoris) 7/18/2013    Arterial ischemic stroke, MCA (middle cerebral artery), left, acute 12/15/2017    Asthma     Broken ribs     CAD, multiple vessel 5/13/2021    Class 1 obesity due to excess calories with serious comorbidity and body mass index (BMI) of 30.0 to 30.9 in adult 3/15/2019    Coronary artery disease 7/18/2013    Depression     Diastolic heart failure     Gastrostomy status     GERD (gastroesophageal reflux disease) 7/18/2013    History of PTCA 7/18/2013    Hypertension 7/18/2013    Hypothyroidism     Immunodeficiency due to  chemotherapy 4/8/2022    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     Trouble in sleeping     Type 2 diabetes mellitus with ophthalmic manifestations     Urinary incontinence     pullups day, pads at night     Review of patient's allergies indicates:  No Known Allergies    Medications:    Current Outpatient Medications:     amLODIPine (NORVASC) 10 MG tablet, Take 1 tablet (10 mg total) by mouth once daily., Disp: 90 tablet, Rfl: 4    aspirin 81 MG Chew, 1 tablet (81 mg total) by Per G Tube route once daily., Disp: , Rfl: 0    atorvastatin (LIPITOR) 40 MG tablet, Take 1 tablet (40 mg total) by mouth every evening., Disp: 90 tablet, Rfl: 4    blood sugar diagnostic Strp, CHECK BLOOD SUGAR THREE TIMES DAILY, Disp: , Rfl:     clonazePAM (KLONOPIN) 0.5 MG tablet, 1 tablet (0.5 mg total) by Per G Tube route every evening., Disp: 30 tablet, Rfl: 0    clopidogreL (PLAVIX) 75 mg tablet, TAKE 1 TABLET EVERY DAY, Disp: 90 tablet, Rfl: 3    donepeziL (ARICEPT) 10 MG tablet, TAKE 1 TABLET (10 MG TOTAL) BY MOUTH ONCE DAILY., Disp: 90 tablet, Rfl: 3    EScitalopram oxalate (LEXAPRO) 20 MG tablet, TAKE 1 TABLET EVERY DAY, Disp: 90 tablet, Rfl: 3    fluoride, sodium, (PREVIDENT) 1.1 % Gel, Place 1 g onto teeth nightly., Disp: 56 g, Rfl: 3    hydroCHLOROthiazide (HYDRODIURIL) 25 MG tablet, Take 1 tablet (25 mg total) by mouth once daily., Disp: 90 tablet, Rfl: 4    HYDROcodone-acetaminophen (NORCO) 5-325 mg per tablet, Take 1 tablet by mouth every 6 (six) hours as needed for Pain., Disp: 120 tablet, Rfl: 0    insulin aspart  protamine-insulin aspart (NOVOLOG 70/30) 100 unit/mL (70-30) InPn pen, Inject 18 Units into the skin once daily., Disp: , Rfl:     insulin detemir U-100 (LEVEMIR FLEXTOUCH) 100 unit/mL (3 mL) SubQ InPn pen, Inject 20 Units into the skin once daily., Disp: , Rfl: 0    isosorbide mononitrate (IMDUR) 30 MG 24 hr tablet, Take 1 tablet (30 mg total) by mouth once daily., Disp: 90 tablet, Rfl: 4    levothyroxine (SYNTHROID) 50 MCG tablet, Take 1 tablet by mouth once daily., Disp: , Rfl:     losartan (COZAAR) 100 MG tablet, Take 1 tablet (100 mg total) by mouth once daily., Disp: 90 tablet, Rfl: 4    methadone (DOLOPHINE) 5 MG tablet, Take 0.5 tablets (2.5 mg total) by mouth every evening., Disp: 30 tablet, Rfl: 0    nitroGLYCERIN (NITROSTAT) 0.4 MG SL tablet, Place 1 tablet (0.4 mg total) under the tongue every 5 (five) minutes as needed., Disp: 25 tablet, Rfl: 6    pantoprazole (PROTONIX) 40 MG tablet, TAKE 1 TABLET EVERY DAY, Disp: 90 tablet, Rfl: 0    potassium chloride SA (K-DUR,KLOR-CON) 20 MEQ tablet, Take 1 tablet (20 mEq total) by mouth once daily., Disp: 14 tablet, Rfl: 0    pregabalin (LYRICA) 75 MG capsule, Take 150 mg by mouth nightly., Disp: , Rfl:     SITagliptin (JANUVIA) 100 MG Tab, Take 1 tablet by mouth once daily., Disp: , Rfl:     vitamin D 1000 units Tab, Take 1,000 Units by mouth once daily., Disp: , Rfl:   No current facility-administered medications for this visit.    Facility-Administered Medications Ordered in Other Visits:     alteplase injection 2 mg, 2 mg, Intra-Catheter, PRN, Jeaneth Osman MD    heparin, porcine (PF) 100 unit/mL injection flush 500 Units, 500 Units, Intravenous, PRN, Jeaneth Osman MD    sodium chloride 0.9% flush 10 mL, 10 mL, Intravenous, PRN, Jeaneth Osman MD    OBJECTIVE:     ROS:  Review of Systems       Review of Symptoms    Symptom Assessment (ESAS 0-10 Scale)  Pain:  5  Dyspnea:  0  Anxiety:  0  Nausea:  0  Depression:  0  Anorexia:   10  Fatigue:  10  Insomnia:  0  Restlessness:  10  Agitation:  0     CAM / Delirium:  Negative    ECOG Performance Status ndGndrndanddndend:nd nd2nd Living Arrangements:  Lives alone      Advance Care Planning   Advance Directives:   Living Will: No    LaPOST: Yes    Do Not Resuscitate Status: Yes    Medical Power of : Yes    Agent's Name:  1: dtr Connie Chinchilla (Harilynn)arre [phone], 2: son in [facility] [phone]    Decision Making:  Patient answered questions and Family answered questions          Physical Exam:  Vitals:    Physical Exam  Vitals reviewed: televisit.         Radiology and laboratory data reviewed    ASSESSMENT/PLAN:     Problem List Items Addressed This Visit        Oncology    Malignant neoplasm of pharyngeal tonsil    Overview     T2v3 N1 (bilateral nodes on PET) Mx, p16 POSITIVE            Current Assessment & Plan     On Keytruda with concern of progression related to cervical node enlargement. PET planned however due to declining functional status ongoing treatment is unlikely to alter the prognosis.    They are accepting of hospice services and will begin enrollment today.           Cancer related pain    Overview     Ear and jaw referred pain worsening however unable to titrate or even tolerate previous regimen due to somnolence and falls.    Recently taking hydrocodone 2.5mg BID     Continue methadone 2.5mg HS (QTc 406 2/17/22).     Defer ongoing management to hospice MD              Palliative Care    Encounter for palliative care - Primary    Overview     Details in HPI, will redirect to comfort focused care and enroll with hospice. PM RN to assist with referral    LaPOST on file current with wishes of DNR/I    HCPOA on file: 1: dtr Rosales (Harilynn) Chilo [phone], 2: son in [facility] [phone]                   Follow up: PRN, defer to hospice for ongoing management. Prognosis- weeks to months.    Signature: Zohra Dudley PA-C

## 2022-07-26 NOTE — ASSESSMENT & PLAN NOTE
On Keytruda with concern of progression related to cervical node enlargement. PET planned however due to declining functional status ongoing treatment is unlikely to alter the prognosis.    They are accepting of hospice services and will begin enrollment today.

## 2022-07-26 NOTE — NURSING
Pt tolerated IV fluids over 2 hours ,verbalized feeling much better after infusion even asked for additional fluids. D/C IV pressure dressing applied discharged to home with daughter via wheelchair.

## 2022-07-26 NOTE — PLAN OF CARE
Discussed plan of care with pt. Addressed any and ongoing concerns. Pt denies   Problem: Adult Inpatient Plan of Care  Goal: Plan of Care Review  Outcome: Ongoing, Progressing  Goal: Patient-Specific Goal (Individualized)  Outcome: Ongoing, Progressing  Goal: Absence of Hospital-Acquired Illness or Injury  Outcome: Ongoing, Progressing  Intervention: Identify and Manage Fall Risk  Flowsheets (Taken 7/26/2022 0853)  Safety Promotion/Fall Prevention:   high risk medications identified   in recliner, wheels locked  Intervention: Prevent Infection  Flowsheets (Taken 7/26/2022 0853)  Infection Prevention:   hand hygiene promoted   personal protective equipment utilized   equipment surfaces disinfected  Goal: Optimal Comfort and Wellbeing  Outcome: Ongoing, Progressing  Intervention: Monitor Pain and Promote Comfort  Flowsheets (Taken 7/26/2022 0853)  Pain Management Interventions:   pillow support provided   position adjusted   relaxation techniques promoted  Intervention: Provide Person-Centered Care  Flowsheets (Taken 7/26/2022 0853)  Trust Relationship/Rapport:   care explained   choices provided   emotional support provided   empathic listening provided   questions answered   questions encouraged   reassurance provided   thoughts/feelings acknowledged

## 2022-08-12 NOTE — TELEPHONE ENCOUNTER
Nurse called pt d/t missed appt today. Pt dtr states that pt is now in hospice and to cancel pt appt. Nurse confirms understanding and states to reach out to us if anything changes.

## 2022-09-21 ENCOUNTER — OUTPATIENT CASE MANAGEMENT (OUTPATIENT)
Dept: ADMINISTRATIVE | Facility: OTHER | Age: 82
End: 2022-09-21
Payer: MEDICARE

## 2022-09-21 NOTE — PROGRESS NOTES
Outpatient Care Management  Patient Not Qualified    Patient: Tiffanie Wise  MRN:  [id number]  Date of Service:  9/21/2022  Completed by:  Connie Briones RN    Chief Complaint   Patient presents with    OPCM RN First Assessment Attempt    Case Closure       Patient Summary     Program:  OPCM High Risk  Qualification Status:  No  Reason Not Qualified:  Needs met by others    9/21/22 - Has been admitted to hospice care. Connie Briones RN

## 2022-09-23 ENCOUNTER — TELEPHONE (OUTPATIENT)
Dept: FAMILY MEDICINE | Facility: CLINIC | Age: 82
End: 2022-09-23
Payer: MEDICARE

## 2022-12-13 NOTE — PROGRESS NOTES
4/1/22 1603  Attempt assessment with patient for outpatient case management. Left voicemail message requesting call back. RN OPCM 3rd assessment attempt.Message sent to Dr. Wood regarding unable to reach for enrollment into OPCM program. OPCM brochure with RN case  and [facility] on Call brochure sent to . CASE CLOSURE.  Nata Quispe RN    4/11/22 1117  Attempt assessment with patient for outpatient case management. Left voicemail message requesting call back. RN OPCM 2nd assessment attempt. Unable to reach letter sent.  Nata Quispe RN    4/8/22 0942  Attempt assessment with patient for outpatient case management. Left voicemail message at [phone] requesting call back. RN OPCM 1st assessment attempt.  Nata Quispe RN   wound check

## 2023-03-19 NOTE — ASSESSMENT & PLAN NOTE
Continue AUTOPAP of  4 - 20 CMWP (DME -  [facility])     Discussed therapeutic goals for positive airway pressure therapy(CPAP or BiPAP): Ideal is usage 100% of nights for 6 - 8 hours per night. Minimum usage is 70% of night for at least 4 hours per night used. Pateint expressed understanding. All Questions answered.    Complaince download in 6 Months.  
Stable and controlled. No changes to current therapeutic plan. Continue all previously prescribed medications.     
General

## 2023-07-03 NOTE — PLAN OF CARE
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, unspecified vessel or lesion type, unspecified whether native or transplanted heart [I25.118]    Admission Date: 2/16/2022  Expected Discharge Date: 2/18/2022    Discharge Barriers Identified: None    Payor: [employer] MANAGED MEDICARE / Plan: [employer] MEDICARE HMO / Product Type: Capitation /     Extended Emergency Contact Information  Primary Emergency Contact: MeirConnie "[facility]"   [place]  Mobile Phone: [phone]  Relation: Healthcare Power of   Secondary Emergency Contact: Juan Jose Tirado  Mobile Phone: [phone]  Relation: Son    Discharge Plan A: Home  Discharge Plan B: Home      [facility] - [facility] [address]  [address] [address]  Phone: [phone] Fax: [phone]    [facility] - [place], [place] - [address]  [address] [address]  Phone: [phone] Fax: [phone]    [facility] [address] [facility] [place] [address]  Phone: [phone] Fax: [phone]    [facility] [place], [place] - [address]   [address]  [facility] [address]  Phone: [phone] Fax: [phone]      Initial Assessment (most recent)     Adult Discharge Assessment - 02/18/22 1023        Discharge Assessment    Assessment Type Discharge Planning Assessment     Confirmed/corrected address, phone number and insurance Yes     Confirmed Demographics Correct on Facesheet     Source of Information health care advocate     Communicated [place] with patient/caregiver Yes     Reason For Admission Abscess     Lives With alone     Facility " Arrived From: home     Do you expect to return to your current living situation? Yes     Do you have help at home or someone to help you manage your care at home? No     Prior to hospitilization cognitive status: Alert/Oriented     Current cognitive status: Alert/Oriented     Walking or Climbing Stairs Difficulty none     Dressing/Bathing Difficulty none     Equipment Currently Used at Home none     Readmission within 30 days? No     Patient currently being followed by outpatient case management? No     Do you currently have service(s) that help you manage your care at home? No     Do you take prescription medications? Yes     Do you have prescription coverage? Yes     Coverage [employer]     Do you have any problems affording any of your prescribed medications? No     Is the patient taking medications as prescribed? yes     Who is going to help you get home at discharge? Patients daughter (HPOA)- Connie     How do you get to doctors appointments? car, drives self;family or friend will provide     Are you on dialysis? No     Do you take coumadin? No     Discharge Plan A Home     Discharge Plan B Home     DME Needed Upon Discharge  none     Discharge Plan discussed with: POA     Name(s) and Number(s) Connie Worley- [phone]     Discharge Barriers Identified None               Initial assessment completed with patient's HPOA Connie. Patient's HPOA reports independence with ADL's  prior to hospitalization. Patient uses no DME at home. Patient currently has no cm needs upon DC. CM will continue following to assist with other needs.   CM provided information on advanced directives, information on pharmacy bedside delivery, and discharge planning begins on admission with contact information for any needs/questions.                      4

## 2024-06-11 NOTE — ASSESSMENT & PLAN NOTE
2/21 Likely secondary to diuretics/other medications in addition to HTN   Creatinine at 2.1 in ED / Baseline appears to be around 1.2  Monitor for fluid overload  Monitor renal function labs closely  Avoid nephrotoxins as able  Monitor I&Os  Holding home HCTZ, losartan for now  2/22 Improved     [Never] : Never [0] : 2) Feeling down, depressed, or hopeless: Not at all (0) [PHQ-2 Negative - No further assessment needed] : PHQ-2 Negative - No further assessment needed [Time Spent: ___ Minutes] : I spent [unfilled] minutes performing a depression screening for this patient. [[id number]] : 0

## 2024-08-28 NOTE — TELEPHONE ENCOUNTER
Spoke with peg appointment made for pt to come io tomorrow at 11 am to see Dr. Wood for the painful lump under her arm. Daughter thinks it is a Lymph node but since the patient has had cancer we need to have her checked   see H&P

## 2024-11-12 NOTE — ASSESSMENT & PLAN NOTE
Patient needs refill on her Buspar and would like it sent to [facility] in [place].    Stroke risk factor  Continue home medications

## (undated) DEVICE — TUBING SUCTION STRAIGHT .25X20

## (undated) DEVICE — GASTRO ENTUIT PLACE SET MEDIUM

## (undated) DEVICE — COVER LIGHT HANDLE 80/CA

## (undated) DEVICE — FASTENER KIT GASTRO SAF-T PEXY

## (undated) DEVICE — APPLICATOR CHLORAPREP ORN 26ML

## (undated) DEVICE — ELECTRODE REM PLYHSV RETURN 9

## (undated) DEVICE — SOL IRR NACL .9% 3000ML

## (undated) DEVICE — SEE MEDLINE ITEM 157027

## (undated) DEVICE — NDL ECLIPSE SAFETY 18GX1-1/2IN

## (undated) DEVICE — SOL 9P NACL IRR PIC IL

## (undated) DEVICE — MANIFOLD 4 PORT

## (undated) DEVICE — GLOVE SURG BIOGEL LATEX SZ 7.5

## (undated) DEVICE — TUBE FEEDING ENTUIT 18FR

## (undated) DEVICE — TOWEL OR DISP STRL BLUE 4/PK

## (undated) DEVICE — SYR 10CC LUER LOCK